# Patient Record
Sex: FEMALE | Race: BLACK OR AFRICAN AMERICAN | NOT HISPANIC OR LATINO | Employment: OTHER | URBAN - METROPOLITAN AREA
[De-identification: names, ages, dates, MRNs, and addresses within clinical notes are randomized per-mention and may not be internally consistent; named-entity substitution may affect disease eponyms.]

---

## 2017-01-03 ENCOUNTER — LAB VISIT (OUTPATIENT)
Dept: LAB | Facility: HOSPITAL | Age: 57
End: 2017-01-03
Attending: INTERNAL MEDICINE
Payer: COMMERCIAL

## 2017-01-03 DIAGNOSIS — Z94.4 LIVER REPLACED BY TRANSPLANT: ICD-10-CM

## 2017-01-03 LAB
ALBUMIN SERPL BCP-MCNC: 3.7 G/DL
ALP SERPL-CCNC: 71 U/L
ALT SERPL W/O P-5'-P-CCNC: 24 U/L
ANION GAP SERPL CALC-SCNC: 6 MMOL/L
AST SERPL-CCNC: 20 U/L
BASOPHILS # BLD AUTO: 0.01 K/UL
BASOPHILS NFR BLD: 0.2 %
BILIRUB SERPL-MCNC: 0.5 MG/DL
BUN SERPL-MCNC: 10 MG/DL
CALCIUM SERPL-MCNC: 9.1 MG/DL
CHLORIDE SERPL-SCNC: 108 MMOL/L
CO2 SERPL-SCNC: 28 MMOL/L
CREAT SERPL-MCNC: 0.8 MG/DL
DIFFERENTIAL METHOD: NORMAL
EOSINOPHIL # BLD AUTO: 0.1 K/UL
EOSINOPHIL NFR BLD: 1.8 %
ERYTHROCYTE [DISTWIDTH] IN BLOOD BY AUTOMATED COUNT: 13.3 %
EST. GFR  (AFRICAN AMERICAN): >60 ML/MIN/1.73 M^2
EST. GFR  (NON AFRICAN AMERICAN): >60 ML/MIN/1.73 M^2
GLUCOSE SERPL-MCNC: 80 MG/DL
HCT VFR BLD AUTO: 39.2 %
HGB BLD-MCNC: 13 G/DL
LYMPHOCYTES # BLD AUTO: 1.2 K/UL
LYMPHOCYTES NFR BLD: 26.8 %
MCH RBC QN AUTO: 29.4 PG
MCHC RBC AUTO-ENTMCNC: 33.2 %
MCV RBC AUTO: 89 FL
MONOCYTES # BLD AUTO: 0.3 K/UL
MONOCYTES NFR BLD: 7.3 %
NEUTROPHILS # BLD AUTO: 2.8 K/UL
NEUTROPHILS NFR BLD: 63.7 %
PLATELET # BLD AUTO: 185 K/UL
PMV BLD AUTO: 10 FL
POTASSIUM SERPL-SCNC: 3.9 MMOL/L
PROT SERPL-MCNC: 7.2 G/DL
RBC # BLD AUTO: 4.42 M/UL
SIROLIMUS BLD-MCNC: 6.5 NG/ML
SODIUM SERPL-SCNC: 142 MMOL/L
TACROLIMUS BLD-MCNC: 2.1 NG/ML
WBC # BLD AUTO: 4.36 K/UL

## 2017-01-03 PROCEDURE — 80053 COMPREHEN METABOLIC PANEL: CPT

## 2017-01-03 PROCEDURE — 85025 COMPLETE CBC W/AUTO DIFF WBC: CPT

## 2017-01-03 PROCEDURE — 36415 COLL VENOUS BLD VENIPUNCTURE: CPT

## 2017-01-03 PROCEDURE — 80197 ASSAY OF TACROLIMUS: CPT

## 2017-01-03 PROCEDURE — 80195 ASSAY OF SIROLIMUS: CPT

## 2017-01-05 ENCOUNTER — TELEPHONE (OUTPATIENT)
Dept: TRANSPLANT | Facility: CLINIC | Age: 57
End: 2017-01-05

## 2017-01-05 DIAGNOSIS — C22.1 CHOLANGIOCARCINOMA: Primary | ICD-10-CM

## 2017-01-05 NOTE — TELEPHONE ENCOUNTER
Dr. Benítez reviewed your labs.  Continue routine labs no changes needed.  Repeat labs on 01/23/17, call to let patient know

## 2017-01-18 DIAGNOSIS — R52 PAIN: Primary | ICD-10-CM

## 2017-01-19 ENCOUNTER — OFFICE VISIT (OUTPATIENT)
Dept: ORTHOPEDICS | Facility: CLINIC | Age: 57
End: 2017-01-19
Payer: COMMERCIAL

## 2017-01-19 ENCOUNTER — HOSPITAL ENCOUNTER (OUTPATIENT)
Dept: RADIOLOGY | Facility: OTHER | Age: 57
Discharge: HOME | End: 2017-01-19
Attending: ORTHOPAEDIC SURGERY
Payer: COMMERCIAL

## 2017-01-19 VITALS
HEART RATE: 66 BPM | DIASTOLIC BLOOD PRESSURE: 68 MMHG | HEIGHT: 62 IN | BODY MASS INDEX: 24.78 KG/M2 | WEIGHT: 134.69 LBS | SYSTOLIC BLOOD PRESSURE: 103 MMHG

## 2017-01-19 DIAGNOSIS — M65.311 TRIGGER FINGER OF RIGHT THUMB: Primary | ICD-10-CM

## 2017-01-19 DIAGNOSIS — M65.312 TRIGGER FINGER OF LEFT THUMB: ICD-10-CM

## 2017-01-19 DIAGNOSIS — M65.30 TRIGGER FINGER OF LEFT HAND, UNSPECIFIED FINGER: ICD-10-CM

## 2017-01-19 DIAGNOSIS — R52 PAIN: ICD-10-CM

## 2017-01-19 PROCEDURE — 20550 NJX 1 TENDON SHEATH/LIGAMENT: CPT | Mod: 51,F3,S$GLB, | Performed by: ORTHOPAEDIC SURGERY

## 2017-01-19 PROCEDURE — 73130 X-RAY EXAM OF HAND: CPT | Mod: 50,TC

## 2017-01-19 PROCEDURE — 1159F MED LIST DOCD IN RCRD: CPT | Mod: S$GLB,,, | Performed by: ORTHOPAEDIC SURGERY

## 2017-01-19 PROCEDURE — 99204 OFFICE O/P NEW MOD 45 MIN: CPT | Mod: 25,S$GLB,, | Performed by: ORTHOPAEDIC SURGERY

## 2017-01-19 PROCEDURE — 99999 PR PBB SHADOW E&M-EST. PATIENT-LVL II: CPT | Mod: PBBFAC,,, | Performed by: ORTHOPAEDIC SURGERY

## 2017-01-19 PROCEDURE — 73130 X-RAY EXAM OF HAND: CPT | Mod: 26,50,, | Performed by: RADIOLOGY

## 2017-01-19 RX ADMIN — DEXAMETHASONE SODIUM PHOSPHATE 4 MG: 4 INJECTION, SOLUTION INTRA-ARTICULAR; INTRALESIONAL; INTRAMUSCULAR; INTRAVENOUS; SOFT TISSUE at 09:01

## 2017-01-19 NOTE — PROCEDURES
Tendon Sheath  Date/Time: 1/19/2017 9:29 AM  Performed by: KATHRYN GRANDA  Authorized by: KATHRYN GRANDA     Consent Done?: Yes (Verbal)  Timeout: prior to procedure the correct patient, procedure, and site was verified   Indications:  Pain  Timeout: prior to procedure the correct patient, procedure, and site was verified    Location:  Thumb  Site:  R thumb MCP  Needle size:  22 G  Medications:  4 mg dexamethasone 4 mg/mL

## 2017-01-19 NOTE — PROCEDURES
Tendon Sheath  Date/Time: 1/19/2017 9:30 AM  Performed by: KATHRYN GRANDA  Authorized by: KATHRYN GRANDA     Consent Done?: Yes (Verbal)  Timeout: prior to procedure the correct patient, procedure, and site was verified    Timeout: prior to procedure the correct patient, procedure, and site was verified    Location:  Thumb  Site:  L thumb MCP  Needle size:  22 G  Medications:  4 mg dexamethasone 4 mg/mL

## 2017-01-19 NOTE — PROGRESS NOTES
Chief Complaint: BL hand pain, RIGHT thumb MCP pain and left thumb and index pain    History of Present Illness:  Nadeen Mcgovern is a very pleasant 56 y.o. female w/ hx of liver tx who presents with bilateral hand with no trauma.  She reports diffuse as well as focal right thumb MCP joint and triggering as well as left thumb and ring pain over A1 and triggering.  She has a hx of RIGHT septic arthritis of her shouder treated with arthroscopic lavage as well ass a right thumb I&D a that same time that reportedly did not extend into the joint .    She denies any f/c/n/v.    Review of Systems   Constitution: Negative. Negative for chills, fever and night sweats.   HENT: neg congestion.    Eyes: Negative for blurred vision.  Cardiovascular: Negative for chest pain and syncope.   Respiratory: Negative for cough and shortness of breath.    Hematologic/Lymphatic: Does not bruise/bleed easily.   Skin: Negative for dry skin, itching and rash.   Musculoskeletal: neg fall.  No weakness  Gastrointestinal: Negative for abdominal pain.  Normal bowel function.  Genitourinary: Normal bladder function   Neurological: No dizziness, loss of balance, seizures.   Psychiatric/Behavioral: Negative for depression.         Past Medical History   Diagnosis Date    Acute cholecystitis      Cholecystitis    Arthritis      septic arthritis of right shoulder    Bacteremia due to Streptococcus pneumoniae     Cancer     Cholangiocarcinoma     Jaundice      obstructive    Prediabetes        Past Surgical History:   Past Surgical History   Procedure Laterality Date     section      Arthoscopic Right      drained infection    Shoulder arthroscopy      Incision and drainage of wound       s/p I&D of R thumb    Liver transplant         Social History:  negative tobacco abuse    Allergies:  Review of patient's allergies indicates:  No Known Allergies    Medications:  Current Outpatient Prescriptions   Medication Sig Dispense Refill     amlodipine (NORVASC) 5 MG tablet Take 1 tablet (5 mg total) by mouth once daily. 90 tablet 3    aspirin 81 MG Chew Take 81 mg by mouth once daily.      famotidine (PEPCID) 20 MG tablet Take 1 tablet (20 mg total) by mouth every evening. 30 tablet 11    hydrOXYzine pamoate (VISTARIL) 50 MG Cap Take 1 capsule (50 mg total) by mouth every 8 (eight) hours as needed. 30 capsule 0    multivitamin (THERAGRAN) tablet Take 1 tablet by mouth once daily.      predniSONE (DELTASONE) 10 MG tablet Take 1 tablet daily x 2 weeks and then 1/2 tab daily x 2 weeks. Then take 1/2 tablet every other day for 2 weeks. 28 tablet 0    sirolimus (RAPAMUNE) 1 MG Tab Take 2 tablets (2 mg total) by mouth once daily. 90 tablet 11    tacrolimus (PROGRAF) 1 MG Cap Take 1 capsule (1 mg total) by mouth every 12 (twelve) hours. 60 capsule 11    tramadol (ULTRAM) 50 mg tablet Take 1 tablet (50 mg total) by mouth every 8 (eight) hours as needed for Pain. 90 tablet 2     No current facility-administered medications for this visit.        Physical Exam:   General:  Well developed and well nourished age appropriate female in no acute distress  Cardiovascular: regular rate and rhythm, 2+ radial pulse  Respiratory: non-labored breathing, no wheezing  Musculoskeletal: RIGHT hand diffuse MCP pain and swelling as well as increased ttp over thumg A1, palpable nodule  LEFT hand + ttp over A1 at left thumb and ring finger  BL NVI Median AIN Radial ,PIN and ulnar  FROM of wrist no pain  - CMC grind test  Neuro: as above    Imaging:  Imaging revealed: + MCP arthritis, no dislocation, BL hands    Diagnosis:  56 year old woman with RIGHT MCP arthritis with additional component of trigger finger.  She also has left ring and thumb trigger finger    Plan:   1. We discussed both surgical and non surgical options.  Patient would like to proceed with non-surgical intervention.   2. BL thumb and left ring finger trigger finger injection today

## 2017-01-19 NOTE — LETTER
January 20, 2017      Ankit العلي MD  [address]  [facility] [address]           [facility]  [address]  [address]  [facility] [address]  Phone: [phone]          Patient: Nadeen Mcgovern   MR Number: [id number]   YOB: 1960   Date of Visit: 1/19/2017       Dear Dr. Ankit العلي:    Thank you for referring Nadeen Mcgovern to me for evaluation. Attached you will find relevant portions of my assessment and plan of care.    If you have questions, please do not hesitate to call me. I look forward to following Nadeen Mcgovern along with you.    Sincerely,    Jovana Rossi MD    Enclosure  CC:  No Recipients    If you would like to receive this communication electronically, please contact [email or web address] or [phone] to request more information on [employer] Link access.    For providers and/or their staff who would like to refer a patient to [facility], please contact us through our one-stop-shop provider referral line, [facility], at [phone].    If you feel you have received this communication in error or would no longer like to receive these types of communications, please e-mail [email or web address]

## 2017-01-19 NOTE — PROCEDURES
Tendon Sheath  Date/Time: 1/19/2017 9:31 AM  Performed by: KATHRYN GRANDA  Authorized by: KATHRYN GRANDA     Consent Done?: Yes (Verbal)  Timeout: prior to procedure the correct patient, procedure, and site was verified    Indications:  Pain  Timeout: prior to procedure the correct patient, procedure, and site was verified    Location:  Ring finger  Site:  L ring MCP  Needle size:  22 G  Medications:  4 mg dexamethasone 4 mg/mL

## 2017-01-20 RX ORDER — DEXAMETHASONE SODIUM PHOSPHATE 4 MG/ML
4 INJECTION, SOLUTION INTRA-ARTICULAR; INTRALESIONAL; INTRAMUSCULAR; INTRAVENOUS; SOFT TISSUE
Status: DISCONTINUED | OUTPATIENT
Start: 2017-01-19 | End: 2017-01-20 | Stop reason: HOSPADM

## 2017-01-20 NOTE — PROGRESS NOTES
I have personally taken the history and examined this patient. I agree with the resident's note as stated above. We will do three trigger finger injections today. Pt will f/u with rheum for her scleroderma and pt was referred to GI by rheum. Pt does not seem to have a real grasp on her condition. Spent significant amount t of time on education.

## 2017-01-23 ENCOUNTER — LAB VISIT (OUTPATIENT)
Dept: LAB | Facility: HOSPITAL | Age: 57
End: 2017-01-23
Attending: INTERNAL MEDICINE
Payer: MEDICAID

## 2017-01-23 ENCOUNTER — TELEPHONE (OUTPATIENT)
Dept: TRANSPLANT | Facility: CLINIC | Age: 57
End: 2017-01-23

## 2017-01-23 DIAGNOSIS — C22.1 CHOLANGIOCELLULAR CARCINOMA: ICD-10-CM

## 2017-01-23 DIAGNOSIS — Z94.4 LIVER REPLACED BY TRANSPLANT: ICD-10-CM

## 2017-01-23 DIAGNOSIS — C22.1 CHOLANGIOCARCINOMA: ICD-10-CM

## 2017-01-23 LAB
AFP SERPL-MCNC: 1.2 NG/ML
ALBUMIN SERPL BCP-MCNC: 4 G/DL
ALP SERPL-CCNC: 81 U/L
ALT SERPL W/O P-5'-P-CCNC: 31 U/L
ANION GAP SERPL CALC-SCNC: 7 MMOL/L
AST SERPL-CCNC: 23 U/L
BASOPHILS # BLD AUTO: 0.02 K/UL
BASOPHILS NFR BLD: 0.4 %
BILIRUB SERPL-MCNC: 0.8 MG/DL
BUN SERPL-MCNC: 12 MG/DL
CALCIUM SERPL-MCNC: 9.4 MG/DL
CANCER AG19-9 SERPL-ACNC: 10 U/ML
CEA SERPL-MCNC: 1.5 NG/ML
CHLORIDE SERPL-SCNC: 107 MMOL/L
CO2 SERPL-SCNC: 26 MMOL/L
CREAT SERPL-MCNC: 0.7 MG/DL
DIFFERENTIAL METHOD: NORMAL
EOSINOPHIL # BLD AUTO: 0.1 K/UL
EOSINOPHIL NFR BLD: 2 %
ERYTHROCYTE [DISTWIDTH] IN BLOOD BY AUTOMATED COUNT: 13.2 %
EST. GFR  (AFRICAN AMERICAN): >60 ML/MIN/1.73 M^2
EST. GFR  (NON AFRICAN AMERICAN): >60 ML/MIN/1.73 M^2
GLUCOSE SERPL-MCNC: 96 MG/DL
HCT VFR BLD AUTO: 40.5 %
HGB BLD-MCNC: 13.8 G/DL
LYMPHOCYTES # BLD AUTO: 1 K/UL
LYMPHOCYTES NFR BLD: 20.5 %
MCH RBC QN AUTO: 29.6 PG
MCHC RBC AUTO-ENTMCNC: 34.1 %
MCV RBC AUTO: 87 FL
MONOCYTES # BLD AUTO: 0.3 K/UL
MONOCYTES NFR BLD: 6.8 %
NEUTROPHILS # BLD AUTO: 3.5 K/UL
NEUTROPHILS NFR BLD: 70.1 %
PLATELET # BLD AUTO: 166 K/UL
PMV BLD AUTO: 9.8 FL
POTASSIUM SERPL-SCNC: 3.7 MMOL/L
PROT SERPL-MCNC: 7.6 G/DL
RBC # BLD AUTO: 4.66 M/UL
SIROLIMUS BLD-MCNC: 5.9 NG/ML
SODIUM SERPL-SCNC: 140 MMOL/L
TACROLIMUS BLD-MCNC: 1.8 NG/ML
WBC # BLD AUTO: 4.97 K/UL

## 2017-01-23 PROCEDURE — 80195 ASSAY OF SIROLIMUS: CPT

## 2017-01-23 PROCEDURE — 86301 IMMUNOASSAY TUMOR CA 19-9: CPT

## 2017-01-23 PROCEDURE — 82378 CARCINOEMBRYONIC ANTIGEN: CPT

## 2017-01-23 PROCEDURE — 80197 ASSAY OF TACROLIMUS: CPT

## 2017-01-23 PROCEDURE — 85025 COMPLETE CBC W/AUTO DIFF WBC: CPT

## 2017-01-23 PROCEDURE — 80053 COMPREHEN METABOLIC PANEL: CPT

## 2017-01-23 PROCEDURE — 82105 ALPHA-FETOPROTEIN SERUM: CPT

## 2017-01-23 PROCEDURE — 36415 COLL VENOUS BLD VENIPUNCTURE: CPT

## 2017-01-26 ENCOUNTER — TELEPHONE (OUTPATIENT)
Dept: TRANSPLANT | Facility: CLINIC | Age: 57
End: 2017-01-26

## 2017-01-26 NOTE — TELEPHONE ENCOUNTER
----- Message from Maryuri Tong sent at 1/26/2017  1:23 PM CST -----  Contact: pt  Called regarding PA for Ansleyludy, please call [phone]

## 2017-01-26 NOTE — TELEPHONE ENCOUNTER
Pharmacist was able get Medicaid to pay for the Rapamune so patient will continue with current meds.

## 2017-01-28 ENCOUNTER — HOSPITAL ENCOUNTER (OUTPATIENT)
Dept: RADIOLOGY | Facility: HOSPITAL | Age: 57
Discharge: HOME | End: 2017-01-28
Attending: INTERNAL MEDICINE
Payer: MEDICAID

## 2017-01-28 DIAGNOSIS — C22.1 CHOLANGIOCARCINOMA: ICD-10-CM

## 2017-01-28 PROCEDURE — 25500020 PHARM REV CODE 255: Performed by: INTERNAL MEDICINE

## 2017-01-28 PROCEDURE — 74177 CT ABD & PELVIS W/CONTRAST: CPT | Mod: 26,,, | Performed by: RADIOLOGY

## 2017-01-28 PROCEDURE — 71260 CT THORAX DX C+: CPT | Mod: 26,,, | Performed by: RADIOLOGY

## 2017-01-28 PROCEDURE — 71260 CT THORAX DX C+: CPT | Mod: TC

## 2017-01-28 PROCEDURE — 74177 CT ABD & PELVIS W/CONTRAST: CPT | Mod: TC

## 2017-01-28 RX ADMIN — IOHEXOL 75 ML: 350 INJECTION, SOLUTION INTRAVENOUS at 10:01

## 2017-01-28 RX ADMIN — IOHEXOL 15 ML: 350 INJECTION, SOLUTION INTRAVENOUS at 09:01

## 2017-01-28 RX ADMIN — IOHEXOL 15 ML: 350 INJECTION, SOLUTION INTRAVENOUS at 08:01

## 2017-01-31 ENCOUNTER — TELEPHONE (OUTPATIENT)
Dept: TRANSPLANT | Facility: CLINIC | Age: 57
End: 2017-01-31

## 2017-01-31 ENCOUNTER — TELEPHONE (OUTPATIENT)
Dept: GASTROENTEROLOGY | Facility: CLINIC | Age: 57
End: 2017-01-31

## 2017-02-01 ENCOUNTER — OFFICE VISIT (OUTPATIENT)
Dept: GASTROENTEROLOGY | Facility: CLINIC | Age: 57
End: 2017-02-01
Payer: MEDICAID

## 2017-02-01 ENCOUNTER — TELEPHONE (OUTPATIENT)
Dept: ENDOSCOPY | Facility: HOSPITAL | Age: 57
End: 2017-02-01

## 2017-02-01 VITALS
WEIGHT: 132.25 LBS | HEART RATE: 72 BPM | HEIGHT: 62 IN | DIASTOLIC BLOOD PRESSURE: 68 MMHG | SYSTOLIC BLOOD PRESSURE: 119 MMHG | BODY MASS INDEX: 24.34 KG/M2

## 2017-02-01 DIAGNOSIS — R13.10 DYSPHAGIA, UNSPECIFIED TYPE: Primary | ICD-10-CM

## 2017-02-01 PROCEDURE — 99999 PR PBB SHADOW E&M-EST. PATIENT-LVL III: CPT | Mod: PBBFAC,,, | Performed by: INTERNAL MEDICINE

## 2017-02-01 PROCEDURE — 99213 OFFICE O/P EST LOW 20 MIN: CPT | Mod: PBBFAC | Performed by: INTERNAL MEDICINE

## 2017-02-01 PROCEDURE — 99214 OFFICE O/P EST MOD 30 MIN: CPT | Mod: S$PBB,,, | Performed by: INTERNAL MEDICINE

## 2017-02-01 NOTE — LETTER
February 1, 2017      Ankit العلي MD  [address]  [facility] [address]           [facility] - Gastroenterology  [address]  [facility] [address]  Phone: [phone]  Fax: [phone]          Patient: Nadeen Mcgovern   MR Number: [id number]   YOB: 1960   Date of Visit: 2/1/2017       Dear Dr. Ankit العلي:    Thank you for referring Nadeen Mcgovern to me for evaluation. Attached you will find relevant portions of my assessment and plan of care.    If you have questions, please do not hesitate to call me. I look forward to following Nadeen Mcgovern along with you.    Sincerely,    Carlos Haynes MD    Enclosure  CC:  No Recipients    If you would like to receive this communication electronically, please contact [email or web address] or [phone] to request more information on [employer] Link access.    For providers and/or their staff who would like to refer a patient to [facility], please contact us through our one-stop-shop provider referral line, [facility], at [phone].    If you feel you have received this communication in error or would no longer like to receive these types of communications, please e-mail [email or web address]

## 2017-02-01 NOTE — MR AVS SNAPSHOT
[facility] - Gastroenterology  [address]  [facility] [address]  Phone: [phone]  Fax: [phone]                  Nadeen Mcgovern   2017 9:00 AM   Office Visit    Description:  Female : 1960   Provider:  Carlos Haynes MD   Department:  [facility] - Gastroenterology           Reason for Visit     GI Problem           Diagnoses this Visit        Comments    Dysphagia, unspecified type    -  Primary            To Do List           Future Appointments        Provider Department Dept Phone    2017 8:00 AM NOM [id number] 350 LB LIMIT [facility] [phone]    3/6/2017 8:05 AM LAB, TRANSPLANT [facility] [phone]    2017 9:00 AM Haresh Butler MD [facility] Rheumatology [phone]      Goals (5 Years of Data)     None      [facility]     [facility] Care [facility] -  Assistance  Registered nurses in the [facility] provide clinical advisement, health education, appointment booking, and other advisory services.  Call for this free service at [phone].             Medications           Message regarding Medications     Verify the changes and/or additions to your medication regime listed below are the same as discussed with your clinician today.  If any of these changes or additions are incorrect, please notify your healthcare provider.             Verify that the below list of medications is an accurate representation of the medications you are currently taking.  If none reported, the list may be blank. If incorrect, please contact your healthcare provider. Carry this list with you in case of emergency.           Current Medications     amlodipine (NORVASC) 5 MG tablet Take 1 tablet (5 mg total) by mouth once daily.    aspirin 81 MG Chew Take 81 mg by mouth once daily.    famotidine (PEPCID) 20 MG tablet Take 1 tablet (20 mg total) by mouth every evening.    hydrOXYzine pamoate (VISTARIL) 50 MG Cap Take 1  "capsule (50 mg total) by mouth every 8 (eight) hours as needed.    multivitamin (THERAGRAN) tablet Take 1 tablet by mouth once daily.    predniSONE (DELTASONE) 10 MG tablet Take 1 tablet daily x 2 weeks and then 1/2 tab daily x 2 weeks. Then take 1/2 tablet every other day for 2 weeks.    sirolimus (RAPAMUNE) 1 MG Tab Take 2 tablets (2 mg total) by mouth once daily.    tacrolimus (PROGRAF) 1 MG Cap Take 1 capsule (1 mg total) by mouth every 12 (twelve) hours.    tramadol (ULTRAM) 50 mg tablet Take 1 tablet (50 mg total) by mouth every 8 (eight) hours as needed for Pain.           Clinical Reference Information           Vital Signs - Last Recorded  Most recent update: 2/1/2017  9:10 AM by Yaw Banks MA    BP Pulse Ht Wt LMP BMI    119/68 72 5' 2" (1.575 m) 60 kg (132 lb 4.4 oz) (LMP Unknown) 24.19 kg/m2      Blood Pressure          Most Recent Value    BP  119/68      Allergies as of 2/1/2017     No Known Allergies      Immunizations Administered on Date of Encounter - 2/1/2017     None      Orders Placed During Today's Visit      Normal Orders This Visit    Case request GI: ESOPHAGOGASTRODUODENOSCOPY (EGD)     Future Labs/Procedures Expected by Expires    FL Esophagram Complete  2/1/2017 2/1/2018      Maintenance Dialysis History     Patient has no recorded history of maintenance dialysis.      Transplant Information        Txp Date Organ Coordinator Care Team    10/8/2015 Liver Chetna Pickard RN Referring Physician:  Wendy Hernandez MD   Current PCP:  Wendy Hernandez MD   Current Hepatologist:  Мария Benítez MD   Corresponding Physician:  Wendy Hernandez MD   Surgeon:  Floyd Recinos MD   Assisting Surgeon:  Vicente Salguero MD   Resident:  Robson Garcia MD   Fellow:  Terrance Black MD         "

## 2017-02-01 NOTE — PROGRESS NOTES
REASON FOR VISIT:  Dysphagia.    HISTORY OF PRESENT ILLNESS:  Ms. Mcgovern is a 56-year-old who has been   complaining of intermittent dysphagia to solids and occasionally to liquids for   the past at least two years.  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  Her dysphagia   complaints include mostly feeling of food when she swallows the food, there is   occasional gurgling in her chest, it may also occasionally happen with liquids.    There is no regurgitation of liquids through her nose.  She denies any   heartburn, although she has been taking Pepcid regularly and upon further query   she did admit she used to have some heartburn symptoms.  Denies any   regurgitation of food when she lies down.  She denies any chest pain except when   the food gets stuck in her chest.  She has never had food impactions in the   past.  There is no shortness of breath.  She has good exercise tolerance, has no   abdominal pain, nausea or vomiting.  Her bowels move every day to every other   day.  There is no evidence of blood in the stool.  Her last colonoscopy was   probably within the last five years, although not done here at [facility].  She   does not have any polyps and she recollects she was asked to come back in 10   years.    PAST MEDICAL, SURGICAL, SOCIAL AND FAMILY HISTORY:  Reviewed.    MEDICATIONS AND ALLERGIES:  Reviewed.    REVIEW OF SYSTEMS:  CONSTITUTIONAL:  No fever, no chills, no malaise or fatigue.  EYES:  No visual changes.  ENT:  No odynophagia or hoarseness of voice.  CARDIOVASCULAR:  No angina or palpitations.  RESPIRATORY:  No shortness of breath or wheezing.  GENITOURINARY:  No dysuria or frequency.  MUSCULOSKELETAL:  No arthralgias or myalgias.  SKIN:  Has eczema on both her hands.  NEUROLOGIC:  No headache, no seizures.  PSYCHIATRIC:  No anxiety or  depression.  GASTROINTESTINAL:  See HPI.    PHYSICAL EXAMINATION:  VITAL SIGNS:  See EPIC.  GENERAL:  Awake, alert and oriented x3, no acute distress.  NECK:  Supple.  No carotid bruits.  No cervical adenopathy.  ABDOMEN:  Flat, soft, nontender, nondistended.  No masses palpable.  No   hepatosplenomegaly appreciated.  Bowel sounds are normal.  No abdominal bruits   heard.  EYES:  Conjunctivae anicteric.  ENT:  Oropharynx, mucosa moist.  Mallampati 3.  CARDIOVASCULAR:  S1, S2 normal.  RESPIRATORY:  Bilateral air entry equal.  SKIN:  Sclerodactyly noted on both hands.  NEUROLOGIC:  No asterixis.  PSYCHIATRY:  Affect appropriate.  LOWER EXTREMITY:  No pedal edema.    IMPRESSION:  Dysphagia, mostly to solids, but occasionally to liquids - most   likely secondary to dysmotility.  However, a mechanical problem cannot be   entirely ruled out.    RECOMMENDATION:  1. We will proceed with a barium esophagram and we will also use a barium   tablet.  2. We will also schedule an upper endoscopy for further evaluation with possible   biopsies with or without dilation.  Further recommendation based on the above.      ACT/HN  dd: 02/01/2017 09:32:19 (CST)  td: 02/02/2017 01:30:55 (CST)  Doc ID   #[id number]  Job ID #[id number]    CC:

## 2017-02-02 ENCOUNTER — HOSPITAL ENCOUNTER (OUTPATIENT)
Dept: RADIOLOGY | Facility: HOSPITAL | Age: 57
Discharge: HOME | End: 2017-02-02
Attending: INTERNAL MEDICINE
Payer: MEDICAID

## 2017-02-02 DIAGNOSIS — R13.10 DYSPHAGIA, UNSPECIFIED TYPE: ICD-10-CM

## 2017-02-02 PROCEDURE — 74220 X-RAY XM ESOPHAGUS 1CNTRST: CPT | Mod: 26,,, | Performed by: RADIOLOGY

## 2017-02-02 PROCEDURE — 74220 X-RAY XM ESOPHAGUS 1CNTRST: CPT | Mod: TC

## 2017-02-03 ENCOUNTER — ANESTHESIA EVENT (OUTPATIENT)
Dept: ENDOSCOPY | Facility: HOSPITAL | Age: 57
End: 2017-02-03
Payer: MEDICAID

## 2017-02-03 ENCOUNTER — HOSPITAL ENCOUNTER (OUTPATIENT)
Facility: HOSPITAL | Age: 57
Discharge: HOME | End: 2017-02-03
Attending: INTERNAL MEDICINE | Admitting: INTERNAL MEDICINE
Payer: MEDICAID

## 2017-02-03 ENCOUNTER — ANESTHESIA (OUTPATIENT)
Dept: ENDOSCOPY | Facility: HOSPITAL | Age: 57
End: 2017-02-03
Payer: MEDICAID

## 2017-02-03 ENCOUNTER — SURGERY (OUTPATIENT)
Age: 57
End: 2017-02-03

## 2017-02-03 VITALS
BODY MASS INDEX: 25.03 KG/M2 | TEMPERATURE: 98 F | DIASTOLIC BLOOD PRESSURE: 67 MMHG | OXYGEN SATURATION: 98 % | RESPIRATION RATE: 16 BRPM | HEART RATE: 66 BPM | SYSTOLIC BLOOD PRESSURE: 120 MMHG | HEIGHT: 62 IN | WEIGHT: 136 LBS

## 2017-02-03 VITALS — RESPIRATION RATE: 50 BRPM

## 2017-02-03 DIAGNOSIS — R13.10 DYSPHAGIA, UNSPECIFIED TYPE: Primary | ICD-10-CM

## 2017-02-03 DIAGNOSIS — R13.10 DYSPHAGIA: ICD-10-CM

## 2017-02-03 PROCEDURE — 43249 ESOPH EGD DILATION <30 MM: CPT | Performed by: INTERNAL MEDICINE

## 2017-02-03 PROCEDURE — 43245 EGD DILATE STRICTURE: CPT | Performed by: INTERNAL MEDICINE

## 2017-02-03 PROCEDURE — 88305 TISSUE EXAM BY PATHOLOGIST: CPT | Performed by: PATHOLOGY

## 2017-02-03 PROCEDURE — 25000003 PHARM REV CODE 250: Performed by: NURSE ANESTHETIST, CERTIFIED REGISTERED

## 2017-02-03 PROCEDURE — 43239 EGD BIOPSY SINGLE/MULTIPLE: CPT | Mod: ,,, | Performed by: INTERNAL MEDICINE

## 2017-02-03 PROCEDURE — 37000009 HC ANESTHESIA EA ADD 15 MINS: Performed by: INTERNAL MEDICINE

## 2017-02-03 PROCEDURE — D9220A PRA ANESTHESIA: Mod: CRNA,,, | Performed by: NURSE ANESTHETIST, CERTIFIED REGISTERED

## 2017-02-03 PROCEDURE — C1726 CATH, BAL DIL, NON-VASCULAR: HCPCS | Performed by: INTERNAL MEDICINE

## 2017-02-03 PROCEDURE — 25000003 PHARM REV CODE 250: Performed by: INTERNAL MEDICINE

## 2017-02-03 PROCEDURE — 27201012 HC FORCEPS, HOT/COLD, DISP: Performed by: INTERNAL MEDICINE

## 2017-02-03 PROCEDURE — D9220A PRA ANESTHESIA: Mod: ANES,,, | Performed by: ANESTHESIOLOGY

## 2017-02-03 PROCEDURE — 43239 EGD BIOPSY SINGLE/MULTIPLE: CPT | Performed by: INTERNAL MEDICINE

## 2017-02-03 PROCEDURE — 37000008 HC ANESTHESIA 1ST 15 MINUTES: Performed by: INTERNAL MEDICINE

## 2017-02-03 PROCEDURE — 43249 ESOPH EGD DILATION <30 MM: CPT | Mod: ,,, | Performed by: INTERNAL MEDICINE

## 2017-02-03 PROCEDURE — 63600175 PHARM REV CODE 636 W HCPCS: Performed by: NURSE ANESTHETIST, CERTIFIED REGISTERED

## 2017-02-03 PROCEDURE — 88305 TISSUE EXAM BY PATHOLOGIST: CPT | Mod: 26,,, | Performed by: PATHOLOGY

## 2017-02-03 RX ORDER — PROPOFOL 10 MG/ML
VIAL (ML) INTRAVENOUS
Status: DISCONTINUED | OUTPATIENT
Start: 2017-02-03 | End: 2017-02-03

## 2017-02-03 RX ORDER — PROPOFOL 10 MG/ML
VIAL (ML) INTRAVENOUS CONTINUOUS PRN
Status: DISCONTINUED | OUTPATIENT
Start: 2017-02-03 | End: 2017-02-03

## 2017-02-03 RX ORDER — SODIUM CHLORIDE 9 MG/ML
INJECTION, SOLUTION INTRAVENOUS CONTINUOUS
Status: DISCONTINUED | OUTPATIENT
Start: 2017-02-03 | End: 2017-02-03 | Stop reason: HOSPADM

## 2017-02-03 RX ORDER — LIDOCAINE HCL/PF 100 MG/5ML
SYRINGE (ML) INTRAVENOUS
Status: DISCONTINUED | OUTPATIENT
Start: 2017-02-03 | End: 2017-02-03

## 2017-02-03 RX ADMIN — PROPOFOL 150 MCG/KG/MIN: 10 INJECTION, EMULSION INTRAVENOUS at 10:02

## 2017-02-03 RX ADMIN — LIDOCAINE HYDROCHLORIDE 50 MG: 20 INJECTION, SOLUTION INTRAVENOUS at 10:02

## 2017-02-03 RX ADMIN — PROPOFOL 100 MG: 10 INJECTION, EMULSION INTRAVENOUS at 10:02

## 2017-02-03 RX ADMIN — SODIUM CHLORIDE: 0.9 INJECTION, SOLUTION INTRAVENOUS at 10:02

## 2017-02-03 NOTE — ANESTHESIA RELEASE NOTE
"Anesthesia Release from PACU Note    Patient: Nadeen Mcgovern    Procedure(s) Performed: Procedure(s) (LRB):  ESOPHAGOGASTRODUODENOSCOPY (EGD) (N/A)    Anesthesia type: general    Post pain: Adequate analgesia    Post assessment: no apparent anesthetic complications, tolerated procedure well and no evidence of recall    Last Vitals:   Visit Vitals    /67 (BP Location: Left arm, Patient Position: Lying, BP Method: Automatic)    Pulse 66    Temp 36.5 °C (97.7 °F)    Resp 16    Ht 5' 2" (1.575 m)    Wt 61.7 kg (136 lb)    LMP  (LMP Unknown)    SpO2 98%    Breastfeeding No    BMI 24.87 kg/m2       Post vital signs: stable    Level of consciousness: awake, alert  and oriented    Nausea/Vomiting: no nausea/no vomiting    Complications: none    Airway Patency: patent    Respiratory: unassisted, spontaneous ventilation, room air    Cardiovascular: stable and blood pressure at baseline    Hydration: euvolemic  "

## 2017-02-03 NOTE — TRANSFER OF CARE
"Anesthesia Transfer of Care Note    Patient: Nadeen Mcgovern    Procedure(s) Performed: Procedure(s) (LRB):  ESOPHAGOGASTRODUODENOSCOPY (EGD) (N/A)    Patient location: PACU    Anesthesia Type: general    Transport from OR: Transported from OR on room air with adequate spontaneous ventilation    Post pain: adequate analgesia    Post assessment: no apparent anesthetic complications    Post vital signs: stable    Level of consciousness: awake    Nausea/Vomiting: no nausea/vomiting    Complications: none          Last vitals:   Visit Vitals    BP (!) 114/58    Pulse 60    Temp 36.5 °C (97.7 °F)    Resp 18    Ht 5' 2" (1.575 m)    Wt 61.7 kg (136 lb)    LMP  (LMP Unknown)    SpO2 100%    Breastfeeding No    BMI 24.87 kg/m2     "

## 2017-02-03 NOTE — ANESTHESIA PREPROCEDURE EVALUATION
02/03/2017  Nadeen Mcgovern is a 56 y.o., female.    [facility] Anesthesia Evaluation    I have reviewed the Patient Summary Reports.    I have reviewed the Nursing Notes.   I have reviewed the Medications.     Review of Systems  Anesthesia Hx:  No problems with previous Anesthesia  History of prior surgery of interest to airway management or planning: liver transplant. Previous anesthesia: General Denies Family Hx of Anesthesia complications.   Denies Personal Hx of Anesthesia complications.   Social:  Non-Smoker    Hematology/Oncology:  Hematology Normal       -- Cancer in past history:  Oncology Comments: Cholangiocarcinoma     EENT/Dental:   Full upper/lower dentures   Cardiovascular:  Cardiovascular Normal Exercise tolerance: good     Pulmonary:  Pulmonary Normal    Renal/:  Renal/ Normal     Hepatic/GI:   GERD Liver Disease, S/p liver transplant   Musculoskeletal:  Musculoskeletal Normal    Neurological:  Neurology Normal    Endocrine:  Endocrine Normal    Dermatological:   Scleroderma   Psych:  Psychiatric Normal           Physical Exam  General:  Well nourished    Airway/Jaw/Neck:  Airway Findings: Mouth Opening: Small, but > 3cm Tongue: Normal  General Airway Assessment: Adult, Average  Mallampati: III  Improves to II with phonation.  TM Distance: 4 - 6 cm        Eyes/Ears/Nose:  EYES/EARS/NOSE FINDINGS: Normal   Dental:  Dental Findings: Upper Dentures, Lower Dentures   Chest/Lungs:  Chest/Lungs Findings: Clear to auscultation, Normal Respiratory Rate     Heart/Vascular:  Heart Findings: Rate: Normal  Rhythm: Regular Rhythm  Sounds: Normal  Heart murmur: negative Vascular Findings: Normal    Abdomen:  Abdomen Findings: Normal    Musculoskeletal:  Musculoskeletal Findings: Normal   Skin:  Skin Findings: Normal    Mental Status:  Mental Status Findings:  Cooperative, Alert and Oriented         Anesthesia  Plan  Type of Anesthesia, risks & benefits discussed:  Anesthesia Type:  general  Patient's Preference:   Intra-op Monitoring Plan:   Intra-op Monitoring Plan Comments:   Post Op Pain Control Plan:   Post Op Pain Control Plan Comments:   Induction:   IV  Beta Blocker:  Patient is not currently on a Beta-Blocker (No further documentation required).       Informed Consent: Patient understands risks and agrees with Anesthesia plan.  Questions answered. Anesthesia consent signed with patient.  ASA Score: 3     Day of Surgery Review of History & Physical:  There are no significant changes.          Ready For Surgery From Anesthesia Perspective.

## 2017-02-03 NOTE — INTERVAL H&P NOTE
The patient has been examined and the H&P has been reviewed:    There is no interval changes since last encounter.  EGD: Dysphagia  Sedation: GA  ASA: Per anesthesia  Mallampati: Per anesthesia    Endoscopy risks, benefits and alternative options discussed and understood by patient/family.          There are no hospital problems to display for this patient.

## 2017-02-03 NOTE — IP AVS SNAPSHOT
[facility]  [address]  [facility] [address]  Phone: [phone]           Patient Discharge Instructions     Our goal is to set you up for success. This packet includes information on your condition, medications, and your home care. It will help you to care for yourself so you don't get sicker and need to go back to the hospital.     Please ask your nurse if you have any questions.        There are many details to remember when preparing to leave the hospital. Here is what you will need to do:    1. Take your medicine. If you are prescribed medications, review your Medication List in the following pages. You may have new medications to  at the pharmacy and others that you'll need to stop taking. Review the instructions for how and when to take your medications. Talk with your doctor or nurses if you are unsure of what to do.     2. Go to your follow-up appointments. Specific follow-up information is listed in the following pages. Your may be contacted by a transition nurse or clinical provider about future appointments. Be sure we have all of the phone numbers to reach you, if needed. Please contact your provider's office if you are unable to make an appointment.     3. Watch for warning signs. Your doctor or nurse will give you detailed warning signs to watch for and when to call for assistance. These instructions may also include educational information about your condition. If you experience any of warning signs to your health, call your doctor.               [facility]  Unless otherwise directed by your provider, please contact [facility], our nurse care line that is available for 24/7 assistance.     [phone] (toll-[phone])    Registered nurses in the [facility] provide clinical advisement, health education, appointment booking, and other advisory services.                    ** Verify the list of medication(s) below is accurate and up  to date. Carry this with you in case of emergency. If your medications have changed, please notify your healthcare provider.             Medication List      CONTINUE taking these medications        Additional Info                      amlodipine 5 MG tablet   Commonly known as:  NORVASC   Quantity:  90 tablet   Refills:  3   Dose:  5 mg    Instructions:  Take 1 tablet (5 mg total) by mouth once daily.     Begin Date    AM    Noon    PM    Bedtime       aspirin 81 MG Chew   Refills:  0   Dose:  81 mg    Instructions:  Take 81 mg by mouth once daily.     Begin Date    AM    Noon    PM    Bedtime       famotidine 20 MG tablet   Commonly known as:  PEPCID   Quantity:  30 tablet   Refills:  11   Dose:  20 mg    Instructions:  Take 1 tablet (20 mg total) by mouth every evening.     Begin Date    AM    Noon    PM    Bedtime       hydrOXYzine pamoate 50 MG Cap   Commonly known as:  VISTARIL   Quantity:  30 capsule   Refills:  0   Dose:  50 mg    Instructions:  Take 1 capsule (50 mg total) by mouth every 8 (eight) hours as needed.     Begin Date    AM    Noon    PM    Bedtime       multivitamin tablet   Commonly known as:  THERAGRAN   Refills:  0   Dose:  1 tablet    Instructions:  Take 1 tablet by mouth once daily.     Begin Date    AM    Noon    PM    Bedtime       predniSONE 10 MG tablet   Commonly known as:  DELTASONE   Quantity:  28 tablet   Refills:  0    Instructions:  Take 1 tablet daily x 2 weeks and then 1/2 tab daily x 2 weeks. Then take 1/2 tablet every other day for 2 weeks.     Begin Date    AM    Noon    PM    Bedtime       sirolimus 1 MG Tab   Commonly known as:  RAPAMUNE   Quantity:  90 tablet   Refills:  11   Dose:  2 mg    Instructions:  Take 2 tablets (2 mg total) by mouth once daily.     Begin Date    AM    Noon    PM    Bedtime       tacrolimus 1 MG Cap   Commonly known as:  PROGRAF   Quantity:  60 capsule   Refills:  11   Dose:  1 mg   Indications:  Prevention of Liver Transplant Rejection     Instructions:  Take 1 capsule (1 mg total) by mouth every 12 (twelve) hours.     Begin Date    AM    Noon    PM    Bedtime       tramadol 50 mg tablet   Commonly known as:  ULTRAM   Quantity:  90 tablet   Refills:  2   Dose:  50 mg    Instructions:  Take 1 tablet (50 mg total) by mouth every 8 (eight) hours as needed for Pain.     Begin Date    AM    Noon    PM    Bedtime                  Please bring to all follow up appointments:    1. A copy of your discharge instructions.  2. All medicines you are currently taking in their original bottles.  3. Identification and insurance card.    Please arrive 15 minutes ahead of scheduled appointment time.    Please call 24 hours in advance if you must reschedule your appointment and/or time.        Your Scheduled Appointments     Mar 06, 2017  8:05 AM CST   Fasting Lab with LAB, TRANSPLANT   [facility] ([facility] )    [address]  [place] [address] [phone]            Apr 07, 2017  9:00 AM CDT   Established Patient Visit with Haresh Butler MD   [facility] - Rheumatology ([facility] )    [address]  [place] [address] [phone]              Follow-up Information     Follow up with Nadeen Figueroa MD.    Specialty:  Internal Medicine    Contact information:    [address]  [place] [id number] [phone]          Discharge Instructions     Future Orders    Diet general     Questions:    Total calories:      Fat restriction, if any:      Protein restriction, if any:      Na restriction, if any:      Fluid restriction:      Additional restrictions:          Discharge Instructions         Upper GI Endoscopy     During endoscopy, a long, flexible tube is used to view the inside of your upper GI tract.      Upper GI endoscopy allows your healthcare provider to look directly into the beginning of your gastrointestinal (GI) tract. The esophagus, stomach, and duodenum (the first part of the small intestine) make  up the upper GI tract.   Before the exam  Follow these and any other instructions you are given before your endoscopy. If you dont follow the healthcare providers instructions carefully, the test may need to be canceled or done over:  · Don't eat or drink anything after midnight the night before your exam. If your exam is in the afternoon, drink only clear liquids in the morning. Don't eat or drink anything for 8 hours before the exam. In some cases, you may be able to take medicines with sips of water until 2 hours before the procedure. Speak with your healthcare provider about this.   · Bring your X-rays and any other test results you have.  · Because you will be sedated, arrange for an adult to drive you home after the exam.  · Tell your healthcare provider before the exam if you are taking any medicines or have any medical problems.  The procedure  Here is what to expect:  · You will lie on the endoscopy table. Usually patients lie on the left side.  · You will be monitored and given oxygen.  · Your throat may be numbed with a spray or gargle. You are given medicine through an intravenous (IV) line that will help you relax and remain comfortable. You may be awake or asleep during the procedure.  · The healthcare provider will put the endoscope in your mouth and down your esophagus. It is thinner than most pieces of food that you swallow. It will not affect your breathing. The medicine helps keep you from gagging.  · Air is put into your GI tract to expand it. It can make you burp.  · During the procedure, the healthcare provider can take biopsies (tissue samples), remove abnormalities, such as polyps, or treat abnormalities through a variety of devices placed through the endoscope. You will not feel this.   · The endoscope carries images of your upper GI tract to a video screen. If you are awake, you may be able to look at the images.  · After the procedure is done, you will rest for a time. An adult must  "drive you home.  When to call your healthcare provider  Contact your healthcare provider if you have:  · Black or tarry stools, or blood in your stool  · Fever  · Pain in your belly that does not go away  · Nausea and vomiting, or vomiting blood   Date Last Reviewed: 7/1/2016  © 1753-3636 [employer]. [address] [place] [address], [place] [address]. All rights reserved. This information is not intended as a substitute for professional medical care. Always follow your healthcare professional's instructions.            Admission Information     Date & Time Provider Department CSN    2/3/2017  8:25 AM Carlos Haynes MD [facility] [id number]      Care Providers     Provider Role Specialty Primary office phone    Carlos Haynes MD Attending Provider Gastroenterology [phone]    Carlos Haynes MD Surgeon  Gastroenterology [phone]      Your Vitals Were     BP Pulse Temp Resp Height Weight    119/59 (BP Location: Left arm, Patient Position: Lying, BP Method: Automatic) 62 97.7 °F (36.5 °C) (Oral) 16 5' 2" (1.575 m) 61.7 kg (136 lb)    Last Period SpO2 BMI          (LMP Unknown) 99% 24.87 kg/m2        Recent Lab Values        10/7/2015                           7:55 PM           A1C 5.9                       Pending Labs     Order Current Status    Specimen to Pathology - Surgery Collected (02/03/17 1056)      Allergies as of 2/3/2017     No Known Allergies      Advance Directives     An advance directive is a document which, in the event you are no longer able to make decisions for yourself, tells your healthcare team what kind of treatment you do or do not want to receive, or who you would like to make those decisions for you.  If you do not currently have an advance directive, [facility] encourages you to create one.  For more information call:  [phone]), [phone] ([phone]),  or log on to [email or web address].        Smoking Cessation     If you " would like to quit smoking:   You may be eligible for free services if you are a [place] resident and started smoking cigarettes before September 1, 1988.  Call the Smoking Cessation Trust (SCT) toll free at (427) 044-1331 or (917) 285-3039.   Call 1-800-QUIT-NOW if you do not meet the above criteria.            Language Assistance Services     ATTENTION: Language assistance services are available, free of charge. Please call [phone].      ATENCIÓN: Si habla español, tiene a segundo disposición servicios gratuitos de asistencia lingüística. Llame al [phone].     [facility] Ý: N?u b?n nói Ti?ng Vi?t, có các d?ch v? h? tr? ngôn ng? mi?n phí dành cho b?n. G?i s? [phone].         [facility] complies with applicable Federal civil rights laws and does not discriminate on the basis of race, color, national origin, age, disability, or sex.

## 2017-02-03 NOTE — ANESTHESIA POSTPROCEDURE EVALUATION
"Anesthesia Post Evaluation    Patient: Nadeen Mcgovern    Procedure(s) Performed: Procedure(s) (LRB):  ESOPHAGOGASTRODUODENOSCOPY (EGD) (N/A)    Final Anesthesia Type: general  Patient location during evaluation: GI PACU  Patient participation: Yes- Able to Participate  Level of consciousness: awake and alert and oriented  Post-procedure vital signs: reviewed and stable  Pain management: adequate  Airway patency: patent  PONV status at discharge: No PONV  Anesthetic complications: no      Cardiovascular status: hemodynamically stable  Respiratory status: unassisted, spontaneous ventilation and room air  Hydration status: euvolemic  Follow-up not needed.        Visit Vitals    /67 (BP Location: Left arm, Patient Position: Lying, BP Method: Automatic)    Pulse 66    Temp 36.5 °C (97.7 °F)    Resp 16    Ht 5' 2" (1.575 m)    Wt 61.7 kg (136 lb)    LMP  (LMP Unknown)    SpO2 98%    Breastfeeding No    BMI 24.87 kg/m2       Pain/Pam Score: Pain Assessment Performed: Yes (2/3/2017 11:32 AM)  Presence of Pain: denies (2/3/2017 11:32 AM)  Pam Score: 10 (2/3/2017 11:32 AM)      "

## 2017-02-03 NOTE — DISCHARGE INSTRUCTIONS

## 2017-02-07 ENCOUNTER — TELEPHONE (OUTPATIENT)
Dept: GASTROENTEROLOGY | Facility: CLINIC | Age: 57
End: 2017-02-07

## 2017-02-07 NOTE — TELEPHONE ENCOUNTER
----- Message from Carlos Haynes MD sent at 2/7/2017  2:06 PM CST -----  Esophagus biopsies were normal.

## 2017-02-08 ENCOUNTER — NURSE TRIAGE (OUTPATIENT)
Dept: ADMINISTRATIVE | Facility: CLINIC | Age: 57
End: 2017-02-08

## 2017-02-08 NOTE — TELEPHONE ENCOUNTER
Received call from Primary Care. Pt had made appt next week with pcp for arm pain but wanted to discuss with a nurse.    Pt reported onset Monday of intermittent arm pain- located in middle of upper left arm, no radiation. Pain described as severe and when it occurs is to painful to move. May have a little swelling of upper arm, no discoloration, no trauma she is aware of. Arm sore to touch in this area.  Cannot bring it on with movement.     Reason for Disposition   SEVERE pain (e.g., excruciating, unable to do any normal activities)    Protocols used: ST ARM PAIN-A-OH

## 2017-02-10 ENCOUNTER — TELEPHONE (OUTPATIENT)
Dept: ENDOSCOPY | Facility: HOSPITAL | Age: 57
End: 2017-02-10

## 2017-02-14 ENCOUNTER — OFFICE VISIT (OUTPATIENT)
Dept: INTERNAL MEDICINE | Facility: CLINIC | Age: 57
End: 2017-02-14
Payer: MEDICAID

## 2017-02-14 VITALS
HEIGHT: 62 IN | WEIGHT: 131.63 LBS | SYSTOLIC BLOOD PRESSURE: 102 MMHG | RESPIRATION RATE: 17 BRPM | BODY MASS INDEX: 24.22 KG/M2 | TEMPERATURE: 98 F | HEART RATE: 70 BPM | DIASTOLIC BLOOD PRESSURE: 60 MMHG

## 2017-02-14 DIAGNOSIS — Z13.6 SCREENING FOR CARDIOVASCULAR CONDITION: ICD-10-CM

## 2017-02-14 DIAGNOSIS — D84.9 IMMUNOSUPPRESSED STATUS: ICD-10-CM

## 2017-02-14 DIAGNOSIS — Z94.4 S/P LIVER TRANSPLANT: ICD-10-CM

## 2017-02-14 DIAGNOSIS — L30.9 ECZEMA OF BOTH HANDS: Primary | ICD-10-CM

## 2017-02-14 DIAGNOSIS — M25.512 CHRONIC LEFT SHOULDER PAIN: ICD-10-CM

## 2017-02-14 DIAGNOSIS — G89.29 CHRONIC LEFT SHOULDER PAIN: ICD-10-CM

## 2017-02-14 DIAGNOSIS — Z79.52 CURRENT CHRONIC USE OF SYSTEMIC STEROIDS: ICD-10-CM

## 2017-02-14 DIAGNOSIS — Z12.4 ENCOUNTER FOR SCREENING FOR CERVICAL CANCER: ICD-10-CM

## 2017-02-14 DIAGNOSIS — R73.03 PRE-DIABETES: ICD-10-CM

## 2017-02-14 DIAGNOSIS — M34.9 LIMITED SCLERODERMA: ICD-10-CM

## 2017-02-14 PROCEDURE — 99999 PR PBB SHADOW E&M-EST. PATIENT-LVL IV: CPT | Mod: PBBFAC,,, | Performed by: INTERNAL MEDICINE

## 2017-02-14 PROCEDURE — 99214 OFFICE O/P EST MOD 30 MIN: CPT | Mod: S$PBB,,, | Performed by: INTERNAL MEDICINE

## 2017-02-14 PROCEDURE — 99214 OFFICE O/P EST MOD 30 MIN: CPT | Mod: PBBFAC | Performed by: INTERNAL MEDICINE

## 2017-02-14 NOTE — PROGRESS NOTES
"Subjective:       Patient ID: Nadeen Mcgovern is a 56 y.o. female.    Chief Complaint: urgent, L shoulder pain.    HPI   L shoulder pain that started 8 days ago. Intermittent. When she does get the pain, she can't move or lift the L arm x 10 min. Pain is sharp. Non-radiating. She was left w/ soreness in the L shoulder area when touching. Went to  to have it evaluated. Given tramadol and pain gone for 4 days. Feels back to baseline.     Rash and pain in the hands returned. Takes tramadol 50mg prn - at most bid. Tramadol still helping w/ the pain. Evaluated by Dr. Souza and will be getting a follow up appt. On amlodipine 5mg daily for Raynaud's. No purulent discharge.     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    Cholangiocarcinoma s/p liver transplant 2015. Currently on prograf 1mg bid and sirolimus 2mg daily and prednisone 10mg daily. No fevers/chills.     Review of Systems   Constitutional: Negative for chills and fever.   HENT: Negative for congestion, postnasal drip, rhinorrhea and sore throat.    Respiratory: Negative for cough, shortness of breath and wheezing.    Cardiovascular: Negative for chest pain, palpitations and leg swelling.   Gastrointestinal: Negative.    Musculoskeletal: Positive for arthralgias (hand pain).   Skin: Positive for rash.   Neurological: Positive for headaches (yesterday but resolved).         Objective:      Physical Exam    Visit Vitals    /60 (BP Location: Left arm, Patient Position: Sitting, BP Method: Manual)    Pulse 70    Temp 98.2 °F (36.8 °C) (Oral)    Resp 17    Ht 5' 2" (1.575 m)    Wt 59.7 kg (131 lb 9.8 oz)    LMP  (LMP Unknown)    BMI 24.07 kg/m2     Gen - A+OX4, NAD  HEENT - PERRL, OP clear. MMM. TM normal.   Neck - no LAD  CV -R RR  CHEST - CTAB, no wheezing/rhonchi/crackles  Abd - S/NT/ND/+BS  Ext -2 + B radial and DP pulses. No LE edema.   Skin - scaly rash w/ cracks along the tips of fingers and also palm of hand - painful on palpation.  MSK - " pain on L shoulder abduction. Unable to abduct L shoulder past 90 degrees. Decreased hand  B.     Labs reviewed w/ pt.       Assessment/Plan     Nadeen was seen today for follow-up.    Diagnoses and all orders for this visit:    Eczema of both hands - pt reports no need for refill on tramadol at this time as just given #45 from . Pt uses tramadol intermittently. F/u w/ Dr. Souza. Keep hands in gloves when weather is cold.    Limited scleroderma - f/u w/ Dr. Butler.     S/P liver transplant - cont current medicines. Immunosuppressed. No signs of active infection. F/u w/ transplant.     Encounter for screening for cervical cancer   -     Ambulatory Referral to Obstetrics / Gynecology    Pre-diabetes  -     Hemoglobin A1c; Future    Current chronic use of systemic steroids  -     Hemoglobin A1c; Future    Immunosuppressed status  -     Hemoglobin A1c; Future    Screening for cardiovascular condition  -     Lipid panel; Future    Chronic left shoulder pain - likely rotator cuff pathology given exam.   -     MRI Upper Extremity Joint WO Cont Left; Future    Return in about 3 months (around 5/14/2017).      Nadeen Figueroa MD  Department of Internal Medicine - [facility]  11:43 AM

## 2017-02-14 NOTE — MR AVS SNAPSHOT
[facility]  [address]  [facility] [address]  Phone: [phone]  Fax: [phone]                  Nadeen Mcgovern   2017 11:40 AM   Office Visit    Description:  Female : 1960   Provider:  Nadeen Figueroa MD   Department:  [facility]           Reason for Visit     Follow-up           Diagnoses this Visit        Comments    Eczema of both hands    -  Primary     Limited scleroderma         S/P liver transplant         Encounter for screening for cervical cancer          Pre-diabetes         Current chronic use of systemic steroids         Immunosuppressed status         Screening for cardiovascular condition         Chronic left shoulder pain                To Do List           Future Appointments        Provider Department Dept Phone    3/6/2017 8:05 AM LAB, TRANSPLANT [facility] [phone]    3/15/2017 11:30 AM Carlos Haynes MD [facility] Gastroenterology [phone]    2017 9:00 AM Haresh Butler MD [facility] Rheumatology [phone]    5/15/2017 1:40 PM Nadeen Figueroa MD [facility] - Internal Medicine [phone]      Goals (5 Years of Data)     None      [facility]     [facility] Nurse Care Line -  Assistance  Registered nurses in the [facility] provide clinical advisement, health education, appointment booking, and other advisory services.  Call for this free service at [phone].             Medications           Message regarding Medications     Verify the changes and/or additions to your medication regime listed below are the same as discussed with your clinician today.  If any of these changes or additions are incorrect, please notify your healthcare provider.             Verify that the below list of medications is an accurate representation of the medications you are currently taking.  If none reported, the list may be blank. If incorrect, please contact your healthcare provider. Carry  "this list with you in case of emergency.           Current Medications     amlodipine (NORVASC) 5 MG tablet Take 1 tablet (5 mg total) by mouth once daily.    aspirin 81 MG Chew Take 81 mg by mouth once daily.    famotidine (PEPCID) 20 MG tablet Take 1 tablet (20 mg total) by mouth every evening.    hydrOXYzine pamoate (VISTARIL) 50 MG Cap Take 1 capsule (50 mg total) by mouth every 8 (eight) hours as needed.    multivitamin (THERAGRAN) tablet Take 1 tablet by mouth once daily.    predniSONE (DELTASONE) 10 MG tablet Take 1 tablet daily x 2 weeks and then 1/2 tab daily x 2 weeks. Then take 1/2 tablet every other day for 2 weeks.    sirolimus (RAPAMUNE) 1 MG Tab Take 2 tablets (2 mg total) by mouth once daily.    tacrolimus (PROGRAF) 1 MG Cap Take 1 capsule (1 mg total) by mouth every 12 (twelve) hours.    tramadol (ULTRAM) 50 mg tablet Take 1 tablet (50 mg total) by mouth every 8 (eight) hours as needed for Pain.           Clinical Reference Information           Your Vitals Were     BP Pulse Temp Resp Height Weight    102/60 (BP Location: Left arm, Patient Position: Sitting, BP Method: Manual) 70 98.2 °F (36.8 °C) (Oral) 17 5' 2" (1.575 m) 59.7 kg (131 lb 9.8 oz)    Last Period BMI             (LMP Unknown) 24.07 kg/m2         Blood Pressure          Most Recent Value    BP  102/60      Allergies as of 2/14/2017     No Known Allergies      Immunizations Administered on Date of Encounter - 2/14/2017     None      Orders Placed During Today's Visit      Normal Orders This Visit    Ambulatory Referral to Obstetrics / Gynecology     Future Labs/Procedures Expected by Expires    Hemoglobin A1c  2/14/2017 (Approximate) 4/15/2018    Lipid panel  2/14/2017 4/15/2018    MRI Upper Extremity Joint WO Cont Left  2/14/2017 2/14/2018      Maintenance Dialysis History     Patient has no recorded history of maintenance dialysis.      Transplant Information        Txp Date Organ Coordinator Care Team    10/8/2015 Liver Chetna Pickard, " RN Referring Physician:  Wendy Hernandez MD   Current PCP:  Wendy Hernandez MD   Current Hepatologist:  Мария Benítez MD   Corresponding Physician:  Wendy Hernandez MD   Surgeon:  Floyd Recinos MD   Assisting Surgeon:  Vicente Salguero MD   Resident:  Robson Garcia MD   Fellow:  Terrance Black MD         Language Assistance Services     ATTENTION: Language assistance services are available, free of charge. Please call [phone].      ATENCIÓN: Si habla español, tiene a segundo disposición servicios gratuitos de asistencia lingüística. Llame al [phone].     CHÚ Ý: N?u b?n nói Ti?ng Vi?t, có các d?ch v? h? tr? ngôn ng? mi?n phí dành cho b?n. G?i s? [phone].         [facility] complies with applicable Federal civil rights laws and does not discriminate on the basis of race, color, national origin, age, disability, or sex.

## 2017-02-15 ENCOUNTER — LAB VISIT (OUTPATIENT)
Dept: LAB | Facility: HOSPITAL | Age: 57
End: 2017-02-15
Attending: INTERNAL MEDICINE
Payer: MEDICAID

## 2017-02-15 DIAGNOSIS — D84.9 IMMUNOSUPPRESSED STATUS: ICD-10-CM

## 2017-02-15 DIAGNOSIS — Z79.52 CURRENT CHRONIC USE OF SYSTEMIC STEROIDS: ICD-10-CM

## 2017-02-15 DIAGNOSIS — R73.03 PRE-DIABETES: ICD-10-CM

## 2017-02-15 DIAGNOSIS — Z13.6 SCREENING FOR CARDIOVASCULAR CONDITION: ICD-10-CM

## 2017-02-15 LAB
CHOLEST/HDLC SERPL: 3.7 {RATIO}
ESTIMATED AVG GLUCOSE: 108 MG/DL
HBA1C MFR BLD HPLC: 5.4 %
HDL/CHOLESTEROL RATIO: 27.4 %
HDLC SERPL-MCNC: 190 MG/DL
HDLC SERPL-MCNC: 52 MG/DL
LDLC SERPL CALC-MCNC: 124 MG/DL
NONHDLC SERPL-MCNC: 138 MG/DL
TRIGL SERPL-MCNC: 70 MG/DL

## 2017-02-15 PROCEDURE — 83036 HEMOGLOBIN GLYCOSYLATED A1C: CPT

## 2017-02-15 PROCEDURE — 36415 COLL VENOUS BLD VENIPUNCTURE: CPT

## 2017-02-15 PROCEDURE — 80061 LIPID PANEL: CPT

## 2017-02-25 ENCOUNTER — NURSE TRIAGE (OUTPATIENT)
Dept: ADMINISTRATIVE | Facility: CLINIC | Age: 57
End: 2017-02-25

## 2017-02-25 NOTE — TELEPHONE ENCOUNTER
"    Reason for Disposition   [1] Request for URGENT new prescription or refill of "essential" medication (i.e., likelihood of harm to patient if not taken) AND [2] triager unable to fill per unit policy    Additional Information   Commented on: Answer Assessment - Initial Assessment Questions     Patient is calling because she has been waiting for her local pharmacist to call for clearance of a prior authorization of Sirolimus 1 mg.  Patient has taken her last pill today.  Called on call doctor - Dr. Su Broussard.  Dr. Broussard suggested that the patient purchase out the pocket medication until Monday.  Called a pharmacy that carried the medication and called Dr. Broussard with the name and number to the pharmacist and ended call.  Called the patient to let her know what pharmacy to  her medication and provided her with the phone number and ended call.  Please f/u with patient on Monday.    Protocols used: ST MEDICATION QUESTION CALL-A-    Liver transplant 10/8/15  BPA 9  "

## 2017-03-01 ENCOUNTER — NURSE TRIAGE (OUTPATIENT)
Dept: ADMINISTRATIVE | Facility: CLINIC | Age: 57
End: 2017-03-01

## 2017-03-06 ENCOUNTER — LAB VISIT (OUTPATIENT)
Dept: LAB | Facility: HOSPITAL | Age: 57
End: 2017-03-06
Attending: INTERNAL MEDICINE
Payer: MEDICAID

## 2017-03-06 DIAGNOSIS — Z94.4 LIVER REPLACED BY TRANSPLANT: ICD-10-CM

## 2017-03-06 DIAGNOSIS — C22.1 CHOLANGIOCELLULAR CARCINOMA: ICD-10-CM

## 2017-03-06 LAB
ALBUMIN SERPL BCP-MCNC: 3.9 G/DL
ALP SERPL-CCNC: 71 U/L
ALT SERPL W/O P-5'-P-CCNC: 26 U/L
ANION GAP SERPL CALC-SCNC: 10 MMOL/L
AST SERPL-CCNC: 23 U/L
BASOPHILS # BLD AUTO: 0.01 K/UL
BASOPHILS NFR BLD: 0.3 %
BILIRUB SERPL-MCNC: 0.7 MG/DL
BUN SERPL-MCNC: 12 MG/DL
CALCIUM SERPL-MCNC: 9.1 MG/DL
CHLORIDE SERPL-SCNC: 112 MMOL/L
CO2 SERPL-SCNC: 21 MMOL/L
CREAT SERPL-MCNC: 0.7 MG/DL
DIFFERENTIAL METHOD: NORMAL
EOSINOPHIL # BLD AUTO: 0.1 K/UL
EOSINOPHIL NFR BLD: 1.5 %
ERYTHROCYTE [DISTWIDTH] IN BLOOD BY AUTOMATED COUNT: 13.2 %
EST. GFR  (AFRICAN AMERICAN): >60 ML/MIN/1.73 M^2
EST. GFR  (NON AFRICAN AMERICAN): >60 ML/MIN/1.73 M^2
GLUCOSE SERPL-MCNC: 87 MG/DL
HCT VFR BLD AUTO: 40.7 %
HGB BLD-MCNC: 13.9 G/DL
LDH SERPL L TO P-CCNC: 228 U/L
LYMPHOCYTES # BLD AUTO: 1.1 K/UL
LYMPHOCYTES NFR BLD: 26.8 %
MCH RBC QN AUTO: 29.5 PG
MCHC RBC AUTO-ENTMCNC: 34.2 %
MCV RBC AUTO: 86 FL
MONOCYTES # BLD AUTO: 0.3 K/UL
MONOCYTES NFR BLD: 6.3 %
NEUTROPHILS # BLD AUTO: 2.6 K/UL
NEUTROPHILS NFR BLD: 64.8 %
PLATELET # BLD AUTO: 165 K/UL
PMV BLD AUTO: 9.8 FL
POTASSIUM SERPL-SCNC: 4 MMOL/L
PROT SERPL-MCNC: 7.6 G/DL
RBC # BLD AUTO: 4.71 M/UL
SIROLIMUS BLD-MCNC: 5.3 NG/ML
SODIUM SERPL-SCNC: 143 MMOL/L
TACROLIMUS BLD-MCNC: 1.6 NG/ML
WBC # BLD AUTO: 4 K/UL

## 2017-03-06 PROCEDURE — 85025 COMPLETE CBC W/AUTO DIFF WBC: CPT

## 2017-03-06 PROCEDURE — 83615 LACTATE (LD) (LDH) ENZYME: CPT

## 2017-03-06 PROCEDURE — 80197 ASSAY OF TACROLIMUS: CPT

## 2017-03-06 PROCEDURE — 36415 COLL VENOUS BLD VENIPUNCTURE: CPT

## 2017-03-06 PROCEDURE — 80053 COMPREHEN METABOLIC PANEL: CPT

## 2017-03-06 PROCEDURE — 80195 ASSAY OF SIROLIMUS: CPT

## 2017-03-07 ENCOUNTER — DOCUMENTATION ONLY (OUTPATIENT)
Dept: REHABILITATION | Facility: HOSPITAL | Age: 57
End: 2017-03-07

## 2017-03-07 NOTE — PROGRESS NOTES
OCCUPATIONAL THERAPY Discharge         DATE : 03/07/2017    Name: Nadeen Mcgovern  Referring Physician: No ref. provider found   Allergies: Review of patient's allergies indicates:  No Known Allergies  Medical history:   Past Medical History:   Diagnosis Date    Acute cholecystitis     Cholecystitis    Arthritis 2015    septic arthritis of right shoulder    Bacteremia due to Streptococcus pneumoniae     Cancer     Cholangiocarcinoma     Jaundice     obstructive    Prediabetes      Medication:    Current Outpatient Prescriptions on File Prior to Visit   Medication Sig Dispense Refill    amlodipine (NORVASC) 5 MG tablet Take 1 tablet (5 mg total) by mouth once daily. 90 tablet 3    aspirin 81 MG Chew Take 81 mg by mouth once daily.      famotidine (PEPCID) 20 MG tablet Take 1 tablet (20 mg total) by mouth every evening. 30 tablet 11    hydrOXYzine pamoate (VISTARIL) 50 MG Cap Take 1 capsule (50 mg total) by mouth every 8 (eight) hours as needed. 30 capsule 0    multivitamin (THERAGRAN) tablet Take 1 tablet by mouth once daily.      predniSONE (DELTASONE) 10 MG tablet Take 1 tablet daily x 2 weeks and then 1/2 tab daily x 2 weeks. Then take 1/2 tablet every other day for 2 weeks. 28 tablet 0    sirolimus (RAPAMUNE) 1 MG Tab Take 2 tablets (2 mg total) by mouth once daily. 90 tablet 11    tacrolimus (PROGRAF) 1 MG Cap Take 1 capsule (1 mg total) by mouth every 12 (twelve) hours. 60 capsule 11    tramadol (ULTRAM) 50 mg tablet Take 1 tablet (50 mg total) by mouth every 8 (eight) hours as needed for Pain. 90 tablet 2     No current facility-administered medications on file prior to visit.      Diagnosis:  No diagnosis found.  Orders: Occupational Therapy evaluate and treat    Precautions stated on order: none      Evaluation Date: 8/8/16  Visit #:3  Authorization period: none  Plan of care Expiration: 9/30/16   last seen 9/19/16       SUBJECTIVE   Patient states:  Pt states that she can get her shoulder up  higher than before. Pt state that she has trouble making a fist , she plans to ask her doctor about removing the port in her arm.       Pts goals for therapy:  Increase  and shoulder motion greater than 90 to left Ue  Pain Scale: Nadeen rates pain on a scale of 0-10 to 2/10     Onset: insidious  Date of Surgery: none  Chief complaint:  Drops things when cooking, she cannot empty pots , feels that she looses controls .  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   Patients  structured exercise routine: none   Exercise routine prior to onset: none     Imaging:   MRI: none         Xray: none        Hand Dominance: right                                   OBJECTIVE        Pt seen for moist heat to left shoulder and fluidotherapy X 10 minutes , passive range of motion to shoulder followed by  Shoulder pulleys flexion, abduction , IR   And repetitive throwing task  , Keeping shoulder at 90 degrees.  Pt then seen for scapula strengthening to mid and lower traps  And fine and gross motor coordination.               Strength - second setting 40#,40#,45# : 20#,20#,30#                           Upper limb tension test , possible entrapement median and ulnar nerve , arms shaking when moving through motions       Treatment        Written Home Exercises Provided:   Nadeen bryson good understanding of the education provided. Patient demo good return demo of skill of exercises.    1. Pt performed and was  "provided with a written copy of exercises to perform as tolerated, including: tendon glides and use of paraffin used today . Importance of range of motion  And large handle tools to manage .      2. Pt was provided educational information, including range of motion, strengthening   3. Pt educated to use ice for 10- 20 minutes to decrease swelling  and/ or pain as needed.    Treatment :  Pt seen for  Moist heat to left shoulder and fluidotherapy X 10 minutes , passive range of motion   Left shoulder X  15 minutes  Flexion ,scaption ER with towel roll under thoracic  Spine .  Standing scpaula  Bent at waist  'i", " y " "t" .  10 reps  Encouraged to use towel roll under  spine at home as well .  Wrist flexion 2# , wrist ext 2# , sup red flex bar, and pronation red flex bar ,     AROM   Shoulder flexion 130 (+30)   shoulder abduction 100(+20)   ER 75 same    T 12 area (+2" higher )     FOTO - Outcomes  And G Codes     FOTO survey     FOTO:   Self Care DATE SCORE GCODE MODIFIER   INITIAL 8/8/16 53 /100  CK   5TH /100 10TH /100     Discharge      CJ       FOTO Goal : CJ modifier 8994  CJ     Interpretation of FOTO Modifiers    TEST SCORE  Modifier  Impairment Limitation Restriction    0%  CH  0 % impaired, limited or restricted    1-19%  CI  @ least 1% but less than 20% impaired, limited or restricted    20-39%  CJ  @ least 20%<40% impaired, limited or restricted    40-59%  CK  @ least 40%<60% impaired, limited or restricted    60-79%  CL  @ least 60% <80% impaired, limited or restricted    80-99%  CM  @ least 80%<100% impaired limited or restricted    100%  CN  100% impaired, limited or restricted       ASSESSMENT    Pt present to occupational therapy with an OT diagnosis of left hand weakness . Pt present with the impairments as stated below in the impairments/problems list. Pt prognosis is Good. Pt with increased   And active shoulder motions , Pt is very motivated and pushes herself " at home .   Pt will benefit from skilled outpatient occupational therapy to address the above stated deficits, provide pt/family education and to maximize pt's level of independence in the home and community environment. Pt stopped scheduling any appointments after  9/9 16     Discussed Plan of Care with patient: Yes    Medical necessity is demonstrated by the following IMPAIRMENTS/PROBLEMS:  1. Poor posture  2. pain in  joint / limb  Right / left   3. Decreased flexibility  4. Decreased ROM  5. Decreased muscle strength  6. Inability to work       Pt's spiritual, cultural and educational needs considered and pt agreeable to plan of care and goals as stated below:     Anticipated Barriers for Occupational  therapy: anticipated none    GOALS: 8 weeks. Pt agrees with goals set.  1. Independent with HEP.  2. Report decreased    to    2./10 pain  with adls , MET   3.  such as reaching overhead and into the cabinets.  4. Decreased pain to   2/10   With gripping toothbrush or water bottle  MET   5. Pt to report that she can hook her bra. Not assessed   6.   7. Increased arom  for increase pip range by 10 degrees on left hand to make a full fist     PLAN   Pt made improvements as evident of increased motion and function. Pt did not schedule any further appointments.  D/C from OT

## 2017-03-08 ENCOUNTER — TELEPHONE (OUTPATIENT)
Dept: TRANSPLANT | Facility: CLINIC | Age: 57
End: 2017-03-08

## 2017-03-08 DIAGNOSIS — C22.0 CARCINOMA OF LIVER: Primary | ICD-10-CM

## 2017-03-08 DIAGNOSIS — C22.1 CHOLANGIOCELLULAR CARCINOMA: ICD-10-CM

## 2017-03-08 NOTE — TELEPHONE ENCOUNTER
Dr. Benítez reviewed your labs.  Continue routine labs no changes needed.  Letter sent for next lab appointment 04/03/17

## 2017-03-15 ENCOUNTER — OFFICE VISIT (OUTPATIENT)
Dept: GASTROENTEROLOGY | Facility: CLINIC | Age: 57
End: 2017-03-15
Payer: MEDICAID

## 2017-03-15 VITALS
HEIGHT: 62 IN | DIASTOLIC BLOOD PRESSURE: 76 MMHG | HEART RATE: 68 BPM | SYSTOLIC BLOOD PRESSURE: 125 MMHG | WEIGHT: 136.44 LBS | BODY MASS INDEX: 25.11 KG/M2

## 2017-03-15 DIAGNOSIS — R13.19 OTHER DYSPHAGIA: Primary | ICD-10-CM

## 2017-03-15 PROCEDURE — 99213 OFFICE O/P EST LOW 20 MIN: CPT | Mod: S$PBB,,, | Performed by: INTERNAL MEDICINE

## 2017-03-15 PROCEDURE — 99213 OFFICE O/P EST LOW 20 MIN: CPT | Mod: PBBFAC | Performed by: INTERNAL MEDICINE

## 2017-03-15 PROCEDURE — 99999 PR PBB SHADOW E&M-EST. PATIENT-LVL III: CPT | Mod: PBBFAC,,, | Performed by: INTERNAL MEDICINE

## 2017-03-15 NOTE — PROGRESS NOTES
REASON FOR VISIT:  Dysphagia.    Ms. Mcgovern was last seen in February for complaints of dysphagia to both solids   and liquids, but mostly to solids where she feels like the food goes down   slowly with occasional gurgling in her chest.  She underwent a barium   esophagram, which did not reveal any gastroesophageal reflux.  There was no   obvious strictures found on followup endoscopy revealed a possible mild   esophageal stenosis and dilation was done up to 18 mm and mid esophagus biopsies   were normal.  She returns today without significant improvement after the   dilation and we discussed about possibility of her dysphagia being secondary to   dysmotility.  Her last CT imaging of the chest was in 2017, which did not reveal   any extrinsic compression on the esophagus.  We briefly talked about esophageal   manometry, but given that she is overall been able to swallow with some as long   as she takes time and chews the food well.  I would not recommend any further   testing at this time.    PAST MEDICAL, SURGICAL, SOCIAL AND FAMILY HISTORY:  Reviewed.    MEDICATIONS AND ALLERGIES:  Reviewed.    REVIEW OF SYSTEMS:  CONSTITUTIONAL:  No fevers, chills or unintentional weight loss.  Appetite is   normal.  EYES:  No visual changes.  ENT:  No odynophagia or hoarseness.  CARDIOVASCULAR:  No angina or palpitations.  RESPIRATORY:  No shortness of breath or wheezing.  GASTROINTESTINAL:  See HPI.  Bowels are moving regularly.  No blood in the   stool.    PHYSICAL EXAMINATION:  VITAL SIGNS:  See EPIC.  GENERAL:  Awake, alert, oriented x3, no acute distress.  No exam today.  About 15 minutes spent with the patient, more than 50% in   counseling regarding possibility of esophageal dysmotility.    IMPRESSION:  Dysphagia most likely secondary to esophageal dysmotility, no   further investigation recommended at this time, although if her swallowing   worsens.  We could potentially do esophageal manometry in the  future.      ACT/IN  dd: 03/15/2017 12:01:55 (CDT)  td: 03/16/2017 03:43:03 (CDT)  Doc ID   #[id number]  Job ID #[id number]    CC:

## 2017-03-15 NOTE — MR AVS SNAPSHOT
[facility]  [address]  [facility] [address]  Phone: [phone]  Fax: [phone]                  Nadeen Mcgovern   3/15/2017 11:30 AM   Office Visit    Description:  Female : 1960   Provider:  Carlos Haynes MD   Department:  [facility]           Reason for Visit     Follow-up                To Do List           Future Appointments        Provider Department Dept Phone    3/22/2017 10:20 AM Su Bland NP [facility] [address] [phone]    4/3/2017 8:20 AM LAB, TRANSPLANT [facility] [phone]    2017 9:00 AM Haresh Butler MD [facility] - Rheumatology [phone]    4/10/2017 9:30 AM NOM CT1 64- LIM[address] 450 LBS [facility] [phone]    5/15/2017 1:40 PM Nadeen Figueroa MD [facility] - Internal Medicine [phone]      Goals (5 Years of Data)     None      [facility]     [facility] -  Assistance  Registered nurses in the [facility] provide clinical advisement, health education, appointment booking, and other advisory services.  Call for this free service at [phone].             Medications           Message regarding Medications     Verify the changes and/or additions to your medication regime listed below are the same as discussed with your clinician today.  If any of these changes or additions are incorrect, please notify your healthcare provider.             Verify that the below list of medications is an accurate representation of the medications you are currently taking.  If none reported, the list may be blank. If incorrect, please contact your healthcare provider. Carry this list with you in case of emergency.           Current Medications     amlodipine (NORVASC) 5 MG tablet Take 1 tablet (5 mg total) by mouth once daily.    aspirin 81 MG Chew Take 81 mg by mouth once daily.    famotidine (PEPCID) 20 MG tablet Take 1 tablet (20 mg  "total) by mouth every evening.    hydrOXYzine pamoate (VISTARIL) 50 MG Cap Take 1 capsule (50 mg total) by mouth every 8 (eight) hours as needed.    multivitamin (THERAGRAN) tablet Take 1 tablet by mouth once daily.    predniSONE (DELTASONE) 10 MG tablet Take 1 tablet daily x 2 weeks and then 1/2 tab daily x 2 weeks. Then take 1/2 tablet every other day for 2 weeks.    sirolimus (RAPAMUNE) 1 MG Tab Take 2 tablets (2 mg total) by mouth once daily.    tacrolimus (PROGRAF) 1 MG Cap Take 1 capsule (1 mg total) by mouth every 12 (twelve) hours.    tramadol (ULTRAM) 50 mg tablet Take 1 tablet (50 mg total) by mouth every 8 (eight) hours as needed for Pain.           Clinical Reference Information           Your Vitals Were     BP Pulse Height Weight Last Period BMI    125/76 68 5' 2" (1.575 m) 61.9 kg (136 lb 7.4 oz) (LMP Unknown) 24.96 kg/m2      Blood Pressure          Most Recent Value    BP  125/76      Allergies as of 3/15/2017     No Known Allergies      Immunizations Administered on Date of Encounter - 3/15/2017     None      Maintenance Dialysis History     Patient has no recorded history of maintenance dialysis.      Transplant Information        Txp Date Organ Coordinator Care Team    10/8/2015 Liver Chetna Pickard RN Referring Physician:  Wendy Hernandez MD   Current PCP:  Wendy Hernandez MD   Current Hepatologist:  Мария Benítez MD   Corresponding Physician:  Wendy Hernandez MD   Surgeon:  Floyd Recinos MD   Assisting Surgeon:  Vicente Salguero MD   Resident:  Robson Garcia MD   Fellow:  Terrance Black MD         Language Assistance Services     ATTENTION: Language assistance services are available, free of charge. Please call [phone].      ATENCIÓN: Si wyattla karuna, tiene a segundo disposición servicios gratuitos de asistencia lingüística. Llame al [phone].     CHÚ Ý: N?u b?n nói Ti?ng Vi?t, có các d?ch v? h? tr? ngôn ng? mi?n phí dành cho b?n. G?i s? [phone].         " [facility] complies with applicable Federal civil rights laws and does not discriminate on the basis of race, color, national origin, age, disability, or sex.

## 2017-03-22 ENCOUNTER — HOSPITAL ENCOUNTER (OUTPATIENT)
Dept: RADIOLOGY | Facility: OTHER | Age: 57
Discharge: HOME | End: 2017-03-22
Attending: NURSE PRACTITIONER
Payer: MEDICAID

## 2017-03-22 ENCOUNTER — OFFICE VISIT (OUTPATIENT)
Dept: OBSTETRICS AND GYNECOLOGY | Facility: CLINIC | Age: 57
End: 2017-03-22
Payer: MEDICAID

## 2017-03-22 VITALS
WEIGHT: 132.06 LBS | SYSTOLIC BLOOD PRESSURE: 106 MMHG | BODY MASS INDEX: 24.3 KG/M2 | DIASTOLIC BLOOD PRESSURE: 64 MMHG | HEIGHT: 62 IN

## 2017-03-22 DIAGNOSIS — Z01.419 PAP SMEAR, AS PART OF ROUTINE GYNECOLOGICAL EXAMINATION: ICD-10-CM

## 2017-03-22 DIAGNOSIS — Z01.419 VISIT FOR GYNECOLOGIC EXAMINATION: Primary | ICD-10-CM

## 2017-03-22 DIAGNOSIS — Z12.31 VISIT FOR SCREENING MAMMOGRAM: ICD-10-CM

## 2017-03-22 DIAGNOSIS — Z12.39 SCREENING FOR MALIGNANT NEOPLASM OF BREAST: ICD-10-CM

## 2017-03-22 DIAGNOSIS — Z78.0 POSTMENOPAUSAL: ICD-10-CM

## 2017-03-22 PROCEDURE — 77067 SCR MAMMO BI INCL CAD: CPT | Mod: TC

## 2017-03-22 PROCEDURE — 99386 PREV VISIT NEW AGE 40-64: CPT | Mod: S$PBB,,, | Performed by: NURSE PRACTITIONER

## 2017-03-22 PROCEDURE — 77063 BREAST TOMOSYNTHESIS BI: CPT | Mod: 26,,, | Performed by: RADIOLOGY

## 2017-03-22 PROCEDURE — 77067 SCR MAMMO BI INCL CAD: CPT | Mod: 26,,, | Performed by: RADIOLOGY

## 2017-03-22 PROCEDURE — 99999 PR PBB SHADOW E&M-EST. PATIENT-LVL III: CPT | Mod: PBBFAC,,, | Performed by: NURSE PRACTITIONER

## 2017-03-22 PROCEDURE — 88175 CYTOPATH C/V AUTO FLUID REDO: CPT

## 2017-03-22 NOTE — PROGRESS NOTES
CC: Annual  HPI: Pt is a 56 y.o.  female who presents for routine annual exam. She is postmenopausal. No family h/o breast cancer and denies h/o abnormal pap smears. Due for her MMG-will schedule today. Had a liver transplant in 2015-doing well. No problems.     Last MMG-12/2015 WNL  Last DXA bone density scan-1/2015 WNL  Last pap-2014    ROS:  GENERAL: Feeling well overall. Denies fever or chills.   SKIN: Denies rash or lesions.   HEAD: Denies head injury or headache.   NODES: Denies enlarged lymph nodes.   CHEST: Denies chest pain or shortness of breath.   CARDIOVASCULAR: Denies palpitations or left sided chest pain.   ABDOMEN: No abdominal pain, constipation, diarrhea, nausea, vomiting or rectal bleeding.   URINARY: No dysuria, hematuria, or burning on urination.  REPRODUCTIVE: See HPI.   BREASTS: Denies pain, lumps, or nipple discharge.   HEMATOLOGIC: No easy bruisability or excessive bleeding.   MUSCULOSKELETAL: Denies joint pain or swelling.   NEUROLOGIC: Denies syncope or weakness.   PSYCHIATRIC: Denies depression, anxiety or mood swings.    PE:   APPEARANCE: Well nourished, well developed, Black or  female in no acute distress.  NODES: no cervical, supraclavicular, or inguinal lymphadenopathy  BREASTS: Symmetrical, no skin changes or visible lesions. No palpable masses, nipple discharge or adenopathy bilaterally.  ABDOMEN: Soft. No tenderness or masses. No distention. No hernias palpated. No CVA tenderness.  VULVA: No lesions. Normal external female genitalia.  URETHRAL MEATUS: Normal size and location, no lesions, no prolapse.  URETHRA: No masses, tenderness, or prolapse.  VAGINA: atophic. No lesions or lacerations noted. No abnormal discharge present. No odor present.   CERVIX: No lesions or discharge. No cervical motion tenderness.   UTERUS: Normal size, regular shape, mobile, non-tender.  ADNEXA: No tenderness. No fullness or masses palpated in the adnexal regions.   ANUS PERINEUM:  Normal.      Diagnosis:  1. Visit for gynecologic examination    2. Screening for malignant neoplasm of breast        Plan:     Orders Placed This Encounter    Mammo Digital Screening Bilat with CAD     MMG ordered  Pap updated  Patient was counseled today on the new ACS guidelines for cervical cytology screening as well as the current recommendations for breast cancer screening. She was counseled to follow up with her PCP for other routine health maintenance. Counseling session lasted approximately 10 minutes, and all her questions were answered.    Follow-up with me in 1 year for routine exam; pap in 3 years.

## 2017-03-22 NOTE — MR AVS SNAPSHOT
[facility] [address]  [address]  [facility] [address]  Phone: [phone]  Fax: [phone]                  Nadeen Mcgovern   3/22/2017 10:20 AM   Office Visit    Description:  Female : 1960   Provider:  Su Bland NP   Department:  [facility] [address]           Reason for Visit     Gynecologic Exam                To Do List           Future Appointments        Provider Department Dept Phone    4/3/2017 8:20 AM LAB, TRANSPLANT [facility] [phone]    2017 9:00 AM Haresh Butler MD [facility] - Rheumatology [phone]    4/10/2017 9:30 AM [facility] CT1 64- LIMIT 450 LBS [facility] [phone]    5/15/2017 1:40 PM Nadeen Figueroa MD [facility] - Internal Medicine [phone]      Goals (5 Years of Data)     None      [facility]     [facility] -  Assistance  Registered nurses in the [facility] provide clinical advisement, health education, appointment booking, and other advisory services.  Call for this free service at [phone].             Medications           Message regarding Medications     Verify the changes and/or additions to your medication regime listed below are the same as discussed with your clinician today.  If any of these changes or additions are incorrect, please notify your healthcare provider.             Verify that the below list of medications is an accurate representation of the medications you are currently taking.  If none reported, the list may be blank. If incorrect, please contact your healthcare provider. Carry this list with you in case of emergency.           Current Medications     amlodipine (NORVASC) 5 MG tablet Take 1 tablet (5 mg total) by mouth once daily.    aspirin 81 MG Chew Take 81 mg by mouth once daily.    famotidine (PEPCID) 20 MG tablet Take 1 tablet (20 mg total) by mouth every evening.    hydrOXYzine pamoate (VISTARIL) 50 MG Cap  "Take 1 capsule (50 mg total) by mouth every 8 (eight) hours as needed.    multivitamin (THERAGRAN) tablet Take 1 tablet by mouth once daily.    predniSONE (DELTASONE) 10 MG tablet Take 1 tablet daily x 2 weeks and then 1/2 tab daily x 2 weeks. Then take 1/2 tablet every other day for 2 weeks.    sirolimus (RAPAMUNE) 1 MG Tab Take 2 tablets (2 mg total) by mouth once daily.    tacrolimus (PROGRAF) 1 MG Cap Take 1 capsule (1 mg total) by mouth every 12 (twelve) hours.    tramadol (ULTRAM) 50 mg tablet Take 1 tablet (50 mg total) by mouth every 8 (eight) hours as needed for Pain.           Clinical Reference Information           Your Vitals Were     BP Height Weight Last Period BMI    106/64 5' 2" (1.575 m) 59.9 kg (132 lb 0.9 oz) (LMP Unknown) 24.15 kg/m2      Blood Pressure          Most Recent Value    BP  106/64      Allergies as of 3/22/2017     No Known Allergies      Immunizations Administered on Date of Encounter - 3/22/2017     None      Maintenance Dialysis History     Patient has no recorded history of maintenance dialysis.      Transplant Information        Txp Date Organ Coordinator Care Team    10/8/2015 Liver Chetna Pickard RN Referring Physician:  Wendy Hernandez MD   Current PCP:  Wendy Hernandez MD   Current Hepatologist:  Мария Benítez MD   Corresponding Physician:  Wendy Hernandez MD   Surgeon:  Floyd Recinos MD   Assisting Surgeon:  Vicente Salguero MD   Resident:  Robson Garcia MD   Fellow:  Terrance Black MD         Language Assistance Services     ATTENTION: Language assistance services are available, free of charge. Please call [phone].      ATENCIÓN: Si habla español, tiene a segundo disposición servicios gratuitos de asistencia lingüística. Llame al [phone].     CHÚ Ý: N?u b?n nói Ti?ng Vi?t, có các d?ch v? h? tr? ngôn ng? mi?n phí dành cho b?n. G?i s? [phone].         [facility] [address] complies with applicable Federal civil rights laws and " does not discriminate on the basis of race, color, national origin, age, disability, or sex.

## 2017-03-27 ENCOUNTER — TELEPHONE (OUTPATIENT)
Dept: TRANSPLANT | Facility: CLINIC | Age: 57
End: 2017-03-27

## 2017-03-27 NOTE — TELEPHONE ENCOUNTER
----- Message from Petra Boothe sent at 3/27/2017  4:05 PM CDT -----  Contact: patient   Patient would like a call about her medication. Please call

## 2017-03-27 NOTE — TELEPHONE ENCOUNTER
Returned call patient states needs Prior Authorization for Rapamune.  I called Bertha Moore she told me that patient needs to call Medicaid because they think she has another insurance.  Patient repeated and verbalized understanding.

## 2017-04-03 ENCOUNTER — LAB VISIT (OUTPATIENT)
Dept: LAB | Facility: HOSPITAL | Age: 57
End: 2017-04-03
Attending: INTERNAL MEDICINE
Payer: MEDICAID

## 2017-04-03 DIAGNOSIS — Z94.4 LIVER REPLACED BY TRANSPLANT: ICD-10-CM

## 2017-04-03 LAB
ALBUMIN SERPL BCP-MCNC: 3.5 G/DL
ALP SERPL-CCNC: 95 U/L
ALT SERPL W/O P-5'-P-CCNC: 33 U/L
ANION GAP SERPL CALC-SCNC: 8 MMOL/L
AST SERPL-CCNC: 25 U/L
BASOPHILS # BLD AUTO: 0.02 K/UL
BASOPHILS NFR BLD: 0.4 %
BILIRUB DIRECT SERPL-MCNC: 0.2 MG/DL
BILIRUB SERPL-MCNC: 0.3 MG/DL
BUN SERPL-MCNC: 9 MG/DL
CALCIUM SERPL-MCNC: 9.1 MG/DL
CHLORIDE SERPL-SCNC: 111 MMOL/L
CO2 SERPL-SCNC: 26 MMOL/L
CREAT SERPL-MCNC: 0.7 MG/DL
DIFFERENTIAL METHOD: ABNORMAL
EOSINOPHIL # BLD AUTO: 0.1 K/UL
EOSINOPHIL NFR BLD: 2 %
ERYTHROCYTE [DISTWIDTH] IN BLOOD BY AUTOMATED COUNT: 13.3 %
EST. GFR  (AFRICAN AMERICAN): >60 ML/MIN/1.73 M^2
EST. GFR  (NON AFRICAN AMERICAN): >60 ML/MIN/1.73 M^2
GLUCOSE SERPL-MCNC: 87 MG/DL
HCT VFR BLD AUTO: 35.7 %
HGB BLD-MCNC: 11.5 G/DL
LYMPHOCYTES # BLD AUTO: 1.3 K/UL
LYMPHOCYTES NFR BLD: 27.3 %
MCH RBC QN AUTO: 29 PG
MCHC RBC AUTO-ENTMCNC: 32.2 %
MCV RBC AUTO: 90 FL
MONOCYTES # BLD AUTO: 0.3 K/UL
MONOCYTES NFR BLD: 6.1 %
NEUTROPHILS # BLD AUTO: 2.9 K/UL
NEUTROPHILS NFR BLD: 63.8 %
PLATELET # BLD AUTO: 227 K/UL
PMV BLD AUTO: 9.2 FL
POTASSIUM SERPL-SCNC: 4.1 MMOL/L
PROT SERPL-MCNC: 7.3 G/DL
RBC # BLD AUTO: 3.97 M/UL
SIROLIMUS BLD-MCNC: 6.2 NG/ML
SODIUM SERPL-SCNC: 145 MMOL/L
TACROLIMUS BLD-MCNC: 2.2 NG/ML
WBC # BLD AUTO: 4.61 K/UL

## 2017-04-03 PROCEDURE — 80053 COMPREHEN METABOLIC PANEL: CPT

## 2017-04-03 PROCEDURE — 80197 ASSAY OF TACROLIMUS: CPT

## 2017-04-03 PROCEDURE — 80195 ASSAY OF SIROLIMUS: CPT

## 2017-04-03 PROCEDURE — 36415 COLL VENOUS BLD VENIPUNCTURE: CPT

## 2017-04-03 PROCEDURE — 85025 COMPLETE CBC W/AUTO DIFF WBC: CPT

## 2017-04-03 PROCEDURE — 82248 BILIRUBIN DIRECT: CPT

## 2017-04-04 ENCOUNTER — TELEPHONE (OUTPATIENT)
Dept: TRANSPLANT | Facility: CLINIC | Age: 57
End: 2017-04-04

## 2017-04-04 DIAGNOSIS — C22.1 CHOLANGIOCARCINOMA: Primary | ICD-10-CM

## 2017-04-04 NOTE — TELEPHONE ENCOUNTER
Dr. Benítez reviewed your labs.  Continue routine labs no changes needed.  Letter sent for next lab appointment 05/22/17

## 2017-04-07 ENCOUNTER — HOSPITAL ENCOUNTER (OUTPATIENT)
Dept: PULMONOLOGY | Facility: CLINIC | Age: 57
Discharge: HOME | End: 2017-04-07
Payer: MEDICAID

## 2017-04-07 ENCOUNTER — OFFICE VISIT (OUTPATIENT)
Dept: RHEUMATOLOGY | Facility: CLINIC | Age: 57
End: 2017-04-07
Payer: MEDICAID

## 2017-04-07 VITALS
BODY MASS INDEX: 23.13 KG/M2 | HEART RATE: 67 BPM | DIASTOLIC BLOOD PRESSURE: 66 MMHG | SYSTOLIC BLOOD PRESSURE: 115 MMHG | WEIGHT: 135.5 LBS | HEIGHT: 64 IN

## 2017-04-07 VITALS — BODY MASS INDEX: 24.75 KG/M2 | WEIGHT: 134.5 LBS | HEIGHT: 62 IN

## 2017-04-07 DIAGNOSIS — M75.100 TEAR OF ROTATOR CUFF, UNSPECIFIED LATERALITY, UNSPECIFIED TEAR EXTENT: ICD-10-CM

## 2017-04-07 DIAGNOSIS — D84.9 IMMUNOSUPPRESSION: ICD-10-CM

## 2017-04-07 DIAGNOSIS — M65.342 TRIGGER RING FINGER OF LEFT HAND: ICD-10-CM

## 2017-04-07 DIAGNOSIS — I73.00 RAYNAUD'S DISEASE WITHOUT GANGRENE: Primary | ICD-10-CM

## 2017-04-07 DIAGNOSIS — M75.02 ADHESIVE CAPSULITIS OF LEFT SHOULDER: ICD-10-CM

## 2017-04-07 DIAGNOSIS — M65.312 TRIGGER THUMB OF LEFT HAND: ICD-10-CM

## 2017-04-07 DIAGNOSIS — M34.9 LIMITED SCLERODERMA: ICD-10-CM

## 2017-04-07 DIAGNOSIS — R13.10 DYSPHAGIA, UNSPECIFIED TYPE: ICD-10-CM

## 2017-04-07 DIAGNOSIS — I73.00 RAYNAUD'S DISEASE WITHOUT GANGRENE: ICD-10-CM

## 2017-04-07 DIAGNOSIS — C22.1 CHOLANGIOCARCINOMA: ICD-10-CM

## 2017-04-07 DIAGNOSIS — L30.9 ECZEMA OF LEFT HAND: ICD-10-CM

## 2017-04-07 DIAGNOSIS — I73.00 RAYNAUD'S PHENOMENON WITHOUT GANGRENE: ICD-10-CM

## 2017-04-07 DIAGNOSIS — M65.9 FLEXOR TENOSYNOVITIS OF FINGER: ICD-10-CM

## 2017-04-07 DIAGNOSIS — M34.9 SCLERODERMA: ICD-10-CM

## 2017-04-07 LAB
PRE FEV1 FVC: 84
PRE FEV1: 2.97
PRE FVC: 3.52
PREDICTED FEV1 FVC: 82
PREDICTED FEV1: 2.34
PREDICTED FVC: 2.87

## 2017-04-07 PROCEDURE — 99214 OFFICE O/P EST MOD 30 MIN: CPT | Mod: S$PBB,,, | Performed by: INTERNAL MEDICINE

## 2017-04-07 PROCEDURE — 94727 GAS DIL/WSHOT DETER LNG VOL: CPT | Mod: 26,S$PBB,, | Performed by: INTERNAL MEDICINE

## 2017-04-07 PROCEDURE — 94729 DIFFUSING CAPACITY: CPT | Mod: 26,S$PBB,, | Performed by: INTERNAL MEDICINE

## 2017-04-07 PROCEDURE — 94010 BREATHING CAPACITY TEST: CPT | Mod: 26,59,S$PBB, | Performed by: INTERNAL MEDICINE

## 2017-04-07 PROCEDURE — 94620 PR PULMONARY STRESS TESTING,SIMPLE: CPT | Mod: 26,S$PBB,, | Performed by: INTERNAL MEDICINE

## 2017-04-07 PROCEDURE — 94620 PR PULMONARY STRESS TESTING,SIMPLE: CPT | Mod: PBBFAC | Performed by: INTERNAL MEDICINE

## 2017-04-07 PROCEDURE — 99999 PR PBB SHADOW E&M-EST. PATIENT-LVL III: CPT | Mod: PBBFAC,,, | Performed by: INTERNAL MEDICINE

## 2017-04-07 ASSESSMENT — ROUTINE ASSESSMENT OF PATIENT INDEX DATA (RAPID3)
PSYCHOLOGICAL DISTRESS SCORE: 1.1
AM STIFFNESS SCORE: 0, NO
TOTAL RAPID3 SCORE: 6.05
MDHAQ FUNCTION SCORE: 1.4
PAIN SCORE: 7
PATIENT GLOBAL ASSESSMENT SCORE: 6.5
FATIGUE SCORE: 5.5

## 2017-04-07 NOTE — PATIENT INSTRUCTIONS
F/u Dr. Kay in Ortho for left rotator cuff tear  F/u Dr. Rossi in Ortho to consider flexor tenosynovectomy  F/u Dr. Haynes in Gastro for swallowing

## 2017-04-07 NOTE — PROGRESS NOTES
Subjective:       Patient ID: Nadeen Mcgovern is a 56 y.o. female.    Chief Complaint: ? LcSSc, oxaplatin induced Raynaud's    HPI    Still dysphagia for liquids and solids despite dilatation 2/3/17. No heartburn. Had injections right thumb, left middle and left little with only temporary relief, now back to baseline. Stopped OT b/o eczema.    2/3/17: EGD:  Impression:           - Benign-appearing mild esophageal stenosis at the                         GE junction, dilated up to 18 mm. Mid esophagus                         biopsied.  Recommendation:       - Patient has a contact number available for                         emergencies. The signs and symptoms of potential                         delayed complications were discussed with the                         patient. Return to normal activities tomorrow.                         Written discharge instructions were provided to the                         patient.                        - Discharge patient to home.                        - Resume previous diet.                        - Continue present medications.                        - Await pathology results.  Attending Participation:       -  Carlos Haynes MD  2/3/2017 11:00:07 AM  ESOPHAGUS, MID (BIOPSY):  Squamous mucosa with no significant histopathologic changes  No increased eosinophils      Saw Dr. Rossi in [facility]o 1/19/17:    I have personally taken the history and examined this patient. I agree with the resident's note as stated above. We will do three trigger finger injections today. Pt will f/u with rheum for her scleroderma and pt was referred to GI by rheum. Pt does not seem to have a real grasp on her condition. Spent significant amount t of time on education.       Saw Dr. Souza in Derm 12/5/16:     Eczema of hand, bilateral  KOH performed at last OV to rule out tinea. KOH negative  - NB-UVB Light Therapy; Future (twice weekly)  Ok to continue TAC cream twice daily as  well     Prophylactic immunotherapy/S/P liver transplant/History of bile duct cancer  Last TBSE 10/20/16. Recommend annual skin checks    Review of Systems   Constitutional: Positive for fatigue. Negative for appetite change, fever and unexpected weight change.   HENT: Positive for trouble swallowing. Negative for mouth sores.         Dry mouth   Eyes: Negative for visual disturbance.   Respiratory: Negative for cough, shortness of breath and wheezing.    Cardiovascular: Negative for chest pain and palpitations.   Gastrointestinal: Negative for abdominal pain, anal bleeding, blood in stool, constipation, diarrhea, nausea and vomiting.   Genitourinary: Negative for dysuria, frequency and urgency.   Musculoskeletal: Negative for arthralgias, back pain, gait problem, joint swelling, myalgias, neck pain and neck stiffness.   Skin: Positive for color change and rash.   Neurological: Positive for numbness. Negative for weakness and headaches.   Hematological: Negative for adenopathy. Does not bruise/bleed easily.   Psychiatric/Behavioral: Negative for sleep disturbance. The patient is not nervous/anxious.        Results for SHILO HAMPTON (MRN [id number]) as of 4/7/2017 09:08   Ref. Range 7/1/2016 09:15 8/17/2016 15:05   EUGENE HEP-2 Titer Unknown Positive 1:320 Nu...    Anti-SSA Antibody Latest Ref Range: 0.00 - 19.99 EU 2.20    Anti-SSA Interpretation Latest Ref Range: Negative  Negative    Anti-SSB Antibody Latest Ref Range: 0.00 - 19.99 EU 1.17    Anti-SSB Interpretation Latest Ref Range: Negative  Negative    ds DNA Ab Latest Ref Range: Negative 1:10  Negative 1:10    Anti Sm Antibody Latest Ref Range: 0.00 - 19.99 EU 2.14    Anti-Sm Interpretation Latest Ref Range: Negative  Negative    Anti Sm/RNP Antibody Latest Ref Range: 0.00 - 19.99 EU 0.82    Anti-Sm/RNP Interpretation Latest Ref Range: Negative  Negative    Scleroderma SCL- Latest Ref Range: <20 UNITS  3   Complement (C-3) Latest Ref Range: 50 - 180 mg/dL  134    Complement (C-4) Latest Ref Range: 11 - 44 mg/dL  35   Anti-Lynn-1 Antibody Latest Ref Range: <20 Units  <20   Anti-PM/Scl Ab Latest Ref Range: <20 Units  <20   Anti-SS-A 52 kD Ab, IgG Latest Ref Range: <20 Units  92 (H)   Anti-U1-RNP  Ab Latest Ref Range: <20 Units  <20   CCP Antibodies Latest Ref Range: <5.0 U/mL <0.5    EJ Latest Ref Range: Negative   Negative   Fibrillarin (U3 RNP) Latest Ref Range: Negative   Negative   Ku Latest Ref Range: Negative   Negative   MDA-5 (P140) (CADM-140) Latest Ref Range: <20 Units  <20   MI-2 Latest Ref Range: Negative   Negative   NXP-2 (P140) Latest Ref Range: <20 Units  <20   OJ Latest Ref Range: Negative   Negative   PL-12 Latest Ref Range: Negative   Negative   PL-7 Latest Ref Range: Negative   Negative   Rheumatoid Factor Latest Ref Range: 0.0 - 15.0 IU/mL <10.0    SRP Latest Ref Range: Negative   Negative   TIF1 GAMMA (P155/140) Latest Ref Range: <20 Units  <20   U2 snRNP Latest Ref Range: Negative   Negative     Results for SHILO HAMPTON (MRN [id number]) as of 4/7/2017 09:08   Ref. Range 8/17/2016 15:05   RNA Polymerase III Antibodies, IgG, Serum Latest Ref Range: <20.0 (Negative) U <10.0         Objective:   LMP  (LMP Unknown)     Physical Exam   Constitutional: She is oriented to person, place, and time and well-developed, well-nourished, and in no distress.   HENT:   Head: Normocephalic and atraumatic.   Mouth/Throat: Oropharynx is clear and moist.   Eyes: Conjunctivae and EOM are normal.   Neck: Normal range of motion. Neck supple. No thyromegaly present.   Cardiovascular: Normal rate, regular rhythm, normal heart sounds and intact distal pulses.  Exam reveals no gallop and no friction rub.    No murmur heard.  Pulmonary/Chest: Breath sounds normal. She has no wheezes. She has no rales. She exhibits no tenderness.   Abdominal: Soft. She exhibits no distension and no mass. There is tenderness (epigastrium, ).   Large inverted v-shaped scar upper abdomen       Right Side  Rheumatological Exam     Examination finds the shoulder, elbow, wrist, knee, 1st PIP, 1st MCP, 2nd PIP, 2nd MCP, 3rd PIP, 3rd MCP, 4th PIP, 4th MCP, 5th PIP and 5th MCP normal.    Left Side Rheumatological Exam     Examination finds the shoulder, elbow, wrist, knee, 1st PIP, 1st MCP, 2nd PIP, 2nd MCP, 3rd PIP, 3rd MCP, 4th PIP, 4th MCP, 5th PIP and 5th MCP normal.      Lymphadenopathy:     She has no cervical adenopathy.   Neurological: She is alert and oriented to person, place, and time. She displays normal reflexes. Gait normal.   Nl motor strength UE and LE bilateral   Skin:     Hand eczema, improved  ?sclerodactyly  ? Thickening of forearm skin  MRSS=4(?)     Musculoskeletal: She exhibits no edema.       Results for SHILO HAMPTON (MRN [id number]) as of 4/7/2017 09:08   Ref. Range 4/3/2017 06:44   WBC Latest Ref Range: 3.90 - 12.70 K/uL 4.61   RBC Latest Ref Range: 4.00 - 5.40 M/uL 3.97 (L)   Hemoglobin Latest Ref Range: 12.0 - 16.0 g/dL 11.5 (L)   Hematocrit Latest Ref Range: 37.0 - 48.5 % 35.7 (L)   MCV Latest Ref Range: 82 - 98 fL 90   MCH Latest Ref Range: 27.0 - 31.0 pg 29.0   MCHC Latest Ref Range: 32.0 - 36.0 % 32.2   RDW Latest Ref Range: 11.5 - 14.5 % 13.3   Platelets Latest Ref Range: 150 - 350 K/uL 227   MPV Latest Ref Range: 9.2 - 12.9 fL 9.2   Gran% Latest Ref Range: 38.0 - 73.0 % 63.8   Gran # Latest Ref Range: 1.8 - 7.7 K/uL 2.9   Lymph% Latest Ref Range: 18.0 - 48.0 % 27.3   Lymph # Latest Ref Range: 1.0 - 4.8 K/uL 1.3   Mono% Latest Ref Range: 4.0 - 15.0 % 6.1   Mono # Latest Ref Range: 0.3 - 1.0 K/uL 0.3   Eosinophil% Latest Ref Range: 0.0 - 8.0 % 2.0   Eos # Latest Ref Range: 0.0 - 0.5 K/uL 0.1   Basophil% Latest Ref Range: 0.0 - 1.9 % 0.4   Baso # Latest Ref Range: 0.00 - 0.20 K/uL 0.02   Sodium Latest Ref Range: 136 - 145 mmol/L 145   Potassium Latest Ref Range: 3.5 - 5.1 mmol/L 4.1   Chloride Latest Ref Range: 95 - 110 mmol/L 111 (H)   CO2 Latest Ref Range: 23 - 29 mmol/L 26   Anion Gap Latest  Ref Range: 8 - 16 mmol/L 8   BUN, Bld Latest Ref Range: 6 - 20 mg/dL 9   Creatinine Latest Ref Range: 0.5 - 1.4 mg/dL 0.7   eGFR if non African American Latest Ref Range: >60 mL/min/1.73 m^2 >60.0   eGFR if African American Latest Ref Range: >60 mL/min/1.73 m^2 >60.0   Glucose Latest Ref Range: 70 - 110 mg/dL 87   Calcium Latest Ref Range: 8.7 - 10.5 mg/dL 9.1   Alkaline Phosphatase Latest Ref Range: 55 - 135 U/L 95   Total Protein Latest Ref Range: 6.0 - 8.4 g/dL 7.3   Albumin Latest Ref Range: 3.5 - 5.2 g/dL 3.5   Total Bilirubin Latest Ref Range: 0.1 - 1.0 mg/dL 0.3   Bilirubin, Direct Latest Ref Range: 0.1 - 0.3 mg/dL 0.2   AST Latest Ref Range: 10 - 40 U/L 25   ALT Latest Ref Range: 10 - 44 U/L 33   Tacrolimus Lvl Latest Ref Range: 5.0 - 15.0 ng/mL 2.2 (L)   Sirolimus Lvl Latest Ref Range: 4.0 - 20.0 ng/mL 6.2   Differential Method Unknown Automated           Ref Range & Units 7mo ago   1yr ago   2yr ago      EF 55 - 65 60 55 60    Diastolic Dysfunction  No No No    Aortic Valve Regurgitation  MILD TO MODERATE (A) MILD MILD TO MODERATE (A)    Tricuspid Valve Regurgitation  MILD TRIVIAL MILD   Resulting Agency  CVIS CVIS CVIS   Narrative   Date of Procedure: 08/17/2016        TEST DESCRIPTION   Technical Quality: This is a technically adequate study.     Aorta: The aortic root is normal in size, measuring 3.1 cm at sinotubular junction and 3.2 cm at Sinuses of Valsalva. The proximal ascending aorta is normal in size, measuring 2.9 cm across.     Left Atrium: The left atrial volume index is mildly enlarged, measuring 34.57 cc/m2.     Left Ventricle: The left ventricle is normal in size, with an end-diastolic diameter of 4.3 cm, and an end-systolic diameter of 3.1 cm. LV wall thickness is normal, with the septum and the posterior wall each measuring 0.7 cm across. Relative wall   thickness was normal at 0.33, and the LV mass index was 60.2 g/m2 consistent with normal left ventricular mass. Global left ventricular  systolic function appears normal. Visually estimated ejection fraction is 60-65%. The LV Doppler derived stroke volume   equals 60.0 ccs.   The E/e'(sep) is 11, consistent with normal diastolic function.     Right Atrium: The right atrium is normal in size, measuring 4.0 cm in length and 3.2 cm in width in the apical view.     Right Ventricle: The right ventricle is normal in size measuring 2.9 cm at the base in the apical right ventricle-focused view. Global right ventricular systolic function appears normal. Tricuspid annular plane systolic excursion (TAPSE) is 2.5 cm.   Tissue Doppler-derived tricuspid annular peak systolic velocity (S prime) is 12.1 cm/s.     Aortic Valve:  The aortic valve is normal in structure. The aortic valve is tri-leaflet in structure. Additionally, there is mild to moderate aortic regurgitation.     Mitral Valve:  The mitral valve is normal in structure.     Tricuspid Valve:  The tricuspid valve is normal in structure. There is mild tricuspid regurgitation.     Pulmonary Valve:  The pulmonic valve is normal in structure.     IVC: IVC is normal in size and collapses > 50% with a sniff, suggesting normal right atrial pressure of 3 mmHg.     Intracavitary: There is no evidence of pericardial effusion, intracavity mass, thrombi, or vegetation.         CONCLUSIONS     1 - Normal left ventricular systolic function (EF 60-65%).     2 - Mild left atrial enlargement.     3 - Normal left ventricular diastolic function.     4 - Normal right ventricular systolic function .     5 - Tricuspid AV; normal aortic root size; mild to moderate aortic regurgitation.     6 - Mild tricuspid regurgitation.                     Results for SHILO HAMPTON (MRN [id number]) as of 4/7/2017 09:08   Ref. Range 1/6/2015 18:29 1/21/2015 09:58 7/1/2016 09:15 8/17/2016 15:05   Sed Rate Latest Ref Range: 0 - 20 mm/Hr 132 (H) 107 (H) 30 (H) 20     Results for SHILO HAMPTON (MRN [id number]) as of 4/7/2017 09:08   Ref. Range 1/6/2015  18:29 1/10/2015 03:51 1/21/2015 09:58 7/1/2016 09:15 8/17/2016 15:05   CRP Latest Ref Range: 0.0 - 8.2 mg/L 219.3 (H) 165.1 (H) 17.4 (H) 2.7 1.1              PFTs 8/18/16: normal lung volumes, spirometry and DLco  Assessment:     ? lcSSc(limited scleroderma): ACR/EULAR criteria: sclerodactyly of fingers distal to mcps prox to pips(4) digital tip pitting scars(3) telangiectases(2) Raynaud's(3) =12 meets criteria ; oxaliplatin(cisplatin associated with Raynaud's) ; 5-FU not known to be associated with Raynaud's or scleroderma. MRSS=2(mild) May all be oxiplatin induced Raynaud's  EUGENE+ 1:320 nucleolar(scleroderma pattern) and speckled + SS-A (most typical of Sjogren's and SLE), Raynaud's, telangiectases, dysphagia. All scleroderma associated autoantibodies negative.   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   hand eczema     Plan:     esr, crp, Th/To today    PFTs today  amlodipine 5mg for Raynaud's will not tolerate higher dose nor other vasodilators given her current BP  Not a candidate for additional immunosuppression based on current disease activity and already on sirolimus and tacrolimus for liver transplant.  reordered OT for left hand and left shoulder as she is doing and ordered by Ortho and ff/u with them.   F/u with Dr. Haynes in Gastro for dysphagia despite EGD with dilatation  R/u Dr. Rossi for recurrent trigger fingers despite injection x2  RTC 3 months

## 2017-04-07 NOTE — MR AVS SNAPSHOT
[facility] - Rheumatology  [address]  [facility] [address]  Phone: [phone]  Fax: [phone]                  Nadeen Mcgovern   2017 9:00 AM   Office Visit    Description:  Female : 1960   Provider:  Haresh Butler MD   Department:  [facility] - Rheumatology           Diagnoses this Visit        Comments    Raynaud's disease without gangrene    -  Primary     Flexor tenosynovitis of finger         Scleroderma         Cholangiocarcinoma         Adhesive capsulitis of left shoulder         Immunosuppression         Limited scleroderma         Raynaud's phenomenon without gangrene         Trigger ring finger of left hand         Trigger thumb of left hand         Tear of rotator cuff, unspecified laterality, unspecified tear extent                To Do List           Future Appointments        Provider Department Dept Phone    2017 10:00 AM PULMONARY FUNCTION [facility] [phone]    2017 10:15 AM PULMONARY FUNCTION [facility] [phone]    2017 10:30 AM PULMONARY FUNCTION [facility] [phone]    2017 11:00 AM SIX, MINUTE WALK [facility] [phone]    4/10/2017 8:10 AM LAB, APPOINTMENT [place] [facility] [phone]      Goals (5 Years of Data)     None      Follow-Up and Disposition     Return in about 3 months (around 2017).      [facility]     [facility] Nurse Care Line -  Assistance  Unless otherwise directed by your provider, please contact [facility], our nurse care line that is available for  assistance.     Registered nurses in the [facility] provide: appointment scheduling, clinical advisement, health education, and other advisory services.  Call: [phone] (toll free)               Medications           Message regarding Medications     Verify the changes and/or additions to your medication regime listed below are the same as  "discussed with your clinician today.  If any of these changes or additions are incorrect, please notify your healthcare provider.        STOP taking these medications     predniSONE (DELTASONE) 10 MG tablet Take 1 tablet daily x 2 weeks and then 1/2 tab daily x 2 weeks. Then take 1/2 tablet every other day for 2 weeks.           Verify that the below list of medications is an accurate representation of the medications you are currently taking.  If none reported, the list may be blank. If incorrect, please contact your healthcare provider. Carry this list with you in case of emergency.           Current Medications     amlodipine (NORVASC) 5 MG tablet Take 1 tablet (5 mg total) by mouth once daily.    aspirin 81 MG Chew Take 81 mg by mouth once daily.    famotidine (PEPCID) 20 MG tablet Take 1 tablet (20 mg total) by mouth every evening.    hydrOXYzine pamoate (VISTARIL) 50 MG Cap Take 1 capsule (50 mg total) by mouth every 8 (eight) hours as needed.    multivitamin (THERAGRAN) tablet Take 1 tablet by mouth once daily.    sirolimus (RAPAMUNE) 1 MG Tab Take 2 tablets (2 mg total) by mouth once daily.    tacrolimus (PROGRAF) 1 MG Cap Take 1 capsule (1 mg total) by mouth every 12 (twelve) hours.    tramadol (ULTRAM) 50 mg tablet Take 1 tablet (50 mg total) by mouth every 8 (eight) hours as needed for Pain.           Clinical Reference Information           Your Vitals Were     BP Pulse Height Weight Last Period BMI    115/66 67 5' 3.6" (1.615 m) 61.5 kg (135 lb 8 oz) (LMP Unknown) 23.55 kg/m2      Blood Pressure          Most Recent Value    BP  115/66      Allergies as of 4/7/2017     No Known Allergies      Immunizations Administered on Date of Encounter - 4/7/2017     None      Orders Placed During Today's Visit      Normal Orders This Visit    Ambulatory Referral to Physical/Occupational Therapy     Future Labs/Procedures Expected by Expires    Sedimentation rate, manual  4/7/2017 4/7/2018    Th/To Antibody  " 4/7/2017 6/6/2018    DLCO  As directed 4/7/2018    Lung Volume  As directed 4/7/2018    Six Minute Walk  As directed 4/7/2018    Spriometry w/Tracings  As directed 4/7/2018      Maintenance Dialysis History     Patient has no recorded history of maintenance dialysis.      Transplant Information        Txp Date Organ Coordinator Care Team    10/8/2015 Liver Chetna Pickard, RENEE Referring Physician:  Wendy Hernandez MD   Current PCP:  Wendy Hernandez MD   Current Hepatologist:  Мария Benítez MD   Corresponding Physician:  Wendy Hernandez MD   Surgeon:  Floyd Recinos MD   Assisting Surgeon:  Vicente Salguero MD   Resident:  Robson Garcia MD   Fellow:  Terrance Black MD         Instructions    F/u Dr. Kay in Ortho for left rotator cuff tear  F/u Dr. Rossi in Ortho to consider flexor tenosynovectomy  F/u Dr. Haynes in Gastro for swallowing       Language Assistance Services     ATTENTION: Language assistance services are available, free of charge. Please call [phone].      ATENCIÓN: Si habla español, tiene a segundo disposición servicios gratuitos de asistencia lingüística. Llame al [phone].     CHÚ Ý: N?u b?n nói Ti?ng Vi?t, có các d?ch v? h? tr? ngôn ng? mi?n phí dành cho b?n. G?i s? [phone].         [facility] complies with applicable Federal civil rights laws and does not discriminate on the basis of race, color, national origin, age, disability, or sex.

## 2017-04-08 NOTE — PROCEDURES
Nadeen Mcgovern is a 56 y.o.  female patient, who presents for a 6 minute walk test ordered by Haresh Butler MD.  The diagnosis is Scleroderma/CREST.  The patient's BMI is 24.6 kg/m2.  Predicted distance (lower limit of normal) is 398.02 meters.      Test Results:    The test was completed without stopping.  The total time walked was 360 seconds.  During walking, the patient reported:  Dyspnea, Lightheadedness. The patient used no assistive devices  during testing.     04/07/2017---------Distance: 365.76 meters (1200 feet)     O2 Sat % Supplemental Oxygen Heart Rate Blood Pressure Adamaris Scale   Pre-exercise  (Resting) 100 % Room Air 62 bpm 122/59 mmHg 3   During Exercise 92 % Room Air 77 bpm 105/54 mmHg 4   Post-exercise  (Recovery) 100 % Room Air  65 bpm       Recovery Time: 88 seconds    Performing nurse/tech: SHANTI Raymond      PREVIOUS STUDY:   08/18/2016---------Distance: 365.76 meters (1200 feet)       O2 Sat % Supplemental Oxygen Heart Rate Blood Pressure Adamaris Scale   Pre-exercise  (Resting) 100 % Room Air 70 bpm 120/63 mmHg 3   During Exercise 100 % Room Air 76 bpm 121/60 mmHg 4   Post-exercise  (Recovery) 100 % Room Air  67 bpm           CLINICAL INTERPRETATION:  Six minute walk distance is 365.76 meters (1200 feet) with somewhat heavy dyspnea.  During exercise, there was significant desaturation while breathing room air.  Both blood pressure and heart rate remained stable with walking.  The patient reported non-pulmonary symptoms during exercise.  Since the previous study in August 2016, exercise capacity is unchanged.  Based upon age and body mass index, exercise capacity is less than predicted.

## 2017-04-10 ENCOUNTER — HOSPITAL ENCOUNTER (OUTPATIENT)
Dept: RADIOLOGY | Facility: HOSPITAL | Age: 57
Discharge: HOME | End: 2017-04-10
Attending: INTERNAL MEDICINE
Payer: MEDICAID

## 2017-04-10 DIAGNOSIS — C22.1 CHOLANGIOCELLULAR CARCINOMA: ICD-10-CM

## 2017-04-10 PROCEDURE — 71260 CT THORAX DX C+: CPT | Mod: 26,,, | Performed by: RADIOLOGY

## 2017-04-10 PROCEDURE — 25500020 PHARM REV CODE 255: Performed by: INTERNAL MEDICINE

## 2017-04-10 PROCEDURE — 74177 CT ABD & PELVIS W/CONTRAST: CPT | Mod: 26,,, | Performed by: RADIOLOGY

## 2017-04-10 PROCEDURE — 74177 CT ABD & PELVIS W/CONTRAST: CPT | Mod: TC

## 2017-04-10 PROCEDURE — 71260 CT THORAX DX C+: CPT | Mod: TC

## 2017-04-10 RX ADMIN — IOHEXOL 15 ML: 350 INJECTION, SOLUTION INTRAVENOUS at 08:04

## 2017-04-10 RX ADMIN — IOHEXOL 15 ML: 350 INJECTION, SOLUTION INTRAVENOUS at 09:04

## 2017-04-10 RX ADMIN — IOHEXOL 75 ML: 350 INJECTION, SOLUTION INTRAVENOUS at 10:04

## 2017-04-12 ENCOUNTER — TELEPHONE (OUTPATIENT)
Dept: TRANSPLANT | Facility: CLINIC | Age: 57
End: 2017-04-12

## 2017-04-16 DIAGNOSIS — G47.00 INSOMNIA: ICD-10-CM

## 2017-04-17 RX ORDER — ZOLPIDEM TARTRATE 5 MG/1
TABLET ORAL
Qty: 30 TABLET | Refills: 0 | Status: SHIPPED | OUTPATIENT
Start: 2017-04-17 | End: 2017-06-20 | Stop reason: SDUPTHER

## 2017-04-20 ENCOUNTER — CLINICAL SUPPORT (OUTPATIENT)
Dept: REHABILITATION | Facility: HOSPITAL | Age: 57
End: 2017-04-20
Attending: INTERNAL MEDICINE
Payer: MEDICAID

## 2017-04-20 DIAGNOSIS — M65.342 TRIGGER RING FINGER OF LEFT HAND: ICD-10-CM

## 2017-04-20 DIAGNOSIS — M75.102 TEAR OF LEFT ROTATOR CUFF, UNSPECIFIED TEAR EXTENT: ICD-10-CM

## 2017-04-20 DIAGNOSIS — R29.898 LEFT ARM WEAKNESS: Primary | ICD-10-CM

## 2017-04-20 DIAGNOSIS — M77.8 TENDINITIS OF LEFT HAND: ICD-10-CM

## 2017-04-20 DIAGNOSIS — M75.02 ADHESIVE CAPSULITIS OF LEFT SHOULDER: ICD-10-CM

## 2017-04-20 PROCEDURE — G8990 OTHER PT/OT CURRENT STATUS: HCPCS | Mod: CK,PO

## 2017-04-20 PROCEDURE — G8991 OTHER PT/OT GOAL STATUS: HCPCS | Mod: CJ,PO

## 2017-04-20 PROCEDURE — 97166 OT EVAL MOD COMPLEX 45 MIN: CPT | Mod: PO

## 2017-04-20 PROCEDURE — 97110 THERAPEUTIC EXERCISES: CPT | Mod: PO

## 2017-04-20 NOTE — PROGRESS NOTES
Occupational Therapy - Evaluation    Patient: Nadeen Mcgovern  Date of Evaluation: 4/20/2017  Referring Physician: Haresh Butler MD  Diagnosis:   1. Left arm weakness     2. Tendinitis of left hand     3. Tear of left rotator cuff, unspecified tear extent     4. Adhesive capsulitis of left shoulder     5. Trigger ring finger of left hand       MRN: [id number]  Age: 56 y.o.  Sex: female     Referral Orders:      Visits Approved: 12    Start Time: 10:05  End Time: 11:15  Total Time: 65     Hand dominance: Right    Subjective     Nadeen is a 56 y.o. female that presents to clinic secondary to L shoulder and hand pain. Pain started in shoulder one year ago and hand six months ago. MRI were taken and revealed no fx. Previous treatment included PT for L shoulder, OT for L thumb and RF, Cortizone injections for L RF and thumb. No cultural, environmental, or spiritual barriers identified to treatment or learning.    Pain:  Reported on the VAS. Functional Pain Scale Rating 0-10 as follows  Pain location: Thumb and RF MCP, L shoulder with AROM  Pain at rest: 3-4/10  Pain w/AROM 6-7/10  Pain at worst: 9/10  Pain is increased with movement, gripping  Patient uses medicine (Tramadol) and rest to control pain  Description of pain: Aching and Throbbing    Prior Level of Function:  Patient was independent prior to onset of symptoms  DME owned:  none  Home/Living environment : lives alone    Current Functional Limitations:      Basic ADLs:    Feeding: Assistance - Difficult due to pain   Bathing: Assistance - Difficulty reaching to R side   Dressing: Assistance - Difficulty donning shirts and buttoning   Grooming: Assistance - Extreme difficulty styling hair   Toileting: Assistance - Difficulty pulling up pants     IADLs:   Managing finances: Assistance - Unable to use L hand   Driving/handling transportation: Assistance - Difficulty grasping steering wheel with L hand   Shopping: Assistance - Unable to reach certain items and  difficulty holding bags   Using phone:Independent   Computer:Independent   Managing medications: Assistance - Min A opening bottles   Housework & home maintenance: Assistance - Mod A from daughter     Leisure interests: walking, inside biking    Vocational:   Working presently: disability  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    Past Medical History/Physical Systems Review:   Past Medical History:   Diagnosis Date    Acute cholecystitis     Cholecystitis    Arthritis 2015    septic arthritis of right shoulder    Bacteremia due to Streptococcus pneumoniae     Cancer     Cholangiocarcinoma     Jaundice     obstructive    Prediabetes        Allergies:  Review of patient's allergies indicates:  No Known Allergies    Barriers:  Environmental Concerns/ Fall Risk:  None  Barriers to Learning: None   Cultural/Spiritual : None   Developmental/Education: None   Abuse/ Neglect: None   Nutritional Deficit: None   Language: None   Hearing/Vision Deficit: None     Objective     Observation:   Diffuse brown spots (mild) on B hands    Sensation:   Pt reports paresthesia in L RF  3.61 diminished light touch L hand    Range of Motion:   Digits:  left Active  (Ext/Flex) Thumb Index Middle Ring Small   MP 0/63 0/35 0/50 0/37 0/15   PIP 0/55 0/78 0/88 0/83 0/70   DIP --- 0/50 0/65 0/65 0/60   GARCIA 118 163 203 185 145     Thumb Opposition: -8mm  Palmar Abduction: 38  Radial Abduction: 38    Wrist: left  Flexion 60   Extension 60   Radial Deviation 17   Ulnar Deviation 20   Supination WNL   Pronation WNL     Manual Muscle Testing:     Wrist Flexion 4+/5  Wrist Extension 4+/5  Radial Deviation 4+/5  Ulnar Deviation 4+/5  Pronation 5/5  Supination 4+/5  Finger Extension 4+/5     Strength: (DUARTE Dynamometer in lbs.) Average 3 trials, Position II  Right: 37  Left: 22    Pinch Strength: (Pinch Gauge in psi's), Average 3 trials  Ventura Pinch  R) 6 psi's    L) 3.5 psi's  3pt Pinch    R) 2 psi's   L) .5 psi's  2pt Pinch     R) 2 psi's    L) 1.2 psi's    9 hole peg test (placing 9 pegs)  36 seconds L hand  19 seconds R hand    OUTCOME MEASURE: FOTO    Category: Self Care      Current Score  =  48% impaired  Goal at Discharge Score =  33% impaired    Score interpretation is as follows:     TEST SCORE  Modifier  Impairment Limitation Restriction    0%  CH  0 % impaired, limited or restricted    1-19%  CI  @ least 1% but less than 20% impaired, limited or restricted    20-39%  CJ  @ least 20%<40% impaired, limited or restricted    40-59%  CK  @ least 40%<60% impaired, limited or restricted    60-79%  CL  @ least 60% <80% impaired, limited or restricted    80-99%  CM  @ least 80%<100% impaired limited or restricted    100%  CN  100% impaired, limited or restricted     Involved area: left shoulder and hand  Mechanism of Injury: Insidious    Past Medical History:   Diagnosis Date    Acute cholecystitis     Cholecystitis    Arthritis 2015    septic arthritis of right shoulder    Bacteremia due to Streptococcus pneumoniae     Cancer     Cholangiocarcinoma     Jaundice     obstructive    Prediabetes      Shoulder    Objective:  Observation/Inspection    Observation/Inspection:  scapular winging and rounded shoulders (mild)    Range of Motion:   Right: limited as follows: (see measurements table below)  Left: limited as follows: (see measurements table below)     L (L) UE (R) UE     PROM AROM AROM Norm   Shoulder Flexion 100 85 115 180   Shoulder Abduction 125 70 105 0-180   Shoulder Extension 65 36 63 0-50   Shoulder Internal Rotation (shoulder at 90 degrees) NT 22 30 0-90   Shoulder External Rotation (elbow by side)  55 40 35 0-90   Hor.Shoulder Adduction 30 20 28 0-40   7/10 pain R shoulder ABD  9/10 IR, other 7/10    Painful Arc:   Patient demonstrates painful arc in shoulder abduction.        Strength  Shoulder Flexion RUE: 4-/5   Shoulder Extension RUE: 4+/5   Shoulder Abduction RUE:  4-/5   Shoulder Adduction RUE:  4+/5    Internal Rotation RUE: 4+/5   External Rotation RUE: 4/5   Horizontal Adduction RUE: 4+/5   Shoulder Flexion LUE: 4-/5   Shoulder Extension LUE: 4+/5   Shoulder Abduction LUE: 3+/5   Shoulder Abbduction LUE: 4+/5   Internal Rotation LUE:  4/5   External Rotation LUE:  4/5   Horizontal Adduction LUE:  4+/5       Special Tests:  Negative: Drop Arm Test  Positive: Empty can test  Positive: Maravilla Ministerio      Palpation: (for pain)  Positive: Coracoid process, Posterior Subacromial Space and Bicipital Groove, AC joint, supra and infraspinatous tendons    Pt presents with limitations as described in problem list. Patient can benefit from Occupational Therapy services for Iontophoresis, Ultrasound, moist heat, PROM, AROM, Theraputic exercises, joint mobs, home exercise program provided with written instructions, ice, strengthening, Theraband Ex, UBE and pulley ex in order to maximize painfree functional use of  left UE. . The following goals were discussed with the patient and she is in agreement with them as to be addressed in the treatment plan.     Problem List:   Decreased function of Left UE, Decreased ROM, Increased pain, Decreased strength, Scapular winging, Hypomobility, Muscular atrophy, Inability to perform work/tasks, Difficulty sleeping, Inability to perform leisure activities and Inability to perform self care tasks    Goals to be met in 4 weeks:  1) Pt will be independent and report performing HEP to maximize (R) shoulder functional capacity.  2) Pt will increase shoulder PROM in all measured planes of motion by 10 degrees to A with daily task.  3) Pt will report use of home modalities for pain management.  4) Pt will report one degree less of pain with nonuse and with use.  5) Pt will report interrupted sleep no more than twice a night.    Plan  Nadeen to be treated by Occupational Therapy 2 times per week for 4 weeks to achieve the established goals.   Treatment to include Iontophoresis w/ 1.2 cc's of  dexamethasone, Ultrasoud @ 3mHz, AAROM Mobilization of GH joint, PROM Home program, Ice, Moist heat, Strengthening Theraband Ex, UBE  and E- stim as well as any other treatments deemed necessary based on the patient's needs or progress.     I certify the need for these services furnished under this plan of treatment and while under my care    ____________________________________  Physician/Referring Practitioner    _______________  Date of Signature    Treatment today included: Evaluation, patient education on HEP and cryotherapy after tx    Assessment     This is a 56 y.o. female referred to outpatient occupational/hand therapy and presents with a medical diagnosis of L shoulder and hand pain and demonstrates limitations as described in the problem list. Following Moderate medical record review it is determined that pt will benefit from occupational therapy services in order to maximize pain free and/or functional use of left hand and shoulder.     History Examination Decision Making Complexity Score   Occupational Profile: Pt is on disability    Medical and Therapy History Review: moderate                 Performance Deficits: Difficulty with all basic ADL's due to pain and limited AROM.  Unable to open medicine bottles or perform homemaking tasks    Physical:  Decreased ROM   Decreased  and pinch strength   Decreased muscle strength   Decreased functional hand use   Increased pain   Edema  Scar Adhesions     Cognitive:  WNL    Psychosocial:    Pt lives alone     Modification of tasks during evaluation: None  moderate  Based on PMHX, co morbidities , data from assessments and functional level of assistance required with task and clinical presentation directly impacting     The following goals were discussed with the patient and he/she is in agreement with them as to be addressed in the treatment plan. Pt was given a HEP consisting of ER/IR, scapular retraction AROM, SROM/tabletop stretch. Pt verbalized  understanding of the instructions as they were given and demonstrated proper form and technique during therapy. Pt was advised to perform these exercises 2x/day.   The patient's rehab potential is good due to motivation and overall condition.     Short Term Goals: ( 2 weeks)   1)   Patient to be IND with HEP and modalities for pain management  2)   Patient to report decreased pain to 5/10 w/AROM  3)   Patient to achieve full opposition with L hand to increase ADL independence    Long Term Goals (6 weeks)  1)   Increase ROM in L shoulder and hand to WFL for increased functional hand use   2)   Increase  strength 10 lbs. to grasp items during basic and IADL's  3)   Increase pinch 1-3 psis for  Opening containers and jars    Plan     Pt to be treated by Occupational Therapy 2 times per week for 6 weeks during the certification period from 4/20/17 to 5/31/17 to achieve the established goals.     Treatment to include: Paraffin, Fluidotherapy, Manual therapy/joint mobilizations, Modalities for pain management, US 3 mhz, Therapeutic exercises/activities., Iontophoresis with 2.0 cc Dexamethasone, Strengthening, Orthotic Fabrication/Fit/Training, Edema Control, Electrical Modalities, Joint Protection and Energy Conservation, as well as any other treatments deemed necessary based on the patient's needs or progress.       I certify the need for these services furnished under this plan of treatment and while under my care    ____________________________________                         __________________  Physician/Referring Practitioner                                               Date of Signature

## 2017-04-24 ENCOUNTER — DOCUMENTATION ONLY (OUTPATIENT)
Dept: REHABILITATION | Facility: HOSPITAL | Age: 57
End: 2017-04-24

## 2017-04-24 NOTE — PLAN OF CARE
Create Note         My Note 04/20/2017   Edit              Occupational Therapy - Evaluation     Patient: Nadeen Mcgovern  Date of Evaluation: 4/20/2017  Referring Physician: Haresh Butler MD  Diagnosis:   1. Left arm weakness      2. Tendinitis of left hand      3. Tear of left rotator cuff, unspecified tear extent      4. Adhesive capsulitis of left shoulder      5. Trigger ring finger of left hand         MRN: [id number]  Age: 56 y.o.  Sex: female      Referral Orders:    Visits Approved: 12     Start Time: 10:05  End Time: 11:15  Total Time: 65      Hand dominance: Right     Subjective      Nadeen is a 56 y.o. female that presents to clinic secondary to L shoulder and hand pain. Pain started in shoulder one year ago and hand six months ago. MRI were taken and revealed no fx. Previous treatment included PT for L shoulder, OT for L thumb and RF, Cortizone injections for L RF and thumb. No cultural, environmental, or spiritual barriers identified to treatment or learning.     Pain:  Reported on the VAS. Functional Pain Scale Rating 0-10 as follows  Pain location: Thumb and RF MCP, L shoulder with AROM  Pain at rest: 3-4/10  Pain w/AROM 6-7/10  Pain at worst: 9/10  Pain is increased with movement, gripping  Patient uses medicine (Tramadol) and rest to control pain  Description of pain: Aching and Throbbing     Prior Level of Function:  Patient was independent prior to onset of symptoms  DME owned: none  Home/Living environment : lives alone     Current Functional Limitations:      Basic ADLs:   Feeding: Assistance - Difficult due to pain  Bathing: Assistance - Difficulty reaching to R side  Dressing: Assistance - Difficulty donning shirts and buttoning  Grooming: Assistance - Extreme difficulty styling hair  Toileting: Assistance - Difficulty pulling up pants      IADLs:  Managing finances: Assistance - Unable to use L hand  Driving/handling transportation: Assistance - Difficulty grasping steering wheel  with L hand  Shopping: Assistance - Unable to reach certain items and difficulty holding bags  Using phone:Independent  Computer:Independent  Managing medications: Assistance - Min A opening bottles  Housework & home maintenance: Assistance - Mod A from daughter     Leisure interests: walking, inside biking     Vocational:   Working presently: disability  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     Past Medical History/Physical Systems Review:        Past Medical History:   Diagnosis Date    Acute cholecystitis       Cholecystitis    Arthritis 2015     septic arthritis of right shoulder    Bacteremia due to Streptococcus pneumoniae      Cancer      Cholangiocarcinoma      Jaundice       obstructive    Prediabetes           Allergies:  Review of patient's allergies indicates:  No Known Allergies     Barriers:  Environmental Concerns/ Fall Risk: None  Barriers to Learning: None   Cultural/Spiritual : None   Developmental/Education: None   Abuse/ Neglect: None   Nutritional Deficit: None   Language: None   Hearing/Vision Deficit: None      Objective      Observation:   Diffuse brown spots (mild) on B hands     Sensation:   Pt reports paresthesia in L RF  3.61 diminished light touch L hand     Range of Motion:   Digits:  left Active  (Ext/Flex) Thumb Index Middle Ring Small   MP 0/63 0/35 0/50 0/37 0/15   PIP 0/55 0/78 0/88 0/83 0/70   DIP --- 0/50 0/65 0/65 0/60   GARCIA 118 163 203 185 145      Thumb Opposition: -8mm  Palmar Abduction: 38  Radial Abduction: 38     Wrist: left  Flexion 60   Extension 60   Radial Deviation 17   Ulnar Deviation 20   Supination WNL   Pronation WNL      Manual Muscle Testing:      Wrist Flexion 4+/5  Wrist Extension 4+/5  Radial Deviation 4+/5  Ulnar Deviation 4+/5  Pronation 5/5  Supination 4+/5  Finger Extension 4+/5      Strength: (DUARTE Dynamometer in lbs.) Average 3 trials, Position II  Right: 37  Left: 22     Pinch Strength: (Pinch Gauge in psi's), Average 3  trials  Ventura Pinch  R) 6 psi's  L) 3.5 psi's  3pt Pinch  R) 2 psi's  L) .5 psi's  2pt Pinch  R) 2 psi's  L) 1.2 psi's     9 hole peg test (placing 9 pegs)  36 seconds L hand  19 seconds R hand     OUTCOME MEASURE: FOTO     Category: Self Care      Current Score = 48% impaired  Goal at Discharge Score = 33% impaired    Score interpretation is as follows:   TEST SCORE Modifier Impairment Limitation Restriction   0% CH 0 % impaired, limited or restricted   1-19% CI @ least 1% but less than 20% impaired, limited or restricted   20-39% CJ @ least 20%<40% impaired, limited or restricted   40-59% CK @ least 40%<60% impaired, limited or restricted   60-79% CL @ least 60% <80% impaired, limited or restricted   80-99% CM @ least 80%<100% impaired limited or restricted   100% % impaired, limited or restricted      Involved area: left shoulder and hand  Mechanism of Injury: Insidious          Past Medical History:   Diagnosis Date    Acute cholecystitis       Cholecystitis    Arthritis 2015     septic arthritis of right shoulder    Bacteremia due to Streptococcus pneumoniae      Cancer      Cholangiocarcinoma      Jaundice       obstructive    Prediabetes        Shoulder     Objective:  Observation/Inspection     Observation/Inspection:  scapular winging and rounded shoulders (mild)     Range of Motion:   Right: limited as follows: (see measurements table below)  Left: limited as follows: (see measurements table below)       L (L) UE (R) UE       PROM AROM AROM Norm   Shoulder Flexion 100 85 115 180   Shoulder Abduction 125 70 105 0-180   Shoulder Extension 65 36 63 0-50   Shoulder Internal Rotation (shoulder at 90 degrees) NT 22 30 0-90   Shoulder External Rotation (elbow by side)  55 40 35 0-90   Hor.Shoulder Adduction 30 20 28 0-40   7/10 pain R shoulder ABD  9/10 IR, other 7/10     Painful Arc:   Patient demonstrates painful arc in shoulder abduction.         Strength  Shoulder Flexion RUE: 4-/5   Shoulder  Extension RUE: 4+/5   Shoulder Abduction RUE:  4-/5   Shoulder Adduction RUE:  4+/5   Internal Rotation RUE: 4+/5   External Rotation RUE: 4/5   Horizontal Adduction RUE: 4+/5   Shoulder Flexion LUE: 4-/5   Shoulder Extension LUE: 4+/5   Shoulder Abduction LUE: 3+/5   Shoulder Abbduction LUE: 4+/5   Internal Rotation LUE:  4/5   External Rotation LUE:  4/5   Horizontal Adduction LUE:  4+/5         Special Tests:  Negative: Drop Arm Test  Positive: Empty can test  Positive: Maravilla Ministerio        Palpation: (for pain)  Positive: Coracoid process, Posterior Subacromial Space and Bicipital Groove, AC joint, supra and infraspinatous tendons     Pt presents with limitations as described in problem list. Patient can benefit from Occupational Therapy services for Iontophoresis, Ultrasound, moist heat, PROM, AROM, Theraputic exercises, joint mobs, home exercise program provided with written instructions, ice, strengthening, Theraband Ex, UBE and pulley ex in order to maximize painfree functional use of left UE. . The following goals were discussed with the patient and she is in agreement with them as to be addressed in the treatment plan.      Problem List:   Decreased function of Left UE, Decreased ROM, Increased pain, Decreased strength, Scapular winging, Hypomobility, Muscular atrophy, Inability to perform work/tasks, Difficulty sleeping, Inability to perform leisure activities and Inability to perform self care tasks     Goals to be met in 4 weeks:  1) Pt will be independent and report performing HEP to maximize (R) shoulder functional capacity.  2) Pt will increase shoulder PROM in all measured planes of motion by 10 degrees to A with daily task.  3) Pt will report use of home modalities for pain management.  4) Pt will report one degree less of pain with nonuse and with use.  5) Pt will report interrupted sleep no more than twice a night.     Plan  Nadeen to be treated by Occupational Therapy 2 times per week for 4  weeks to achieve the established goals.   Treatment to include Iontophoresis w/ 1.2 cc's of dexamethasone, Ultrasoud @ 3mHz, AAROM Mobilization of GH joint, PROM Home program, Ice, Moist heat, Strengthening Theraband Ex, UBE and E- stim as well as any other treatments deemed necessary based on the patient's needs or progress.      I certify the need for these services furnished under this plan of treatment and while under my care     ____________________________________  Physician/Referring Practitioner     _______________  Date of Signature     Treatment today included: Evaluation, patient education on HEP and cryotherapy after tx     Assessment      This is a 56 y.o. female referred to outpatient occupational/hand therapy and presents with a medical diagnosis of L shoulder and hand pain and demonstrates limitations as described in the problem list. Following Moderate medical record review it is determined that pt will benefit from occupational therapy services in order to maximize pain free and/or functional use of left hand and shoulder.      History Examination Decision Making Complexity Score   Occupational Profile: Pt is on disability     Medical and Therapy History Review: moderate                      Performance Deficits: Difficulty with all basic ADL's due to pain and limited AROM. Unable to open medicine bottles or perform homemaking tasks     Physical:  Decreased ROM   Decreased  and pinch strength   Decreased muscle strength   Decreased functional hand use   Increased pain   Edema  Scar Adhesions      Cognitive:  WNL     Psychosocial:   Pt lives alone    Modification of tasks during evaluation: None moderate  Based on PMHX, co morbidities , data from assessments and functional level of assistance required with task and clinical presentation directly impacting      The following goals were discussed with the patient and he/she is in agreement with them as to be addressed in the treatment plan. Pt was  given a HEP consisting of ER/IR, scapular retraction AROM, SROM/tabletop stretch. Pt verbalized understanding of the instructions as they were given and demonstrated proper form and technique during therapy. Pt was advised to perform these exercises 2x/day. The patient's rehab potential is good due to motivation and overall condition.      Short Term Goals: ( 2 weeks)   1)  Patient to be IND with HEP and modalities for pain management  2) Patient to report decreased pain to 5/10 w/AROM  3) Patient to achieve full opposition with L hand to increase ADL independence     Long Term Goals (6 weeks)  1)  Increase ROM in L shoulder and hand to WFL for increased functional hand use   2) Increase  strength 10 lbs. to grasp items during basic and IADL's  3) Increase pinch 1-3 psis for Opening containers and jars     Plan      Pt to be treated by Occupational Therapy 2 times per week for 6 weeks during the certification period from 4/20/17 to 5/31/17 to achieve the established goals.      Treatment to include: Paraffin, Fluidotherapy, Manual therapy/joint mobilizations, Modalities for pain management, US 3 mhz, Therapeutic exercises/activities., Iontophoresis with 2.0 cc Dexamethasone, Strengthening, Orthotic Fabrication/Fit/Training, Edema Control, Electrical Modalities, Joint Protection and Energy Conservation, as well as any other treatments deemed necessary based on the patient's needs or progress.         I certify the need for these services furnished under this plan of treatment and while under my care     ____________________________________ __________________  Physician/Referring Practitioner Date of Signature

## 2017-04-26 ENCOUNTER — CLINICAL SUPPORT (OUTPATIENT)
Dept: REHABILITATION | Facility: HOSPITAL | Age: 57
End: 2017-04-26
Attending: INTERNAL MEDICINE
Payer: MEDICAID

## 2017-04-26 DIAGNOSIS — M65.312 TRIGGER THUMB OF LEFT HAND: Primary | ICD-10-CM

## 2017-04-26 DIAGNOSIS — M77.8 TENDINITIS OF LEFT HAND: ICD-10-CM

## 2017-04-26 DIAGNOSIS — M75.102 TEAR OF LEFT ROTATOR CUFF, UNSPECIFIED TEAR EXTENT: ICD-10-CM

## 2017-04-26 PROCEDURE — 97014 ELECTRIC STIMULATION THERAPY: CPT | Mod: PO

## 2017-04-26 PROCEDURE — 97110 THERAPEUTIC EXERCISES: CPT | Mod: PO

## 2017-04-26 PROCEDURE — 97140 MANUAL THERAPY 1/> REGIONS: CPT | Mod: PO

## 2017-04-26 PROCEDURE — 97035 APP MDLTY 1+ULTRASOUND EA 15: CPT | Mod: PO

## 2017-04-26 NOTE — PROGRESS NOTES
"     OT DAILY NOTE     Patient: Nadeen Mcgovern  Date of Surgery:   Referring Physician: Haresh Butler MD  Diagnosis:   1. Trigger thumb of left hand     2. Tendinitis of left hand     3. Tear of left rotator cuff, unspecified tear extent       MRN: [id number]    Start Time: 8:30 am  End Time: 9:30 am  Total Time: 60    Subjective: Pt reported 4-5/10 pain in fingertips and "tightness" in L shoulder and neck on arrival.    Treatment Min.   TE: FDP blocking exercises, FDP tendon gliding (no full fist) 3x15 each 10   US: Patient receives ultrasound  for pain control and decreased inflammation @ 50% duty cycle, 3.3 Mhz, applied to R hand MCP, intensity = .8 w/cm2 for 8 minutes. 8   Estim: IFC to R shoulder for pain 15   MT: Manual lymphatic drainage to hand and retrograde massage around FDP tendon at MCP pulley of RF 8     Charges this day: 1 TE, 1 us, 1 estim, 1 MT    Assessment: Patient participated in therapy well today and reported resolution of pain.  Pt. has good potential for further improvement w/ continued OT services. Recommend to continue OT per poc & progress, as tolerated     Plan:  Recommend to continue OT 2x/week for 6 weeks & progress as tolerated towards established goals during the certification period of 4/20/17 to 5/31/17                "

## 2017-05-01 ENCOUNTER — CLINICAL SUPPORT (OUTPATIENT)
Dept: REHABILITATION | Facility: HOSPITAL | Age: 57
End: 2017-05-01
Attending: INTERNAL MEDICINE
Payer: MEDICAID

## 2017-05-01 DIAGNOSIS — M65.342 TRIGGER RING FINGER OF LEFT HAND: ICD-10-CM

## 2017-05-01 DIAGNOSIS — M75.02 ADHESIVE CAPSULITIS OF LEFT SHOULDER: ICD-10-CM

## 2017-05-01 DIAGNOSIS — M77.8 TENDINITIS OF LEFT HAND: ICD-10-CM

## 2017-05-01 DIAGNOSIS — R29.898 LEFT ARM WEAKNESS: Primary | ICD-10-CM

## 2017-05-01 DIAGNOSIS — M65.312 TRIGGER THUMB OF LEFT HAND: ICD-10-CM

## 2017-05-01 PROCEDURE — 97150 GROUP THERAPEUTIC PROCEDURES: CPT | Mod: PO

## 2017-05-01 NOTE — PROGRESS NOTES
OT DAILY NOTE     Patient: Nadeen Mcgovern  Date of Surgery:   Referring Physician: Haresh Butler MD  Diagnosis:   1. Left arm weakness     2. Adhesive capsulitis of left shoulder     3. Trigger ring finger of left hand     4. Tendinitis of left hand     5. Trigger thumb of left hand       MRN: [id number]    Start Time: 8:00 am  End Time: 9:00 am  Total Time: 60    Subjective: Pt reported 0/10 pain  In hand at rest, 2/10 L upper trap at rest and 3/10 L hand pain w/AROM    Treatment Min.   TE: FDP blocking exercises, FDP tendon gliding (no full fist) 3x15 each 15   US: Patient receives ultrasound  for pain control and decreased inflammation @ 50% duty cycle, 3.3 Mhz, applied to Lhand MCP, intensity = .8 w/cm2 for 8 minutes. 8   Paraffin w/ MHP to L hand for 15 min, pre-tx to decrease pain & increase tissue extensibility   15   MT: Manual lymphatic drainage to hand and retrograde massage around FDP tendon at MCP pulley of RF. STM to upper trap to decrease pain and spasm.  Kinesiotape to inhibit B upper trap 15     Charges this day: 4 TA    Assessment: Patient reported decreased pain after tx to 1/10. Pt. has good potential for further improvement w/ continued OT services. Recommend to continue OT per poc & progress, as tolerated     Plan:  Recommend to continue OT 2x/week for 6 weeks & progress as tolerated towards established goals during the certification period of 4/20/17 to 5/31/17

## 2017-05-03 ENCOUNTER — CLINICAL SUPPORT (OUTPATIENT)
Dept: REHABILITATION | Facility: HOSPITAL | Age: 57
End: 2017-05-03
Attending: INTERNAL MEDICINE
Payer: MEDICAID

## 2017-05-03 DIAGNOSIS — M75.102 TEAR OF LEFT ROTATOR CUFF, UNSPECIFIED TEAR EXTENT: ICD-10-CM

## 2017-05-03 DIAGNOSIS — M77.8 TENDINITIS OF LEFT HAND: ICD-10-CM

## 2017-05-03 DIAGNOSIS — M75.02 ADHESIVE CAPSULITIS OF LEFT SHOULDER: ICD-10-CM

## 2017-05-03 DIAGNOSIS — M65.312 TRIGGER THUMB OF LEFT HAND: Primary | ICD-10-CM

## 2017-05-03 PROCEDURE — 97530 THERAPEUTIC ACTIVITIES: CPT | Mod: PO

## 2017-05-03 NOTE — PROGRESS NOTES
"     OT DAILY NOTE     Patient: Nadeen Mcgovern  Date of Surgery:   Referring Physician: Haresh Butler MD  Diagnosis:   1. Trigger thumb of left hand     2. Tendinitis of left hand     3. Tear of left rotator cuff, unspecified tear extent     4. Adhesive capsulitis of left shoulder       MRN: [id number]    Start Time: 8:00 am  End Time: 9:00 am  Total Time: 60    Subjective: Pt reported 0/10 pain  In hand at rest (just stiffness), 3/10 L upper trap at rest.  Pt stated "my neck/shoulder is feeling a lot better since my last treatment"    Visit #4 of 12    Treatment Min.   TE: FDP blocking exercises, FDP tendon gliding (no full fist) 3x15 each 15   US: Patient receives ultrasound  for pain control and decreased inflammation @ 50% duty cycle, 3.3 Mhz, applied to Lhand MCP, intensity = .8 w/cm2 for 8 minutes. 8   Paraffin w/ MHP to L hand for 15 min, pre-tx to decrease pain & increase tissue extensibility.  MH concurrently to L shoulder (NC) 15   MT: Manual lymphatic drainage to hand and RFand retrograde massage around FDP tendon at MCP pulley of RF. Kinesiotape to inhibit B upper trap and functional correction to limit RF MP flexion 15     Charges this day: 4 TA    Assessment: Patient reported decreased pain after tx to 1/10 and decreased stiffness in L hand. Pt. has good potential for further improvement w/ continued OT services. Recommend to continue OT per poc & progress, as tolerated     Plan:  Recommend to continue OT 2x/week for 6 weeks & progress as tolerated towards established goals during the certification period of 4/20/17 to 5/31/17                "

## 2017-05-08 ENCOUNTER — CLINICAL SUPPORT (OUTPATIENT)
Dept: REHABILITATION | Facility: HOSPITAL | Age: 57
End: 2017-05-08
Attending: INTERNAL MEDICINE
Payer: MEDICAID

## 2017-05-08 DIAGNOSIS — M77.8 TENDINITIS OF LEFT HAND: ICD-10-CM

## 2017-05-08 DIAGNOSIS — M65.312 TRIGGER THUMB OF LEFT HAND: Primary | ICD-10-CM

## 2017-05-08 DIAGNOSIS — M75.102 TEAR OF LEFT ROTATOR CUFF, UNSPECIFIED TEAR EXTENT: ICD-10-CM

## 2017-05-08 PROCEDURE — 97530 THERAPEUTIC ACTIVITIES: CPT | Mod: PO

## 2017-05-10 ENCOUNTER — CLINICAL SUPPORT (OUTPATIENT)
Dept: REHABILITATION | Facility: HOSPITAL | Age: 57
End: 2017-05-10
Attending: INTERNAL MEDICINE
Payer: MEDICAID

## 2017-05-10 DIAGNOSIS — M77.8 TENDINITIS OF LEFT HAND: ICD-10-CM

## 2017-05-10 DIAGNOSIS — M75.102 TEAR OF LEFT ROTATOR CUFF, UNSPECIFIED TEAR EXTENT: ICD-10-CM

## 2017-05-10 DIAGNOSIS — M65.312 TRIGGER THUMB OF LEFT HAND: Primary | ICD-10-CM

## 2017-05-10 PROCEDURE — 97530 THERAPEUTIC ACTIVITIES: CPT | Mod: PO

## 2017-05-10 NOTE — PROGRESS NOTES
"     OT DAILY NOTE     Patient: Nadeen Mcgovern  Date of Surgery:   Referring Physician: Haresh Butler MD  Diagnosis:   1. Trigger thumb of left hand     2. Tendinitis of left hand     3. Tear of left rotator cuff, unspecified tear extent       MRN: [id number]    Start Time: 8:15 am  End Time: 9:00 am  Total Time: 60    Subjective: Pt reported 0/10 pain  In L RF hand on arrival "just stiffness".    Visit #6 of 12    FOTO readministered w/pt scoring 45% limitation (decreased from 58%- see media section of chart for details)    Treatment Min.   TE: FDP blocking exercises, FDP tendon gliding (no full fist) 3x15 each. 8 min reciprocal pulleys to increase shoulder AROM 18   US: Patient receives ultrasound  for pain control and decreased inflammation @ 50% duty cycle, 3.3 Mhz, applied to Lhand MCP, intensity = .8 w/cm2 for 8 minutes. 8   Paraffin w/ MHP to L hand for 15 min, pre-tx to decrease pain & increase tissue extensibility.  MH concurrently to L shoulder (NC) 15   MT: Manual lymphatic drainage to hand and RFand retrograde massage around FDP tendon at MCP pulley of RF. Kinesiotape for space correction at RF MCP pulley and functional correction to limit RF MP flexion 15     Charges this day: 4 TA    Assessment: Patient is progressing well with decreased pain and increased ROM.  Pt  over IF MCP but improving.  Pt. has good potential for further improvement w/ continued OT services. Recommend to continue OT per poc & progress, as tolerated     Plan:  Recommend to continue OT 2x/week for 6 weeks & progress as tolerated towards established goals during the certification period of 4/20/17 to 5/31/17                "

## 2017-05-15 ENCOUNTER — OFFICE VISIT (OUTPATIENT)
Dept: INTERNAL MEDICINE | Facility: CLINIC | Age: 57
End: 2017-05-15
Payer: MEDICAID

## 2017-05-15 ENCOUNTER — CLINICAL SUPPORT (OUTPATIENT)
Dept: REHABILITATION | Facility: HOSPITAL | Age: 57
End: 2017-05-15
Attending: INTERNAL MEDICINE
Payer: MEDICAID

## 2017-05-15 VITALS
TEMPERATURE: 99 F | SYSTOLIC BLOOD PRESSURE: 91 MMHG | DIASTOLIC BLOOD PRESSURE: 68 MMHG | WEIGHT: 138.63 LBS | HEIGHT: 62 IN | BODY MASS INDEX: 25.51 KG/M2 | RESPIRATION RATE: 17 BRPM | HEART RATE: 74 BPM

## 2017-05-15 DIAGNOSIS — L30.9 ECZEMA OF BOTH HANDS: Primary | ICD-10-CM

## 2017-05-15 DIAGNOSIS — R29.898 LEFT ARM WEAKNESS: ICD-10-CM

## 2017-05-15 DIAGNOSIS — Z94.4 LIVER TRANSPLANT STATUS: ICD-10-CM

## 2017-05-15 DIAGNOSIS — M79.642 HAND PAIN, LEFT: ICD-10-CM

## 2017-05-15 DIAGNOSIS — M34.9 SCLERODERMA: ICD-10-CM

## 2017-05-15 DIAGNOSIS — D84.9 IMMUNOSUPPRESSED STATUS: ICD-10-CM

## 2017-05-15 DIAGNOSIS — K62.5 BRBPR (BRIGHT RED BLOOD PER RECTUM): ICD-10-CM

## 2017-05-15 DIAGNOSIS — M65.342 TRIGGER RING FINGER OF LEFT HAND: Primary | ICD-10-CM

## 2017-05-15 PROCEDURE — 99214 OFFICE O/P EST MOD 30 MIN: CPT | Mod: S$PBB,,, | Performed by: INTERNAL MEDICINE

## 2017-05-15 PROCEDURE — 99213 OFFICE O/P EST LOW 20 MIN: CPT | Mod: PBBFAC | Performed by: INTERNAL MEDICINE

## 2017-05-15 PROCEDURE — 97530 THERAPEUTIC ACTIVITIES: CPT | Mod: PO

## 2017-05-15 PROCEDURE — 99999 PR PBB SHADOW E&M-EST. PATIENT-LVL III: CPT | Mod: PBBFAC,,, | Performed by: INTERNAL MEDICINE

## 2017-05-15 NOTE — MR AVS SNAPSHOT
[facility]  [address]  [facility] [address]  Phone: [phone]  Fax: [phone]                  Nadeen Mcgovern   5/15/2017 1:40 PM   Office Visit    Description:  Female : 1960   Provider:  Nadeen Figueroa MD   Department:  [facility]           Reason for Visit     Follow-up           Diagnoses this Visit        Comments    Eczema of both hands    -  Primary     Scleroderma         Liver transplant status         Immunosuppressed status         BRBPR (bright red blood per rectum)                To Do List           Future Appointments        Provider Department Dept Phone    2017 8:00 AM Sandra Prater OT [facility] [phone]    2017 8:25 AM LAB, TRANSPLANT [facility] [phone]    2017 10:30 AM Carlos Haynes MD [facility] Gastroenterology [phone]    2017 9:00 AM Haresh Butler MD [facility] - Rheumatology [phone]    9/15/2017 10:20 AM Nadeen Figueroa MD [facility] - Internal Medicine [phone]      Goals (5 Years of Data)     None      Follow-Up and Disposition     Return in about 4 months (around 9/15/2017).      [facility]     [facility] -  Assistance  Unless otherwise directed by your provider, please contact [facility], our nurse care line that is available for  assistance.     Registered nurses in the [facility] provide: appointment scheduling, clinical advisement, health education, and other advisory services.  Call: [phone] (toll free)               Medications           Message regarding Medications     Verify the changes and/or additions to your medication regime listed below are the same as discussed with your clinician today.  If any of these changes or additions are incorrect, please notify your healthcare provider.             Verify that the below list of medications is an accurate representation of the medications  "you are currently taking.  If none reported, the list may be blank. If incorrect, please contact your healthcare provider. Carry this list with you in case of emergency.           Current Medications     amlodipine (NORVASC) 5 MG tablet Take 1 tablet (5 mg total) by mouth once daily.    aspirin 81 MG Chew Take 81 mg by mouth once daily.    famotidine (PEPCID) 20 MG tablet Take 1 tablet (20 mg total) by mouth every evening.    hydrOXYzine pamoate (VISTARIL) 50 MG Cap Take 1 capsule (50 mg total) by mouth every 8 (eight) hours as needed.    multivitamin (THERAGRAN) tablet Take 1 tablet by mouth once daily.    sirolimus (RAPAMUNE) 1 MG Tab Take 2 tablets (2 mg total) by mouth once daily.    tacrolimus (PROGRAF) 1 MG Cap Take 1 capsule (1 mg total) by mouth every 12 (twelve) hours.    tramadol (ULTRAM) 50 mg tablet Take 1 tablet (50 mg total) by mouth every 8 (eight) hours as needed for Pain.    zolpidem (AMBIEN) 5 MG Tab TAKE 1 TABLET BY MOUTH NIGHTLY AS NEEDED           Clinical Reference Information           Your Vitals Were     BP Pulse Temp Resp Height Weight    91/68 74 98.5 °F (36.9 °C) (Oral) 17 5' 2" (1.575 m) 62.9 kg (138 lb 9.6 oz)    Last Period BMI             (LMP Unknown) 25.35 kg/m2         Blood Pressure          Most Recent Value    BP  91/68      Allergies as of 5/15/2017     No Known Allergies      Immunizations Administered on Date of Encounter - 5/15/2017     None      Orders Placed During Today's Visit     Future Labs/Procedures Expected by Expires    Ferritin  5/15/2017 7/14/2018    Iron and TIBC  5/15/2017 7/14/2018      Maintenance Dialysis History     Patient has no recorded history of maintenance dialysis.      Transplant Information        Txp Date Organ Coordinator Care Team    10/8/2015 Liver Chetna Pickard RN Referring Physician:  Wendy Hernandez MD   Current PCP:  Wendy Hernandez MD   Current Hepatologist:  Мария Benítez MD   Corresponding Physician:  Wendy Hernandez MD "   Surgeon:  Floyd Recinos MD   Assisting Surgeon:  Vicente Salguero MD   Resident:  Robson Garcia MD   Fellow:  Terrance Black MD         Language Assistance Services     ATTENTION: Language assistance services are available, free of charge. Please call [phone].      ATENCIÓN: Si habla español, tiene a segundo disposición servicios gratuitos de asistencia lingüística. Llame al [phone].     CHÚ Ý: N?u b?n nói Ti?ng Vi?t, có các d?ch v? h? tr? ngôn ng? mi?n phí dành cho b?n. G?i s? [phone].         [facility] complies with applicable Federal civil rights laws and does not discriminate on the basis of race, color, national origin, age, disability, or sex.

## 2017-05-15 NOTE — PROGRESS NOTES
"Subjective:       Patient ID: Nadeen Mcgovern is a 56 y.o. female.    Chief Complaint: follow up     HPI   Last seen me 2/14/17.  Eczema of the hands much improved since last visit. Was seeing Dr. Rossi but had to change to [facility] 2/2 insurance. Still w/ pain in the hands. Working OT. Takes tramadol prn. Still have leftover from rx 12/2016. Helps w/ the pain. Still w/ some hand weakness.     Has scleroderma. Occasionally w/ dysphagia. Seen in GI. Had EGD w/ stenosis s/p dilation. Small bites and pt reports brings water w/ her wherever she goes. Raynaud's - on amlodipine 5mg daily.     H/o liver transplant. On sirolimus and tacrolimus. No fevers/chills. Rhinorrhea. No cough. No SOB/wheezing.     Started biking. Walks every Saturday w/ walking group - 3 miles. Does take breaks in between. Spirometry normal. 6MWT - w/ TAM and desat during exercise.     No abdominal pain/nausea/vomiting. No diarrhea. Occasional constipation. Saw some blood in stool - streaks - today. First time noticing it. Had hard stools. Last Cscope was about 4 yrs ago from [facility] on [address].     Review of Systems  as above in HPI.     Objective:      Physical Exam    BP 91/68  Pulse 74  Temp 98.5 °F (36.9 °C) (Oral)   Resp 17  Ht 5' 2" (1.575 m)  Wt 62.9 kg (138 lb 9.6 oz)  LMP  (LMP Unknown)  BMI 25.35 kg/m2    Gen - A+OX4, NAD  HEENT - PERRL, OP clear. MMM.   NECK - No LAD or thyromegaly  CV - rrr, no m/r  Chest - CTAB, no wheezing/rhonchi  Abd - S/NT/ND/+BS x 4.   Ext - 2+ BDP and radial pulses. No LE edema.   Rectal - external hemorrhoids. Trace FOBT positive. No masses felt internally. Soft brown stool in vault.     CT C/A/P 4/10/17  Impression       1. Postoperative change consistent with orthotopic liver transplantation and choledochojejunostomy.    2. Slight reduction in size of a hepatic segment VII hypodensity now measuring 3.0 cm x 0.7 cm and most consistent with postoperative seroma versus resolving hematoma.    3. Stable 1.4 " cm splenic cyst.    4. Stable sub-solid opacity within the RIGHT lung apex as well as a subcentimeter pleural-based pulmonary nodule within the posterior segment RIGHT upper lobe.         Assessment/Plan     Nadeen was seen today for follow-up.    Diagnoses and all orders for this visit:    Eczema of both hands - improving. Cont to work w/ OT for hand pain. F/u w/ Dr. Souza. Last seen 12/2016. Needs at least once a year.     Scleroderma - f/u w/ Dr. Butler    Liver transplant status  -     Cancel: CBC auto differential; Future    Immunosuppressed status - has CBC scheduled. Will check iron levels as Hgb worsened slightly. No signs of acute infection.  -     Cancel: CBC auto differential; Future    BRBPR (bright red blood per rectum) - will get Cscope result from [facility]. Possibly due to hemorrhoids. Avoid constipation. Increase fiber intake - metamucil daily and continue to stay hydrated. Stool softener prn.  -     Ferritin; Future   -     Iron and TIBC; Future    Return in about 4 months (around 9/15/2017).      Nadeen Figueroa MD  Department of Internal Medicine - [facility]  1:42 PM

## 2017-05-15 NOTE — PROGRESS NOTES
OT DAILY NOTE     Patient: Nadeen Mcgovern  Date of Surgery:   Referring Physician: Haresh Butler MD  Diagnosis:   1. Trigger ring finger of left hand     2. Left arm weakness     3. Hand pain, left       MRN: [id number]    Start Time: 8:00 am  End Time: 9:00 am  Total Time: 60    Subjective: Pt reported 0/10 pain at rest, 2-3/10 w/palpation over RF MCP.   Visit #8 of 12    Treatment Min.   TE: FDP blocking exercises, FDP tendon gliding (no full fist) 3x15 each. 8 min reciprocal pulleys to increase shoulder AROM 10   US: Patient receives ultrasound  for pain control and decreased inflammation @ 50% duty cycle, 3.3 Mhz, applied to Lhand MCP, intensity = .8 w/cm2 for 8 minutes. (NC performed by tech) 8   Paraffin w/ MHP to L hand for 15 min, pre-tx to decrease pain & increase tissue extensibility.  MH concurrently to L shoulder (NC) 15   Ortho Fit:  Fabricated hand based RF blocking splint to decrease pain over MCP pulleyy and educated on wear/care/precautions 27     Charges this day: 4 TA    Assessment: Patient with good understanding of splint wear/care/precautions.  Pain decreased to 1/10.   Recommend to continue OT per poc & progress, as tolerated     Plan:  Recommend to continue OT 2x/week for 6 weeks & progress as tolerated towards established goals during the certification period of 4/20/17 to 5/31/17

## 2017-05-17 ENCOUNTER — DOCUMENTATION ONLY (OUTPATIENT)
Dept: REHABILITATION | Facility: HOSPITAL | Age: 57
End: 2017-05-17

## 2017-05-22 ENCOUNTER — LAB VISIT (OUTPATIENT)
Dept: LAB | Facility: HOSPITAL | Age: 57
End: 2017-05-22
Attending: INTERNAL MEDICINE
Payer: MEDICAID

## 2017-05-22 DIAGNOSIS — Z94.4 LIVER REPLACED BY TRANSPLANT: ICD-10-CM

## 2017-05-22 DIAGNOSIS — K62.5 BRBPR (BRIGHT RED BLOOD PER RECTUM): ICD-10-CM

## 2017-05-22 LAB
ALBUMIN SERPL BCP-MCNC: 3.7 G/DL
ALP SERPL-CCNC: 73 U/L
ALT SERPL W/O P-5'-P-CCNC: 21 U/L
ANION GAP SERPL CALC-SCNC: 8 MMOL/L
AST SERPL-CCNC: 20 U/L
BASOPHILS # BLD AUTO: 0.02 K/UL
BASOPHILS NFR BLD: 0.5 %
BILIRUB SERPL-MCNC: 0.8 MG/DL
BUN SERPL-MCNC: 11 MG/DL
CALCIUM SERPL-MCNC: 9.3 MG/DL
CHLORIDE SERPL-SCNC: 109 MMOL/L
CO2 SERPL-SCNC: 26 MMOL/L
CREAT SERPL-MCNC: 0.8 MG/DL
DIFFERENTIAL METHOD: ABNORMAL
EOSINOPHIL # BLD AUTO: 0.1 K/UL
EOSINOPHIL NFR BLD: 1.6 %
ERYTHROCYTE [DISTWIDTH] IN BLOOD BY AUTOMATED COUNT: 13.5 %
EST. GFR  (AFRICAN AMERICAN): >60 ML/MIN/1.73 M^2
EST. GFR  (NON AFRICAN AMERICAN): >60 ML/MIN/1.73 M^2
FERRITIN SERPL-MCNC: 49 NG/ML
GLUCOSE SERPL-MCNC: 88 MG/DL
HCT VFR BLD AUTO: 39.9 %
HGB BLD-MCNC: 13.1 G/DL
IRON SERPL-MCNC: 63 UG/DL
LYMPHOCYTES # BLD AUTO: 1.1 K/UL
LYMPHOCYTES NFR BLD: 28.8 %
MCH RBC QN AUTO: 29.2 PG
MCHC RBC AUTO-ENTMCNC: 32.8 %
MCV RBC AUTO: 89 FL
MONOCYTES # BLD AUTO: 0.3 K/UL
MONOCYTES NFR BLD: 8.5 %
NEUTROPHILS # BLD AUTO: 2.2 K/UL
NEUTROPHILS NFR BLD: 60.6 %
PLATELET # BLD AUTO: 166 K/UL
PMV BLD AUTO: 10.1 FL
POTASSIUM SERPL-SCNC: 4.2 MMOL/L
PROT SERPL-MCNC: 7.3 G/DL
RBC # BLD AUTO: 4.49 M/UL
SATURATED IRON: 20 %
SIROLIMUS BLD-MCNC: 5.4 NG/ML
SODIUM SERPL-SCNC: 143 MMOL/L
TACROLIMUS BLD-MCNC: <1.5 NG/ML
TOTAL IRON BINDING CAPACITY: 315 UG/DL
TRANSFERRIN SERPL-MCNC: 213 MG/DL
WBC # BLD AUTO: 3.64 K/UL

## 2017-05-22 PROCEDURE — 36415 COLL VENOUS BLD VENIPUNCTURE: CPT

## 2017-05-22 PROCEDURE — 85025 COMPLETE CBC W/AUTO DIFF WBC: CPT

## 2017-05-22 PROCEDURE — 83540 ASSAY OF IRON: CPT

## 2017-05-22 PROCEDURE — 82728 ASSAY OF FERRITIN: CPT

## 2017-05-22 PROCEDURE — 80197 ASSAY OF TACROLIMUS: CPT

## 2017-05-22 PROCEDURE — 80195 ASSAY OF SIROLIMUS: CPT

## 2017-05-22 PROCEDURE — 80053 COMPREHEN METABOLIC PANEL: CPT

## 2017-05-24 ENCOUNTER — CLINICAL SUPPORT (OUTPATIENT)
Dept: REHABILITATION | Facility: HOSPITAL | Age: 57
End: 2017-05-24
Attending: INTERNAL MEDICINE
Payer: MEDICAID

## 2017-05-24 DIAGNOSIS — M79.642 HAND PAIN, LEFT: Primary | ICD-10-CM

## 2017-05-24 DIAGNOSIS — R29.898 LEFT ARM WEAKNESS: ICD-10-CM

## 2017-05-24 PROCEDURE — 97530 THERAPEUTIC ACTIVITIES: CPT | Mod: PO

## 2017-05-24 NOTE — PROGRESS NOTES
"     OT DAILY NOTE     Patient: Nadeen Mcgovern  Date of Surgery:   Referring Physician: Haresh Butler MD  Diagnosis:   1. Hand pain, left     2. Left arm weakness       MRN: [id number]    Start Time: 8:00 am  End Time: 9:00 am  Total Time: 60    Subjective: Pt reported 5/10 pain at rest, stating "it's this weather".  Pt reported shoulder has been hurting intermittently as well  .   Visit #9 of 12    Treatment Min.   TE: FDP blocking exercises, FDP tendon gliding (no full fist) while in fluidotherapy x15 min. 5 min reciprocal pulleys to increase shoulder AROM 20   US: Patient receives ultrasound  for pain control and decreased inflammation @ 50% duty cycle, 3.3 Mhz, applied to Lhand MCP, intensity = .8-.6 w/cm2 for 8 minutes. (NC performed by tech) 8   MT: MT: provided deep STM, including MFR, CFM, lymphatic drainage technique to L  MCP Joint mobilizations to include: L RF , grade I, all planes.  Massage over RF MCP w/mini massager to decrease edema.  Kinesiotape for RF edema and space correction over MCP   25         Charges this day: 4 TA    Assessment: Patient with increased pain on arrival today, decreased after tx to 2/10.   Recommend to continue OT per poc & progress, as tolerated     Plan:  Recommend to continue OT 2x/week for 6 weeks & progress as tolerated towards established goals during the certification period of 4/20/17 to 5/31/17                "

## 2017-05-25 ENCOUNTER — TELEPHONE (OUTPATIENT)
Dept: TRANSPLANT | Facility: CLINIC | Age: 57
End: 2017-05-25

## 2017-05-25 NOTE — TELEPHONE ENCOUNTER
----- Message from Мария Benítez MD sent at 5/24/2017 12:39 PM CDT -----  Liver tests ok but tacro level low for unclear reasons, repeat labs next week

## 2017-05-25 NOTE — TELEPHONE ENCOUNTER
Dr. Benítez reviewed your labs.  Continue routine labs no changes needed.  Called patient to let her know to due labs on Tuesday.

## 2017-05-30 ENCOUNTER — LAB VISIT (OUTPATIENT)
Dept: LAB | Facility: HOSPITAL | Age: 57
End: 2017-05-30
Attending: INTERNAL MEDICINE
Payer: MEDICAID

## 2017-05-30 DIAGNOSIS — Z94.4 LIVER REPLACED BY TRANSPLANT: ICD-10-CM

## 2017-05-30 LAB
ALBUMIN SERPL BCP-MCNC: 3.7 G/DL
ALP SERPL-CCNC: 79 U/L
ALT SERPL W/O P-5'-P-CCNC: 21 U/L
ANION GAP SERPL CALC-SCNC: 9 MMOL/L
AST SERPL-CCNC: 19 U/L
BASOPHILS # BLD AUTO: 0.02 K/UL
BASOPHILS NFR BLD: 0.5 %
BILIRUB SERPL-MCNC: 0.6 MG/DL
BUN SERPL-MCNC: 10 MG/DL
CALCIUM SERPL-MCNC: 8.9 MG/DL
CHLORIDE SERPL-SCNC: 109 MMOL/L
CO2 SERPL-SCNC: 25 MMOL/L
CREAT SERPL-MCNC: 0.8 MG/DL
DIFFERENTIAL METHOD: NORMAL
EOSINOPHIL # BLD AUTO: 0.1 K/UL
EOSINOPHIL NFR BLD: 1.7 %
ERYTHROCYTE [DISTWIDTH] IN BLOOD BY AUTOMATED COUNT: 13.4 %
EST. GFR  (AFRICAN AMERICAN): >60 ML/MIN/1.73 M^2
EST. GFR  (NON AFRICAN AMERICAN): >60 ML/MIN/1.73 M^2
GLUCOSE SERPL-MCNC: 86 MG/DL
HCT VFR BLD AUTO: 38.5 %
HGB BLD-MCNC: 12.8 G/DL
LYMPHOCYTES # BLD AUTO: 1.2 K/UL
LYMPHOCYTES NFR BLD: 29 %
MCH RBC QN AUTO: 29.3 PG
MCHC RBC AUTO-ENTMCNC: 33.2 %
MCV RBC AUTO: 88 FL
MONOCYTES # BLD AUTO: 0.3 K/UL
MONOCYTES NFR BLD: 7.7 %
NEUTROPHILS # BLD AUTO: 2.5 K/UL
NEUTROPHILS NFR BLD: 60.9 %
PLATELET # BLD AUTO: 165 K/UL
PMV BLD AUTO: 9.7 FL
POTASSIUM SERPL-SCNC: 4 MMOL/L
PROT SERPL-MCNC: 7.3 G/DL
RBC # BLD AUTO: 4.37 M/UL
SIROLIMUS BLD-MCNC: 5.2 NG/ML
SODIUM SERPL-SCNC: 143 MMOL/L
TACROLIMUS BLD-MCNC: 1.7 NG/ML
WBC # BLD AUTO: 4.14 K/UL

## 2017-05-30 PROCEDURE — 85025 COMPLETE CBC W/AUTO DIFF WBC: CPT

## 2017-05-30 PROCEDURE — 80195 ASSAY OF SIROLIMUS: CPT

## 2017-05-30 PROCEDURE — 36415 COLL VENOUS BLD VENIPUNCTURE: CPT

## 2017-05-30 PROCEDURE — 80053 COMPREHEN METABOLIC PANEL: CPT

## 2017-05-30 PROCEDURE — 80197 ASSAY OF TACROLIMUS: CPT

## 2017-05-31 ENCOUNTER — CLINICAL SUPPORT (OUTPATIENT)
Dept: REHABILITATION | Facility: HOSPITAL | Age: 57
End: 2017-05-31
Attending: INTERNAL MEDICINE
Payer: MEDICAID

## 2017-05-31 DIAGNOSIS — M79.642 HAND PAIN, LEFT: Primary | ICD-10-CM

## 2017-05-31 DIAGNOSIS — R29.898 WEAKNESS OF LEFT HAND: ICD-10-CM

## 2017-05-31 PROCEDURE — 97530 THERAPEUTIC ACTIVITIES: CPT | Mod: PO

## 2017-05-31 NOTE — PLAN OF CARE
OCCUPATIONAL THERAPY UPDATED PLAN OF TREATMENT    Patient name: Nadeen Mcgovern  Onset Date:  Shoulder 1 year ago, hand 6 months ago  SOC Date:  4/20/17  Primary Diagnosis:  L hand and shoulder pain  Treatment Diagnosis:  Same  Certification Period:  5/31/17 to 7/31/17  Precautions:  universal  Visits from SOC:  10    Pain:  Reported on the VAS. Functional Pain Scale Rating 0-10 as follows  Pain location: Thumb and RF MCP, L shoulder with AROM  Pain at rest: 1-2/10  (-2)  Pain w/AROM 4-5 (-2)  Pain at worst: 6/10  (-3)    Range of Motion:   Digits:  left Active  (Ext/Flex) Ring   MP 68 (+31)   PIP 95 (+14)   DIP 65 (=)   GARCIA 228 (+43)      Thumb Opposition: wnlmm  Palmar Abduction: 48 (+10)  Radial Abduction: 48 (+10)      Strength: (DUARTE Dynamometer in lbs.) Average 3 trials, Position II  Right: 50 (+13)  Left: 38 (+16)     Pinch Strength: (Pinch Gauge in psi's), Average 3 trials  [place] Pinch  R) 8.5 (+2.5)  psi's  L) 9 (+5.5) psi's  3pt Pinch  R) 3 (+1) psi's  L) .5(=)  psi's  2pt Pinch  R) 2 (=) psi's  L) 1 (= ) psi's    Previous Short Term Goals Status:     Short Term Goals: ( 2 weeks)   1)  Patient to be IND with HEP and modalities for pain management - Met  2) Patient to report decreased pain to 5/10 w/AROM - Met  3) Patient to achieve full opposition with L hand to increase ADL independence - Met     Long Term Goal Status:     Long Term Goals (by d/c)  1)  Increase ROM in L shoulder and hand to WFL for increased functional hand use 124 flex, 95 ABD - Not met  2) Increase  strength 10 lbs. to grasp items during basic and IADL's - Met  3) Increase pinch 1-3 psis for Opening containers and jars - Partially met  4) Increase  strength to 45# in L hand to grasp items during B/IADL's - New goal  5) L hand pain to decrease to 0-2 at worst - New goal    Continue with LTG's # 1 and 3    Reasons for Recertification of Therapy:  Patient has increased strength and ROM in her L hand with decreased pain, however is  still limited.  Treatment has focused more on the hand during the previous certification period    Recommended Treatment Plan: Therapeutic Exercise, Functional Activities, Patient Education, Home Exercise Program, ADL Training, Ultrasound/Phonophoresis, Edema Control/Fluidotherapy, Electrical Stimulation/TENS/Interferential, Moist Heat/Ice/Paraffin, Functional Standing and Manual Therapy        Therapist's Name: FELICIA Mckeon       Date: 05/31/2017     I CERTIFY THE NEED FOR THESE SERVICES FURNISHED UNDER THIS PLAN OF TREATMENT AND WHILE UNDER MY CARE    Physician's comments: ________________________________________________________________________________________________________________________________________________      Physician's Name (print): ___________________________________    Physician's Signature: _______________________________________________    Date: ________________________

## 2017-05-31 NOTE — PROGRESS NOTES
OT DAILY NOTE     Patient: Nadeen Mcgovern  Date of Surgery:   Referring Physician: Haresh Butler MD  Diagnosis:   1. Hand pain, left     2. Weakness of left hand       MRN: [id number]    Start Time: 8:00 am  End Time: 9:00 am  Total Time: 60    Subjective: Pt reported 3-4/10 pain at rest.   Pt remeasured for progress report and revised POC.  See tx section for details    Visit #10 of 12    Treatment Min.   TE: FDP blocking exercises, FDP tendon gliding (no full fist) 10   US: Patient receives ultrasound  for pain control and decreased inflammation @ 50% duty cycle, 3.3 Mhz, applied to L hand MCP, intensity = .8-.6 w/cm2 for 8 minutes.  8   MT: MT: provided deep STM, including MFR, CFM, lymphatic drainage technique to L  MCP. Massage over RF MCP w/mini massager to decrease edema.  10   Paraffin w/ MHP to L hand for 10 min, pre-tx to decrease pain & increase tissue extensibility     10     Charges this day: 4 TA    See updated POC in tx session for Assessment and PLan

## 2017-06-02 ENCOUNTER — TELEPHONE (OUTPATIENT)
Dept: TRANSPLANT | Facility: CLINIC | Age: 57
End: 2017-06-02

## 2017-06-02 NOTE — TELEPHONE ENCOUNTER
----- Message from Мария Benítez MD sent at 5/31/2017 12:43 PM CDT -----  Reviewed, nothing to do

## 2017-06-02 NOTE — LETTER
June 2, 2017    Nadeen Mcgovern  [address] [facility] [address]          Dear Nadeen Mcgovern:  MRN: [id number]    Your lab results were stable.  There are no medicine changes.  Please have your labs drawn again on 7/17/17.      If you cannot have your labs drawn on the scheduled date, it is your responsibility to call the transplant department to reschedule.  To reschedule or make an appointment, please as to speak to or leave a message for my assistant, Sarah Golden, at [phone].  When leaving a message for Chantel Smith, or myself, we ask that you leave a brief message regarding your request.    Sincerely,      Your Liver Transplant Coordinator    [facility]  [facility] [place] [address] [phone]

## 2017-06-07 ENCOUNTER — TELEPHONE (OUTPATIENT)
Dept: TRANSPLANT | Facility: CLINIC | Age: 57
End: 2017-06-07

## 2017-06-07 ENCOUNTER — CLINICAL SUPPORT (OUTPATIENT)
Dept: REHABILITATION | Facility: HOSPITAL | Age: 57
End: 2017-06-07
Attending: INTERNAL MEDICINE
Payer: MEDICARE

## 2017-06-07 DIAGNOSIS — M79.642 HAND PAIN, LEFT: Primary | ICD-10-CM

## 2017-06-07 DIAGNOSIS — M75.02 ADHESIVE CAPSULITIS OF LEFT SHOULDER: ICD-10-CM

## 2017-06-07 DIAGNOSIS — R29.898 WEAKNESS OF LEFT HAND: ICD-10-CM

## 2017-06-07 DIAGNOSIS — M77.8 TENDINITIS OF LEFT HAND: ICD-10-CM

## 2017-06-07 PROCEDURE — 97530 THERAPEUTIC ACTIVITIES: CPT | Mod: PO

## 2017-06-07 NOTE — PROGRESS NOTES
TIME RECORD    Date:  06/09/2017    Start Time:  8:00 am  Stop Time:  9:00 am    PROCEDURES:    TIMED  Procedure Min.   MT 10   TE 35                 UNTIMED  Procedure Min.   Paraffin 10         Total Timed Minutes:  55  Total Timed Units:  3  Total Untimed Units:  1  Charges Billed/# of units:  4      Progress/Current Status    Subjective:     Patient ID: Nadeen Mcgovern is a 56 y.o. female.  Diagnosis:   1. Hand pain, left     2. Tendinitis of left hand     3. Adhesive capsulitis of left shoulder     4. Weakness of left hand       Pain: 3 /10  L palm    Visit #10 of 13 approved    Objective:     Paraffin w/ MHP to L hand for 10 min, pre-tx to decrease pain & increase tissue extensibility    MT: Pt received STM to L RF MCP to decrease edema and pain.  Pt received passive scapular mobilization in side lying position, f/b ant/post GH joint mobs, pec release and PROM/prolonged passive stretch to L shoulder.     TE: Pt performed 2x10 scapular retraction with green theraband, and 2x20  ER sidelying w/2# db for RTC stretngthening  2x15 FDP blocking exercise for L hand  5 min reciprocol pulleys to increase L shoulder AROM        Assessment:     Pt tolerated shoulder treatment without pain.  Pt reported hand pain decreased to 1/10 after tx      Plans and Goals:     Recommend continue with updated POC to progress towards established goals

## 2017-06-07 NOTE — TELEPHONE ENCOUNTER
Spoke with pt, she is switching from Medicaid to Medicare. Pt states she will have a 10 to 14 day lapse in coverage.  Will discuss with Andre Mackay and call pt back.

## 2017-06-07 NOTE — TELEPHONE ENCOUNTER
----- Message from Petra Boothe sent at 6/7/2017  1:07 PM CDT -----  Contact: patient   Calling to speak with someone about her medication. Please call

## 2017-06-07 NOTE — TELEPHONE ENCOUNTER
----- Message from Qi Hernández sent at 6/7/2017  2:28 PM CDT -----  Contact: patient  Returning your call please call

## 2017-06-08 ENCOUNTER — TELEPHONE (OUTPATIENT)
Dept: TRANSPLANT | Facility: CLINIC | Age: 57
End: 2017-06-08

## 2017-06-08 NOTE — TELEPHONE ENCOUNTER
14 day prograf voucher received from Andre Mackay.  Script called in with voucher info to [facility].    Spoke with pt, states she will  medication today. Pt to contact insurance to find out exact date insurance coverage changes.

## 2017-06-14 ENCOUNTER — CLINICAL SUPPORT (OUTPATIENT)
Dept: REHABILITATION | Facility: HOSPITAL | Age: 57
End: 2017-06-14
Attending: INTERNAL MEDICINE
Payer: MEDICAID

## 2017-06-14 DIAGNOSIS — R29.898 WEAKNESS OF LEFT HAND: ICD-10-CM

## 2017-06-14 DIAGNOSIS — M77.8 TENDINITIS OF LEFT HAND: ICD-10-CM

## 2017-06-14 DIAGNOSIS — R29.898 LEFT ARM WEAKNESS: ICD-10-CM

## 2017-06-14 DIAGNOSIS — M79.642 HAND PAIN, LEFT: ICD-10-CM

## 2017-06-14 PROCEDURE — 97110 THERAPEUTIC EXERCISES: CPT | Mod: PO

## 2017-06-14 PROCEDURE — 97140 MANUAL THERAPY 1/> REGIONS: CPT | Mod: PO

## 2017-06-14 PROCEDURE — 97035 APP MDLTY 1+ULTRASOUND EA 15: CPT | Mod: PO

## 2017-06-14 NOTE — PROGRESS NOTES
"Occupational Therapy Progress Note    Patient: Nadeen Mcgovern  OT Diagnosis: left hand pain, left hand weakness, left shoulder adhesive capsulitis   Date of treatment: 06/14/2017  Visit #11 of 12 approved    Total Treatment time: 60  Group Time: 0    Time in: 8  Time out: 8:45    Subjective: Pt reported, " I have been able to use my hand."     Pain upon arrival:  2/10  Pain post session: 2/10    Objective: Pt will be re-assessed for discharge at next visit as it will be her 12th approved visit.  Pt participated in OT on this date for:  Patient received moist heat x 10 minutes to left shoulder to increase tissue elasticity in preparation for treatment. Performed manual therapy techniques to include left scapula retract, depress and GH joint ant/post joint mobilizations, grade 1 for 15 minutes to increase joint mobility, ROM and for pain management. Patient received ultrasound  for pain control and decreased inflammation @ 100 duty cycle, 3.3 Mhz, applied to left RF, intensity = 0.5 w/cm2 for 8 minutes. Performed manual therapy techniques to left RF MP/PIP/DIP area including joint mobilizations, grade 1 for 5 minutes to increase joint mobility, ROM and for pain management. Pt performed following therex: PIP and DIP isolated flex for FDS and FDP to left RF x 10 reps each, isolated left RF ext x 10 reps.    Treatment: MH x 10 min, MT x 23 min, US x 8 min, therex x 8 min    Assessment:  Fabricated finger gutter orthosis. Pt demonstrated tightness in shoulder capsule at 90 degrees sh flex and sh abd with increased pain. Pt demonstrated hard connective tissue mass on volar MP. Pt left shoulder PROM limited by joint tightness and pain. Pt would continue to benefit from skilled therapy treatment at this time. Pt requires verbal and visual cueing to complete HEP.    Goals: Continue with POC.     Plan:  Continue 1-2x week x 6-8  weeks during the certification period from 3-29-17  to 5-29-17  in pursuit of OT goals.           "

## 2017-06-20 DIAGNOSIS — Z94.4 LIVER REPLACED BY TRANSPLANT: ICD-10-CM

## 2017-06-20 DIAGNOSIS — G47.00 INSOMNIA: ICD-10-CM

## 2017-06-20 DIAGNOSIS — Z29.89 PROPHYLACTIC IMMUNOTHERAPY: ICD-10-CM

## 2017-06-20 RX ORDER — ZOLPIDEM TARTRATE 5 MG/1
TABLET ORAL
Qty: 30 TABLET | Refills: 1 | Status: SHIPPED | OUTPATIENT
Start: 2017-06-20 | End: 2017-09-15 | Stop reason: SDUPTHER

## 2017-06-21 ENCOUNTER — CLINICAL SUPPORT (OUTPATIENT)
Dept: REHABILITATION | Facility: HOSPITAL | Age: 57
End: 2017-06-21
Attending: INTERNAL MEDICINE
Payer: MEDICAID

## 2017-06-21 DIAGNOSIS — R29.898 LEFT ARM WEAKNESS: ICD-10-CM

## 2017-06-21 DIAGNOSIS — M79.642 HAND PAIN, LEFT: ICD-10-CM

## 2017-06-21 DIAGNOSIS — R29.898 WEAKNESS OF LEFT HAND: ICD-10-CM

## 2017-06-21 DIAGNOSIS — M77.8 TENDINITIS OF LEFT HAND: ICD-10-CM

## 2017-06-21 PROCEDURE — G8989 SELF CARE D/C STATUS: HCPCS | Mod: CK,PO

## 2017-06-21 PROCEDURE — G8988 SELF CARE GOAL STATUS: HCPCS | Mod: CJ,PO

## 2017-06-21 PROCEDURE — 97530 THERAPEUTIC ACTIVITIES: CPT | Mod: PO

## 2017-06-21 RX ORDER — SIROLIMUS 1 MG/1
2 TABLET, FILM COATED ORAL DAILY
Qty: 60 TABLET | Refills: 0 | Status: SHIPPED | OUTPATIENT
Start: 2017-06-21 | End: 2017-06-29 | Stop reason: SDUPTHER

## 2017-06-21 RX ORDER — TACROLIMUS 1 MG/1
1 CAPSULE, GELATIN COATED ORAL EVERY 12 HOURS
Qty: 60 CAPSULE | Refills: 0 | Status: SHIPPED | OUTPATIENT
Start: 2017-06-21 | End: 2017-06-29 | Stop reason: SDUPTHER

## 2017-06-21 NOTE — PROGRESS NOTES
"Occupational Therapy Progress Note    Patient: Nadeen Mcgovern  OT Diagnosis: left hand pain, left hand weakness, left shoulder adhesive capsulitis   Date of treatment: 06/21/2017  Visit #11 of 12 approved    Total Treatment time: 60  Group Time: 0    Time in: 8  Time out: 8:45    Subjective: Pt reported, " I    Pain upon arrival:  2/10  Pain post session: 2/10    Objective:   Pt participated in OT on this date for:  Patient received moist heat x 10 minutes to left shoulder to increase tissue elasticity in preparation for treatment. Supplied new HEP: shoulder flex, ABDhoriz ADD, IE with extension, ER at side (GE) with broomstick x 5 reps each, scap retraction throughout the day along with some pendulums. Manual massage to (B) shoulder with biofreeze x 7 minutes and Supplied biofreeze packet for home use.        (L) UE (R) UE      AROM AROM Norm   Shoulder Flexion  80 135 180   Shoulder Abduction  70 95 0-180   Shoulder Extension  36 wnl 0-50   Shoulder Internal Rotation (shoulder at 90 degrees)  22 L2 0-90   Shoulder External Rotation (elbow by side)   45GE; 70AG 50 0-90   Hor.Shoulder Adduction  30 25 0-40   7/10 pain R shoulder ABD  9/10 IR, other 7/10    Painful Arc:   Patient demonstrates painful arc in shoulder abduction.        Strength: 3-/5 in all shoulder plans of motion        Treatment: MH x 10 min, MT x 23 min, US x 8 min, therex x 8 min    Assessment:  Left shoulder more limited than right shoulder . 1/5 goals met and this patient was transferred from another OT. Pt would continue to benefit from skilled therapy treatment at this time. She is very limited at this time. She was supplied new HEP for shoulder stretching x 10 reps twice daily, see above for HEP,    G CODE TOOL: FOTO    Category: self care       goal Score  = 33% impaired   Discharge Score = 46 impaired    Score interpretation is as follows:     TEST SCORE  Modifier  Impairment Limitation Restriction    0%  CH  0 % impaired, limited or " restricted    1-19%  CI  @ least 1% but less than 20% impaired, limited or restricted    20-39%  CJ  @ least 20%<40% impaired, limited or restricted    40-59%  CK  @ least 40%<60% impaired, limited or restricted    60-79%  CL  @ least 60% <80% impaired, limited or restricted    80-99%  CM  @ least 80%<100% impaired limited or restricted    100%  CN  100% impaired, limited or restricted       Goals to be met in 4 weeks:  1) Pt will be independent and report performing HEP to maximize (R) shoulder functional capacity.(met)  2) Pt will increase shoulder PROM in all measured planes of motion by 10 degrees to A with daily task.(not met)  3) Pt will report use of home modalities for pain management.(not met)  4) Pt will report one degree less of pain with nonuse and with use.(not met)  5) Pt will report interrupted sleep no more than twice a night.(not met)       Plan:  Discharge at this time as  insurance is limited, but she could benefit for more therapy for neck and shoulder if her new  insurance allows.

## 2017-06-22 ENCOUNTER — TELEPHONE (OUTPATIENT)
Dept: ENDOSCOPY | Facility: HOSPITAL | Age: 57
End: 2017-06-22

## 2017-06-22 ENCOUNTER — OFFICE VISIT (OUTPATIENT)
Dept: GASTROENTEROLOGY | Facility: CLINIC | Age: 57
End: 2017-06-22
Payer: MEDICARE

## 2017-06-22 VITALS
SYSTOLIC BLOOD PRESSURE: 125 MMHG | WEIGHT: 140.63 LBS | BODY MASS INDEX: 25.88 KG/M2 | DIASTOLIC BLOOD PRESSURE: 76 MMHG | HEIGHT: 62 IN | HEART RATE: 61 BPM

## 2017-06-22 DIAGNOSIS — R13.10 DYSPHAGIA, UNSPECIFIED TYPE: Primary | ICD-10-CM

## 2017-06-22 PROCEDURE — 99213 OFFICE O/P EST LOW 20 MIN: CPT | Mod: PBBFAC | Performed by: INTERNAL MEDICINE

## 2017-06-22 PROCEDURE — 99213 OFFICE O/P EST LOW 20 MIN: CPT | Mod: S$PBB,,, | Performed by: INTERNAL MEDICINE

## 2017-06-22 PROCEDURE — 99999 PR PBB SHADOW E&M-EST. PATIENT-LVL III: CPT | Mod: PBBFAC,,, | Performed by: INTERNAL MEDICINE

## 2017-06-22 NOTE — PROGRESS NOTES
REASON FOR VISIT:  Continued dysphagia, although she has learned to adjust to it   by chewing her food well and drinking sips of water.  Her dysphagia is not   progressing, it is stable.  Her last endoscopy with dilation was done in   February 2017 with balloon dilation of the GE junction up to 18 mm, but I   suspect given her continued diagnosis of scleroderma, most likely she has   esophageal dysmotility contributing to her current condition.  Today, we   discussed about proceeding with an esophageal manometry for further evaluation.    No new complaints.  Bowels are moving okay with no blood in the stool.  Last   colonoscopy about 5 years or so ago, repeat will be in 2022.    PAST MEDICAL, SURGICAL, SOCIAL AND FAMILY HISTORY:  Reviewed.    MEDICATIONS AND ALLERGIES:  Reviewed.    REVIEW OF SYSTEMS:  CONSTITUTIONAL:  No fever, no chills, no malaise or fatigue.  EYES:  No visual changes.  No red eyes.  ENT:  No odynophagia.  Dysphagia, as previously mentioned.  GASTROINTESTINAL:  See HPI.    PHYSICAL EXAMINATION:  VITAL SIGNS:  See EPIC.  GENERAL:  Awake, alert and oriented x3, in no acute distress.  No physical exam done today.  About 15 minutes spent with the patient, more than   50% in counseling regarding the manometry testing.    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    RECOMMENDATION:  1. Proceed with esophageal manometry:  2. The patient will continue to chew her food well and drink sips of water in   between to help with dysphagia.      ACT/HN  dd: 06/22/2017 11:35:26 (CDT)  td: 06/23/2017 04:35:56 (CDT)  Doc ID   #[id number]  Job ID #[id number]    CC:

## 2017-06-29 DIAGNOSIS — Z94.4 LIVER REPLACED BY TRANSPLANT: ICD-10-CM

## 2017-06-29 RX ORDER — SIROLIMUS 1 MG/1
2 TABLET, FILM COATED ORAL DAILY
Qty: 60 TABLET | Refills: 5 | Status: SHIPPED | OUTPATIENT
Start: 2017-06-29 | End: 2018-07-11 | Stop reason: SINTOL

## 2017-06-29 RX ORDER — TACROLIMUS 1 MG/1
1 CAPSULE, GELATIN COATED ORAL EVERY 12 HOURS
Qty: 60 CAPSULE | Refills: 5 | Status: SHIPPED | OUTPATIENT
Start: 2017-06-29 | End: 2017-12-26 | Stop reason: SDUPTHER

## 2017-06-29 NOTE — TELEPHONE ENCOUNTER
----- Message from Qi Hernández sent at 6/29/2017  8:56 AM CDT -----  Contact: patient   States that her insurance changed and she's now using a different pharmacy [place] [address] [facility] # [phone] pt's # [phone]

## 2017-06-30 ENCOUNTER — TELEPHONE (OUTPATIENT)
Dept: TRANSPLANT | Facility: CLINIC | Age: 57
End: 2017-06-30

## 2017-06-30 NOTE — TELEPHONE ENCOUNTER
----- Message from Petra Boothe sent at 6/30/2017  1:32 PM CDT -----  Contact: patient   Returning call to speak with Chetna mi states the pharmacy will fill for the week. Please call

## 2017-06-30 NOTE — TELEPHONE ENCOUNTER
Returned call she will  a weeks worth today, since I cannot do PA because her new insurance goes into affect tomorrow.

## 2017-07-05 ENCOUNTER — SURGERY (OUTPATIENT)
Age: 57
End: 2017-07-05

## 2017-07-05 ENCOUNTER — HOSPITAL ENCOUNTER (OUTPATIENT)
Facility: HOSPITAL | Age: 57
Discharge: HOME | End: 2017-07-05
Attending: INTERNAL MEDICINE | Admitting: INTERNAL MEDICINE
Payer: MEDICARE

## 2017-07-05 VITALS
BODY MASS INDEX: 24.84 KG/M2 | OXYGEN SATURATION: 99 % | HEART RATE: 70 BPM | TEMPERATURE: 98 F | HEIGHT: 62 IN | RESPIRATION RATE: 20 BRPM | DIASTOLIC BLOOD PRESSURE: 79 MMHG | WEIGHT: 135 LBS | SYSTOLIC BLOOD PRESSURE: 140 MMHG

## 2017-07-05 DIAGNOSIS — R13.10 DYSPHAGIA: ICD-10-CM

## 2017-07-05 PROCEDURE — 91037 ESOPH IMPED FUNCTION TEST: CPT | Performed by: INTERNAL MEDICINE

## 2017-07-05 PROCEDURE — 91010 ESOPHAGUS MOTILITY STUDY: CPT | Performed by: INTERNAL MEDICINE

## 2017-07-05 PROCEDURE — 25000003 PHARM REV CODE 250: Performed by: INTERNAL MEDICINE

## 2017-07-05 PROCEDURE — 91037 ESOPH IMPED FUNCTION TEST: CPT | Mod: 26,,, | Performed by: INTERNAL MEDICINE

## 2017-07-05 PROCEDURE — 91010 ESOPHAGUS MOTILITY STUDY: CPT | Mod: 26,,, | Performed by: INTERNAL MEDICINE

## 2017-07-05 RX ORDER — LIDOCAINE HYDROCHLORIDE 20 MG/ML
JELLY TOPICAL ONCE
Status: COMPLETED | OUTPATIENT
Start: 2017-07-05 | End: 2017-07-05

## 2017-07-05 RX ADMIN — LIDOCAINE HYDROCHLORIDE 10 ML: 20 JELLY TOPICAL at 07:07

## 2017-07-05 NOTE — DISCHARGE INSTRUCTIONS
Esophageal Manometry     A catheter measures pressure along the esophagus.     Esophageal manometry is a test to measure the strength and function of the esophagus (the food pipe). Results can help identify causes of heartburn, swallowing problems, or chest pain. The test can also help plan surgery and determine the success of previous surgery.  Preparing for the test  Be sure to talk to your healthcare provider about any medicines you take. Some medicines can affect the test results. Also ask any questions you have about the risks of the test. These include irritation to the nose and throat. Be sure not to smoke, eat, or drink for up to 12 hours before the test.  During the test  Manometry takes about an hour. Usually you lie down during the test. Your nose and throat are numbed. Then a soft, thin tube is placed through the nose and down the esophagus. At first you may notice a gagging feeling. You will be asked to swallow several times. Holes along the tube measure the pressure while you swallow. Measurements are printed out as tracings, much like a heart test tracing. After the test, another catheter may be left in the esophagus for up to 24 hours to measure acid (pH) levels.  After esophageal manometry  Youll probably discuss the results of the test with your healthcare provider at another appointment. This is because time is needed to review the tracings. You may have a mild sore throat for a short time. As soon as the numbness in your throat is gone, you can return to eating and your normal activities.  Date Last Reviewed: 6/1/2016  © 0544-3947 [employer]. [address] [place] [address], [place] [address]. All rights reserved. This information is not intended as a substitute for professional medical care. Always follow your healthcare professional's instructions.

## 2017-07-05 NOTE — OR NURSING
Catheter to right naris without difficulty. Slight to moderate discomfort noted per facial expression during placement. Tolerated well.

## 2017-07-06 DIAGNOSIS — R13.19 ESOPHAGEAL DYSPHAGIA: Primary | ICD-10-CM

## 2017-07-07 ENCOUNTER — TELEPHONE (OUTPATIENT)
Dept: GASTROENTEROLOGY | Facility: CLINIC | Age: 57
End: 2017-07-07

## 2017-07-07 NOTE — PROGRESS NOTES
Esophageal manometry reveals possible narrowing in the esophagus. Will schedule endoscopy with dilation.

## 2017-07-07 NOTE — TELEPHONE ENCOUNTER
----- Message from Carlos Haynes MD sent at 7/6/2017 10:40 PM CDT -----  Esophageal manometry reveals possible narrowing in the esophagus. Will schedule endoscopy with dilation.

## 2017-07-12 ENCOUNTER — LAB VISIT (OUTPATIENT)
Dept: LAB | Facility: HOSPITAL | Age: 57
End: 2017-07-12
Payer: MEDICARE

## 2017-07-12 ENCOUNTER — OFFICE VISIT (OUTPATIENT)
Dept: RHEUMATOLOGY | Facility: CLINIC | Age: 57
End: 2017-07-12
Payer: MEDICARE

## 2017-07-12 ENCOUNTER — TELEPHONE (OUTPATIENT)
Dept: ENDOSCOPY | Facility: HOSPITAL | Age: 57
End: 2017-07-12

## 2017-07-12 VITALS
HEIGHT: 64 IN | SYSTOLIC BLOOD PRESSURE: 116 MMHG | HEART RATE: 74 BPM | BODY MASS INDEX: 24.05 KG/M2 | DIASTOLIC BLOOD PRESSURE: 73 MMHG | WEIGHT: 140.88 LBS

## 2017-07-12 DIAGNOSIS — R13.10 DYSPHAGIA, UNSPECIFIED TYPE: ICD-10-CM

## 2017-07-12 DIAGNOSIS — M18.11 OSTEOARTHRITIS OF RIGHT THUMB: ICD-10-CM

## 2017-07-12 DIAGNOSIS — G89.29 CHRONIC RIGHT SHOULDER PAIN: ICD-10-CM

## 2017-07-12 DIAGNOSIS — M25.40 SWOLLEN JOINT: ICD-10-CM

## 2017-07-12 DIAGNOSIS — M12.812 ROTATOR CUFF TEAR ARTHROPATHY, LEFT: ICD-10-CM

## 2017-07-12 DIAGNOSIS — M25.40 SWOLLEN JOINT: Primary | ICD-10-CM

## 2017-07-12 DIAGNOSIS — I73.00 RAYNAUD'S PHENOMENON WITHOUT GANGRENE: ICD-10-CM

## 2017-07-12 DIAGNOSIS — Z94.4 S/P LIVER TRANSPLANT: ICD-10-CM

## 2017-07-12 DIAGNOSIS — D84.9 IMMUNOSUPPRESSION: ICD-10-CM

## 2017-07-12 DIAGNOSIS — M75.102 TEAR OF LEFT ROTATOR CUFF, UNSPECIFIED TEAR EXTENT: ICD-10-CM

## 2017-07-12 DIAGNOSIS — I73.00 RAYNAUD'S DISEASE WITHOUT GANGRENE: ICD-10-CM

## 2017-07-12 DIAGNOSIS — C22.1 CHOLANGIOCARCINOMA: ICD-10-CM

## 2017-07-12 DIAGNOSIS — M25.511 CHRONIC RIGHT SHOULDER PAIN: ICD-10-CM

## 2017-07-12 DIAGNOSIS — M75.102 ROTATOR CUFF TEAR ARTHROPATHY, LEFT: ICD-10-CM

## 2017-07-12 LAB
CRP SERPL-MCNC: 1 MG/L
ERYTHROCYTE [SEDIMENTATION RATE] IN BLOOD BY WESTERGREN METHOD: 28 MM/HR
URATE SERPL-MCNC: 3.7 MG/DL

## 2017-07-12 PROCEDURE — 84550 ASSAY OF BLOOD/URIC ACID: CPT

## 2017-07-12 PROCEDURE — 86140 C-REACTIVE PROTEIN: CPT

## 2017-07-12 PROCEDURE — 36415 COLL VENOUS BLD VENIPUNCTURE: CPT

## 2017-07-12 PROCEDURE — 85651 RBC SED RATE NONAUTOMATED: CPT

## 2017-07-12 PROCEDURE — 99214 OFFICE O/P EST MOD 30 MIN: CPT | Mod: S$PBB,,, | Performed by: INTERNAL MEDICINE

## 2017-07-12 PROCEDURE — 99999 PR PBB SHADOW E&M-EST. PATIENT-LVL IV: CPT | Mod: PBBFAC,,, | Performed by: INTERNAL MEDICINE

## 2017-07-12 RX ORDER — DICLOFENAC SODIUM 10 MG/G
2 GEL TOPICAL 4 TIMES DAILY
Qty: 1 TUBE | Refills: 3 | Status: SHIPPED | OUTPATIENT
Start: 2017-07-12 | End: 2017-11-21 | Stop reason: SDUPTHER

## 2017-07-12 ASSESSMENT — ROUTINE ASSESSMENT OF PATIENT INDEX DATA (RAPID3)
PATIENT GLOBAL ASSESSMENT SCORE: 5
WHEN YOU AWAKENED IN THE MORNING OVER THE LAST WEEK, PLEASE INDICATE THE AMOUNT OF TIME IT TAKES UNTIL YOU ARE AS LIMBER AS YOU WILL BE FOR THE DAY: 20 MINS
PAIN SCORE: 5.5
AM STIFFNESS SCORE: 1, YES
MDHAQ FUNCTION SCORE: .6
FATIGUE SCORE: 4.5
TOTAL RAPID3 SCORE: 4.17
PSYCHOLOGICAL DISTRESS SCORE: 2.2

## 2017-07-12 NOTE — PROGRESS NOTES
I have personally taken the history and examined the patient and agree with the resident,s note as stated above c/o right thumb mcp pain. No digital ulcers, rash    Exam: no definite sclerodactyly or telangiectases, no digital ulcers. Fingers warm bilateral. Tender bony enlargement right thumb mcp. Other joints normal. Tenderness and decreased rom left shoulder      Results for SHILO HAMPTON (MRN [id number]) as of 7/12/2017 09:15   Ref. Range 1/6/2015 18:29 1/10/2015 03:51 1/21/2015 09:58 7/1/2016 09:15 8/17/2016 15:05   CRP Latest Ref Range: 0.0 - 8.2 mg/L 219.3 (H) 165.1 (H) 17.4 (H) 2.7 1.1       Results for SHILO HAMPTON (MRN [id number]) as of 7/12/2017 09:15   Ref. Range 1/6/2015 18:29 1/21/2015 09:58 7/1/2016 09:15 8/17/2016 15:05 4/7/2017 10:45   Sed Rate Latest Ref Range: 0 - 20 mm/Hr 132 (H) 107 (H) 30 (H) 20 30 (H)     Results for SHILO HAMPTON (MRN [id number]) as of 7/12/2017 09:15   Ref. Range 7/1/2016 09:15 8/17/2016 15:05 4/7/2017 10:45   EUGENE HEP-2 Titer Unknown Positive 1:320 Nu...     Anti-SSA Antibody Latest Ref Range: 0.00 - 19.99 EU 2.20     Anti-SSA Interpretation Latest Ref Range: Negative  Negative     Anti-SSB Antibody Latest Ref Range: 0.00 - 19.99 EU 1.17     Anti-SSB Interpretation Latest Ref Range: Negative  Negative     ds DNA Ab Latest Ref Range: Negative 1:10  Negative 1:10     Anti Sm Antibody Latest Ref Range: 0.00 - 19.99 EU 2.14     Anti-Sm Interpretation Latest Ref Range: Negative  Negative     Anti Sm/RNP Antibody Latest Ref Range: 0.00 - 19.99 EU 0.82     Anti-Sm/RNP Interpretation Latest Ref Range: Negative  Negative     Scleroderma SCL- Latest Ref Range: <20 UNITS  3    Complement (C-3) Latest Ref Range: 50 - 180 mg/dL  134    Complement (C-4) Latest Ref Range: 11 - 44 mg/dL  35    Anti-Lynn-1 Antibody Latest Ref Range: <20 Units  <20    Anti-PM/Scl Ab Latest Ref Range: <20 Units  <20    Anti-SS-A 52 kD Ab, IgG Latest Ref Range: <20 Units  92 (H)    Anti-U1-RNP  Ab Latest Ref Range: <20  Units  <20    CCP Antibodies Latest Ref Range: <5.0 U/mL <0.5     EJ Latest Ref Range: Negative   Negative    Fibrillarin (U3 RNP) Latest Ref Range: Negative   Negative    Ku Latest Ref Range: Negative   Negative    MDA-5 (P140) (CADM-140) Latest Ref Range: <20 Units  <20    MI-2 Latest Ref Range: Negative   Negative    NXP-2 (P140) Latest Ref Range: <20 Units  <20    OJ Latest Ref Range: Negative   Negative    PL-12 Latest Ref Range: Negative   Negative    PL-7 Latest Ref Range: Negative   Negative    Rheumatoid Factor Latest Ref Range: 0.0 - 15.0 IU/mL <10.0     SRP Latest Ref Range: Negative   Negative    Th/To Latest Ref Range: Not Detected    Negative   TIF1 GAMMA (P155/140) Latest Ref Range: <20 Units  <20    U2 snRNP Latest Ref Range: Negative   Negative      CT CHEST/ABDOMEN/PELVIS - TRIPLE PHASE.      Technique:  Oral contrast was used. Non-contrast images of the abdomen were done. Using 75 cc of Omnipaque 350 intravenous contrast and helical CT technique, images of the liver were obtained during the arterial phase. Standard portal venous phase CT scan of the chest, abdomen, and pelvis was then performed. Delayed images of the liver also performed    Comparison: CT chest abdomen pelvis with contrast 1/28/2017.    Findings:    Examination of the vascular and soft tissue structures at the base of the neck is unremarkable.    There is a left-sided three-vessel aortic arch. The thoracic aorta maintains normal caliber, contour, and course with minimal calcific atherosclerosis along its course.      The heart is normal in appearance without cardiomegaly or pericardial effusion.    The trachea is midline, the proximal airways are patent, and the lungs are symmetrically expanded. There is redemonstration of a subtle subsolid opacity within the RIGHT lung apex as well as a subcentimeter pleural-based nodule within the posterior segment of the RIGHT upper lobe unchanged in size and appearance from prior examination  dated 1/28/2017.    There is no axillary, mediastinal, or hilar lymphadenopathy.    The esophagus maintains a normal course and caliber.     Again noted are postoperative changes consistent with orthotopic liver transplantation and choledochojejunostomy with surgical clips present within the nick hepatis. There is redemonstration of a hypodense hepatic segment VII lesion, slightly reduced in size on prior examination measuring 3.0 cm x 0.7 cm from 3.5 cm x 0.7 cm previously. The gallbladder is surgically absent. There is a small amount of persistent air within the biliary system secondary to patient's history of choledochojejunostomy, however there is no intra-or extrahepatic biliary ductal dilatation.    There is a 1.4 cm splenic hypodensity unchanged in size and appearance from prior examination and most consistent with splenic cyst. The stomach, pancreas, and adrenal glands are unremarkable.    The kidneys are normal in size and location demonstrating appropriate concentration and excretion of contrast on delayed imaging. Note is made of a retroaortic LEFT renal vein. There is no hydroureteronephrosis. The urinary bladder and uterus demonstrate no significant abnormalities.     The visualized loops of small and large bowel show no evidence of obstruction or inflammation.      There is no ascites, free fluid, or intraperitoneal free air.     There is no abdominopelvic lymphadenopathy.    The abdominal aorta is normal in course and caliber without significant atherosclerotic calcifications.    The osseus structures demonstrate age-appropriate degenerative change without evidence of acute fracture or osseus destructive process.      The extraperitoneal soft tissues are unremarkable.   Impression         1. Postoperative change consistent with orthotopic liver transplantation and choledochojejunostomy.    2. Slight reduction in size of a hepatic segment VII hypodensity now measuring 3.0 cm x 0.7 cm and most  consistent with postoperative seroma versus resolving hematoma.    3. Stable 1.4 cm splenic cyst.    4. Stable sub-solid opacity within the RIGHT lung apex as well as a subcentimeter pleural-based pulmonary nodule within the posterior segment RIGHT upper lobe.    ______________________________________     Electronically signed by resident: FABIAN LAYNE MD  Date: 04/10/17  Time: 11:58            As the supervising and teaching physician, I personally reviewed the images and resident's interpretation and I agree with the findings.            Electronically signed by: KIMMY MORGAN MD       Comparison: 4/28/16.    Findings: PA, lateral and oblique radiographs of the hands demonstrate severe loss of joint space at the right first metacarpophalangeal joint with sclerosis and subchondral cyst formation.  The mineralization is preserved bilaterally.  The remaining joint spaces are preserved.  There is no fracture, dislocation or abnormal soft tissue findings.   Impression      Findings compatible with severe osteoarthritis at the right first metacarpophalangeal joint.  Otherwise this exam is within normal limits.      Electronically signed by: TIMOTHY TAYLOR MD  Date: 01/19/17  Time: 08:49            Impression:           - Esophagogastric junction (EGJ) outflow                         obstruction. There is impairment of bolus passage                         (incomplete passage).                        - Findings are not consistent with Scleroderma.  Recommendation:       - Return to referring physician.  Attending Participation:       I have reviewed and interpreted the results of the above documented        diagnostic study.  Leonardo Chris MD  7/6/2017 11:35:08 AM      PFTs           FVC            SVC          RV           DLco    4/7/17           123             124            49            91  8/18/17          113             107           110          88           ?? lcSSc(limited scleroderma): ACR/EULAR  criteria: (no sclerodactyly today) digital tip pitting scars(3) telangiectases(2) Raynaud's(3) =8 does not meet  criteria ; oxaliplatin(cisplatin associated with Raynaud's) ; 5-FU not known to be associated with Raynaud's or scleroderma. MRSS=2(mild) May all be oxiplatin induced Raynaud's. No evidence of scleroderma esophagus by esophageal manometry  EUGENE+ 1:320 nucleolar(scleroderma pattern) and speckled + SS-A (most typical of Sjogren's and SLE), Raynaud's, telangiectases, dysphagia. All scleroderma associated autoantibodies negative.   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   hand eczema         Continue amlodipine 5mg for Raynaud's hands much improved, all digital ulcers healed and fingers warm.  Not a candidate for additional immunosuppression based on current disease activity and already on sirolimus and tacrolimus for liver transplant.  reordered OT for left hand and left shoulder as she is doing and ordered by Ortho and ff/u with them.   F/u with Dr. Haynes in [place] for dysphagia caused by distal esophageal outflow obstruction with EGD with dilatation  R/u Dr. Rossi for recurrent trigger fingers despite injection x2 and right thumb mcp  Small [place] thumb brace  Diclofenac gel 1% qid prn  Reordered OT/PT for hands and left shoulder  RTC 3 months

## 2017-07-13 ENCOUNTER — PATIENT MESSAGE (OUTPATIENT)
Dept: INTERNAL MEDICINE | Facility: CLINIC | Age: 57
End: 2017-07-13

## 2017-07-13 DIAGNOSIS — B35.2 TINEA MANUS: Primary | ICD-10-CM

## 2017-07-13 RX ORDER — BETAMETHASONE DIPROPIONATE 0.5 MG/G
CREAM TOPICAL 2 TIMES DAILY
Qty: 30 G | Refills: 3 | Status: SHIPPED | OUTPATIENT
Start: 2017-07-13 | End: 2018-01-16 | Stop reason: SDUPTHER

## 2017-07-13 RX ORDER — CLOTRIMAZOLE 1 %
CREAM (GRAM) TOPICAL 2 TIMES DAILY
Qty: 30 G | Refills: 3 | Status: SHIPPED | OUTPATIENT
Start: 2017-07-13 | End: 2017-11-21 | Stop reason: SDUPTHER

## 2017-07-13 NOTE — TELEPHONE ENCOUNTER
Pt request  Refill on clotrimazole cream and betamethasone cream seperately for insurance to cover

## 2017-07-14 ENCOUNTER — CLINICAL SUPPORT (OUTPATIENT)
Dept: REHABILITATION | Facility: HOSPITAL | Age: 57
End: 2017-07-14
Attending: STUDENT IN AN ORGANIZED HEALTH CARE EDUCATION/TRAINING PROGRAM
Payer: MEDICARE

## 2017-07-14 DIAGNOSIS — G89.29 CHRONIC PAIN OF BOTH SHOULDERS: ICD-10-CM

## 2017-07-14 DIAGNOSIS — M25.511 CHRONIC PAIN OF BOTH SHOULDERS: ICD-10-CM

## 2017-07-14 DIAGNOSIS — M25.512 CHRONIC PAIN OF BOTH SHOULDERS: ICD-10-CM

## 2017-07-14 PROCEDURE — G8979 MOBILITY GOAL STATUS: HCPCS | Mod: CJ,PO

## 2017-07-14 PROCEDURE — 97112 NEUROMUSCULAR REEDUCATION: CPT | Mod: PO

## 2017-07-14 PROCEDURE — 97161 PT EVAL LOW COMPLEX 20 MIN: CPT | Mod: PO

## 2017-07-14 PROCEDURE — G8978 MOBILITY CURRENT STATUS: HCPCS | Mod: CL,PO

## 2017-07-14 NOTE — PROGRESS NOTES
Name: Nadeen Mcgovern  Clinic Number: [id number]  07/14/2017.     Diagnosis: L shoulder   Physician: Kerrie Rosas MD  Treatment Orders: PT Eval and Treat    Past Medical History:   Diagnosis Date    Acute cholecystitis     Cholecystitis    Arthritis 2015    septic arthritis of right shoulder    Bacteremia due to Streptococcus pneumoniae     Cancer     Cholangiocarcinoma     Jaundice     obstructive    Prediabetes      Current Outpatient Prescriptions   Medication Sig    amlodipine (NORVASC) 5 MG tablet Take 1 tablet (5 mg total) by mouth once daily.    aspirin 81 MG Chew Take 81 mg by mouth once daily.    betamethasone dipropionate (DIPROLENE) 0.05 % cream Apply topically 2 (two) times daily.    clotrimazole (LOTRIMIN) 1 % cream Apply topically 2 (two) times daily.    diclofenac sodium (VOLTAREN) 1 % Gel Apply 2 g topically 4 (four) times daily. PRN    famotidine (PEPCID) 20 MG tablet Take 1 tablet (20 mg total) by mouth every evening.    hydrOXYzine pamoate (VISTARIL) 50 MG Cap Take 1 capsule (50 mg total) by mouth every 8 (eight) hours as needed.    multivitamin (THERAGRAN) tablet Take 1 tablet by mouth once daily.    PROGRAF 1 mg Cap Take 1 capsule (1 mg total) by mouth every 12 (twelve) hours.    sirolimus (RAPAMUNE) 1 MG Tab Take 2 tablets (2 mg total) by mouth once daily.    zolpidem (AMBIEN) 5 MG Tab TAKE ONE TABLET BY MOUTH NIGHTLY AS NEEDED     No current facility-administered medications for this visit.      Review of patient's allergies indicates:  No Known Allergies  Precautions: cardiac      Subjective:    Previous PT: OT for L hand; also PT for her shoulders;   Work history: retired; disable; was a cook; been disabled since 2014; liver cancer; has had a liver transplant; told she can't lift > 10#   Recreational activities/exercise program: walks, rides her bike; does OT shoulder exercises.      Nadeen is a 56 y.o. female referred to PT for shoulder pain. She states she has trouble  toting pots for long distances when trying to cook. She has trouble with her , and she also has trouble lifting her arms up past her shoulder. Will have to lean over or onto the bed to put on her shirt, and has trouble fixing her hair. Has trouble washing her back. sxs are located on anterior shoulder and sometimes on lateral neck. Sxs on both sides, L > R. Describes sxs as sharp. No sxs in the arm/past elbow. Writing is fine. Sometimes feels like she loses her balance, which is new.      Pain is rated on a 0-10 scale with 0 being no pain and 10 being pain requiring emergency room visit.    Diagnostic Tests: n/a    Patient Goals for PT: improve carrying strength, improved UE ROM      Objective:    FOTO Shoulder Survey 60% limitations  Current -YK Goal -XZ (40%)   Category: Mobility     G-Code Modifiers  CH  0% Impaired, limited, or restricted    CI  19% - 1%  Impaired, limited, or restricted    CJ  20% - 39% Impaired, limited, or restricted    CK  40% - 59% Impaired, limited, or restricted    CL  60% - 79% Impaired, limited, or restricted    CM  80% - 99% Impaired, limited, or restricted    CN  100% Impaired, limited, or restricted        Visit # 1  Time In: 0800  Time Out: 0900  Treatment time: 60  Date of eval/re-eval: 7/14/2017    DTR R/L: Biceps C5/6 NL / NL Patella L4 NL / NL Achilles S2 NL / NL   NL 1+, 2+, 3+; ABS 0; HYP 4+; PANCHITO 5+    Light touch R/L: Ant shoulder C5 NL / NL Thumb C6 NL / NL Middle finger C7 NL / NL Pinky C8 NL / NL Med bicep T1 NL / NL    NL intact; DIM diminished     Alignment/appearance:   Thoracic spine:  --> nl --> inc   Lumbar spine:  --> nl --> inc  Pelvic tilt:  --> nl --> ant    Pelvic rotation: L / R post / sup nl  Scapular position: ant tilt     Strength R/L wfl throughout UE except   Flexion 4- / 4-   Scaption 4- / 4-   Abduction C5 4- / 4- unable to get full shoulder flexion         Findings R/L    Selective Functional Movement Assessment (SFMA)  Coding:  FN =  "functional non-painful FP = functional painful     DP = dysfunctional painful  DN = dysfunctional non-painful    Cervical flexion FP  Cervical extension DN  Cervical rotation FN / DP  UE Pattern 1 MR DP / DP   UE Pattern 2 ER DP / DP   Multi-segmental flexion DN uanble to touch toes     Shoulder flexion ~ 90E / ~ 90E     Movement tests R/L     PROM ER wfl all planes    PROM IR wfl all planes    T-spine rotation wfl / nettles   AROM shoulder extension nt / nt   AROM shoulder flexion nt / decreases sxs with nt      PT Evaluation Complete? YES  Discussed Plan of Care with patient: YES    [n/c] Manual therapy for improved ROM and/or pain relief x 5'   MWM c/s rotation R/L 2 x 10 R/L      [2] Neuromuscular re-ed for improving motor control, learning new techniques, and/or pain relief x 30'   Supnie chin tucks 10 x 5" cues to "make back of neck long"   Chin tucks with supine horiz abd 2 x 8    Prone ext/row/horiz abd/scap 2 x 8 R/L   Wall slides 2 x 8 cues to 'float arms up' to reduce effort of neck     Written HEP Provided: yes  Patient education: HEP  Nadeen demo good understanding of the education provided. Patient demo good return demo of skill of exercises.    Problem List: muscle length impairments, decreased motor control and mobility, impaired ADLs, activity and participation restrictions.     Personal factors: n/a    CPT Code reference        Hx  Exam  Presentation   Code     Low     0   1-2    Stable    11339     Mod     1-2   3    Evolving    41171     High     3+   4+     Unstable    06956       Assessment:    Physical therapy diagnosis: insufficient scap UR  Rehab potential: good    Nadeen presents with the above listed impairments.  GHJ more flexible into elevation than scapulo-thoracic joint. Increased activation of traps into elevation of scapula instead of serratus as upward rotator.    Nadeen will benefit from skilled PT services to address these impairments and progress towards the below listed functional goals.  " Nadeen is in agreement with the goals that will be addressed in the treatment plan.Nadeen demonstrates no additional cultural, spiritual or educational needs and currently has no barriers to learning.      Medical necessity: see problem list -  indicates low / mod / high complexity based on clinical presentation that is stable / evolving / unstable.       Functional Goals  Time frame     1.   The pt will demosntrate > 125 AROM elevation for improved functional strength and improving grooming.   6 weeks     2.   The pt will demonstrate > 4/5 UE strength for improved functional strength with cooking.   6-8 weeks     3.         4.   The pt will be independent in performance and progression of HEP.     2-3 visits            Plan:      Proceed/Continue with POC.     Pt will be treated by physical therapy 2x/week during the certification period. Treatment will consist of therapeutic exercise, manual therapy, neuromuscular re-education, heat and cold modalities, electrical stimulation for pain relief and/or muscle activation, and any other types of treatment hat may be deemed medically necessary. Nadeen may at times be seen by a PTA as part of the Rehab Team.       Physical Therapist: DASH Shepherd, DPT, OCS, CSCS    I certify the need for these services furnished under this plan of treatment and while under my care.       ___________________________________  Physician/Referring Practitioner        _________________  Date of Signature

## 2017-07-17 ENCOUNTER — LAB VISIT (OUTPATIENT)
Dept: LAB | Facility: HOSPITAL | Age: 57
End: 2017-07-17
Attending: INTERNAL MEDICINE
Payer: MEDICARE

## 2017-07-17 DIAGNOSIS — Z94.4 LIVER REPLACED BY TRANSPLANT: ICD-10-CM

## 2017-07-17 LAB
ALBUMIN SERPL BCP-MCNC: 4 G/DL
ALP SERPL-CCNC: 87 U/L
ALT SERPL W/O P-5'-P-CCNC: 28 U/L
ANION GAP SERPL CALC-SCNC: 9 MMOL/L
AST SERPL-CCNC: 22 U/L
BASOPHILS # BLD AUTO: 0.02 K/UL
BASOPHILS NFR BLD: 0.5 %
BILIRUB SERPL-MCNC: 0.8 MG/DL
BUN SERPL-MCNC: 11 MG/DL
CALCIUM SERPL-MCNC: 9.5 MG/DL
CHLORIDE SERPL-SCNC: 110 MMOL/L
CO2 SERPL-SCNC: 23 MMOL/L
CREAT SERPL-MCNC: 0.7 MG/DL
DIFFERENTIAL METHOD: NORMAL
EOSINOPHIL # BLD AUTO: 0.1 K/UL
EOSINOPHIL NFR BLD: 1.4 %
ERYTHROCYTE [DISTWIDTH] IN BLOOD BY AUTOMATED COUNT: 13.2 %
EST. GFR  (AFRICAN AMERICAN): >60 ML/MIN/1.73 M^2
EST. GFR  (NON AFRICAN AMERICAN): >60 ML/MIN/1.73 M^2
GLUCOSE SERPL-MCNC: 98 MG/DL
HCT VFR BLD AUTO: 40.7 %
HGB BLD-MCNC: 13.8 G/DL
LYMPHOCYTES # BLD AUTO: 1.4 K/UL
LYMPHOCYTES NFR BLD: 32.3 %
MCH RBC QN AUTO: 29.2 PG
MCHC RBC AUTO-ENTMCNC: 33.9 %
MCV RBC AUTO: 86 FL
MONOCYTES # BLD AUTO: 0.4 K/UL
MONOCYTES NFR BLD: 8.6 %
NEUTROPHILS # BLD AUTO: 2.4 K/UL
NEUTROPHILS NFR BLD: 57 %
PLATELET # BLD AUTO: 182 K/UL
PMV BLD AUTO: 9.8 FL
POTASSIUM SERPL-SCNC: 3.7 MMOL/L
PROT SERPL-MCNC: 7.7 G/DL
RBC # BLD AUTO: 4.72 M/UL
SIROLIMUS BLD-MCNC: 7.7 NG/ML
SODIUM SERPL-SCNC: 142 MMOL/L
TACROLIMUS BLD-MCNC: 2.4 NG/ML
WBC # BLD AUTO: 4.21 K/UL

## 2017-07-17 PROCEDURE — 80053 COMPREHEN METABOLIC PANEL: CPT

## 2017-07-17 PROCEDURE — 80195 ASSAY OF SIROLIMUS: CPT

## 2017-07-17 PROCEDURE — 36415 COLL VENOUS BLD VENIPUNCTURE: CPT

## 2017-07-17 PROCEDURE — 85025 COMPLETE CBC W/AUTO DIFF WBC: CPT

## 2017-07-17 PROCEDURE — 80197 ASSAY OF TACROLIMUS: CPT

## 2017-07-18 ENCOUNTER — CLINICAL SUPPORT (OUTPATIENT)
Dept: REHABILITATION | Facility: HOSPITAL | Age: 57
End: 2017-07-18
Attending: STUDENT IN AN ORGANIZED HEALTH CARE EDUCATION/TRAINING PROGRAM
Payer: MEDICARE

## 2017-07-18 DIAGNOSIS — M25.512 CHRONIC PAIN OF BOTH SHOULDERS: Primary | ICD-10-CM

## 2017-07-18 DIAGNOSIS — G89.29 CHRONIC PAIN OF BOTH SHOULDERS: Primary | ICD-10-CM

## 2017-07-18 DIAGNOSIS — G89.29 CHRONIC LEFT SHOULDER PAIN: ICD-10-CM

## 2017-07-18 DIAGNOSIS — M25.511 CHRONIC PAIN OF BOTH SHOULDERS: Primary | ICD-10-CM

## 2017-07-18 DIAGNOSIS — M25.512 CHRONIC LEFT SHOULDER PAIN: ICD-10-CM

## 2017-07-18 PROCEDURE — 97110 THERAPEUTIC EXERCISES: CPT | Mod: PO

## 2017-07-18 NOTE — PROGRESS NOTES
"                                                    Physical Therapy Daily Note     Name: Nadeen Mcgovern  Clinic Number: [id number]  Diagnosis:   Encounter Diagnoses   Name Primary?    Chronic left shoulder pain     Chronic pain of both shoulders Yes     Physician: Kerrie Rosas MD  Visit #: 2 of 20  Time In: 8:02  Time Out: 9:00  Treatment time: 50  1 on1 time: 30    Subjective     Pt reports: chronic pain/stiffness to (B) shoulders.  Pain Scale: Nadeen rates pain on a scale of 0-10 to be 5 currently.    Objective     Patient receives moist heat applied to (B) shoulders x 5 minutes for muscle relaxation/pain control prior to Tx.     Nadeen received individual therapeutic exercises to develop strength, endurance, ROM, flexibility and posture for 40 minutes including:  Supnie chin tucks 10 x 5" cues to "make back of neck long"   Chin tucks with supine horiz abd/add  2 x 10    Prone ext/row/horiz abd/scap 2 x 10 R/L    Serratus Wall slides with foam roll 2 x 10 cues to 'float arms up' to reduce effort of neck  Supine AAROM shoulder flex with dowel 2 x 10 with 5 sec hold end range    Nadeen received the following manual therapy techniques: Joint mobilizations were applied to the: (B) GH joint for 10 minutes including:  GH oscillations, Inferior/posterior GH glides grade ll-lll.    Patient education: Patient educated to continue with ex's provided on eval along with the addition of supine shoulder flex with dowel , supine H- abd/add and serratus wall slides. Patient with good understanding.   No spiritual or educational barriers to learning .    Assessment     Patient tolerated nelly Tx well.  Some muscular fatigue with progressive ex's but was able to perform without increased pain. She notes some pain relief with manual GH mobilization techniques and shoulder flexibility post Tx.   This is a 56 y.o. female referred to outpatient physical therapy and presents with a medical diagnosis of   Encounter Diagnoses   Name Primary? "    Chronic left shoulder pain     Chronic pain of both shoulders Yes    and demonstrates limitations as described in the problem list. Pt will continue to benefit from skilled outpatient physical therapy to address the deficits listed in the problem list, provide pt/family education and to maximize pt's level of independence in the home and community environment.           Functional Goals  Time frame     1.   The pt will demosntrate > 125 AROM elevation for improved functional strength and improving grooming.   6 weeks     2.   The pt will demonstrate > 4/5 UE strength for improved functional strength with cooking.   6-8 weeks     3.          4.   The pt will be independent in performance and progression of HEP.     2-3 visits                    Plan     Continue with established Plan of Care towards PT goals.    Therapist: Sachin Love, PTA  7/18/2017

## 2017-07-19 ENCOUNTER — TELEPHONE (OUTPATIENT)
Dept: TRANSPLANT | Facility: CLINIC | Age: 57
End: 2017-07-19

## 2017-07-19 DIAGNOSIS — C22.1 INTRAHEPATIC CHOLANGIOCARCINOMA: Primary | ICD-10-CM

## 2017-07-19 NOTE — TELEPHONE ENCOUNTER
Dr. Benítez reviewed your labs.  Continue routine labs no changes needed.  Letter sent for next lab appointment 09/11/17

## 2017-07-19 NOTE — TELEPHONE ENCOUNTER
----- Message from Мария Benítez MD sent at 7/19/2017 11:07 AM CDT -----  Reviewed, nothing to do

## 2017-07-21 ENCOUNTER — CLINICAL SUPPORT (OUTPATIENT)
Dept: REHABILITATION | Facility: HOSPITAL | Age: 57
End: 2017-07-21
Attending: STUDENT IN AN ORGANIZED HEALTH CARE EDUCATION/TRAINING PROGRAM
Payer: MEDICARE

## 2017-07-21 DIAGNOSIS — M25.512 CHRONIC LEFT SHOULDER PAIN: ICD-10-CM

## 2017-07-21 DIAGNOSIS — G89.29 CHRONIC PAIN OF BOTH SHOULDERS: Primary | ICD-10-CM

## 2017-07-21 DIAGNOSIS — M25.511 CHRONIC PAIN OF BOTH SHOULDERS: Primary | ICD-10-CM

## 2017-07-21 DIAGNOSIS — M25.512 CHRONIC PAIN OF BOTH SHOULDERS: Primary | ICD-10-CM

## 2017-07-21 DIAGNOSIS — G89.29 CHRONIC LEFT SHOULDER PAIN: ICD-10-CM

## 2017-07-21 PROCEDURE — 97110 THERAPEUTIC EXERCISES: CPT | Mod: PO

## 2017-07-21 PROCEDURE — 97140 MANUAL THERAPY 1/> REGIONS: CPT | Mod: PO

## 2017-07-21 NOTE — PROGRESS NOTES
"                                                    Physical Therapy Daily Note     Name: Nadeen Mcgovern  Clinic Number: [id number]  Diagnosis:   Encounter Diagnoses   Name Primary?    Chronic left shoulder pain     Chronic pain of both shoulders Yes     Physician: Kerrie Rosas MD  Visit #: 3 of 20  Time In: 8:00  Time Out: 9:00  Treatment time: 55       Subjective     Pt reports: chronic pain/stiffness to (B) shoulders. She reports no problems after last Tx. She states that she took a warm shower prior to Tx which helps to loosen her shoulders up.  Pain Scale: Nadeen rates pain on a scale of 0-10 to be 5 currently.    Objective     Patient receives moist heat applied to (B) shoulders x 5 minutes for muscle relaxation/pain control prior to Tx.     Nadeen received individual therapeutic exercises to develop strength, endurance, ROM, flexibility and posture for 45 minutes including:  Supine chin tucks 10 x 5" cues to "make back of neck long"   Supine pec stretch lying over towel rolls x 2 minutes.   Chin tucks with supine horiz abd/add while lying over towel roll 2 x 10   Sidelying Shoulder H- abd 2 x10  sidelying shoulder flex 2 x 10  Supine AAROM shoulder flex with dowel 2 x 10 with 5 sec hold end range  Prone ext/row/horiz abd/scap with 1# 2 x 8 R/L    Serratus Wall slides with foam roll 2 x 10 cues to 'float arms up' to reduce effort of neck  Standing scaption to 90 degrees 2 x10    Nadeen received the following manual therapy techniques: Joint mobilizations were applied to the: (B) GH joint for 10 minutes including:  GH oscillations, Inferior/posterior GH glides grade ll-lll.    Patient education: Patient educated to continue with previously issued HEP to tolerance.  Patient with good understanding.   No spiritual or educational barriers to learning .    Assessment     Patient tolerated nelly Tx fairly well.  Some muscular fatigue with progressive ex's but was able to perform without increased pain. She notes some " pain relief with manual GH mobilization techniques and improved shoulder flexibility post Tx. Discomfort level = tolerable post Tx.  This is a 56 y.o. female referred to outpatient physical therapy and presents with a medical diagnosis of   Encounter Diagnoses   Name Primary?    Chronic left shoulder pain     Chronic pain of both shoulders Yes    and demonstrates limitations as described in the problem list. Pt will continue to benefit from skilled outpatient physical therapy to address the deficits listed in the problem list, provide pt/family education and to maximize pt's level of independence in the home and community environment.           Functional Goals  Time frame     1.   The pt will demosntrate > 125 AROM elevation for improved functional strength and improving grooming.   6 weeks     2.   The pt will demonstrate > 4/5 UE strength for improved functional strength with cooking.   6-8 weeks     3.          4.   The pt will be independent in performance and progression of HEP.     2-3 visits                    Plan     Continue with established Plan of Care towards PT goals.    Therapist: Sachin Love, PTA  7/21/2017

## 2017-07-25 ENCOUNTER — CLINICAL SUPPORT (OUTPATIENT)
Dept: REHABILITATION | Facility: HOSPITAL | Age: 57
End: 2017-07-25
Attending: STUDENT IN AN ORGANIZED HEALTH CARE EDUCATION/TRAINING PROGRAM
Payer: MEDICARE

## 2017-07-25 DIAGNOSIS — M25.512 CHRONIC LEFT SHOULDER PAIN: ICD-10-CM

## 2017-07-25 DIAGNOSIS — G89.29 CHRONIC LEFT SHOULDER PAIN: ICD-10-CM

## 2017-07-25 PROCEDURE — 97110 THERAPEUTIC EXERCISES: CPT | Mod: PO

## 2017-07-25 PROCEDURE — 97140 MANUAL THERAPY 1/> REGIONS: CPT | Mod: PO

## 2017-07-25 NOTE — PROGRESS NOTES
Physical Therapy Daily Note     Name: Nadeen Mcgovern  Clinic Number: [id number]  Diagnosis:   Encounter Diagnosis   Name Primary?    Chronic left shoulder pain      Physician: Kerrie Rosas MD  Visit #: 4 of 20  Time In: 8:05  Time Out: 9:00  Treatment time: 55     Subjective     Pt reports: chronic pain/stiffness to (B) shoulders. She reports symptoms are improving and feel like she has more flexibility.   Pain Scale: Nadeen rates pain on a scale of 0-10 to be 3-4 currently.    Objective     Patient receives moist heat applied to (B) shoulders x 5 minutes for muscle relaxation/pain control prior to Tx.--NP     Nadeen received individual therapeutic exercises to develop strength, endurance, ROM, flexibility and posture for 45 minutes including:  Supine chin tucks 10 x 5  Supine pec stretch lying over towel rolls x 2 minutes.   Chin tucks with supine horiz abd/add while lying over towel roll 2 x 10   Sidelying Shoulder H- abd 2 x 10  sidelying shoulder flex   2 x 10  Sidelying shoulder ER 2 x 10  Supine scap protract with 2# 2 x 10  Supine AAROM shoulder flex with dowel 2 x 10 with 5 sec hold end range  Rhythmic stabilization 2  x  30 sec  Prone ext/row/horiz abd/scap with 1# 2 x 10 R/L --NP  Scap retract/Row with GTB 2 x 10  Shoulder ext with OTB 2 x 10   Serratus Wall slides with foam roll 2 x 10 cues to 'float arms up' to reduce effort of neck  Standing scaption to 90 degrees with 1# 2 x10    Nadeen received the following manual therapy techniques: Joint mobilizations were applied to the: (B) GH joint for 10 minutes including:  GH oscillations, Inferior/posterior GH glides grade ll-lll.    Patient education: Patient educated to continue with previously issued HEP to tolerance. She ws provided with a copy of new sidelying shoulder ER ex. Patient with good understanding.   No spiritual or educational barriers to learning .    Assessment     Patient tolerated nelly Tx  fairly well.  Some muscular fatigue with progressive ex's but was able to perform without increased pain. Attempted addition of 1# weight for sidelying shoulder flex however, stopped due to patient reporting that it felt too heavy. She notes some pain relief with manual GH mobilization techniques and improved shoulder flexibility post Tx. Discomfort level = slight/tolerable post Tx.  This is a 56 y.o. female referred to outpatient physical therapy and presents with a medical diagnosis of   Encounter Diagnoses   Name Primary?    Chronic left shoulder pain     Chronic pain of both shoulders Yes    and demonstrates limitations as described in the problem list. Pt will continue to benefit from skilled outpatient physical therapy to address the deficits listed in the problem list, provide pt/family education and to maximize pt's level of independence in the home and community environment.           Functional Goals  Time frame     1.   The pt will demosntrate > 125 AROM elevation for improved functional strength and improving grooming.   6 weeks     2.   The pt will demonstrate > 4/5 UE strength for improved functional strength with cooking.   6-8 weeks     3.          4.   The pt will be independent in performance and progression of HEP.     2-3 visits                    Plan     Continue with established Plan of Care towards PT goals.    Therapist: Sachin Love, PTA  7/25/2017

## 2017-07-27 ENCOUNTER — HOSPITAL ENCOUNTER (OUTPATIENT)
Dept: RADIOLOGY | Facility: HOSPITAL | Age: 57
Discharge: HOME | End: 2017-07-27
Attending: INTERNAL MEDICINE
Payer: MEDICARE

## 2017-07-27 DIAGNOSIS — C22.1 INTRAHEPATIC CHOLANGIOCARCINOMA: ICD-10-CM

## 2017-07-27 PROCEDURE — 71260 CT THORAX DX C+: CPT | Mod: 26,,, | Performed by: RADIOLOGY

## 2017-07-27 PROCEDURE — 71260 CT THORAX DX C+: CPT | Mod: TC

## 2017-07-27 PROCEDURE — 25500020 PHARM REV CODE 255: Performed by: INTERNAL MEDICINE

## 2017-07-27 PROCEDURE — 74177 CT ABD & PELVIS W/CONTRAST: CPT | Mod: TC

## 2017-07-27 PROCEDURE — 74177 CT ABD & PELVIS W/CONTRAST: CPT | Mod: 26,GC,, | Performed by: RADIOLOGY

## 2017-07-27 RX ADMIN — IOHEXOL 15 ML: 350 INJECTION, SOLUTION INTRAVENOUS at 04:07

## 2017-07-27 RX ADMIN — IOHEXOL 68 ML: 350 INJECTION, SOLUTION INTRAVENOUS at 06:07

## 2017-07-27 RX ADMIN — IOHEXOL 15 ML: 350 INJECTION, SOLUTION INTRAVENOUS at 03:07

## 2017-08-01 ENCOUNTER — CLINICAL SUPPORT (OUTPATIENT)
Dept: REHABILITATION | Facility: HOSPITAL | Age: 57
End: 2017-08-01
Attending: INTERNAL MEDICINE
Payer: MEDICARE

## 2017-08-01 DIAGNOSIS — M25.512 CHRONIC PAIN OF BOTH SHOULDERS: Primary | ICD-10-CM

## 2017-08-01 DIAGNOSIS — G89.29 CHRONIC PAIN OF BOTH SHOULDERS: Primary | ICD-10-CM

## 2017-08-01 DIAGNOSIS — M25.512 CHRONIC LEFT SHOULDER PAIN: ICD-10-CM

## 2017-08-01 DIAGNOSIS — G89.29 CHRONIC LEFT SHOULDER PAIN: ICD-10-CM

## 2017-08-01 DIAGNOSIS — M25.511 CHRONIC PAIN OF BOTH SHOULDERS: Primary | ICD-10-CM

## 2017-08-01 PROCEDURE — 97110 THERAPEUTIC EXERCISES: CPT | Mod: PO

## 2017-08-01 NOTE — PROGRESS NOTES
Physical Therapy Daily Note     Name: Nadeen cMgovern  Clinic Number: [id number]  Diagnosis:   Encounter Diagnoses   Name Primary?    Chronic left shoulder pain     Chronic pain of both shoulders Yes     Physician: Kerrie Rosas MD  Visit #: 5 of 20  Time In: 8:00  Time Out: 9:00  Treatment time: 55   1 on1  Time: 30    Subjective     Pt reports: chronic pain/stiffness to (B) shoulders. She reports symptoms are improving and feels like she has more flexibility.   Pain Scale: Nadeen rates pain on a scale of 0-10 to be 3-4 currently.    Objective     Patient receives moist heat applied to (B) shoulders x 5 minutes for muscle relaxation/pain control prior to Tx.--NP     Nadeen received individual therapeutic exercises to develop strength, endurance, ROM, flexibility and posture for 45 minutes including:  Supine chin tucks 10 x 5  Supine pec stretch lying over towel rolls x 2 minutes.   Chin tucks with supine horiz abd/add while lying over towel roll 2 x 10   Supine AAROM shoulder flex with dowel 2 x 10 with 5 sec hold end range  Sidelying Shoulder H- abd with 1# 2 x 10--NP  sidelying shoulder flex   2 x 10  Sidelying shoulder ER 2 x 10  Closed chain scap protract on wall  2# 2 x 10  Rhythmic stabilization 2  x  30 sec---NP  Prone ext/row/horiz abd/scap with 1# 2 x 10 R/L ( Prone over T-ball)  Scap retract/Row on Cable cross with 7#  2 x 8  Shoulder ext on Cable cross with 3#  2 x 8   Serratus Wall slides with foam roll 2 x 10 cues to 'float arms up' to reduce effort of neck  Standing scaption to 90 degrees with 1# 2 x15    Nadeen received the following manual therapy techniques: Joint mobilizations were applied to the: (B) GH joint for 10 minutes including:  GH oscillations, Inferior/posterior GH glides grade ll-lll.    Patient education: Patient educated to continue with previously issued HEP to tolerance.   Patient with good understanding.   No spiritual or educational  barriers to learning .    Assessment     Patient tolerated nelly Tx fairly well.  Some muscular fatigue with progressive ex's but was able to perform without increased pain.   Some discomfort/stiffness still lingering although it is improved along with functional use per subjective report. Discomfort level = slight/tolerable post Tx.  This is a 56 y.o. female referred to outpatient physical therapy and presents with a medical diagnosis of   Encounter Diagnoses   Name Primary?    Chronic left shoulder pain     Chronic pain of both shoulders Yes    and demonstrates limitations as described in the problem list. Pt will continue to benefit from skilled outpatient physical therapy to address the deficits listed in the problem list, provide pt/family education and to maximize pt's level of independence in the home and community environment.           Functional Goals  Time frame     1.   The pt will demosntrate > 125 AROM elevation for improved functional strength and improving grooming.   6 weeks     2.   The pt will demonstrate > 4/5 UE strength for improved functional strength with cooking.   6-8 weeks     3.          4.   The pt will be independent in performance and progression of HEP.     2-3 visits                    Plan     Continue with established Plan of Care towards PT goals.    Therapist: Sachin Love, PTA  8/1/2017

## 2017-08-03 ENCOUNTER — TELEPHONE (OUTPATIENT)
Dept: TRANSPLANT | Facility: CLINIC | Age: 57
End: 2017-08-03

## 2017-08-03 NOTE — TELEPHONE ENCOUNTER
----- Message from Мария Benítez MD sent at 8/3/2017  8:44 AM CDT -----  Reviewed, nothing to do

## 2017-08-08 ENCOUNTER — CLINICAL SUPPORT (OUTPATIENT)
Dept: REHABILITATION | Facility: HOSPITAL | Age: 57
End: 2017-08-08
Attending: INTERNAL MEDICINE
Payer: MEDICARE

## 2017-08-08 DIAGNOSIS — M25.512 CHRONIC LEFT SHOULDER PAIN: ICD-10-CM

## 2017-08-08 DIAGNOSIS — G89.29 CHRONIC LEFT SHOULDER PAIN: ICD-10-CM

## 2017-08-08 PROCEDURE — 97110 THERAPEUTIC EXERCISES: CPT | Mod: PO

## 2017-08-08 PROCEDURE — 97140 MANUAL THERAPY 1/> REGIONS: CPT | Mod: PO

## 2017-08-08 NOTE — PROGRESS NOTES
Physical Therapy Daily Note     Name: Nadeen Mcgovern  Clinic Number: [id number]  Diagnosis:   Encounter Diagnosis   Name Primary?    Chronic left shoulder pain      Physician: Kerrie Rosas MD  Visit #: 6 of 20  Time In: 8:00  Time Out: 9:00  Treatment time: 60   1 on1  Time: 30    Subjective     Pt reports: chronic pain/stiffness to (B) shoulders. She reports symptoms are slightly improved but reports having some good days and some bad days. (L) shoulder more bothersome than (R) in general.  Pain Scale: Nadeen rates pain on a scale of 0-10 to be  5 currently.    Objective     Patient receives moist heat applied to (B) shoulders x 5 minutes for muscle relaxation/pain control prior to Tx.--NP     Nadeen received individual therapeutic exercises to develop strength, endurance, ROM, flexibility and posture for 50 minutes including:    Supine pec stretch lying over 1/2 foam roll x 2 minutes.   Chin tucks with supine horiz abd/add while lying over towel roll 2 x 10   Supine AAROM shoulder flex with dowel 2 x 10 with 5 sec hold end range--NP  Sidelying Shoulder H- abd with 1# 2 x 10--NP  sidelying shoulder flex   2 x 10  Sidelying shoulder ER 2 x 10  Supine AA D2 flex Diagonal 2 x 8  Closed chain scap protract on wall  2 x 10  Rhythmic stabilization 2  x  30 sec---NP  Prone ext with ER (1#)/row ( 2#)/horiz abd/scap  2 x 10 R/L ( Performed Prone over T-ball)  Scap retract/Row on Cable cross with 7#  2 x 10  Shoulder ext on Cable cross with 3#  2 x 10  Lower trap lift off on wall 2 x 8  Middle trap lift off on wall 2 x 8   Serratus Wall slides with foam roll 2 x 10    Standing scaption to 90 degrees with 1# 2 x10       Nadeen received the following manual therapy techniques: Joint mobilizations were applied to the: (B) GH joint for 10 minutes including:  GH oscillations, Inferior/posterior GH glides grade ll-lll.    Ex's/activities not performed today  Supine chin tucks 10 x  5--NP      Patient education: Patient educated to continue with previously issued HEP to tolerance along with the addition of lower trap and middle trap lift off on wall, D2 flex in supine. Patient with good understanding.   No spiritual or educational barriers to learning .    Assessment     Patient tolerated nelly Tx fairly well.  . Some muscular fatigue with progressive ex's but was able to perform without increased pain.  Still some discomfort/stiffness still lingering (L>R) although it is improved along with functional use per subjective report. Patient notes some relief with manual techniques. Discomfort level = slight/tolerable post Tx.  This is a 56 y.o. female referred to outpatient physical therapy and presents with a medical diagnosis of   Encounter Diagnoses   Name Primary?    Chronic left shoulder pain     Chronic pain of both shoulders Yes    and demonstrates limitations as described in the problem list. Pt will continue to benefit from skilled outpatient physical therapy to address the deficits listed in the problem list, provide pt/family education and to maximize pt's level of independence in the home and community environment.           Functional Goals  Time frame     1.   The pt will demosntrate > 125 AROM elevation for improved functional strength and improving grooming.   6 weeks     2.   The pt will demonstrate > 4/5 UE strength for improved functional strength with cooking.   6-8 weeks     3.          4.   The pt will be independent in performance and progression of HEP.     2-3 visits                    Plan     Continue with established Plan of Care towards PT goals.    Therapist: Sachin Love, RADHA  8/8/2017

## 2017-08-11 ENCOUNTER — CLINICAL SUPPORT (OUTPATIENT)
Dept: REHABILITATION | Facility: HOSPITAL | Age: 57
End: 2017-08-11
Attending: INTERNAL MEDICINE
Payer: MEDICARE

## 2017-08-11 DIAGNOSIS — G89.29 CHRONIC LEFT SHOULDER PAIN: ICD-10-CM

## 2017-08-11 DIAGNOSIS — M25.512 CHRONIC LEFT SHOULDER PAIN: ICD-10-CM

## 2017-08-11 PROCEDURE — G8978 MOBILITY CURRENT STATUS: HCPCS | Mod: CK,PO

## 2017-08-11 PROCEDURE — 97110 THERAPEUTIC EXERCISES: CPT | Mod: PO

## 2017-08-11 PROCEDURE — G8979 MOBILITY GOAL STATUS: HCPCS | Mod: CK,PO

## 2017-08-11 PROCEDURE — 97112 NEUROMUSCULAR REEDUCATION: CPT | Mod: PO

## 2017-08-11 NOTE — PROGRESS NOTES
Therapist: Sachin Shepherd, PT  8/11/2017     Name: Nadeen Mcgovern  Clinic Number: [id number]  08/11/2017.     Diagnosis: L shoulder   Physician: Kerrie Rosas MD  Treatment Orders: PT Eval and Treat    Past Medical History:   Diagnosis Date    Acute cholecystitis     Cholecystitis    Arthritis 2015    septic arthritis of right shoulder    Bacteremia due to Streptococcus pneumoniae     Cancer     Cholangiocarcinoma     Jaundice     obstructive    Prediabetes      Current Outpatient Prescriptions   Medication Sig    amlodipine (NORVASC) 5 MG tablet Take 1 tablet (5 mg total) by mouth once daily.    aspirin 81 MG Chew Take 81 mg by mouth once daily.    betamethasone dipropionate (DIPROLENE) 0.05 % cream Apply topically 2 (two) times daily.    clotrimazole (LOTRIMIN) 1 % cream Apply topically 2 (two) times daily.    diclofenac sodium (VOLTAREN) 1 % Gel Apply 2 g topically 4 (four) times daily. PRN    famotidine (PEPCID) 20 MG tablet Take 1 tablet (20 mg total) by mouth every evening.    hydrOXYzine pamoate (VISTARIL) 50 MG Cap Take 1 capsule (50 mg total) by mouth every 8 (eight) hours as needed.    multivitamin (THERAGRAN) tablet Take 1 tablet by mouth once daily.    PROGRAF 1 mg Cap Take 1 capsule (1 mg total) by mouth every 12 (twelve) hours.    sirolimus (RAPAMUNE) 1 MG Tab Take 2 tablets (2 mg total) by mouth once daily.    zolpidem (AMBIEN) 5 MG Tab TAKE ONE TABLET BY MOUTH NIGHTLY AS NEEDED     No current facility-administered medications for this visit.      Review of patient's allergies indicates:  No Known Allergies  Precautions: cardiac      Subjective:    Previous PT: OT for L hand; also PT for her shoulders;   Work history: retired; disable; was a cook; been disabled since 2014; liver cancer; has had a liver transplant; told she can't lift > 10#   Recreational activities/exercise program: walks, rides her bike; does OT shoulder exercises.      Nadeen doing well overall. States it is  easier to reach behind her head for grooming. Feels she also has more elevation. Still has difficulty lifting 10# pots for cooking, has to ask for help. Had to cancel 2 appointments to go be with her brother who was dx with liver cancer. Will be watching her grandchildren while her kids are on vacation.       Pain is rated on a 0-10 scale with 0 being no pain and 10 being pain requiring emergency room visit.    Diagnostic Tests: n/a    Patient Goals for PT: improve carrying strength, improved UE ROM      Objective:    FOTO Shoulder Survey 46% limitations  Current -JT Goal -BY (40%)   Category: Mobility     G-Code Modifiers  CH  0% Impaired, limited, or restricted    CI  19% - 1%  Impaired, limited, or restricted    CJ  20% - 39% Impaired, limited, or restricted    CK  40% - 59% Impaired, limited, or restricted    CL  60% - 79% Impaired, limited, or restricted    CM  80% - 99% Impaired, limited, or restricted    CN  100% Impaired, limited, or restricted        Visit # 7  Time In: 0700  Time Out: 0800  Treatment time: 45  Date of eval/re-eval: 7/14/2017     findings R/L       UE AROM   Elevation 115e / 100e  PROM WFL all planes     Strength R/L wfl throughout UE except   Flexion 4- / 4-   Scaption 4- / 4-   Abduction C5 4- / 4- unable to get full shoulder flexion      Special tests   ER lag -  /-   Hornblower's teres + / + both difficulty, L > R     10# DB carry min-mod difficulty     [2] Neurmuscular re-ed x 30'   Prone ER at 90/90 3 x 12 R/L   Repeated extension with lunge 10x LUE    [1] Therapeutic exercise x 15'   DB deadlift from foot stools 5x 5#, 10#, 15#  Floor press 3 x 5 4# db        Next visits   3 sets of 5 floor press @ 6#; increase weight 2-4# each workout  deadlift  1 set of 5 15# dbs' increase weight each workout          Written HEP Provided: yes  Patient education: HEP  Nadeen bryson good understanding of the education provided. Patient demo good return demo of skill of exercises.    Problem  List: muscle length impairments, decreased motor control and mobility, impaired ADLs, activity and participation restrictions.     Personal factors: n/a    CPT Code reference        Hx  Exam  Presentation   Code     Low     0   1-2    Stable    48134     Mod     1-2   3    Evolving    53499     High     3+   4+     Unstable    71533       Assessment:    Physical therapy diagnosis: insufficient scap UR  Rehab potential: good    Nadeen tolerated tx without increase in sxs. ROM is improving. Strength and coordination improving but limitations still present. Will move to linear progression strengthening program in order to help with lifting pots for cooking.    Nadeen is in agreement with the goals that will be addressed in the treatment plan.Nadeen demonstrates no additional cultural, spiritual or educational needs and currently has no barriers to learning.      Medical necessity: see problem list -  indicates low / mod / high complexity based on clinical presentation that is stable / evolving / unstable.       Functional Goals  Time frame     1.   The pt will demosntrate > 125 AROM elevation for improved functional strength and improving grooming. onging  6 weeks     2.   The pt will demonstrate > 4/5 UE strength for improved functional strength with cooking. ongoing  6-8 weeks     3.   The pt will be able to carry 15# db at waist height for 100# for improved functional strength with cooking.   6-8 weeks     4.   The pt will be independent in performance and progression of HEP.     2-3 visits            Plan:      Proceed/Continue with POC.     Pt will be treated by physical therapy 2x/week during the certification period. Treatment will consist of therapeutic exercise, manual therapy, neuromuscular re-education, heat and cold modalities, electrical stimulation for pain relief and/or muscle activation, and any other types of treatment hat may be deemed medically necessary. Nadeen may at times be seen by a PTA as part of the  Rehab Team.       Physical Therapist: DASH Shepherd, DPT, OCS, CSCS    I certify the need for these services furnished under this plan of treatment and while under my care.       ___________________________________  Physician/Referring Practitioner        _________________  Date of Signature

## 2017-08-15 ENCOUNTER — OFFICE VISIT (OUTPATIENT)
Dept: ORTHOPEDICS | Facility: CLINIC | Age: 57
End: 2017-08-15
Payer: MEDICARE

## 2017-08-15 VITALS
DIASTOLIC BLOOD PRESSURE: 76 MMHG | BODY MASS INDEX: 24.05 KG/M2 | SYSTOLIC BLOOD PRESSURE: 120 MMHG | WEIGHT: 140.88 LBS | HEIGHT: 64 IN | HEART RATE: 74 BPM

## 2017-08-15 DIAGNOSIS — M34.9 SCLERODERMA: Primary | ICD-10-CM

## 2017-08-15 PROCEDURE — 99214 OFFICE O/P EST MOD 30 MIN: CPT | Mod: S$PBB,,, | Performed by: ORTHOPAEDIC SURGERY

## 2017-08-15 PROCEDURE — 99999 PR PBB SHADOW E&M-EST. PATIENT-LVL III: CPT | Mod: PBBFAC,,, | Performed by: ORTHOPAEDIC SURGERY

## 2017-08-15 PROCEDURE — 99213 OFFICE O/P EST LOW 20 MIN: CPT | Mod: PBBFAC | Performed by: ORTHOPAEDIC SURGERY

## 2017-08-15 NOTE — PROGRESS NOTES
Chief Complaint: BL hand pain, RIGHT thumb MCP pain and left thumb and index pain    History of Present Illness:  Nadeen Mcgovern is a very pleasant 56 y.o. female w/ hx of liver tx who presents with bilateral hand with no trauma.  She reports diffuse as well as focal right thumb MCP joint and triggering as well as left thumb and ring pain over A1 and triggering.  She has a hx of RIGHT septic arthritis of her shouder treated with arthroscopic lavage as well ass a right thumb I&D a that same time that reportedly did not extend into the joint .    She denies any f/c/n/v.    Review of Systems   Constitution: Negative. Negative for chills, fever and night sweats.   HENT: neg congestion.    Eyes: Negative for blurred vision.  Cardiovascular: Negative for chest pain and syncope.   Respiratory: Negative for cough and shortness of breath.    Hematologic/Lymphatic: Does not bruise/bleed easily.   Skin: Negative for dry skin, itching and rash.   Musculoskeletal: neg fall.  No weakness  Gastrointestinal: Negative for abdominal pain.  Normal bowel function.  Genitourinary: Normal bladder function   Neurological: No dizziness, loss of balance, seizures.   Psychiatric/Behavioral: Negative for depression.         Past Medical History:   Diagnosis Date    Acute cholecystitis     Cholecystitis    Arthritis 2015    septic arthritis of right shoulder    Bacteremia due to Streptococcus pneumoniae     Cancer     Cholangiocarcinoma     Jaundice     obstructive    Prediabetes        Past Surgical History:   Past Surgical History:   Procedure Laterality Date    arthoscopic Right     drained infection     SECTION      INCISION AND DRAINAGE OF WOUND      s/p I&D of R thumb    LIVER TRANSPLANT      SHOULDER ARTHROSCOPY         Social History:  negative tobacco abuse    Allergies:  Review of patient's allergies indicates:  No Known Allergies    Medications:  Current Outpatient Prescriptions   Medication Sig Dispense Refill     amlodipine (NORVASC) 5 MG tablet Take 1 tablet (5 mg total) by mouth once daily. 90 tablet 3    aspirin 81 MG Chew Take 81 mg by mouth once daily.      betamethasone dipropionate (DIPROLENE) 0.05 % cream Apply topically 2 (two) times daily. 30 g 3    clotrimazole (LOTRIMIN) 1 % cream Apply topically 2 (two) times daily. 30 g 3    diclofenac sodium (VOLTAREN) 1 % Gel Apply 2 g topically 4 (four) times daily. PRN 1 Tube 3    famotidine (PEPCID) 20 MG tablet Take 1 tablet (20 mg total) by mouth every evening. 30 tablet 11    hydrOXYzine pamoate (VISTARIL) 50 MG Cap Take 1 capsule (50 mg total) by mouth every 8 (eight) hours as needed. 30 capsule 0    multivitamin (THERAGRAN) tablet Take 1 tablet by mouth once daily.      PROGRAF 1 mg Cap Take 1 capsule (1 mg total) by mouth every 12 (twelve) hours. 60 capsule 5    sirolimus (RAPAMUNE) 1 MG Tab Take 2 tablets (2 mg total) by mouth once daily. 60 tablet 5    zolpidem (AMBIEN) 5 MG Tab TAKE ONE TABLET BY MOUTH NIGHTLY AS NEEDED 30 tablet 1     No current facility-administered medications for this visit.        Physical Exam:   General:  Well developed and well nourished age appropriate female in no acute distress  Cardiovascular: regular rate and rhythm, 2+ radial pulse  Respiratory: non-labored breathing, no wheezing  Musculoskeletal: RIGHT hand diffuse MCP pain and swelling as well as increased ttp over thumg A1, palpable nodule  LEFT hand + ttp over A1 at left thumb and ring finger  BL NVI Median AIN Radial ,PIN and ulnar  FROM of wrist no pain  - [facility] grind test  Neuro: as above    Imaging:  Imaging revealed: + MCP arthritis, no dislocation, BL hands    Diagnosis:  56 year old woman with RIGHT MCP arthritis with additional component of trigger finger.  She also has left ring and thumb trigger finger    Plan:   1. We discussed both surgical and non surgical options.  Patient would like to proceed with non-surgical intervention. She does not want injections  today and will fu with rheum for her scleroderma. We will start OT and hyperbarics.

## 2017-08-15 NOTE — PROGRESS NOTES
I have personally taken the history and examined this patient. I agree with the resident's note as stated above. Plan for hyperbarics consult for finger ulceration and OT for hand stiffness. Pt would also like to see her rheumatologist earlier than OCtober

## 2017-08-18 ENCOUNTER — CLINICAL SUPPORT (OUTPATIENT)
Dept: REHABILITATION | Facility: HOSPITAL | Age: 57
End: 2017-08-18
Attending: INTERNAL MEDICINE
Payer: MEDICARE

## 2017-08-18 DIAGNOSIS — G89.29 CHRONIC LEFT SHOULDER PAIN: ICD-10-CM

## 2017-08-18 DIAGNOSIS — M25.512 CHRONIC LEFT SHOULDER PAIN: ICD-10-CM

## 2017-08-18 PROCEDURE — 97110 THERAPEUTIC EXERCISES: CPT | Mod: PO

## 2017-08-18 PROCEDURE — 97112 NEUROMUSCULAR REEDUCATION: CPT | Mod: PO

## 2017-08-18 NOTE — PROGRESS NOTES
Therapist: Sachin Shepherd, PT  8/18/2017     Name: Nadeen Mcgovern  Clinic Number: [id number]  08/18/2017.     Diagnosis: L shoulder   Physician: Kerrie Rosas MD  Treatment Orders: PT Eval and Treat    Past Medical History:   Diagnosis Date    Acute cholecystitis     Cholecystitis    Arthritis 2015    septic arthritis of right shoulder    Bacteremia due to Streptococcus pneumoniae     Cancer     Cholangiocarcinoma     Jaundice     obstructive    Prediabetes      Current Outpatient Prescriptions   Medication Sig    amlodipine (NORVASC) 5 MG tablet Take 1 tablet (5 mg total) by mouth once daily.    aspirin 81 MG Chew Take 81 mg by mouth once daily.    betamethasone dipropionate (DIPROLENE) 0.05 % cream Apply topically 2 (two) times daily.    clotrimazole (LOTRIMIN) 1 % cream Apply topically 2 (two) times daily.    diclofenac sodium (VOLTAREN) 1 % Gel Apply 2 g topically 4 (four) times daily. PRN    famotidine (PEPCID) 20 MG tablet Take 1 tablet (20 mg total) by mouth every evening.    hydrOXYzine pamoate (VISTARIL) 50 MG Cap Take 1 capsule (50 mg total) by mouth every 8 (eight) hours as needed.    multivitamin (THERAGRAN) tablet Take 1 tablet by mouth once daily.    PROGRAF 1 mg Cap Take 1 capsule (1 mg total) by mouth every 12 (twelve) hours.    sirolimus (RAPAMUNE) 1 MG Tab Take 2 tablets (2 mg total) by mouth once daily.    zolpidem (AMBIEN) 5 MG Tab TAKE ONE TABLET BY MOUTH NIGHTLY AS NEEDED     No current facility-administered medications for this visit.      Review of patient's allergies indicates:  No Known Allergies  Precautions: cardiac      Subjective:    Previous PT: OT for L hand; also PT for her shoulders;   Work history: retired; disable; was a cook; been disabled since 2014; liver cancer; has had a liver transplant; told she can't lift > 10#   Recreational activities/exercise program: walks, rides her bike; does OT shoulder exercises.      Nadeen reports had flare up of scleroderma  last week. Was offered injections into the hands but she declined. States that it happens often, doesn't know of the triggers.     Pain is rated on a 0-10 scale with 0 being no pain and 10 being pain requiring emergency room visit.    Diagnostic Tests: n/a    Patient Goals for PT: improve carrying strength, improved UE ROM      Objective:    FOTO Shoulder Survey 46% limitations  Current -CV Goal -FS (40%)   Category: Mobility     G-Code Modifiers  CH  0% Impaired, limited, or restricted    CI  19% - 1%  Impaired, limited, or restricted    CJ  20% - 39% Impaired, limited, or restricted    CK  40% - 59% Impaired, limited, or restricted    CL  60% - 79% Impaired, limited, or restricted    CM  80% - 99% Impaired, limited, or restricted    CN  100% Impaired, limited, or restricted        Visit # 8  Time In: 0730  Time Out: 0800  Treatment time: 45  Date of eval/re-eval: 7/14/2017     findings R/L       UE AROM   Elevation 115e / 100e  PROM WFL all planes     Strength R/L wfl throughout UE except   Flexion 4- / 4-   Scaption 4- / 4-   Abduction C5 4- / 4- unable to get full shoulder flexion      Special tests   ER lag -  /-   Hornblower's teres + / + both difficulty, L > R     10# DB carry min-mod difficulty     [2] Neurmuscular re-ed x 30'   Supine ER/IR 10/10 R/L   Prone ER at 90/90 2 x 15 R/L   Wall slides 15x   Repeated extension with lunge 10x RUE    [2] Therapeutic exercise x 15'   See flow sheet      Exercise    8/18/2017        Incline press    3 x 5 @ 25# (+ 5# next VISIT)   3 x 5 @ 35#      Trap bar DL    1 x 5 @ 100# (+15# next WEEK)   Next week     Box squats   NP   3 x 5 @ ##                Program:  Trap bar DL 1 x 5 reps, 1x week, + 10-15# per week after warm up   Incline press 3 x 5 reps, 2x/week, + 2.5-5# per week   Box squats 3 x 5 reps, 2x/week, +10-15# per week, DB at side or held in front         Written HEP Provided: yes  Patient education: HEP  Nadeen bryson good understanding of the education  provided. Patient demo good return demo of skill of exercises.    Problem List: muscle length impairments, decreased motor control and mobility, impaired ADLs, activity and participation restrictions.     Personal factors: n/a    CPT Code reference        Hx  Exam  Presentation   Code     Low     0   1-2    Stable    11657     Mod     1-2   3    Evolving    85917     High     3+   4+     Unstable    60162       Assessment:    Physical therapy diagnosis: insufficient scap UR  Rehab potential: good    Nadeen tolerated tx without increase in sxs.  Linear progression for strength training. Will monitor status of scleroderma.     Nadeen is in agreement with the goals that will be addressed in the treatment plan.Nadeen demonstrates no additional cultural, spiritual or educational needs and currently has no barriers to learning.      Medical necessity: see problem list -  indicates low / mod / high complexity based on clinical presentation that is stable / evolving / unstable.       Functional Goals  Time frame     1.   The pt will demosntrate > 125 AROM elevation for improved functional strength and improving grooming. onging  6 weeks     2.   The pt will demonstrate > 4/5 UE strength for improved functional strength with cooking. ongoing  6-8 weeks     3.   The pt will be able to carry 15# db at waist height for 100# for improved functional strength with cooking.   6-8 weeks     4.   The pt will be independent in performance and progression of HEP.     2-3 visits            Plan:      Proceed/Continue with POC.     Pt will be treated by physical therapy 2x/week during the certification period. Treatment will consist of therapeutic exercise, manual therapy, neuromuscular re-education, heat and cold modalities, electrical stimulation for pain relief and/or muscle activation, and any other types of treatment hat may be deemed medically necessary. Nadeen may at times be seen by a PTA as part of the Rehab Team.       Physical  Therapist: DASH Shepherd, DPT, OCS, CSCS    I certify the need for these services furnished under this plan of treatment and while under my care.       ___________________________________  Physician/Referring Practitioner        _________________  Date of Signature

## 2017-08-25 ENCOUNTER — CLINICAL SUPPORT (OUTPATIENT)
Dept: REHABILITATION | Facility: HOSPITAL | Age: 57
End: 2017-08-25
Attending: INTERNAL MEDICINE
Payer: MEDICARE

## 2017-08-25 DIAGNOSIS — G89.29 CHRONIC LEFT SHOULDER PAIN: ICD-10-CM

## 2017-08-25 DIAGNOSIS — M25.512 CHRONIC LEFT SHOULDER PAIN: ICD-10-CM

## 2017-08-25 PROCEDURE — 97110 THERAPEUTIC EXERCISES: CPT | Mod: PO

## 2017-08-25 NOTE — PROGRESS NOTES
Therapist: Sachin Shepherd, PT  8/25/2017     Name: Nadeen Mcgovern  Clinic Number: [id number]  08/25/2017.     Diagnosis: L shoulder   Physician: Kerrie Rosas MD  Treatment Orders: PT Eval and Treat    Past Medical History:   Diagnosis Date    Acute cholecystitis     Cholecystitis    Arthritis 2015    septic arthritis of right shoulder    Bacteremia due to Streptococcus pneumoniae     Cancer     Cholangiocarcinoma     Jaundice     obstructive    Prediabetes      Current Outpatient Prescriptions   Medication Sig    amlodipine (NORVASC) 5 MG tablet Take 1 tablet (5 mg total) by mouth once daily.    aspirin 81 MG Chew Take 81 mg by mouth once daily.    betamethasone dipropionate (DIPROLENE) 0.05 % cream Apply topically 2 (two) times daily.    clotrimazole (LOTRIMIN) 1 % cream Apply topically 2 (two) times daily.    diclofenac sodium (VOLTAREN) 1 % Gel Apply 2 g topically 4 (four) times daily. PRN    famotidine (PEPCID) 20 MG tablet Take 1 tablet (20 mg total) by mouth every evening.    hydrOXYzine pamoate (VISTARIL) 50 MG Cap Take 1 capsule (50 mg total) by mouth every 8 (eight) hours as needed.    multivitamin (THERAGRAN) tablet Take 1 tablet by mouth once daily.    PROGRAF 1 mg Cap Take 1 capsule (1 mg total) by mouth every 12 (twelve) hours.    sirolimus (RAPAMUNE) 1 MG Tab Take 2 tablets (2 mg total) by mouth once daily.    zolpidem (AMBIEN) 5 MG Tab TAKE ONE TABLET BY MOUTH NIGHTLY AS NEEDED     No current facility-administered medications for this visit.      Review of patient's allergies indicates:  No Known Allergies  Precautions: cardiac      Subjective:    Previous PT: OT for L hand; also PT for her shoulders;   Work history: retired; disable; was a cook; been disabled since 2014; liver cancer; has had a liver transplant; told she can't lift > 10#   Recreational activities/exercise program: walks, rides her bike; does OT shoulder exercises.      Nadeen reports hands still bother her.  Reports lifting weights last tx didn't increase sxs in hand.     Pain is rated on a 0-10 scale with 0 being no pain and 10 being pain requiring emergency room visit.    Diagnostic Tests: n/a    Patient Goals for PT: improve carrying strength, improved UE ROM      Objective:    FOTO Shoulder Survey 46% limitations  Current -OU Goal -MI (40%)   Category: Mobility     G-Code Modifiers  CH  0% Impaired, limited, or restricted    CI  19% - 1%  Impaired, limited, or restricted    CJ  20% - 39% Impaired, limited, or restricted    CK  40% - 59% Impaired, limited, or restricted    CL  60% - 79% Impaired, limited, or restricted    CM  80% - 99% Impaired, limited, or restricted    CN  100% Impaired, limited, or restricted        Visit # 9  Time In: 0730  Time Out: 0800  Treatment time: 45  Date of eval/re-eval: 7/14/2017     findings R/L       UE AROM R/L   Elevation 155e / 145e        Strength R/L wfl throughout UE except   Flexion 4- / 4-   Scaption 4- / 4-   Abduction C5 4- / 4- unable to get full shoulder flexion      Special tests   ER lag -  /-   Hornblower's teres + / + both difficulty, L > R     10# DB carry min-mod difficulty     [2] Neurmuscular re-ed x 30'   Supine ER/IR 10/10 R/L   Prone ER at 90/90 2 x 15 R/L   Wall slides 15x   Repeated extension with lunge 10x RUE    [2] Therapeutic exercise x 15'     See flow sheet      Exercise    8/25/2017 8/18/2017     Incline press    3 x 5 @ 30#   3 x 5 @ 25# (+ 5# next VISIT)                Trap bar squats   2 x 5 50#, 70#   NP     Trap bar DL    1 x 5 @ 100# (+15# next WEEK)           Program:  Trap bar DL 1 x 5 reps, 1x week, + 10-15# per week after warm up (at least 5 days between)  Incline press 3 x 5 reps, 2x/week, + 2.5-5# per week   Box squats 3 x 5 reps, 2x/week, +10-15# per week, DB at side or held in front         Written HEP Provided: yes  Patient education: HEP  Nadeen bryson good understanding of the education provided. Patient demo good return demo  of skill of exercises.    Problem List: muscle length impairments, decreased motor control and mobility, impaired ADLs, activity and participation restrictions.     Personal factors: n/a    CPT Code reference        Hx  Exam  Presentation   Code     Low     0   1-2    Stable    73500     Mod     1-2   3    Evolving    18427     High     3+   4+     Unstable    65422       Assessment:    Physical therapy diagnosis: insufficient scap UR  Rehab potential: good    Nadeen tolerated tx without increase in sxs. Large improvements in AROM elevation.   Linear progression for strength training. Will monitor status of scleroderma.     Nadeen is in agreement with the goals that will be addressed in the treatment plan.Nadeen demonstrates no additional cultural, spiritual or educational needs and currently has no barriers to learning.      Medical necessity: see problem list -  indicates low / mod / high complexity based on clinical presentation that is stable / evolving / unstable.       Functional Goals  Time frame     1.   The pt will demosntrate > 125 AROM elevation for improved functional strength and improving grooming. onging  6 weeks     2.   The pt will demonstrate > 4/5 UE strength for improved functional strength with cooking. ongoing  6-8 weeks     3.   The pt will be able to carry 15# db at waist height for 100# for improved functional strength with cooking.   6-8 weeks     4.   The pt will be independent in performance and progression of HEP.     2-3 visits            Plan:      Proceed/Continue with POC.     Pt will be treated by physical therapy 2x/week during the certification period. Treatment will consist of therapeutic exercise, manual therapy, neuromuscular re-education, heat and cold modalities, electrical stimulation for pain relief and/or muscle activation, and any other types of treatment hat may be deemed medically necessary. Nadeen may at times be seen by a PTA as part of the Rehab Team.       Physical  Therapist: DASH Shepherd, DPT, OCS, CSCS    I certify the need for these services furnished under this plan of treatment and while under my care.       ___________________________________  Physician/Referring Practitioner        _________________  Date of Signature

## 2017-08-28 ENCOUNTER — TELEPHONE (OUTPATIENT)
Dept: PODIATRY | Facility: CLINIC | Age: 57
End: 2017-08-28

## 2017-08-28 NOTE — TELEPHONE ENCOUNTER
----- Message from Pau Sevilla LPN sent at 8/23/2017  5:51 PM CDT -----  Dr Smith said yes  ----- Message -----  From: Candace Dickerson RN  Sent: 8/21/2017   2:42 PM  To: Jose Miguel Arroyo MD, [facility] Staff    Dr Arroyo, [facility] is no longer covered for treatment of Reynaud's wounds.  Do you still want her evaluated???  Let me know     Heaven WARDN, RN, CWS  Wound Care Nurse Navigator  [phone]

## 2017-09-01 ENCOUNTER — CLINICAL SUPPORT (OUTPATIENT)
Dept: REHABILITATION | Facility: HOSPITAL | Age: 57
End: 2017-09-01
Attending: INTERNAL MEDICINE
Payer: MEDICARE

## 2017-09-01 DIAGNOSIS — G89.29 CHRONIC LEFT SHOULDER PAIN: ICD-10-CM

## 2017-09-01 DIAGNOSIS — M25.512 CHRONIC LEFT SHOULDER PAIN: ICD-10-CM

## 2017-09-01 PROCEDURE — 97112 NEUROMUSCULAR REEDUCATION: CPT | Mod: PO

## 2017-09-01 PROCEDURE — 97110 THERAPEUTIC EXERCISES: CPT | Mod: PO

## 2017-09-01 NOTE — PROGRESS NOTES
Therapist: Sachin Shepherd, PT  9/1/2017     Name: Nadeen Mcgovren  Clinic Number: [id number]  09/01/2017.     Diagnosis: L shoulder   Physician: Kerrie Eaton MD  Treatment Orders: PT Eval and Treat    Past Medical History:   Diagnosis Date    Acute cholecystitis     Cholecystitis    Arthritis 2015    septic arthritis of right shoulder    Bacteremia due to Streptococcus pneumoniae     Cancer     Cholangiocarcinoma     Jaundice     obstructive    Prediabetes      Current Outpatient Prescriptions   Medication Sig    amlodipine (NORVASC) 5 MG tablet Take 1 tablet (5 mg total) by mouth once daily.    aspirin 81 MG Chew Take 81 mg by mouth once daily.    betamethasone dipropionate (DIPROLENE) 0.05 % cream Apply topically 2 (two) times daily.    clotrimazole (LOTRIMIN) 1 % cream Apply topically 2 (two) times daily.    diclofenac sodium (VOLTAREN) 1 % Gel Apply 2 g topically 4 (four) times daily. PRN    famotidine (PEPCID) 20 MG tablet Take 1 tablet (20 mg total) by mouth every evening.    hydrOXYzine pamoate (VISTARIL) 50 MG Cap Take 1 capsule (50 mg total) by mouth every 8 (eight) hours as needed.    multivitamin (THERAGRAN) tablet Take 1 tablet by mouth once daily.    PROGRAF 1 mg Cap Take 1 capsule (1 mg total) by mouth every 12 (twelve) hours.    sirolimus (RAPAMUNE) 1 MG Tab Take 2 tablets (2 mg total) by mouth once daily.    zolpidem (AMBIEN) 5 MG Tab TAKE ONE TABLET BY MOUTH NIGHTLY AS NEEDED     No current facility-administered medications for this visit.      Review of patient's allergies indicates:  No Known Allergies  Precautions: cardiac      Subjective:    Previous PT: OT for L hand; also PT for her shoulders;   Work history: retired; disable; was a cook; been disabled since 2014; liver cancer; has had a liver transplant; told she can't lift > 10#   Recreational activities/exercise program: walks, rides her bike; does OT shoulder exercises.      Nadeen reports better able to  heavy pots.  "    Pain is rated on a 0-10 scale with 0 being no pain and 10 being pain requiring emergency room visit.    Diagnostic Tests: n/a    Patient Goals for PT: improve carrying strength, improved UE ROM      Objective:    FOTO Shoulder Survey 46% limitations  Current -EQ Goal -SO (40%)   Category: Mobility     G-Code Modifiers  CH  0% Impaired, limited, or restricted    CI  19% - 1%  Impaired, limited, or restricted    CJ  20% - 39% Impaired, limited, or restricted    CK  40% - 59% Impaired, limited, or restricted    CL  60% - 79% Impaired, limited, or restricted    CM  80% - 99% Impaired, limited, or restricted    CN  100% Impaired, limited, or restricted        Visit # 10  Time In: 0730  Time Out: 0815  Treatment time: 45  Date of eval/re-eval: 7/14/2017     findings R/L       UE AROM R/L   Elevation 155e / 145e        Strength R/L wfl throughout UE except   Flexion 4- / 4-   Scaption 4- / 4-   Abduction C5 4- / 4- unable to get full shoulder flexion      Special tests   ER lag -  /-   Hornblower's teres + / + both difficulty, L > R     10# DB carry min-mod difficulty     [1] Neurmuscular re-ed x 15'  TB 1-arm row with rotation 2 x 8/8 OTB  TB T's 10x YTB  Long axis distraction with isometric hold 5 x 10"/ 10" at various angles   Supine dead bug Pallof 2 x 10 BTB    [2] Therapeutic exercise x 30'  Supine ER/IR 10/10 R/L NP  Prone ER at 90/90 2 x 15 R/L     See flow sheet for strength training       Exercise    9/1/2017 8/25/2017 8/18/2017     Incline press    3 x 5 29# next visit 30+   3 x 5 @ 30#   3 x 5 @ 25# (+ 5# next VISIT)                 Trap bar squats   1 x 5 120#   2 x 5 50#, 70#   NP     Trap bar DL     1 x 5 @ 100# (+15# next WEEK)            Program:  Trap bar DL 1 x 5 reps, 1x week, + 10-15# per week after warm up (at least 5 days between)  Incline press 3 x 5 reps, 2x/week, + 2.5-5# per week   Box squats 3 x 5 reps, 2x/week, +10-15# per week, DB at side or held in front         Written HEP " Provided: yes  Patient education: HEP  Nadeen demo good understanding of the education provided. Patient demo good return demo of skill of exercises.    Problem List: muscle length impairments, decreased motor control and mobility, impaired ADLs, activity and participation restrictions.     Personal factors: n/a    CPT Code reference        Hx  Exam  Presentation   Code     Low     0   1-2    Stable    83790     Mod     1-2   3    Evolving    45428     High     3+   4+     Unstable    56177       Assessment:    Physical therapy diagnosis: insufficient scap UR  Rehab potential: good    Nadeen tolerated tx without increase in sxs. Large improvements in AROM elevation.   Linear progression for strength training. Will monitor status of scleroderma.     Nadeen is in agreement with the goals that will be addressed in the treatment plan.Nadeen demonstrates no additional cultural, spiritual or educational needs and currently has no barriers to learning.      Medical necessity: see problem list -  indicates low / mod / high complexity based on clinical presentation that is stable / evolving / unstable.       Functional Goals  Time frame     1.   The pt will demosntrate > 125 AROM elevation for improved functional strength and improving grooming. onging  6 weeks     2.   The pt will demonstrate > 4/5 UE strength for improved functional strength with cooking. ongoing  6-8 weeks     3.   The pt will be able to carry 15# db at waist height for 100# for improved functional strength with cooking.   6-8 weeks     4.   The pt will be independent in performance and progression of HEP.     2-3 visits            Plan:      Proceed/Continue with POC.     Pt will be treated by physical therapy 2x/week during the certification period. Treatment will consist of therapeutic exercise, manual therapy, neuromuscular re-education, heat and cold modalities, electrical stimulation for pain relief and/or muscle activation, and any other types of treatment  hat may be deemed medically necessary. Nadeen may at times be seen by a PTA as part of the Rehab Team.       Physical Therapist: DASH Shepherd, DPT, OCS, CSCS    I certify the need for these services furnished under this plan of treatment and while under my care.       ___________________________________  Physician/Referring Practitioner        _________________  Date of Signature

## 2017-09-11 ENCOUNTER — LAB VISIT (OUTPATIENT)
Dept: LAB | Facility: HOSPITAL | Age: 57
End: 2017-09-11
Attending: INTERNAL MEDICINE
Payer: MEDICARE

## 2017-09-11 DIAGNOSIS — C22.0 CARCINOMA OF LIVER: ICD-10-CM

## 2017-09-11 DIAGNOSIS — C22.1 CHOLANGIOCELLULAR CARCINOMA: ICD-10-CM

## 2017-09-11 DIAGNOSIS — Z94.4 LIVER REPLACED BY TRANSPLANT: ICD-10-CM

## 2017-09-11 LAB
AFP SERPL-MCNC: 1.5 NG/ML
ALBUMIN SERPL BCP-MCNC: 3.7 G/DL
ALP SERPL-CCNC: 82 U/L
ALT SERPL W/O P-5'-P-CCNC: 25 U/L
ANION GAP SERPL CALC-SCNC: 7 MMOL/L
AST SERPL-CCNC: 22 U/L
BASOPHILS # BLD AUTO: 0.02 K/UL
BASOPHILS NFR BLD: 0.5 %
BILIRUB SERPL-MCNC: 0.9 MG/DL
BUN SERPL-MCNC: 11 MG/DL
CALCIUM SERPL-MCNC: 9.2 MG/DL
CANCER AG19-9 SERPL-ACNC: 8 U/ML
CHLORIDE SERPL-SCNC: 109 MMOL/L
CO2 SERPL-SCNC: 26 MMOL/L
CREAT SERPL-MCNC: 0.8 MG/DL
DIFFERENTIAL METHOD: NORMAL
EOSINOPHIL # BLD AUTO: 0.1 K/UL
EOSINOPHIL NFR BLD: 1.9 %
ERYTHROCYTE [DISTWIDTH] IN BLOOD BY AUTOMATED COUNT: 13.8 %
EST. GFR  (AFRICAN AMERICAN): >60 ML/MIN/1.73 M^2
EST. GFR  (NON AFRICAN AMERICAN): >60 ML/MIN/1.73 M^2
GLUCOSE SERPL-MCNC: 91 MG/DL
HCT VFR BLD AUTO: 38.1 %
HGB BLD-MCNC: 12.9 G/DL
LYMPHOCYTES # BLD AUTO: 1.2 K/UL
LYMPHOCYTES NFR BLD: 27.2 %
MCH RBC QN AUTO: 29.5 PG
MCHC RBC AUTO-ENTMCNC: 33.9 G/DL
MCV RBC AUTO: 87 FL
MONOCYTES # BLD AUTO: 0.3 K/UL
MONOCYTES NFR BLD: 7.5 %
NEUTROPHILS # BLD AUTO: 2.7 K/UL
NEUTROPHILS NFR BLD: 62.9 %
PLATELET # BLD AUTO: 189 K/UL
PMV BLD AUTO: 9.8 FL
POTASSIUM SERPL-SCNC: 3.6 MMOL/L
PROT SERPL-MCNC: 7.4 G/DL
RBC # BLD AUTO: 4.37 M/UL
SIROLIMUS BLD-MCNC: 6.6 NG/ML
SODIUM SERPL-SCNC: 142 MMOL/L
TACROLIMUS BLD-MCNC: 1.5 NG/ML
WBC # BLD AUTO: 4.26 K/UL

## 2017-09-11 PROCEDURE — 36415 COLL VENOUS BLD VENIPUNCTURE: CPT

## 2017-09-11 PROCEDURE — 80195 ASSAY OF SIROLIMUS: CPT

## 2017-09-11 PROCEDURE — 86301 IMMUNOASSAY TUMOR CA 19-9: CPT

## 2017-09-11 PROCEDURE — 80197 ASSAY OF TACROLIMUS: CPT

## 2017-09-11 PROCEDURE — 85025 COMPLETE CBC W/AUTO DIFF WBC: CPT

## 2017-09-11 PROCEDURE — 80053 COMPREHEN METABOLIC PANEL: CPT

## 2017-09-11 PROCEDURE — 82105 ALPHA-FETOPROTEIN SERUM: CPT

## 2017-09-14 ENCOUNTER — TELEPHONE (OUTPATIENT)
Dept: TRANSPLANT | Facility: CLINIC | Age: 57
End: 2017-09-14

## 2017-09-14 DIAGNOSIS — Z94.4 LIVER REPLACED BY TRANSPLANT: Primary | ICD-10-CM

## 2017-09-14 NOTE — TELEPHONE ENCOUNTER
Dr. Benítez reviewed your labs.  Continue routine labs no changes needed.  Letter sent for next lab appointment 11/06/17

## 2017-09-14 NOTE — TELEPHONE ENCOUNTER
----- Message from Мария Benítez MD sent at 9/14/2017  3:35 PM CDT -----  Reviewed, nothing to do

## 2017-09-15 ENCOUNTER — IMMUNIZATION (OUTPATIENT)
Dept: INTERNAL MEDICINE | Facility: CLINIC | Age: 57
End: 2017-09-15
Payer: MEDICARE

## 2017-09-15 ENCOUNTER — OFFICE VISIT (OUTPATIENT)
Dept: INTERNAL MEDICINE | Facility: CLINIC | Age: 57
End: 2017-09-15
Payer: MEDICARE

## 2017-09-15 ENCOUNTER — HOSPITAL ENCOUNTER (OUTPATIENT)
Dept: RADIOLOGY | Facility: HOSPITAL | Age: 57
Discharge: HOME | End: 2017-09-15
Attending: INTERNAL MEDICINE
Payer: MEDICARE

## 2017-09-15 VITALS
DIASTOLIC BLOOD PRESSURE: 61 MMHG | OXYGEN SATURATION: 99 % | HEIGHT: 64 IN | BODY MASS INDEX: 23.63 KG/M2 | HEART RATE: 73 BPM | WEIGHT: 138.38 LBS | TEMPERATURE: 99 F | SYSTOLIC BLOOD PRESSURE: 105 MMHG

## 2017-09-15 DIAGNOSIS — L98.491: ICD-10-CM

## 2017-09-15 DIAGNOSIS — Z94.4 S/P LIVER TRANSPLANT: ICD-10-CM

## 2017-09-15 DIAGNOSIS — M34.9 LIMITED SCLERODERMA: ICD-10-CM

## 2017-09-15 DIAGNOSIS — G47.00 INSOMNIA, UNSPECIFIED TYPE: Primary | ICD-10-CM

## 2017-09-15 PROCEDURE — G0008 ADMIN INFLUENZA VIRUS VAC: HCPCS | Mod: PBBFAC

## 2017-09-15 PROCEDURE — 99214 OFFICE O/P EST MOD 30 MIN: CPT | Mod: S$PBB,,, | Performed by: INTERNAL MEDICINE

## 2017-09-15 PROCEDURE — 93930 UPPER EXTREMITY STUDY: CPT

## 2017-09-15 PROCEDURE — 93931 UPPER EXTREMITY STUDY: CPT | Mod: 26,GC,, | Performed by: RADIOLOGY

## 2017-09-15 PROCEDURE — 93931 UPPER EXTREMITY STUDY: CPT | Mod: 26,59,GC, | Performed by: RADIOLOGY

## 2017-09-15 PROCEDURE — 99999 PR PBB SHADOW E&M-EST. PATIENT-LVL IV: CPT | Mod: PBBFAC,,, | Performed by: INTERNAL MEDICINE

## 2017-09-15 PROCEDURE — 99214 OFFICE O/P EST MOD 30 MIN: CPT | Mod: PBBFAC | Performed by: INTERNAL MEDICINE

## 2017-09-15 PROCEDURE — 93931 UPPER EXTREMITY STUDY: CPT | Mod: TC

## 2017-09-15 RX ORDER — ZOLPIDEM TARTRATE 5 MG/1
5 TABLET ORAL NIGHTLY PRN
Qty: 30 TABLET | Refills: 2 | Status: SHIPPED | OUTPATIENT
Start: 2017-09-15 | End: 2018-04-10 | Stop reason: SDUPTHER

## 2017-09-15 NOTE — PROGRESS NOTES
"Subjective:       Patient ID: Nadeen Mcgovern is a 56 y.o. female.    Chief Complaint: Follow-up    HPI   Scleroderma   Saw Dr. Rosas and Dr. Butler in July. On voltaren gel qid prn. R thumb w/ pain and swelling. Saw Dr. Arroyo/Dr. Smith-Wise 8/2017. Declined injections at this time. Started on OT and plan for hyperbarics (pt reports hasn't been contacted yet). Increased ROM of shoulders. Still w/ some eczema/wound at the fingertips. No purulent drainage. Pain when the fingertips crack. Can't bend the fingers.     S/p liver transplant 2 yrs ago - on sirolimus 2mg daily.  No weight gain but eating more. No abdominal pain. No nausea/vomting/diarrhea. Sometimes w/ constipation. Takes metamucil, which works for her.     No fevers/chills/rhinorrhea/sore throat. No painful urination. Drinks a lot of water.    Very active. Rides bike regularly.     Insomnia - takes ambien 5mg intermittently.     Dysphagia - EGD s/p dilation of esophageal stenosis.   Still present. Will wait till food goes down before continuing to eat. Sometimes feels like she has phlegm in her throat. No aspiration.   Follows w/ Dr. Haynes.     Review of Systems  Comprehensive review of systems otherwise negative. See history/subjective section for more details.    Objective:      Physical Exam    /61 (BP Location: Left arm, Patient Position: Sitting)   Pulse 73   Temp 98.7 °F (37.1 °C) (Oral)   Ht 5' 3.6" (1.615 m)   Wt 62.8 kg (138 lb 6.4 oz)   LMP  (LMP Unknown)   SpO2 99%   BMI 24.06 kg/m²     Gen - A+OX4, NAD  HEENT - PERRL, OP clear. MMM. TM normal  Neck - no LAD  CV - RRR, II/VI diastolic murmur at apex.  Chest - CTAB, no wheezing/ rhonchi  Abd - s/nt/nd/+bs. abd scar well healed.   Ext - 2+ BDP and 1+ B radial pulses. No LE edema.   Skin - multiple wound at the tips of fingers, some healing and some still open. No drainage. Taut skin.    Labs reviewed.    Assessment/Plan     Nadeen was seen today for follow-up.    Diagnoses and all " orders for this visit:    Insomnia, unspecified type  -     zolpidem (AMBIEN) 5 MG Tab; Take 1 tablet (5 mg total) by mouth nightly as needed.    Limited scleroderma - f/u w/ Dr. Butler.  Cont to f/u w/ Dr. Haynes. EGD 6/2017 s/p dilation of esophagus. Continued dysphagia. Discussed chopping foods finely or thickened diet. Wait till foods go down before next bite. Follow each bite w/ a small sip of water.     S/P liver transplant - ana laura sirolimus and f/u w/ transplant. No signs of acute infections.     Finger ulcer, limited to breakdown of skin - will send message to Dr. Smith-Derrick to see how to set up bariatrics. Cont OT as it's helping w/ her hand motility.  -     US Upper Extremity Arteries Bilateral; Future    Flu vaccine today.  Return in about 6 months (around 3/15/2018).      Nadeen Figueroa MD  Department of Internal Medicine - [facility]  10:32 AM

## 2017-09-18 ENCOUNTER — OFFICE VISIT (OUTPATIENT)
Dept: RHEUMATOLOGY | Facility: CLINIC | Age: 57
End: 2017-09-18
Payer: MEDICARE

## 2017-09-18 VITALS
SYSTOLIC BLOOD PRESSURE: 118 MMHG | HEIGHT: 62 IN | HEART RATE: 74 BPM | BODY MASS INDEX: 25.34 KG/M2 | WEIGHT: 137.69 LBS | DIASTOLIC BLOOD PRESSURE: 71 MMHG

## 2017-09-18 DIAGNOSIS — I73.00 RAYNAUD'S PHENOMENON WITHOUT GANGRENE: ICD-10-CM

## 2017-09-18 DIAGNOSIS — I35.1 AORTIC VALVE REGURGITATION, UNSPECIFIED ETIOLOGY: ICD-10-CM

## 2017-09-18 DIAGNOSIS — J98.4 RESTRICTIVE LUNG DISEASE: ICD-10-CM

## 2017-09-18 DIAGNOSIS — M34.9 SCLERODERMA: Primary | ICD-10-CM

## 2017-09-18 DIAGNOSIS — Z94.4 S/P LIVER TRANSPLANT: ICD-10-CM

## 2017-09-18 DIAGNOSIS — L30.9 HAND ECZEMA: ICD-10-CM

## 2017-09-18 DIAGNOSIS — C22.1 CHOLANGIOCARCINOMA: ICD-10-CM

## 2017-09-18 PROCEDURE — 99999 PR PBB SHADOW E&M-EST. PATIENT-LVL IV: CPT | Mod: PBBFAC,,, | Performed by: INTERNAL MEDICINE

## 2017-09-18 PROCEDURE — 99214 OFFICE O/P EST MOD 30 MIN: CPT | Mod: S$PBB,,, | Performed by: INTERNAL MEDICINE

## 2017-09-18 PROCEDURE — 99214 OFFICE O/P EST MOD 30 MIN: CPT | Mod: PBBFAC | Performed by: INTERNAL MEDICINE

## 2017-09-18 RX ORDER — SILDENAFIL CITRATE 20 MG/1
20 TABLET ORAL 3 TIMES DAILY
Qty: 90 TABLET | Refills: 2 | Status: SHIPPED | OUTPATIENT
Start: 2017-09-18 | End: 2018-01-12 | Stop reason: SDUPTHER

## 2017-09-18 NOTE — PROGRESS NOTES
Subjective:       Patient ID: Nadeen Mcgovern is a 56 y.o. female.    Chief Complaint: lcSSc, EUGENE + 1:320 nucleolar, +SS-A, OA hands    HPI   Not doing as well. Has developed multiple digital finger pad scars and crusted ulcer right thumb.Did attend OT/PT, completed. Using diclofenac gel twice daily prn only. Misplaced thumb brace which was helping when out of town at wedding and (mother of the bride passed just prior to planned wedding).   Increased hand eczema as well. D  Still Still dysphagia for solids and liquids, manometry normal, except for distal GEJ obstruction as per prior endoscopy. The left shoulder is improved. Did attend OT/PT, completed.  Breathing is unchanged.        Saw Dr. Haynes in [facility]astro 17 :   IMPRESSION:  Dysphagia to both solids and liquids - most likely secondary to   scleroderma esophagus.   1. Proceed with esophageal manometry:  2. The patient will continue to chew her food well and drink sips of water in   between to help with dysphagia.      2/3/17 EGD:       One mild benign-appearing, intrinsic stenosis was found at the        gastroesophageal junction. And was traversed. A TTS dilator was        passed through the scope. Dilation with a 15-16.5-18 mm pyloric        balloon dilator was performed. The dilation site was examined and        showed mild improvement in luminal narrowing. Estimated blood loss        was minimal. Biopsies were taken with a cold forceps for histology.        Estimated blood loss was minimal.       The entire examined stomach was normal.       The examined duodenum was normal.  Impression:           - Benign-appearing mild esophageal stenosis at the                         GE junction, dilated up to 18 mm. Mid esophagus                         biopsied.    Impression:           - Esophagogastric junction (EGJ) outflow                         obstruction. There is impairment of bolus passage                         (incomplete passage).                         - Findings are not consistent with Scleroderma.  Recommendation:       - Return to referring physician.  Attending Participation:       I have reviewed and interpreted the results of the above documented        diagnostic study.  Leonardo Chris MD  7/6/2017 11:35:08 AM    Saw Dr Smith-Derrick         Review of Systems   Constitutional: Positive for fatigue. Negative for appetite change, fever and unexpected weight change.   HENT: Positive for trouble swallowing. Negative for mouth sores.         Dry mouth   Eyes: Negative for visual disturbance.   Respiratory: Negative for cough, shortness of breath and wheezing.    Cardiovascular: Negative for chest pain and palpitations.   Gastrointestinal: Negative for abdominal pain, anal bleeding, blood in stool, constipation, diarrhea, nausea and vomiting.   Genitourinary: Negative for dysuria, frequency and urgency.   Musculoskeletal: Negative for arthralgias, back pain, gait problem, joint swelling, myalgias, neck pain and neck stiffness.   Skin: Positive for color change. Negative for rash.   Neurological: Negative for weakness, numbness and headaches.   Hematological: Negative for adenopathy. Bruises/bleeds easily.   Psychiatric/Behavioral: Negative for sleep disturbance. The patient is not nervous/anxious.          Objective:   LMP  (LMP Unknown)      Physical Exam   Constitutional: She is oriented to person, place, and time and well-developed, well-nourished, and in no distress.   HENT:   Head: Normocephalic and atraumatic.   Mouth/Throat: Oropharynx is clear and moist.   Eyes: Conjunctivae and EOM are normal.   Neck: Normal range of motion. Neck supple. No thyromegaly present.   Cardiovascular: Normal rate, regular rhythm, normal heart sounds and intact distal pulses.  Exam reveals no gallop and no friction rub.    No murmur heard.  Pulmonary/Chest: Breath sounds normal. She has no wheezes. She has no rales. She exhibits no tenderness.   Abdominal: Soft. She  exhibits no distension and no mass. There is no tenderness.       Right Side Rheumatological Exam     Examination finds the shoulder, elbow, wrist, knee, 1st PIP, 2nd PIP, 2nd MCP, 3rd PIP, 3rd MCP, 4th PIP, 4th MCP, 5th PIP and 5th MCP normal.    The patient is tender to palpation of the 1st MCP    She has swelling of the 1st MCP    The patient has an enlarged 1st MCP    Left Side Rheumatological Exam     Examination finds the shoulder, elbow, wrist, knee, 1st PIP, 2nd PIP, 2nd MCP, 3rd PIP, 3rd MCP, 4th PIP, 4th MCP, 5th PIP and 5th MCP normal.    The patient is tender to palpation of the 1st MCP.    The patient has an enlarged 1st MCP.      Lymphadenopathy:     She has no cervical adenopathy.   Neurological: She is alert and oriented to person, place, and time. She displays normal reflexes. Gait normal.   Nl motor strength UE and LE bilateral   Skin:     Hand eczema  Digital finger pad pitting scars  Eliza right thumb also others, all left digits  MRSS=17(moderate)   Musculoskeletal: She exhibits no edema.           Assessment:             lcSSc(limited scleroderma): ACR/EULAR criteria: (no sclerodactyly today) digital tip pitting scars(3) telangiectases(2) Raynaud's(3) =8 does not meet  criteria ; oxaliplatin(cisplatin associated with Raynaud's) ; 5-FU not known to be associated with Raynaud's or scleroderma. MRSS=17 increased from 2 previous!. No evidence of scleroderma esophagus by esophageal manometry or upper endoscopy  EUGENE+ 1:320 nucleolar(scleroderma pattern) and speckled + SS-A (most typical of Sjogren's and SLE), Raynaud's, telangiectases, dysphagia. All scleroderma associated autoantibodies negative.   S/p liver transplant 10/8/15 for unresectable cholangiocarcinoma and biliary tract carcinoma) on sirolimus and tacrolimus, s/p chemotherapy prior  Left rotator cuff tear, adhesive capsulitis, followed by Ortho  Left thumb and ring finger flexor tenosynovitis, failed left thumb inj x 1 by Ortho 4/2016,  injected x 3 1/19/17  Right thumb mcp severe OA   Mild-moderate aortic regurgitation  CPX with severe functional impairment due to deconditioning or circulatory insufficiency.   hand eczema                Plan:       Continue amlodipine 5mg for Raynaud's hands  Cautiously add sildenafil(Revatio) 20mg daily x 3 days, twice daily x 3 days, then three times daily, as tolerated and with daily BP checks to avoid hypotension given low baseline BP at present.  If no better, consider hospitalization for 5 day course of epoprostenol   6 month PFTs due  Annual 2D echocardiogram due  Consider add mycophenolate given progression of rapid progression of skin score, would need to check with Liver Transplant team as already on tacrolimus and sirolimus  Not a candidate for additional immunosuppression based on current disease activity and already on sirolimus and tacrolimus for liver transplant.  F/u with Dr. Haynes in [place] for dysphagia caused by distal esophageal outflow obstruction with EGD with dilatation  R/u Dr. Rossi for recurrent trigger fingers despite injection x2 and right thumb mcp  Small [place] thumb braces bilateral, reorder  Diclofenac gel 1% qid prn  RTC 6 wks

## 2017-09-25 ENCOUNTER — HOSPITAL ENCOUNTER (OUTPATIENT)
Dept: PULMONOLOGY | Facility: CLINIC | Age: 57
Discharge: HOME | End: 2017-09-25
Payer: MEDICARE

## 2017-09-25 ENCOUNTER — HOSPITAL ENCOUNTER (OUTPATIENT)
Dept: CARDIOLOGY | Facility: CLINIC | Age: 57
Discharge: HOME | End: 2017-09-25
Payer: MEDICARE

## 2017-09-25 VITALS — HEIGHT: 62 IN | BODY MASS INDEX: 25.76 KG/M2 | WEIGHT: 140 LBS

## 2017-09-25 DIAGNOSIS — M34.9 SCLERODERMA: ICD-10-CM

## 2017-09-25 DIAGNOSIS — I35.1 AORTIC VALVE REGURGITATION, UNSPECIFIED ETIOLOGY: ICD-10-CM

## 2017-09-25 DIAGNOSIS — J98.4 RESTRICTIVE LUNG DISEASE: ICD-10-CM

## 2017-09-25 LAB
AORTIC VALVE REGURGITATION: NORMAL
DIASTOLIC DYSFUNCTION: NO
ESTIMATED PA SYSTOLIC PRESSURE: 25.47
MITRAL VALVE MOBILITY: NORMAL
PRE FEV1 FVC: 83
PRE FEV1: 2.9
PRE FVC: 3.48
PREDICTED FEV1 FVC: 82
PREDICTED FEV1: 2.32
PREDICTED FVC: 2.86
RETIRED EF AND QEF - SEE NOTES: 60 (ref 55–65)

## 2017-09-25 PROCEDURE — 94727 GAS DIL/WSHOT DETER LNG VOL: CPT | Mod: 26,S$PBB,, | Performed by: INTERNAL MEDICINE

## 2017-09-25 PROCEDURE — 94620 PR PULMONARY STRESS TESTING,SIMPLE: CPT | Mod: PBBFAC | Performed by: INTERNAL MEDICINE

## 2017-09-25 PROCEDURE — 94729 DIFFUSING CAPACITY: CPT | Mod: 26,S$PBB,, | Performed by: INTERNAL MEDICINE

## 2017-09-25 PROCEDURE — 93306 TTE W/DOPPLER COMPLETE: CPT | Mod: PBBFAC | Performed by: INTERNAL MEDICINE

## 2017-09-25 PROCEDURE — 94727 GAS DIL/WSHOT DETER LNG VOL: CPT | Mod: PBBFAC | Performed by: INTERNAL MEDICINE

## 2017-09-25 PROCEDURE — 94010 BREATHING CAPACITY TEST: CPT | Mod: PBBFAC | Performed by: INTERNAL MEDICINE

## 2017-09-25 PROCEDURE — 94620 PR PULMONARY STRESS TESTING,SIMPLE: CPT | Mod: 26,S$PBB,, | Performed by: INTERNAL MEDICINE

## 2017-09-25 PROCEDURE — 94729 DIFFUSING CAPACITY: CPT | Mod: PBBFAC | Performed by: INTERNAL MEDICINE

## 2017-09-25 PROCEDURE — 94010 BREATHING CAPACITY TEST: CPT | Mod: 26,S$PBB,, | Performed by: INTERNAL MEDICINE

## 2017-09-25 NOTE — PROCEDURES
Nadeen Mcgovern is a 57 y.o.  female patient, who presents for a 6 minute walk test ordered by MD. Carrie.  The diagnosis is Scleroderma/CREST.  The patient's BMI is 25.7 kg/m2.  Predicted distance (lower limit of normal) is 385.32 meters.      Test Results:    The test was completed without stopping.The total time walked was 360 seconds.  During walking, the patient reported:  Dyspnea, Other (Comment) (Knee pain.). The patient used no assistive devices  during testing.     09/25/2017---------Distance: 304.8 meters (1000 feet)     O2 Sat % Supplemental Oxygen Heart Rate Blood Pressure Adamaris Scale   Pre-exercise  (Resting) 98 % Room Air 68 bpm 133/61 mmHg 2   During Exercise 98 % Room Air 83 bpm 137/76 mmHg 4   Post-exercise  (Recovery) 98 % Room Air  69 bpm   mmHg       Recovery Time: 60 seconds    Performing nurse/tech: SHANTI Mackay      PREVIOUS STUDY:   The patient had a previous study.  04/07/2017---------Distance: 365.76 meters (1200 feet)       O2 Sat % Supplemental Oxygen Heart Rate Blood Pressure Adamaris Scale   Pre-exercise  (Resting) 100 % Room Air 62 bpm 122/59 mmHg 3   During Exercise 92 % Room Air 77 bpm 105/54 mmHg 4   Post-exercise  (Recovery) 100 % Room Air  65 bpm             CLINICAL INTERPRETATION:  Six minute walk distance is 304.8 meters (1000 feet) with somewhat heavy dyspnea.  During exercise, there was no significant desaturation while breathing room air.  Both blood pressure and heart rate remained stable with walking.  The patient did not report non-pulmonary symptoms during exercise.  Since the previous study in April 2017, exercise capacity is unchanged.  Based upon age and body mass index, exercise capacity is less than predicted.

## 2017-09-26 ENCOUNTER — TELEPHONE (OUTPATIENT)
Dept: RHEUMATOLOGY | Facility: CLINIC | Age: 57
End: 2017-09-26

## 2017-09-26 NOTE — TELEPHONE ENCOUNTER
PFTs           FVC            SVC          RV           DLco      9/25/17         122             122            30           89  4/7/17           123             124            49           91  8/18/17          113            107           110          88           CT CHEST, ABDOMEN, and, PELVIS  with IV contrast    Protocol:  Axial images of the chest, abdomen, and pelvis were acquired  after the use of 68 cc [id number] IV contrast.  Coronal and sagittal reconstructions were also obtained    HISTORY:  56 year old F with Cholangio     COMPARISON: CT chest abdomen pelvis 04/10/2017.     FINDINGS:  Thoracic soft tissues: No significant abnormality.     Aorta: Normal in course and caliber, without significant atherosclerotic plaque. There are three branching vessels at the arch.       Heart: Normal in size. No pericardial effusion.     Aminta/Mediastinum: No significant lymphadenopathy     Lungs: Well aerated, without consolidation or pleural fluid. Previously identified pleura nodule in the right upper lobe is stable from prior.    Liver: Post operative changes consistent with orthotopic liver transplant and choledochojejunostomy with surgical clips in place. There is interval decrease in size of the previously identified hypodense lesion in segment VII. There is pneumobilia, unchanged from prior.      Gallbladder: Is surgically removed.     Bile Ducts: No evidence of dilated ducts.     Pancreas: No mass or peripancreatic fat stranding.      Spleen: There is 1.4 cm splenic cyst, unchanged from prior.     Adrenals: Unremarkable.     Kidneys/ Ureters: Normal in size and location. Normal concentration and excretion of contrast. No hydronephrosis or nephrolithiasis. No ureteral dilatation. Incidental finding of a retroaortic left renal vein.    Bladder: No evidence of wall thickening.     Reproductive organs: Unremarkable.     GI Tract/Mesentery: No evidence of bowel obstruction or inflammation.     Peritoneal Space: No  ascites. No free air.     Retroperitoneum:  No significant adenopathy.      Abdominal wall:  Unremarkable.      Vasculature: No significant atherosclerosis or aneurysm.     Bones: No acute fracture. Age-appropriate degenerative changes.   Impression        Postoperative changes consistent with orthotopic liver transplantation and choledochojejunostomy.    Interval decrease in size of hepatic segment VII hypodense lesion.    Stable splenic cyst.    Stable right pleural nodule.        ______________________________________     Electronically signed by resident: DEMETRI MAX  Date: 07/28/17  Time: 08:34            As the supervising and teaching physician, I personally reviewed the images and resident's interpretation and I agree with the findings.            Electronically signed by: Dr. Tl Lynn MD  Date: 07/28/17  Time: 08:53     Encounter     View Encounter            Mild restrictive lung disease, normal CT chest except stable RUL pleural based nodule.

## 2017-10-05 ENCOUNTER — OFFICE VISIT (OUTPATIENT)
Dept: TRANSPLANT | Facility: CLINIC | Age: 57
End: 2017-10-05
Payer: MEDICARE

## 2017-10-05 VITALS
WEIGHT: 140 LBS | SYSTOLIC BLOOD PRESSURE: 121 MMHG | BODY MASS INDEX: 25.76 KG/M2 | OXYGEN SATURATION: 99 % | RESPIRATION RATE: 16 BRPM | DIASTOLIC BLOOD PRESSURE: 71 MMHG | HEART RATE: 68 BPM | TEMPERATURE: 98 F | HEIGHT: 62 IN

## 2017-10-05 DIAGNOSIS — Z94.4 LIVER TRANSPLANTED: ICD-10-CM

## 2017-10-05 DIAGNOSIS — D84.9 IMMUNOSUPPRESSION: Primary | ICD-10-CM

## 2017-10-05 DIAGNOSIS — Z85.09 HISTORY OF CHOLANGIOCARCINOMA: ICD-10-CM

## 2017-10-05 PROCEDURE — 99214 OFFICE O/P EST MOD 30 MIN: CPT | Mod: PBBFAC | Performed by: INTERNAL MEDICINE

## 2017-10-05 PROCEDURE — 99999 PR PBB SHADOW E&M-EST. PATIENT-LVL IV: CPT | Mod: PBBFAC,,, | Performed by: INTERNAL MEDICINE

## 2017-10-05 PROCEDURE — 99213 OFFICE O/P EST LOW 20 MIN: CPT | Mod: S$PBB,,, | Performed by: INTERNAL MEDICINE

## 2017-10-05 NOTE — LETTER
October 17, 2017        Wendy Hernandez  [address] [facility] [address]  Phone: [phone]  Fax: [phone]             [facility] - Liver Transplant  [address]  [place] [address]  Phone: [phone]   Patient: Nadeen Mcgovern   MR Number: [id number]   YOB: 1960   Date of Visit: 10/5/2017       Dear Dr. Wendy Hernandez    Thank you for referring Nadeen Mcgovern to me for evaluation. Attached you will find relevant portions of my assessment and plan of care.    If you have questions, please do not hesitate to call me. I look forward to following Nadeen Mcgovern along with you.    Sincerely,    Мария Benítez MD    Enclosure    If you would like to receive this communication electronically, please contact [email or web address] or [phone] to request [employer] access.    [employer] is a tool which provides read-only access to select patient information with whom you have a relationship. Its easy to use and provides real time access to review your patients record including encounter summaries, notes, results, and demographic information.    If you feel you have received this communication in error or would no longer like to receive these types of communications, please e-mail [email or web address]

## 2017-10-05 NOTE — PROGRESS NOTES
Transplant Hepatology  Liver Transplant Recipient Follow-up    Transplant Date: 10/8/2015  UN Native Liver Dx: Bile Duct Cancer (Cholangioma, Biliary Tract Carcinoma)    Nadeen is here for follow up of her liver transplant.    ORGAN: LIVER  Whole or Partial: whole liver  Donor Type:  - brain death  CDC High Risk: yes  Donor CMV Status: Positive  Donor HCV Status: Negative  Donor HBcAb: Negative  Biliary Anastomosis: tobias-en-y  Arterial Anatomy: standard  IVC reconstruction: end to end ivc  Portal vein status: patent  .    Subjective:     Interval History:  Currently, she is doing adequately. Current complaints include hand peeling. This has been a chronic issue for which is currently getting evaluation and treatment.    She denies any other issues. She reports compliance with medications.    Review of Systems    Objective:     Physical Exam   Constitutional: She is oriented to person, place, and time. She appears well-developed and well-nourished. No distress.   HENT:   Head: Normocephalic and atraumatic.   Mouth/Throat: Oropharynx is clear and moist. No oropharyngeal exudate.   Eyes: Conjunctivae are normal. Pupils are equal, round, and reactive to light. Right eye exhibits no discharge. Left eye exhibits no discharge. No scleral icterus.   Pulmonary/Chest: Effort normal and breath sounds normal. No respiratory distress. She has no wheezes.   Abdominal: Soft. She exhibits no distension. There is no tenderness.   Musculoskeletal: She exhibits no edema.   Neurological: She is alert and oriented to person, place, and time.   Psychiatric: She has a normal mood and affect. Her behavior is normal.   Vitals reviewed.      CT 2017  Postoperative changes consistent with orthotopic liver transplantation and choledochojejunostomy.    Interval decrease in size of hepatic segment VII hypodense lesion.    Stable splenic cyst.    Stable right pleural nodule.    WBC   Date Value Ref Range Status   2017 4.26 3.90 -  12.70 K/uL Final     Hemoglobin   Date Value Ref Range Status   09/11/2017 12.9 12.0 - 16.0 g/dL Final     POC Hematocrit   Date Value Ref Range Status   10/08/2015 34 (L) 36 - 54 %PCV Final     Hematocrit   Date Value Ref Range Status   09/11/2017 38.1 37.0 - 48.5 % Final     Platelets   Date Value Ref Range Status   09/11/2017 189 150 - 350 K/uL Final     BUN, Bld   Date Value Ref Range Status   09/11/2017 11 6 - 20 mg/dL Final     Creatinine   Date Value Ref Range Status   09/11/2017 0.8 0.5 - 1.4 mg/dL Final     Glucose   Date Value Ref Range Status   09/11/2017 91 70 - 110 mg/dL Final     Calcium   Date Value Ref Range Status   09/11/2017 9.2 8.7 - 10.5 mg/dL Final     Sodium   Date Value Ref Range Status   09/11/2017 142 136 - 145 mmol/L Final     Potassium   Date Value Ref Range Status   09/11/2017 3.6 3.5 - 5.1 mmol/L Final     Chloride   Date Value Ref Range Status   09/11/2017 109 95 - 110 mmol/L Final     Magnesium   Date Value Ref Range Status   10/13/2015 2.0 1.6 - 2.6 mg/dL Final     AST   Date Value Ref Range Status   09/11/2017 22 10 - 40 U/L Final     ALT   Date Value Ref Range Status   09/11/2017 25 10 - 44 U/L Final     Alkaline Phosphatase   Date Value Ref Range Status   09/11/2017 82 55 - 135 U/L Final     Total Bilirubin   Date Value Ref Range Status   09/11/2017 0.9 0.1 - 1.0 mg/dL Final     Comment:     For infants and newborns, interpretation of results should be based  on gestational age, weight and in agreement with clinical  observations.  Premature Infant recommended reference ranges:  Up to 24 hours.............<8.0 mg/dL  Up to 48 hours............<12.0 mg/dL  3-5 days..................<15.0 mg/dL  6-29 days.................<15.0 mg/dL       Albumin   Date Value Ref Range Status   09/11/2017 3.7 3.5 - 5.2 g/dL Final     INR   Date Value Ref Range Status   10/27/2016 1.0 0.8 - 1.2 Final     Comment:     Coumadin Therapy:  2.0 - 3.0 for INR for all indicators except mechanical heart  valves  and antiphospholipid syndromes which should use 2.5 - 3.5.       Lab Results   Component Value Date    TACROLIMUS 1.5 (L) 09/11/2017    SIROLIMUSLEV 6.6 09/11/2017           Assessment/Plan:     1. Immunosuppression    2. Liver transplanted    3. History of cholangiocarcinoma      Immunosuppression  -Continue current IS    Liver transplanted-good allograft function    History of cholangiocarcinoma  -Continue with routine f/u    RTC in 1 year    Мария Benítez MD         [facility] Patient Status  Functional Status: 70% - Cares for self: unable to carry on normal activity or active work  Physical Capacity: No Limitations

## 2017-10-17 DIAGNOSIS — C22.1 CHOLANGIOCARCINOMA: Primary | ICD-10-CM

## 2017-10-25 ENCOUNTER — OFFICE VISIT (OUTPATIENT)
Dept: CARDIOLOGY | Facility: CLINIC | Age: 57
End: 2017-10-25
Payer: MEDICARE

## 2017-10-25 VITALS
OXYGEN SATURATION: 100 % | SYSTOLIC BLOOD PRESSURE: 134 MMHG | BODY MASS INDEX: 25.8 KG/M2 | HEART RATE: 68 BPM | HEIGHT: 62 IN | WEIGHT: 140.19 LBS | DIASTOLIC BLOOD PRESSURE: 61 MMHG

## 2017-10-25 DIAGNOSIS — R06.09 DYSPNEA ON EXERTION: Primary | ICD-10-CM

## 2017-10-25 DIAGNOSIS — R73.03 PREDIABETES: ICD-10-CM

## 2017-10-25 DIAGNOSIS — Z94.4 S/P LIVER TRANSPLANT: ICD-10-CM

## 2017-10-25 DIAGNOSIS — I35.1 NONRHEUMATIC AORTIC VALVE INSUFFICIENCY: ICD-10-CM

## 2017-10-25 PROCEDURE — 99999 PR PBB SHADOW E&M-EST. PATIENT-LVL III: CPT | Mod: PBBFAC,,, | Performed by: INTERNAL MEDICINE

## 2017-10-25 PROCEDURE — 99214 OFFICE O/P EST MOD 30 MIN: CPT | Mod: S$PBB,,, | Performed by: INTERNAL MEDICINE

## 2017-10-25 PROCEDURE — 99213 OFFICE O/P EST LOW 20 MIN: CPT | Mod: PBBFAC | Performed by: INTERNAL MEDICINE

## 2017-10-25 NOTE — PROGRESS NOTES
Subjective:   Patient ID:  Nadeen Mcgovern is a 57 y.o. female who presents for follow up of Shortness of Breath (1 yr f/u )      HPI: Very pleasant woman with history as below here for f/u.  Last year she had a CPX that was challenging to interpret but that revealed significant impairment (54% of VO2 max).  It looked most likely on that test that deconditioning was the cause of her functional impairment.  She is walking a bit more now and overall feeling a bit better though she still gets out of breath fairly easily.  She says she can climb a flight of stairs without stopping.    No angina or other chest discomfort, no palpitations, no PND/orthopnea.  No syncope.    2016 HPI: Very pleasant woman with a history of liver transplantation for cholangiocarcinoma presents for increasing dyspnea on exertion.  She has noticed an increasing dyspnea on exertion over the last several months.  She doesn't think she could walk up a full flight of stairs at this point without having to stop to catch her breath.  She still tries to maintain some exercise even if it's just walking around the house or chasing her grandson.     PFTs were done last week and they were entirely normal.     The only chest discomfort she has is with the feeling of food getting stuck in her esophagus.  This has been present for at least a month.    Patient Active Problem List   Diagnosis    Normocytic anemia    S/P liver transplant    Immunosuppression    Prediabetes    Cholangiocarcinoma    Left arm weakness    Limited scleroderma    Trigger thumb of left hand    Tendinitis of left hand    Dysphagia    Rotator cuff tear, left    Adhesive capsulitis of left shoulder    Raynaud's phenomenon    Trigger ring finger of left hand    Hand eczema    Raynaud's disease without gangrene    Hand pain, left    Weakness of left hand    Right shoulder pain    Osteoarthritis of right thumb    Chronic left shoulder pain    Aortic valve regurgitation     Restrictive lung disease       Current Outpatient Prescriptions   Medication Sig    amlodipine (NORVASC) 5 MG tablet Take 1 tablet (5 mg total) by mouth once daily.    aspirin 81 MG Chew Take 81 mg by mouth once daily.    clotrimazole (LOTRIMIN) 1 % cream Apply topically 2 (two) times daily.    diclofenac sodium (VOLTAREN) 1 % Gel Apply 2 g topically 4 (four) times daily. PRN    hydrOXYzine pamoate (VISTARIL) 50 MG Cap Take 1 capsule (50 mg total) by mouth every 8 (eight) hours as needed.    multivitamin (THERAGRAN) tablet Take 1 tablet by mouth once daily.    PROGRAF 1 mg Cap Take 1 capsule (1 mg total) by mouth every 12 (twelve) hours.    sildenafil (REVATIO) 20 mg Tab Take 1 tablet (20 mg total) by mouth 3 (three) times daily. Take only 1 daily for 3 days, monitor BP daily. If tolerated, increase to 1 twice daily for 3 days, and if tolerated increase to full dose of three times daily.    sirolimus (RAPAMUNE) 1 MG Tab Take 2 tablets (2 mg total) by mouth once daily.    zolpidem (AMBIEN) 5 MG Tab Take 1 tablet (5 mg total) by mouth nightly as needed.    betamethasone dipropionate (DIPROLENE) 0.05 % cream Apply topically 2 (two) times daily.     No current facility-administered medications for this visit.        Review of Systems   Constitution: Negative.   HENT: Negative.    Eyes: Negative.    Cardiovascular: Negative.  Negative for chest pain, dyspnea on exertion, near-syncope, orthopnea and palpitations.   Respiratory: Negative.  Negative for cough, hemoptysis and shortness of breath.    Endocrine: Negative.    Hematologic/Lymphatic: Negative.    Skin: Negative.         Skin cracking, and skin on hand feels stiff   Musculoskeletal: Negative.    Gastrointestinal: Positive for dysphagia.   Genitourinary: Negative.    Neurological: Negative.    Psychiatric/Behavioral: Negative.      Objective:   Physical Exam   Constitutional: She is oriented to person, place, and time. She appears well-developed and  well-nourished.   HENT:   Head: Normocephalic and atraumatic.   Mouth/Throat: Oropharynx is clear and moist.   Eyes: Conjunctivae and EOM are normal. No scleral icterus.   Neck: Normal range of motion. Neck supple. No JVD present.   Cardiovascular: Normal rate, regular rhythm, normal heart sounds and intact distal pulses.  Exam reveals no gallop and no friction rub.    No murmur heard.  Pulmonary/Chest: Effort normal and breath sounds normal. She has no wheezes. She has no rales.   Abdominal: Soft. Bowel sounds are normal. She exhibits no distension. There is no tenderness.   Musculoskeletal: Normal range of motion. She exhibits no edema.   Neurological: She is alert and oriented to person, place, and time.   Skin: Skin is warm and dry. No rash noted. No erythema.   Psychiatric: She has a normal mood and affect. Her behavior is normal. Judgment and thought content normal.   Vitals reviewed.      Lab Results   Component Value Date    WBC 4.26 09/11/2017    HGB 12.9 09/11/2017    HCT 38.1 09/11/2017    MCV 87 09/11/2017     09/11/2017         Chemistry        Component Value Date/Time     09/11/2017 0855    K 3.6 09/11/2017 0855     09/11/2017 0855    CO2 26 09/11/2017 0855    BUN 11 09/11/2017 0855    CREATININE 0.8 09/11/2017 0855    GLU 91 09/11/2017 0855        Component Value Date/Time    CALCIUM 9.2 09/11/2017 0855    ALKPHOS 82 09/11/2017 0855    AST 22 09/11/2017 0855    ALT 25 09/11/2017 0855    BILITOT 0.9 09/11/2017 0855    ESTGFRAFRICA >60.0 09/11/2017 0855    EGFRNONAA >60.0 09/11/2017 0855            Lab Results   Component Value Date    CHOL 190 02/15/2017    CHOL 140 12/14/2015    CHOL 200 (H) 01/21/2015     Lab Results   Component Value Date    HDL 52 02/15/2017    HDL 49 12/14/2015    HDL 47 01/21/2015     Lab Results   Component Value Date    LDLCALC 124.0 02/15/2017    LDLCALC 74.4 12/14/2015    LDLCALC 129.6 01/21/2015     Lab Results   Component Value Date    TRIG 70 02/15/2017     TRIG 83 12/14/2015    TRIG 117 01/21/2015     Lab Results   Component Value Date    CHOLHDL 27.4 02/15/2017    CHOLHDL 35.0 12/14/2015    CHOLHDL 23.5 01/21/2015       Lab Results   Component Value Date    TSH 1.224 08/17/2016       Lab Results   Component Value Date    HGBA1C 5.4 02/15/2017       Assessment:     1. Dyspnea on exertion    2. S/P liver transplant    3. Nonrheumatic aortic valve insufficiency    4. Prediabetes        Plan:     Reassurance.    Exercise regimen reinforced.    F/U PRN

## 2017-10-27 ENCOUNTER — TELEPHONE (OUTPATIENT)
Dept: TRANSPLANT | Facility: CLINIC | Age: 57
End: 2017-10-27

## 2017-10-27 RX ORDER — FAMOTIDINE 20 MG/1
20 TABLET, FILM COATED ORAL NIGHTLY
Qty: 30 TABLET | Refills: 11 | Status: SHIPPED | OUTPATIENT
Start: 2017-10-27 | End: 2019-06-25 | Stop reason: SDUPTHER

## 2017-10-27 NOTE — TELEPHONE ENCOUNTER
Called patient per request from Dr Benítez, patient was taking it every night.  I told her only take it if she has heartburn, because she is not on Prednisone anymore.  She repeated and verbalized understanding.

## 2017-10-27 NOTE — TELEPHONE ENCOUNTER
----- Message from Мария Benítez MD sent at 10/27/2017  4:48 PM CDT -----  I refilled famotidine on licea but can you make sure she knows its only if she has heartburn. I want to make sure she's not just taking it because it was on the blue card from her transplant.

## 2017-11-02 ENCOUNTER — HOSPITAL ENCOUNTER (OUTPATIENT)
Dept: RADIOLOGY | Facility: HOSPITAL | Age: 57
Discharge: HOME | End: 2017-11-02
Attending: INTERNAL MEDICINE
Payer: MEDICARE

## 2017-11-02 DIAGNOSIS — C22.1 CHOLANGIOCARCINOMA: ICD-10-CM

## 2017-11-02 PROCEDURE — 71260 CT THORAX DX C+: CPT | Mod: TC

## 2017-11-02 PROCEDURE — 71260 CT THORAX DX C+: CPT | Mod: 26,,, | Performed by: RADIOLOGY

## 2017-11-02 PROCEDURE — 74177 CT ABD & PELVIS W/CONTRAST: CPT | Mod: 26,GC,, | Performed by: RADIOLOGY

## 2017-11-02 PROCEDURE — 74177 CT ABD & PELVIS W/CONTRAST: CPT | Mod: TC

## 2017-11-02 PROCEDURE — 25500020 PHARM REV CODE 255: Performed by: INTERNAL MEDICINE

## 2017-11-02 RX ADMIN — IOHEXOL 75 ML: 350 INJECTION, SOLUTION INTRAVENOUS at 10:11

## 2017-11-02 RX ADMIN — IOHEXOL 15 ML: 350 INJECTION, SOLUTION INTRAVENOUS at 08:11

## 2017-11-02 RX ADMIN — IOHEXOL 15 ML: 350 INJECTION, SOLUTION INTRAVENOUS at 07:11

## 2017-11-06 ENCOUNTER — LAB VISIT (OUTPATIENT)
Dept: LAB | Facility: HOSPITAL | Age: 57
End: 2017-11-06
Attending: INTERNAL MEDICINE
Payer: MEDICARE

## 2017-11-06 DIAGNOSIS — Z94.4 LIVER REPLACED BY TRANSPLANT: ICD-10-CM

## 2017-11-06 LAB
ALBUMIN SERPL BCP-MCNC: 3.9 G/DL
ALP SERPL-CCNC: 91 U/L
ALT SERPL W/O P-5'-P-CCNC: 28 U/L
ANION GAP SERPL CALC-SCNC: 8 MMOL/L
AST SERPL-CCNC: 24 U/L
BASOPHILS # BLD AUTO: 0.03 K/UL
BASOPHILS NFR BLD: 0.7 %
BILIRUB DIRECT SERPL-MCNC: 0.2 MG/DL
BILIRUB SERPL-MCNC: 0.6 MG/DL
BUN SERPL-MCNC: 10 MG/DL
CALCIUM SERPL-MCNC: 9.4 MG/DL
CHLORIDE SERPL-SCNC: 108 MMOL/L
CO2 SERPL-SCNC: 26 MMOL/L
CREAT SERPL-MCNC: 0.8 MG/DL
DIFFERENTIAL METHOD: NORMAL
EOSINOPHIL # BLD AUTO: 0.1 K/UL
EOSINOPHIL NFR BLD: 2.7 %
ERYTHROCYTE [DISTWIDTH] IN BLOOD BY AUTOMATED COUNT: 12.7 %
EST. GFR  (AFRICAN AMERICAN): >60 ML/MIN/1.73 M^2
EST. GFR  (NON AFRICAN AMERICAN): >60 ML/MIN/1.73 M^2
GLUCOSE SERPL-MCNC: 87 MG/DL
HCT VFR BLD AUTO: 40.6 %
HGB BLD-MCNC: 13.5 G/DL
IMM GRANULOCYTES # BLD AUTO: 0.01 K/UL
IMM GRANULOCYTES NFR BLD AUTO: 0.2 %
LYMPHOCYTES # BLD AUTO: 1.3 K/UL
LYMPHOCYTES NFR BLD: 32.4 %
MCH RBC QN AUTO: 29.4 PG
MCHC RBC AUTO-ENTMCNC: 33.3 G/DL
MCV RBC AUTO: 89 FL
MONOCYTES # BLD AUTO: 0.4 K/UL
MONOCYTES NFR BLD: 8.7 %
NEUTROPHILS # BLD AUTO: 2.2 K/UL
NEUTROPHILS NFR BLD: 55.3 %
NRBC BLD-RTO: 0 /100 WBC
PLATELET # BLD AUTO: 181 K/UL
PMV BLD AUTO: 10.1 FL
POTASSIUM SERPL-SCNC: 4 MMOL/L
PROT SERPL-MCNC: 8 G/DL
RBC # BLD AUTO: 4.59 M/UL
SIROLIMUS BLD-MCNC: 5.3 NG/ML
SODIUM SERPL-SCNC: 142 MMOL/L
WBC # BLD AUTO: 4.01 K/UL

## 2017-11-06 PROCEDURE — 36415 COLL VENOUS BLD VENIPUNCTURE: CPT

## 2017-11-06 PROCEDURE — 82248 BILIRUBIN DIRECT: CPT

## 2017-11-06 PROCEDURE — 85025 COMPLETE CBC W/AUTO DIFF WBC: CPT

## 2017-11-06 PROCEDURE — 80053 COMPREHEN METABOLIC PANEL: CPT

## 2017-11-06 PROCEDURE — 80195 ASSAY OF SIROLIMUS: CPT

## 2017-11-06 PROCEDURE — 80197 ASSAY OF TACROLIMUS: CPT

## 2017-11-07 LAB — TACROLIMUS BLD-MCNC: 1.9 NG/ML

## 2017-11-13 ENCOUNTER — TELEPHONE (OUTPATIENT)
Dept: TRANSPLANT | Facility: CLINIC | Age: 57
End: 2017-11-13

## 2017-11-13 NOTE — TELEPHONE ENCOUNTER
----- Message from Мария Benítez MD sent at 11/10/2017  3:06 PM CST -----  Reviewed, nothing to do

## 2017-11-21 ENCOUNTER — PATIENT MESSAGE (OUTPATIENT)
Dept: INTERNAL MEDICINE | Facility: CLINIC | Age: 57
End: 2017-11-21

## 2017-11-21 DIAGNOSIS — B35.2 TINEA MANUS: ICD-10-CM

## 2017-11-21 RX ORDER — DICLOFENAC SODIUM 10 MG/G
2 GEL TOPICAL 4 TIMES DAILY
Qty: 1 TUBE | Refills: 3 | Status: SHIPPED | OUTPATIENT
Start: 2017-11-21 | End: 2018-06-15

## 2017-11-21 RX ORDER — CLOTRIMAZOLE 1 %
CREAM (GRAM) TOPICAL 2 TIMES DAILY
Qty: 30 G | Refills: 3 | Status: SHIPPED | OUTPATIENT
Start: 2017-11-21 | End: 2020-07-20

## 2017-11-29 ENCOUNTER — TELEPHONE (OUTPATIENT)
Dept: RHEUMATOLOGY | Facility: CLINIC | Age: 57
End: 2017-11-29

## 2017-12-26 DIAGNOSIS — Z94.4 LIVER REPLACED BY TRANSPLANT: ICD-10-CM

## 2017-12-27 ENCOUNTER — HOSPITAL ENCOUNTER (EMERGENCY)
Facility: HOSPITAL | Age: 57
Discharge: HOME | End: 2017-12-27
Attending: EMERGENCY MEDICINE
Payer: MEDICARE

## 2017-12-27 ENCOUNTER — PATIENT MESSAGE (OUTPATIENT)
Dept: TRANSPLANT | Facility: CLINIC | Age: 57
End: 2017-12-27

## 2017-12-27 VITALS
DIASTOLIC BLOOD PRESSURE: 57 MMHG | SYSTOLIC BLOOD PRESSURE: 115 MMHG | RESPIRATION RATE: 16 BRPM | BODY MASS INDEX: 23.92 KG/M2 | TEMPERATURE: 99 F | OXYGEN SATURATION: 99 % | WEIGHT: 130 LBS | HEART RATE: 81 BPM | HEIGHT: 62 IN

## 2017-12-27 DIAGNOSIS — M25.551 HIP PAIN, ACUTE, RIGHT: ICD-10-CM

## 2017-12-27 DIAGNOSIS — Z94.4 S/P LIVER TRANSPLANT: ICD-10-CM

## 2017-12-27 DIAGNOSIS — S76.211A GROIN STRAIN, RIGHT, INITIAL ENCOUNTER: Primary | ICD-10-CM

## 2017-12-27 PROCEDURE — 25000003 PHARM REV CODE 250: Performed by: PHYSICIAN ASSISTANT

## 2017-12-27 PROCEDURE — 99284 EMERGENCY DEPT VISIT MOD MDM: CPT | Mod: 25

## 2017-12-27 PROCEDURE — 99284 EMERGENCY DEPT VISIT MOD MDM: CPT | Mod: ,,, | Performed by: PHYSICIAN ASSISTANT

## 2017-12-27 RX ORDER — TACROLIMUS 1 MG/1
1 CAPSULE, GELATIN COATED ORAL EVERY 12 HOURS
Qty: 60 CAPSULE | Refills: 11 | Status: SHIPPED | OUTPATIENT
Start: 2017-12-27 | End: 2018-07-11 | Stop reason: SDUPTHER

## 2017-12-27 RX ORDER — TRAMADOL HYDROCHLORIDE 50 MG/1
50 TABLET ORAL
Status: COMPLETED | OUTPATIENT
Start: 2017-12-27 | End: 2017-12-27

## 2017-12-27 RX ADMIN — TRAMADOL HYDROCHLORIDE 50 MG: 50 TABLET, FILM COATED ORAL at 10:12

## 2017-12-27 NOTE — DISCHARGE INSTRUCTIONS
Use a heat pack over the region. Use over the counter pain medication if needed. Reduce strenuous activity.

## 2017-12-27 NOTE — ED PROVIDER NOTES
Encounter Date: 2017       History     Chief Complaint   Patient presents with    Hip Pain     since  pain in right hip with difficulty ambulating, sensation unchanged, SMC intact distal     Patient is a 57-year-old female status post liver transplant secondary to carcinoma, pancreatitis, scleroderma is presenting to the ER for evaluation right hip pain.  Patient is the 25th and noticed pain to the right hip.  She states that since then she has had difficulty ambulating due to pain.  She denies radiation of pain to her right lower extremity.  Denies any injury or trauma.  She says almost pain is located anterior aspect of the hip.  No use of blood thinners.  Denies any abdominal pain, nausea or vomiting.  No diarrhea.  She took some antacids for her symptom.  Denies any swelling or redness to the site.  No paresthesia or focal weakness to her extremity.  No prior history of injury or surgery to the site.        The history is provided by the patient.     Review of patient's allergies indicates:  No Known Allergies  Past Medical History:   Diagnosis Date    Acute cholecystitis     Cholecystitis    Arthritis     septic arthritis of right shoulder    Bacteremia due to Streptococcus pneumoniae     Cancer     Cholangiocarcinoma     Jaundice     obstructive    Prediabetes      Past Surgical History:   Procedure Laterality Date    arthoscopic Right     drained infection     SECTION      INCISION AND DRAINAGE OF WOUND      s/p I&D of R thumb    LIVER TRANSPLANT      SHOULDER ARTHROSCOPY       Family History   Problem Relation Age of Onset    Cancer Father      Lung    Arthritis Father     Stroke Mother     Diabetes Mother     Hypertension Mother     Heart attack Mother     Cancer Paternal Grandmother      pancreatic cancer    Cancer Brother      Lung    Alcohol abuse Brother     Arthritis Sister     Colon cancer Neg Hx     Esophageal cancer Neg Hx      Social History    Substance Use Topics    Smoking status: Former Smoker    Smokeless tobacco: Never Used    Alcohol use No      Comment: socially     Review of Systems   Constitutional: Negative for chills, diaphoresis, fatigue and fever.   Respiratory: Negative for cough and shortness of breath.    Cardiovascular: Negative for chest pain and leg swelling.   Gastrointestinal: Negative for abdominal pain, diarrhea, nausea and vomiting.   Genitourinary: Negative for dysuria, hematuria, pelvic pain and urgency.   Musculoskeletal: Positive for arthralgias and gait problem. Negative for back pain and myalgias.   Skin: Negative for color change, rash and wound.   Neurological: Negative for weakness and numbness.   Psychiatric/Behavioral: Negative for confusion.       Physical Exam     Initial Vitals [12/27/17 0822]   BP Pulse Resp Temp SpO2   (!) 141/66 77 16 98 °F (36.7 °C) 100 %      MAP       91         Physical Exam    Constitutional: She appears well-developed and well-nourished.   HENT:   Head: Normocephalic and atraumatic.   Eyes: Conjunctivae and EOM are normal.   Cardiovascular: Normal rate.   Pulmonary/Chest: Breath sounds normal.   Abdominal: Soft. She exhibits no distension and no mass. There is no tenderness. There is no guarding.   Musculoskeletal:   Tenderness on palpation over the anterior aspect of the right hip/ proximal femur.  No erythema or swelling to the site.  Range of motion intact but limited upon abduction  .  Mild tenderness on external rotation.  Pulses intact.  Range of motion of the knee otherwise intact.  No calf swelling noted.  Sensation fully intact.     Neurological: She is alert and oriented to person, place, and time.   Skin: Skin is warm and dry.         ED Course   Procedures  Labs Reviewed - No data to display                APC / Resident Notes:   Patient was seen in the ER promptly upon arrival.  She is afebrile acute distress.  Physical examination does reveal tenderness on palpation over  the right anterior hip, proximal femoral head.  Injuries and pain upon abduction or external rotation.  Sensation intact. No calf swelling.   She was given Tramadol for pain.  X-ray shows DJD, no acute fractures.  The patient continues to complain of pain.  I did evaluate patient but she states that she has increasing pain upon bearing weight and movement.  Due to this I did obtain a CT scan of the pelvis without contrast.  This is done to rule out evidence of fractures versus metastatic lesions given her history of cholangiocarcinoma.  CT does not show any acute findings.  No hip fracture, dislocation avascular necrosis or metastases noted.  There is some degeneration.  Upon reassessment patient is resting comfortably.  She did have alleviation after the tramadol given.  I do have suspicion that her pain likely secondary to groin strain.  She is advised to use a heat pad over the region.  Over-the-counter pain medication if needed.  She was advised.  Tomorrow.  She is otherwise stable for outpatient therapy at this time.    The care of this patient was overseen by attending physician who agrees with treatment, plan, and disposition.                ED Course as of Dec 27 1528   Wed Dec 27, 2017   1252 CT Pelvis Without Contrast [AJ]   1252 CT Pelvis Without Contrast [AJ]      ED Course User Index  [AJ] Brie HERNANDEZ Santoro     CT Pelvis Without Contrast   Final Result         No acute findings, specifically no hip fracture, dislocation, avascular necrosis, or metastases.      Degenerative changes of the hips, symphysis pubis, and lumbar spine.   ______________________________________       Electronically signed by resident: TIFFANIE UREÑA   Date:     12/27/17   Time:    11:54                  As the supervising and teaching physician, I personally reviewed the images and resident's interpretation and I agree with the findings.                  Electronically signed by: Sachin Falcon MD   Date:     12/27/17    Time:    11:58       X-Ray Hip 2 View Right   Final Result        Clinical Impression:   The primary encounter diagnosis was Groin strain, right, initial encounter. A diagnosis of Hip pain, acute, right was also pertinent to this visit.    Disposition:   Disposition: Discharged  Condition: Stable                        Brie Santoro PA-C  12/27/17 1523

## 2017-12-29 NOTE — TELEPHONE ENCOUNTER
----- Message from Petra Boothe sent at 1/23/2017  9:06 AM CST -----  Calling to speak with Chetna about a medication. Please call     Patient/Parent

## 2018-01-02 ENCOUNTER — LAB VISIT (OUTPATIENT)
Dept: LAB | Facility: HOSPITAL | Age: 58
End: 2018-01-02
Attending: INTERNAL MEDICINE
Payer: MEDICARE

## 2018-01-02 ENCOUNTER — OFFICE VISIT (OUTPATIENT)
Dept: INTERNAL MEDICINE | Facility: CLINIC | Age: 58
End: 2018-01-02
Payer: MEDICARE

## 2018-01-02 ENCOUNTER — TELEPHONE (OUTPATIENT)
Dept: ORTHOPEDICS | Facility: CLINIC | Age: 58
End: 2018-01-02

## 2018-01-02 VITALS
DIASTOLIC BLOOD PRESSURE: 64 MMHG | SYSTOLIC BLOOD PRESSURE: 110 MMHG | HEIGHT: 62 IN | WEIGHT: 141.56 LBS | HEART RATE: 75 BPM | BODY MASS INDEX: 26.05 KG/M2

## 2018-01-02 DIAGNOSIS — Z94.4 S/P LIVER TRANSPLANT: ICD-10-CM

## 2018-01-02 DIAGNOSIS — D84.9 IMMUNOSUPPRESSION: ICD-10-CM

## 2018-01-02 DIAGNOSIS — I73.00 RAYNAUD'S DISEASE WITHOUT GANGRENE: Primary | ICD-10-CM

## 2018-01-02 DIAGNOSIS — Z94.4 LIVER REPLACED BY TRANSPLANT: ICD-10-CM

## 2018-01-02 DIAGNOSIS — C22.0 CARCINOMA OF LIVER: ICD-10-CM

## 2018-01-02 DIAGNOSIS — I73.00 RAYNAUD'S PHENOMENON WITHOUT GANGRENE: ICD-10-CM

## 2018-01-02 DIAGNOSIS — M34.9 LIMITED SCLERODERMA: ICD-10-CM

## 2018-01-02 DIAGNOSIS — C22.1 CHOLANGIOCELLULAR CARCINOMA: ICD-10-CM

## 2018-01-02 LAB
AFP SERPL-MCNC: 1.4 NG/ML
ALBUMIN SERPL BCP-MCNC: 3.7 G/DL
ALP SERPL-CCNC: 98 U/L
ALT SERPL W/O P-5'-P-CCNC: 31 U/L
ANION GAP SERPL CALC-SCNC: 9 MMOL/L
AST SERPL-CCNC: 22 U/L
BASOPHILS # BLD AUTO: 0.02 K/UL
BASOPHILS NFR BLD: 0.4 %
BILIRUB DIRECT SERPL-MCNC: 0.3 MG/DL
BILIRUB SERPL-MCNC: 0.7 MG/DL
BUN SERPL-MCNC: 8 MG/DL
CALCIUM SERPL-MCNC: 9.4 MG/DL
CANCER AG19-9 SERPL-ACNC: 9 U/ML
CHLORIDE SERPL-SCNC: 108 MMOL/L
CO2 SERPL-SCNC: 23 MMOL/L
CREAT SERPL-MCNC: 0.7 MG/DL
DIFFERENTIAL METHOD: ABNORMAL
EOSINOPHIL # BLD AUTO: 0.1 K/UL
EOSINOPHIL NFR BLD: 1.2 %
ERYTHROCYTE [DISTWIDTH] IN BLOOD BY AUTOMATED COUNT: 12.8 %
EST. GFR  (AFRICAN AMERICAN): >60 ML/MIN/1.73 M^2
EST. GFR  (NON AFRICAN AMERICAN): >60 ML/MIN/1.73 M^2
GLUCOSE SERPL-MCNC: 96 MG/DL
HCT VFR BLD AUTO: 39.6 %
HGB BLD-MCNC: 13.2 G/DL
IMM GRANULOCYTES # BLD AUTO: 0.02 K/UL
IMM GRANULOCYTES NFR BLD AUTO: 0.4 %
LYMPHOCYTES # BLD AUTO: 0.9 K/UL
LYMPHOCYTES NFR BLD: 17.6 %
MCH RBC QN AUTO: 29.2 PG
MCHC RBC AUTO-ENTMCNC: 33.3 G/DL
MCV RBC AUTO: 88 FL
MONOCYTES # BLD AUTO: 0.4 K/UL
MONOCYTES NFR BLD: 8.5 %
NEUTROPHILS # BLD AUTO: 3.6 K/UL
NEUTROPHILS NFR BLD: 71.9 %
NRBC BLD-RTO: 0 /100 WBC
PLATELET # BLD AUTO: 208 K/UL
PMV BLD AUTO: 9.8 FL
POTASSIUM SERPL-SCNC: 4 MMOL/L
PROT SERPL-MCNC: 7.8 G/DL
RBC # BLD AUTO: 4.52 M/UL
SIROLIMUS BLD-MCNC: 5.9 NG/ML
SODIUM SERPL-SCNC: 140 MMOL/L
TACROLIMUS BLD-MCNC: 1.9 NG/ML
WBC # BLD AUTO: 5.06 K/UL

## 2018-01-02 PROCEDURE — 99213 OFFICE O/P EST LOW 20 MIN: CPT | Mod: PBBFAC | Performed by: INTERNAL MEDICINE

## 2018-01-02 PROCEDURE — 86301 IMMUNOASSAY TUMOR CA 19-9: CPT

## 2018-01-02 PROCEDURE — 82248 BILIRUBIN DIRECT: CPT

## 2018-01-02 PROCEDURE — 99999 PR PBB SHADOW E&M-EST. PATIENT-LVL III: CPT | Mod: PBBFAC,,, | Performed by: INTERNAL MEDICINE

## 2018-01-02 PROCEDURE — 99499 UNLISTED E&M SERVICE: CPT | Mod: S$PBB,,, | Performed by: INTERNAL MEDICINE

## 2018-01-02 PROCEDURE — 80053 COMPREHEN METABOLIC PANEL: CPT

## 2018-01-02 PROCEDURE — 80195 ASSAY OF SIROLIMUS: CPT

## 2018-01-02 PROCEDURE — 80197 ASSAY OF TACROLIMUS: CPT

## 2018-01-02 PROCEDURE — 36415 COLL VENOUS BLD VENIPUNCTURE: CPT

## 2018-01-02 PROCEDURE — 99214 OFFICE O/P EST MOD 30 MIN: CPT | Mod: S$PBB,,, | Performed by: INTERNAL MEDICINE

## 2018-01-02 PROCEDURE — 82105 ALPHA-FETOPROTEIN SERUM: CPT

## 2018-01-02 PROCEDURE — 85025 COMPLETE CBC W/AUTO DIFF WBC: CPT

## 2018-01-02 NOTE — PROGRESS NOTES
HISTORY OF PRESENT ILLNESS:  She is a patient of Dr. Figueroa, who comes in today   after Emergency Room visit on 09/27/2017, at which time on [place] day, she   felt like she hurt her leg to the point where she could not stand her legs   anymore due to pain.  She came in the Emergency Room after having trouble moving   the leg.  She denies any trauma, but now that she thinks about it, she did lift   the turkey.  She had a CAT scan of her abdomen done, which shows some OA of the   hip and OA of the pelvis, but otherwise unremarkable.  This is a lady who had   status post liver transplant back in 2015 secondary to carcinoma.  No signs of   malignancy were found.  She was discharged on some tramadol.  She says that the   pain is resolved.  Only complaint she has is scleroderma and has been having   some difficulty with her hands being very dry in the weather.  Right now is   blood freezing outside and she has been having a little trouble with that.    Otherwise, no new complaints.    REVIEW OF SYSTEMS:  No chest pain, shortness of breath, palpitations, nausea, or   vomiting.  No fevers or chills, otherwise, she has been feeling pretty well.    PHYSICAL EXAMINATION:  GENERAL:  She is a well-appearing 57-year-old -American female in no   acute distress.  NECK:  Supple.  She has no JVD.  Thyroid is not enlarged.  CARDIOVASCULAR:  S1 and S2, regular rate and rhythm without murmur, gallop, or   rub.  ABDOMEN:  Soft, nontender, no hepatosplenomegaly, no guarding or rebound   tenderness.  LOWER EXTREMITIES:  No edema.    ASSESSMENT:  Raynaud's phenomenon with scleroderma.  Hands are very dry and   cool.  We discussed moisturizing of the hand and keep in warm.  She is   immunosuppressed secondary to her liver transplant.  No signs of any malignancy   going on, especially with history of malignancy.  For the hip pain, it has   completely resolved.  It seems like musculoskeletal in nature.  She does have   some spinal  stenosis that was seen on her CT scan, but she is not having any   symptoms and even at the time she came in to the Emergency Room, she was not   having any sciatica symptoms, so we will just follow her.  She will follow with   Dr. Figueroa, her regular physician.      NABEEL  dd: 01/02/2018 10:19:49 (CST)  td: 01/02/2018 23:01:44 (CST)  Doc ID   #[id number]  Job ID #[id number]    CC:

## 2018-01-02 NOTE — TELEPHONE ENCOUNTER
Message     Appointment For: SHILO HAMPTON ([id number])   Visit Type: MYCHART FOLLOWUP/OFFICE VISIT (2382)      2/20/2018    4:45 PM  15 mins.  Jovana Rossi MD  [facility]      Patient Comments:   Existing Patient   Hands peeling,cracked and sore spots     Phone call to patient regarding reason for appt. States that this is more skin relate rather than joint. Suggested that she call rheumatology to follow up with them on the scleroderma and can cancel the appt with us if she no longer needs to see us.   Verbalized understanding and appreciated the call to let her know that she may need to see another specialist.

## 2018-01-03 ENCOUNTER — TELEPHONE (OUTPATIENT)
Dept: TRANSPLANT | Facility: CLINIC | Age: 58
End: 2018-01-03

## 2018-01-03 DIAGNOSIS — C22.1 CHOLANGIOCARCINOMA: ICD-10-CM

## 2018-01-03 DIAGNOSIS — C22.0 ERYTHROCYTOSIS DUE TO HEPATOMA: Primary | ICD-10-CM

## 2018-01-03 DIAGNOSIS — D75.1 ERYTHROCYTOSIS DUE TO HEPATOMA: Primary | ICD-10-CM

## 2018-01-03 NOTE — TELEPHONE ENCOUNTER
----- Message from Мария Benítez MD sent at 1/2/2018  4:00 PM CST -----  Reviewed, nothing to do

## 2018-01-03 NOTE — TELEPHONE ENCOUNTER
Dr. Benítez reviewed your labs.  Continue routine labs no changes needed.  Letter sent for next lab appointment 04/03/18

## 2018-01-08 DIAGNOSIS — C22.1 CHOLANGIOCARCINOMA: Primary | ICD-10-CM

## 2018-01-08 DIAGNOSIS — R97.8 OTHER ABNORMAL TUMOR MARKERS: ICD-10-CM

## 2018-01-11 ENCOUNTER — TELEPHONE (OUTPATIENT)
Dept: RHEUMATOLOGY | Facility: CLINIC | Age: 58
End: 2018-01-11

## 2018-01-12 ENCOUNTER — OFFICE VISIT (OUTPATIENT)
Dept: RHEUMATOLOGY | Facility: CLINIC | Age: 58
End: 2018-01-12
Payer: MEDICARE

## 2018-01-12 ENCOUNTER — TELEPHONE (OUTPATIENT)
Dept: DERMATOLOGY | Facility: CLINIC | Age: 58
End: 2018-01-12

## 2018-01-12 VITALS
SYSTOLIC BLOOD PRESSURE: 125 MMHG | HEART RATE: 75 BPM | HEIGHT: 62 IN | BODY MASS INDEX: 25.8 KG/M2 | DIASTOLIC BLOOD PRESSURE: 71 MMHG | WEIGHT: 140.19 LBS

## 2018-01-12 DIAGNOSIS — M34.9 SCLERODERMA: ICD-10-CM

## 2018-01-12 DIAGNOSIS — Z94.4 S/P LIVER TRANSPLANT: ICD-10-CM

## 2018-01-12 DIAGNOSIS — M18.11 OSTEOARTHRITIS OF RIGHT THUMB: ICD-10-CM

## 2018-01-12 DIAGNOSIS — J98.4 RESTRICTIVE LUNG DISEASE: ICD-10-CM

## 2018-01-12 DIAGNOSIS — L30.9 HAND ECZEMA: ICD-10-CM

## 2018-01-12 DIAGNOSIS — M65.312 TRIGGER THUMB OF LEFT HAND: ICD-10-CM

## 2018-01-12 DIAGNOSIS — M34.9 LIMITED SCLERODERMA: ICD-10-CM

## 2018-01-12 DIAGNOSIS — M75.102 TEAR OF LEFT ROTATOR CUFF, UNSPECIFIED TEAR EXTENT: ICD-10-CM

## 2018-01-12 PROCEDURE — 99213 OFFICE O/P EST LOW 20 MIN: CPT | Mod: S$PBB,,, | Performed by: INTERNAL MEDICINE

## 2018-01-12 PROCEDURE — 99213 OFFICE O/P EST LOW 20 MIN: CPT | Mod: PBBFAC | Performed by: INTERNAL MEDICINE

## 2018-01-12 PROCEDURE — 99999 PR PBB SHADOW E&M-EST. PATIENT-LVL III: CPT | Mod: PBBFAC,,, | Performed by: INTERNAL MEDICINE

## 2018-01-12 RX ORDER — SILDENAFIL CITRATE 20 MG/1
20 TABLET ORAL 3 TIMES DAILY
Qty: 270 TABLET | Refills: 3 | Status: SHIPPED | OUTPATIENT
Start: 2018-01-12 | End: 2018-04-09 | Stop reason: SDUPTHER

## 2018-01-12 RX ORDER — DIPHENHYDRAMINE HCL 25 MG
25 CAPSULE ORAL EVERY 6 HOURS PRN
COMMUNITY

## 2018-01-12 RX ORDER — CLOBETASOL PROPIONATE 0.5 MG/G
OINTMENT TOPICAL 2 TIMES DAILY
Qty: 60 G | Refills: 0 | Status: SHIPPED | OUTPATIENT
Start: 2018-01-12 | End: 2020-01-17

## 2018-01-12 ASSESSMENT — ROUTINE ASSESSMENT OF PATIENT INDEX DATA (RAPID3)
AM STIFFNESS SCORE: 1, YES
MDHAQ FUNCTION SCORE: 1.8
WHEN YOU AWAKENED IN THE MORNING OVER THE LAST WEEK, PLEASE INDICATE THE AMOUNT OF TIME IT TAKES UNTIL YOU ARE AS LIMBER AS YOU WILL BE FOR THE DAY: 8 HRS
PAIN SCORE: 10
PSYCHOLOGICAL DISTRESS SCORE: 4.4
FATIGUE SCORE: 6.5
PATIENT GLOBAL ASSESSMENT SCORE: 10
TOTAL RAPID3 SCORE: 8.66

## 2018-01-12 NOTE — ASSESSMENT & PLAN NOTE
Keep regularly scheduled appt late March with PFTs  Increase Revatio to 20mg tid  Cont amlodipine 5mg daily

## 2018-01-12 NOTE — PROGRESS NOTES
"Subjective:       Patient ID: Nadeen Mcgovern is a 57 y.o. female.    Chief Complaint: lcSSc, EUGENE + 1:320 nucleolar, +SS-A, OA hands  HPI  For past 2 weeks severe erythematous, scaly intensely  Pruritic  eczematoid rash palmar largely slightly antecubital. Hasn't seen Dr. Souza in Derm since 12/16 for bilateral hand eczema. Had planned NB-UVB but never done. She is using TAC cream as previously prescribed. Raynaud's stable despite recent cold weather. Remains on amlodipine and taking Revatio 20mg only twice daily Dysphagia improved after E & D  Review of Systems   Constitutional: Positive for fatigue. Negative for appetite change, fever and unexpected weight change.   HENT: Negative for mouth sores.         Dry mouth   Eyes: Negative for visual disturbance.   Respiratory: Positive for cough. Negative for shortness of breath and wheezing.    Cardiovascular: Negative for chest pain and palpitations.   Gastrointestinal: Negative for abdominal pain, anal bleeding, blood in stool, constipation, diarrhea, nausea and vomiting.   Genitourinary: Negative for dysuria, frequency and urgency.   Musculoskeletal: Negative for arthralgias, back pain, gait problem, joint swelling, myalgias, neck pain and neck stiffness.   Skin: Positive for color change and rash.   Neurological: Positive for numbness and headaches. Negative for weakness.   Hematological: Negative for adenopathy. Does not bruise/bleed easily.   Psychiatric/Behavioral: Negative for sleep disturbance. The patient is not nervous/anxious.          Objective:   /71   Pulse 75   Ht 5' 2.4" (1.585 m)   Wt 63.6 kg (140 lb 3.2 oz)   LMP  (LMP Unknown)   BMI 25.32 kg/m²      Physical Exam   Pulmonary/Chest: No respiratory distress. She has no wheezes. She has no rales.                 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 MRSS=17 increased from 2 previous!. No evidence of scleroderma esophagus by esophageal manometry or upper endoscopy  EUGENE+ 1:320 nucleolar(scleroderma pattern) and speckled + SS-A (most typical of Sjogren's and SLE), Raynaud's, telangiectases, dysphagia. All scleroderma associated autoantibodies negative         Trigger thumb of left hand    Overview     Left thumb and ring finger flexor tenosynovitis, failed left thumb inj x 1 by Ortho 4/2016, injected x 3 1/19/17         Rotator cuff tear, left    Current Assessment & Plan     Left rotator cuff tear, adhesive capsulitis, followed by Ortho         Hand eczema    Current Assessment & Plan     F/u Dr. Souza for severe progressive hand eczema, likely needs UVB Rx         Osteoarthritis of right thumb    Overview     Right thumb mcp severe OA

## 2018-01-12 NOTE — PATIENT INSTRUCTIONS
Increase Revatio to 20mg three times daily  See Dr. Kari Souza for NB- UVB for eczema    Clobetasol 0.05% twice to three times daily, place saran wrap over and then white cloth gloves  Aquaphor hourly

## 2018-01-12 NOTE — TELEPHONE ENCOUNTER
Call pt, informed her that she will need to see provider again before UV light therapy. Pt scheduled for 1/16    ----- Message from Kenya Mendez sent at 1/12/2018 12:10 PM CST -----  Contact: pt at [phone]  Harley pt-pts hands are turning another color.  Hands itching,scaly ,red and splitting.  Saw Harley a years ago and never did the uv light

## 2018-01-16 ENCOUNTER — OFFICE VISIT (OUTPATIENT)
Dept: DERMATOLOGY | Facility: CLINIC | Age: 58
End: 2018-01-16
Payer: MEDICARE

## 2018-01-16 ENCOUNTER — TELEPHONE (OUTPATIENT)
Dept: DERMATOLOGY | Facility: CLINIC | Age: 58
End: 2018-01-16

## 2018-01-16 DIAGNOSIS — L30.9 ECZEMA, UNSPECIFIED TYPE: ICD-10-CM

## 2018-01-16 DIAGNOSIS — L25.9 CONTACT DERMATITIS, UNSPECIFIED CONTACT DERMATITIS TYPE, UNSPECIFIED TRIGGER: Primary | ICD-10-CM

## 2018-01-16 PROCEDURE — 99999 PR PBB SHADOW E&M-EST. PATIENT-LVL II: CPT | Mod: PBBFAC,,, | Performed by: DERMATOLOGY

## 2018-01-16 PROCEDURE — 99214 OFFICE O/P EST MOD 30 MIN: CPT | Mod: S$PBB,,, | Performed by: DERMATOLOGY

## 2018-01-16 PROCEDURE — 99212 OFFICE O/P EST SF 10 MIN: CPT | Mod: PBBFAC | Performed by: DERMATOLOGY

## 2018-01-16 RX ORDER — BETAMETHASONE DIPROPIONATE 0.5 MG/G
CREAM TOPICAL 2 TIMES DAILY
Qty: 45 G | Refills: 3 | Status: SHIPPED | OUTPATIENT
Start: 2018-01-16 | End: 2020-04-28

## 2018-01-16 RX ORDER — PREDNISONE 20 MG/1
TABLET ORAL
Qty: 11 TABLET | Refills: 0 | Status: SHIPPED | OUTPATIENT
Start: 2018-01-16 | End: 2018-03-15

## 2018-01-16 NOTE — TELEPHONE ENCOUNTER
Spoke with pt, states her insurance company approved the patch testing. Informed her that she is all set up for the patch testing appointments.     ----- Message from Kenya Mendez sent at 1/16/2018 10:34 AM CST -----  Contact: pt at [phone]  Harley pt-Pt saw Harley today and is ready to set up her patch testing.  Pt can be reached at above number.

## 2018-01-16 NOTE — PROGRESS NOTES
Subjective:       Patient ID:  Nadeen Mcgovern is a 57 y.o. female who presents for   Chief Complaint   Patient presents with    Eczema     both hands same      Eczema  - Initial  Affected locations: left arm, right arm, right hand and left hand  Duration: 2 weeks  Signs / symptoms: dryness, itching, peeling, scaling, tender, pain, burning, cracking and crusting  Severity: moderate to severe  Timing: constant  Aggravated by: cold weather and sweating  Treatments tried: been using cold/packs(vegetables packs) to cool down to calm down the itching   Improvement on treatment: mild    Derm Hx: Denies personal or family h/o skin cancer but pt is high risk due to immunosuppression s/p liver transplant 10/2015 (due to cholangiocarcinoma); also with h/o limited scleroderma, Raynaud's (followed by Rheumatology    Past Medical History:   Diagnosis Date    Acute cholecystitis     Cholecystitis    Arthritis 2015    septic arthritis of right shoulder    Bacteremia due to Streptococcus pneumoniae     Cancer     Cholangiocarcinoma     Jaundice     obstructive    Prediabetes      Review of Systems   Constitutional: Positive for chills. Negative for fever, fatigue and malaise.   Skin: Positive for itching, rash, dry skin and activity-related sunscreen use. Negative for daily sunscreen use.   Hematologic/Lymphatic: Bruises/bleeds easily.        Objective:    Physical Exam   Constitutional: She appears well-developed and well-nourished.   Neurological: She is alert and oriented to person, place, and time.   Psychiatric: She has a normal mood and affect.   Skin:   Areas Examined (abnormalities noted in diagram):   Head / Face Inspection Performed  Back Inspection Performed  RUE Inspected  LUE Inspection Performed  Nails and Digits Inspection Performed              Diagram Legend     Erythematous scaling macule/papule c/w actinic keratosis       Vascular papule c/w angioma      Pigmented verrucoid papule/plaque c/w seborrheic  keratosis      Yellow umbilicated papule c/w sebaceous hyperplasia      Irregularly shaped tan macule c/w lentigo     1-2 mm smooth white papules consistent with Milia      Movable subcutaneous cyst with punctum c/w epidermal inclusion cyst      Subcutaneous movable cyst c/w pilar cyst      Firm pink to brown papule c/w dermatofibroma      Pedunculated fleshy papule(s) c/w skin tag(s)      Evenly pigmented macule c/w junctional nevus     Mildly variegated pigmented, slightly irregular-bordered macule c/w mildly atypical nevus      Flesh colored to evenly pigmented papule c/w intradermal nevus       Pink pearly papule/plaque c/w basal cell carcinoma      Erythematous hyperkeratotic cursted plaque c/w SCC      Surgical scar with no sign of skin cancer recurrence      Open and closed comedones      Inflammatory papules and pustules      Verrucoid papule consistent consistent with wart     Erythematous eczematous patches and plaques     Dystrophic onycholytic nail with subungual debris c/w onychomycosis     Umbilicated papule    Erythematous-base heme-crusted tan verrucoid plaque consistent with inflamed seborrheic keratosis     Erythematous Silvery Scaling Plaque c/w Psoriasis     See annotation      Assessment / Plan:        Contact dermatitis, unspecified contact dermatitis type, unspecified trigger  -     Patch Testing; Future      Eczema, unspecified type  -     NB-UVB Light Therapy; Standing  -     predniSONE (DELTASONE) 20 MG tablet; Take 2 pills qd x 3d, then 1 pills qd x3d, then 1/2 pill qd x 3d  Dispense: 11 tablet; Refill: 0  -     betamethasone dipropionate (DIPROLENE) 0.05 % cream; Apply topically 2 (two) times daily.  Dispense: 45 g; Refill: 3  - Start prednisone taper for symptom relief  - Suspect component of allergic contact dermatitis. Recommend patch testing  - Start nbUVB to hands 2-3 times weekly  - Continue betamethasone cream BID, vaseline, and gentle cleansers         Follow-up for for Patch  testing.

## 2018-01-17 ENCOUNTER — TELEPHONE (OUTPATIENT)
Dept: PHARMACY | Facility: CLINIC | Age: 58
End: 2018-01-17

## 2018-01-18 DIAGNOSIS — I73.00 RAYNAUD'S DISEASE WITHOUT GANGRENE: ICD-10-CM

## 2018-01-18 RX ORDER — AMLODIPINE BESYLATE 5 MG/1
TABLET ORAL
Qty: 90 TABLET | Refills: 3 | Status: SHIPPED | OUTPATIENT
Start: 2018-01-18 | End: 2018-06-15 | Stop reason: SDUPTHER

## 2018-01-29 ENCOUNTER — CLINICAL SUPPORT (OUTPATIENT)
Dept: DERMATOLOGY | Facility: CLINIC | Age: 58
End: 2018-01-29
Payer: MEDICARE

## 2018-01-29 DIAGNOSIS — L25.9 CONTACT DERMATITIS, UNSPECIFIED CONTACT DERMATITIS TYPE, UNSPECIFIED TRIGGER: ICD-10-CM

## 2018-01-29 PROCEDURE — 99212 OFFICE O/P EST SF 10 MIN: CPT | Mod: PBBFAC,PO,25

## 2018-01-29 PROCEDURE — 95044 PATCH/APPLICATION TESTS: CPT | Mod: PBBFAC,PO

## 2018-01-29 PROCEDURE — 99999 PR PBB SHADOW E&M-EST. PATIENT-LVL II: CPT | Mod: PBBFAC,,,

## 2018-01-29 NOTE — PROGRESS NOTES
[place] screening series applied to patients back. Follow up on Wed for 1st reading.   (70 allergens applied)

## 2018-01-31 ENCOUNTER — OFFICE VISIT (OUTPATIENT)
Dept: DERMATOLOGY | Facility: CLINIC | Age: 58
End: 2018-01-31
Payer: MEDICARE

## 2018-01-31 DIAGNOSIS — L25.9 CONTACT DERMATITIS, UNSPECIFIED CONTACT DERMATITIS TYPE, UNSPECIFIED TRIGGER: Primary | ICD-10-CM

## 2018-01-31 PROCEDURE — 99212 OFFICE O/P EST SF 10 MIN: CPT | Mod: PBBFAC | Performed by: DERMATOLOGY

## 2018-01-31 PROCEDURE — 99999 PR PBB SHADOW E&M-EST. PATIENT-LVL II: CPT | Mod: PBBFAC,,, | Performed by: DERMATOLOGY

## 2018-01-31 PROCEDURE — 99212 OFFICE O/P EST SF 10 MIN: CPT | Mod: S$PBB,,, | Performed by: DERMATOLOGY

## 2018-01-31 NOTE — PROGRESS NOTES
Subjective:       Patient ID:  Nadeen Mcgovern is a 57 y.o. female who presents for   Chief Complaint   Patient presents with    Patch Testing     HPI   Pt presents for 1st patch test reading today (eczema vs contact derm)    Derm Hx: Denies personal or family h/o skin cancer but pt is high risk due to immunosuppression s/p liver transplant 10/2015 (due to cholangiocarcinoma); also with h/o limited scleroderma, Raynaud's (followed by Rheumatology)    Past Medical History:   Diagnosis Date    Acute cholecystitis     Cholecystitis    Arthritis 2015    septic arthritis of right shoulder    Bacteremia due to Streptococcus pneumoniae     Cancer     Cholangiocarcinoma     Jaundice     obstructive    Prediabetes      Review of Systems   Constitutional: Negative for fever, fatigue and malaise.   Skin: Positive for itching, rash, dry skin and activity-related sunscreen use. Negative for daily sunscreen use.   Hematologic/Lymphatic: Bruises/bleeds easily.        Objective:    Physical Exam   Constitutional: She appears well-developed and well-nourished.   Neurological: She is alert and oriented to person, place, and time.   Psychiatric: She has a normal mood and affect.   Skin:   Areas Examined (abnormalities noted in diagram):   Back Inspection Performed  RUE Inspected  LUE Inspection Performed  Nails and Digits Inspection Performed              Diagram Legend     Erythematous scaling macule/papule c/w actinic keratosis       Vascular papule c/w angioma      Pigmented verrucoid papule/plaque c/w seborrheic keratosis      Yellow umbilicated papule c/w sebaceous hyperplasia      Irregularly shaped tan macule c/w lentigo     1-2 mm smooth white papules consistent with Milia      Movable subcutaneous cyst with punctum c/w epidermal inclusion cyst      Subcutaneous movable cyst c/w pilar cyst      Firm pink to brown papule c/w dermatofibroma      Pedunculated fleshy papule(s) c/w skin tag(s)      Evenly pigmented macule  c/w junctional nevus     Mildly variegated pigmented, slightly irregular-bordered macule c/w mildly atypical nevus      Flesh colored to evenly pigmented papule c/w intradermal nevus       Pink pearly papule/plaque c/w basal cell carcinoma      Erythematous hyperkeratotic cursted plaque c/w SCC      Surgical scar with no sign of skin cancer recurrence      Open and closed comedones      Inflammatory papules and pustules      Verrucoid papule consistent consistent with wart     Erythematous eczematous patches and plaques     Dystrophic onycholytic nail with subungual debris c/w onychomycosis     Umbilicated papule    Erythematous-base heme-crusted tan verrucoid plaque consistent with inflamed seborrheic keratosis     Erythematous Silvery Scaling Plaque c/w Psoriasis     See annotation      Assessment / Plan:        Contact dermatitis, unspecified contact dermatitis type, unspecified trigger vs Eczema  Patch testing was performed using standard technique. Tests were read after 48 hours.     Interpretation:  No reaction today         Follow-up for Fri for final patch test reading.

## 2018-02-02 ENCOUNTER — OFFICE VISIT (OUTPATIENT)
Dept: DERMATOLOGY | Facility: CLINIC | Age: 58
End: 2018-02-02
Payer: MEDICARE

## 2018-02-02 DIAGNOSIS — L25.9 CONTACT DERMATITIS, UNSPECIFIED CONTACT DERMATITIS TYPE, UNSPECIFIED TRIGGER: Primary | ICD-10-CM

## 2018-02-02 PROCEDURE — 3008F BODY MASS INDEX DOCD: CPT | Mod: S$GLB,,, | Performed by: NURSE PRACTITIONER

## 2018-02-02 PROCEDURE — 99212 OFFICE O/P EST SF 10 MIN: CPT | Mod: S$GLB,,, | Performed by: NURSE PRACTITIONER

## 2018-02-02 PROCEDURE — 99999 PR PBB SHADOW E&M-EST. PATIENT-LVL III: CPT | Mod: PBBFAC,,, | Performed by: NURSE PRACTITIONER

## 2018-02-02 PROCEDURE — 99213 OFFICE O/P EST LOW 20 MIN: CPT | Mod: PBBFAC | Performed by: NURSE PRACTITIONER

## 2018-02-02 NOTE — PROGRESS NOTES
Patient here for 2nd patch test reading.    HPI: Hx of rash to bilateral hands and upper extremities for approximately 1 yr. Come and goes. +itching, redness, cracking, and peeling. Last appointment with [facility] 1/16/18- treated with Betamethasone 0.05% BID (under occlusion qhs) and prednisone taper. Last dose prednisone approx 2 weeks. + flare since stopping prednisone.     PE: erythematous scaling plaques to entire palmar surface of bilateral hands. Forearms clear.     IMP/PLAN:  R/O Allergic Contact Dermatitis    +1: Colophony  +2: nickel sulfate hexahydrate; methylchloroisothiazolinone/methylisothiazolinone; oleamidopropyl dimethylamine  +4: propylene glycol; hydrocortisone-17 butyrate      Information provided to patient about these allergens and what to avoid. Printout from [employer] database will be sent to patient, which lists all products safe for the patient to use that do not contain these particular allergens.

## 2018-02-05 ENCOUNTER — TELEPHONE (OUTPATIENT)
Dept: DERMATOLOGY | Facility: CLINIC | Age: 58
End: 2018-02-05

## 2018-03-14 ENCOUNTER — TELEPHONE (OUTPATIENT)
Dept: INTERNAL MEDICINE | Facility: CLINIC | Age: 58
End: 2018-03-14

## 2018-03-14 DIAGNOSIS — Z12.31 ENCOUNTER FOR SCREENING MAMMOGRAM FOR BREAST CANCER: Primary | ICD-10-CM

## 2018-03-14 NOTE — TELEPHONE ENCOUNTER
----- Message from Shayla Samson sent at 3/14/2018 10:12 AM CDT -----  Contact: Self  X   1st Request  _  2nd Request  _  3rd Request        Who: SHILO HAMPTON [[id number]]    Why: Requesting a call back in regards to an order she needs for a mammogram placed in the system.    Please return the call at earliest convenience. Thanks!    What Number to Call Back: [phone]      When to Expect a call back: (Within 24 hours)

## 2018-03-15 ENCOUNTER — OFFICE VISIT (OUTPATIENT)
Dept: RHEUMATOLOGY | Facility: CLINIC | Age: 58
End: 2018-03-15
Payer: MEDICARE

## 2018-03-15 VITALS
SYSTOLIC BLOOD PRESSURE: 123 MMHG | HEART RATE: 68 BPM | WEIGHT: 144.38 LBS | DIASTOLIC BLOOD PRESSURE: 74 MMHG | BODY MASS INDEX: 26.57 KG/M2 | HEIGHT: 62 IN

## 2018-03-15 DIAGNOSIS — D84.9 IMMUNOSUPPRESSION: ICD-10-CM

## 2018-03-15 DIAGNOSIS — M18.11 OSTEOARTHRITIS OF RIGHT THUMB: ICD-10-CM

## 2018-03-15 DIAGNOSIS — M75.102 TEAR OF LEFT ROTATOR CUFF, UNSPECIFIED TEAR EXTENT: ICD-10-CM

## 2018-03-15 DIAGNOSIS — Z85.09 HISTORY OF BILE DUCT CANCER: ICD-10-CM

## 2018-03-15 DIAGNOSIS — M34.9 LIMITED SCLERODERMA: ICD-10-CM

## 2018-03-15 DIAGNOSIS — I73.00 RAYNAUD'S DISEASE WITHOUT GANGRENE: ICD-10-CM

## 2018-03-15 DIAGNOSIS — I73.00 RAYNAUD'S PHENOMENON WITHOUT GANGRENE: ICD-10-CM

## 2018-03-15 DIAGNOSIS — Z94.4 S/P LIVER TRANSPLANT: Primary | ICD-10-CM

## 2018-03-15 PROCEDURE — 99214 OFFICE O/P EST MOD 30 MIN: CPT | Mod: S$GLB,,, | Performed by: INTERNAL MEDICINE

## 2018-03-15 PROCEDURE — 99999 PR PBB SHADOW E&M-EST. PATIENT-LVL IV: CPT | Mod: PBBFAC,,, | Performed by: INTERNAL MEDICINE

## 2018-03-15 PROCEDURE — 99499 UNLISTED E&M SERVICE: CPT | Mod: S$GLB,,, | Performed by: INTERNAL MEDICINE

## 2018-03-15 ASSESSMENT — ROUTINE ASSESSMENT OF PATIENT INDEX DATA (RAPID3)
PATIENT GLOBAL ASSESSMENT SCORE: 7
FATIGUE SCORE: 1.5
PSYCHOLOGICAL DISTRESS SCORE: 2.2
AM STIFFNESS SCORE: 0, NO
TOTAL RAPID3 SCORE: 6.94
MDHAQ FUNCTION SCORE: 1.3
PAIN SCORE: 9.5

## 2018-03-15 NOTE — PATIENT INSTRUCTIONS
Be sure and take sildenafil(Revatio) 20mg three times daily  F/u Dr. Souza for persistent hand and now foot eczema

## 2018-03-15 NOTE — ASSESSMENT & PLAN NOTE
MRSS=6 mild, improved      Increase sildenafil 20mg three times daily as prescribed  If no improvement Raynaud's, increase amlodipine 10mg daily if tolerated  PFTs ordered  ESR, CRP added to Hepatology labs early April  RTC 3 months

## 2018-03-15 NOTE — PROGRESS NOTES
"Subjective:       Patient ID: Nadeen Mcgovern is a 57 y.o. female.    Chief Complaint: lcSSc, EUGENE+ 1:320 nucleolar, SS-A+, OA hands; hand eczema  HPI The hand eczema v contact dermatitis hands slightly improved with twice daily betamethasone 0.05%. Now has on soles of both feet and using betamethasone 0.05% twice daily on soles of feet. Raynaud's unchanged, but still only taking sildenafil 20mg twice instead of three times daily. Taking amlodipine 5mg daily. No other changes in skin texture. No digital ulcers. No heartburn, rare dysphagia. No cough/SOB.  Review of Systems   Constitutional: Positive for fatigue. Negative for appetite change, fever and unexpected weight change.   HENT: Negative for mouth sores.         Dry mouth   Eyes: Negative for visual disturbance.   Respiratory: Negative for cough, shortness of breath and wheezing.    Cardiovascular: Negative for chest pain and palpitations.   Gastrointestinal: Negative for abdominal pain, anal bleeding, blood in stool, constipation, diarrhea, nausea and vomiting.   Genitourinary: Negative for dysuria, frequency and urgency.   Musculoskeletal: Negative for arthralgias, back pain, gait problem, joint swelling, myalgias, neck pain and neck stiffness.   Skin: Positive for color change and rash.   Neurological: Positive for numbness and headaches. Negative for weakness.   Hematological: Negative for adenopathy. Bruises/bleeds easily.   Psychiatric/Behavioral: Negative for sleep disturbance. The patient is not nervous/anxious.          Objective:   /74   Pulse 68   Ht 5' 2.4" (1.585 m)   Wt 65.5 kg (144 lb 6.4 oz)   LMP  (LMP Unknown)   BMI 26.07 kg/m²      Physical Exam   Constitutional: She is oriented to person, place, and time and well-developed, well-nourished, and in no distress.   HENT:   Head: Normocephalic and atraumatic.   Mouth/Throat: Oropharynx is clear and moist.   Eyes: Conjunctivae and EOM are normal.   Neck: Normal range of motion. Neck " supple. No thyromegaly present.   Cardiovascular: Normal rate, regular rhythm, normal heart sounds and intact distal pulses.  Exam reveals no gallop and no friction rub.    No murmur heard.  Pulmonary/Chest: Breath sounds normal. She has no wheezes. She has no rales. She exhibits no tenderness.   Abdominal: Soft. She exhibits no distension and no mass. There is no tenderness.       Right Side Rheumatological Exam     Examination finds the shoulder, elbow, wrist, knee, 1st PIP, 1st MCP, 2nd PIP, 2nd MCP, 3rd PIP, 3rd MCP, 4th PIP, 4th MCP, 5th PIP and 5th MCP normal.    Left Side Rheumatological Exam     Examination finds the shoulder, elbow, wrist, knee, 1st PIP, 1st MCP, 2nd PIP, 2nd MCP, 3rd PIP, 3rd MCP, 4th PIP, 4th MCP, 5th PIP and 5th MCP normal.      Lymphadenopathy:     She has no cervical adenopathy.   Neurological: She is alert and oriented to person, place, and time. She displays normal reflexes. Gait normal.   Nl motor strength UE and LE bilateral   Skin: Rash noted. No erythema. No pallor.     See photos hands and feet    mRSS =6   Psychiatric: Mood, memory, affect and judgment normal.   Musculoskeletal: She exhibits no edema.                              Results for SHILO HAMPTON (MRN [id number]) as of 3/15/2018 09:20   Ref. Range 1/2/2018 08:20   WBC Latest Ref Range: 3.90 - 12.70 K/uL 5.06   RBC Latest Ref Range: 4.00 - 5.40 M/uL 4.52   Hemoglobin Latest Ref Range: 12.0 - 16.0 g/dL 13.2   Hematocrit Latest Ref Range: 37.0 - 48.5 % 39.6   MCV Latest Ref Range: 82 - 98 fL 88   MCH Latest Ref Range: 27.0 - 31.0 pg 29.2   MCHC Latest Ref Range: 32.0 - 36.0 g/dL 33.3   RDW Latest Ref Range: 11.5 - 14.5 % 12.8   Platelets Latest Ref Range: 150 - 350 K/uL 208   MPV Latest Ref Range: 9.2 - 12.9 fL 9.8   Gran% Latest Ref Range: 38.0 - 73.0 % 71.9   Gran # (ANC) Latest Ref Range: 1.8 - 7.7 K/uL 3.6   Immature Granulocytes Latest Ref Range: 0.0 - 0.5 % 0.4   Immature Grans (Abs) Latest Ref Range: 0.00 - 0.04 K/uL  0.02   Lymph% Latest Ref Range: 18.0 - 48.0 % 17.6 (L)   Lymph # Latest Ref Range: 1.0 - 4.8 K/uL 0.9 (L)   Mono% Latest Ref Range: 4.0 - 15.0 % 8.5   Mono # Latest Ref Range: 0.3 - 1.0 K/uL 0.4   Eosinophil% Latest Ref Range: 0.0 - 8.0 % 1.2   Eos # Latest Ref Range: 0.0 - 0.5 K/uL 0.1   Basophil% Latest Ref Range: 0.0 - 1.9 % 0.4   Baso # Latest Ref Range: 0.00 - 0.20 K/uL 0.02   nRBC Latest Ref Range: 0 /100 WBC 0   Sodium Latest Ref Range: 136 - 145 mmol/L 140   Potassium Latest Ref Range: 3.5 - 5.1 mmol/L 4.0   Chloride Latest Ref Range: 95 - 110 mmol/L 108   CO2 Latest Ref Range: 23 - 29 mmol/L 23   Anion Gap Latest Ref Range: 8 - 16 mmol/L 9   BUN, Bld Latest Ref Range: 6 - 20 mg/dL 8   Creatinine Latest Ref Range: 0.5 - 1.4 mg/dL 0.7   eGFR if non African American Latest Ref Range: >60 mL/min/1.73 m^2 >60.0   eGFR if African American Latest Ref Range: >60 mL/min/1.73 m^2 >60.0   Glucose Latest Ref Range: 70 - 110 mg/dL 96   Calcium Latest Ref Range: 8.7 - 10.5 mg/dL 9.4   Alkaline Phosphatase Latest Ref Range: 55 - 135 U/L 98   Total Protein Latest Ref Range: 6.0 - 8.4 g/dL 7.8   Albumin Latest Ref Range: 3.5 - 5.2 g/dL 3.7   Total Bilirubin Latest Ref Range: 0.1 - 1.0 mg/dL 0.7   Bilirubin, Direct Latest Ref Range: 0.1 - 0.3 mg/dL 0.3   AST Latest Ref Range: 10 - 40 U/L 22   ALT Latest Ref Range: 10 - 44 U/L 31   AFP Latest Ref Range: 0.0 - 8.4 ng/mL 1.4   CA 19-9 Latest Ref Range: 2.0 - 40.0 U/mL 9.0   Tacrolimus Lvl Latest Ref Range: 5.0 - 15.0 ng/mL 1.9 (L)   Sirolimus Lvl Latest Ref Range: 4.0 - 20.0 ng/mL 5.9   Differential Method Unknown Automated        Assessment/Plan         Problem List Items Addressed This Visit     S/P liver transplant - Primary    Overview     S/p liver transplant 10/8/15 for unresectable cholangiocarcinoma and biliary tract carcinoma) on sirolimus and tacrolimus, s/p chemotherapy prior         Immunosuppression    RESOLVED: History of bile duct cancer    Limited scleroderma     Overview     lcSSc(limited scleroderma): ACR/EULAR criteria: (no sclerodactyly today) digital tip pitting scars(3) telangiectases(2) Raynaud's(3) =8 does not meet  criteria ; oxaliplatin(cisplatin associated with Raynaud's) ; 5-FU not known to be associated with Raynaud's or scleroderma. MRSS=17 increased from 2 previous!. No evidence of scleroderma esophagus by esophageal manometry or upper endoscopy  EUGENE+ 1:320 nucleolar(scleroderma pattern) and speckled + SS-A (most typical of Sjogren's and SLE), Raynaud's, telangiectases, dysphagia. All scleroderma associated autoantibodies negative    2D Echo w/ Color Flow Doppler   Order: [id number]   Status:  Final result   Visible to patient:  Yes (Patient Portal) Next appt:  02/20/2018 at 04:45 PM in Orthopedics (Jovana Rossi MD) Dx:  Scleroderma; Aortic valve regurgitati...     Ref Range & Units 3mo ago  (9/25/17) 1yr ago  (9/2/16) 1yr ago  (8/17/16) 2yr ago  (5/7/15) 2yr ago  (1/23/15)    EF 55 - 65 60   60  55  60     Diastolic Dysfunction  No  No  No  No  No     Aortic Valve Regurgitation  MILD   MILD TO MODERATE   MILD  MILD TO MODERATE      Est. PA Systolic Pressure  25.47    28  24     Mitral Valve Mobility  NORMAL     NORMAL    Resulting Agency  CVIS CVIS CVIS CVIS CVIS   Narrative                  PFTs          FVC     SVC    RV   DLco    9/25/17     122     122    30    89  4/7/17       123     124    49    91  8/18/16     107     107    110   88           Current Assessment & Plan     MRSS=6 mild, improved      Increase sildenafil 20mg three times daily as prescribed  If no improvement Raynaud's, increase amlodipine 10mg daily if tolerated  PFTs ordered  ESR, CRP added to Hepatology labs early April  RTC 3 months         Relevant Orders    Spriometry w/Tracings    DLCO    Lung Volume    Six Minute Walk    Sedimentation rate, manual    C-reactive protein    Rotator cuff tear, left    Raynaud's phenomenon    Current Assessment & Plan     Cont amlodipine 5mg  daily  Cont sildenafil 20mg three times daily         Raynaud's disease without gangrene    Osteoarthritis of right thumb    Overview     Right thumb mcp severe OA

## 2018-03-20 ENCOUNTER — HOSPITAL ENCOUNTER (OUTPATIENT)
Dept: PULMONOLOGY | Facility: CLINIC | Age: 58
Discharge: HOME | End: 2018-03-20
Payer: MEDICARE

## 2018-03-20 VITALS — WEIGHT: 141 LBS | HEIGHT: 62 IN | BODY MASS INDEX: 25.95 KG/M2

## 2018-03-20 DIAGNOSIS — M34.9 LIMITED SCLERODERMA: ICD-10-CM

## 2018-03-20 LAB
PRE FEV1 FVC: 86
PRE FEV1: 2.97
PRE FVC: 3.44
PREDICTED FEV1 FVC: 82
PREDICTED FEV1: 2.32
PREDICTED FVC: 2.86

## 2018-03-20 PROCEDURE — 94618 PULMONARY STRESS TESTING: CPT | Mod: S$GLB,,, | Performed by: INTERNAL MEDICINE

## 2018-03-20 PROCEDURE — 94010 BREATHING CAPACITY TEST: CPT | Mod: S$GLB,,, | Performed by: INTERNAL MEDICINE

## 2018-03-20 PROCEDURE — 94729 DIFFUSING CAPACITY: CPT | Mod: S$GLB,,, | Performed by: INTERNAL MEDICINE

## 2018-03-20 PROCEDURE — 94727 GAS DIL/WSHOT DETER LNG VOL: CPT | Mod: S$GLB,,, | Performed by: INTERNAL MEDICINE

## 2018-03-20 NOTE — PROCEDURES
Nadeen Mcgovern is a 57 y.o.  female patient, who presents for a 6 minute walk test ordered by MD. Carrie.  The diagnosis is Scleroderma/CREST.  The patient's BMI is 25.8 kg/m2.  Predicted distance (lower limit of normal) is 384.7 meters.      Test Results:    The test was completed with stops.  The patient stopped 1 times for a total of 85 seconds.  The total time walked was 275 seconds.  During walking, the patient reported:  Leg pain. The patient used no assistive devices  during testing.     03/20/2018---------Distance: 335.28 meters (1100 feet)     O2 Sat % Supplemental Oxygen Heart Rate Blood Pressure Adamaris Scale   Pre-exercise  (Resting) 99 % Room Air 64 bpm 124/60 mmHg 3   During Exercise 100 % Room Air 82 bpm 130/60 mmHg 3   Post-exercise  (Recovery) 99 % Room Air  67 bpm   mmHg       Recovery Time: 60 seconds    Performing nurse/tech: KIZZY Edward.      PREVIOUS STUDY:   The patient had a previous study.  09/25/2017---------Distance: 304.8 meters (1000 feet)       O2 Sat % Supplemental Oxygen Heart Rate Blood Pressure Adamaris Scale   Pre-exercise  (Resting) 98 % Room Air 68 bpm 133/61 mmHg 2   During Exercise 98 % Room Air 83 bpm 137/76 mmHg 4   Post-exercise  (Recovery) 98 % Room Air  69 bpm   mmHg          CLINICAL INTERPRETATION:  Six minute walk distance is 335.28 meters (1100 feet) with moderate dyspnea.  During exercise, there was no significant desaturation while breathing room air.  Both blood pressure and heart rate remained stable with walking.  The patient reported non-pulmonary symptoms during exercise.  The patient did complete the study, walking 275 seconds of the 360 second test.  Since the previous study in September 2017, exercise capacity is unchanged.  Based upon age and body mass index, exercise capacity is less than predicted.

## 2018-03-23 ENCOUNTER — HOSPITAL ENCOUNTER (OUTPATIENT)
Dept: RADIOLOGY | Facility: OTHER | Age: 58
Discharge: HOME | End: 2018-03-23
Attending: INTERNAL MEDICINE
Payer: MEDICARE

## 2018-03-23 DIAGNOSIS — Z12.31 ENCOUNTER FOR SCREENING MAMMOGRAM FOR BREAST CANCER: ICD-10-CM

## 2018-03-23 PROCEDURE — 77067 SCR MAMMO BI INCL CAD: CPT | Mod: 26,,, | Performed by: RADIOLOGY

## 2018-03-23 PROCEDURE — 77067 SCR MAMMO BI INCL CAD: CPT | Mod: TC

## 2018-03-23 PROCEDURE — 77063 BREAST TOMOSYNTHESIS BI: CPT | Mod: 26,,, | Performed by: RADIOLOGY

## 2018-04-03 ENCOUNTER — LAB VISIT (OUTPATIENT)
Dept: LAB | Facility: HOSPITAL | Age: 58
End: 2018-04-03
Attending: INTERNAL MEDICINE
Payer: MEDICARE

## 2018-04-03 DIAGNOSIS — C22.1 CHOLANGIOCARCINOMA: ICD-10-CM

## 2018-04-03 DIAGNOSIS — M34.9 LIMITED SCLERODERMA: ICD-10-CM

## 2018-04-03 DIAGNOSIS — R97.8 OTHER ABNORMAL TUMOR MARKERS: ICD-10-CM

## 2018-04-03 DIAGNOSIS — Z94.4 LIVER REPLACED BY TRANSPLANT: ICD-10-CM

## 2018-04-03 LAB
AFP SERPL-MCNC: 1.5 NG/ML
ALBUMIN SERPL BCP-MCNC: 3.9 G/DL
ALP SERPL-CCNC: 84 U/L
ALT SERPL W/O P-5'-P-CCNC: 21 U/L
ANION GAP SERPL CALC-SCNC: 10 MMOL/L
AST SERPL-CCNC: 19 U/L
BASOPHILS # BLD AUTO: 0.02 K/UL
BASOPHILS NFR BLD: 0.5 %
BILIRUB DIRECT SERPL-MCNC: 0.2 MG/DL
BILIRUB SERPL-MCNC: 0.5 MG/DL
BUN SERPL-MCNC: 12 MG/DL
CALCIUM SERPL-MCNC: 9.2 MG/DL
CANCER AG19-9 SERPL-ACNC: 7 U/ML
CEA SERPL-MCNC: 2.2 NG/ML
CHLORIDE SERPL-SCNC: 110 MMOL/L
CO2 SERPL-SCNC: 22 MMOL/L
CREAT SERPL-MCNC: 0.8 MG/DL
CRP SERPL-MCNC: 1.6 MG/L
DIFFERENTIAL METHOD: NORMAL
EOSINOPHIL # BLD AUTO: 0.1 K/UL
EOSINOPHIL NFR BLD: 2.3 %
ERYTHROCYTE [DISTWIDTH] IN BLOOD BY AUTOMATED COUNT: 13 %
ERYTHROCYTE [SEDIMENTATION RATE] IN BLOOD BY WESTERGREN METHOD: 20 MM/HR
EST. GFR  (AFRICAN AMERICAN): >60 ML/MIN/1.73 M^2
EST. GFR  (NON AFRICAN AMERICAN): >60 ML/MIN/1.73 M^2
GLUCOSE SERPL-MCNC: 100 MG/DL
HCT VFR BLD AUTO: 39.2 %
HGB BLD-MCNC: 12.9 G/DL
IMM GRANULOCYTES # BLD AUTO: 0.01 K/UL
IMM GRANULOCYTES NFR BLD AUTO: 0.2 %
LYMPHOCYTES # BLD AUTO: 1.3 K/UL
LYMPHOCYTES NFR BLD: 29.2 %
MCH RBC QN AUTO: 29.4 PG
MCHC RBC AUTO-ENTMCNC: 32.9 G/DL
MCV RBC AUTO: 89 FL
MONOCYTES # BLD AUTO: 0.4 K/UL
MONOCYTES NFR BLD: 8.4 %
NEUTROPHILS # BLD AUTO: 2.6 K/UL
NEUTROPHILS NFR BLD: 59.4 %
NRBC BLD-RTO: 0 /100 WBC
PLATELET # BLD AUTO: 192 K/UL
PMV BLD AUTO: 9.5 FL
POTASSIUM SERPL-SCNC: 3.9 MMOL/L
PROT SERPL-MCNC: 7.4 G/DL
RBC # BLD AUTO: 4.39 M/UL
SIROLIMUS BLD-MCNC: 5.2 NG/ML
SODIUM SERPL-SCNC: 142 MMOL/L
TACROLIMUS BLD-MCNC: <1.5 NG/ML
WBC # BLD AUTO: 4.39 K/UL

## 2018-04-03 PROCEDURE — 80053 COMPREHEN METABOLIC PANEL: CPT

## 2018-04-03 PROCEDURE — 82378 CARCINOEMBRYONIC ANTIGEN: CPT

## 2018-04-03 PROCEDURE — 82105 ALPHA-FETOPROTEIN SERUM: CPT

## 2018-04-03 PROCEDURE — 86140 C-REACTIVE PROTEIN: CPT

## 2018-04-03 PROCEDURE — 86301 IMMUNOASSAY TUMOR CA 19-9: CPT

## 2018-04-03 PROCEDURE — 80195 ASSAY OF SIROLIMUS: CPT

## 2018-04-03 PROCEDURE — 80197 ASSAY OF TACROLIMUS: CPT

## 2018-04-03 PROCEDURE — 85651 RBC SED RATE NONAUTOMATED: CPT

## 2018-04-03 PROCEDURE — 82248 BILIRUBIN DIRECT: CPT

## 2018-04-03 PROCEDURE — 36415 COLL VENOUS BLD VENIPUNCTURE: CPT

## 2018-04-03 PROCEDURE — 85025 COMPLETE CBC W/AUTO DIFF WBC: CPT

## 2018-04-05 ENCOUNTER — PATIENT MESSAGE (OUTPATIENT)
Dept: RHEUMATOLOGY | Facility: CLINIC | Age: 58
End: 2018-04-05

## 2018-04-05 DIAGNOSIS — M34.9 SCLERODERMA: ICD-10-CM

## 2018-04-05 RX ORDER — SILDENAFIL CITRATE 20 MG/1
20 TABLET ORAL 3 TIMES DAILY
Qty: 270 TABLET | Refills: 3 | Status: CANCELLED | OUTPATIENT
Start: 2018-04-05 | End: 2019-04-05

## 2018-04-06 ENCOUNTER — TELEPHONE (OUTPATIENT)
Dept: TRANSPLANT | Facility: CLINIC | Age: 58
End: 2018-04-06

## 2018-04-06 NOTE — TELEPHONE ENCOUNTER
----- Message from Мария Benítez MD sent at 4/4/2018  9:35 AM CDT -----  Reviewed, nothing to do

## 2018-04-06 NOTE — TELEPHONE ENCOUNTER
Dr. Benítez reviewed your labs.  Continue routine labs no changes needed..  Letter sent for next lab appointment 08/03/18

## 2018-04-09 ENCOUNTER — PATIENT MESSAGE (OUTPATIENT)
Dept: RHEUMATOLOGY | Facility: CLINIC | Age: 58
End: 2018-04-09

## 2018-04-09 ENCOUNTER — TELEPHONE (OUTPATIENT)
Dept: PHARMACY | Facility: CLINIC | Age: 58
End: 2018-04-09

## 2018-04-09 DIAGNOSIS — M34.9 SCLERODERMA: ICD-10-CM

## 2018-04-09 RX ORDER — SILDENAFIL CITRATE 20 MG/1
20 TABLET ORAL 3 TIMES DAILY
Qty: 270 TABLET | Refills: 3 | Status: SHIPPED | OUTPATIENT
Start: 2018-04-09 | End: 2018-04-12 | Stop reason: SDUPTHER

## 2018-04-10 ENCOUNTER — OFFICE VISIT (OUTPATIENT)
Dept: INTERNAL MEDICINE | Facility: CLINIC | Age: 58
End: 2018-04-10
Payer: MEDICARE

## 2018-04-10 VITALS
DIASTOLIC BLOOD PRESSURE: 60 MMHG | HEIGHT: 62 IN | WEIGHT: 143.63 LBS | BODY MASS INDEX: 26.43 KG/M2 | HEART RATE: 68 BPM | SYSTOLIC BLOOD PRESSURE: 102 MMHG

## 2018-04-10 DIAGNOSIS — M54.41 CHRONIC RIGHT-SIDED LOW BACK PAIN WITH RIGHT-SIDED SCIATICA: Primary | ICD-10-CM

## 2018-04-10 DIAGNOSIS — Z94.4 S/P LIVER TRANSPLANT: ICD-10-CM

## 2018-04-10 DIAGNOSIS — D84.9 IMMUNOSUPPRESSION: ICD-10-CM

## 2018-04-10 DIAGNOSIS — G89.29 CHRONIC RIGHT-SIDED LOW BACK PAIN WITH RIGHT-SIDED SCIATICA: ICD-10-CM

## 2018-04-10 DIAGNOSIS — G47.00 INSOMNIA, UNSPECIFIED TYPE: ICD-10-CM

## 2018-04-10 DIAGNOSIS — M54.41 CHRONIC RIGHT-SIDED LOW BACK PAIN WITH RIGHT-SIDED SCIATICA: ICD-10-CM

## 2018-04-10 DIAGNOSIS — I70.0 AORTIC ATHEROSCLEROSIS: ICD-10-CM

## 2018-04-10 DIAGNOSIS — G89.29 CHRONIC RIGHT-SIDED LOW BACK PAIN WITH RIGHT-SIDED SCIATICA: Primary | ICD-10-CM

## 2018-04-10 PROCEDURE — 99214 OFFICE O/P EST MOD 30 MIN: CPT | Mod: S$GLB,,, | Performed by: INTERNAL MEDICINE

## 2018-04-10 PROCEDURE — 99999 PR PBB SHADOW E&M-EST. PATIENT-LVL III: CPT | Mod: PBBFAC,,, | Performed by: INTERNAL MEDICINE

## 2018-04-10 PROCEDURE — 99499 UNLISTED E&M SERVICE: CPT | Mod: S$GLB,,, | Performed by: INTERNAL MEDICINE

## 2018-04-10 RX ORDER — ZOLPIDEM TARTRATE 5 MG/1
5 TABLET ORAL NIGHTLY PRN
Qty: 30 TABLET | Refills: 2 | Status: SHIPPED | OUTPATIENT
Start: 2018-04-10 | End: 2018-10-15 | Stop reason: SDUPTHER

## 2018-04-10 RX ORDER — TIZANIDINE 4 MG/1
4 TABLET ORAL NIGHTLY PRN
Qty: 30 TABLET | Refills: 1 | Status: SHIPPED | OUTPATIENT
Start: 2018-04-10 | End: 2018-04-10 | Stop reason: SDUPTHER

## 2018-04-10 RX ORDER — TIZANIDINE 4 MG/1
TABLET ORAL
Qty: 90 TABLET | Refills: 1 | Status: SHIPPED | OUTPATIENT
Start: 2018-04-10 | End: 2018-10-11 | Stop reason: SDUPTHER

## 2018-04-10 RX ORDER — GABAPENTIN 100 MG/1
100 CAPSULE ORAL 3 TIMES DAILY PRN
Qty: 90 CAPSULE | Refills: 0 | Status: SHIPPED | OUTPATIENT
Start: 2018-04-10 | End: 2018-07-06 | Stop reason: SDUPTHER

## 2018-04-10 NOTE — PROGRESS NOTES
"Subjective:       Patient ID: Nadeen Mcgovern is a 57 y.o. female.    Chief Complaint: 6 mo f/u for insomnia    HPI   Reports pulled a muscle in the lower back after picking up turkey 12/2017. Went to ED.   Hip XR 12/27/17 - mild DJD. No fx.  CT pelvis 12/27/17 - degenerative changes of the hip. No mets. No fx. Disc bulge at L4-L5 w/ mild spinal canal stenosis and neural foraminal narrowing.   Pain has been intermittent since then. Most recently she was sweeping and had LBP w/ radiation down the back of the R leg. Sharp pain - 7-8 of 10. It stopped her in her track. She walked back to her bed slowly and laid down. She took 2 of tylenol 500mg. Eventually it went away.   Pain comes on about 2-3x/mo. Each time last about 2 hours.    Legs sometimes feel like they're going to give out. No falls.     Insomnia - takes ambien 5mg intermittently. Still controlling insomnia well when she had to take it.   Dysphagia - EGD s/p dilation of esophageal stenosis. Reports this is doing much better.   s/p liver transplant for cholangiocarcinoma s/p chemo - on sirolimus 2mg daily. Saw Dr. Lock 10/17/17 - f/u in 1 yr.  Limited scleroderma - Raynaud's - sildenafil 20 and amlodipine 5  TAM - 6MWT unchanged from 2017 - no desat. Seen Dr. Trivedi 10/25/17 - deemed due to deconditioning.  CT C/A/P 2017 - The aorta maintains normal caliber and course and demonstrates mild scattered calcific atherosclerosis. No signs of metastatic disease.    Review of Systems  as above in HPI.     Objective:      Physical Exam    /60 (BP Location: Left arm, Patient Position: Sitting, BP Method: Medium (Manual))   Pulse 68   Ht 5' 2" (1.575 m)   Wt 65.1 kg (143 lb 9.6 oz)   LMP  (LMP Unknown)   BMI 26.26 kg/m²     Gen - A+O4, NAD  HEENT - PERRL, OP clear. MMM.   Neck - no LAD  CV - RRR  CHEST - CTAB, no wheezing/rhonchi/crackles  Abd - S/NT/ND/+BS  Ext -2 + B radial and PT pulses. No LE edema.   Skin - taut skin of hands and feet and also some cracked " skin at the fingertips. no erythema.   MSK - pain on palpation of the lower lumbosacral spine and R buttocks palpation. Positive straight leg raise test on the R. 5/5 BUE and BLE strength. Antalgic gait.    Labs reviewed.     Assessment/Plan     Nadeen was seen today for raynaud's, back pain, hip pain and foot pain.    Diagnoses and all orders for this visit:    Chronic right-sided low back pain with right-sided sciatica - f/u in 2 mo. If pain worsens or does not improve, refer to back and spine.   -     Ambulatory consult to Physical Therapy  -     gabapentin (NEURONTIN) 100 MG capsule; Take 1 capsule (100 mg total) by mouth 3 (three) times daily as needed.  -     tiZANidine (ZANAFLEX) 4 MG tablet; Take 1 tablet (4 mg total) by mouth nightly as needed.    Insomnia, unspecified type  -     zolpidem (AMBIEN) 5 MG Tab; Take 1 tablet (5 mg total) by mouth nightly as needed.    S/P liver transplant on chronic Immunosuppression - no signs of infection currently. F/u w/ transplant.     Aortic atherosclerosis - cont asa 81mg daily.       Follow-up in about 2 months (around 6/10/2018).      Nadeen Figueroa MD  Department of Internal Medicine - [facility]  7:21 AM

## 2018-04-12 ENCOUNTER — PATIENT MESSAGE (OUTPATIENT)
Dept: RHEUMATOLOGY | Facility: CLINIC | Age: 58
End: 2018-04-12

## 2018-04-12 DIAGNOSIS — M34.9 SCLERODERMA: ICD-10-CM

## 2018-04-12 RX ORDER — SILDENAFIL CITRATE 20 MG/1
20 TABLET ORAL 3 TIMES DAILY
Qty: 270 TABLET | Refills: 3 | Status: SHIPPED | OUTPATIENT
Start: 2018-04-12 | End: 2018-06-15 | Stop reason: SDUPTHER

## 2018-04-12 NOTE — TELEPHONE ENCOUNTER
Documentation Only:    Faxed Prior Authorization form and supporting documentation for Sildenafil to insurance on 04/12/2018.

## 2018-04-16 ENCOUNTER — HOSPITAL ENCOUNTER (OUTPATIENT)
Dept: RADIOLOGY | Facility: HOSPITAL | Age: 58
Discharge: HOME | End: 2018-04-16
Attending: INTERNAL MEDICINE
Payer: MEDICARE

## 2018-04-16 DIAGNOSIS — C22.1 CHOLANGIOCARCINOMA: ICD-10-CM

## 2018-04-16 PROCEDURE — 74177 CT ABD & PELVIS W/CONTRAST: CPT | Mod: TC

## 2018-04-16 PROCEDURE — 25500020 PHARM REV CODE 255: Performed by: INTERNAL MEDICINE

## 2018-04-16 PROCEDURE — 71260 CT THORAX DX C+: CPT | Mod: TC

## 2018-04-16 PROCEDURE — 74177 CT ABD & PELVIS W/CONTRAST: CPT | Mod: 26,,, | Performed by: RADIOLOGY

## 2018-04-16 PROCEDURE — 71260 CT THORAX DX C+: CPT | Mod: 26,,, | Performed by: RADIOLOGY

## 2018-04-16 RX ADMIN — IOHEXOL 75 ML: 350 INJECTION, SOLUTION INTRAVENOUS at 11:04

## 2018-04-16 NOTE — TELEPHONE ENCOUNTER
Documentation Only:    Prior Authorization for Sildenafil has been approved for 8 months.    Approval dates:  04/13/2018 through 12/31/2018    Patient co-pay: $3.35

## 2018-04-18 ENCOUNTER — TELEPHONE (OUTPATIENT)
Dept: TRANSPLANT | Facility: CLINIC | Age: 58
End: 2018-04-18

## 2018-04-18 NOTE — TELEPHONE ENCOUNTER
----- Message from Мария Benítez MD sent at 4/18/2018 11:56 AM CDT -----  Reviewed, nothing to do

## 2018-04-20 ENCOUNTER — TELEPHONE (OUTPATIENT)
Dept: TRANSPLANT | Facility: CLINIC | Age: 58
End: 2018-04-20

## 2018-04-24 ENCOUNTER — CLINICAL SUPPORT (OUTPATIENT)
Dept: REHABILITATION | Facility: HOSPITAL | Age: 58
End: 2018-04-24
Attending: INTERNAL MEDICINE
Payer: MEDICARE

## 2018-04-24 DIAGNOSIS — M54.40 CHRONIC BILATERAL LOW BACK PAIN WITH SCIATICA, SCIATICA LATERALITY UNSPECIFIED: ICD-10-CM

## 2018-04-24 DIAGNOSIS — G89.29 CHRONIC BILATERAL LOW BACK PAIN WITH SCIATICA, SCIATICA LATERALITY UNSPECIFIED: ICD-10-CM

## 2018-04-24 PROBLEM — M54.50 CHRONIC BILATERAL LOW BACK PAIN: Status: ACTIVE | Noted: 2018-04-24

## 2018-04-24 PROCEDURE — G8978 MOBILITY CURRENT STATUS: HCPCS | Mod: CK,PO

## 2018-04-24 PROCEDURE — 97161 PT EVAL LOW COMPLEX 20 MIN: CPT | Mod: PO

## 2018-04-24 PROCEDURE — 97110 THERAPEUTIC EXERCISES: CPT | Mod: PO

## 2018-04-24 PROCEDURE — G8979 MOBILITY GOAL STATUS: HCPCS | Mod: CK,PO
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
## 2018-04-27 ENCOUNTER — OFFICE VISIT (OUTPATIENT)
Dept: DERMATOLOGY | Facility: CLINIC | Age: 58
End: 2018-04-27
Payer: MEDICARE

## 2018-04-27 VITALS — WEIGHT: 143 LBS | BODY MASS INDEX: 26.16 KG/M2

## 2018-04-27 DIAGNOSIS — L25.9 CONTACT DERMATITIS, UNSPECIFIED CONTACT DERMATITIS TYPE, UNSPECIFIED TRIGGER: ICD-10-CM

## 2018-04-27 DIAGNOSIS — B35.3 TINEA PEDIS OF BOTH FEET: ICD-10-CM

## 2018-04-27 PROCEDURE — 99213 OFFICE O/P EST LOW 20 MIN: CPT | Mod: 25,S$GLB,, | Performed by: DERMATOLOGY

## 2018-04-27 PROCEDURE — 87220 TISSUE EXAM FOR FUNGI: CPT | Mod: S$GLB,,, | Performed by: DERMATOLOGY

## 2018-04-27 PROCEDURE — 99999 PR PBB SHADOW E&M-EST. PATIENT-LVL III: CPT | Mod: PBBFAC,,, | Performed by: DERMATOLOGY

## 2018-04-27 RX ORDER — DESOXIMETASONE 2.5 MG/G
OINTMENT TOPICAL 2 TIMES DAILY
Qty: 60 G | Refills: 5 | Status: ON HOLD | OUTPATIENT
Start: 2018-04-27 | End: 2018-05-08 | Stop reason: CLARIF

## 2018-04-27 NOTE — PROGRESS NOTES
Subjective:       Patient ID:  Nadeen Mcgovern is a 57 y.o. female who presents for   Chief Complaint   Patient presents with    Dry Skin     bilateral hands and feet     See previous notes Dr Souza, pt with contact dermatitis on hands has flared recently attempts to avoid the antigens she is allergic to but is using clobetasol now which is positive for 2 of her allergens.   Liver transplant pt        Review of Systems   Constitutional: Negative for fever.   Skin: Positive for itching and rash.   Hematologic/Lymphatic: Does not bruise/bleed easily.        Objective:    Physical Exam   Skin:   Areas Examined (abnormalities noted in diagram):   Head / Face Inspection Performed  Neck Inspection Performed  RUE Inspected  LUE Inspection Performed  RLE Inspected  LLE Inspection Performed  Nails and Digits Inspection Performed                 Diagram Legend     Erythematous scaling macule/papule c/w actinic keratosis       Vascular papule c/w angioma      Pigmented verrucoid papule/plaque c/w seborrheic keratosis      Yellow umbilicated papule c/w sebaceous hyperplasia      Irregularly shaped tan macule c/w lentigo     1-2 mm smooth white papules consistent with Milia      Movable subcutaneous cyst with punctum c/w epidermal inclusion cyst      Subcutaneous movable cyst c/w pilar cyst      Firm pink to brown papule c/w dermatofibroma      Pedunculated fleshy papule(s) c/w skin tag(s)      Evenly pigmented macule c/w junctional nevus     Mildly variegated pigmented, slightly irregular-bordered macule c/w mildly atypical nevus      Flesh colored to evenly pigmented papule c/w intradermal nevus       Pink pearly papule/plaque c/w basal cell carcinoma      Erythematous hyperkeratotic cursted plaque c/w SCC      Surgical scar with no sign of skin cancer recurrence      Open and closed comedones      Inflammatory papules and pustules      Verrucoid papule consistent consistent with wart     Erythematous eczematous patches and  plaques     Dystrophic onycholytic nail with subungual debris c/w onychomycosis     Umbilicated papule    Erythematous-base heme-crusted tan verrucoid plaque consistent with inflamed seborrheic keratosis     Erythematous Silvery Scaling Plaque c/w Psoriasis     See annotation      Assessment / Plan:        Contact dermatitis, unspecified contact dermatitis type, unspecified trigger  -     desoximetasone 0.25 % ointment; Apply topically 2 (two) times daily.  Dispense: 60 g; Refill: 5  cerave lotion after hand washing, do not use liquid soaps in restrooms which may contain propylene glycol  'Previously patch tested see notes Dr Souza  Avoid:  +1: Colophony  +2: nickel sulfate hexahydrate; methylchloroisothiazolinone/methylisothiazolinone; oleamidopropyl dimethylamine  +4: propylene glycol; hydrocortisone-17 butyrate    She is using diprolene cream and some clobetasol cream, both contain propylene glycol was told to dc  In addition clobetasol cross reacts the the hydrocortisone butyrate, told pt to only use topicort pr diprolene ointment (not cream) and to avoid prednisone, decadron ok   Explained door knobs, shopping cart handles etc have nickel in them and should consider gloves  Feel her main culprits are the propylene glycol, nickel and cortisone.     Tinea pedis of both feet, also contact dermatitis feet  Zeasorb af powder             Follow-up in about 3 months (around 7/27/2018).

## 2018-05-08 ENCOUNTER — HOSPITAL ENCOUNTER (INPATIENT)
Facility: HOSPITAL | Age: 58
LOS: 1 days | Discharge: HOME | DRG: 603 | End: 2018-05-09
Attending: EMERGENCY MEDICINE | Admitting: EMERGENCY MEDICINE
Payer: MEDICARE

## 2018-05-08 DIAGNOSIS — L03.116 CELLULITIS OF LEFT FOOT: Primary | ICD-10-CM

## 2018-05-08 DIAGNOSIS — Z94.4 LIVER TRANSPLANT RECIPIENT: ICD-10-CM

## 2018-05-08 DIAGNOSIS — L25.9 CONTACT DERMATITIS, UNSPECIFIED CONTACT DERMATITIS TYPE, UNSPECIFIED TRIGGER: ICD-10-CM

## 2018-05-08 DIAGNOSIS — M79.672 LEFT FOOT PAIN: ICD-10-CM

## 2018-05-08 LAB
ALBUMIN SERPL BCP-MCNC: 3.8 G/DL
ALP SERPL-CCNC: 84 U/L
ALT SERPL W/O P-5'-P-CCNC: 21 U/L
ANION GAP SERPL CALC-SCNC: 10 MMOL/L
AST SERPL-CCNC: 26 U/L
BASOPHILS # BLD AUTO: 0.04 K/UL
BASOPHILS NFR BLD: 0.9 %
BILIRUB SERPL-MCNC: 0.5 MG/DL
BUN SERPL-MCNC: 8 MG/DL
CALCIUM SERPL-MCNC: 9.2 MG/DL
CHLORIDE SERPL-SCNC: 111 MMOL/L
CO2 SERPL-SCNC: 21 MMOL/L
CREAT SERPL-MCNC: 0.7 MG/DL
DIFFERENTIAL METHOD: ABNORMAL
EOSINOPHIL # BLD AUTO: 0.1 K/UL
EOSINOPHIL NFR BLD: 1.9 %
ERYTHROCYTE [DISTWIDTH] IN BLOOD BY AUTOMATED COUNT: 13.1 %
EST. GFR  (AFRICAN AMERICAN): >60 ML/MIN/1.73 M^2
EST. GFR  (NON AFRICAN AMERICAN): >60 ML/MIN/1.73 M^2
GLUCOSE SERPL-MCNC: 90 MG/DL
HCT VFR BLD AUTO: 44.7 %
HGB BLD-MCNC: 13.7 G/DL
IMM GRANULOCYTES # BLD AUTO: 0.01 K/UL
IMM GRANULOCYTES NFR BLD AUTO: 0.2 %
LACTATE SERPL-SCNC: 0.9 MMOL/L
LYMPHOCYTES # BLD AUTO: 1.1 K/UL
LYMPHOCYTES NFR BLD: 24.5 %
MCH RBC QN AUTO: 29.5 PG
MCHC RBC AUTO-ENTMCNC: 30.6 G/DL
MCV RBC AUTO: 96 FL
MONOCYTES # BLD AUTO: 0.4 K/UL
MONOCYTES NFR BLD: 8 %
NEUTROPHILS # BLD AUTO: 3 K/UL
NEUTROPHILS NFR BLD: 64.5 %
NRBC BLD-RTO: 0 /100 WBC
PLATELET # BLD AUTO: 167 K/UL
PMV BLD AUTO: 10.7 FL
POTASSIUM SERPL-SCNC: 4.5 MMOL/L
PROT SERPL-MCNC: 7.8 G/DL
RBC # BLD AUTO: 4.64 M/UL
SODIUM SERPL-SCNC: 142 MMOL/L
WBC # BLD AUTO: 4.62 K/UL

## 2018-05-08 PROCEDURE — 11000001 HC ACUTE MED/SURG PRIVATE ROOM

## 2018-05-08 PROCEDURE — 25000003 PHARM REV CODE 250: Performed by: INTERNAL MEDICINE

## 2018-05-08 PROCEDURE — 85025 COMPLETE CBC W/AUTO DIFF WBC: CPT

## 2018-05-08 PROCEDURE — 99284 EMERGENCY DEPT VISIT MOD MDM: CPT | Mod: ,,, | Performed by: EMERGENCY MEDICINE

## 2018-05-08 PROCEDURE — 25000003 PHARM REV CODE 250: Performed by: PHYSICIAN ASSISTANT

## 2018-05-08 PROCEDURE — 94761 N-INVAS EAR/PLS OXIMETRY MLT: CPT

## 2018-05-08 PROCEDURE — 83605 ASSAY OF LACTIC ACID: CPT

## 2018-05-08 PROCEDURE — 96365 THER/PROPH/DIAG IV INF INIT: CPT

## 2018-05-08 PROCEDURE — 63600175 PHARM REV CODE 636 W HCPCS: Performed by: PHYSICIAN ASSISTANT

## 2018-05-08 PROCEDURE — 87040 BLOOD CULTURE FOR BACTERIA: CPT | Mod: 59

## 2018-05-08 PROCEDURE — 99223 1ST HOSP IP/OBS HIGH 75: CPT | Mod: ,,, | Performed by: INTERNAL MEDICINE

## 2018-05-08 PROCEDURE — 80053 COMPREHEN METABOLIC PANEL: CPT

## 2018-05-08 PROCEDURE — 63600175 PHARM REV CODE 636 W HCPCS: Performed by: INTERNAL MEDICINE

## 2018-05-08 PROCEDURE — 99285 EMERGENCY DEPT VISIT HI MDM: CPT | Mod: 25

## 2018-05-08 PROCEDURE — 96375 TX/PRO/DX INJ NEW DRUG ADDON: CPT

## 2018-05-08 RX ORDER — VANCOMYCIN/0.9 % SOD CHLORIDE 1 G/100 ML
1000 PLASTIC BAG, INJECTION (ML) INTRAVENOUS
Status: DISCONTINUED | OUTPATIENT
Start: 2018-05-08 | End: 2018-05-09 | Stop reason: HOSPADM

## 2018-05-08 RX ORDER — VANCOMYCIN 2 GRAM/500 ML IN 0.9 % SODIUM CHLORIDE INTRAVENOUS
2000
Status: COMPLETED | OUTPATIENT
Start: 2018-05-08 | End: 2018-05-08

## 2018-05-08 RX ORDER — SILDENAFIL CITRATE 20 MG/1
20 TABLET ORAL 3 TIMES DAILY
Status: DISCONTINUED | OUTPATIENT
Start: 2018-05-08 | End: 2018-05-09 | Stop reason: HOSPADM

## 2018-05-08 RX ORDER — CEFTRIAXONE 1 G/1
1 INJECTION, POWDER, FOR SOLUTION INTRAMUSCULAR; INTRAVENOUS
Status: DISCONTINUED | OUTPATIENT
Start: 2018-05-09 | End: 2018-05-09 | Stop reason: HOSPADM

## 2018-05-08 RX ORDER — TACROLIMUS 0.5 MG/1
1 CAPSULE ORAL 2 TIMES DAILY
Status: DISCONTINUED | OUTPATIENT
Start: 2018-05-08 | End: 2018-05-09 | Stop reason: HOSPADM

## 2018-05-08 RX ORDER — SIROLIMUS 1 MG/1
2 TABLET, FILM COATED ORAL DAILY
Status: DISCONTINUED | OUTPATIENT
Start: 2018-05-08 | End: 2018-05-09 | Stop reason: HOSPADM

## 2018-05-08 RX ORDER — FAMOTIDINE 20 MG/1
20 TABLET, FILM COATED ORAL NIGHTLY
Status: DISCONTINUED | OUTPATIENT
Start: 2018-05-08 | End: 2018-05-09 | Stop reason: HOSPADM

## 2018-05-08 RX ORDER — TRAMADOL HYDROCHLORIDE 50 MG/1
50 TABLET ORAL ONCE
Status: COMPLETED | OUTPATIENT
Start: 2018-05-08 | End: 2018-05-08

## 2018-05-08 RX ORDER — GABAPENTIN 100 MG/1
100 CAPSULE ORAL 3 TIMES DAILY
Status: DISCONTINUED | OUTPATIENT
Start: 2018-05-08 | End: 2018-05-09 | Stop reason: HOSPADM

## 2018-05-08 RX ORDER — DIPHENHYDRAMINE HCL 25 MG
25 CAPSULE ORAL EVERY 6 HOURS PRN
Status: DISCONTINUED | OUTPATIENT
Start: 2018-05-08 | End: 2018-05-09 | Stop reason: HOSPADM

## 2018-05-08 RX ORDER — TIZANIDINE 2 MG/1
4 TABLET ORAL NIGHTLY PRN
Status: DISCONTINUED | OUTPATIENT
Start: 2018-05-08 | End: 2018-05-09 | Stop reason: HOSPADM

## 2018-05-08 RX ORDER — ZOLPIDEM TARTRATE 5 MG/1
5 TABLET ORAL NIGHTLY PRN
Status: DISCONTINUED | OUTPATIENT
Start: 2018-05-08 | End: 2018-05-09 | Stop reason: HOSPADM

## 2018-05-08 RX ORDER — DICLOFENAC SODIUM 10 MG/G
2 GEL TOPICAL 4 TIMES DAILY
Status: DISCONTINUED | OUTPATIENT
Start: 2018-05-08 | End: 2018-05-09 | Stop reason: HOSPADM

## 2018-05-08 RX ORDER — AMLODIPINE BESYLATE 5 MG/1
5 TABLET ORAL DAILY
Status: DISCONTINUED | OUTPATIENT
Start: 2018-05-08 | End: 2018-05-09 | Stop reason: HOSPADM

## 2018-05-08 RX ORDER — NAPROXEN SODIUM 220 MG/1
81 TABLET, FILM COATED ORAL DAILY
Status: DISCONTINUED | OUTPATIENT
Start: 2018-05-08 | End: 2018-05-09 | Stop reason: HOSPADM

## 2018-05-08 RX ADMIN — DIPHENHYDRAMINE HYDROCHLORIDE 25 MG: 25 CAPSULE ORAL at 09:05

## 2018-05-08 RX ADMIN — GABAPENTIN 100 MG: 100 CAPSULE ORAL at 03:05

## 2018-05-08 RX ADMIN — FAMOTIDINE 20 MG: 20 TABLET, FILM COATED ORAL at 09:05

## 2018-05-08 RX ADMIN — DICLOFENAC 2 G: 10 GEL TOPICAL at 09:05

## 2018-05-08 RX ADMIN — AMLODIPINE BESYLATE 5 MG: 5 TABLET ORAL at 01:05

## 2018-05-08 RX ADMIN — CEFTRIAXONE SODIUM 2 G: 2 INJECTION, POWDER, FOR SOLUTION INTRAMUSCULAR; INTRAVENOUS at 10:05

## 2018-05-08 RX ADMIN — Medication 1 G: at 10:05

## 2018-05-08 RX ADMIN — VANCOMYCIN HYDROCHLORIDE 2000 MG: 10 INJECTION, POWDER, LYOPHILIZED, FOR SOLUTION INTRAVENOUS at 10:05

## 2018-05-08 RX ADMIN — SIROLIMUS 2 MG: 1 TABLET, SUGAR COATED ORAL at 01:05

## 2018-05-08 RX ADMIN — TACROLIMUS 1 MG: 0.5 CAPSULE ORAL at 05:05

## 2018-05-08 RX ADMIN — THERA TABS 1 TABLET: TAB at 01:05

## 2018-05-08 RX ADMIN — TRAMADOL HYDROCHLORIDE 50 MG: 50 TABLET, FILM COATED ORAL at 10:05

## 2018-05-08 RX ADMIN — GABAPENTIN 100 MG: 100 CAPSULE ORAL at 09:05

## 2018-05-08 RX ADMIN — ASPIRIN 81 MG CHEWABLE TABLET 81 MG: 81 TABLET CHEWABLE at 01:05

## 2018-05-08 RX ADMIN — DICLOFENAC 2 G: 10 GEL TOPICAL at 05:05

## 2018-05-08 RX ADMIN — TIZANIDINE 4 MG: 2 TABLET ORAL at 01:05

## 2018-05-08 RX ADMIN — SILDENAFIL 20 MG: 20 TABLET ORAL at 09:05

## 2018-05-08 RX ADMIN — SILDENAFIL 20 MG: 20 TABLET ORAL at 03:05

## 2018-05-08 NOTE — PROGRESS NOTES
"Pt complaining of redness and bumpy skin to right forearm and "burning" to head.  Vancomycin stopped and reported to Dr. Arnold.  Dr. Arnold ordered to keep Vancomycin stopped.  "

## 2018-05-08 NOTE — MEDICAL/APP STUDENT
Hospital Medicine  History and Physical Exam    Team: [facility] MED [address] [facility]  Admit Date: 2018  EREN   Principal Problem:  <principal problem not specified>   Patient information was obtained from patient and ER records.   Primary care Physician: Nadeen Figueroa MD  Code status: Full Code    HPI:   Patient is a 57 year old female with PMHx of cholangiocarcinoma s/p liver transplant in Oct 2015, chronic immunosuppression on prograf 1 mg and Rapamune 1 mg, HTN, pre-diabetes, and arthritis. She presents to the ED for rash of bilateral feet. Patient recently seen by dermatology on 18 for similar complaint. Patient treated for contact dermatitis of bilateral feet and hands. Patient denies relief of symptoms with diprolene cream, clobetasol cream, and athelete's foot powder. She reports increased pain of left foot with warmth to the touch and swelling for approximately one week. Reports associated itching. She denies fever,chills, nausea, vomiting, sob, chest pain, abd pain, dysuria, diarrhea, or constipation. She is a former smoker and denies alcohol use.       Past Medical History: Patient has a past medical history of Acute cholecystitis; Arthritis (); Bacteremia due to Streptococcus pneumoniae; Cancer; Cholangiocarcinoma; Jaundice; and Prediabetes.    Past Surgical History: Patient has a past surgical history that includes  section; arthoscopic (Right); Shoulder arthroscopy; Incision and drainage of wound; and Liver transplant.    Social History: Patient reports that she has quit smoking. She has never used smokeless tobacco. She reports that she does not drink alcohol or use drugs.    Family History: family history includes Alcohol abuse in her brother; Arthritis in her father and sister; Cancer in her brother, father, and paternal grandmother; Diabetes in her mother; Heart attack in her mother; Hypertension in her mother; Stroke in her mother.    Medications:   Prior to Admission medications     Medication Sig Start Date End Date Taking? Authorizing Provider   amLODIPine (NORVASC) 5 MG tablet TAKE 1 TABLET BY MOUTH EVERY DAY. 1/18/18  Yes Haresh Butler MD   aspirin 81 MG Chew Take 81 mg by mouth once daily.   Yes Historical Provider, MD   diclofenac sodium (VOLTAREN) 1 % Gel Apply 2 g topically 4 (four) times daily. PRN 11/21/17  Yes Nadeen Figueroa MD   famotidine (PEPCID) 20 MG tablet Take 1 tablet (20 mg total) by mouth nightly. 10/27/17 10/27/18 Yes Мария Benítez MD   gabapentin (NEURONTIN) 100 MG capsule Take 1 capsule (100 mg total) by mouth 3 (three) times daily as needed. 4/10/18 4/10/19 Yes Nadeen Figueroa MD   hydrOXYzine pamoate (VISTARIL) 50 MG Cap Take 1 capsule (50 mg total) by mouth every 8 (eight) hours as needed. 10/24/16  Yes Alicia Nelson NP   multivitamin (THERAGRAN) tablet Take 1 tablet by mouth once daily. 10/12/15  Yes Marysol Zavaleta MD   PROGRAF 1 mg Cap Take 1 capsule (1 mg total) by mouth every 12 (twelve) hours. 12/27/17  Yes Мария Benítez MD   sildenafil, antihypertensive, (REVATIO) 20 mg Tab Take 1 tablet (20 mg total) by mouth 3 (three) times daily. 4/12/18 4/12/19 Yes Haresh Butler MD   sirolimus (RAPAMUNE) 1 MG Tab Take 2 tablets (2 mg total) by mouth once daily. 6/29/17 6/29/18 Yes Мария Benítez MD   tiZANidine (ZANAFLEX) 4 MG tablet TAKE 1 TABLET(4 MG) BY MOUTH EVERY NIGHT AS NEEDED 4/10/18  Yes Nadeen Figueroa MD   zolpidem (AMBIEN) 5 MG Tab Take 1 tablet (5 mg total) by mouth nightly as needed. 4/10/18  Yes Nadeen Figueroa MD   betamethasone dipropionate (DIPROLENE) 0.05 % cream Apply topically 2 (two) times daily. 1/16/18   Kari Souza MD   clobetasol 0.05% (TEMOVATE) 0.05 % Oint Apply topically 2 (two) times daily. 1/12/18 1/22/18  Haresh Butler MD   clotrimazole (LOTRIMIN) 1 % cream Apply topically 2 (two) times daily. 11/21/17   Nadeen Figueroa MD   diphenhydrAMINE (BENADRYL) 25 mg capsule Take 25 mg by mouth every 6 (six) hours as needed for Itching.    Historical Provider, MD    desoximetasone 0.25 % ointment Apply topically 2 (two) times daily. 4/27/18 5/8/18  Verona Flowers MD       Allergies: Patient has No Known Allergies.    ROS    Pain Scale: 0/10   Constitutional: no fever or chills  Respiratory: no cough or shortness of breath  Cardiovascular: no chest pain or palpitations  Gastrointestinal: no nausea or vomiting, no abdominal pain or change in bowel habits  Genitourinary: no hematuria or dysuria  Integument/Breast: no rash or pruritis  Hematologic/Lymphatic: no easy bruising or lymphadenopathy  Musculoskeletal: no arthralgias or myalgias. B/L foot swelling  Neurological: no seizures or tremors  Behavioral/Psych: no depression or anxiety    PEx  Temp:  [97.5 °F (36.4 °C)-98.2 °F (36.8 °C)]   Pulse:  [63-73]   Resp:  [11-18]   BP: (115-127)/(60-75)   SpO2:  [96 %-100 %]   Body mass index is 24.69 kg/m².         General appearance: Resting comfortably in NAD on RA  Mental status: Alert and oriented x 3  HEENT:  conjunctivae/corneas clear, PERRL  Neck: supple, thyroid not enlarged  Pulm:   normal respiratory effort, CTA B, no c/w/r  Card: RRR, S1, S2 normal, no murmur, click, rub or gallop  Abd: soft, NT, ND, BS present; no masses, no organomegaly  Ext: no c/c/e  Pulses: 2+, symmetric  Skin: B/L non erythematous scaling feet with 1+ edema and weeping  Neuro: CN II-XII grossly intact, no focal numbness or weakness, normal strength and tone         Intake/Output Summary (Last 24 hours) at 05/08/18 1537  Last data filed at 05/08/18 1050   Gross per 24 hour   Intake               50 ml   Output                0 ml   Net               50 ml       Recent Labs  Lab 05/08/18  0946   WBC 4.62   HGB 13.7   HCT 44.7          Recent Labs  Lab 05/08/18  1106      K 4.5   *   CO2 21*   BUN 8   CREATININE 0.7   GLU 90   CALCIUM 9.2       Recent Labs  Lab 05/08/18  1106   ALKPHOS 84   ALT 21   AST 26   ALBUMIN 3.8   PROT 7.8   BILITOT 0.5      No results for input(s): POCTGLUCOSE  in the last 168 hours.  No results for input(s): CPK, CPKMB, MB, TROPONINI in the last 72 hours.    Recent Labs      05/08/18   1043   LACTATE  0.9        Active Hospital Problems    Diagnosis  POA    Cellulitis of left foot [L03.116]  Yes      Resolved Hospital Problems    Diagnosis Date Resolved POA   No resolved problems to display.       Overview  Patient is a 57 year old female with PMHx of cholangiocarcinoma s/p liver transplant in Oct 2015, chronic immunosuppression on prograf 1 mg and Rapamune 1 mg, HTN, pre-diabetes, and arthritis. She presents to the ED for rash of bilateral feet.     Assessment and Plan for Problems addressed today:    1) B/L non erythematous feet swelling  · Scaling with 1+ edema and weeping   · Consulted dermatology    2) Cellulitis  · Vancomycin 1g/250mL /hr q12  · Rocephin 1g IV daily  · Cultures pending     3) S/P liver transplant (10/8/2015)  · Continue with tacrolimus  · Get a tacrolimus level      DVT PPx:     Provider    I personally scribed for Arnaldo Arnold MD on 05/08/2018 at 3:51 PM. Electronically signed by bear Manzo III on 05/08/2018 at 3:51 PM

## 2018-05-08 NOTE — ED TRIAGE NOTES
Presents to ER with complaint of fungus type rash with burning, itching, and peeling to her both feet for 2 weeks.  Rash is worse on the left and left foot is swollen.  Pt identifiers checked and correct    LOC:   · The pt is awake, alert, and aware of environment with an appropriate affect,  as well as speaking appropriately; AAOx3 to person, place, and situation  APPEARANCE:   · Pt resting comfortably and in no acute distress; clean and well groomed  SKIN:   · Skin is warm and dry; color consistent with ethnicity; pt has normal skin turgor and moist mucus membranes  MUSCULOSKELETAL:   · Pt moving all extremities well; absent of obvious deformities; Ambulates independently  RESPIRATORY:   · Airway is open and patent; respirations are spontaneous; normal effort and rate noted; absent of accessory-muscle use  · Breath sounds auscultated in all lung fields are noted to be clear and absent of adventitious sounds  CARDIAC:   · Pt denies chest pain; normal heart sounds auscultated; Pt has no peripheral edema noted; capillary refill < 3 seconds  · 2+ pulses palpated in all extremities   ABDOMEN:   · Soft and non-tender to palpation; no abnormal distention noted/reported; bowel sounds present x 4  NEUROLOGIC:   · PERRL, 3mm bilaterally, eyes open spontaneously; pt follows commands; facial expression symmetrical; equal hand  bilaterally; purposeful motor response noted  · Normal sensation to touch reported in distal region of all extremities

## 2018-05-08 NOTE — ED PROVIDER NOTES
Encounter Date: 2018    SCRIBE #1 NOTE: I, Sierra Marcelo, am scribing for, and in the presence of,  Dr. Bill. I have scribed the following portions of the note - the APC attestation.       History     Chief Complaint   Patient presents with    Foot Problem     they broke out on the bottom with itching     Patient is a 57 year old female with PMHx of liver transplant in Oct 2015 s/p cholangiocarcinoma, chronic immunosuppression on prograf 1 mg and Rapamune 1 mg, HTN, pre-diabetes, and arthritis. She presents to the ED for rash of bilateral feet. Patient recently seen by dermatology on 18 for similar complaint. Patient treated for contact dermatitis of bilateral feet and hands. Patient denies relief of symptoms with diprolene cream, clobetasol cream, and athelete's foot powder. She reports increased pain of left foot with warmth to the touch and swelling for approximately one week. Reports associated itching of bilateral feet. She denies fever,chills, nausea, vomiting, sob, chest pain, abd pain, dysuria, diarrhea, or constipation. She is a former smoker and denies alcohol use.           Review of patient's allergies indicates:  No Known Allergies  Past Medical History:   Diagnosis Date    Acute cholecystitis     Cholecystitis    Arthritis     septic arthritis of right shoulder    Bacteremia due to Streptococcus pneumoniae     Cancer     Cholangiocarcinoma     Jaundice     obstructive    Prediabetes      Past Surgical History:   Procedure Laterality Date    arthoscopic Right     drained infection     SECTION      INCISION AND DRAINAGE OF WOUND      s/p I&D of R thumb    LIVER TRANSPLANT      SHOULDER ARTHROSCOPY       Family History   Problem Relation Age of Onset    Cancer Father         Lung    Arthritis Father     Stroke Mother     Diabetes Mother     Hypertension Mother     Heart attack Mother     Cancer Paternal Grandmother         pancreatic cancer    Cancer Brother          Lung    Alcohol abuse Brother     Arthritis Sister     Colon cancer Neg Hx     Esophageal cancer Neg Hx      Social History   Substance Use Topics    Smoking status: Former Smoker    Smokeless tobacco: Never Used    Alcohol use No      Comment: socially     Review of Systems   Constitutional: Negative for fever.   HENT: Negative for sore throat.    Respiratory: Negative for shortness of breath.    Cardiovascular: Negative for chest pain.   Gastrointestinal: Negative for abdominal pain, nausea and vomiting.   Genitourinary: Negative for dysuria.   Musculoskeletal: Negative for back pain.        Left foot swelling.    Skin: Positive for rash and wound.   Neurological: Negative for weakness.   Hematological: Does not bruise/bleed easily.       Physical Exam     Initial Vitals [05/08/18 0812]   BP Pulse Resp Temp SpO2   126/69 73 16 98.2 °F (36.8 °C) 97 %      MAP       88         Physical Exam    Vitals reviewed.  Constitutional: She appears well-developed and well-nourished. She is not diaphoretic. No distress.   Patient resting comfortably in NAD on RA.   HENT:   Head: Normocephalic and atraumatic.   Nose: Nose normal.   Eyes: Conjunctivae and EOM are normal. Pupils are equal, round, and reactive to light.   Neck: Normal range of motion.   Cardiovascular: Normal rate and regular rhythm. Exam reveals no friction rub.    No murmur heard.  Pulmonary/Chest: Breath sounds normal. No respiratory distress. She has no wheezes. She has no rales.   Abdominal: Soft. Bowel sounds are normal. She exhibits no distension. There is no tenderness. There is no rebound.   Musculoskeletal: Normal range of motion.   Neurological: She is alert and oriented to person, place, and time. She has normal strength. No sensory deficit.   Skin: Skin is warm and dry. There is erythema.   Dry, scaling erythematous rash of bilateral feet on plantar surface. Excoriations noted to dorsum of left foot with swelling and warmth to the touch  extending to ankle.    Psychiatric: She has a normal mood and affect. Thought content normal.         ED Course   Procedures  Labs Reviewed   CBC W/ AUTO DIFFERENTIAL - Abnormal; Notable for the following:        Result Value    MCHC 30.6 (*)     All other components within normal limits   COMPREHENSIVE METABOLIC PANEL - Abnormal; Notable for the following:     Chloride 111 (*)     CO2 21 (*)     All other components within normal limits   CULTURE, BLOOD   CULTURE, BLOOD   LACTIC ACID, PLASMA        Imaging Results          X-Ray Foot Complete Left (Final result)  Result time 05/08/18 10:25:33    Final result by Samuel Montoya III, MD (05/08/18 10:25:33)                 Impression:      See above      Electronically signed by: Samuel Montoya MD  Date:    05/08/2018  Time:    10:25             Narrative:    EXAMINATION:  XR FOOT COMPLETE 3 VIEW LEFT    CLINICAL HISTORY:  Pain in left foot    FINDINGS:  No fracture dislocation bone destruction seen.  There is DJD and spurs on the calcaneus.                                         APC / Resident Notes:   Patient is a 57 year old female with PMHx of liver transplant in Oct 2015 s/p cholangiocarcinoma, chronic immunosuppression on prograf 1 mg and Rapamune 1 mg, HTN, pre-diabetes, and arthritis. She presents to the ED for rash of bilateral feet.     Will order labs and antibiotics. Will treat with vanc and rocephin while in ED. Will continue to monitor.     Differential diagnoses include, but are not limited to: cellulitis, contact dermatitis, eczema, or electrolyte imbalance.     Labs unremarkable for infectious process. Lactate WNL. Blood cultures pending. Xray left foot found to have No fracture or dislocation or bone destruction seen.     Will admit to medicine.     I have discussed and reviewed with my supervising physician.           Scribe Attestation:   Scribe #1: I performed the above scribed service and the documentation accurately describes the services I  performed. I attest to the accuracy of the note.    Attending Attestation:     Physician Attestation Statement for NP/PA:   I discussed this assessment and plan of this patient with the NP/PA, but I did not personally examine the patient. The face to face encounter was performed by the NP/PA.    Other NP/PA Attestation Additions:    History of Present Illness: 57 Y.O. female with co morbidities of liver transplant and chronic  immunosuppression, as well as eczema to hands and feet, presents with left foot pain and swelling.              Clinical Impression:   The primary encounter diagnosis was Cellulitis of left foot. Diagnoses of Contact dermatitis, unspecified contact dermatitis type, unspecified trigger, Liver transplant recipient, and Left foot pain were also pertinent to this visit.    Disposition:   Disposition: Admitted  Condition: Stable                        Sasha Law PA-C  05/08/18 4075

## 2018-05-08 NOTE — H&P
Hospital Medicine  History and Physical Exam    Team: [facility] MED [facility]  Admit Date: 2018  EREN   Principal Problem:  <principal problem not specified>   Patient information was obtained from patient and ER records.   Primary care Physician: Nadeen Figueroa MD  Code status: Full Code    HPI:   Patient is a 57 year old female with PMHx of cholangiocarcinoma s/p liver transplant in Oct 2015, chronic immunosuppression on prograf 1 mg and Rapamune 1 mg, HTN, pre-diabetes, and arthritis. She presents to the ED for rash of bilateral feet. Patient recently seen by dermatology on 18 for similar complaint. Patient treated for contact dermatitis of bilateral feet and hands. Patient denies relief of symptoms with diprolene cream, clobetasol cream, and athelete's foot powder. She reports increased pain of left foot with warmth to the touch and swelling for approximately one week. Reports associated itching. She denies fever,chills, nausea, vomiting, sob, chest pain, abd pain, dysuria, diarrhea, or constipation. She is a former smoker and denies alcohol use.       Past Medical History: Patient has a past medical history of Acute cholecystitis; Arthritis (); Bacteremia due to Streptococcus pneumoniae; Cancer; Cholangiocarcinoma; Jaundice; and Prediabetes.    Past Surgical History: Patient has a past surgical history that includes  section; arthoscopic (Right); Shoulder arthroscopy; Incision and drainage of wound; and Liver transplant.    Social History: Patient reports that she has quit smoking. She has never used smokeless tobacco. She reports that she does not drink alcohol or use drugs.    Family History: family history includes Alcohol abuse in her brother; Arthritis in her father and sister; Cancer in her brother, father, and paternal grandmother; Diabetes in her mother; Heart attack in her mother; Hypertension in her mother; Stroke in her mother.    Medications:   Prior to Admission medications     Medication Sig Start Date End Date Taking? Authorizing Provider   amLODIPine (NORVASC) 5 MG tablet TAKE 1 TABLET BY MOUTH EVERY DAY. 1/18/18  Yes Haresh Butler MD   aspirin 81 MG Chew Take 81 mg by mouth once daily.   Yes Historical Provider, MD   diclofenac sodium (VOLTAREN) 1 % Gel Apply 2 g topically 4 (four) times daily. PRN 11/21/17  Yes Nadeen Figueroa MD   famotidine (PEPCID) 20 MG tablet Take 1 tablet (20 mg total) by mouth nightly. 10/27/17 10/27/18 Yes Мария Benítez MD   gabapentin (NEURONTIN) 100 MG capsule Take 1 capsule (100 mg total) by mouth 3 (three) times daily as needed. 4/10/18 4/10/19 Yes Nadeen Figueroa MD   hydrOXYzine pamoate (VISTARIL) 50 MG Cap Take 1 capsule (50 mg total) by mouth every 8 (eight) hours as needed. 10/24/16  Yes Alicia Nelson NP   multivitamin (THERAGRAN) tablet Take 1 tablet by mouth once daily. 10/12/15  Yes Marysol Zavaleta MD   PROGRAF 1 mg Cap Take 1 capsule (1 mg total) by mouth every 12 (twelve) hours. 12/27/17  Yes Мария Benítez MD   sildenafil, antihypertensive, (REVATIO) 20 mg Tab Take 1 tablet (20 mg total) by mouth 3 (three) times daily. 4/12/18 4/12/19 Yes Haresh Butler MD   sirolimus (RAPAMUNE) 1 MG Tab Take 2 tablets (2 mg total) by mouth once daily. 6/29/17 6/29/18 Yes Мария Benítez MD   tiZANidine (ZANAFLEX) 4 MG tablet TAKE 1 TABLET(4 MG) BY MOUTH EVERY NIGHT AS NEEDED 4/10/18  Yes Nadeen Figueroa MD   zolpidem (AMBIEN) 5 MG Tab Take 1 tablet (5 mg total) by mouth nightly as needed. 4/10/18  Yes Nadeen Figueroa MD   betamethasone dipropionate (DIPROLENE) 0.05 % cream Apply topically 2 (two) times daily. 1/16/18   Kari Souza MD   clobetasol 0.05% (TEMOVATE) 0.05 % Oint Apply topically 2 (two) times daily. 1/12/18 1/22/18  Haresh Butler MD   clotrimazole (LOTRIMIN) 1 % cream Apply topically 2 (two) times daily. 11/21/17   Nadeen Figueroa MD   diphenhydrAMINE (BENADRYL) 25 mg capsule Take 25 mg by mouth every 6 (six) hours as needed for Itching.    Historical Provider, MD    desoximetasone 0.25 % ointment Apply topically 2 (two) times daily. 4/27/18 5/8/18  Verona Flowers MD       Allergies: Patient has No Known Allergies.    ROS    Pain Scale: 0/10   Constitutional: no fever or chills  Respiratory: no cough or shortness of breath  Cardiovascular: no chest pain or palpitations  Gastrointestinal: no nausea or vomiting, no abdominal pain or change in bowel habits  Genitourinary: no hematuria or dysuria  Integument/Breast: no rash or pruritis  Hematologic/Lymphatic: no easy bruising or lymphadenopathy  Musculoskeletal: no arthralgias or myalgias. B/L foot swelling  Neurological: no seizures or tremors  Behavioral/Psych: no depression or anxiety    PEx  Temp:  [97.5 °F (36.4 °C)-98.2 °F (36.8 °C)]   Pulse:  [62-73]   Resp:  [11-19]   BP: (115-128)/(60-76)   SpO2:  [96 %-100 %]   Body mass index is 24.69 kg/m².         General appearance: Resting comfortably in NAD on RA  Mental status: Alert and oriented x 3  HEENT:  conjunctivae/corneas clear, PERRL  Neck: supple, thyroid not enlarged  Pulm:   normal respiratory effort, CTA B, no c/w/r  Card: RRR, S1, S2 normal, no murmur, click, rub or gallop  Abd: soft, NT, ND, BS present; no masses, no organomegaly  Ext: no c/c/e  Pulses: 2+, symmetric  Skin: B/L non erythematous scaling feet with 1+ edema and weeping  Neuro: CN II-XII grossly intact, no focal numbness or weakness, normal strength and tone         Intake/Output Summary (Last 24 hours) at 05/08/18 1646  Last data filed at 05/08/18 1050   Gross per 24 hour   Intake               50 ml   Output                0 ml   Net               50 ml       Recent Labs  Lab 05/08/18  0946   WBC 4.62   HGB 13.7   HCT 44.7          Recent Labs  Lab 05/08/18  1106      K 4.5   *   CO2 21*   BUN 8   CREATININE 0.7   GLU 90   CALCIUM 9.2       Recent Labs  Lab 05/08/18  1106   ALKPHOS 84   ALT 21   AST 26   ALBUMIN 3.8   PROT 7.8   BILITOT 0.5      No results for input(s): POCTGLUCOSE  in the last 168 hours.  No results for input(s): CPK, CPKMB, MB, TROPONINI in the last 72 hours.    Recent Labs      05/08/18   1043   LACTATE  0.9        Active Hospital Problems    Diagnosis  POA    Cellulitis of left foot [L03.116]  Yes      Resolved Hospital Problems    Diagnosis Date Resolved POA   No resolved problems to display.       Overview  Patient is a 57 year old female with PMHx of cholangiocarcinoma s/p liver transplant in Oct 2015, chronic immunosuppression on prograf 1 mg and Rapamune 1 mg, HTN, pre-diabetes, and arthritis. She presents to the ED for rash of bilateral feet.     Assessment and Plan for Problems addressed today:    1) B/L non erythematous feet swelling  · Scaling with 1+ edema and weeping   · Consulted dermatology    2) Cellulitis  · Vancomycin 1g/250mL /hr q12  · Rocephin 1g IV daily  · Cultures pending     3) S/P liver transplant (10/8/2015)  · Continue with tacrolimus  · Get a tacrolimus level      DVT PPx:     Provider    I personally scribed for Arnaldo Arnold MD on 05/08/2018 at 3:51 PM. Electronically signed by bear Manzo III on 05/08/2018 at 3:51 PM

## 2018-05-09 VITALS
TEMPERATURE: 98 F | SYSTOLIC BLOOD PRESSURE: 127 MMHG | WEIGHT: 143.19 LBS | RESPIRATION RATE: 15 BRPM | HEART RATE: 63 BPM | BODY MASS INDEX: 26.35 KG/M2 | DIASTOLIC BLOOD PRESSURE: 67 MMHG | OXYGEN SATURATION: 96 % | HEIGHT: 62 IN

## 2018-05-09 PROBLEM — L25.9 CHRONIC CONTACT DERMATITIS: Status: ACTIVE | Noted: 2018-05-09

## 2018-05-09 PROCEDURE — 63600175 PHARM REV CODE 636 W HCPCS: Performed by: INTERNAL MEDICINE

## 2018-05-09 PROCEDURE — 25000003 PHARM REV CODE 250: Performed by: INTERNAL MEDICINE

## 2018-05-09 PROCEDURE — 99238 HOSP IP/OBS DSCHRG MGMT 30/<: CPT | Mod: ,,, | Performed by: INTERNAL MEDICINE

## 2018-05-09 RX ORDER — CLINDAMYCIN HYDROCHLORIDE 300 MG/1
300 CAPSULE ORAL EVERY 6 HOURS
Qty: 20 CAPSULE | Refills: 0 | Status: SHIPPED | OUTPATIENT
Start: 2018-05-09 | End: 2018-05-14

## 2018-05-09 RX ADMIN — ASPIRIN 81 MG CHEWABLE TABLET 81 MG: 81 TABLET CHEWABLE at 08:05

## 2018-05-09 RX ADMIN — DICLOFENAC 2 G: 10 GEL TOPICAL at 08:05

## 2018-05-09 RX ADMIN — SIROLIMUS 2 MG: 1 TABLET, SUGAR COATED ORAL at 09:05

## 2018-05-09 RX ADMIN — SILDENAFIL 20 MG: 20 TABLET ORAL at 08:05

## 2018-05-09 RX ADMIN — TACROLIMUS 1 MG: 0.5 CAPSULE ORAL at 08:05

## 2018-05-09 RX ADMIN — AMLODIPINE BESYLATE 5 MG: 5 TABLET ORAL at 08:05

## 2018-05-09 RX ADMIN — THERA TABS 1 TABLET: TAB at 08:05

## 2018-05-09 RX ADMIN — GABAPENTIN 100 MG: 100 CAPSULE ORAL at 08:05

## 2018-05-09 RX ADMIN — DIPHENHYDRAMINE HYDROCHLORIDE 25 MG: 25 CAPSULE ORAL at 08:05

## 2018-05-09 RX ADMIN — CEFTRIAXONE SODIUM 1 G: 1 INJECTION, POWDER, FOR SOLUTION INTRAMUSCULAR; INTRAVENOUS at 11:05

## 2018-05-09 NOTE — MEDICAL/APP STUDENT
Discharge Summary  Hospital Medicine    Attending Provider on Discharge: Dr Arnold  [facility] Medicine Team: [facility] HOSP MED B  Date of Admission:  5/8/2018     Date of Discharge:    Code status: Full Code    Active Hospital Problems    Diagnosis  POA    *Chronic contact dermatitis [L25.9]  Yes    Cellulitis of left foot [L03.116]  Yes      Resolved Hospital Problems    Diagnosis Date Resolved POA   No resolved problems to display.        HPI  Patient is a 57 year old female with PMHx of cholangiocarcinoma s/p liver transplant in Oct 2015, chronic immunosuppression on prograf 1 mg and Rapamune 1 mg, HTN, pre-diabetes, and arthritis. She presents to the ED for rash of bilateral feet. Patient recently seen by dermatology on 04/27/18 for similar complaint. Patient treated for contact dermatitis of bilateral feet and hands. Patient denies relief of symptoms with diprolene cream, clobetasol cream, and athelete's foot powder. She reports increased pain of left foot with warmth to the touch and swelling for approximately one week. Reports associated itching. She denies fever,chills, nausea, vomiting, sob, chest pain, abd pain, dysuria, diarrhea, or constipation. She is a former smoker and denies alcohol use.        Hospital Course   Educated on what allergens to avoid in creams used for rashes. Discussed avoidance of scratching feet with bare hands, use ice pack, cold compresses, fans etc as alternative pruritus relief.      Overview  Patient is a 57 year old female with PMHx of cholangiocarcinoma s/p liver transplant in Oct 2015, chronic immunosuppression on prograf 1 mg and Rapamune 1 mg, HTN, pre-diabetes, and arthritis. She presents to the ED for rash of bilateral feet.      Assessment and Plan for Problems addressed today:     B/L non erythematous feet swelling  · Scaling with 1+ edema and weeping   · Consulted dermatology     Cellulitis  · Vancomycin 1g/250mL /hr q12  · Rocephin 1g IV daily  · Cultures pending       S/P liver transplant (10/8/2015)  · Continue with tacrolimus  · Get a tacrolimus level        Recent Labs  Lab 05/08/18  0946   WBC 4.62   HGB 13.7   HCT 44.7          Recent Labs  Lab 05/08/18  1106      K 4.5   *   CO2 21*   BUN 8   CREATININE 0.7   GLU 90   CALCIUM 9.2       Recent Labs  Lab 05/08/18  1106   ALKPHOS 84   ALT 21   AST 26   ALBUMIN 3.8   PROT 7.8   BILITOT 0.5      No results for input(s): POCTGLUCOSE in the last 168 hours.  No results for input(s): CPK, CPKMB, MB, TROPONINI in the last 72 hours.    Recent Labs      05/08/18   1043   LACTATE  0.9        Procedures: none    Consultants: Dermatology: Rash and swelling, Rheumatology: Immunosuppression    Current Discharge Medication List      CONTINUE these medications which have NOT CHANGED    Details   amLODIPine (NORVASC) 5 MG tablet TAKE 1 TABLET BY MOUTH EVERY DAY.  Qty: 90 tablet, Refills: 3    Associated Diagnoses: Raynaud's disease without gangrene      aspirin 81 MG Chew Take 81 mg by mouth once daily.      diclofenac sodium (VOLTAREN) 1 % Gel Apply 2 g topically 4 (four) times daily. PRN  Qty: 1 Tube, Refills: 3      famotidine (PEPCID) 20 MG tablet Take 1 tablet (20 mg total) by mouth nightly.  Qty: 30 tablet, Refills: 11      gabapentin (NEURONTIN) 100 MG capsule Take 1 capsule (100 mg total) by mouth 3 (three) times daily as needed.  Qty: 90 capsule, Refills: 0    Associated Diagnoses: Chronic right-sided low back pain with right-sided sciatica      hydrOXYzine pamoate (VISTARIL) 50 MG Cap Take 1 capsule (50 mg total) by mouth every 8 (eight) hours as needed.  Qty: 30 capsule, Refills: 0    Associated Diagnoses: Eczema, unspecified type      multivitamin (THERAGRAN) tablet Take 1 tablet by mouth once daily.      PROGRAF 1 mg Cap Take 1 capsule (1 mg total) by mouth every 12 (twelve) hours.  Qty: 60 capsule, Refills: 11    Associated Diagnoses: Liver replaced by transplant      sildenafil, antihypertensive,  (REVATIO) 20 mg Tab Take 1 tablet (20 mg total) by mouth 3 (three) times daily.  Qty: 270 tablet, Refills: 3    Associated Diagnoses: Scleroderma      sirolimus (RAPAMUNE) 1 MG Tab Take 2 tablets (2 mg total) by mouth once daily.  Qty: 60 tablet, Refills: 5    Associated Diagnoses: Liver replaced by transplant      tiZANidine (ZANAFLEX) 4 MG tablet TAKE 1 TABLET(4 MG) BY MOUTH EVERY NIGHT AS NEEDED  Qty: 90 tablet, Refills: 1    Comments: **Patient requests 90 days supply**  Associated Diagnoses: Chronic right-sided low back pain with right-sided sciatica      zolpidem (AMBIEN) 5 MG Tab Take 1 tablet (5 mg total) by mouth nightly as needed.  Qty: 30 tablet, Refills: 2    Associated Diagnoses: Insomnia, unspecified type      betamethasone dipropionate (DIPROLENE) 0.05 % cream Apply topically 2 (two) times daily.  Qty: 45 g, Refills: 3    Associated Diagnoses: Contact dermatitis, unspecified contact dermatitis type, unspecified trigger      clobetasol 0.05% (TEMOVATE) 0.05 % Oint Apply topically 2 (two) times daily.  Qty: 60 g, Refills: 0    Associated Diagnoses: Hand eczema      clotrimazole (LOTRIMIN) 1 % cream Apply topically 2 (two) times daily.  Qty: 30 g, Refills: 3    Associated Diagnoses: Tinea manus      diphenhydrAMINE (BENADRYL) 25 mg capsule Take 25 mg by mouth every 6 (six) hours as needed for Itching.             Discharge Diet:regular diet with Normal Fluid intake of 1500 - 2000 mL per day    Activity: activity as tolerated    Discharge Condition: Good    Disposition: Home or Self Care    Tests pending at the time of discharge: none      Time spent  on the discharge of the patient including review of hospital course with the patient. reviewing discharge medications and arranging follow-up care     Discharge examination Patient was seen and examined on the date of discharge and determined to be suitable for discharge.    Discharge plan and follow up:    Patient is to be discharged home with follow-up  with Rehab PT, Dermatology, Rheumatology and PCP as shown below.     Future Appointments  Date Time Provider Department Center   5/10/2018 7:00 AM Ashley Holstein, PT [facility]   5/15/2018 7:00 AM Ashley Holstein, PT [facility]   5/17/2018 7:00 AM Ashley Holstein, PT [facility]   5/22/2018 7:00 AM Ashley Holstein, PT [facility]   5/22/2018 2:00 PM Kari Souza MD [facility] DERM [address]   5/24/2018 7:00 AM Ashley Holstein, PT [facility]   6/12/2018 8:30 AM LAB[facility]   6/15/2018 9:00 AM Haresh Butler MD [facility] RHEUM [address]   6/19/2018 11:40 AM Nadeen Figueroa MD [facility] IM [address] PCW   6/22/2018 8:10 AM Verona Flowers MD [facility] DERM [place]   7/10/2018 8:00 AM [facility] [address]       Provider    I personally scribed for Arnaldo Arnold MD on 05/09/2018 at 9:47 AM. Electronically signed by bear Manzo III on 05/09/2018 at 9:47 AM

## 2018-05-09 NOTE — ASSESSMENT & PLAN NOTE
Cellulitis likely 2/2 to traumatic scratching. Mildly erythematous, edematous and warm to touch - no overt purulent fluctuance. Patient has multiple risk factors for infection including poor skin barrier function 2/2 chronic contact dermatitis and history of liver transplantation w/ subsequent immunosuppression. CBC, CMP, lactic acid and ESR wnl.   - Continue ceftriaxone; transition to po antibiotics per primary team.   - Discussed avoidance of scratching feet with bare hands. Recommended ice packs, cold compresses, fans, etc. as alternative pruritus relief.

## 2018-05-09 NOTE — ASSESSMENT & PLAN NOTE
Present on bilateral hands and feet. Feet worse than hands. Previous patch testing results as stated in the HPI.   - Reiterated allergens that patient should avoid.   - Diclofenac gel patient is using contains propylene glycol, one of her listed allergies. Currently tolerating. Discussed stopping the medication if patient's hands and feet worsen while using.   - Continue Topicort and diprolene ointment.   - Continue antihistamines as needed.   - Discussed importance of controlling pruritus so that patient doesn't scratch uncontrollably and introduce an infection.

## 2018-05-09 NOTE — HPI
Ms. Mcgovern is a 58 y/o woman w/ history of liver transplantation, cholangiocarcinoma and contact dermatitis of the hands who was admitted for treatment of cellulitis. History of immunosuppression on prograf and rapamune. Patient was last seen by derm on 4/27. Has contact dermatitis of bilateral hands and feet at baseline.    No evidence of cellulitis at last appointment. Patient reports significant pruritus of the foot and endorses scratching. In the past 1 week patient noticed swelling, redness and pain of the left foot unlike her previous symptoms of contact dermatitis. Reports using topicort for her feet and hands since last appointment. Also using diclofenac cream.     History of contact dermatitis:   +1: Colophony  +2: nickel sulfate hexahydrate; methylchloroisothiazolinone/methylisothiazolinone; oleamidopropyl dimethylamine  +4: propylene glycol; hydrocortisone-17 butyrate

## 2018-05-09 NOTE — PLAN OF CARE
05/09/18 1054   Discharge Assessment   Assessment Type Discharge Planning Assessment   Confirmed/corrected address and phone number on facesheet? Yes   Assessment information obtained from? Patient   Expected Length of Stay (days) 1   Communicated expected length of stay with patient/caregiver yes   Prior to hospitilization cognitive status: Alert/Oriented   Prior to hospitalization functional status: Independent   Current cognitive status: Alert/Oriented   Current Functional Status: Independent   Lives With spouse   Able to Return to Prior Arrangements yes   Is patient able to care for self after discharge? Yes   Patient's perception of discharge disposition home or selfcare   Readmission Within The Last 30 Days no previous admission in last 30 days   Patient currently being followed by outpatient case management? No   Equipment Currently Used at Home none   Do you have any problems affording any of your prescribed medications? Yes   Is the patient taking medications as prescribed? yes   Does the patient have transportation home? Yes   Transportation Available family or friend will provide   Discharge Plan A Home with family   Discharge Plan B Home Health   Patient/Family In Agreement With Plan yes

## 2018-05-09 NOTE — DISCHARGE SUMMARY
Discharge Summary  Hospital Medicine    Attending Provider on Discharge: Dr Arnold  [facility] Medicine Team: [facility] HOSP MED B  Date of Admission:  5/8/2018     Date of Discharge:    Code status: Full Code    Active Hospital Problems    Diagnosis  POA    *Chronic contact dermatitis [L25.9]  Yes    Cellulitis of left foot [L03.116]  Yes      Resolved Hospital Problems    Diagnosis Date Resolved POA   No resolved problems to display.        HPI  Patient is a 57 year old female with PMHx of cholangiocarcinoma s/p liver transplant in Oct 2015, chronic immunosuppression on prograf 1 mg and Rapamune 1 mg, HTN, pre-diabetes, and arthritis. She presents to the ED for rash of bilateral feet. Patient recently seen by dermatology on 04/27/18 for similar complaint. Patient treated for contact dermatitis of bilateral feet and hands. Patient denies relief of symptoms with diprolene cream, clobetasol cream, and athelete's foot powder. She reports increased pain of left foot with warmth to the touch and swelling for approximately one week. Reports associated itching. She denies fever,chills, nausea, vomiting, sob, chest pain, abd pain, dysuria, diarrhea, or constipation. She is a former smoker and denies alcohol use.        Hospital Course   Educated on what allergens to avoid in creams used for rashes. Discussed avoidance of scratching feet with bare hands, use ice pack, cold compresses, fans etc as alternative pruritus relief.      Overview  Patient is a 57 year old female with PMHx of cholangiocarcinoma s/p liver transplant in Oct 2015, chronic immunosuppression on prograf 1 mg and Rapamune 1 mg, HTN, pre-diabetes, and arthritis. She presents to the ED for rash of bilateral feet.      Assessment and Plan for Problems addressed today:     B/L non erythematous feet swelling  · Scaling with 1+ edema and weeping   · Consulted dermatology     Cellulitis  · Vancomycin 1g/250mL /hr q12  · Rocephin 1g IV daily  · Cultures pending       S/P liver transplant (10/8/2015)  · Continue with tacrolimus  · Get a tacrolimus level        Recent Labs  Lab 05/08/18  0946   WBC 4.62   HGB 13.7   HCT 44.7          Recent Labs  Lab 05/08/18  1106      K 4.5   *   CO2 21*   BUN 8   CREATININE 0.7   GLU 90   CALCIUM 9.2       Recent Labs  Lab 05/08/18  1106   ALKPHOS 84   ALT 21   AST 26   ALBUMIN 3.8   PROT 7.8   BILITOT 0.5      No results for input(s): POCTGLUCOSE in the last 168 hours.  No results for input(s): CPK, CPKMB, MB, TROPONINI in the last 72 hours.    Recent Labs      05/08/18   1043   LACTATE  0.9        Procedures: none    Consultants: Dermatology: Rash and swelling, Rheumatology: Immunosuppression    Current Discharge Medication List      START taking these medications    Details   clindamycin (CLEOCIN) 300 MG capsule Take 1 capsule (300 mg total) by mouth every 6 (six) hours.  Qty: 20 capsule, Refills: 0         CONTINUE these medications which have NOT CHANGED    Details   amLODIPine (NORVASC) 5 MG tablet TAKE 1 TABLET BY MOUTH EVERY DAY.  Qty: 90 tablet, Refills: 3    Associated Diagnoses: Raynaud's disease without gangrene      aspirin 81 MG Chew Take 81 mg by mouth once daily.      diclofenac sodium (VOLTAREN) 1 % Gel Apply 2 g topically 4 (four) times daily. PRN  Qty: 1 Tube, Refills: 3      famotidine (PEPCID) 20 MG tablet Take 1 tablet (20 mg total) by mouth nightly.  Qty: 30 tablet, Refills: 11      gabapentin (NEURONTIN) 100 MG capsule Take 1 capsule (100 mg total) by mouth 3 (three) times daily as needed.  Qty: 90 capsule, Refills: 0    Associated Diagnoses: Chronic right-sided low back pain with right-sided sciatica      hydrOXYzine pamoate (VISTARIL) 50 MG Cap Take 1 capsule (50 mg total) by mouth every 8 (eight) hours as needed.  Qty: 30 capsule, Refills: 0    Associated Diagnoses: Eczema, unspecified type      multivitamin (THERAGRAN) tablet Take 1 tablet by mouth once daily.      PROGRAF 1 mg Cap Take 1  capsule (1 mg total) by mouth every 12 (twelve) hours.  Qty: 60 capsule, Refills: 11    Associated Diagnoses: Liver replaced by transplant      sildenafil, antihypertensive, (REVATIO) 20 mg Tab Take 1 tablet (20 mg total) by mouth 3 (three) times daily.  Qty: 270 tablet, Refills: 3    Associated Diagnoses: Scleroderma      sirolimus (RAPAMUNE) 1 MG Tab Take 2 tablets (2 mg total) by mouth once daily.  Qty: 60 tablet, Refills: 5    Associated Diagnoses: Liver replaced by transplant      tiZANidine (ZANAFLEX) 4 MG tablet TAKE 1 TABLET(4 MG) BY MOUTH EVERY NIGHT AS NEEDED  Qty: 90 tablet, Refills: 1    Comments: **Patient requests 90 days supply**  Associated Diagnoses: Chronic right-sided low back pain with right-sided sciatica      zolpidem (AMBIEN) 5 MG Tab Take 1 tablet (5 mg total) by mouth nightly as needed.  Qty: 30 tablet, Refills: 2    Associated Diagnoses: Insomnia, unspecified type      betamethasone dipropionate (DIPROLENE) 0.05 % cream Apply topically 2 (two) times daily.  Qty: 45 g, Refills: 3    Associated Diagnoses: Contact dermatitis, unspecified contact dermatitis type, unspecified trigger      clobetasol 0.05% (TEMOVATE) 0.05 % Oint Apply topically 2 (two) times daily.  Qty: 60 g, Refills: 0    Associated Diagnoses: Hand eczema      clotrimazole (LOTRIMIN) 1 % cream Apply topically 2 (two) times daily.  Qty: 30 g, Refills: 3    Associated Diagnoses: Tinea manus      diphenhydrAMINE (BENADRYL) 25 mg capsule Take 25 mg by mouth every 6 (six) hours as needed for Itching.             Discharge Diet:regular diet with Normal Fluid intake of 1500 - 2000 mL per day    Activity: activity as tolerated    Discharge Condition: Good    Disposition: Home or Self Care    Tests pending at the time of discharge: none      Time spent  on the discharge of the patient including review of hospital course with the patient. reviewing discharge medications and arranging follow-up care     Discharge examination Patient was  seen and examined on the date of discharge and determined to be suitable for discharge.    Discharge plan and follow up:    Patient is to be discharged home with follow-up with Rehab PT, Dermatology, Rheumatology and PCP as shown below.     Future Appointments  Date Time Provider Department Center   5/10/2018 7:00 AM Ashley Holstein, PT [facility]   5/15/2018 7:00 AM Ashley Holstein, PT [facility]   5/17/2018 7:00 AM Ashley Holstein, [facility]   5/22/2018 7:00 AM Ashley Holstein, PT [facility]   5/22/2018 2:00 PM Kari Souza MD [facility] DERM [address]   5/24/2018 7:00 AM Ashley Holstein, PT [facility]   6/12/2018 8:30 AM LAB, [facility]   6/15/2018 9:00 AM Haresh Butler MD [facility] RHEUM [address]   6/19/2018 11:40 AM Nadeen Figueroa MD [facility] IM [address] PCW   6/22/2018 8:10 AM Verona Flowers MD [facility] DERM [place]   7/10/2018 8:00 AM LAB[facility] [address]       Provider    I personally scribed for Arnaldo Arnold MD on 05/09/2018 at 9:47 AM. Electronically signed by bear Manzo III on 05/09/2018 at 9:47 AM

## 2018-05-09 NOTE — CONSULTS
[facility]  Dermatology  Consult Note    Patient Name: Nadeen Mcgovern  MRN: [id number]  Admission Date: 2018  Hospital Length of Stay: 1 days  Attending Physician: Arnaldo Arnold MD  Primary Care Provider: Nadeen Figueroa MD     Consults  Subjective:     Principal Problem:Chronic contact dermatitis    HPI:  Ms. Mcgovern is a 58 y/o woman w/ history of liver transplantation, cholangiocarcinoma and contact dermatitis of the hands who was admitted for treatment of cellulitis. History of immunosuppression on prograf and rapamune. Patient was last seen by derm on . Has contact dermatitis of bilateral hands and feet at baseline.    No evidence of cellulitis at last appointment. Patient reports significant pruritus of the foot and endorses scratching. In the past 1 week patient noticed swelling, redness and pain of the left foot unlike her previous symptoms of contact dermatitis. Reports using topicort for her feet and hands since last appointment. Also using diclofenac cream.     History of contact dermatitis:   +1: Colophony  +2: nickel sulfate hexahydrate; methylchloroisothiazolinone/methylisothiazolinone; oleamidopropyl dimethylamine  +4: propylene glycol; hydrocortisone-17 butyrate             Past Medical History:   Diagnosis Date    Acute cholecystitis     Cholecystitis    Arthritis 2015    septic arthritis of right shoulder    Bacteremia due to Streptococcus pneumoniae     Cancer     Cholangiocarcinoma     Jaundice     obstructive    Prediabetes        Past Surgical History:   Procedure Laterality Date    arthoscopic Right     drained infection     SECTION      INCISION AND DRAINAGE OF WOUND      s/p I&D of R thumb    LIVER TRANSPLANT      SHOULDER ARTHROSCOPY       Family History     Problem Relation (Age of Onset)    Alcohol abuse Brother    Arthritis Father, Sister    Cancer Father, Paternal Grandmother, Brother    Diabetes Mother    Heart attack Mother    Hypertension Mother     Stroke Mother        Social History Main Topics    Smoking status: Former Smoker    Smokeless tobacco: Never Used    Alcohol use No      Comment: socially    Drug use: No      Comment: last use about a month ago    Sexual activity: No       Review of patient's allergies indicates:  No Known Allergies    Medications:  Continuous Infusions:  Scheduled Meds:   amLODIPine  5 mg Oral Daily    aspirin  81 mg Oral Daily    cefTRIAXone (ROCEPHIN) IVPB  1 g Intravenous Q24H    diclofenac sodium  2 g Topical (Top) QID    famotidine  20 mg Oral Nightly    gabapentin  100 mg Oral TID    multivitamin  1 tablet Oral Daily    sildenafil (antihypertensive)  20 mg Oral TID    sirolimus  2 mg Oral Daily    tacrolimus  1 mg Oral BID    vancomycin (VANCOCIN) IVPB  1,000 mg Intravenous Q12H     PRN Meds:diphenhydrAMINE, tiZANidine, zolpidem    Review of Systems   Constitutional: Negative for fever, chills and fatigue.   Gastrointestinal: Negative for nausea and vomiting.   Skin: Positive for itching and rash.   Allergic/Immunologic: Positive for environmental allergies.     Objective:     Vital Signs (Most Recent):  Temp: 97.6 °F (36.4 °C) (05/09/18 0849)  Pulse: 63 (05/09/18 0849)  Resp: 15 (05/09/18 0849)  BP: 127/67 (05/09/18 0849)  SpO2: 96 % (05/09/18 0849) Vital Signs (24h Range):  Temp:  [97.5 °F (36.4 °C)-98.1 °F (36.7 °C)] 97.6 °F (36.4 °C)  Pulse:  [60-70] 63  Resp:  [11-20] 15  SpO2:  [95 %-100 %] 96 %  BP: (109-137)/(56-81) 127/67     Weight: 65 kg (143 lb 3.2 oz)  Body mass index is 26.19 kg/m².    Physical Exam   Skin:              Significant Labs: All pertinent labs within the past 24 hours have been reviewed.  None    Significant Imaging: I have reviewed all pertinent imaging results/findings within the past 24 hours.    Assessment/Plan:     * Chronic contact dermatitis    Present on bilateral hands and feet. Feet worse than hands. Previous patch testing results as stated in the HPI.   - Reiterated  allergens that patient should avoid.   - Diclofenac gel patient is using contains propylene glycol, one of her listed allergies. Currently tolerating. Discussed stopping the medication if patient's hands and feet worsen while using.   - Continue Topicort and diprolene ointment.   - Continue antihistamines as needed.   - Discussed importance of controlling pruritus so that patient doesn't scratch uncontrollably and introduce an infection.        Cellulitis of left foot    Cellulitis likely 2/2 to traumatic scratching. Mildly erythematous, edematous and warm to touch - no overt purulent fluctuance. Patient has multiple risk factors for infection including poor skin barrier function 2/2 chronic contact dermatitis and history of liver transplantation w/ subsequent immunosuppression. CBC, CMP, lactic acid and ESR wnl.   - Continue ceftriaxone; transition to po antibiotics per primary team.   - Discussed avoidance of scratching feet with bare hands. Recommended ice packs, cold compresses, fans, etc. as alternative pruritus relief.           Thank you for your consult. I will follow-up with patient. Please contact us if you have any additional questions.    Marysol Molina MD  Dermatology  [facility]

## 2018-05-09 NOTE — PLAN OF CARE
Problem: Patient Care Overview  Goal: Plan of Care Review  Outcome: Outcome(s) achieved Date Met: 05/09/18  Patient being discharged in stable condition. Both patient and  verbalize understanding of discharge instructions, medications and follow up appointments.  All questions answered.

## 2018-05-09 NOTE — SUBJECTIVE & OBJECTIVE
Past Medical History:   Diagnosis Date    Acute cholecystitis     Cholecystitis    Arthritis 2015    septic arthritis of right shoulder    Bacteremia due to Streptococcus pneumoniae     Cancer     Cholangiocarcinoma     Jaundice     obstructive    Prediabetes        Past Surgical History:   Procedure Laterality Date    arthoscopic Right     drained infection     SECTION      INCISION AND DRAINAGE OF WOUND      s/p I&D of R thumb    LIVER TRANSPLANT      SHOULDER ARTHROSCOPY       Family History     Problem Relation (Age of Onset)    Alcohol abuse Brother    Arthritis Father, Sister    Cancer Father, Paternal Grandmother, Brother    Diabetes Mother    Heart attack Mother    Hypertension Mother    Stroke Mother        Social History Main Topics    Smoking status: Former Smoker    Smokeless tobacco: Never Used    Alcohol use No      Comment: socially    Drug use: No      Comment: last use about a month ago    Sexual activity: No       Review of patient's allergies indicates:  No Known Allergies    Medications:  Continuous Infusions:  Scheduled Meds:   amLODIPine  5 mg Oral Daily    aspirin  81 mg Oral Daily    cefTRIAXone (ROCEPHIN) IVPB  1 g Intravenous Q24H    diclofenac sodium  2 g Topical (Top) QID    famotidine  20 mg Oral Nightly    gabapentin  100 mg Oral TID    multivitamin  1 tablet Oral Daily    sildenafil (antihypertensive)  20 mg Oral TID    sirolimus  2 mg Oral Daily    tacrolimus  1 mg Oral BID    vancomycin (VANCOCIN) IVPB  1,000 mg Intravenous Q12H     PRN Meds:diphenhydrAMINE, tiZANidine, zolpidem    Review of Systems   Constitutional: Negative for fever, chills and fatigue.   Gastrointestinal: Negative for nausea and vomiting.   Skin: Positive for itching and rash.   Allergic/Immunologic: Positive for environmental allergies.     Objective:     Vital Signs (Most Recent):  Temp: 97.6 °F (36.4 °C) (18 0849)  Pulse: 63 (18 0849)  Resp: 15 (18  0849)  BP: 127/67 (05/09/18 0849)  SpO2: 96 % (05/09/18 0849) Vital Signs (24h Range):  Temp:  [97.5 °F (36.4 °C)-98.1 °F (36.7 °C)] 97.6 °F (36.4 °C)  Pulse:  [60-70] 63  Resp:  [11-20] 15  SpO2:  [95 %-100 %] 96 %  BP: (109-137)/(56-81) 127/67     Weight: 65 kg (143 lb 3.2 oz)  Body mass index is 26.19 kg/m².    Physical Exam   Skin:              Significant Labs: All pertinent labs within the past 24 hours have been reviewed.  None    Significant Imaging: I have reviewed all pertinent imaging results/findings within the past 24 hours.

## 2018-05-13 LAB
BACTERIA BLD CULT: NORMAL
BACTERIA BLD CULT: NORMAL

## 2018-05-22 ENCOUNTER — OFFICE VISIT (OUTPATIENT)
Dept: DERMATOLOGY | Facility: CLINIC | Age: 58
End: 2018-05-22
Payer: MEDICARE

## 2018-05-22 ENCOUNTER — TELEPHONE (OUTPATIENT)
Dept: TRANSPLANT | Facility: CLINIC | Age: 58
End: 2018-05-22

## 2018-05-22 DIAGNOSIS — L25.9 CHRONIC CONTACT DERMATITIS: Primary | ICD-10-CM

## 2018-05-22 DIAGNOSIS — L03.116 CELLULITIS OF LEFT FOOT: ICD-10-CM

## 2018-05-22 PROCEDURE — 87070 CULTURE OTHR SPECIMN AEROBIC: CPT

## 2018-05-22 PROCEDURE — 99213 OFFICE O/P EST LOW 20 MIN: CPT | Mod: S$GLB,,, | Performed by: DERMATOLOGY

## 2018-05-22 PROCEDURE — 87077 CULTURE AEROBIC IDENTIFY: CPT

## 2018-05-22 PROCEDURE — 87186 SC STD MICRODIL/AGAR DIL: CPT

## 2018-05-22 PROCEDURE — 99999 PR PBB SHADOW E&M-EST. PATIENT-LVL III: CPT | Mod: PBBFAC,,, | Performed by: DERMATOLOGY

## 2018-05-22 RX ORDER — CLINDAMYCIN HYDROCHLORIDE 300 MG/1
300 CAPSULE ORAL EVERY 6 HOURS
Qty: 28 CAPSULE | Refills: 0 | Status: SHIPPED | OUTPATIENT
Start: 2018-05-22 | End: 2018-05-29

## 2018-05-22 NOTE — ASSESSMENT & PLAN NOTE
Today's Plan:    - Reiterated allergens that patient should avoid.  - Printed a hand out on Foot dermatitis for the patient including the commonly encountered allergens in footwear. Handout contained recommendations for allergen free footwear the the patient could purchase.  - Continue betamethasone diproprionate 0.05% ointment

## 2018-05-22 NOTE — TELEPHONE ENCOUNTER
----- Message from Radha Jones sent at 5/22/2018 11:21 AM CDT -----  Contact: pt  Needs Medical Advice    Who Called: pt  Symptoms (please be specific):    How long has patient had these symptoms:    Pharmacy name and phone # if needed:    Communication Preference (MyChart response to Pt. (or) Call Back # and timeframe): [phone]  Additional Information: feet are still swollen and has rash/difficult for pt to walk on them/pt is finished antibiotics but still no better

## 2018-05-22 NOTE — TELEPHONE ENCOUNTER
Returned call patient was reporting the problems she had been having with her feet.  She will see a new Dermatologist today.

## 2018-05-22 NOTE — PROGRESS NOTES
Subjective:       Patient ID:  Nadeen Mcgovern is a 57 y.o. female who presents for   Chief Complaint   Patient presents with    Dermatitis     hands & botom of feet will crack, hurt, itch & get dry, Eucerin cr prn, antibiotic med that the ER gave me 3 wks ago     Nadeen Mcgovern is a 57 y.o. Female who presents for follow-up, last seen in the hospital on 5/9. Patient was admitted for cellulitis of the left foot. Sent home on 5 day course of clindamycin. Patient has long standing contact dermatitis of bilateral hands and feet, patch testing results as stated below. She has chronic cracks and fissures in her feet, deep fissures on the left foot and scratching were believed to be the entry point for the infection of her left foot. Today she reports that she is not doing well. She completed her antibiotic course w/o much improvement in the appearance of her foot. Her left foot continues to be swollen, red, and tender. She is finding it difficult to ambulate 2/2 pain. She denies fevers. She is using eucerin and desoximetasone on her feet twice daily under the occlusion of socks. She is also washing her feet twice daily with cold water and gentle soap.     History of contact dermatitis:   +1: Colophony  +2: nickel sulfate hexahydrate; methylchloroisothiazolinone/methylisothiazolinone; oleamidopropyl dimethylamine  +4: propylene glycol; hydrocortisone-17 butyrate      Past Medical History:   Diagnosis Date    Acute cholecystitis     Cholecystitis    Arthritis 2015    septic arthritis of right shoulder    Bacteremia due to Streptococcus pneumoniae     Cancer     Cholangiocarcinoma     Jaundice     obstructive    Prediabetes      Review of Systems   Constitutional: Negative for fever, chills and fatigue.   Gastrointestinal: Negative for nausea and vomiting.   Skin: Positive for itching, rash and dry skin.        +Erythema, swelling and tenderness of the left foot        Objective:    Physical Exam   Constitutional: She  appears well-developed and well-nourished.   Neurological: She is alert and oriented to person, place, and time.   Psychiatric: She has a normal mood and affect.   Skin:   Areas Examined (abnormalities noted in diagram):   Head / Face Inspection Performed  Neck Inspection Performed  RUE Inspected  LUE Inspection Performed  RLE Inspected  LLE Inspection Performed  Nails and Digits Inspection Performed                 Diagram Legend     Erythematous scaling macule/papule c/w actinic keratosis       Vascular papule c/w angioma      Pigmented verrucoid papule/plaque c/w seborrheic keratosis      Yellow umbilicated papule c/w sebaceous hyperplasia      Irregularly shaped tan macule c/w lentigo     1-2 mm smooth white papules consistent with Milia      Movable subcutaneous cyst with punctum c/w epidermal inclusion cyst      Subcutaneous movable cyst c/w pilar cyst      Firm pink to brown papule c/w dermatofibroma      Pedunculated fleshy papule(s) c/w skin tag(s)      Evenly pigmented macule c/w junctional nevus     Mildly variegated pigmented, slightly irregular-bordered macule c/w mildly atypical nevus      Flesh colored to evenly pigmented papule c/w intradermal nevus       Pink pearly papule/plaque c/w basal cell carcinoma      Erythematous hyperkeratotic cursted plaque c/w SCC      Surgical scar with no sign of skin cancer recurrence      Open and closed comedones      Inflammatory papules and pustules      Verrucoid papule consistent consistent with wart     Erythematous eczematous patches and plaques     Dystrophic onycholytic nail with subungual debris c/w onychomycosis     Umbilicated papule    Erythematous-base heme-crusted tan verrucoid plaque consistent with inflamed seborrheic keratosis     Erythematous Silvery Scaling Plaque c/w Psoriasis     See annotation      Assessment / Plan:        Cellulitis of left foot  -     clindamycin (CLEOCIN) 300 MG capsule; Take 1 capsule (300 mg total) by mouth every 6 (six)  hours.  Dispense: 28 capsule; Refill: 0  -     CULTURE, AEROBIC  (SPECIFY SOURCE)      Chronic contact dermatitis  Today's Plan:    - Reiterated allergens that patient should avoid.  - Printed a hand out on Foot dermatitis for the patient including the commonly encountered allergens in footwear. Handout contained recommendations for allergen free footwear the the patient could purchase.  - Continue betamethasone diproprionate 0.05% ointment        Follow-up in about 3 weeks (around 6/12/2018).

## 2018-05-25 ENCOUNTER — DOCUMENTATION ONLY (OUTPATIENT)
Dept: TRANSPLANT | Facility: CLINIC | Age: 58
End: 2018-05-25

## 2018-05-25 LAB — BACTERIA SPEC AEROBE CULT: NORMAL

## 2018-05-25 RX ORDER — DOXYCYCLINE 100 MG/1
100 TABLET ORAL 2 TIMES DAILY
Qty: 20 TABLET | Refills: 0 | Status: SHIPPED | OUTPATIENT
Start: 2018-05-25 | End: 2018-06-04

## 2018-06-12 ENCOUNTER — LAB VISIT (OUTPATIENT)
Dept: LAB | Facility: HOSPITAL | Age: 58
End: 2018-06-12
Attending: INTERNAL MEDICINE
Payer: MEDICARE

## 2018-06-12 DIAGNOSIS — M34.9 LIMITED SCLERODERMA: ICD-10-CM

## 2018-06-12 LAB
CRP SERPL-MCNC: 1.6 MG/L
ERYTHROCYTE [SEDIMENTATION RATE] IN BLOOD BY WESTERGREN METHOD: 18 MM/HR

## 2018-06-12 PROCEDURE — 36415 COLL VENOUS BLD VENIPUNCTURE: CPT

## 2018-06-12 PROCEDURE — 86140 C-REACTIVE PROTEIN: CPT

## 2018-06-12 PROCEDURE — 85651 RBC SED RATE NONAUTOMATED: CPT

## 2018-06-13 ENCOUNTER — OFFICE VISIT (OUTPATIENT)
Dept: DERMATOLOGY | Facility: CLINIC | Age: 58
End: 2018-06-13
Payer: MEDICARE

## 2018-06-13 ENCOUNTER — OFFICE VISIT (OUTPATIENT)
Dept: INTERNAL MEDICINE | Facility: CLINIC | Age: 58
End: 2018-06-13
Payer: MEDICARE

## 2018-06-13 VITALS
WEIGHT: 139.13 LBS | BODY MASS INDEX: 25.6 KG/M2 | TEMPERATURE: 98 F | DIASTOLIC BLOOD PRESSURE: 62 MMHG | HEART RATE: 76 BPM | HEIGHT: 62 IN | OXYGEN SATURATION: 99 % | SYSTOLIC BLOOD PRESSURE: 106 MMHG

## 2018-06-13 DIAGNOSIS — L30.9 HAND ECZEMA: ICD-10-CM

## 2018-06-13 DIAGNOSIS — L25.9 CHRONIC CONTACT DERMATITIS: Primary | ICD-10-CM

## 2018-06-13 DIAGNOSIS — M34.9 LIMITED SCLERODERMA: Primary | ICD-10-CM

## 2018-06-13 DIAGNOSIS — Z94.4 S/P LIVER TRANSPLANT: ICD-10-CM

## 2018-06-13 DIAGNOSIS — I35.1 NONRHEUMATIC AORTIC VALVE INSUFFICIENCY: ICD-10-CM

## 2018-06-13 DIAGNOSIS — I73.00 RAYNAUD'S DISEASE WITHOUT GANGRENE: ICD-10-CM

## 2018-06-13 DIAGNOSIS — L25.9 CHRONIC CONTACT DERMATITIS: ICD-10-CM

## 2018-06-13 PROCEDURE — 99213 OFFICE O/P EST LOW 20 MIN: CPT | Mod: S$GLB,,, | Performed by: DERMATOLOGY

## 2018-06-13 PROCEDURE — 99999 PR PBB SHADOW E&M-EST. PATIENT-LVL V: CPT | Mod: PBBFAC,,, | Performed by: INTERNAL MEDICINE

## 2018-06-13 PROCEDURE — 99999 PR PBB SHADOW E&M-EST. PATIENT-LVL II: CPT | Mod: PBBFAC,,, | Performed by: DERMATOLOGY

## 2018-06-13 PROCEDURE — 99213 OFFICE O/P EST LOW 20 MIN: CPT | Mod: S$GLB,,, | Performed by: INTERNAL MEDICINE

## 2018-06-13 PROCEDURE — 3008F BODY MASS INDEX DOCD: CPT | Mod: CPTII,S$GLB,, | Performed by: INTERNAL MEDICINE

## 2018-06-13 RX ORDER — BETAMETHASONE DIPROPIONATE 0.5 MG/G
OINTMENT TOPICAL 2 TIMES DAILY
Qty: 45 G | Refills: 1 | Status: SHIPPED | OUTPATIENT
Start: 2018-06-13 | End: 2019-06-25 | Stop reason: SDUPTHER

## 2018-06-13 NOTE — PROGRESS NOTES
Subjective:       Patient ID:  Nadeen Mcgovern is a 57 y.o. female who presents for   Chief Complaint   Patient presents with    Dermatitis     worsen     Dermatitis  - Follow-up  Symptom course: improving  Affected locations: left arm, right arm and neck  Signs / symptoms: spreading, itching, swelling, peeling, cracking and rough (hands, feet)  Severity: moderate to severe    Patient has long standing contact dermatitis of bilateral hands and feet, patch testing results as stated below. Culture from last OV grew Staph sensitive to doxycycline.  She completed her course of doxycycline and noticed improvement.  She is applying CeraVe cream only, no medicated ointments    History of contact dermatitis:   +1: ColophonyHe  +2: nickel sulfate hexahydrate; methylchloroisothiazolinone/methylisothiazolinone; oleamidopropyl dimethylamine  +4: propylene glycol; hydrocortisone-17 butyrate    Past Medical History:   Diagnosis Date    Acute cholecystitis     Cholecystitis    Arthritis 2015    septic arthritis of right shoulder    Bacteremia due to Streptococcus pneumoniae     Cancer     Cholangiocarcinoma     Jaundice     obstructive    Prediabetes      Review of Systems   Constitutional: Negative.  Negative for fever, chills and fatigue.   Skin: Positive for itching and rash. Negative for daily sunscreen use and activity-related sunscreen use.   Hematologic/Lymphatic: Bruises/bleeds easily.        Objective:    Physical Exam   Constitutional: She appears well-developed and well-nourished.   Neurological: She is alert and oriented to person, place, and time.   Psychiatric: She has a normal mood and affect.   Skin:   Areas Examined (abnormalities noted in diagram):   Head / Face Inspection Performed  Neck Inspection Performed  RUE Inspected  LUE Inspection Performed  RLE Inspected  LLE Inspection Performed  Nails and Digits Inspection Performed                      Diagram Legend     Erythematous scaling macule/papule c/w  actinic keratosis       Vascular papule c/w angioma      Pigmented verrucoid papule/plaque c/w seborrheic keratosis      Yellow umbilicated papule c/w sebaceous hyperplasia      Irregularly shaped tan macule c/w lentigo     1-2 mm smooth white papules consistent with Milia      Movable subcutaneous cyst with punctum c/w epidermal inclusion cyst      Subcutaneous movable cyst c/w pilar cyst      Firm pink to brown papule c/w dermatofibroma      Pedunculated fleshy papule(s) c/w skin tag(s)      Evenly pigmented macule c/w junctional nevus     Mildly variegated pigmented, slightly irregular-bordered macule c/w mildly atypical nevus      Flesh colored to evenly pigmented papule c/w intradermal nevus       Pink pearly papule/plaque c/w basal cell carcinoma      Erythematous hyperkeratotic cursted plaque c/w SCC      Surgical scar with no sign of skin cancer recurrence      Open and closed comedones      Inflammatory papules and pustules      Verrucoid papule consistent consistent with wart     Erythematous eczematous patches and plaques     Dystrophic onycholytic nail with subungual debris c/w onychomycosis     Umbilicated papule    Erythematous-base heme-crusted tan verrucoid plaque consistent with inflamed seborrheic keratosis     Erythematous Silvery Scaling Plaque c/w Psoriasis     See annotation      Assessment / Plan:        Chronic contact dermatitis-improving  Suspect the thin papules and plaques on arms and neck is id reaction  -     betamethasone dipropionate (DIPROLENE) 0.05 % ointment; Apply topically 2 (two) times daily.  Dispense: 45 g; Refill: 1    Reiterated again that allergens should be avoided  - At last OV, we printed a hand out on Foot dermatitis including the commonly encountered allergens in footwear. Handout contained recommendations for allergen free footwear the the patient could purchase.  She ordered a pair  - Continue betamethasone diproprionate 0.05% ointment and CeraVe cream          Follow-up in about 2 months (around 8/13/2018).

## 2018-06-13 NOTE — Clinical Note
Dr Benítez, I saw this pt of yours for urgent care. She is not my usual pt. I placed pictures of hands in my note. She is seeing Derm today but was worried maybe the rash related to one of her liver meds or indicated some other problem. I told her I would forward my note to you for your review.

## 2018-06-13 NOTE — PROGRESS NOTES
Subjective:       Patient ID: Nadeen Mcgovern is a 57 y.o. female.    Chief Complaint: Rash    HPI:  57-year-old female here for urgent care visit for rash.  As turns out she has a dermatology appointment later this morning and said she did not realize it was today.  She has a history hand eczema and chronic contact dermatitis with possible nickel allergy.  She says in part these skin changes have been present for months.  She is a liver transplant patient.  She has not seen her hepatology transplant specialist in a number of months but has seen Dermatology a few weeks ago and her rheumatologist for her scleroderma.  She has used creams were recommended with maybe a little improvement in her feet.  The hands are at times very itchy and at times painful.  No temperature.  She was afebrile in the office today.  She has a new rash on both forearms and the nape of her for a few days.  She did try Cetaphil cream that some family members use.  She is not sure if that caused the new rash she plans on stopping it.  I reviewed the recent notes including the prior Dermatology note.  Did not feel particularly comfortable stopping or changing any meds but told her I would send a picture of her skin changes to her transplant specialist for their review      Review of Systems   Constitutional: Negative for appetite change, chills and fever.   HENT: Negative for nosebleeds, sinus pain and sore throat.    Eyes: Negative for visual disturbance.   Respiratory: Positive for shortness of breath (chronic). Negative for cough and wheezing.    Cardiovascular: Negative for chest pain and leg swelling.   Gastrointestinal: Negative for abdominal pain, constipation and diarrhea.   Genitourinary: Negative for difficulty urinating and hematuria.   Musculoskeletal: Negative for neck pain and neck stiffness.   Skin: Positive for color change and rash. Negative for pallor.   Neurological: Negative for headaches.   Psychiatric/Behavioral: Negative for  dysphoric mood and suicidal ideas. The patient is not nervous/anxious.        Objective:      Physical Exam   Constitutional: She appears well-developed and well-nourished.   HENT:   Head: Normocephalic and atraumatic.   Right Ear: External ear normal.   Left Ear: External ear normal.   Abdominal: She exhibits no distension. There is no tenderness.   Skin: Rash (hands, arms and feet) noted.                 Assessment:       1. Limited scleroderma    2. S/P liver transplant    3. Nonrheumatic aortic valve insufficiency    4. Raynaud's disease without gangrene    5. Chronic contact dermatitis    6. Hand eczema        Plan:       Nadeen was seen today for rash.    Diagnoses and all orders for this visit:    Limited scleroderma    S/P liver transplant    Nonrheumatic aortic valve insufficiency    Raynaud's disease without gangrene    Chronic contact dermatitis    Hand eczema        Keep Derm appt and I will send a note to her Hepatologist.

## 2018-06-15 ENCOUNTER — OFFICE VISIT (OUTPATIENT)
Dept: RHEUMATOLOGY | Facility: CLINIC | Age: 58
End: 2018-06-15
Payer: MEDICARE

## 2018-06-15 VITALS
DIASTOLIC BLOOD PRESSURE: 62 MMHG | BODY MASS INDEX: 24.65 KG/M2 | HEIGHT: 64 IN | SYSTOLIC BLOOD PRESSURE: 98 MMHG | HEART RATE: 76 BPM | WEIGHT: 144.38 LBS

## 2018-06-15 DIAGNOSIS — M35.00 SICCA SYNDROME: ICD-10-CM

## 2018-06-15 DIAGNOSIS — L30.9 HAND ECZEMA: ICD-10-CM

## 2018-06-15 DIAGNOSIS — M35.01 SICCA SYNDROME WITH KERATOCONJUNCTIVITIS: ICD-10-CM

## 2018-06-15 DIAGNOSIS — I73.00 RAYNAUD'S DISEASE WITHOUT GANGRENE: ICD-10-CM

## 2018-06-15 DIAGNOSIS — M35.00 SJOGREN'S SYNDROME, WITH UNSPECIFIED ORGAN INVOLVEMENT: ICD-10-CM

## 2018-06-15 DIAGNOSIS — Z11.4 ENCOUNTER FOR SCREENING FOR HIV: ICD-10-CM

## 2018-06-15 DIAGNOSIS — I73.00 RAYNAUD'S PHENOMENON WITHOUT GANGRENE: ICD-10-CM

## 2018-06-15 DIAGNOSIS — J98.4 RESTRICTIVE LUNG DISEASE: ICD-10-CM

## 2018-06-15 DIAGNOSIS — M34.9 SCLERODERMA: Primary | ICD-10-CM

## 2018-06-15 PROBLEM — H16.229 SICCA SYNDROME WITH KERATOCONJUNCTIVITIS: Status: ACTIVE | Noted: 2018-06-15

## 2018-06-15 PROCEDURE — 3008F BODY MASS INDEX DOCD: CPT | Mod: CPTII,S$GLB,, | Performed by: INTERNAL MEDICINE

## 2018-06-15 PROCEDURE — 99499 UNLISTED E&M SERVICE: CPT | Mod: S$GLB,,, | Performed by: INTERNAL MEDICINE

## 2018-06-15 PROCEDURE — 99214 OFFICE O/P EST MOD 30 MIN: CPT | Mod: S$GLB,,, | Performed by: INTERNAL MEDICINE

## 2018-06-15 PROCEDURE — 99999 PR PBB SHADOW E&M-EST. PATIENT-LVL IV: CPT | Mod: PBBFAC,,, | Performed by: INTERNAL MEDICINE

## 2018-06-15 RX ORDER — SILDENAFIL CITRATE 20 MG/1
20 TABLET ORAL 2 TIMES DAILY
Qty: 180 TABLET | Refills: 3 | Status: SHIPPED | OUTPATIENT
Start: 2018-06-15 | End: 2019-06-18 | Stop reason: SDUPTHER

## 2018-06-15 RX ORDER — AMLODIPINE BESYLATE 5 MG/1
2.5 TABLET ORAL DAILY
Qty: 90 TABLET | Refills: 3 | Status: SHIPPED | OUTPATIENT
Start: 2018-06-15 | End: 2019-06-25

## 2018-06-15 ASSESSMENT — ROUTINE ASSESSMENT OF PATIENT INDEX DATA (RAPID3)
PAIN SCORE: 8.5
PSYCHOLOGICAL DISTRESS SCORE: 2.2
WHEN YOU AWAKENED IN THE MORNING OVER THE LAST WEEK, PLEASE INDICATE THE AMOUNT OF TIME IT TAKES UNTIL YOU ARE AS LIMBER AS YOU WILL BE FOR THE DAY: 1 HR
AM STIFFNESS SCORE: 1, YES
TOTAL RAPID3 SCORE: 6.83
MDHAQ FUNCTION SCORE: 1.8
PATIENT GLOBAL ASSESSMENT SCORE: 6
FATIGUE SCORE: 5

## 2018-06-15 NOTE — ASSESSMENT & PLAN NOTE
MRSS= 19(moderate) increased from 6 last visit    Pre-DMARD labs  Add mycophenolate if OK with Dr. Benítez, Liver Transplant  *Shingrix  ACR handout on mycophenolate provided  RTC 3 months with standing  Labs(additional if mycophenolate added) and PFTs

## 2018-06-15 NOTE — PROGRESS NOTES
"Subjective:       Patient ID: Nadeen Mcgovern is a 57 y.o. female.    Chief Complaint: lcSSc(+nucleolar EUGENE), secondary Sjogren's; OA hands  HPI hand dermatitis improving, and foot even more so. Metal allergy suspected as contributory. The Raynaud's Is controlled but only able to tolerate sildenafil 20mg twice daily. Still on amlodipine 5mg daily. Still dysphagia. Remains on sirolimus and tacrolimus for liver transplant post cholangiocarcinoma. No noted change in skin texture. No digital ulcers. Occ SOB.  Review of Systems   Constitutional: Positive for fatigue and unexpected weight change. Negative for fever.   HENT: Positive for trouble swallowing.         Dry mouth   Eyes: Negative for redness.   Respiratory: Positive for shortness of breath. Negative for cough.    Cardiovascular: Negative for chest pain.   Gastrointestinal: Negative for constipation and diarrhea.   Genitourinary: Negative for dysuria and genital sores.   Skin: Positive for rash.   Neurological: Positive for numbness. Negative for headaches.   Hematological: Bruises/bleeds easily.         Objective:   BP 98/62   Pulse 76   Ht 5' 3.6" (1.615 m)   Wt 65.5 kg (144 lb 6.4 oz)   LMP  (LMP Unknown)   BMI 25.10 kg/m²      Physical Exam   Constitutional: She is oriented to person, place, and time and well-developed, well-nourished, and in no distress.   HENT:   Head: Normocephalic and atraumatic.   Mouth/Throat: Oropharynx is clear and moist.   Eyes: Conjunctivae and EOM are normal.   dry   Neck: Normal range of motion. Neck supple. No thyromegaly present.   Cardiovascular: Normal rate, regular rhythm, normal heart sounds and intact distal pulses.  Exam reveals no gallop and no friction rub.    No murmur heard.  Pulmonary/Chest: Breath sounds normal. She has no wheezes. She has no rales. She exhibits no tenderness.   Abdominal: Soft. She exhibits no distension and no mass. There is no tenderness.       Right Side Rheumatological Exam     Examination " finds the shoulder, elbow, wrist, knee, 1st PIP, 1st MCP, 2nd PIP, 2nd MCP, 3rd PIP, 3rd MCP, 4th PIP, 4th MCP, 5th PIP and 5th MCP normal.    Left Side Rheumatological Exam     Examination finds the shoulder, elbow, wrist, knee, 1st PIP, 1st MCP, 2nd PIP, 2nd MCP, 3rd PIP, 3rd MCP, 4th PIP, 4th MCP, 5th PIP and 5th MCP normal.      Lymphadenopathy:     She has no cervical adenopathy.   Neurological: She is alert and oriented to person, place, and time. She displays normal reflexes. Gait normal.   Nl motor strength UE and LE bilateral   Skin:     Eczematous dermatitis palms and soles  Scleroderma mRSS=19(moderate)   Musculoskeletal: She exhibits no edema.           AssessmResults for SHILO HAMPTON (MRN [id number]) as of 6/15/2018 09:34   Ref. Range 5/8/2018 09:46 5/8/2018 10:43 5/8/2018 11:06 5/22/2018 16:41 6/12/2018 08:39   WBC Latest Ref Range: 3.90 - 12.70 K/uL 4.62       RBC Latest Ref Range: 4.00 - 5.40 M/uL 4.64       Hemoglobin Latest Ref Range: 12.0 - 16.0 g/dL 13.7       Hematocrit Latest Ref Range: 37.0 - 48.5 % 44.7       MCV Latest Ref Range: 82 - 98 fL 96       MCH Latest Ref Range: 27.0 - 31.0 pg 29.5       MCHC Latest Ref Range: 32.0 - 36.0 g/dL 30.6 (L)       RDW Latest Ref Range: 11.5 - 14.5 % 13.1       Platelets Latest Ref Range: 150 - 350 K/uL 167       MPV Latest Ref Range: 9.2 - 12.9 fL 10.7       Gran% Latest Ref Range: 38.0 - 73.0 % 64.5       Gran # (ANC) Latest Ref Range: 1.8 - 7.7 K/uL 3.0       Immature Granulocytes Latest Ref Range: 0.0 - 0.5 % 0.2       Immature Grans (Abs) Latest Ref Range: 0.00 - 0.04 K/uL 0.01       Lymph% Latest Ref Range: 18.0 - 48.0 % 24.5       Lymph # Latest Ref Range: 1.0 - 4.8 K/uL 1.1       Mono% Latest Ref Range: 4.0 - 15.0 % 8.0       Mono # Latest Ref Range: 0.3 - 1.0 K/uL 0.4       Eosinophil% Latest Ref Range: 0.0 - 8.0 % 1.9       Eos # Latest Ref Range: 0.0 - 0.5 K/uL 0.1       Basophil% Latest Ref Range: 0.0 - 1.9 % 0.9       Baso # Latest Ref Range:  0.00 - 0.20 K/uL 0.04       nRBC Latest Ref Range: 0 /100 WBC 0       Sed Rate Latest Ref Range: 0 - 20 mm/Hr     18   Sodium Latest Ref Range: 136 - 145 mmol/L   142     Potassium Latest Ref Range: 3.5 - 5.1 mmol/L   4.5     Chloride Latest Ref Range: 95 - 110 mmol/L   111 (H)     CO2 Latest Ref Range: 23 - 29 mmol/L   21 (L)     Anion Gap Latest Ref Range: 8 - 16 mmol/L   10     BUN, Bld Latest Ref Range: 6 - 20 mg/dL   8     Creatinine Latest Ref Range: 0.5 - 1.4 mg/dL   0.7     eGFR if non African American Latest Ref Range: >60 mL/min/1.73 m^2   >60.0     eGFR if African American Latest Ref Range: >60 mL/min/1.73 m^2   >60.0     Glucose Latest Ref Range: 70 - 110 mg/dL   90     Calcium Latest Ref Range: 8.7 - 10.5 mg/dL   9.2     Alkaline Phosphatase Latest Ref Range: 55 - 135 U/L   84     Total Protein Latest Ref Range: 6.0 - 8.4 g/dL   7.8     Albumin Latest Ref Range: 3.5 - 5.2 g/dL   3.8     Total Bilirubin Latest Ref Range: 0.1 - 1.0 mg/dL   0.5     AST Latest Ref Range: 10 - 40 U/L   26     ALT Latest Ref Range: 10 - 44 U/L   21     CRP Latest Ref Range: 0.0 - 8.2 mg/L     1.6   Lactate, Harjeet Latest Ref Range: 0.5 - 2.2 mmol/L  0.9      Blood Culture, Routine Unknown No growth after 5...       CULTURE, AEROBIC  (SPECIFY SOURCE) Unknown    Rpt    CULTURE, BLOOD Unknown Rpt       ent/Plan         Problem List Items Addressed This Visit     Scleroderma - Primary    Overview     lcSSc(limited scleroderma): ACR/EULAR criteria: (no sclerodactyly today) digital tip pitting scars(3) telangiectases(2) Raynaud's(3) =8 does not meet  criteria ; oxaliplatin(cisplatin associated with Raynaud's) ; 5-FU not known to be associated with Raynaud's or scleroderma. MRSS=17 increased from 2 previous!. No evidence of scleroderma esophagus by esophageal manometry or upper endoscopy  EUGENE+ 1:320 nucleolar(scleroderma pattern) and speckled + SS-A (most typical of Sjogren's and SLE), Raynaud's, telangiectases, dysphagia. All  scleroderma associated autoantibodies negative    2D Echo w/ Color Flow Doppler   Order: [id number]   Status:  Final result   Visible to patient:  Yes (Patient Portal) Next appt:  02/20/2018 at 04:45 PM in Orthopedics (Jovana Rossi MD) Dx:  Scleroderma; Aortic valve regurgitati...     Ref Range & Units 3mo ago  (9/25/17) 1yr ago  (9/2/16) 1yr ago  (8/17/16) 2yr ago  (5/7/15) 2yr ago  (1/23/15)    EF 55 - 65 60   60  55  60     Diastolic Dysfunction  No  No  No  No  No     Aortic Valve Regurgitation  MILD   MILD TO MODERATE   MILD  MILD TO MODERATE      Est. PA Systolic Pressure  25.47    28  24     Mitral Valve Mobility  NORMAL     NORMAL    Resulting Agency  CVIS CVIS CVIS CVIS CVIS   Narrative                  PFTs          FVC     SVC    RV   DLco  3/20/18     120    121     62    90  9/25/17     122     122    30    89  4/7/17       123     124    49    91  8/18/16     107     107    110   88           Current Assessment & Plan     MRSS= 19(moderate) increased from 6 last visit    Pre-DMARD labs  Add mycophenolate if OK with Dr. Benítez, Liver Transplant  *Shingrix  ACR handout on mycophenolate provided  RTC 3 months with standing  Labs(additional if mycophenolate added) and PFTs         Relevant Medications    sildenafil (REVATIO) 20 mg Tab    Other Relevant Orders    HIV-1 and HIV-2 antibodies    Hepatitis B surface antigen    HBcAB    Hepatitis C antibody    Quantiferon Gold TB    RPR    Ambulatory Referral to Ophthalmology    Raynaud's phenomenon    Current Assessment & Plan     Decrease amlodipine to 2.5mg daily  Keep sildenafil 20mg twice daily, max tolerated dose         Hand eczema    Current Assessment & Plan     F/u Kari Souza MD         Restrictive lung disease    Current Assessment & Plan     Repeat PFTs in 3 months         Sicca syndrome with keratoconjunctivitis    Sicca syndrome    Current Assessment & Plan     Ref to Dr. Connolly in Ophthalmology           Other Visit Diagnoses      Encounter for screening for HIV         Relevant Orders    HIV-1 and HIV-2 antibodies    Sjogren's syndrome, with unspecified organ involvement        Relevant Orders    Ambulatory Referral to Ophthalmology    Raynaud's disease without gangrene        Relevant Medications    amLODIPine (NORVASC) 5 MG tablet

## 2018-06-22 ENCOUNTER — TELEPHONE (OUTPATIENT)
Dept: PHARMACY | Facility: CLINIC | Age: 58
End: 2018-06-22

## 2018-06-22 ENCOUNTER — TELEPHONE (OUTPATIENT)
Dept: RHEUMATOLOGY | Facility: CLINIC | Age: 58
End: 2018-06-22

## 2018-06-22 DIAGNOSIS — G89.29 CHRONIC RIGHT-SIDED LOW BACK PAIN WITH RIGHT-SIDED SCIATICA: ICD-10-CM

## 2018-06-22 DIAGNOSIS — M34.9 SCLERODERMA: Primary | ICD-10-CM

## 2018-06-22 DIAGNOSIS — M54.41 CHRONIC RIGHT-SIDED LOW BACK PAIN WITH RIGHT-SIDED SCIATICA: ICD-10-CM

## 2018-06-22 DIAGNOSIS — Z79.899 ON MYCOPHENOLATE MOFETIL THERAPY: ICD-10-CM

## 2018-06-22 RX ORDER — MYCOPHENOLATE MOFETIL 500 MG/1
500 TABLET ORAL 2 TIMES DAILY
Qty: 120 TABLET | Refills: 2 | Status: SHIPPED | OUTPATIENT
Start: 2018-06-22 | End: 2018-08-15 | Stop reason: SDUPTHER

## 2018-06-22 RX ORDER — GABAPENTIN 100 MG/1
100 CAPSULE ORAL 3 TIMES DAILY PRN
Qty: 90 CAPSULE | Refills: 0 | Status: CANCELLED | OUTPATIENT
Start: 2018-06-22 | End: 2019-06-22

## 2018-06-22 NOTE — TELEPHONE ENCOUNTER
Angelique, please tell pt I spoke with Dr. Benítez who feels that we can safely substitute mycophenolate for the sirolimus(Rapamune). Will send Rx for mycophenolate 500mg twice daily x 1wk, then 1000mg twice daily and continue to OSP. Once she gets the mycophenolate, she should stop the sirolimus(Rapamune). She will need weekly CBC x 4, then q 2wks x 4. Also cmp q 4wks. Thanks SAILAJA

## 2018-06-22 NOTE — TELEPHONE ENCOUNTER
Spoke with Dr. German of Liver Transplant about mycophenolate. She says this is OK but patient should stop the sirolimus.SAILAJA

## 2018-06-25 ENCOUNTER — TELEPHONE (OUTPATIENT)
Dept: RHEUMATOLOGY | Facility: CLINIC | Age: 58
End: 2018-06-25

## 2018-06-25 ENCOUNTER — TELEPHONE (OUTPATIENT)
Dept: TRANSPLANT | Facility: CLINIC | Age: 58
End: 2018-06-25

## 2018-06-25 NOTE — TELEPHONE ENCOUNTER
Spoke to Vijaya Pharmacist at [facility] she was calling to get new RX for Sirolimus.  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  Called patient she will  Cellcept in the morning and discontinue the Sirolimus, she will call me to let me know when she starts so we can set lab schedule.  Called Vijaya back to let her know.

## 2018-07-02 ENCOUNTER — TELEPHONE (OUTPATIENT)
Dept: TRANSPLANT | Facility: CLINIC | Age: 58
End: 2018-07-02

## 2018-07-02 NOTE — TELEPHONE ENCOUNTER
----- Message from Tara Shepherd sent at 2018  3:19 PM CDT -----  Contact: Angi  Needs Advice    Reason for call:  Refill for sirolimus (RAPAMUNE) 1 MG Tab ()  Communication Preference: [phone]  Additional Information: n/a

## 2018-07-02 NOTE — TELEPHONE ENCOUNTER
Returned call spoke to Vijaya Pharmacist last week to let them know patient will no longer be taking medication.

## 2018-07-06 DIAGNOSIS — G89.29 CHRONIC RIGHT-SIDED LOW BACK PAIN WITH RIGHT-SIDED SCIATICA: ICD-10-CM

## 2018-07-06 DIAGNOSIS — M54.41 CHRONIC RIGHT-SIDED LOW BACK PAIN WITH RIGHT-SIDED SCIATICA: ICD-10-CM

## 2018-07-06 RX ORDER — GABAPENTIN 100 MG/1
CAPSULE ORAL
Qty: 90 CAPSULE | Refills: 1 | Status: SHIPPED | OUTPATIENT
Start: 2018-07-06 | End: 2018-09-16 | Stop reason: SDUPTHER

## 2018-07-10 ENCOUNTER — LAB VISIT (OUTPATIENT)
Dept: LAB | Facility: HOSPITAL | Age: 58
End: 2018-07-10
Attending: INTERNAL MEDICINE
Payer: MEDICARE

## 2018-07-10 DIAGNOSIS — Z94.4 LIVER REPLACED BY TRANSPLANT: ICD-10-CM

## 2018-07-10 LAB
ALBUMIN SERPL BCP-MCNC: 3.9 G/DL
ALP SERPL-CCNC: 76 U/L
ALT SERPL W/O P-5'-P-CCNC: 23 U/L
ANION GAP SERPL CALC-SCNC: 12 MMOL/L
AST SERPL-CCNC: 24 U/L
BASOPHILS # BLD AUTO: 0.03 K/UL
BASOPHILS NFR BLD: 0.6 %
BILIRUB DIRECT SERPL-MCNC: 0.3 MG/DL
BILIRUB SERPL-MCNC: 0.9 MG/DL
BUN SERPL-MCNC: 7 MG/DL
CALCIUM SERPL-MCNC: 9.6 MG/DL
CHLORIDE SERPL-SCNC: 107 MMOL/L
CO2 SERPL-SCNC: 22 MMOL/L
CREAT SERPL-MCNC: 0.7 MG/DL
DIFFERENTIAL METHOD: NORMAL
EOSINOPHIL # BLD AUTO: 0.1 K/UL
EOSINOPHIL NFR BLD: 2.3 %
ERYTHROCYTE [DISTWIDTH] IN BLOOD BY AUTOMATED COUNT: 13.3 %
EST. GFR  (AFRICAN AMERICAN): >60 ML/MIN/1.73 M^2
EST. GFR  (NON AFRICAN AMERICAN): >60 ML/MIN/1.73 M^2
GLUCOSE SERPL-MCNC: 82 MG/DL
HCT VFR BLD AUTO: 39 %
HGB BLD-MCNC: 12.9 G/DL
IMM GRANULOCYTES # BLD AUTO: 0.01 K/UL
IMM GRANULOCYTES NFR BLD AUTO: 0.2 %
LYMPHOCYTES # BLD AUTO: 1.1 K/UL
LYMPHOCYTES NFR BLD: 20.2 %
MCH RBC QN AUTO: 29.3 PG
MCHC RBC AUTO-ENTMCNC: 33.1 G/DL
MCV RBC AUTO: 89 FL
MONOCYTES # BLD AUTO: 0.5 K/UL
MONOCYTES NFR BLD: 8.8 %
NEUTROPHILS # BLD AUTO: 3.5 K/UL
NEUTROPHILS NFR BLD: 67.9 %
NRBC BLD-RTO: 0 /100 WBC
PLATELET # BLD AUTO: 210 K/UL
PMV BLD AUTO: 10.3 FL
POTASSIUM SERPL-SCNC: 3.9 MMOL/L
PROT SERPL-MCNC: 7.4 G/DL
RBC # BLD AUTO: 4.4 M/UL
SIROLIMUS BLD-MCNC: <2 NG/ML
SODIUM SERPL-SCNC: 141 MMOL/L
TACROLIMUS BLD-MCNC: 1.6 NG/ML
WBC # BLD AUTO: 5.2 K/UL

## 2018-07-10 PROCEDURE — 80197 ASSAY OF TACROLIMUS: CPT

## 2018-07-10 PROCEDURE — 80053 COMPREHEN METABOLIC PANEL: CPT

## 2018-07-10 PROCEDURE — 82248 BILIRUBIN DIRECT: CPT

## 2018-07-10 PROCEDURE — 36415 COLL VENOUS BLD VENIPUNCTURE: CPT

## 2018-07-10 PROCEDURE — 85025 COMPLETE CBC W/AUTO DIFF WBC: CPT

## 2018-07-10 PROCEDURE — 80195 ASSAY OF SIROLIMUS: CPT

## 2018-07-11 ENCOUNTER — HOSPITAL ENCOUNTER (EMERGENCY)
Facility: HOSPITAL | Age: 58
Discharge: HOME | End: 2018-07-11
Attending: EMERGENCY MEDICINE
Payer: MEDICARE

## 2018-07-11 VITALS
HEIGHT: 62 IN | RESPIRATION RATE: 17 BRPM | DIASTOLIC BLOOD PRESSURE: 58 MMHG | BODY MASS INDEX: 25.4 KG/M2 | OXYGEN SATURATION: 99 % | TEMPERATURE: 98 F | HEART RATE: 67 BPM | SYSTOLIC BLOOD PRESSURE: 113 MMHG | WEIGHT: 138 LBS

## 2018-07-11 DIAGNOSIS — L03.114 CELLULITIS OF LEFT UPPER EXTREMITY: Primary | ICD-10-CM

## 2018-07-11 DIAGNOSIS — C22.1 CHOLANGIOCARCINOMA: ICD-10-CM

## 2018-07-11 DIAGNOSIS — Z94.4 LIVER REPLACED BY TRANSPLANT: ICD-10-CM

## 2018-07-11 DIAGNOSIS — R60.9 SWOLLEN: ICD-10-CM

## 2018-07-11 DIAGNOSIS — C22.0: Primary | ICD-10-CM

## 2018-07-11 LAB
ALBUMIN SERPL BCP-MCNC: 3.9 G/DL
ALP SERPL-CCNC: 88 U/L
ALT SERPL W/O P-5'-P-CCNC: 22 U/L
ANION GAP SERPL CALC-SCNC: 9 MMOL/L
AST SERPL-CCNC: 21 U/L
BASOPHILS # BLD AUTO: 0.03 K/UL
BASOPHILS NFR BLD: 0.4 %
BILIRUB SERPL-MCNC: 0.9 MG/DL
BUN SERPL-MCNC: 10 MG/DL
CALCIUM SERPL-MCNC: 9.6 MG/DL
CHLORIDE SERPL-SCNC: 108 MMOL/L
CO2 SERPL-SCNC: 25 MMOL/L
CREAT SERPL-MCNC: 0.8 MG/DL
CRP SERPL-MCNC: 3.2 MG/L
DIFFERENTIAL METHOD: ABNORMAL
EOSINOPHIL # BLD AUTO: 0.1 K/UL
EOSINOPHIL NFR BLD: 1.4 %
ERYTHROCYTE [DISTWIDTH] IN BLOOD BY AUTOMATED COUNT: 13.6 %
ERYTHROCYTE [SEDIMENTATION RATE] IN BLOOD BY WESTERGREN METHOD: 21 MM/HR
EST. GFR  (AFRICAN AMERICAN): >60 ML/MIN/1.73 M^2
EST. GFR  (NON AFRICAN AMERICAN): >60 ML/MIN/1.73 M^2
GLUCOSE SERPL-MCNC: 103 MG/DL
HCT VFR BLD AUTO: 37.9 %
HGB BLD-MCNC: 12.9 G/DL
IMM GRANULOCYTES # BLD AUTO: 0.02 K/UL
IMM GRANULOCYTES NFR BLD AUTO: 0.3 %
LYMPHOCYTES # BLD AUTO: 1 K/UL
LYMPHOCYTES NFR BLD: 14.6 %
MCH RBC QN AUTO: 30.1 PG
MCHC RBC AUTO-ENTMCNC: 34 G/DL
MCV RBC AUTO: 89 FL
MONOCYTES # BLD AUTO: 0.5 K/UL
MONOCYTES NFR BLD: 7.4 %
NEUTROPHILS # BLD AUTO: 5.3 K/UL
NEUTROPHILS NFR BLD: 75.9 %
NRBC BLD-RTO: 0 /100 WBC
PLATELET # BLD AUTO: 206 K/UL
PMV BLD AUTO: 9.9 FL
POTASSIUM SERPL-SCNC: 3.7 MMOL/L
PROT SERPL-MCNC: 7.2 G/DL
RBC # BLD AUTO: 4.28 M/UL
SODIUM SERPL-SCNC: 142 MMOL/L
WBC # BLD AUTO: 7 K/UL

## 2018-07-11 PROCEDURE — 99284 EMERGENCY DEPT VISIT MOD MDM: CPT | Mod: ,,, | Performed by: PHYSICIAN ASSISTANT

## 2018-07-11 PROCEDURE — 86140 C-REACTIVE PROTEIN: CPT

## 2018-07-11 PROCEDURE — 99284 EMERGENCY DEPT VISIT MOD MDM: CPT | Mod: 25

## 2018-07-11 PROCEDURE — 96365 THER/PROPH/DIAG IV INF INIT: CPT

## 2018-07-11 PROCEDURE — 85025 COMPLETE CBC W/AUTO DIFF WBC: CPT

## 2018-07-11 PROCEDURE — 80053 COMPREHEN METABOLIC PANEL: CPT

## 2018-07-11 PROCEDURE — S0077 INJECTION, CLINDAMYCIN PHOSP: HCPCS | Performed by: PHYSICIAN ASSISTANT

## 2018-07-11 PROCEDURE — 25000003 PHARM REV CODE 250: Performed by: PHYSICIAN ASSISTANT

## 2018-07-11 PROCEDURE — 85651 RBC SED RATE NONAUTOMATED: CPT

## 2018-07-11 RX ORDER — TACROLIMUS 1 MG/1
2 CAPSULE, GELATIN COATED ORAL EVERY 12 HOURS
Qty: 120 CAPSULE | Refills: 11 | Status: SHIPPED | OUTPATIENT
Start: 2018-07-11 | End: 2019-07-21 | Stop reason: SDUPTHER

## 2018-07-11 RX ORDER — CLINDAMYCIN PHOSPHATE 600 MG/50ML
600 INJECTION, SOLUTION INTRAVENOUS
Status: COMPLETED | OUTPATIENT
Start: 2018-07-11 | End: 2018-07-11

## 2018-07-11 RX ORDER — CLINDAMYCIN HYDROCHLORIDE 300 MG/1
600 CAPSULE ORAL EVERY 8 HOURS
Qty: 60 CAPSULE | Refills: 0 | Status: SHIPPED | OUTPATIENT
Start: 2018-07-11 | End: 2018-07-21

## 2018-07-11 RX ORDER — HYDROCODONE BITARTRATE AND ACETAMINOPHEN 5; 325 MG/1; MG/1
1 TABLET ORAL
Status: COMPLETED | OUTPATIENT
Start: 2018-07-11 | End: 2018-07-11

## 2018-07-11 RX ADMIN — CLINDAMYCIN IN 5 PERCENT DEXTROSE 600 MG: 12 INJECTION, SOLUTION INTRAVENOUS at 10:07

## 2018-07-11 RX ADMIN — HYDROCODONE BITARTRATE AND ACETAMINOPHEN 1 TABLET: 5; 325 TABLET ORAL at 11:07

## 2018-07-11 NOTE — TELEPHONE ENCOUNTER
Dr. Benítez reviewed your labs  Called patient to let her know that labs were ok but her Prograf level was too low since we took her off the Sirolimus.  Please increase Prograf dose to 2 mg twice a day and repeat labs on 07/17/18.

## 2018-07-11 NOTE — ED PROVIDER NOTES
Encounter Date: 2018       History     Chief Complaint   Patient presents with    Insect Bite     Pt presented to the ED via pov. Pt c/o insect bite, pt states she thinks it happened yesterday. Pt's left arm is swollen.      Patient is a 57-year-old female with a history of hypertension, status post liver transplant in  secondary to cholangiocarcinoma is presenting to the ER for evaluation of left elbow and arm swelling. Patient states that last night she noticed pain to the left elbow.  She has noticed redness to the site.  She thought that she was bit by an insect.  Denies any itching to the site.  She states that she does have pain upon movement of the left elbow.  Denies fall injury or trauma. Denies fever chills at home.  No recent antibiotic use.  Denies recent hospitalizations.  Patient is currently on CellCept and Rapamune.       The history is provided by the patient.     Review of patient's allergies indicates:  No Known Allergies  Past Medical History:   Diagnosis Date    Acute cholecystitis     Cholecystitis    Arthritis     septic arthritis of right shoulder    Bacteremia due to Streptococcus pneumoniae     Cancer     Cholangiocarcinoma     Hypertension     Jaundice     obstructive    Prediabetes      Past Surgical History:   Procedure Laterality Date    arthoscopic Right     drained infection     SECTION      INCISION AND DRAINAGE OF WOUND      s/p I&D of R thumb    LIVER TRANSPLANT      SHOULDER ARTHROSCOPY       Family History   Problem Relation Age of Onset    Cancer Father         Lung    Arthritis Father     Stroke Mother     Diabetes Mother     Hypertension Mother     Heart attack Mother     Cancer Paternal Grandmother         pancreatic cancer    Cancer Brother         Lung    Alcohol abuse Brother     Arthritis Sister     Colon cancer Neg Hx     Esophageal cancer Neg Hx      Social History   Substance Use Topics    Smoking status: Former Smoker     Smokeless tobacco: Never Used    Alcohol use No      Comment: socially     Review of Systems   Constitutional: Negative for chills and fever.   Respiratory: Negative for shortness of breath.    Cardiovascular: Negative for chest pain.   Musculoskeletal: Positive for arthralgias, joint swelling and myalgias.   Skin: Positive for rash. Negative for wound.   Allergic/Immunologic: Positive for immunocompromised state.   Neurological: Negative for weakness and numbness.   Hematological: Does not bruise/bleed easily.   Psychiatric/Behavioral: Negative for confusion.       Physical Exam     Initial Vitals [07/11/18 0958]   BP Pulse Resp Temp SpO2   126/60 68 17 97.8 °F (36.6 °C) 99 %      MAP       --         Physical Exam    Constitutional: She appears well-developed and well-nourished. She is not diaphoretic. No distress.   HENT:   Head: Normocephalic and atraumatic.   Mouth/Throat: Oropharynx is clear and moist.   Eyes: EOM are normal. Pupils are equal, round, and reactive to light.   Cardiovascular: Normal rate, regular rhythm, normal heart sounds and intact distal pulses.   Pulmonary/Chest: Breath sounds normal.   Musculoskeletal:        Left elbow: She exhibits swelling. She exhibits normal range of motion, no effusion and no deformity. Tenderness (diffuse tenderness with mild erythema to the elbow and distal to the elbow.) found.   Neurological: She is alert and oriented to person, place, and time. She has normal strength.   Skin: Skin is warm and dry. There is erythema.              ED Course   Procedures  Labs Reviewed   CBC W/ AUTO DIFFERENTIAL - Abnormal; Notable for the following:        Result Value    Gran% 75.9 (*)     Lymph% 14.6 (*)     All other components within normal limits   SEDIMENTATION RATE - Abnormal; Notable for the following:     Sed Rate 21 (*)     All other components within normal limits   COMPREHENSIVE METABOLIC PANEL   C-REACTIVE PROTEIN          Imaging Results          X-Ray Elbow  Complete Left (Final result)  Result time 07/11/18 12:19:15    Final result by Samuel Montoya III, MD (07/11/18 12:19:15)                 Impression:      See above      Electronically signed by: Samuel Montoya MD  Date:    07/11/2018  Time:    12:19             Narrative:    EXAMINATION:  XR ELBOW COMPLETE 3 VIEW LEFT    CLINICAL HISTORY:  Edema, unspecified    FINDINGS:  No fracture dislocation bone destruction or joint effusion seen.  There is mild DJD.                                       APC / Resident Notes:   Patient was seen in the ER promptly upon arrival.  She is afebrile, no acute distress. Physical examination does reveal erythema to the left elbow as well as distally.  Tender on palpation. Appears to be cellulitic in nature. IV access was established labs ordered.  Patient was initiated on IV clindamycin.  Patient given [place] for pain.    Laboratory studies were found to be unremarkable. Normal white count of 7.0.  Hemoglobin stable.  Chemistries were unremarkable.  Normal CRP of a 3.2.  ESR 21 the.  X-ray of elbow is unremarkable. Mild DJD.    Discussed with transplant LOS who will message transplant coordinator for follow up appointment.  Feel that she is stable for outpatient therapy and follow-up.  Advised patient to follow up with family doctor as well as the transplant team.  She will be prescribed home on clindamycin to use as directed.  Given strict return precautions.    The care of this patient was overseen by attending physician who agrees with treatment, plan, and disposition.                   Clinical Impression:   The primary encounter diagnosis was Cellulitis of left upper extremity. A diagnosis of Swollen was also pertinent to this visit.      Disposition:   Disposition: Discharged  Condition: Stable                        Brie Santoro PA-C  07/11/18 2915

## 2018-07-11 NOTE — DISCHARGE INSTRUCTIONS
Finish full course of antibiotics.  Follow up with family doctor as well as transplant team in 2 days.  Return to the ER immediately for symptoms persist or worsen.

## 2018-07-11 NOTE — TELEPHONE ENCOUNTER
----- Message from Мария Benítez MD sent at 7/11/2018  9:21 AM CDT -----  Tacrolimus level is low.  Increased to 2 mg twice a day repeat labs next week.

## 2018-07-11 NOTE — ED TRIAGE NOTES
Patient states she thinks she has an insect bite to her left forearm because she has swelling, tightness, and tenderness.  Patient denies itching.

## 2018-07-17 ENCOUNTER — LAB VISIT (OUTPATIENT)
Dept: LAB | Facility: HOSPITAL | Age: 58
End: 2018-07-17
Attending: INTERNAL MEDICINE
Payer: MEDICARE

## 2018-07-17 DIAGNOSIS — Z94.4 LIVER REPLACED BY TRANSPLANT: ICD-10-CM

## 2018-07-17 LAB
ALBUMIN SERPL BCP-MCNC: 3.7 G/DL
ALP SERPL-CCNC: 81 U/L
ALT SERPL W/O P-5'-P-CCNC: 19 U/L
ANION GAP SERPL CALC-SCNC: 8 MMOL/L
AST SERPL-CCNC: 16 U/L
BASOPHILS # BLD AUTO: 0.03 K/UL
BASOPHILS NFR BLD: 0.6 %
BILIRUB SERPL-MCNC: 0.9 MG/DL
BUN SERPL-MCNC: 9 MG/DL
CALCIUM SERPL-MCNC: 9.4 MG/DL
CHLORIDE SERPL-SCNC: 108 MMOL/L
CO2 SERPL-SCNC: 26 MMOL/L
CREAT SERPL-MCNC: 0.8 MG/DL
DIFFERENTIAL METHOD: ABNORMAL
EOSINOPHIL # BLD AUTO: 0.1 K/UL
EOSINOPHIL NFR BLD: 2.6 %
ERYTHROCYTE [DISTWIDTH] IN BLOOD BY AUTOMATED COUNT: 13.9 %
EST. GFR  (AFRICAN AMERICAN): >60 ML/MIN/1.73 M^2
EST. GFR  (NON AFRICAN AMERICAN): >60 ML/MIN/1.73 M^2
GLUCOSE SERPL-MCNC: 79 MG/DL
HCT VFR BLD AUTO: 36.9 %
HGB BLD-MCNC: 12.1 G/DL
IMM GRANULOCYTES # BLD AUTO: 0.01 K/UL
IMM GRANULOCYTES NFR BLD AUTO: 0.2 %
LYMPHOCYTES # BLD AUTO: 1.2 K/UL
LYMPHOCYTES NFR BLD: 26.3 %
MCH RBC QN AUTO: 29.5 PG
MCHC RBC AUTO-ENTMCNC: 32.8 G/DL
MCV RBC AUTO: 90 FL
MONOCYTES # BLD AUTO: 0.5 K/UL
MONOCYTES NFR BLD: 9.6 %
NEUTROPHILS # BLD AUTO: 2.8 K/UL
NEUTROPHILS NFR BLD: 60.7 %
NRBC BLD-RTO: 0 /100 WBC
PLATELET # BLD AUTO: 193 K/UL
PMV BLD AUTO: 10.9 FL
POTASSIUM SERPL-SCNC: 4.1 MMOL/L
PROT SERPL-MCNC: 7 G/DL
RBC # BLD AUTO: 4.1 M/UL
SODIUM SERPL-SCNC: 142 MMOL/L
TACROLIMUS BLD-MCNC: 3.3 NG/ML
WBC # BLD AUTO: 4.67 K/UL

## 2018-07-17 PROCEDURE — 80197 ASSAY OF TACROLIMUS: CPT

## 2018-07-17 PROCEDURE — 36415 COLL VENOUS BLD VENIPUNCTURE: CPT

## 2018-07-17 PROCEDURE — 80053 COMPREHEN METABOLIC PANEL: CPT

## 2018-07-17 PROCEDURE — 85025 COMPLETE CBC W/AUTO DIFF WBC: CPT

## 2018-07-18 ENCOUNTER — TELEPHONE (OUTPATIENT)
Dept: TRANSPLANT | Facility: CLINIC | Age: 58
End: 2018-07-18

## 2018-07-18 DIAGNOSIS — C22.1 CHOLANGIOCARCINOMA: Primary | ICD-10-CM

## 2018-07-18 NOTE — TELEPHONE ENCOUNTER
----- Message from Мария Benítez MD sent at 7/18/2018  9:19 AM CDT -----  Labs ok repeat in 2 weeks

## 2018-07-18 NOTE — TELEPHONE ENCOUNTER
Dr. Benítez reviewed your labs.  Continue routine labs no changes needed.  Letter sent for next lab appointment 07/30/18

## 2018-07-20 ENCOUNTER — TELEPHONE (OUTPATIENT)
Dept: TRANSPLANT | Facility: CLINIC | Age: 58
End: 2018-07-20

## 2018-07-20 NOTE — TELEPHONE ENCOUNTER
Called patient to see if she how she was doing since visiting the ER with an insect bite, that was recommended to follow up with her PCP.  Left voicemail.

## 2018-07-23 ENCOUNTER — TELEPHONE (OUTPATIENT)
Dept: INTERNAL MEDICINE | Facility: CLINIC | Age: 58
End: 2018-07-23

## 2018-07-23 ENCOUNTER — OFFICE VISIT (OUTPATIENT)
Dept: INTERNAL MEDICINE | Facility: CLINIC | Age: 58
End: 2018-07-23
Payer: MEDICARE

## 2018-07-23 VITALS
OXYGEN SATURATION: 98 % | SYSTOLIC BLOOD PRESSURE: 112 MMHG | WEIGHT: 139.75 LBS | BODY MASS INDEX: 25.72 KG/M2 | HEIGHT: 62 IN | HEART RATE: 73 BPM | DIASTOLIC BLOOD PRESSURE: 52 MMHG

## 2018-07-23 DIAGNOSIS — L03.114 CELLULITIS OF LEFT ELBOW: Primary | ICD-10-CM

## 2018-07-23 PROCEDURE — 99999 PR PBB SHADOW E&M-EST. PATIENT-LVL III: CPT | Mod: PBBFAC,,, | Performed by: NURSE PRACTITIONER

## 2018-07-23 PROCEDURE — 99213 OFFICE O/P EST LOW 20 MIN: CPT | Mod: S$GLB,,, | Performed by: NURSE PRACTITIONER

## 2018-07-23 PROCEDURE — 3008F BODY MASS INDEX DOCD: CPT | Mod: CPTII,S$GLB,, | Performed by: NURSE PRACTITIONER

## 2018-07-23 NOTE — PROGRESS NOTES
Subjective:       Patient ID: Nadeen Mcgovern is a 57 y.o. female.    Chief Complaint: No chief complaint on file.    Pt of Dr. Figueroa, here for ED f/u visit.    She went to ED on 7-11-18 for what she thought was an insect bite to the left elbow. Xray done was negative for fractures. Dx with Cellulitis and sent home on Clindamycin. She has 3 more days on the antibiotics. States that it is still sore, however it has improved. She denies fever or chills.    I have reviewed the ED note from the 7-11-18 visit.      Review of Systems   Constitutional: Negative for activity change and unexpected weight change.   HENT: Negative.    Eyes: Negative for discharge and visual disturbance.   Respiratory: Negative for chest tightness and wheezing.    Cardiovascular: Negative for chest pain and palpitations.   Gastrointestinal: Positive for constipation. Negative for blood in stool, diarrhea and vomiting.   Endocrine: Negative for polydipsia and polyuria.   Genitourinary: Negative for difficulty urinating and dysuria.   Musculoskeletal: Negative for arthralgias, joint swelling and neck pain.   Skin:        As documented in HPI left elbow   Allergic/Immunologic: Negative.  Negative for environmental allergies, food allergies and immunocompromised state.   Neurological: Negative.    Psychiatric/Behavioral: Negative.  Negative for confusion and dysphoric mood.       Review of patient's allergies indicates:  No Known Allergies    Current Outpatient Prescriptions:     amLODIPine (NORVASC) 5 MG tablet, Take 0.5 tablets (2.5 mg total) by mouth once daily., Disp: 90 tablet, Rfl: 3    aspirin 81 MG Chew, Take 81 mg by mouth once daily., Disp: , Rfl:     betamethasone dipropionate (DIPROLENE) 0.05 % cream, Apply topically 2 (two) times daily., Disp: 45 g, Rfl: 3    betamethasone dipropionate (DIPROLENE) 0.05 % ointment, Apply topically 2 (two) times daily., Disp: 45 g, Rfl: 1    clobetasol 0.05% (TEMOVATE) 0.05 % Oint, Apply topically 2 (two)  times daily., Disp: 60 g, Rfl: 0    clotrimazole (LOTRIMIN) 1 % cream, Apply topically 2 (two) times daily., Disp: 30 g, Rfl: 3    diphenhydrAMINE (BENADRYL) 25 mg capsule, Take 25 mg by mouth every 6 (six) hours as needed for Itching., Disp: , Rfl:     famotidine (PEPCID) 20 MG tablet, Take 1 tablet (20 mg total) by mouth nightly., Disp: 30 tablet, Rfl: 11    gabapentin (NEURONTIN) 100 MG capsule, TAKE 1 CAPSULE(100 MG) BY MOUTH THREE TIMES DAILY AS NEEDED, Disp: 90 capsule, Rfl: 1    hydrOXYzine pamoate (VISTARIL) 50 MG Cap, Take 1 capsule (50 mg total) by mouth every 8 (eight) hours as needed., Disp: 30 capsule, Rfl: 0    multivitamin (THERAGRAN) tablet, Take 1 tablet by mouth once daily., Disp: , Rfl:     mycophenolate (CELLCEPT) 500 mg Tab, Take 1 tablet (500 mg total) by mouth 2 (two) times daily. After 1 wk, increase to 2 tabs(1000mg) twice daily and continue, Disp: 120 tablet, Rfl: 2    PROGRAF 1 mg Cap, Take 2 capsules (2 mg total) by mouth every 12 (twelve) hours., Disp: 120 capsule, Rfl: 11    sildenafil (REVATIO) 20 mg Tab, Take 1 tablet (20 mg total) by mouth 2 (two) times daily., Disp: 180 tablet, Rfl: 3    tiZANidine (ZANAFLEX) 4 MG tablet, TAKE 1 TABLET(4 MG) BY MOUTH EVERY NIGHT AS NEEDED, Disp: 90 tablet, Rfl: 1    zolpidem (AMBIEN) 5 MG Tab, Take 1 tablet (5 mg total) by mouth nightly as needed., Disp: 30 tablet, Rfl: 2    Patient Active Problem List   Diagnosis    S/P liver transplant    Immunosuppression    Prediabetes    Left arm weakness    Scleroderma    Trigger thumb of left hand    Tendinitis of left hand    Dysphagia    Rotator cuff tear, left    Adhesive capsulitis of left shoulder    Raynaud's phenomenon    Hand eczema    Hand pain, left    Weakness of left hand    Right shoulder pain    Osteoarthritis of right thumb    Chronic left shoulder pain    Aortic valve regurgitation    Restrictive lung disease    Chronic bilateral low back pain    Cellulitis of  "left foot    Chronic contact dermatitis    Sicca syndrome with keratoconjunctivitis    Sicca syndrome       Past Medical History:   Diagnosis Date    Acute cholecystitis     Cholecystitis    Arthritis 2015    septic arthritis of right shoulder    Bacteremia due to Streptococcus pneumoniae     Cancer     Cholangiocarcinoma     Hypertension     Jaundice     obstructive    Prediabetes        Past Surgical History:   Procedure Laterality Date    arthoscopic Right     drained infection     SECTION      INCISION AND DRAINAGE OF WOUND      s/p I&D of R thumb    LIVER TRANSPLANT      SHOULDER ARTHROSCOPY         Social History     Social History    Marital status: Single     Spouse name: N/A    Number of children: N/A    Years of education: N/A     Occupational History    [employer]     Social History Main Topics    Smoking status: Former Smoker    Smokeless tobacco: Never Used    Alcohol use No      Comment: socially    Drug use: No      Comment: last use about a month ago    Sexual activity: No     Other Topics Concern    Not on file     Social History Narrative    No narrative on file       Family History   Problem Relation Age of Onset    Cancer Father         Lung    Arthritis Father     Stroke Mother     Diabetes Mother     Hypertension Mother     Heart attack Mother     Cancer Paternal Grandmother         pancreatic cancer    Cancer Brother         Lung    Alcohol abuse Brother     Arthritis Sister     Colon cancer Neg Hx     Esophageal cancer Neg Hx        Objective:       Vitals:    18 1352   BP: (!) 112/52   Pulse: 73   SpO2: 98%   Weight: 63.4 kg (139 lb 12.4 oz)   Height: 5' 2" (1.575 m)   PainSc: 0-No pain     Body mass index is 25.56 kg/m².    Physical Exam   Constitutional: She is oriented to person, place, and time. Vital signs are normal. She appears well-developed and well-nourished.   Eyes: Lids are normal. Lids " are everted and swept, no foreign bodies found.   Neck: Trachea normal and full passive range of motion without pain. No JVD present. Carotid bruit is not present.   Cardiovascular: Normal rate, regular rhythm, S1 normal, S2 normal, normal heart sounds, intact distal pulses and normal pulses.    Pulmonary/Chest: Effort normal and breath sounds normal.   Abdominal: Soft. Normal appearance and bowel sounds are normal. There is no hepatosplenomegaly.   Musculoskeletal: Normal range of motion. She exhibits no edema, tenderness or deformity.   Neurological: She is alert and oriented to person, place, and time. She has normal strength and normal reflexes.   Skin: Skin is warm, dry and intact. Capillary refill takes less than 2 seconds.   Left elbow, no swelling or redness   Psychiatric: She has a normal mood and affect. Her speech is normal and behavior is normal. Judgment and thought content normal. Cognition and memory are normal.   Vitals reviewed.      Assessment:       1. Cellulitis of left elbow     2. BMI 25.0-25.9,adult            Plan:     Nadeen was seen today for hospital follow up.    Diagnoses and all orders for this visit:    Cellulitis of left elbow  Resolving    Continue and finish out clindamycin    Keep skin clean with antibacterial soap and water    Warm compresses prn    BMI 25.0-25.9,adult  BMI reviewed    RTC prn if no improvement or s/s worsen

## 2018-07-23 NOTE — TELEPHONE ENCOUNTER
----- Message from Alicia ALCANTARA Harley sent at 7/23/2018  9:43 AM CDT -----  Contact: PT Portal Request  Appointment Request From: Nadeen Mcgovern    With Provider: Nadeen Figueroa MD [-Primary Care Physician-]    Would Accept With:Only the person I've selected    Preferred Date Range: Any date 7/23/2018 or later    Preferred Times: Any    Reason for visit: Request an Appt    Comments:  Follow up with Dr Figueroa from emergency rm

## 2018-07-23 NOTE — PATIENT INSTRUCTIONS
Keep skin clean with antibacterial soap and water    Warm compresses prn    Finish out clindamycin pills

## 2018-07-31 ENCOUNTER — LAB VISIT (OUTPATIENT)
Dept: LAB | Facility: HOSPITAL | Age: 58
End: 2018-07-31
Attending: INTERNAL MEDICINE
Payer: MEDICARE

## 2018-07-31 DIAGNOSIS — Z94.4 LIVER REPLACED BY TRANSPLANT: ICD-10-CM

## 2018-07-31 LAB
ALBUMIN SERPL BCP-MCNC: 3.8 G/DL
ALP SERPL-CCNC: 77 U/L
ALT SERPL W/O P-5'-P-CCNC: 21 U/L
ANION GAP SERPL CALC-SCNC: 7 MMOL/L
AST SERPL-CCNC: 16 U/L
BASOPHILS # BLD AUTO: 0.02 K/UL
BASOPHILS NFR BLD: 0.4 %
BILIRUB SERPL-MCNC: 0.7 MG/DL
BUN SERPL-MCNC: 14 MG/DL
CALCIUM SERPL-MCNC: 9.4 MG/DL
CHLORIDE SERPL-SCNC: 110 MMOL/L
CO2 SERPL-SCNC: 24 MMOL/L
CREAT SERPL-MCNC: 0.7 MG/DL
DIFFERENTIAL METHOD: ABNORMAL
EOSINOPHIL # BLD AUTO: 0.1 K/UL
EOSINOPHIL NFR BLD: 2.8 %
ERYTHROCYTE [DISTWIDTH] IN BLOOD BY AUTOMATED COUNT: 14.6 %
EST. GFR  (AFRICAN AMERICAN): >60 ML/MIN/1.73 M^2
EST. GFR  (NON AFRICAN AMERICAN): >60 ML/MIN/1.73 M^2
GLUCOSE SERPL-MCNC: 95 MG/DL
HCT VFR BLD AUTO: 38.3 %
HGB BLD-MCNC: 12.5 G/DL
IMM GRANULOCYTES # BLD AUTO: 0.01 K/UL
IMM GRANULOCYTES NFR BLD AUTO: 0.2 %
LYMPHOCYTES # BLD AUTO: 1.1 K/UL
LYMPHOCYTES NFR BLD: 23.7 %
MCH RBC QN AUTO: 30.2 PG
MCHC RBC AUTO-ENTMCNC: 32.6 G/DL
MCV RBC AUTO: 93 FL
MONOCYTES # BLD AUTO: 0.3 K/UL
MONOCYTES NFR BLD: 7.3 %
NEUTROPHILS # BLD AUTO: 3.1 K/UL
NEUTROPHILS NFR BLD: 65.6 %
NRBC BLD-RTO: 0 /100 WBC
PLATELET # BLD AUTO: 189 K/UL
PMV BLD AUTO: 10.3 FL
POTASSIUM SERPL-SCNC: 4.3 MMOL/L
PROT SERPL-MCNC: 7.4 G/DL
RBC # BLD AUTO: 4.14 M/UL
SODIUM SERPL-SCNC: 141 MMOL/L
TACROLIMUS BLD-MCNC: 3.3 NG/ML
WBC # BLD AUTO: 4.68 K/UL

## 2018-07-31 PROCEDURE — 80053 COMPREHEN METABOLIC PANEL: CPT

## 2018-07-31 PROCEDURE — 85025 COMPLETE CBC W/AUTO DIFF WBC: CPT

## 2018-07-31 PROCEDURE — 80197 ASSAY OF TACROLIMUS: CPT

## 2018-07-31 PROCEDURE — 36415 COLL VENOUS BLD VENIPUNCTURE: CPT

## 2018-08-01 ENCOUNTER — TELEPHONE (OUTPATIENT)
Dept: TRANSPLANT | Facility: CLINIC | Age: 58
End: 2018-08-01

## 2018-08-01 NOTE — TELEPHONE ENCOUNTER
----- Message from Мария Benítez MD sent at 8/1/2018 10:01 AM CDT -----  Labs stable repeat in 2 weeks

## 2018-08-01 NOTE — TELEPHONE ENCOUNTER
Dr. Benítez reviewed your labs.  Continue routine labs no changes needed.  Letter sent for next lab appointment 08/14/18

## 2018-08-14 ENCOUNTER — LAB VISIT (OUTPATIENT)
Dept: LAB | Facility: HOSPITAL | Age: 58
End: 2018-08-14
Attending: INTERNAL MEDICINE
Payer: MEDICARE

## 2018-08-14 DIAGNOSIS — Z94.4 LIVER REPLACED BY TRANSPLANT: ICD-10-CM

## 2018-08-14 LAB
ALBUMIN SERPL BCP-MCNC: 4 G/DL
ALP SERPL-CCNC: 75 U/L
ALT SERPL W/O P-5'-P-CCNC: 21 U/L
ANION GAP SERPL CALC-SCNC: 9 MMOL/L
AST SERPL-CCNC: 20 U/L
BASOPHILS # BLD AUTO: 0.02 K/UL
BASOPHILS NFR BLD: 0.4 %
BILIRUB SERPL-MCNC: 1.3 MG/DL
BUN SERPL-MCNC: 10 MG/DL
CALCIUM SERPL-MCNC: 10 MG/DL
CHLORIDE SERPL-SCNC: 108 MMOL/L
CO2 SERPL-SCNC: 26 MMOL/L
CREAT SERPL-MCNC: 0.8 MG/DL
DIFFERENTIAL METHOD: ABNORMAL
EOSINOPHIL # BLD AUTO: 0.2 K/UL
EOSINOPHIL NFR BLD: 3.4 %
ERYTHROCYTE [DISTWIDTH] IN BLOOD BY AUTOMATED COUNT: 14.8 %
EST. GFR  (AFRICAN AMERICAN): >60 ML/MIN/1.73 M^2
EST. GFR  (NON AFRICAN AMERICAN): >60 ML/MIN/1.73 M^2
GLUCOSE SERPL-MCNC: 100 MG/DL
HCT VFR BLD AUTO: 41.1 %
HGB BLD-MCNC: 13.5 G/DL
IMM GRANULOCYTES # BLD AUTO: 0.01 K/UL
IMM GRANULOCYTES NFR BLD AUTO: 0.2 %
LYMPHOCYTES # BLD AUTO: 1.2 K/UL
LYMPHOCYTES NFR BLD: 26.1 %
MCH RBC QN AUTO: 30.5 PG
MCHC RBC AUTO-ENTMCNC: 32.8 G/DL
MCV RBC AUTO: 93 FL
MONOCYTES # BLD AUTO: 0.4 K/UL
MONOCYTES NFR BLD: 8.7 %
NEUTROPHILS # BLD AUTO: 2.9 K/UL
NEUTROPHILS NFR BLD: 61.2 %
NRBC BLD-RTO: 0 /100 WBC
PLATELET # BLD AUTO: 190 K/UL
PMV BLD AUTO: 10.2 FL
POTASSIUM SERPL-SCNC: 4.7 MMOL/L
PROT SERPL-MCNC: 7.5 G/DL
RBC # BLD AUTO: 4.42 M/UL
SODIUM SERPL-SCNC: 143 MMOL/L
TACROLIMUS BLD-MCNC: 4 NG/ML
WBC # BLD AUTO: 4.72 K/UL

## 2018-08-14 PROCEDURE — 85025 COMPLETE CBC W/AUTO DIFF WBC: CPT

## 2018-08-14 PROCEDURE — 80197 ASSAY OF TACROLIMUS: CPT

## 2018-08-14 PROCEDURE — 80053 COMPREHEN METABOLIC PANEL: CPT

## 2018-08-14 PROCEDURE — 36415 COLL VENOUS BLD VENIPUNCTURE: CPT

## 2018-08-15 ENCOUNTER — TELEPHONE (OUTPATIENT)
Dept: TRANSPLANT | Facility: CLINIC | Age: 58
End: 2018-08-15

## 2018-08-15 ENCOUNTER — TELEPHONE (OUTPATIENT)
Dept: RHEUMATOLOGY | Facility: CLINIC | Age: 58
End: 2018-08-15

## 2018-08-15 DIAGNOSIS — Z79.899 ON MYCOPHENOLATE MOFETIL THERAPY: ICD-10-CM

## 2018-08-15 DIAGNOSIS — Z94.4 LIVER REPLACED BY TRANSPLANT: Primary | ICD-10-CM

## 2018-08-15 DIAGNOSIS — M34.9 SCLERODERMA: ICD-10-CM

## 2018-08-15 NOTE — TELEPHONE ENCOUNTER
----- Message from Мария Benítez MD sent at 8/15/2018  4:07 PM CDT -----  Slight increase in tbili, repeat labs in 4 weeks with direct bili included

## 2018-08-15 NOTE — TELEPHONE ENCOUNTER
Angelique, please find out how much mycophenolate she is taking so I can refill with current dose. Thanks SAILAJA

## 2018-08-15 NOTE — TELEPHONE ENCOUNTER
Dr. Benítez reviewed your labs.  Continue routine labs no changes needed.  Letter sent for next lab appointment 09/11/18

## 2018-08-16 RX ORDER — MYCOPHENOLATE MOFETIL 500 MG/1
1000 TABLET ORAL 2 TIMES DAILY
Qty: 120 TABLET | Refills: 1 | Status: SHIPPED | OUTPATIENT
Start: 2018-08-16 | End: 2018-08-22 | Stop reason: SDUPTHER

## 2018-08-22 ENCOUNTER — TELEPHONE (OUTPATIENT)
Dept: TRANSPLANT | Facility: CLINIC | Age: 58
End: 2018-08-22

## 2018-08-22 DIAGNOSIS — Z79.899 ON MYCOPHENOLATE MOFETIL THERAPY: ICD-10-CM

## 2018-08-22 DIAGNOSIS — M34.9 SCLERODERMA: ICD-10-CM

## 2018-08-22 RX ORDER — MYCOPHENOLATE MOFETIL 500 MG/1
1000 TABLET ORAL 2 TIMES DAILY
Qty: 120 TABLET | Refills: 0 | Status: SHIPPED | OUTPATIENT
Start: 2018-08-22 | End: 2018-09-17 | Stop reason: SDUPTHER

## 2018-08-22 RX ORDER — MYCOPHENOLATE MOFETIL 500 MG/1
1000 TABLET ORAL 2 TIMES DAILY
Qty: 120 TABLET | Refills: 1 | Status: SHIPPED | OUTPATIENT
Start: 2018-08-22 | End: 2018-09-17 | Stop reason: SDUPTHER

## 2018-08-23 ENCOUNTER — TELEPHONE (OUTPATIENT)
Dept: TRANSPLANT | Facility: CLINIC | Age: 58
End: 2018-08-23

## 2018-08-23 NOTE — TELEPHONE ENCOUNTER
----- Message from Tara Shepherd sent at 8/23/2018  8:49 AM CDT -----  Contact: Patient  Needs Advice    Reason for call:    Patient asks that prograf medication be routed to [facility] on [address] [address] [facility] in [place]. [phone]  Communication Preference: [phone]  Additional Information: n/a

## 2018-09-11 ENCOUNTER — LAB VISIT (OUTPATIENT)
Dept: LAB | Facility: HOSPITAL | Age: 58
End: 2018-09-11
Attending: INTERNAL MEDICINE
Payer: MEDICARE

## 2018-09-11 DIAGNOSIS — Z94.4 LIVER REPLACED BY TRANSPLANT: ICD-10-CM

## 2018-09-11 LAB
ALBUMIN SERPL BCP-MCNC: 3.9 G/DL
ALP SERPL-CCNC: 64 U/L
ALT SERPL W/O P-5'-P-CCNC: 17 U/L
ANION GAP SERPL CALC-SCNC: 7 MMOL/L
AST SERPL-CCNC: 18 U/L
BASOPHILS # BLD AUTO: 0.02 K/UL
BASOPHILS NFR BLD: 0.6 %
BILIRUB DIRECT SERPL-MCNC: 0.4 MG/DL
BILIRUB SERPL-MCNC: 1.2 MG/DL
BUN SERPL-MCNC: 9 MG/DL
CALCIUM SERPL-MCNC: 9.2 MG/DL
CHLORIDE SERPL-SCNC: 108 MMOL/L
CO2 SERPL-SCNC: 24 MMOL/L
CREAT SERPL-MCNC: 0.7 MG/DL
DIFFERENTIAL METHOD: ABNORMAL
EOSINOPHIL # BLD AUTO: 0.1 K/UL
EOSINOPHIL NFR BLD: 3.7 %
ERYTHROCYTE [DISTWIDTH] IN BLOOD BY AUTOMATED COUNT: 14 %
EST. GFR  (AFRICAN AMERICAN): >60 ML/MIN/1.73 M^2
EST. GFR  (NON AFRICAN AMERICAN): >60 ML/MIN/1.73 M^2
GLUCOSE SERPL-MCNC: 94 MG/DL
HCT VFR BLD AUTO: 38.9 %
HGB BLD-MCNC: 12.8 G/DL
IMM GRANULOCYTES # BLD AUTO: 0.01 K/UL
IMM GRANULOCYTES NFR BLD AUTO: 0.3 %
LYMPHOCYTES # BLD AUTO: 0.9 K/UL
LYMPHOCYTES NFR BLD: 25.9 %
MCH RBC QN AUTO: 30.5 PG
MCHC RBC AUTO-ENTMCNC: 32.9 G/DL
MCV RBC AUTO: 93 FL
MONOCYTES # BLD AUTO: 0.3 K/UL
MONOCYTES NFR BLD: 9.8 %
NEUTROPHILS # BLD AUTO: 2 K/UL
NEUTROPHILS NFR BLD: 59.7 %
NRBC BLD-RTO: 0 /100 WBC
PLATELET # BLD AUTO: 178 K/UL
PMV BLD AUTO: 10.6 FL
POTASSIUM SERPL-SCNC: 3.9 MMOL/L
PROT SERPL-MCNC: 7.1 G/DL
RBC # BLD AUTO: 4.19 M/UL
SODIUM SERPL-SCNC: 139 MMOL/L
TACROLIMUS BLD-MCNC: 5.1 NG/ML
WBC # BLD AUTO: 3.28 K/UL

## 2018-09-11 PROCEDURE — 82248 BILIRUBIN DIRECT: CPT

## 2018-09-11 PROCEDURE — 36415 COLL VENOUS BLD VENIPUNCTURE: CPT

## 2018-09-11 PROCEDURE — 85025 COMPLETE CBC W/AUTO DIFF WBC: CPT

## 2018-09-11 PROCEDURE — 80197 ASSAY OF TACROLIMUS: CPT

## 2018-09-11 PROCEDURE — 80053 COMPREHEN METABOLIC PANEL: CPT

## 2018-09-13 ENCOUNTER — TELEPHONE (OUTPATIENT)
Dept: TRANSPLANT | Facility: CLINIC | Age: 58
End: 2018-09-13

## 2018-09-13 NOTE — TELEPHONE ENCOUNTER
Dr. Benítez reviewed your labs.  Continue routine labs no changes needed.  Letter sent for next lab appointment 12/10/18

## 2018-09-13 NOTE — TELEPHONE ENCOUNTER
----- Message from Мария Benítez MD sent at 9/13/2018  1:38 PM CDT -----  Reviewed, nothing to do; repeat per routine

## 2018-09-14 ENCOUNTER — LAB VISIT (OUTPATIENT)
Dept: LAB | Facility: HOSPITAL | Age: 58
End: 2018-09-14
Attending: INTERNAL MEDICINE
Payer: MEDICARE

## 2018-09-14 DIAGNOSIS — M34.9 LIMITED SCLERODERMA: ICD-10-CM

## 2018-09-14 LAB
CRP SERPL-MCNC: 0.8 MG/L
ERYTHROCYTE [SEDIMENTATION RATE] IN BLOOD BY WESTERGREN METHOD: 10 MM/HR

## 2018-09-14 PROCEDURE — 85652 RBC SED RATE AUTOMATED: CPT

## 2018-09-14 PROCEDURE — 86140 C-REACTIVE PROTEIN: CPT

## 2018-09-14 PROCEDURE — 36415 COLL VENOUS BLD VENIPUNCTURE: CPT

## 2018-09-16 DIAGNOSIS — G89.29 CHRONIC RIGHT-SIDED LOW BACK PAIN WITH RIGHT-SIDED SCIATICA: ICD-10-CM

## 2018-09-16 DIAGNOSIS — M54.41 CHRONIC RIGHT-SIDED LOW BACK PAIN WITH RIGHT-SIDED SCIATICA: ICD-10-CM

## 2018-09-17 ENCOUNTER — TELEPHONE (OUTPATIENT)
Dept: RHEUMATOLOGY | Facility: CLINIC | Age: 58
End: 2018-09-17

## 2018-09-17 ENCOUNTER — OFFICE VISIT (OUTPATIENT)
Dept: RHEUMATOLOGY | Facility: CLINIC | Age: 58
End: 2018-09-17
Payer: MEDICARE

## 2018-09-17 VITALS
DIASTOLIC BLOOD PRESSURE: 73 MMHG | BODY MASS INDEX: 23.59 KG/M2 | WEIGHT: 138.19 LBS | SYSTOLIC BLOOD PRESSURE: 121 MMHG | HEIGHT: 64 IN | HEART RATE: 59 BPM

## 2018-09-17 DIAGNOSIS — J98.4 RESTRICTIVE LUNG DISEASE: Primary | ICD-10-CM

## 2018-09-17 DIAGNOSIS — L30.9 HAND ECZEMA: ICD-10-CM

## 2018-09-17 DIAGNOSIS — I73.00 RAYNAUD'S PHENOMENON WITHOUT GANGRENE: ICD-10-CM

## 2018-09-17 DIAGNOSIS — M35.01 SICCA SYNDROME WITH KERATOCONJUNCTIVITIS: ICD-10-CM

## 2018-09-17 DIAGNOSIS — Z79.899 ON MYCOPHENOLATE MOFETIL THERAPY: ICD-10-CM

## 2018-09-17 DIAGNOSIS — M34.9 SCLERODERMA: ICD-10-CM

## 2018-09-17 PROBLEM — Z79.624 ON MYCOPHENOLATE MOFETIL THERAPY: Status: ACTIVE | Noted: 2018-09-17

## 2018-09-17 PROCEDURE — 99999 PR PBB SHADOW E&M-EST. PATIENT-LVL III: CPT | Mod: PBBFAC,,, | Performed by: INTERNAL MEDICINE

## 2018-09-17 PROCEDURE — 99499 UNLISTED E&M SERVICE: CPT | Mod: S$GLB,,, | Performed by: INTERNAL MEDICINE

## 2018-09-17 PROCEDURE — 99213 OFFICE O/P EST LOW 20 MIN: CPT | Mod: PBBFAC | Performed by: INTERNAL MEDICINE

## 2018-09-17 PROCEDURE — 99214 OFFICE O/P EST MOD 30 MIN: CPT | Mod: S$PBB,,, | Performed by: INTERNAL MEDICINE

## 2018-09-17 PROCEDURE — 3008F BODY MASS INDEX DOCD: CPT | Mod: CPTII,,, | Performed by: INTERNAL MEDICINE

## 2018-09-17 RX ORDER — GABAPENTIN 100 MG/1
CAPSULE ORAL
Qty: 90 CAPSULE | Refills: 0 | Status: SHIPPED | OUTPATIENT
Start: 2018-09-17 | End: 2019-05-02

## 2018-09-17 RX ORDER — MYCOPHENOLATE MOFETIL 500 MG/1
1000 TABLET ORAL 2 TIMES DAILY
Qty: 120 TABLET | Refills: 2 | Status: SHIPPED | OUTPATIENT
Start: 2018-09-17 | End: 2018-12-06 | Stop reason: SDUPTHER

## 2018-09-17 ASSESSMENT — ROUTINE ASSESSMENT OF PATIENT INDEX DATA (RAPID3)
PSYCHOLOGICAL DISTRESS SCORE: 3.3
PAIN SCORE: 7
AM STIFFNESS SCORE: 0, NO
MDHAQ FUNCTION SCORE: .9
TOTAL RAPID3 SCORE: 5.5
FATIGUE SCORE: 6
PATIENT GLOBAL ASSESSMENT SCORE: 6.5

## 2018-09-17 NOTE — PROGRESS NOTES
"Subjective:       Patient ID: Nadeen Mcgovern is a 57 y.o. female.    Chief Complaint: No chief complaint on file.    Ms Mcgovern is a 57 year old female with PMH of lcSsc, secondary Sjögren's, OA hands who presents for follow up visit. She reports that she has noticed significant improvement in her symptoms since starting cellcept. She notes that her hand dermatitis is improving, but at times can still be painful enough to affect her . She has followed up with dermatology and attempts to avoid contact allergies. She is somewhat bother by dry eyes and mouth and uses artifical tears as needed. She has not yet followed up with Ophthalmology since her last visit with us. She reports no worsening shortness of breath and rides her stationary bike often at home without difficulty. She does note difficulty swallowing 2-3 times per month, especially with meats and notes that she knows she needs "to chew that stuff a bit more."      Review of Systems   Constitutional: Negative for chills and fever.   HENT: Positive for trouble swallowing.    Eyes: Negative for redness.   Respiratory: Positive for shortness of breath. Negative for cough.    Cardiovascular: Negative for chest pain.   Gastrointestinal: Negative for constipation and diarrhea.   Genitourinary: Negative for dysuria and genital sores.   Skin: Positive for rash. Negative for color change.   Neurological: Negative for headaches.   Hematological: Bruises/bleeds easily.         Objective:   /73   Pulse (!) 59   Ht 5' 3.6" (1.615 m)   Wt 62.7 kg (138 lb 3.2 oz)   LMP  (LMP Unknown)   BMI 24.02 kg/m²      Physical Exam   Constitutional: She is oriented to person, place, and time and well-developed, well-nourished, and in no distress. No distress.   HENT:   Head: Normocephalic and atraumatic.   Right Ear: External ear normal.   Left Ear: External ear normal.   Mouth/Throat: Oropharynx is clear and moist.   Eyes: Conjunctivae and EOM are normal. Pupils are " equal, round, and reactive to light.   Neck: Normal range of motion. Neck supple. No thyromegaly present.   Cardiovascular: Normal rate, regular rhythm and normal heart sounds.    Pulmonary/Chest: Effort normal and breath sounds normal. No respiratory distress. She has no wheezes. She has no rales. She exhibits no tenderness.   Abdominal: Soft. Bowel sounds are normal. She exhibits no distension and no mass. There is no tenderness. There is no rebound and no guarding.       Right Side Rheumatological Exam     Examination finds the shoulder, elbow, wrist, knee, 1st PIP, 1st MCP, 2nd PIP, 2nd MCP, 3rd PIP, 3rd MCP, 4th PIP, 4th MCP, 5th PIP and 5th MCP normal.    Left Side Rheumatological Exam     Examination finds the shoulder, elbow, wrist, knee, 1st PIP, 1st MCP, 2nd PIP, 2nd MCP, 3rd PIP, 3rd MCP, 4th PIP, 4th MCP, 5th PIP and 5th MCP normal.      Neurological: She is alert and oriented to person, place, and time. Gait normal.   Skin: She is not diaphoretic.     Eczematous dermatitis of palms and soles  Digital skin pitting  Scleroderma mRSS=13 (Mild)         Assessment:       1. Scleroderma    2. On mycophenolate mofetil therapy    3. Raynaud's phenomenon without gangrene    4. Hand eczema    5. Sicca syndrome with keratoconjunctivitis            Plan:           Problem List Items Addressed This Visit     Scleroderma     LcSSC: mRSS 13 improved from last visit.   Continue mycophenolate 1000mg po BID  WBC decreased; repeat CBC in 2 weeks         Relevant Medications    mycophenolate (CELLCEPT) 500 mg Tab    Raynaud's phenomenon     No recent episodes  Continue amlodipine 2.5mg daily  continue sildenafil 20mg po BID         Hand eczema     F/u with Kari Souza MD         Sicca syndrome with keratoconjunctivitis     Ref to Dr. Connolly in opthalmology         On mycophenolate mofetil therapy    Relevant Medications    mycophenolate (CELLCEPT) 500 mg Tab        RTC in 3 months with standing labs    Robert FLOR  DO Darrin  9:35 AM 09/17/2018

## 2018-09-17 NOTE — ASSESSMENT & PLAN NOTE
LcSSC: mRSS 13 improved from last visit.   Continue mycophenolate 1000mg po BID  WBC decreased; repeat CBC in 2 weeks

## 2018-09-17 NOTE — PROGRESS NOTES
I have personally taken the history and examined the patient and agree with the resident,s note as stated above Off sirolimus(Rapaimmune) and on mycophenolate since  6/25/18. Doing much better overall, including hand eczema. Tolerating mycophenolate without GI issues. Raynaud's improved, no new digital ulcers. Feels skin overall may be softer    Exam: 121/73 Chest is clear to ausc. Cardiac exam normal. Abdomen benign. Joints with OA hands. Hand eczema much improved, still some scaly erythema hypothenar eminences. + digital pitting scars left hand.  MRSS= 13(decreased from 19 prior visit)    DcSSc: EUGENE+ 1:320 nucleolar/speckled, SS-A + Scl-70 - RNAP III - improved with mRSS currently 13(mild)  Sjogren's  Hand eczema much improved  Leukopenia, lymphopenia due to mycophenolate  Liver transplant for cholangiocarcinoma    Repeat cbc in 2 wks  *Shingrix x 2  when available  Due for annual 2 D echo with CFD to screen for PAH  Due for 6 month PFTs  Cont mycophenolate 1000mg twice daily  Cont amlodipine 2.5mg daily  Cont sildenafil 20 mg twice daily   RTC  3 months with standing labs

## 2018-10-01 ENCOUNTER — LAB VISIT (OUTPATIENT)
Dept: LAB | Facility: HOSPITAL | Age: 58
End: 2018-10-01
Attending: INTERNAL MEDICINE
Payer: MEDICARE

## 2018-10-01 DIAGNOSIS — Z79.899 ON MYCOPHENOLATE MOFETIL THERAPY: ICD-10-CM

## 2018-10-01 DIAGNOSIS — M34.9 SCLERODERMA: ICD-10-CM

## 2018-10-01 LAB
BASOPHILS # BLD AUTO: 0.02 K/UL
BASOPHILS NFR BLD: 0.4 %
DIFFERENTIAL METHOD: NORMAL
EOSINOPHIL # BLD AUTO: 0.1 K/UL
EOSINOPHIL NFR BLD: 2.2 %
ERYTHROCYTE [DISTWIDTH] IN BLOOD BY AUTOMATED COUNT: 13.7 %
HCT VFR BLD AUTO: 39.7 %
HGB BLD-MCNC: 13.2 G/DL
LYMPHOCYTES # BLD AUTO: 1.4 K/UL
LYMPHOCYTES NFR BLD: 25.1 %
MCH RBC QN AUTO: 30.7 PG
MCHC RBC AUTO-ENTMCNC: 33.2 G/DL
MCV RBC AUTO: 92 FL
MONOCYTES # BLD AUTO: 0.4 K/UL
MONOCYTES NFR BLD: 8 %
NEUTROPHILS # BLD AUTO: 3.5 K/UL
NEUTROPHILS NFR BLD: 64.3 %
PLATELET # BLD AUTO: 182 K/UL
PMV BLD AUTO: 10.2 FL
RBC # BLD AUTO: 4.3 M/UL
WBC # BLD AUTO: 5.49 K/UL

## 2018-10-01 PROCEDURE — 36415 COLL VENOUS BLD VENIPUNCTURE: CPT

## 2018-10-01 PROCEDURE — 85025 COMPLETE CBC W/AUTO DIFF WBC: CPT

## 2018-10-11 DIAGNOSIS — G89.29 CHRONIC RIGHT-SIDED LOW BACK PAIN WITH RIGHT-SIDED SCIATICA: ICD-10-CM

## 2018-10-11 DIAGNOSIS — M54.41 CHRONIC RIGHT-SIDED LOW BACK PAIN WITH RIGHT-SIDED SCIATICA: ICD-10-CM

## 2018-10-12 RX ORDER — TIZANIDINE 4 MG/1
TABLET ORAL
Qty: 90 TABLET | Refills: 0 | Status: SHIPPED | OUTPATIENT
Start: 2018-10-12 | End: 2019-06-25

## 2018-10-15 DIAGNOSIS — G47.00 INSOMNIA, UNSPECIFIED TYPE: ICD-10-CM

## 2018-10-15 RX ORDER — ZOLPIDEM TARTRATE 5 MG/1
TABLET ORAL
Qty: 30 TABLET | Refills: 3 | Status: SHIPPED | OUTPATIENT
Start: 2018-10-15 | End: 2019-01-10 | Stop reason: SDUPTHER

## 2018-10-16 ENCOUNTER — HOSPITAL ENCOUNTER (OUTPATIENT)
Dept: RADIOLOGY | Facility: HOSPITAL | Age: 58
Discharge: HOME | End: 2018-10-16
Attending: INTERNAL MEDICINE
Payer: MEDICARE

## 2018-10-16 DIAGNOSIS — C22.1 CHOLANGIOCARCINOMA: ICD-10-CM

## 2018-10-16 LAB
CREAT SERPL-MCNC: 0.6 MG/DL (ref 0.5–1.4)
SAMPLE: NORMAL

## 2018-10-16 PROCEDURE — 74177 CT ABD & PELVIS W/CONTRAST: CPT | Mod: TC

## 2018-10-16 PROCEDURE — 25500020 PHARM REV CODE 255: Performed by: INTERNAL MEDICINE

## 2018-10-16 PROCEDURE — 71260 CT THORAX DX C+: CPT | Mod: 26,,, | Performed by: RADIOLOGY

## 2018-10-16 PROCEDURE — 74177 CT ABD & PELVIS W/CONTRAST: CPT | Mod: 26,,, | Performed by: RADIOLOGY

## 2018-10-16 PROCEDURE — 71260 CT THORAX DX C+: CPT | Mod: TC

## 2018-10-16 RX ADMIN — IOHEXOL 75 ML: 350 INJECTION, SOLUTION INTRAVENOUS at 11:10

## 2018-10-17 ENCOUNTER — TELEPHONE (OUTPATIENT)
Dept: TRANSPLANT | Facility: CLINIC | Age: 58
End: 2018-10-17

## 2018-10-17 NOTE — TELEPHONE ENCOUNTER
----- Message from Мария Benítez MD sent at 10/17/2018  4:03 PM CDT -----  No concern for cancer. There is a hernia in the abdominal wall, but nothing to do if no pain or asymptomatic

## 2018-10-17 NOTE — TELEPHONE ENCOUNTER
Sent letter to let patient know CT scan reviewed no evidence of recurrent disease, will continue with routine scans.

## 2018-10-18 ENCOUNTER — OFFICE VISIT (OUTPATIENT)
Dept: TRANSPLANT | Facility: CLINIC | Age: 58
End: 2018-10-18
Payer: MEDICARE

## 2018-10-18 VITALS
WEIGHT: 139.75 LBS | SYSTOLIC BLOOD PRESSURE: 109 MMHG | RESPIRATION RATE: 16 BRPM | HEART RATE: 63 BPM | BODY MASS INDEX: 24.76 KG/M2 | OXYGEN SATURATION: 96 % | HEIGHT: 63 IN | TEMPERATURE: 98 F | DIASTOLIC BLOOD PRESSURE: 57 MMHG

## 2018-10-18 DIAGNOSIS — Z85.09 HISTORY OF CHOLANGIOCARCINOMA: ICD-10-CM

## 2018-10-18 DIAGNOSIS — D84.9 IMMUNOSUPPRESSION: Primary | ICD-10-CM

## 2018-10-18 DIAGNOSIS — Z94.4 LIVER TRANSPLANTED: ICD-10-CM

## 2018-10-18 PROCEDURE — 99213 OFFICE O/P EST LOW 20 MIN: CPT | Mod: S$PBB,,, | Performed by: INTERNAL MEDICINE

## 2018-10-18 PROCEDURE — 3008F BODY MASS INDEX DOCD: CPT | Mod: CPTII,,, | Performed by: INTERNAL MEDICINE

## 2018-10-18 PROCEDURE — 99999 PR PBB SHADOW E&M-EST. PATIENT-LVL III: CPT | Mod: PBBFAC,,, | Performed by: INTERNAL MEDICINE

## 2018-10-18 PROCEDURE — 99213 OFFICE O/P EST LOW 20 MIN: CPT | Mod: PBBFAC | Performed by: INTERNAL MEDICINE

## 2018-10-18 NOTE — PROGRESS NOTES
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     ORGAN: LIVER  Whole or Partial: whole liver  Donor Type:  - brain death  CDC High Risk: yes  Donor CMV Status: Positive  Donor HCV Status: Negative  Donor HBcAb: Negative  Biliary Anastomosis: tobias-en-y  Arterial Anatomy: standard  IVC reconstruction: end to end ivc  Portal vein status: patent  .    Subjective:     Interval History:  Currently, she is doing adequately. She is on Tacrolimus and Cellcept. She is following with Rheumatology for her scleroderma. Pain in her hands and feet are improved following initiation of Cellcept. She is able to ambulate more now. She complains of intermittent dysphagia with both solids and liquids. Episodes can occur when she is lying down or standing up. She denies odynophagia. She denies fevers or chills. She complains of epigastric bloating, likely related to recent meal. Last bowel movement was yesterday, denies melena, hematochezia or diarrhea.    She denies any other issues. She reports compliance with medications.    Review of Systems   Constitutional: Negative for activity change, appetite change, fatigue and fever.   HENT: Negative for congestion, nosebleeds and postnasal drip.    Respiratory: Negative for chest tightness, shortness of breath and wheezing.    Cardiovascular: Negative for chest pain, palpitations and leg swelling.   Gastrointestinal: Positive for nausea. Negative for abdominal distention, constipation, diarrhea and vomiting.   Musculoskeletal: Negative for arthralgias, joint swelling and myalgias.   Skin: Negative for pallor.   Neurological: Negative for dizziness and numbness.       Objective:     Physical Exam   Constitutional: She is oriented to person, place, and time. She appears well-developed and well-nourished. No  distress.   HENT:   Head: Normocephalic and atraumatic.   Mouth/Throat: Oropharynx is clear and moist. No oropharyngeal exudate.   Eyes: Conjunctivae are normal. Pupils are equal, round, and reactive to light. Right eye exhibits no discharge. Left eye exhibits no discharge. No scleral icterus.   Pulmonary/Chest: Effort normal and breath sounds normal. No respiratory distress. She has no wheezes.   Abdominal: Soft. She exhibits no distension. There is no tenderness.   Musculoskeletal: She exhibits no edema.   Neurological: She is alert and oriented to person, place, and time.   Psychiatric: She has a normal mood and affect. Her behavior is normal.   Vitals reviewed.  Abdomen: Transverse abdominal scar, soft, no evidence of hernia    CT 7/2018  No evidence of metastatic or recurrent malignancy. Two stable right apical subcentimeter lung nodules. Nonspecific but stable splenic hypodensity.  Fat containing left-sided anterior abdominal wall hernia.    WBC   Date Value Ref Range Status   10/01/2018 5.49 3.90 - 12.70 K/uL Final     Hemoglobin   Date Value Ref Range Status   10/01/2018 13.2 12.0 - 16.0 g/dL Final     POC Hematocrit   Date Value Ref Range Status   10/08/2015 34 (L) 36 - 54 %PCV Final     Hematocrit   Date Value Ref Range Status   10/01/2018 39.7 37.0 - 48.5 % Final     Platelets   Date Value Ref Range Status   10/01/2018 182 150 - 350 K/uL Final     BUN, Bld   Date Value Ref Range Status   09/11/2018 9 6 - 20 mg/dL Final     Creatinine   Date Value Ref Range Status   09/11/2018 0.7 0.5 - 1.4 mg/dL Final     Glucose   Date Value Ref Range Status   09/11/2018 94 70 - 110 mg/dL Final     Calcium   Date Value Ref Range Status   09/11/2018 9.2 8.7 - 10.5 mg/dL Final     Sodium   Date Value Ref Range Status   09/11/2018 139 136 - 145 mmol/L Final     Potassium   Date Value Ref Range Status   09/11/2018 3.9 3.5 - 5.1 mmol/L Final     Chloride   Date Value Ref Range Status   09/11/2018 108 95 - 110 mmol/L Final      Magnesium   Date Value Ref Range Status   10/13/2015 2.0 1.6 - 2.6 mg/dL Final     AST   Date Value Ref Range Status   09/11/2018 18 10 - 40 U/L Final     ALT   Date Value Ref Range Status   09/11/2018 17 10 - 44 U/L Final     Alkaline Phosphatase   Date Value Ref Range Status   09/11/2018 64 55 - 135 U/L Final     Total Bilirubin   Date Value Ref Range Status   09/11/2018 1.2 (H) 0.1 - 1.0 mg/dL Final     Comment:     For infants and newborns, interpretation of results should be based  on gestational age, weight and in agreement with clinical  observations.  Premature Infant recommended reference ranges:  Up to 24 hours.............<8.0 mg/dL  Up to 48 hours............<12.0 mg/dL  3-5 days..................<15.0 mg/dL  6-29 days.................<15.0 mg/dL       Albumin   Date Value Ref Range Status   09/11/2018 3.9 3.5 - 5.2 g/dL Final     INR   Date Value Ref Range Status   10/27/2016 1.0 0.8 - 1.2 Final     Comment:     Coumadin Therapy:  2.0 - 3.0 for INR for all indicators except mechanical heart valves  and antiphospholipid syndromes which should use 2.5 - 3.5.       Lab Results   Component Value Date    TACROLIMUS 5.1 09/11/2018    SIROLIMUSLEV <2.0 (L) 07/10/2018           Assessment/Plan:     No diagnosis found.  Immunosuppression  - Continue Cellcept and Tacrolimus     Liver transplanted  - Reviewed labs. Good allograft function    History of cholangiocarcinoma  - CT abd/pelvis with no re-occurrence/malignancy. Continue with routine f/u    Intermittent Dysphagia  - Likely secondary to scleroderma. Discussed importance of chewing foods carefully.   - Recommend EGD for possible dilation if needed with GI evaluation with Dr. Mckenna    RTC in 1 year    Irma Bustamante MD         [facility] Patient Status  Functional Status: 70% - Cares for self: unable to carry on normal activity or active work  Physical Capacity: No Limitations

## 2018-10-18 NOTE — LETTER
October 25, 2018        Wendy Hernandez  [address] [facility] [address]  Phone: [phone]  Fax: [phone]             [facility] - Liver Trans[facility]  [address]  [place] [address]  Phone: [phone]   Patient: Nadeen Mcgovern   MR Number: [id number]   YOB: 1960   Date of Visit: 10/18/2018       Dear Dr. Wendy Hernandez    Thank you for referring Nadeen Mcgovern to me for evaluation. Attached you will find relevant portions of my assessment and plan of care.    If you have questions, please do not hesitate to call me. I look forward to following Nadeen Mcgovern along with you.    Sincerely,    Мария Benítez MD    Enclosure    If you would like to receive this communication electronically, please contact [email or web address] or [phone] to request [employer] access.    [employer] is a tool which provides read-only access to select patient information with whom you have a relationship. Its easy to use and provides real time access to review your patients record including encounter summaries, notes, results, and demographic information.    If you feel you have received this communication in error or would no longer like to receive these types of communications, please e-mail [email or web address]

## 2018-10-25 NOTE — PROGRESS NOTES
Patient seen and discussed with fellow. I agree with her assessment and plan. Patient doing well post transplant as it related to her liver. Main issues have been secondary to scleroderma. She has dysphagia now therefore recommend she get EGD and be schedule with Dr. Mckenna for management of likely motility issues related to scleroderma.

## 2018-10-30 ENCOUNTER — OFFICE VISIT (OUTPATIENT)
Dept: CARDIOLOGY | Facility: CLINIC | Age: 58
End: 2018-10-30
Payer: MEDICARE

## 2018-10-30 VITALS
WEIGHT: 139.31 LBS | DIASTOLIC BLOOD PRESSURE: 67 MMHG | OXYGEN SATURATION: 100 % | HEIGHT: 62 IN | HEART RATE: 59 BPM | BODY MASS INDEX: 25.64 KG/M2 | SYSTOLIC BLOOD PRESSURE: 132 MMHG

## 2018-10-30 DIAGNOSIS — Z94.4 S/P LIVER TRANSPLANT: ICD-10-CM

## 2018-10-30 DIAGNOSIS — R73.03 PREDIABETES: ICD-10-CM

## 2018-10-30 DIAGNOSIS — I35.1 NONRHEUMATIC AORTIC VALVE INSUFFICIENCY: Primary | ICD-10-CM

## 2018-10-30 DIAGNOSIS — D84.9 IMMUNOSUPPRESSION: ICD-10-CM

## 2018-10-30 PROCEDURE — 99213 OFFICE O/P EST LOW 20 MIN: CPT | Mod: PBBFAC | Performed by: INTERNAL MEDICINE

## 2018-10-30 PROCEDURE — 3008F BODY MASS INDEX DOCD: CPT | Mod: CPTII,,, | Performed by: INTERNAL MEDICINE

## 2018-10-30 PROCEDURE — 99213 OFFICE O/P EST LOW 20 MIN: CPT | Mod: S$PBB,,, | Performed by: INTERNAL MEDICINE

## 2018-10-30 PROCEDURE — 99999 PR PBB SHADOW E&M-EST. PATIENT-LVL III: CPT | Mod: PBBFAC,,, | Performed by: INTERNAL MEDICINE

## 2018-10-30 NOTE — PROGRESS NOTES
Subjective:   Patient ID:  Nadeen Mcgovern is a 58 y.o. female who presents for follow up of Shortness of Breath (1 yr f/u )      HPI: Yearly f/u in this pt s/p liver transplant who was having TAM a year ago but was reassured that it was just deconditioning.  She's feeling like overall her health is improving and she reports no limitation in her activity because of undue dyspnea.  She is doing well with no new symptoms or cardiovascular complaints and no change in exercise capacity.  She denies chest discomfort, TAM, palpitations, PND/orthopnea, lightheadedness and syncope.      Patient Active Problem List   Diagnosis    S/P liver transplant    Immunosuppression    Prediabetes    Left arm weakness    Scleroderma    Trigger thumb of left hand    Tendinitis of left hand    Dysphagia    Rotator cuff tear, left    Adhesive capsulitis of left shoulder    Raynaud's phenomenon    Hand eczema    Hand pain, left    Weakness of left hand    Right shoulder pain    Osteoarthritis of right thumb    Chronic left shoulder pain    Aortic valve regurgitation    Restrictive lung disease    Chronic bilateral low back pain    Cellulitis of left foot    Chronic contact dermatitis    Sicca syndrome with keratoconjunctivitis    Sicca syndrome    On mycophenolate mofetil therapy       Current Outpatient Medications   Medication Sig    amLODIPine (NORVASC) 5 MG tablet Take 0.5 tablets (2.5 mg total) by mouth once daily.    aspirin 81 MG Chew Take 81 mg by mouth once daily.    betamethasone dipropionate (DIPROLENE) 0.05 % cream Apply topically 2 (two) times daily.    betamethasone dipropionate (DIPROLENE) 0.05 % ointment Apply topically 2 (two) times daily.    clotrimazole (LOTRIMIN) 1 % cream Apply topically 2 (two) times daily.    diphenhydrAMINE (BENADRYL) 25 mg capsule Take 25 mg by mouth every 6 (six) hours as needed for Itching.    gabapentin (NEURONTIN) 100 MG capsule TAKE 1 CAPSULE(100 MG) BY MOUTH THREE  TIMES DAILY AS NEEDED    hydrOXYzine pamoate (VISTARIL) 50 MG Cap Take 1 capsule (50 mg total) by mouth every 8 (eight) hours as needed.    multivitamin (THERAGRAN) tablet Take 1 tablet by mouth once daily.    mycophenolate (CELLCEPT) 500 mg Tab Take 2 tablets (1,000 mg total) by mouth 2 (two) times daily.    PROGRAF 1 mg Cap Take 2 capsules (2 mg total) by mouth every 12 (twelve) hours.    sildenafil (REVATIO) 20 mg Tab Take 1 tablet (20 mg total) by mouth 2 (two) times daily.    tiZANidine (ZANAFLEX) 4 MG tablet TAKE 1 TABLET(4 MG) BY MOUTH EVERY NIGHT AS NEEDED    zolpidem (AMBIEN) 5 MG Tab TAKE 1 TABLET BY MOUTH EVERY DAY AT BEDTIME AS NEEDED.    clobetasol 0.05% (TEMOVATE) 0.05 % Oint Apply topically 2 (two) times daily.    famotidine (PEPCID) 20 MG tablet Take 1 tablet (20 mg total) by mouth nightly.     No current facility-administered medications for this visit.        Review of Systems   Constitution: Negative.   HENT: Negative.    Eyes: Negative.    Cardiovascular: Negative.  Negative for chest pain, dyspnea on exertion, near-syncope, orthopnea and palpitations.   Respiratory: Negative.  Negative for cough, hemoptysis and shortness of breath.    Endocrine: Negative.    Hematologic/Lymphatic: Negative.    Skin: Negative.    Musculoskeletal: Negative.    Gastrointestinal: Negative.    Genitourinary: Negative.    Neurological: Negative.    Psychiatric/Behavioral: Negative.      Objective:   Physical Exam   Constitutional: She is oriented to person, place, and time. She appears well-developed and well-nourished.   HENT:   Head: Normocephalic and atraumatic.   Mouth/Throat: Oropharynx is clear and moist.   Eyes: Conjunctivae and EOM are normal. No scleral icterus.   Neck: Normal range of motion. Neck supple. No JVD present.   Cardiovascular: Normal rate, regular rhythm, normal heart sounds and intact distal pulses. Exam reveals no gallop and no friction rub.   No murmur heard.  Pulmonary/Chest: Effort  normal and breath sounds normal. She has no wheezes. She has no rales.   Abdominal: Soft. Bowel sounds are normal. She exhibits no distension. There is no tenderness.   Musculoskeletal: Normal range of motion. She exhibits no edema.   Neurological: She is alert and oriented to person, place, and time.   Skin: Skin is warm and dry. No rash noted. No erythema.   Psychiatric: She has a normal mood and affect. Her behavior is normal. Judgment and thought content normal.   Vitals reviewed.      Lab Results   Component Value Date    WBC 5.49 10/01/2018    HGB 13.2 10/01/2018    HCT 39.7 10/01/2018    MCV 92 10/01/2018     10/01/2018         Chemistry        Component Value Date/Time     09/11/2018 0655    K 3.9 09/11/2018 0655     09/11/2018 0655    CO2 24 09/11/2018 0655    BUN 9 09/11/2018 0655    CREATININE 0.7 09/11/2018 0655    GLU 94 09/11/2018 0655        Component Value Date/Time    CALCIUM 9.2 09/11/2018 0655    ALKPHOS 64 09/11/2018 0655    AST 18 09/11/2018 0655    ALT 17 09/11/2018 0655    BILITOT 1.2 (H) 09/11/2018 0655    ESTGFRAFRICA >60.0 09/11/2018 0655    EGFRNONAA >60.0 09/11/2018 0655            Lab Results   Component Value Date    CHOL 190 02/15/2017    CHOL 140 12/14/2015    CHOL 200 (H) 01/21/2015     Lab Results   Component Value Date    HDL 52 02/15/2017    HDL 49 12/14/2015    HDL 47 01/21/2015     Lab Results   Component Value Date    LDLCALC 124.0 02/15/2017    LDLCALC 74.4 12/14/2015    LDLCALC 129.6 01/21/2015     Lab Results   Component Value Date    TRIG 70 02/15/2017    TRIG 83 12/14/2015    TRIG 117 01/21/2015     Lab Results   Component Value Date    CHOLHDL 27.4 02/15/2017    CHOLHDL 35.0 12/14/2015    CHOLHDL 23.5 01/21/2015       Lab Results   Component Value Date    TSH 1.224 08/17/2016       Lab Results   Component Value Date    HGBA1C 5.4 02/15/2017       Assessment:     1. Nonrheumatic aortic valve insufficiency - mild   2. Prediabetes    3. S/P liver  transplant    4. Immunosuppression        Plan:     Continue current medicines.    Diet/exercise goals reinforced.    F/U PRN

## 2018-11-12 ENCOUNTER — OFFICE VISIT (OUTPATIENT)
Dept: INTERNAL MEDICINE | Facility: CLINIC | Age: 58
End: 2018-11-12
Payer: MEDICARE

## 2018-11-12 VITALS
HEART RATE: 66 BPM | OXYGEN SATURATION: 97 % | DIASTOLIC BLOOD PRESSURE: 70 MMHG | TEMPERATURE: 98 F | WEIGHT: 135.81 LBS | BODY MASS INDEX: 24.99 KG/M2 | HEIGHT: 62 IN | SYSTOLIC BLOOD PRESSURE: 120 MMHG

## 2018-11-12 DIAGNOSIS — R05.8 COUGH PRODUCTIVE OF CLEAR SPUTUM: Primary | ICD-10-CM

## 2018-11-12 PROCEDURE — 3008F BODY MASS INDEX DOCD: CPT | Mod: CPTII,GC,S$GLB, | Performed by: STUDENT IN AN ORGANIZED HEALTH CARE EDUCATION/TRAINING PROGRAM

## 2018-11-12 PROCEDURE — 99999 PR PBB SHADOW E&M-EST. PATIENT-LVL V: CPT | Mod: PBBFAC,GC,, | Performed by: STUDENT IN AN ORGANIZED HEALTH CARE EDUCATION/TRAINING PROGRAM

## 2018-11-12 PROCEDURE — 99213 OFFICE O/P EST LOW 20 MIN: CPT | Mod: GC,S$GLB,, | Performed by: STUDENT IN AN ORGANIZED HEALTH CARE EDUCATION/TRAINING PROGRAM

## 2018-11-12 RX ORDER — PROMETHAZINE HYDROCHLORIDE 6.25 MG/5ML
25 SYRUP ORAL EVERY 6 HOURS PRN
Qty: 118 ML | Refills: 0 | Status: SHIPPED | OUTPATIENT
Start: 2018-11-12 | End: 2020-01-17

## 2018-11-12 RX ORDER — BENZONATATE 100 MG/1
100 CAPSULE ORAL 3 TIMES DAILY PRN
Qty: 30 CAPSULE | Refills: 0 | Status: SHIPPED | OUTPATIENT
Start: 2018-11-12 | End: 2018-11-22

## 2018-11-12 NOTE — PATIENT INSTRUCTIONS
- If you do not get better in a week call us back   - Try some honey with your tea   - Try some flonase for over the counter   - If you have any fever or chills please call us back  - wash your hands and try to stay away from sick individuals   - Call us if you have any questions can give you some augmentin if you get worse.   - I sent your medication to [facility]

## 2018-11-12 NOTE — PROGRESS NOTES
Subjective     Chief Complaint: Cough and congestion     History of Present Illness:  Ms. Nadeen Mcgovern is a 58 y.o. female with a PMH of HTN, arthritis, limited scleroderma, s/p liver transplant ( 2015) , nonrheumatic aortic valve insufficiency( sees cardiology )  , raynauds here for an urgent care visit for cough and congestion. It started two weeks ago with congestion , productive cough that started with dark green and now clearing now yellowish, used to have headaches ( not anymore),  And complains of a constant drip in the back of her throat. She states that sometimes she has worsening of shortness of breath at night but describes it as a cough that causes her to  Breath harder. She states that this started after she noticed her grandson was cough. He is 4 years old and goes to  with known teacher who had a cold. She also has some nausea . She is taking mucinex which has helped a lot and is drinking hot tea which soothes her throat.     Patient denies any rhinorrhea , chills,  fever, diarrhea, vomting .     She has taken her flu shot , pneumonia.        Review of Systems   Constitutional: Positive for chills and malaise/fatigue. Negative for diaphoresis, fever and weight loss.   HENT: Positive for congestion and sore throat. Negative for hearing loss.    Eyes: Negative for blurred vision, double vision and photophobia.   Respiratory: Positive for cough. Negative for wheezing.    Cardiovascular: Negative for chest pain, palpitations, orthopnea, leg swelling and PND.   Gastrointestinal: Positive for nausea. Negative for abdominal pain, constipation, diarrhea and vomiting.   Genitourinary: Negative for frequency, hematuria and urgency.   Musculoskeletal: Negative for back pain, joint pain, myalgias and neck pain.   Skin: Negative for itching and rash.   Neurological: Positive for headaches. Negative for dizziness, sensory change, seizures, loss of consciousness and weakness.   Psychiatric/Behavioral:  Negative for depression. The patient is not nervous/anxious.        PAST HISTORY:     Past Medical History:   Diagnosis Date    Acute cholecystitis     Cholecystitis    Arthritis     septic arthritis of right shoulder    Bacteremia due to Streptococcus pneumoniae     Cancer     Cholangiocarcinoma     Hypertension     Jaundice     obstructive    Prediabetes        Past Surgical History:   Procedure Laterality Date    arthoscopic Right     drained infection    ARTHROSCOPY-SHOULDER - septic right shoulder Right 2015    Performed by Drew Frank MD at [facility] OR 2ND FLR    LYBBXA-KEZSRO-BY N/A 2016    Performed by [facility] Diagnostic Provider at [facility] OR 2ND FLR     SECTION      ERCP N/A 2016    Performed by Efrain Simms MD at [facility] ENDO (2ND FLR)    ERCP N/A 2015    Performed by Sathish Cohen MD at [facility] ENDO (2ND FLR)    ERCP N/A 2015    Performed by Efrain Simms MD at [facility] ENDO (2ND FLR)    ERCP N/A 3/12/2015    Performed by Sathish Cohen MD at [facility] ENDO (2ND FLR)    ERCP N/A 2015    Performed by Efrain Simms MD at [facility] ENDO (2ND FLR)    ERCP N/A 2014    Performed by Efrain Simms MD at [facility] ENDO (2ND FLR)    ERCP W/ Confocal EM N/A 2015    Per[facility] by [facility] at [facility] ENDO    ESOPHAGOGASTRODUODENOSCOPY (EGD) N/A 2/3/2017    Performed by Carlos Haynes MD at [facility] ENDO (4TH FLR)    ESOPHAGOGASTRODUODENOSCOPY (EGD) N/A 2015    Performed by Sathish Cohen MD at [facility] ENDO (2ND FLR)    INCISION AND DRAINAGE (I&D), HAND Right 2015    Performed by Drew Frank MD at [facility] OR 2ND [place]    INCISION AND DRAINAGE OF WOUND      s/p I&D of R thumb    DXAOBCVRK-VOED-O-CATH N/A 2015    Performed by Josue Cartwright Jr., MD at [facility] OR 2ND FLR    LIVER TRANSPLANT      MANOMETRY-ESOPHAGEAL-WITH IMPEDANCE N/A 2017    Performed by Leonardo Chris MD at [facility] ENDO (4TH FLR)    REMOVAL-PORT-A-CATH N/A  10/27/2016    Performed by [facility] Diagnostic Provider at [facility] OR 2ND FLR    SHOULDER ARTHROSCOPY      TRANSPLANT-LIVER N/A 10/7/2015    Performed by Floyd Recinos MD at [facility] OR 2ND FLR    ULTRASOUND-ENDOSCOPIC-UPPER N/A 3/12/2015    Performed by Sathish Cohen MD at [facility] ENDO (2ND FLR)    ULTRASOUND-ENDOSCOPIC-UPPER N/A 12/16/2014    Performed by Efrain Simms MD at [facility] ENDO (2ND FLR)       Family History   Problem Relation Age of Onset    Cancer Father         Lung    Arthritis Father     Stroke Mother     Diabetes Mother     Hypertension Mother     Heart attack Mother     Cancer Paternal Grandmother         pancreatic cancer    Cancer Brother         Lung    Alcohol abuse Brother     Arthritis Sister     Colon cancer Neg Hx     Esophageal cancer Neg Hx        Social History     Socioeconomic History    Marital status: Single     Spouse name: Not on file    Number of children: Not on file    Years of education: Not on file    Highest education level: Not on file   Social Needs    Financial resource strain: Not on file    Food insecurity - worry: Not on file    Food insecurity - inability: Not on file    Transportation needs - medical: Not on file    Transportation needs - non-medical: Not on file   Occupational History    Occupation: Food Tech     Comment: [employer]   Tobacco Use    Smoking status: Former Smoker    Smokeless tobacco: Never Used   Substance and Sexual Activity    Alcohol use: Yes     Comment: socially    Drug use: No     Comment: last use about a month ago    Sexual activity: No   Other Topics Concern    Not on file   Social History Narrative    Not on file       MEDICATIONS & ALLERGIES:     Current Outpatient Medications on File Prior to Visit   Medication Sig    amLODIPine (NORVASC) 5 MG tablet Take 0.5 tablets (2.5 mg total) by mouth once daily.    aspirin 81 MG Chew Take 81 mg by mouth once daily.    betamethasone  "dipropionate (DIPROLENE) 0.05 % cream Apply topically 2 (two) times daily.    betamethasone dipropionate (DIPROLENE) 0.05 % ointment Apply topically 2 (two) times daily.    clobetasol 0.05% (TEMOVATE) 0.05 % Oint Apply topically 2 (two) times daily.    clotrimazole (LOTRIMIN) 1 % cream Apply topically 2 (two) times daily.    diphenhydrAMINE (BENADRYL) 25 mg capsule Take 25 mg by mouth every 6 (six) hours as needed for Itching.    famotidine (PEPCID) 20 MG tablet Take 1 tablet (20 mg total) by mouth nightly.    gabapentin (NEURONTIN) 100 MG capsule TAKE 1 CAPSULE(100 MG) BY MOUTH THREE TIMES DAILY AS NEEDED    hydrOXYzine pamoate (VISTARIL) 50 MG Cap Take 1 capsule (50 mg total) by mouth every 8 (eight) hours as needed.    multivitamin (THERAGRAN) tablet Take 1 tablet by mouth once daily.    mycophenolate (CELLCEPT) 500 mg Tab Take 2 tablets (1,000 mg total) by mouth 2 (two) times daily.    PROGRAF 1 mg Cap Take 2 capsules (2 mg total) by mouth every 12 (twelve) hours.    sildenafil (REVATIO) 20 mg Tab Take 1 tablet (20 mg total) by mouth 2 (two) times daily.    tiZANidine (ZANAFLEX) 4 MG tablet TAKE 1 TABLET(4 MG) BY MOUTH EVERY NIGHT AS NEEDED    zolpidem (AMBIEN) 5 MG Tab TAKE 1 TABLET BY MOUTH EVERY DAY AT BEDTIME AS NEEDED.     No current facility-administered medications on file prior to visit.        Review of patient's allergies indicates:  No Known Allergies    OBJECTIVE:     Vital Signs:  Vitals:    11/12/18 1308   BP: 120/70   BP Location: Left arm   Patient Position: Sitting   BP Method: Medium (Manual)   Pulse: 66   Temp: 98 °F (36.7 °C)   TempSrc: Oral   SpO2: 97%   Weight: 61.6 kg (135 lb 12.9 oz)   Height: 5' 2" (1.575 m)       Body mass index is 24.84 kg/m².     Physical Exam:  General:  Well developed, well nourished, no acute distress  Head: Normocephalic, atraumatic  Eyes: PERRL, EOMI, clear sclera  Throat: No posterior pharyngeal erythema or exudate, no lymphadenopathy  no tonsillar " exudate  Neck: supple, normal ROM, no thyromegaly   CVS:  RRR, S1 and S2 normal, no murmurs, rubs, gallops, 2+ peripheral pulses  Resp:  Lungs clear to auscultation, no wheezes, rales, rhonchi, cough  GI:  Abdomen soft, non-tender, non-distended, normoactive bowel sounds  MSK:  No muscle atrophy, cyanosis, peripheral edema   Skin:  No rashes, ulcers, erythema  Neuro:  CNII-XII grossly intact, no focal deficits noted  Psych:  Appropriate mood and affect, normal judgement    Laboratory  Lab Results   Component Value Date    WBC 5.49 10/01/2018    HGB 13.2 10/01/2018    HCT 39.7 10/01/2018    MCV 92 10/01/2018     10/01/2018     @[email or web address](GLU,NA,K,Cl,CO2,BUN,Creatinine,Calcium,MG)@  Lab Results   Component Value Date    INR 1.0 10/27/2016    INR 0.9 06/20/2016    INR 1.0 05/16/2016     Lab Results   Component Value Date    HGBA1C 5.4 02/15/2017     No results for input(s): POCTGLUCOSE in the last 72 hours.    Diagnostic Results:  Labs: Reviewed  No new imaging     ASSESSMENT & PLAN:   Ms. Nadeen Mcgovern is a 58 y.o. female    Cough productive of clear sputum  Upper respiratory infection - likely viral     - Patient with a history of two weeks who is presenting with a cough.  - She remains to be feeling better since a week ago  - Centor criteria is -1   - Talked to her about her immunosuppression and that she should always sees a physician when she feels ill as she has a low threshold for antibiotics.   -Given that she is afebrile and improving will continue with supportive therapy, tessalon pearls, phenergan syrup and Flonase  - She is to call if she is feeling worse or has any fevers        RTC as needed     Discussed with Dr. Manjarrez - staff attestation to follow        Padma Curran MD, MPH  Internal Medicine PGY2  [facility]  [address] [place] [id number] [phone]

## 2018-11-20 ENCOUNTER — TELEPHONE (OUTPATIENT)
Dept: INTERNAL MEDICINE | Facility: CLINIC | Age: 58
End: 2018-11-20

## 2018-11-20 DIAGNOSIS — R05.9 COUGH: Primary | ICD-10-CM

## 2018-11-20 RX ORDER — AMOXICILLIN AND CLAVULANATE POTASSIUM 500; 125 MG/1; MG/1
1 TABLET, FILM COATED ORAL 2 TIMES DAILY
Qty: 14 TABLET | Refills: 0 | Status: SHIPPED | OUTPATIENT
Start: 2018-11-20 | End: 2018-11-27

## 2018-11-20 NOTE — TELEPHONE ENCOUNTER
----- Message from Jong Archuleta sent at 11/20/2018  3:06 PM CST -----  Contact: Patient [phone]  Patient is returning a phone call.  Who left a message for the patient:  office  Does patient know what this is regarding:  Previous message  Comments:     Attn: Padma Chatman    Please call an advise  Thank you

## 2018-11-20 NOTE — TELEPHONE ENCOUNTER
----- Message from Nancy Rush sent at 11/20/2018 12:31 PM CST -----  Contact: self/[phone]  Pt called in regards to letting Padma Curran MD know that she is not feeling any better and would like to get the Rx for augmentin.     [facility]   Please advise

## 2018-11-20 NOTE — TELEPHONE ENCOUNTER
----- Message from Nancy Rush sent at 11/20/2018 12:31 PM CST -----  Contact: self/[phone]  Pt called in regards to letting Padma Curran MD know that she is not feeling any better and would like to get the Rx for augmentin.     [facility]   Please advise

## 2018-11-21 NOTE — TELEPHONE ENCOUNTER
Called patient to check on her symptoms. She is feeling much better and does not complain of any new symptoms of fever or chills. She will call her PCP if her cough gets worse for now she filled her Augmentin and is comfortable.         Padma Curran MD, MPH  Internal Medicine PGY2  [address] [place] [address]

## 2018-11-27 ENCOUNTER — TELEPHONE (OUTPATIENT)
Dept: RHEUMATOLOGY | Facility: CLINIC | Age: 58
End: 2018-11-27

## 2018-12-06 DIAGNOSIS — Z79.899 ON MYCOPHENOLATE MOFETIL THERAPY: ICD-10-CM

## 2018-12-06 DIAGNOSIS — M34.9 SCLERODERMA: ICD-10-CM

## 2018-12-06 RX ORDER — MYCOPHENOLATE MOFETIL 500 MG/1
TABLET ORAL
Qty: 120 TABLET | Refills: 1 | Status: SHIPPED | OUTPATIENT
Start: 2018-12-06 | End: 2019-03-19 | Stop reason: SDUPTHER

## 2018-12-10 ENCOUNTER — LAB VISIT (OUTPATIENT)
Dept: LAB | Facility: HOSPITAL | Age: 58
End: 2018-12-10
Attending: INTERNAL MEDICINE
Payer: MEDICARE

## 2018-12-10 DIAGNOSIS — Z94.4 LIVER REPLACED BY TRANSPLANT: ICD-10-CM

## 2018-12-10 DIAGNOSIS — C22.1 CHOLANGIOCARCINOMA: ICD-10-CM

## 2018-12-10 LAB
ALBUMIN SERPL BCP-MCNC: 3.8 G/DL
ALP SERPL-CCNC: 74 U/L
ALT SERPL W/O P-5'-P-CCNC: 17 U/L
ANION GAP SERPL CALC-SCNC: 8 MMOL/L
AST SERPL-CCNC: 17 U/L
BASOPHILS # BLD AUTO: 0.03 K/UL
BASOPHILS NFR BLD: 0.5 %
BILIRUB DIRECT SERPL-MCNC: 0.3 MG/DL
BILIRUB SERPL-MCNC: 0.8 MG/DL
BUN SERPL-MCNC: 11 MG/DL
CALCIUM SERPL-MCNC: 9.6 MG/DL
CANCER AG19-9 SERPL-ACNC: 3 U/ML
CHLORIDE SERPL-SCNC: 104 MMOL/L
CO2 SERPL-SCNC: 27 MMOL/L
CREAT SERPL-MCNC: 0.8 MG/DL
DIFFERENTIAL METHOD: ABNORMAL
EOSINOPHIL # BLD AUTO: 0.2 K/UL
EOSINOPHIL NFR BLD: 3.4 %
ERYTHROCYTE [DISTWIDTH] IN BLOOD BY AUTOMATED COUNT: 13.2 %
EST. GFR  (AFRICAN AMERICAN): >60 ML/MIN/1.73 M^2
EST. GFR  (NON AFRICAN AMERICAN): >60 ML/MIN/1.73 M^2
GLUCOSE SERPL-MCNC: 85 MG/DL
HCT VFR BLD AUTO: 42.1 %
HGB BLD-MCNC: 13.4 G/DL
IMM GRANULOCYTES # BLD AUTO: 0.02 K/UL
IMM GRANULOCYTES NFR BLD AUTO: 0.4 %
LYMPHOCYTES # BLD AUTO: 1.6 K/UL
LYMPHOCYTES NFR BLD: 27.6 %
MCH RBC QN AUTO: 30.2 PG
MCHC RBC AUTO-ENTMCNC: 31.8 G/DL
MCV RBC AUTO: 95 FL
MONOCYTES # BLD AUTO: 0.5 K/UL
MONOCYTES NFR BLD: 8.7 %
NEUTROPHILS # BLD AUTO: 3.3 K/UL
NEUTROPHILS NFR BLD: 59.4 %
NRBC BLD-RTO: 0 /100 WBC
PLATELET # BLD AUTO: 195 K/UL
PMV BLD AUTO: 10.7 FL
POTASSIUM SERPL-SCNC: 4 MMOL/L
PROT SERPL-MCNC: 7.5 G/DL
RBC # BLD AUTO: 4.43 M/UL
SODIUM SERPL-SCNC: 139 MMOL/L
TACROLIMUS BLD-MCNC: 3.3 NG/ML
WBC # BLD AUTO: 5.62 K/UL

## 2018-12-10 PROCEDURE — 86301 IMMUNOASSAY TUMOR CA 19-9: CPT

## 2018-12-10 PROCEDURE — 82248 BILIRUBIN DIRECT: CPT

## 2018-12-10 PROCEDURE — 85025 COMPLETE CBC W/AUTO DIFF WBC: CPT

## 2018-12-10 PROCEDURE — 80053 COMPREHEN METABOLIC PANEL: CPT

## 2018-12-10 PROCEDURE — 36415 COLL VENOUS BLD VENIPUNCTURE: CPT

## 2018-12-10 PROCEDURE — 80197 ASSAY OF TACROLIMUS: CPT

## 2018-12-14 ENCOUNTER — TELEPHONE (OUTPATIENT)
Dept: TRANSPLANT | Facility: CLINIC | Age: 58
End: 2018-12-14

## 2018-12-14 NOTE — TELEPHONE ENCOUNTER
Dr. Benítez reviewed your labs.  Continue routine labs no changes needed.  Letter sent for next lab appointment 03/11/19

## 2018-12-14 NOTE — TELEPHONE ENCOUNTER
----- Message from Мария Benítez MD sent at 12/13/2018  5:06 PM CST -----  Reviewed, nothing to do; repeat per routine

## 2018-12-20 ENCOUNTER — PES CALL (OUTPATIENT)
Dept: ADMINISTRATIVE | Facility: CLINIC | Age: 58
End: 2018-12-20

## 2018-12-28 ENCOUNTER — LAB VISIT (OUTPATIENT)
Dept: LAB | Facility: HOSPITAL | Age: 58
End: 2018-12-28
Attending: INTERNAL MEDICINE
Payer: MEDICARE

## 2018-12-28 DIAGNOSIS — M34.9 LIMITED SCLERODERMA: ICD-10-CM

## 2018-12-28 DIAGNOSIS — Z79.899 ON MYCOPHENOLATE MOFETIL THERAPY: ICD-10-CM

## 2018-12-28 DIAGNOSIS — M34.9 SCLERODERMA: ICD-10-CM

## 2018-12-28 LAB
ALBUMIN SERPL BCP-MCNC: 3.9 G/DL
ALP SERPL-CCNC: 69 U/L
ALT SERPL W/O P-5'-P-CCNC: 23 U/L
ANION GAP SERPL CALC-SCNC: 8 MMOL/L
AST SERPL-CCNC: 20 U/L
BASOPHILS # BLD AUTO: 0.01 K/UL
BASOPHILS NFR BLD: 0.2 %
BILIRUB SERPL-MCNC: 0.7 MG/DL
BUN SERPL-MCNC: 11 MG/DL
CALCIUM SERPL-MCNC: 9 MG/DL
CHLORIDE SERPL-SCNC: 109 MMOL/L
CO2 SERPL-SCNC: 23 MMOL/L
CREAT SERPL-MCNC: 0.7 MG/DL
CRP SERPL-MCNC: 4.8 MG/L
DIFFERENTIAL METHOD: NORMAL
EOSINOPHIL # BLD AUTO: 0.2 K/UL
EOSINOPHIL NFR BLD: 2.7 %
ERYTHROCYTE [DISTWIDTH] IN BLOOD BY AUTOMATED COUNT: 13.3 %
ERYTHROCYTE [SEDIMENTATION RATE] IN BLOOD BY WESTERGREN METHOD: 31 MM/HR
EST. GFR  (AFRICAN AMERICAN): >60 ML/MIN/1.73 M^2
EST. GFR  (NON AFRICAN AMERICAN): >60 ML/MIN/1.73 M^2
GLUCOSE SERPL-MCNC: 96 MG/DL
HCT VFR BLD AUTO: 40 %
HGB BLD-MCNC: 12.9 G/DL
LYMPHOCYTES # BLD AUTO: 1.3 K/UL
LYMPHOCYTES NFR BLD: 24 %
MCH RBC QN AUTO: 30.5 PG
MCHC RBC AUTO-ENTMCNC: 32.3 G/DL
MCV RBC AUTO: 95 FL
MONOCYTES # BLD AUTO: 0.4 K/UL
MONOCYTES NFR BLD: 6.6 %
NEUTROPHILS # BLD AUTO: 3.6 K/UL
NEUTROPHILS NFR BLD: 66.3 %
PLATELET # BLD AUTO: 187 K/UL
PMV BLD AUTO: 11 FL
POTASSIUM SERPL-SCNC: 4 MMOL/L
PROT SERPL-MCNC: 7.1 G/DL
RBC # BLD AUTO: 4.23 M/UL
SODIUM SERPL-SCNC: 140 MMOL/L
WBC # BLD AUTO: 5.49 K/UL

## 2018-12-28 PROCEDURE — 36415 COLL VENOUS BLD VENIPUNCTURE: CPT

## 2018-12-28 PROCEDURE — 85652 RBC SED RATE AUTOMATED: CPT

## 2018-12-28 PROCEDURE — 85025 COMPLETE CBC W/AUTO DIFF WBC: CPT

## 2018-12-28 PROCEDURE — 80053 COMPREHEN METABOLIC PANEL: CPT

## 2018-12-28 PROCEDURE — 86140 C-REACTIVE PROTEIN: CPT

## 2019-01-07 ENCOUNTER — TELEPHONE (OUTPATIENT)
Dept: INTERNAL MEDICINE | Facility: CLINIC | Age: 59
End: 2019-01-07

## 2019-01-07 ENCOUNTER — OFFICE VISIT (OUTPATIENT)
Dept: INTERNAL MEDICINE | Facility: CLINIC | Age: 59
End: 2019-01-07
Payer: MEDICARE

## 2019-01-07 VITALS
DIASTOLIC BLOOD PRESSURE: 60 MMHG | RESPIRATION RATE: 15 BRPM | OXYGEN SATURATION: 98 % | HEIGHT: 62 IN | BODY MASS INDEX: 24.84 KG/M2 | WEIGHT: 135 LBS | SYSTOLIC BLOOD PRESSURE: 108 MMHG | HEART RATE: 67 BPM

## 2019-01-07 DIAGNOSIS — Z13.6 ENCOUNTER FOR LIPID SCREENING FOR CARDIOVASCULAR DISEASE: ICD-10-CM

## 2019-01-07 DIAGNOSIS — Z79.899 ON MYCOPHENOLATE MOFETIL THERAPY: ICD-10-CM

## 2019-01-07 DIAGNOSIS — I10 BENIGN ESSENTIAL HYPERTENSION: ICD-10-CM

## 2019-01-07 DIAGNOSIS — Z13.5 SCREENING FOR EYE CONDITION: ICD-10-CM

## 2019-01-07 DIAGNOSIS — M75.02 ADHESIVE CAPSULITIS OF LEFT SHOULDER: ICD-10-CM

## 2019-01-07 DIAGNOSIS — I73.00 RAYNAUD'S PHENOMENON WITHOUT GANGRENE: ICD-10-CM

## 2019-01-07 DIAGNOSIS — R73.03 PREDIABETES: ICD-10-CM

## 2019-01-07 DIAGNOSIS — I70.0 ATHEROSCLEROSIS OF AORTA: ICD-10-CM

## 2019-01-07 DIAGNOSIS — M35.01 SICCA SYNDROME WITH KERATOCONJUNCTIVITIS: ICD-10-CM

## 2019-01-07 DIAGNOSIS — M18.11 OSTEOARTHRITIS OF RIGHT THUMB: ICD-10-CM

## 2019-01-07 DIAGNOSIS — M34.9 SCLERODERMA: ICD-10-CM

## 2019-01-07 DIAGNOSIS — J98.4 RESTRICTIVE LUNG DISEASE: ICD-10-CM

## 2019-01-07 DIAGNOSIS — R91.1 LUNG NODULE SEEN ON IMAGING STUDY: ICD-10-CM

## 2019-01-07 DIAGNOSIS — Z94.4 S/P LIVER TRANSPLANT: ICD-10-CM

## 2019-01-07 DIAGNOSIS — Z00.00 ENCOUNTER FOR PREVENTIVE HEALTH EXAMINATION: Primary | ICD-10-CM

## 2019-01-07 DIAGNOSIS — I35.1 AORTIC VALVE INSUFFICIENCY, ETIOLOGY OF CARDIAC VALVE DISEASE UNSPECIFIED: ICD-10-CM

## 2019-01-07 DIAGNOSIS — K22.4 ESOPHAGEAL DYSMOTILITY: ICD-10-CM

## 2019-01-07 DIAGNOSIS — M54.5 CHRONIC BILATERAL LOW BACK PAIN, WITH SCIATICA PRESENCE UNSPECIFIED: ICD-10-CM

## 2019-01-07 DIAGNOSIS — M79.642 HAND PAIN, LEFT: ICD-10-CM

## 2019-01-07 DIAGNOSIS — M75.102 TEAR OF LEFT ROTATOR CUFF, UNSPECIFIED TEAR EXTENT: ICD-10-CM

## 2019-01-07 DIAGNOSIS — L25.9 CHRONIC CONTACT DERMATITIS: ICD-10-CM

## 2019-01-07 DIAGNOSIS — G89.29 CHRONIC BILATERAL LOW BACK PAIN, WITH SCIATICA PRESENCE UNSPECIFIED: ICD-10-CM

## 2019-01-07 DIAGNOSIS — M77.8 TENDINITIS OF LEFT HAND: ICD-10-CM

## 2019-01-07 DIAGNOSIS — Z13.220 ENCOUNTER FOR LIPID SCREENING FOR CARDIOVASCULAR DISEASE: ICD-10-CM

## 2019-01-07 DIAGNOSIS — M35.00 SICCA SYNDROME: ICD-10-CM

## 2019-01-07 DIAGNOSIS — D84.9 IMMUNOSUPPRESSION: ICD-10-CM

## 2019-01-07 DIAGNOSIS — L30.9 HAND ECZEMA: ICD-10-CM

## 2019-01-07 PROBLEM — L03.116 CELLULITIS OF LEFT FOOT: Status: RESOLVED | Noted: 2018-05-08 | Resolved: 2019-01-07

## 2019-01-07 PROBLEM — R29.898 WEAKNESS OF LEFT HAND: Status: RESOLVED | Noted: 2017-05-31 | Resolved: 2019-01-07

## 2019-01-07 PROBLEM — M25.511 RIGHT SHOULDER PAIN: Status: RESOLVED | Noted: 2017-07-12 | Resolved: 2019-01-07

## 2019-01-07 PROBLEM — M25.512 CHRONIC LEFT SHOULDER PAIN: Status: RESOLVED | Noted: 2017-07-14 | Resolved: 2019-01-07

## 2019-01-07 PROCEDURE — 99999 PR PBB SHADOW E&M-EST. PATIENT-LVL V: CPT | Mod: PBBFAC,,, | Performed by: NURSE PRACTITIONER

## 2019-01-07 PROCEDURE — G0439 PPPS, SUBSEQ VISIT: HCPCS | Mod: S$GLB,,, | Performed by: NURSE PRACTITIONER

## 2019-01-07 PROCEDURE — 99499 RISK ADDL DX/OHS AUDIT: ICD-10-PCS | Mod: S$GLB,,, | Performed by: NURSE PRACTITIONER

## 2019-01-07 PROCEDURE — G0439 PR MEDICARE ANNUAL WELLNESS SUBSEQUENT VISIT: ICD-10-PCS | Mod: S$GLB,,, | Performed by: NURSE PRACTITIONER

## 2019-01-07 PROCEDURE — 99499 UNLISTED E&M SERVICE: CPT | Mod: S$GLB,,, | Performed by: NURSE PRACTITIONER

## 2019-01-07 PROCEDURE — 99999 PR PBB SHADOW E&M-EST. PATIENT-LVL V: ICD-10-PCS | Mod: PBBFAC,,, | Performed by: NURSE PRACTITIONER

## 2019-01-07 NOTE — TELEPHONE ENCOUNTER
----- Message from Vanessa Russell sent at 1/7/2019  9:55 AM CST -----  Doctor appointment and lab have been scheduled.  Please link lab orders to the lab appointment.  Date of doctor appointment:  5/2  Physical or EP:  Physical  Date of lab appointment:  4/25  Comments:

## 2019-01-07 NOTE — Clinical Note
Primary Care Providers:Nadeen Figueroa MD, MD (General)Your patient was seen today for a HRA visit. Gap(s) in care (HEDIS gaps) have been identified during this visit that require additional testing and possible follow up.Orders Placed This Encounter    Ambulatory Referral to Ophthalmology        Referral Priority:Routine        Referral Type:Consultation        Referral Reason:Specialty Services Required        Requested Specialty:Ophthalmology        Number of Visits Requested:1 have included a copy of my visit note; please review the note and feel free to contact me with any questions. Thank you for allowing me to participate in the care of your patients.Ania Blackwood NP

## 2019-01-07 NOTE — PROGRESS NOTES
I offered to discuss end of life issues, including information on how to make advance directives that the patient could use to name someone who would make medical decisions on their behalf if they became too ill to make themselves.    ___Patient declined  _X_Patient  Has completed already.

## 2019-01-07 NOTE — PATIENT INSTRUCTIONS
Counseling and Referral of Other Preventative  (Italic type indicates deductible and co-insurance are waived)    Patient Name: Nadeen Mcgovern  Today's Date: 1/7/2019    Health Maintenance       Date Due Completion Date    Pap Smear with HPV Cotest 03/22/2019   3/22/2017    Eye exam-- due      Mammogram 03/23/2019   3/23/2018    Pneumococcal Vaccine (Highest Risk) (3 of 3 - PPSV23) Already done 4/1/2015    Lipid Panel 02/15/2018   2/15/2017    Colonoscopy 06/06/2022 approx date- due in 2022 per Dr Gonzalez/s progress notes 6/6/2014    Bone density                    normal             Last 1/15/16  Due in 2-4 years      TETANUS VACCINE 02/18/2025 2/18/2015        Orders Placed This Encounter   Procedures    Ambulatory Referral to Ophthalmology     The following information is provided to all patients.  This information is to help you find resources for any of the problems found today that may be affecting your health:                Living healthy guide: www.[facility]      Understanding Diabetes: www.diabetes.org      Eating healthy: www.cdc.gov/healthyweight      CDC home safety checklist: www.cdc.gov/steadi/patient.html      Agency on Aging: www.goea.[place].[facility]      Alcoholics anonymous (AA): www.aa.org      Physical Activity: www.cody.nih.gov/qp1zkwc      Tobacco use: www.quitwithusla.org

## 2019-01-07 NOTE — PROGRESS NOTES
"Nadeen Mcgovern presented for a  Medicare AWV and comprehensive Health Risk Assessment today. The following components were reviewed and updated:    · Medical history  · Family History  · Social history  · Allergies and Current Medications  · Health Risk Assessment  · Health Maintenance  · Care Team-external eye     ** See Completed Assessments for Annual Wellness Visit within the encounter summary.**       The following assessments were completed:  · Living Situation  · CAGE  · Depression Screening  · Timed Get Up and Go  · Whisper Test  · Cognitive Function Screening  · Nutrition Screening  · ADL Screening  · PAQ Screening    Vitals:    01/07/19 0927   BP: 108/60   Pulse: 67   Resp: 15   SpO2: 98%   Weight: 61.2 kg (135 lb)   Height: 5' 2" (1.575 m)     Body mass index is 24.69 kg/m².  Physical Exam   Constitutional: She is oriented to person, place, and time. She appears well-developed and well-nourished.   HENT:   Head: Normocephalic and atraumatic.   Cardiovascular: Normal rate, regular rhythm and normal heart sounds.   No murmur heard.  Pulmonary/Chest: Effort normal and breath sounds normal. No respiratory distress. She has no wheezes. She has no rales.   Abdominal: Soft. Bowel sounds are normal. She exhibits no distension. There is no tenderness.   Musculoskeletal: Normal range of motion. She exhibits no edema, tenderness or deformity.   Neurological: She is alert and oriented to person, place, and time. No cranial nerve deficit.   Skin: Skin is warm and dry.   Psychiatric: She has a normal mood and affect. Her behavior is normal. Judgment and thought content normal.   Nursing note and vitals reviewed.        Diagnoses and health risks identified today and associated recommendations/orders:    1. S/P liver transplant  stable- followed by liver transplant    2. Immunosuppression  stable- followed by liver transplant    3. Prediabetes  stable- followed by PCP    4. Scleroderma  stable- followed by " rheumatology    5. Osteoarthritis of right thumb  stable- followed by orthopedics    6. Aortic valve insufficiency, etiology of cardiac valve disease unspecified  stable- followed by PCP    7. Restrictive lung disease  stable- followed by rheumatology      8. On mycophenolate mofetil therapy  stable- followed by liver transplant    9. Sicca syndrome  stable- followed by rheumatology      10. Sicca syndrome with keratoconjunctivitis  stable- followed by rheumatology    11. Chronic contact dermatitis  stable- followed by derm    12. Chronic bilateral low back pain, with sciatica presence unspecified  stable- followed by PCP    13. Raynaud's phenomenon without gangrene  stable- followed by rheumatology    14. Tear of left rotator cuff, unspecified tear extent  stable- followed by orthopedics    15. Esophageal dysmotility  stable- followed by gastroenterology    16. Lung nodule seen on imaging study  stable- followed by rheumatology, PCP- last CT chest 10/16/18    17. Tendinitis of left hand  stable- followed by orthopedics    18. Adhesive capsulitis of left shoulder  stable- followed by orthopedics    19. Hand eczema  stable- followed by derm    20. Hand pain, left  stable- followed by orthopedics    21. Atherosclerosis of aorta  stable- followed by PCP      22. Screening for eye condition  stable- followed by opth  - Ambulatory Referral to Ophthalmology    23. Encounter for preventive health examination  Assessments completed  Preventative health recommendations reviewed         Provided Nadeen with a 5-10 year written screening schedule and personal prevention plan. Recommendations were developed using the USPSTF age appropriate recommendations. Education, counseling, and referrals were provided as needed. After Visit Summary printed and given to patient which includes a list of additional screenings\tests needed. SHe is due for annual & opth exams- she will schedule.    Follow-up in about 1 year (around 1/7/2020) for  YANDELA.    Ania Blackwood, NP

## 2019-01-10 DIAGNOSIS — G47.00 INSOMNIA, UNSPECIFIED TYPE: ICD-10-CM

## 2019-01-10 RX ORDER — ZOLPIDEM TARTRATE 5 MG/1
5 TABLET ORAL NIGHTLY PRN
Qty: 30 TABLET | Refills: 3 | Status: SHIPPED | OUTPATIENT
Start: 2019-01-10 | End: 2019-09-23 | Stop reason: SDUPTHER

## 2019-03-11 ENCOUNTER — LAB VISIT (OUTPATIENT)
Dept: LAB | Facility: HOSPITAL | Age: 59
End: 2019-03-11
Attending: INTERNAL MEDICINE
Payer: MEDICARE

## 2019-03-11 DIAGNOSIS — Z94.4 LIVER REPLACED BY TRANSPLANT: ICD-10-CM

## 2019-03-11 LAB
ALBUMIN SERPL BCP-MCNC: 3.8 G/DL
ALP SERPL-CCNC: 79 U/L
ALT SERPL W/O P-5'-P-CCNC: 23 U/L
ANION GAP SERPL CALC-SCNC: 7 MMOL/L
AST SERPL-CCNC: 20 U/L
BASOPHILS # BLD AUTO: 0.03 K/UL
BASOPHILS NFR BLD: 0.6 %
BILIRUB DIRECT SERPL-MCNC: 0.3 MG/DL
BILIRUB SERPL-MCNC: 0.7 MG/DL
BUN SERPL-MCNC: 10 MG/DL
CALCIUM SERPL-MCNC: 9.6 MG/DL
CHLORIDE SERPL-SCNC: 108 MMOL/L
CO2 SERPL-SCNC: 26 MMOL/L
CREAT SERPL-MCNC: 0.7 MG/DL
DIFFERENTIAL METHOD: NORMAL
EOSINOPHIL # BLD AUTO: 0.2 K/UL
EOSINOPHIL NFR BLD: 3.5 %
ERYTHROCYTE [DISTWIDTH] IN BLOOD BY AUTOMATED COUNT: 13 %
EST. GFR  (AFRICAN AMERICAN): >60 ML/MIN/1.73 M^2
EST. GFR  (NON AFRICAN AMERICAN): >60 ML/MIN/1.73 M^2
GLUCOSE SERPL-MCNC: 91 MG/DL
HCT VFR BLD AUTO: 41 %
HGB BLD-MCNC: 13.3 G/DL
IMM GRANULOCYTES # BLD AUTO: 0.01 K/UL
IMM GRANULOCYTES NFR BLD AUTO: 0.2 %
LYMPHOCYTES # BLD AUTO: 1.5 K/UL
LYMPHOCYTES NFR BLD: 28.2 %
MCH RBC QN AUTO: 30.4 PG
MCHC RBC AUTO-ENTMCNC: 32.4 G/DL
MCV RBC AUTO: 94 FL
MONOCYTES # BLD AUTO: 0.5 K/UL
MONOCYTES NFR BLD: 9 %
NEUTROPHILS # BLD AUTO: 3.1 K/UL
NEUTROPHILS NFR BLD: 58.5 %
NRBC BLD-RTO: 0 /100 WBC
PLATELET # BLD AUTO: 190 K/UL
PMV BLD AUTO: 11.3 FL
POTASSIUM SERPL-SCNC: 4.3 MMOL/L
PROT SERPL-MCNC: 7.2 G/DL
RBC # BLD AUTO: 4.37 M/UL
SODIUM SERPL-SCNC: 141 MMOL/L
TACROLIMUS BLD-MCNC: 5.2 NG/ML
WBC # BLD AUTO: 5.21 K/UL

## 2019-03-11 PROCEDURE — 82248 BILIRUBIN DIRECT: CPT

## 2019-03-11 PROCEDURE — 80197 ASSAY OF TACROLIMUS: CPT

## 2019-03-11 PROCEDURE — 36415 COLL VENOUS BLD VENIPUNCTURE: CPT

## 2019-03-11 PROCEDURE — 85025 COMPLETE CBC W/AUTO DIFF WBC: CPT

## 2019-03-11 PROCEDURE — 80053 COMPREHEN METABOLIC PANEL: CPT

## 2019-03-15 ENCOUNTER — TELEPHONE (OUTPATIENT)
Dept: TRANSPLANT | Facility: CLINIC | Age: 59
End: 2019-03-15

## 2019-03-15 DIAGNOSIS — C22.1 CHOLANGIOCARCINOMA: ICD-10-CM

## 2019-03-15 DIAGNOSIS — C22.0 COMBINED HEPATOCELLULAR AND CHOLANGIOCARCINOMA: Primary | ICD-10-CM

## 2019-03-15 NOTE — TELEPHONE ENCOUNTER
----- Message from Мария Benítez MD sent at 3/13/2019  6:20 PM CDT -----  Reviewed, nothing to do; repeat per routine

## 2019-03-15 NOTE — TELEPHONE ENCOUNTER
Dr. Benítez reviewed your labs.  Continue routine labs no changes needed.  Letter sent for next lab appointment 06/11/19

## 2019-03-19 DIAGNOSIS — Z79.899 ON MYCOPHENOLATE MOFETIL THERAPY: ICD-10-CM

## 2019-03-19 DIAGNOSIS — M34.9 SCLERODERMA: ICD-10-CM

## 2019-03-19 RX ORDER — MYCOPHENOLATE MOFETIL 500 MG/1
TABLET ORAL
Qty: 360 TABLET | Refills: 0 | Status: SHIPPED | OUTPATIENT
Start: 2019-03-19 | End: 2019-06-25 | Stop reason: SDUPTHER

## 2019-03-21 ENCOUNTER — TELEPHONE (OUTPATIENT)
Dept: INTERNAL MEDICINE | Facility: CLINIC | Age: 59
End: 2019-03-21

## 2019-03-21 DIAGNOSIS — Z12.31 ENCOUNTER FOR SCREENING MAMMOGRAM FOR BREAST CANCER: ICD-10-CM

## 2019-03-21 DIAGNOSIS — Z12.39 ENCOUNTER FOR BREAST CANCER SCREENING OTHER THAN MAMMOGRAM: Primary | ICD-10-CM

## 2019-03-21 NOTE — TELEPHONE ENCOUNTER
----- Message from Coby Sadler sent at 3/21/2019  1:51 PM CDT -----  Contact: [employer] request  Message     ----- Message from [facility], System Message sent at 3/19/2019  5:03 PM CDT -----    Appointment Request From: Nadeen Mcgovern    With Provider: Nadeen Figueroa MD [[facility] - Internal Medicine]    Preferred Date Range: 3/21/2019 - 3/29/2019    Preferred Times: Any time    Reason for visit: Existing Patient    Comments:  Need a referral for annual mammogram

## 2019-03-26 ENCOUNTER — HOSPITAL ENCOUNTER (OUTPATIENT)
Dept: CARDIOLOGY | Facility: CLINIC | Age: 59
Discharge: HOME | End: 2019-03-26
Attending: INTERNAL MEDICINE
Payer: MEDICARE

## 2019-03-26 ENCOUNTER — HOSPITAL ENCOUNTER (OUTPATIENT)
Dept: PULMONOLOGY | Facility: CLINIC | Age: 59
Discharge: HOME | End: 2019-03-26
Payer: MEDICARE

## 2019-03-26 ENCOUNTER — OFFICE VISIT (OUTPATIENT)
Dept: RHEUMATOLOGY | Facility: CLINIC | Age: 59
End: 2019-03-26
Payer: MEDICARE

## 2019-03-26 ENCOUNTER — TELEPHONE (OUTPATIENT)
Dept: RHEUMATOLOGY | Facility: CLINIC | Age: 59
End: 2019-03-26

## 2019-03-26 VITALS
HEIGHT: 64 IN | DIASTOLIC BLOOD PRESSURE: 77 MMHG | HEART RATE: 63 BPM | BODY MASS INDEX: 24.75 KG/M2 | WEIGHT: 145 LBS | SYSTOLIC BLOOD PRESSURE: 124 MMHG

## 2019-03-26 VITALS — BODY MASS INDEX: 27.18 KG/M2 | WEIGHT: 147.69 LBS | HEIGHT: 62 IN

## 2019-03-26 VITALS
HEART RATE: 60 BPM | DIASTOLIC BLOOD PRESSURE: 77 MMHG | BODY MASS INDEX: 27.23 KG/M2 | SYSTOLIC BLOOD PRESSURE: 124 MMHG | HEIGHT: 62 IN | WEIGHT: 148 LBS

## 2019-03-26 DIAGNOSIS — G89.29 CHRONIC PAIN OF BOTH SHOULDERS: ICD-10-CM

## 2019-03-26 DIAGNOSIS — M34.9 SCLERODERMA: ICD-10-CM

## 2019-03-26 DIAGNOSIS — M34.9 LIMITED SCLERODERMA: ICD-10-CM

## 2019-03-26 DIAGNOSIS — Z78.0 MENOPAUSE: Primary | ICD-10-CM

## 2019-03-26 DIAGNOSIS — Z94.4 S/P LIVER TRANSPLANT: ICD-10-CM

## 2019-03-26 DIAGNOSIS — I73.00 RAYNAUD'S PHENOMENON WITHOUT GANGRENE: ICD-10-CM

## 2019-03-26 DIAGNOSIS — M35.01 SICCA SYNDROME WITH KERATOCONJUNCTIVITIS: ICD-10-CM

## 2019-03-26 DIAGNOSIS — M25.512 CHRONIC PAIN OF BOTH SHOULDERS: ICD-10-CM

## 2019-03-26 DIAGNOSIS — M25.512 LEFT SHOULDER PAIN, UNSPECIFIED CHRONICITY: ICD-10-CM

## 2019-03-26 DIAGNOSIS — M25.511 RIGHT ANTERIOR SHOULDER PAIN: ICD-10-CM

## 2019-03-26 DIAGNOSIS — J98.4 RESTRICTIVE LUNG DISEASE: ICD-10-CM

## 2019-03-26 DIAGNOSIS — M25.511 CHRONIC PAIN OF BOTH SHOULDERS: ICD-10-CM

## 2019-03-26 DIAGNOSIS — L30.9 HAND ECZEMA: ICD-10-CM

## 2019-03-26 DIAGNOSIS — I70.0 ATHEROSCLEROSIS OF AORTA: ICD-10-CM

## 2019-03-26 LAB
ASCENDING AORTA: 3.01 CM
AV INDEX (PROSTH): 0.75
AV MEAN GRADIENT: 3.41 MMHG
AV PEAK GRADIENT: 6.97 MMHG
AV VALVE AREA: 3.1 CM2
AV VELOCITY RATIO: 0.66
BSA FOR ECHO PROCEDURE: 1.71 M2
CV ECHO LV RWT: 0.32 CM
DOP CALC AO PEAK VEL: 1.32 M/S
DOP CALC AO VTI: 26.99 CM
DOP CALC LVOT AREA: 4.15 CM2
DOP CALC LVOT DIAMETER: 2.3 CM
DOP CALC LVOT PEAK VEL: 0.88 M/S
DOP CALC LVOT STROKE VOLUME: 83.76 CM3
DOP CALCLVOT PEAK VEL VTI: 20.17 CM
E WAVE DECELERATION TIME: 200.09 MSEC
E/A RATIO: 1
E/E' RATIO: 7.76
ECHO LV POSTERIOR WALL: 0.72 CM (ref 0.6–1.1)
FRACTIONAL SHORTENING: 27 % (ref 28–44)
INTERVENTRICULAR SEPTUM: 0.72 CM (ref 0.6–1.1)
IVRT: 0.13 MSEC
LA MAJOR: 4.94 CM
LA MINOR: 4.74 CM
LA WIDTH: 3.31 CM
LEFT ATRIUM SIZE: 3.47 CM
LEFT ATRIUM VOLUME INDEX: 28.1 ML/M2
LEFT ATRIUM VOLUME: 47.23 CM3
LEFT INTERNAL DIMENSION IN SYSTOLE: 3.34 CM (ref 2.1–4)
LEFT VENTRICLE DIASTOLIC VOLUME INDEX: 56.43 ML/M2
LEFT VENTRICLE DIASTOLIC VOLUME: 94.91 ML
LEFT VENTRICLE MASS INDEX: 60.1 G/M2
LEFT VENTRICLE SYSTOLIC VOLUME INDEX: 27 ML/M2
LEFT VENTRICLE SYSTOLIC VOLUME: 45.34 ML
LEFT VENTRICULAR INTERNAL DIMENSION IN DIASTOLE: 4.55 CM (ref 3.5–6)
LEFT VENTRICULAR MASS: 101.04 G
LV LATERAL E/E' RATIO: 6.6
LV SEPTAL E/E' RATIO: 9.43
MV PEAK A VEL: 0.66 M/S
MV PEAK E VEL: 0.66 M/S
PISA TR MAX VEL: 2.28 M/S
PRE FEV1 FVC: 83
PRE FEV1: 2.71
PRE FVC: 3.27
PREDICTED FEV1 FVC: 82
PREDICTED FEV1: 2.3
PREDICTED FVC: 2.84
PULM VEIN S/D RATIO: 1.22
PV PEAK D VEL: 0.46 M/S
PV PEAK S VEL: 0.56 M/S
RA MAJOR: 4.51 CM
RA PRESSURE: 3 MMHG
RA WIDTH: 3.68 CM
RIGHT VENTRICULAR END-DIASTOLIC DIMENSION: 3.57 CM
RV TISSUE DOPPLER FREE WALL SYSTOLIC VELOCITY 1 (APICAL 4 CHAMBER VIEW): 12.32 M/S
SINUS: 3.05 CM
STJ: 2.63 CM
TDI LATERAL: 0.1
TDI SEPTAL: 0.07
TDI: 0.09
TR MAX PG: 20.79 MMHG
TRICUSPID ANNULAR PLANE SYSTOLIC EXCURSION: 2.33 CM
TV REST PULMONARY ARTERY PRESSURE: 24 MMHG

## 2019-03-26 PROCEDURE — 94010 BREATHING CAPACITY TEST: CPT | Mod: S$GLB,,, | Performed by: INTERNAL MEDICINE

## 2019-03-26 PROCEDURE — 93306 TRANSTHORACIC ECHO (TTE) COMPLETE: ICD-10-PCS | Mod: S$GLB,,, | Performed by: INTERNAL MEDICINE

## 2019-03-26 PROCEDURE — 99999 PR PBB SHADOW E&M-EST. PATIENT-LVL V: ICD-10-PCS | Mod: PBBFAC,,, | Performed by: INTERNAL MEDICINE

## 2019-03-26 PROCEDURE — 94618 PULMONARY STRESS TESTING: ICD-10-PCS | Mod: S$GLB,,, | Performed by: INTERNAL MEDICINE

## 2019-03-26 PROCEDURE — 3008F BODY MASS INDEX DOCD: CPT | Mod: CPTII,S$GLB,, | Performed by: INTERNAL MEDICINE

## 2019-03-26 PROCEDURE — 3074F SYST BP LT 130 MM HG: CPT | Mod: CPTII,S$GLB,, | Performed by: INTERNAL MEDICINE

## 2019-03-26 PROCEDURE — 99214 OFFICE O/P EST MOD 30 MIN: CPT | Mod: S$GLB,,, | Performed by: INTERNAL MEDICINE

## 2019-03-26 PROCEDURE — 94727 GAS DIL/WSHOT DETER LNG VOL: CPT | Mod: S$GLB,,, | Performed by: INTERNAL MEDICINE

## 2019-03-26 PROCEDURE — 3078F DIAST BP <80 MM HG: CPT | Mod: CPTII,S$GLB,, | Performed by: INTERNAL MEDICINE

## 2019-03-26 PROCEDURE — 3008F PR BODY MASS INDEX (BMI) DOCUMENTED: ICD-10-PCS | Mod: CPTII,S$GLB,, | Performed by: INTERNAL MEDICINE

## 2019-03-26 PROCEDURE — 99214 PR OFFICE/OUTPT VISIT, EST, LEVL IV, 30-39 MIN: ICD-10-PCS | Mod: S$GLB,,, | Performed by: INTERNAL MEDICINE

## 2019-03-26 PROCEDURE — 93306 TTE W/DOPPLER COMPLETE: CPT | Mod: S$GLB,,, | Performed by: INTERNAL MEDICINE

## 2019-03-26 PROCEDURE — 3078F PR MOST RECENT DIASTOLIC BLOOD PRESSURE < 80 MM HG: ICD-10-PCS | Mod: CPTII,S$GLB,, | Performed by: INTERNAL MEDICINE

## 2019-03-26 PROCEDURE — 99999 PR PBB SHADOW E&M-EST. PATIENT-LVL V: CPT | Mod: PBBFAC,,, | Performed by: INTERNAL MEDICINE

## 2019-03-26 PROCEDURE — 94618 PULMONARY STRESS TESTING: CPT | Mod: S$GLB,,, | Performed by: INTERNAL MEDICINE

## 2019-03-26 PROCEDURE — 94727 PR PULM FUNCTION TEST BY GAS: ICD-10-PCS | Mod: S$GLB,,, | Performed by: INTERNAL MEDICINE

## 2019-03-26 PROCEDURE — 94729 PR C02/MEMBANE DIFFUSE CAPACITY: ICD-10-PCS | Mod: S$GLB,,, | Performed by: INTERNAL MEDICINE

## 2019-03-26 PROCEDURE — 3074F PR MOST RECENT SYSTOLIC BLOOD PRESSURE < 130 MM HG: ICD-10-PCS | Mod: CPTII,S$GLB,, | Performed by: INTERNAL MEDICINE

## 2019-03-26 PROCEDURE — 94729 DIFFUSING CAPACITY: CPT | Mod: S$GLB,,, | Performed by: INTERNAL MEDICINE

## 2019-03-26 PROCEDURE — 94010 BREATHING CAPACITY TEST: ICD-10-PCS | Mod: S$GLB,,, | Performed by: INTERNAL MEDICINE

## 2019-03-26 ASSESSMENT — ROUTINE ASSESSMENT OF PATIENT INDEX DATA (RAPID3)
PAIN SCORE: 5
AM STIFFNESS SCORE: 0, NO
PATIENT GLOBAL ASSESSMENT SCORE: 8
MDHAQ FUNCTION SCORE: .6
FATIGUE SCORE: 5
TOTAL RAPID3 SCORE: 5
PSYCHOLOGICAL DISTRESS SCORE: 2.2

## 2019-03-26 NOTE — ASSESSMENT & PLAN NOTE
Active and passive abd limited 90 degrees bilateral  No erythema, warmth or swelling  + empty can, Kasi and Uziel bilateral          X-ray shoulders  Ref to PT  Ref to Ortho, Dr. Rossi

## 2019-03-26 NOTE — PROCEDURES
Nadeen Mcgovern is a 58 y.o.  female patient, who presents for a 6 minute walk test ordered by MD Carrie.  The diagnosis is Scleroderma/CREST.  The patient's BMI is 27.1 kg/m2.  Predicted distance (lower limit of normal) is 370.76 meters.      Test Results:    The test was completed without stopping.   The total time walked was 360 seconds.  During walking, the patient reported:  Dyspnea, Lightheadedness. The patient used no assistive devices  during testing.     03/26/2019---------Distance: 284.07 meters (932 feet)     O2 Sat % Supplemental Oxygen Heart Rate Blood Pressure Adamaris Scale   Pre-exercise  (Resting) 99 % Room Air 65 bpm 133/62 mmHg 3   During Exercise 99 % Room Air 71 bpm 136/70 mmHg 4   Post-exercise  (Recovery) 100 % Room Air  64 bpm   mmHg       Recovery Time: 66 seconds    Performing nurse/tech: Enrique MOSER      PREVIOUS STUDY:   The patient had a previous study.    03/20/2018---------Distance: 335.28 meters (1100 feet)       O2 Sat % Supplemental Oxygen Heart Rate Blood Pressure Adamaris Scale   Pre-exercise  (Resting) 99 % Room Air 64 bpm 124/60 mmHg 3   During Exercise 100 % Room Air 82 bpm 130/60 mmHg 3   Post-exercise  (Recovery) 99 % Room Air  67 bpm   mmHg        CLINICAL INTERPRETATION:  Six minute walk distance is 284.07 meters (932 feet) with somewhat heavy dyspnea.  During exercise, there was no significant desaturation while breathing room air.  Both blood pressure and heart rate remained stable with walking.  The patient reported non-pulmonary symptoms during exercise.  Significant exercise impairment is likely due to subjective symptoms.  Since the previous study in March 2018, exercise capacity is significantly worse.  Based upon age and body mass index, exercise capacity is less than predicted.

## 2019-03-26 NOTE — ASSESSMENT & PLAN NOTE
MRSS=10(mild) continues to improve, down from 13 last visit.     *Shingrix x 2  when available  Cont mycophenolate 1000mg twice daily  Cont amlodipine 2.5mg daily for Raynaud's  Cont sildendafil 20mg three times daily for Raynaud's  Schedule PFTS ordered in Sept 2018  Schedule TTE ordered in Sept. 2018

## 2019-03-26 NOTE — PROGRESS NOTES
"Subjective:       Patient ID: Nadeen Mcgovern is a 58 y.o. female.    Chief Complaint: lcSSc, secondary Sjogren's, OA hands  HPI Recurrent hand eczema started last few days, just resumed Diprolene.Some pain left> right shoulder hiral at  Night. No recent specific trauma. H/o septic arthritis right shoulder 2015  And h/o rotator cuff tear and adhesive capsulitis left shoulder.  Review of Systems   Constitutional: Positive for fatigue (5/10) and unexpected weight change. Negative for appetite change and fever.   HENT: Positive for trouble swallowing. Negative for mouth sores.         Dry mouth   Eyes: Negative for visual disturbance.   Respiratory: Positive for shortness of breath. Negative for cough and wheezing.    Cardiovascular: Negative for chest pain and palpitations.   Gastrointestinal: Negative for abdominal pain, anal bleeding, blood in stool, constipation, diarrhea, nausea and vomiting.   Genitourinary: Negative for dysuria, frequency and urgency.   Musculoskeletal: Negative for arthralgias, back pain, gait problem, joint swelling, myalgias, neck pain and neck stiffness.   Skin: Positive for rash.   Neurological: Positive for numbness. Negative for weakness and headaches.   Hematological: Negative for adenopathy. Bruises/bleeds easily.   Psychiatric/Behavioral: Negative for sleep disturbance. The patient is not nervous/anxious.          Objective:   /77   Pulse 63   Ht 5' 3.6" (1.615 m)   Wt 65.8 kg (145 lb)   LMP  (LMP Unknown)   BMI 25.20 kg/m²      Physical Exam   Constitutional: She is oriented to person, place, and time and well-developed, well-nourished, and in no distress.   HENT:   Head: Normocephalic and atraumatic.   Mouth/Throat: Oropharynx is clear and moist.   Eyes: Conjunctivae and EOM are normal.   Neck: Normal range of motion. Neck supple. No thyromegaly present.   Cardiovascular: Normal rate, regular rhythm, normal heart sounds and intact distal pulses.  Exam reveals no gallop and no " friction rub.    No murmur heard.  Pulmonary/Chest: Breath sounds normal. She has no wheezes. She has no rales. She exhibits no tenderness.   Abdominal: Soft. She exhibits no distension and no mass. There is no tenderness.       Right Side Rheumatological Exam     Examination finds the elbow, wrist, knee, 1st PIP, 1st MCP, 2nd PIP, 2nd MCP, 3rd PIP, 3rd MCP, 4th PIP, 4th MCP, 5th PIP and 5th MCP normal.    The patient is tender to palpation of the shoulder    Left Side Rheumatological Exam     Examination finds the elbow, wrist, knee, 1st PIP, 1st MCP, 2nd PIP, 2nd MCP, 3rd PIP, 3rd MCP, 4th PIP, 4th MCP, 5th PIP and 5th MCP normal.    The patient is tender to palpation of the shoulder.      Lymphadenopathy:     She has no cervical adenopathy.   Neurological: She is alert and oriented to person, place, and time. She displays normal reflexes. Gait normal.   Nl motor strength UE and LE bilateral   Skin: Rash (hand eczema bilateral) noted.     MRSS=10(mild)   Psychiatric: Mood, memory, affect and judgment normal.   Musculoskeletal: She exhibits no edema.         Active and passive abd limited 90 degrees bilateral  No erythema, warmth or swelling  + empty can, Maravilla-Ministerio and Neer bilateral       Results for SHILO HAMPTON (MRN [id number]) as of 3/26/2019 07:30   Ref. Range 3/11/2019 07:25   WBC Latest Ref Range: 3.90 - 12.70 K/uL 5.21   RBC Latest Ref Range: 4.00 - 5.40 M/uL 4.37   Hemoglobin Latest Ref Range: 12.0 - 16.0 g/dL 13.3   Hematocrit Latest Ref Range: 37.0 - 48.5 % 41.0   MCV Latest Ref Range: 82 - 98 fL 94   MCH Latest Ref Range: 27.0 - 31.0 pg 30.4   MCHC Latest Ref Range: 32.0 - 36.0 g/dL 32.4   RDW Latest Ref Range: 11.5 - 14.5 % 13.0   Platelets Latest Ref Range: 150 - 350 K/uL 190   MPV Latest Ref Range: 9.2 - 12.9 fL 11.3   Gran% Latest Ref Range: 38.0 - 73.0 % 58.5   Gran # (ANC) Latest Ref Range: 1.8 - 7.7 K/uL 3.1   Immature Grans (Abs) Latest Ref Range: 0.00 - 0.04 K/uL 0.01   Lymph% Latest Ref  Range: 18.0 - 48.0 % 28.2   Lymph # Latest Ref Range: 1.0 - 4.8 K/uL 1.5   Mono% Latest Ref Range: 4.0 - 15.0 % 9.0   Mono # Latest Ref Range: 0.3 - 1.0 K/uL 0.5   Eosinophil% Latest Ref Range: 0.0 - 8.0 % 3.5   Eos # Latest Ref Range: 0.0 - 0.5 K/uL 0.2   Basophil% Latest Ref Range: 0.0 - 1.9 % 0.6   Baso # Latest Ref Range: 0.00 - 0.20 K/uL 0.03   nRBC Latest Ref Range: 0 /100 WBC 0   Differential Method Unknown Automated   Immature Granulocytes Latest Ref Range: 0.0 - 0.5 % 0.2   Sodium Latest Ref Range: 136 - 145 mmol/L 141   Potassium Latest Ref Range: 3.5 - 5.1 mmol/L 4.3   Chloride Latest Ref Range: 95 - 110 mmol/L 108   CO2 Latest Ref Range: 23 - 29 mmol/L 26   Anion Gap Latest Ref Range: 8 - 16 mmol/L 7 (L)   BUN, Bld Latest Ref Range: 6 - 20 mg/dL 10   Creatinine Latest Ref Range: 0.5 - 1.4 mg/dL 0.7   eGFR if non African American Latest Ref Range: >60 mL/min/1.73 m^2 >60.0   eGFR if African American Latest Ref Range: >60 mL/min/1.73 m^2 >60.0   Glucose Latest Ref Range: 70 - 110 mg/dL 91   Calcium Latest Ref Range: 8.7 - 10.5 mg/dL 9.6   Alkaline Phosphatase Latest Ref Range: 55 - 135 U/L 79   Total Protein Latest Ref Range: 6.0 - 8.4 g/dL 7.2   Albumin Latest Ref Range: 3.5 - 5.2 g/dL 3.8   Total Bilirubin Latest Ref Range: 0.1 - 1.0 mg/dL 0.7   Bilirubin, Direct Latest Ref Range: 0.1 - 0.3 mg/dL 0.3   AST Latest Ref Range: 10 - 40 U/L 20   ALT Latest Ref Range: 10 - 44 U/L 23   Tacrolimus Lvl Latest Ref Range: 5.0 - 15.0 ng/mL 5.2     Assessment/Plan         Problem List Items Addressed This Visit     Limited scleroderma    Overview     lcSSc(limited scleroderma): ACR/EULAR criteria: (no sclerodactyly today) digital tip pitting scars(3) telangiectases(2) Raynaud's(3) =8 does not meet  criteria ; oxaliplatin(cisplatin associated with Raynaud's) ; 5-FU not known to be associated with Raynaud's or scleroderma. MRSS=17 increased from 2 previous!. No evidence of scleroderma esophagus by esophageal manometry  or upper endoscopy  EUGENE+ 1:320 nucleolar(scleroderma pattern) and speckled + SS-A (most typical of Sjogren's and SLE), Raynaud's, telangiectases, dysphagia. All scleroderma associated autoantibodies negative    2D Echo w/ Color Flow Doppler   Order: [id number]   Status:  Final result   Visible to patient:  Yes (Patient Portal) Next appt:  02/20/2018 at 04:45 PM in Orthopedics (Jovana Rossi MD) Dx:  Scleroderma; Aortic valve regurgitati...     Ref Range & Units 3mo ago  (9/25/17) 1yr ago  (9/2/16) 1yr ago  (8/17/16) 2yr ago  (5/7/15) 2yr ago  (1/23/15)    EF 55 - 65 60   60  55  60     Diastolic Dysfunction  No  No  No  No  No     Aortic Valve Regurgitation  MILD   MILD TO MODERATE   MILD  MILD TO MODERATE      Est. PA Systolic Pressure  25.47    28  24     Mitral Valve Mobility  NORMAL     NORMAL    Resulting Agency  CVIS CVIS CVIS CVIS CVIS   Narrative                  PFTs          FVC     SVC    RV   DLco  3/20/18     120    121     62    90  9/25/17     122     122    30    89  4/7/17       123     124    49    91  8/18/16     107     107    110   88           Current Assessment & Plan     MRSS=10(mild) continues to improve, down from 13 last visit.     *Shingrix x 2  when available  Cont mycophenolate 1000mg twice daily  Cont amlodipine 2.5mg daily for Raynaud's  Cont sildendafil 20mg three times daily for Raynaud's  Schedule PFTS ordered in Sept 2018  Schedule TTE ordered in Sept. 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 Rossi         Restrictive lung disease    Current Assessment & Plan     Schedule previously ordered PFTs  Cont mycophenolate         Atherosclerosis of aorta    Current Assessment & Plan     Lipid panel           Other Visit Diagnoses     Menopause    -  Primary    Relevant Orders    DXA Bone Density Spine And Hip    Left shoulder pain, unspecified chronicity        Relevant Orders    Ambulatory Referral to Physical/Occupational Therapy    X-Ray Shoulder Complete Bilateral    Ambulatory Referral to Orthopedics    Right anterior shoulder pain        Relevant Orders    X-Ray Shoulder Complete Bilateral    Ambulatory Referral to Orthopedics

## 2019-03-26 NOTE — PATIENT INSTRUCTIONS
F/u Dr. Souza for hand eczema  X-ray shoulders  Labs today  F/u Dr. Rossi for shoulders   PT for shoulders  Schedule breathing tests and echocardiogram

## 2019-03-27 ENCOUNTER — HOSPITAL ENCOUNTER (OUTPATIENT)
Dept: RADIOLOGY | Facility: OTHER | Age: 59
Discharge: HOME | End: 2019-03-27
Attending: INTERNAL MEDICINE
Payer: MEDICARE

## 2019-03-27 DIAGNOSIS — Z12.31 ENCOUNTER FOR SCREENING MAMMOGRAM FOR BREAST CANCER: ICD-10-CM

## 2019-03-27 PROCEDURE — 77067 SCR MAMMO BI INCL CAD: CPT | Mod: TC

## 2019-03-27 PROCEDURE — 77067 MAMMO DIGITAL SCREENING BILAT WITH TOMOSYNTHESIS_CAD: ICD-10-PCS | Mod: 26,,, | Performed by: INTERNAL MEDICINE

## 2019-03-27 PROCEDURE — 77067 SCR MAMMO BI INCL CAD: CPT | Mod: 26,,, | Performed by: INTERNAL MEDICINE

## 2019-03-27 PROCEDURE — 77063 MAMMO DIGITAL SCREENING BILAT WITH TOMOSYNTHESIS_CAD: ICD-10-PCS | Mod: 26,,, | Performed by: INTERNAL MEDICINE

## 2019-03-27 PROCEDURE — 77063 BREAST TOMOSYNTHESIS BI: CPT | Mod: 26,,, | Performed by: INTERNAL MEDICINE

## 2019-03-29 ENCOUNTER — TELEPHONE (OUTPATIENT)
Dept: RHEUMATOLOGY | Facility: CLINIC | Age: 59
End: 2019-03-29

## 2019-04-09 DIAGNOSIS — R52 PAIN: Primary | ICD-10-CM

## 2019-04-11 ENCOUNTER — OFFICE VISIT (OUTPATIENT)
Dept: ORTHOPEDICS | Facility: CLINIC | Age: 59
End: 2019-04-11
Payer: MEDICARE

## 2019-04-11 ENCOUNTER — HOSPITAL ENCOUNTER (OUTPATIENT)
Dept: RADIOLOGY | Facility: OTHER | Age: 59
Discharge: HOME | End: 2019-04-11
Attending: ORTHOPAEDIC SURGERY
Payer: MEDICARE

## 2019-04-11 VITALS
DIASTOLIC BLOOD PRESSURE: 80 MMHG | HEIGHT: 62 IN | HEART RATE: 67 BPM | SYSTOLIC BLOOD PRESSURE: 122 MMHG | BODY MASS INDEX: 27.22 KG/M2 | WEIGHT: 147.94 LBS

## 2019-04-11 DIAGNOSIS — M18.11 DEGENERATIVE ARTHRITIS OF THUMB, RIGHT: Primary | ICD-10-CM

## 2019-04-11 DIAGNOSIS — M25.511 RIGHT SHOULDER PAIN, UNSPECIFIED CHRONICITY: ICD-10-CM

## 2019-04-11 DIAGNOSIS — R52 PAIN: ICD-10-CM

## 2019-04-11 PROCEDURE — 3074F SYST BP LT 130 MM HG: CPT | Mod: CPTII,S$GLB,, | Performed by: ORTHOPAEDIC SURGERY

## 2019-04-11 PROCEDURE — 20610 DRAIN/INJ JOINT/BURSA W/O US: CPT | Mod: RT,S$GLB,, | Performed by: ORTHOPAEDIC SURGERY

## 2019-04-11 PROCEDURE — 20610 LARGE JOINT ASPIRATION/INJECTION: R SUBACROMIAL BURSA: ICD-10-PCS | Mod: RT,S$GLB,, | Performed by: ORTHOPAEDIC SURGERY

## 2019-04-11 PROCEDURE — 73030 XR SHOULDER COMPLETE 2 OR MORE VIEWS RIGHT: ICD-10-PCS | Mod: 26,RT,, | Performed by: RADIOLOGY

## 2019-04-11 PROCEDURE — 3079F PR MOST RECENT DIASTOLIC BLOOD PRESSURE 80-89 MM HG: ICD-10-PCS | Mod: CPTII,S$GLB,, | Performed by: ORTHOPAEDIC SURGERY

## 2019-04-11 PROCEDURE — 3008F PR BODY MASS INDEX (BMI) DOCUMENTED: ICD-10-PCS | Mod: CPTII,51,S$GLB, | Performed by: ORTHOPAEDIC SURGERY

## 2019-04-11 PROCEDURE — 99214 OFFICE O/P EST MOD 30 MIN: CPT | Mod: 25,S$GLB,, | Performed by: ORTHOPAEDIC SURGERY

## 2019-04-11 PROCEDURE — 3079F DIAST BP 80-89 MM HG: CPT | Mod: CPTII,S$GLB,, | Performed by: ORTHOPAEDIC SURGERY

## 2019-04-11 PROCEDURE — 73030 X-RAY EXAM OF SHOULDER: CPT | Mod: TC,FY,RT

## 2019-04-11 PROCEDURE — 3008F BODY MASS INDEX DOCD: CPT | Mod: CPTII,51,S$GLB, | Performed by: ORTHOPAEDIC SURGERY

## 2019-04-11 PROCEDURE — 73030 X-RAY EXAM OF SHOULDER: CPT | Mod: 26,RT,, | Performed by: RADIOLOGY

## 2019-04-11 PROCEDURE — 3074F PR MOST RECENT SYSTOLIC BLOOD PRESSURE < 130 MM HG: ICD-10-PCS | Mod: CPTII,S$GLB,, | Performed by: ORTHOPAEDIC SURGERY

## 2019-04-11 PROCEDURE — 99999 PR PBB SHADOW E&M-EST. PATIENT-LVL IV: ICD-10-PCS | Mod: PBBFAC,,, | Performed by: ORTHOPAEDIC SURGERY

## 2019-04-11 PROCEDURE — 99214 PR OFFICE/OUTPT VISIT, EST, LEVL IV, 30-39 MIN: ICD-10-PCS | Mod: 25,S$GLB,, | Performed by: ORTHOPAEDIC SURGERY

## 2019-04-11 PROCEDURE — 99999 PR PBB SHADOW E&M-EST. PATIENT-LVL IV: CPT | Mod: PBBFAC,,, | Performed by: ORTHOPAEDIC SURGERY

## 2019-04-11 RX ADMIN — TRIAMCINOLONE ACETONIDE 80 MG: 40 INJECTION, SUSPENSION INTRA-ARTICULAR; INTRAMUSCULAR at 07:04

## 2019-04-11 NOTE — LETTER
April 12, 2019      Haresh Butler MD  [address]  [facility] [address]           [facility] [address] [facility] [address]  [facility], [address]  [facility] [address]  Phone: [phone]          Patient: Nadeen Mcgovern   MR Number: [id number]   YOB: 1960   Date of Visit: 4/11/2019       Dear Dr. Haresh Butler:    Thank you for referring Nadeen Mcgovern to me for evaluation. Attached you will find relevant portions of my assessment and plan of care.    If you have questions, please do not hesitate to call me. I look forward to following Nadeen Mcgovern along with you.    Sincerely,    Jovana Rossi MD    Enclosure  CC:  No Recipients    If you would like to receive this communication electronically, please contact [email or web address] or [phone] to request more information on [employer] access.    For providers and/or their staff who would like to refer a patient to [facility], please contact us through our one-stop-shop provider referral line, [facility] [place], at [phone].    If you feel you have received this communication in error or would no longer like to receive these types of communications, please e-mail [email or web address]

## 2019-04-11 NOTE — PROGRESS NOTES
Chief Complaint: BL hand pain, RIGHT thumb MCP pain and right shoulder pain    History of Present Illness 8/15/17:  Nadeen Mcgovern is a very pleasant 58 y.o. female w/ hx of liver tx and scleroderma who presents with bilateral hand with no trauma.  She reports diffuse as well as focal right thumb MCP joint and triggering as well as left thumb and ring pain over A1 and triggering.  She has a hx of RIGHT septic arthritis of her shouder treated with arthroscopic lavage as well ass a right thumb I&D a that same time that reportedly did not extend into the joint .    She denies any f/c/n/v.    Interval History 4/11/19:   Here for follow-up of right thumb MCP and right shoulder pain. Has been having some shoulder pain since arthroscopic I&D in 2017. Has pain in thumb MCP with ADLs. Of note has had some wounds on her hands that have been healing. Follows with Dr. Butler for scleroderma. States right thumb also triggers sometimes at night, unable to make trigger in clinic.    Review of Systems   Constitution: Negative. Negative for chills, fever and night sweats.   HENT: neg congestion.    Eyes: Negative for blurred vision.  Cardiovascular: Negative for chest pain and syncope.   Respiratory: Negative for cough and shortness of breath.    Hematologic/Lymphatic: Does not bruise/bleed easily.   Skin: Negative for dry skin, itching and rash.   Musculoskeletal: neg fall.  No weakness  Gastrointestinal: Negative for abdominal pain.  Normal bowel function.  Genitourinary: Normal bladder function   Neurological: No dizziness, loss of balance, seizures.   Psychiatric/Behavioral: Negative for depression.         Past Medical History:   Diagnosis Date    Acute cholecystitis     Cholecystitis    Arthritis 2015    septic arthritis of right shoulder    Bacteremia due to Streptococcus pneumoniae     Cancer     Cholangiocarcinoma     Hypertension     Jaundice     obstructive    Prediabetes        Past Surgical History:   Past  Surgical History:   Procedure Laterality Date    arthoscopic Right     drained infection    ARTHROSCOPY-SHOULDER - septic right shoulder Right 2015    Performed by Drew Frank MD at [facility] OR 2ND FLR    [id number] N/A 2016    Performed by [facility] at [facility] OR 2ND FLR     SECTION      ERCP N/A 2016    Performed by Efrain Simms MD at [facility] ENDO (2ND FLR)    ERCP N/A 2015    Performed by Sathish Cohen MD at [facility] ENDO (2ND FLR)    ERCP N/A 2015    Performed by Efrain Simms MD at [facility] ENDO (2ND FLR)    ERCP N/A 3/12/2015    Performed by Sathish Cohen MD at [facility] ENDO (2ND FLR)    ERCP N/A 2015    Performed by Efrain Simms MD at [facility] ENDO (2ND FLR)    ERCP N/A 2014    [facility] at [facility] (2ND FLR)    ERCP W/ Confocal EM N/A 2015    Performed by [facility]obar [facility] at [facility] ENDO    ESOPHAGOGASTRODUODENOSCOPY (EGD) N/A 2/3/2017    Performed by Carlos Haynes MD at [facility] ENDO (4TH FLR)    ESOPHAGOGASTRODUODENOSCOPY (EGD) N/A 2015    Performed by Sathish Cohen MD at [facility] ENDO (2ND FLR)    INCISION AND DRAINAGE (I&D), HAND Right 2015    Performed by Drew Frank MD at [facility] OR 2ND FLR    INCISION AND DRAINAGE OF WOUND      s/p I&D of R thumb    [id number]-CATH N/A 2015    Performed by Josue Cartwright Jr., MD at [facility] OR 2ND FLR    LIVER TRANSPLANT      MANOMETRY-ESOPHAGEAL-WITH IMPEDANCE N/A 2017    Performed by Leonardo Chris MD at [facility] ENDO (4TH FLR)    REMOVAL-PORT-A-CATH N/A 10/27/2016    Performed by [facility] at [facility] OR 2ND FLR    SHOULDER ARTHROSCOPY      TRANSPLANT-LIVER N/A 10/7/2015    Performed by Floyd Recinos MD at [facility] OR 2ND FLR    ULTRASOUND-ENDOSCOPIC-UPPER N/A 3/12/2015    Performed by Sathish Cohen MD at [facility] ENDO (2ND FLR)    ULTRASOUND-ENDOSCOPIC-UPPER N/A 2014    Performed by Efrain Simms MD at [facility]  ENDO (2ND FLR)       Social History:  negative tobacco abuse    Allergies:  Review of patient's allergies indicates:  No Known Allergies    Medications:  Current Outpatient Medications   Medication Sig Dispense Refill    amLODIPine (NORVASC) 5 MG tablet Take 0.5 tablets (2.5 mg total) by mouth once daily. 90 tablet 3    aspirin 81 MG Chew Take 81 mg by mouth once daily.      betamethasone dipropionate (DIPROLENE) 0.05 % cream Apply topically 2 (two) times daily. 45 g 3    betamethasone dipropionate (DIPROLENE) 0.05 % ointment Apply topically 2 (two) times daily. 45 g 1    clotrimazole (LOTRIMIN) 1 % cream Apply topically 2 (two) times daily. 30 g 3    diphenhydrAMINE (BENADRYL) 25 mg capsule Take 25 mg by mouth every 6 (six) hours as needed for Itching.      gabapentin (NEURONTIN) 100 MG capsule TAKE 1 CAPSULE(100 MG) BY MOUTH THREE TIMES DAILY AS NEEDED 90 capsule 0    hydrOXYzine pamoate (VISTARIL) 50 MG Cap Take 1 capsule (50 mg total) by mouth every 8 (eight) hours as needed. 30 capsule 0    multivitamin (THERAGRAN) tablet Take 1 tablet by mouth once daily.      mycophenolate (CELLCEPT) 500 mg Tab TAKE 2 TABLETS BY MOUTH TWICE DAILY 360 tablet 0    PROGRAF 1 mg Cap Take 2 capsules (2 mg total) by mouth every 12 (twelve) hours. 120 capsule 11    promethazine (PHENERGAN) 6.25 mg/5 mL syrup Take 20 mLs (25 mg total) by mouth every 6 (six) hours as needed for Nausea (and cough). 118 mL 0    sildenafil (REVATIO) 20 mg Tab Take 1 tablet (20 mg total) by mouth 2 (two) times daily. 180 tablet 3    tiZANidine (ZANAFLEX) 4 MG tablet TAKE 1 TABLET(4 MG) BY MOUTH EVERY NIGHT AS NEEDED 90 tablet 0    zolpidem (AMBIEN) 5 MG Tab Take 1 tablet (5 mg total) by mouth nightly as needed. 30 tablet 3    clobetasol 0.05% (TEMOVATE) 0.05 % Oint Apply topically 2 (two) times daily. 60 g 0    famotidine (PEPCID) 20 MG tablet Take 1 tablet (20 mg total) by mouth nightly. 30 tablet 11     No current facility-administered  medications for this visit.        Physical Exam:   General:  Well developed and well nourished age appropriate female in no acute distress  Cardiovascular: regular rate and rhythm, 2+ radial pulse  Respiratory: non-labored breathing, no wheezing  Musculoskeletal: RIGHT hand diffuse MCP pain and swelling as well as increased ttp over thumg A1, palpable nodule  BL NVI Median AIN Radial ,PIN and ulnar  FROM of wrist no pain  - CMC grind test    R shoulder:  FF 80 (passive 110)  Abd 70  IR sacrum  ER 40  + Speed  Tenderness over biceps tendon    L shoulder    Abd 90  IR T10  ER 60    Neuro: as above    Imaging:  B hand x-rays 2017 Imaging revealed: + MCP arthritis, no dislocation, BL hands  R shoulder 4/11/19: arthritis changes    Diagnosis:  56 year old woman with RIGHT MCP arthritis and right frozen shoulder and biceps tendonitis  Plan:   We discussed both surgical and non surgical options.  Patient would like to proceed with non-surgical intervention.   She agrees for injection of right shoulder  Declined right MCP and right trigger finger injections  R thumb spica brace given today  PT, OT ordered, given prescription as she would like to see therapists outside  Follow-up in 6 weeks

## 2019-04-12 RX ORDER — TRIAMCINOLONE ACETONIDE 40 MG/ML
80 INJECTION, SUSPENSION INTRA-ARTICULAR; INTRAMUSCULAR
Status: DISCONTINUED | OUTPATIENT
Start: 2019-04-11 | End: 2019-04-12 | Stop reason: HOSPADM

## 2019-04-12 NOTE — PROGRESS NOTES
I have personally taken the history and examined this patient. I agree with the resident's note as stated above.

## 2019-04-12 NOTE — PROCEDURES
Large Joint Aspiration/Injection: R subacromial bursa  Date/Time: 4/11/2019 7:26 AM  Performed by: Jovana Rossi MD  Authorized by: Jovana Rossi MD     Consent Done?:  Yes (Verbal)  Indications:  Pain  Timeout: Prior to procedure the correct patient, procedure, and site was verified      Location:  Shoulder  Site:  R subacromial bursa  Prep: Patient was prepped and draped in usual sterile fashion    Needle size:  22 G  Approach:  Posterior  Medications:  80 mg triamcinolone acetonide 40 mg/mL

## 2019-04-12 NOTE — PROGRESS NOTES
I have personally taken the history and examined this patient. I agree with the resident's note as stated above.  We discussed both surgical and non surgical options.  Patient would like to proceed with non-surgical intervention.   She agrees for injection of right shoulder  Declined right MCP and right trigger finger injections  R thumb spica brace given today  PT, OT ordered, given prescription as she would like to see therapists outside  Follow-up in 6 weeks

## 2019-04-16 ENCOUNTER — HOSPITAL ENCOUNTER (OUTPATIENT)
Dept: RADIOLOGY | Facility: CLINIC | Age: 59
Discharge: HOME | End: 2019-04-16
Attending: INTERNAL MEDICINE
Payer: MEDICARE

## 2019-04-16 ENCOUNTER — HOSPITAL ENCOUNTER (OUTPATIENT)
Dept: RADIOLOGY | Facility: HOSPITAL | Age: 59
Discharge: HOME | End: 2019-04-16
Attending: INTERNAL MEDICINE
Payer: MEDICARE

## 2019-04-16 DIAGNOSIS — M25.512 LEFT SHOULDER PAIN, UNSPECIFIED CHRONICITY: ICD-10-CM

## 2019-04-16 DIAGNOSIS — C22.0 COMBINED HEPATOCELLULAR AND CHOLANGIOCARCINOMA: ICD-10-CM

## 2019-04-16 DIAGNOSIS — M25.511 RIGHT ANTERIOR SHOULDER PAIN: ICD-10-CM

## 2019-04-16 DIAGNOSIS — Z78.0 MENOPAUSE: ICD-10-CM

## 2019-04-16 LAB
CREAT SERPL-MCNC: 0.6 MG/DL (ref 0.5–1.4)
SAMPLE: NORMAL

## 2019-04-16 PROCEDURE — 77080 DEXA BONE DENSITY SPINE HIP: ICD-10-PCS | Mod: 26,,, | Performed by: INTERNAL MEDICINE

## 2019-04-16 PROCEDURE — 71260 CT THORAX DX C+: CPT | Mod: 26,,, | Performed by: RADIOLOGY

## 2019-04-16 PROCEDURE — 74177 CT ABD & PELVIS W/CONTRAST: CPT | Mod: 26,,, | Performed by: RADIOLOGY

## 2019-04-16 PROCEDURE — 25500020 PHARM REV CODE 255: Performed by: INTERNAL MEDICINE

## 2019-04-16 PROCEDURE — 77080 DXA BONE DENSITY AXIAL: CPT | Mod: TC

## 2019-04-16 PROCEDURE — 71260 CT CHEST WITH CONTRAST: ICD-10-PCS | Mod: 26,,, | Performed by: RADIOLOGY

## 2019-04-16 PROCEDURE — 77080 DXA BONE DENSITY AXIAL: CPT | Mod: 26,,, | Performed by: INTERNAL MEDICINE

## 2019-04-16 PROCEDURE — 71260 CT THORAX DX C+: CPT | Mod: TC

## 2019-04-16 PROCEDURE — 74177 CT ABDOMEN PELVIS WITH CONTRAST: ICD-10-PCS | Mod: 26,,, | Performed by: RADIOLOGY

## 2019-04-16 PROCEDURE — 74177 CT ABD & PELVIS W/CONTRAST: CPT | Mod: TC

## 2019-04-16 RX ADMIN — IOHEXOL 75 ML: 350 INJECTION, SOLUTION INTRAVENOUS at 10:04

## 2019-04-18 ENCOUNTER — TELEPHONE (OUTPATIENT)
Dept: TRANSPLANT | Facility: CLINIC | Age: 59
End: 2019-04-18

## 2019-04-18 NOTE — TELEPHONE ENCOUNTER
Sent letter to let patient know CT scan reviewed and no evidence of recurrent disease, will continue routine imaging.

## 2019-04-18 NOTE — TELEPHONE ENCOUNTER
----- Message from Мария Benítez MD sent at 4/18/2019  2:05 PM CDT -----  Reviewed, nothing to do; repeat per routine

## 2019-04-25 ENCOUNTER — LAB VISIT (OUTPATIENT)
Dept: LAB | Facility: HOSPITAL | Age: 59
End: 2019-04-25
Payer: MEDICARE

## 2019-04-25 DIAGNOSIS — I10 BENIGN ESSENTIAL HYPERTENSION: ICD-10-CM

## 2019-04-25 DIAGNOSIS — Z13.6 ENCOUNTER FOR LIPID SCREENING FOR CARDIOVASCULAR DISEASE: ICD-10-CM

## 2019-04-25 DIAGNOSIS — Z13.220 ENCOUNTER FOR LIPID SCREENING FOR CARDIOVASCULAR DISEASE: ICD-10-CM

## 2019-04-25 LAB
ALBUMIN SERPL BCP-MCNC: 3.9 G/DL (ref 3.5–5.2)
ALP SERPL-CCNC: 75 U/L (ref 55–135)
ALT SERPL W/O P-5'-P-CCNC: 22 U/L (ref 10–44)
ANION GAP SERPL CALC-SCNC: 10 MMOL/L (ref 8–16)
AST SERPL-CCNC: 18 U/L (ref 10–40)
BASOPHILS # BLD AUTO: 0.02 K/UL (ref 0–0.2)
BASOPHILS NFR BLD: 0.3 % (ref 0–1.9)
BILIRUB SERPL-MCNC: 0.9 MG/DL (ref 0.1–1)
BUN SERPL-MCNC: 14 MG/DL (ref 6–20)
CALCIUM SERPL-MCNC: 9.2 MG/DL (ref 8.7–10.5)
CHLORIDE SERPL-SCNC: 107 MMOL/L (ref 95–110)
CHOLEST SERPL-MCNC: 193 MG/DL (ref 120–199)
CHOLEST/HDLC SERPL: 3.7 {RATIO} (ref 2–5)
CO2 SERPL-SCNC: 22 MMOL/L (ref 23–29)
CREAT SERPL-MCNC: 0.8 MG/DL (ref 0.5–1.4)
DIFFERENTIAL METHOD: NORMAL
EOSINOPHIL # BLD AUTO: 0.2 K/UL (ref 0–0.5)
EOSINOPHIL NFR BLD: 2.7 % (ref 0–8)
ERYTHROCYTE [DISTWIDTH] IN BLOOD BY AUTOMATED COUNT: 13.4 % (ref 11.5–14.5)
EST. GFR  (AFRICAN AMERICAN): >60 ML/MIN/1.73 M^2
EST. GFR  (NON AFRICAN AMERICAN): >60 ML/MIN/1.73 M^2
GLUCOSE SERPL-MCNC: 97 MG/DL (ref 70–110)
HCT VFR BLD AUTO: 42.5 % (ref 37–48.5)
HDLC SERPL-MCNC: 52 MG/DL (ref 40–75)
HDLC SERPL: 26.9 % (ref 20–50)
HGB BLD-MCNC: 13.7 G/DL (ref 12–16)
LDLC SERPL CALC-MCNC: 128.8 MG/DL (ref 63–159)
LYMPHOCYTES # BLD AUTO: 1.8 K/UL (ref 1–4.8)
LYMPHOCYTES NFR BLD: 24.9 % (ref 18–48)
MCH RBC QN AUTO: 30.9 PG (ref 27–31)
MCHC RBC AUTO-ENTMCNC: 32.2 G/DL (ref 32–36)
MCV RBC AUTO: 96 FL (ref 82–98)
MONOCYTES # BLD AUTO: 0.6 K/UL (ref 0.3–1)
MONOCYTES NFR BLD: 8.3 % (ref 4–15)
NEUTROPHILS # BLD AUTO: 4.5 K/UL (ref 1.8–7.7)
NEUTROPHILS NFR BLD: 63.7 % (ref 38–73)
NONHDLC SERPL-MCNC: 141 MG/DL
PLATELET # BLD AUTO: 182 K/UL (ref 150–350)
PMV BLD AUTO: 10.5 FL (ref 9.2–12.9)
POTASSIUM SERPL-SCNC: 4.3 MMOL/L (ref 3.5–5.1)
PROT SERPL-MCNC: 6.8 G/DL (ref 6–8.4)
RBC # BLD AUTO: 4.44 M/UL (ref 4–5.4)
SODIUM SERPL-SCNC: 139 MMOL/L (ref 136–145)
TRIGL SERPL-MCNC: 61 MG/DL (ref 30–150)
WBC # BLD AUTO: 7.11 K/UL (ref 3.9–12.7)

## 2019-04-25 PROCEDURE — 80053 COMPREHEN METABOLIC PANEL: CPT

## 2019-04-25 PROCEDURE — 80061 LIPID PANEL: CPT

## 2019-04-25 PROCEDURE — 36415 COLL VENOUS BLD VENIPUNCTURE: CPT

## 2019-04-25 PROCEDURE — 85025 COMPLETE CBC W/AUTO DIFF WBC: CPT

## 2019-05-01 NOTE — PROGRESS NOTES
INTERNAL MEDICINE ANNUAL VISIT NOTE      CHIEF COMPLAINT     ANNUAL    HPI     Nadeen Mcgovern is a 58 y.o. AA female who presents for her annual exam    Cellcept 1000mg BID, amlodipine 2.5mg daily, sildenafil 20mg TID - scleroderma and raynaud's.  Recently saw Dr. Butler.    Insomnia - takes ambien 5mg intermittently. Still controlling insomnia well when she had to take it. reports piece of CBD infused fruity pebble put her to sleep better than ambien.     Dysphagia - EGD 2/3/17 s/p dilation of esophageal stenosis. Still w/ occasional dysphagia.     s/p liver transplant for cholangiocarcinoma s/p chemo prograf 2mg BID  AFP and CA 19-9 WNL 19.   Follows w/ Dr. Benítez.  CT C/A/P  - TStable 0.7cm nodule at L apex. Stable subcm ill-defined ground-glass opacity at the R lung apex. Stable 3.2cm cystic structure by nick hepatis. Fat containing L ant abd wall hernia. Scoliosis. degen changes throughout spine.   CT C/A/P  w/ mild aortic atherosclerosis.     R shoulder pain w/ mild DJD on XR. Saw Dr. Rossi. Taking 2 gabapentin 100mg 2 at night. Reports gabapentin is causing some dry mouth.  Undergoing therapy. Helps.     Past Medical History:  Past Medical History:   Diagnosis Date    Acute cholecystitis     Cholecystitis    Arthritis     septic arthritis of right shoulder    Bacteremia due to Streptococcus pneumoniae     Cancer     Cholangiocarcinoma     Hypertension     Jaundice     obstructive    Prediabetes        Past Surgical History:  Past Surgical History:   Procedure Laterality Date    arthoscopic Right     drained infection    ARTHROSCOPY-SHOULDER - septic right shoulder Right 2015    Performed by [facility] MD at [facility] OR 2ND FLR    [id number] N/A 2016    Performed by [facility] Provider at [facility] OR 2ND FLR     SECTION      ERCP N/A 2016    Performed by Efrain Simms MD at [facility] ENDO (2ND FLR)    ERCP N/A 2015    Performed by Sathish VEGA  MD Noel at [facility] ENDO (2ND FLR)    ERCP N/A 5/25/2015    Performed by Efrain Simms MD at [facility] ENDO (2ND FLR)    ERCP N/A 3/12/2015    Performed by Sathish Cohen MD at [facility] ENDO (2ND FLR)    ERCP N/A 1/9/2015    Performed by Efrain Simms MD at [facility] ENDO (2ND FLR)    ERCP N/A 12/16/2014    Performed by Efrain Simms MD at [facility] ENDO (2ND FLR)    ERCP W/ Confocal EM N/A 2/13/2015    Performed by [facility] at [facility] ENDO    ESOPHAGOGASTRODUODENOSCOPY (EGD) N/A 2/3/2017    Performed by Carlos Haynes MD at [facility] ENDO (4TH FLR)    ESOPHAGOGASTRODUODENOSCOPY (EGD) N/A 8/6/2015    Performed by Sathish Cohen MD at [facility] ENDO (2ND FLR)    INCISION AND DRAINAGE (I&D), HAND Right 1/7/2015    Performed by Drew Frank MD at [facility] OR 2ND FLR    INCISION AND DRAINAGE OF WOUND      s/p I&D of R thumb    [id number]-CATH N/A 2/24/2015    Performed by Josue Cartwright Jr., MD at [facility] OR 2ND FLR    LIVER TRANSPLANT      MANOMETRY-ESOPHAGEAL-WITH IMPEDANCE N/A 7/5/2017    Performed by Leonardo Chris MD at [facility] ENDO (4TH FLR)    REMOVAL-PORT-A-CATH N/A 10/27/2016    Performed by [facility] Diagnostic Provider at [facility] OR 2ND FLR    SHOULDER ARTHROSCOPY      TRANSPLANT-LIVER N/A 10/7/2015    Performed by Floyd Recinos MD at [facility] OR 2ND FLR    ULTRASOUND-ENDOSCOPIC-UPPER N/A 3/12/2015    Performed by Sathish Cohen MD at [facility] ENDO (2ND FLR)    ULTRASOUND-ENDOSCOPIC-UPPER N/A 12/16/2014    Performed by Efrain Simms MD at [facility] ENDO (2ND FLR)       Allergies:  Review of patient's allergies indicates:  No Known Allergies    Home Medications:  Prior to Admission medications    Medication Sig Start Date End Date Taking? Authorizing Provider   amLODIPine (NORVASC) 5 MG tablet Take 0.5 tablets (2.5 mg total) by mouth once daily. 6/15/18   Haresh Butler MD   aspirin 81 MG Chew Take 81 mg by mouth once daily.    Historical Provider, MD   betamethasone dipropionate (DIPROLENE)  0.05 % cream Apply topically 2 (two) times daily. 1/16/18   Kari Souza MD   betamethasone dipropionate (DIPROLENE) 0.05 % ointment Apply topically 2 (two) times daily. 6/13/18   Kari Souza MD   clobetasol 0.05% (TEMOVATE) 0.05 % Oint Apply topically 2 (two) times daily. 1/12/18 1/22/18  Haresh Butler MD   clotrimazole (LOTRIMIN) 1 % cream Apply topically 2 (two) times daily. 11/21/17   Nadeen Figueroa MD   diphenhydrAMINE (BENADRYL) 25 mg capsule Take 25 mg by mouth every 6 (six) hours as needed for Itching.    Etienne Cloud MD   famotidine (PEPCID) 20 MG tablet Take 1 tablet (20 mg total) by mouth nightly. 10/27/17 10/27/18  Мария Benítez MD   gabapentin (NEURONTIN) 100 MG capsule TAKE 1 CAPSULE(100 MG) BY MOUTH THREE TIMES DAILY AS NEEDED 9/17/18   Maryuri Hazel MD   hydrOXYzine pamoate (VISTARIL) 50 MG Cap Take 1 capsule (50 mg total) by mouth every 8 (eight) hours as needed. 10/24/16   Alicia Nelson NP   multivitamin (THERAGRAN) tablet Take 1 tablet by mouth once daily. 10/12/15   Marysol Zavaleta MD   mycophenolate (CELLCEPT) 500 mg Tab TAKE 2 TABLETS BY MOUTH TWICE DAILY 3/19/19   Haresh Butler MD   PROGRAF 1 mg Cap Take 2 capsules (2 mg total) by mouth every 12 (twelve) hours. 7/11/18   Мария Benítez MD   promethazine (PHENERGAN) 6.25 mg/5 mL syrup Take 20 mLs (25 mg total) by mouth every 6 (six) hours as needed for Nausea (and cough). 11/12/18   Padma Curran MD   sildenafil (REVATIO) 20 mg Tab Take 1 tablet (20 mg total) by mouth 2 (two) times daily. 6/15/18 6/15/19  Haresh Butler MD   tiZANidine (ZANAFLEX) 4 MG tablet TAKE 1 TABLET(4 MG) BY MOUTH EVERY NIGHT AS NEEDED 10/12/18   Nadeen Figueroa MD   zolpidem (AMBIEN) 5 MG Tab Take 1 tablet (5 mg total) by mouth nightly as needed. 1/10/19   Nadeen Figueroa MD       Family History:  Family History   Problem Relation Age of Onset    Cancer Father         Lung    Arthritis Father     Stroke Mother     Diabetes Mother     Hypertension  "Mother     Heart attack Mother     Cancer Paternal Grandmother         pancreatic cancer    Cancer Brother         Lung    Alcohol abuse Brother     Arthritis Sister     No Known Problems Daughter     No Known Problems Son     No Known Problems Son     No Known Problems Daughter     Colon cancer Neg Hx     Esophageal cancer Neg Hx        Social History:  Social History     Tobacco Use    Smoking status: Former Smoker    Smokeless tobacco: Never Used   Substance Use Topics    Alcohol use: Yes     Frequency: 2-4 times a month     Drinks per session: 1 or 2     Binge frequency: Never     Comment: socially    Drug use: No     Types: Marijuana       Review of Systems:  Review of Systems   Constitutional: Positive for unexpected weight change (weight gain). Negative for activity change.   HENT: Positive for trouble swallowing. Negative for hearing loss and rhinorrhea.    Eyes: Negative for discharge and visual disturbance.   Respiratory: Negative for chest tightness and wheezing.    Cardiovascular: Negative for chest pain and palpitations.   Gastrointestinal: Positive for constipation. Negative for blood in stool, diarrhea and vomiting.   Endocrine: Positive for polydipsia (has dry eyes and dry mouth so drinks a lot of water) and polyuria.   Genitourinary: Negative for difficulty urinating, dysuria, hematuria and menstrual problem.   Musculoskeletal: Positive for arthralgias and joint swelling. Negative for neck pain.   Neurological: Negative for weakness and headaches.   Psychiatric/Behavioral: Negative for confusion and dysphoric mood.       Health Maintainence:   Td 2/18/15  Flu 12/13/18  Prevnar 1/8/15  Pneumovax 4/1/15  MMG 3/27/19  C-SCOPE 6/6/2014  DEXA 2019 - normal.   Hep C screening 6/15/18    PHYSICAL EXAM     /70 (BP Location: Left arm, Patient Position: Sitting, BP Method: Medium (Manual))   Pulse 63   Ht 5' 2" (1.575 m)   Wt 65.1 kg (143 lb 8.3 oz)   LMP  (LMP Unknown)   BMI 26.25 " kg/m²     GEN - A+OX4, NAD   HEENT - PERRL, EOMI, OP clear. MMM. TM normal.   Neck - No thyromegaly or cervical LAD. No thyroid masses felt.  CV - RRR, no m/r   Chest - CTAB, no wheezing or rhonchi  Abd - S/NT/ND/+BS.   Ext - 2+BDP and radial pulses. No LE edema.   Neuro - PERRL, EOMI, no nystagmus, eyebrow raise, facial sensation, hearing, m of mastication, smile, palatal raise, shoulder shrug, tongue protrusion symmetric and intact. 5/5 BUE and BLE strength. Sensation to light touch intact throughout. 2+ DTRs. Normal gait.   MSK - B shoulder pain. Normal gait.   Skin - No rash.    LABS     Previous labs reviewed.    ASSESSMENT/PLAN     Nadeen Mcgovern is a 58 y.o. female with  Nadeen was seen today for annual exam.    Diagnoses and all orders for this visit:    Annual physical exam - labs reviewed. Doing well. MMG and DEXA reviewed. HM UTD.     Raynaud's phenomenon without gangrene - cont amlodipine and sildenafil    Limited scleroderma - cont cellcept.     S/P liver transplant on Immunosuppression - no acute infections. Cont prograf.     Atherosclerosis of aorta - cont asa 81.     Primary osteoarthritis of right shoulder   -     diclofenac sodium (VOLTAREN) 1 % Gel; Apply 2 g topically 3 (three) times daily.    Dysphagia, unspecified type - reports not bad enough to repeat EGD. Will let me know if it gets that bad.    RTC in 12 months, sooner if needed and depending on labs.    Nadeen Figueroa MD  Department of Internal Medicine - [facility]  5:16 PM

## 2019-05-02 ENCOUNTER — TELEPHONE (OUTPATIENT)
Dept: INTERNAL MEDICINE | Facility: CLINIC | Age: 59
End: 2019-05-02

## 2019-05-02 ENCOUNTER — OFFICE VISIT (OUTPATIENT)
Dept: INTERNAL MEDICINE | Facility: CLINIC | Age: 59
End: 2019-05-02
Payer: MEDICARE

## 2019-05-02 VITALS
SYSTOLIC BLOOD PRESSURE: 112 MMHG | DIASTOLIC BLOOD PRESSURE: 70 MMHG | HEIGHT: 62 IN | HEART RATE: 63 BPM | BODY MASS INDEX: 26.41 KG/M2 | WEIGHT: 143.5 LBS

## 2019-05-02 DIAGNOSIS — Z00.00 ANNUAL PHYSICAL EXAM: Primary | ICD-10-CM

## 2019-05-02 DIAGNOSIS — Z94.4 S/P LIVER TRANSPLANT: ICD-10-CM

## 2019-05-02 DIAGNOSIS — M34.9 LIMITED SCLERODERMA: ICD-10-CM

## 2019-05-02 DIAGNOSIS — I70.0 ATHEROSCLEROSIS OF AORTA: ICD-10-CM

## 2019-05-02 DIAGNOSIS — D84.9 IMMUNOSUPPRESSION: ICD-10-CM

## 2019-05-02 DIAGNOSIS — I73.00 RAYNAUD'S PHENOMENON WITHOUT GANGRENE: ICD-10-CM

## 2019-05-02 DIAGNOSIS — R13.10 DYSPHAGIA, UNSPECIFIED TYPE: ICD-10-CM

## 2019-05-02 DIAGNOSIS — M19.011 PRIMARY OSTEOARTHRITIS OF RIGHT SHOULDER: ICD-10-CM

## 2019-05-02 PROCEDURE — 99499 RISK ADDL DX/OHS AUDIT: ICD-10-PCS | Mod: S$GLB,,, | Performed by: INTERNAL MEDICINE

## 2019-05-02 PROCEDURE — 99999 PR PBB SHADOW E&M-EST. PATIENT-LVL III: ICD-10-PCS | Mod: PBBFAC,,, | Performed by: INTERNAL MEDICINE

## 2019-05-02 PROCEDURE — 99499 UNLISTED E&M SERVICE: CPT | Mod: S$GLB,,, | Performed by: INTERNAL MEDICINE

## 2019-05-02 PROCEDURE — 99999 PR PBB SHADOW E&M-EST. PATIENT-LVL III: CPT | Mod: PBBFAC,,, | Performed by: INTERNAL MEDICINE

## 2019-05-02 PROCEDURE — 3074F SYST BP LT 130 MM HG: CPT | Mod: CPTII,S$GLB,, | Performed by: INTERNAL MEDICINE

## 2019-05-02 PROCEDURE — 3074F PR MOST RECENT SYSTOLIC BLOOD PRESSURE < 130 MM HG: ICD-10-PCS | Mod: CPTII,S$GLB,, | Performed by: INTERNAL MEDICINE

## 2019-05-02 PROCEDURE — 3078F DIAST BP <80 MM HG: CPT | Mod: CPTII,S$GLB,, | Performed by: INTERNAL MEDICINE

## 2019-05-02 PROCEDURE — 3078F PR MOST RECENT DIASTOLIC BLOOD PRESSURE < 80 MM HG: ICD-10-PCS | Mod: CPTII,S$GLB,, | Performed by: INTERNAL MEDICINE

## 2019-05-02 PROCEDURE — 99214 PR OFFICE/OUTPT VISIT, EST, LEVL IV, 30-39 MIN: ICD-10-PCS | Mod: S$GLB,,, | Performed by: INTERNAL MEDICINE

## 2019-05-02 PROCEDURE — 99214 OFFICE O/P EST MOD 30 MIN: CPT | Mod: S$GLB,,, | Performed by: INTERNAL MEDICINE

## 2019-05-02 RX ORDER — DICLOFENAC SODIUM 10 MG/G
2 GEL TOPICAL 3 TIMES DAILY
Qty: 100 G | Refills: 0 | Status: SHIPPED | OUTPATIENT
Start: 2019-05-02 | End: 2020-07-06

## 2019-05-02 NOTE — TELEPHONE ENCOUNTER
"Prior Authorization Needed    Rx: diclofenac sodium (VOLTAREN) 1 % Gel    To submit the PA:    1: Go to " [email or web address] " and click "Enter a Key"    2. Enter the patient's last name and date of birth and the key.      KEY: [id number]    3. Complete the forms and click "send to Plan"    Note chart when prior authorization has been submitted.    Please notify pharmacy when prior authorization has been approved.    Thank You    "

## 2019-05-22 ENCOUNTER — PATIENT OUTREACH (OUTPATIENT)
Dept: ADMINISTRATIVE | Facility: HOSPITAL | Age: 59
End: 2019-05-22

## 2019-05-23 ENCOUNTER — OFFICE VISIT (OUTPATIENT)
Dept: ORTHOPEDICS | Facility: CLINIC | Age: 59
End: 2019-05-23
Payer: MEDICARE

## 2019-05-23 VITALS
BODY MASS INDEX: 26.41 KG/M2 | HEART RATE: 65 BPM | HEIGHT: 62 IN | SYSTOLIC BLOOD PRESSURE: 106 MMHG | WEIGHT: 143.5 LBS | DIASTOLIC BLOOD PRESSURE: 66 MMHG

## 2019-05-23 DIAGNOSIS — M25.511 RIGHT SHOULDER PAIN, UNSPECIFIED CHRONICITY: Primary | ICD-10-CM

## 2019-05-23 DIAGNOSIS — M18.11 DEGENERATIVE ARTHRITIS OF THUMB, RIGHT: ICD-10-CM

## 2019-05-23 PROCEDURE — 99999 PR PBB SHADOW E&M-EST. PATIENT-LVL III: CPT | Mod: PBBFAC,,, | Performed by: ORTHOPAEDIC SURGERY

## 2019-05-23 PROCEDURE — 3078F PR MOST RECENT DIASTOLIC BLOOD PRESSURE < 80 MM HG: ICD-10-PCS | Mod: CPTII,S$GLB,, | Performed by: ORTHOPAEDIC SURGERY

## 2019-05-23 PROCEDURE — 3074F PR MOST RECENT SYSTOLIC BLOOD PRESSURE < 130 MM HG: ICD-10-PCS | Mod: CPTII,S$GLB,, | Performed by: ORTHOPAEDIC SURGERY

## 2019-05-23 PROCEDURE — 3074F SYST BP LT 130 MM HG: CPT | Mod: CPTII,S$GLB,, | Performed by: ORTHOPAEDIC SURGERY

## 2019-05-23 PROCEDURE — 3008F BODY MASS INDEX DOCD: CPT | Mod: CPTII,S$GLB,, | Performed by: ORTHOPAEDIC SURGERY

## 2019-05-23 PROCEDURE — 3078F DIAST BP <80 MM HG: CPT | Mod: CPTII,S$GLB,, | Performed by: ORTHOPAEDIC SURGERY

## 2019-05-23 PROCEDURE — 99214 OFFICE O/P EST MOD 30 MIN: CPT | Mod: S$GLB,,, | Performed by: ORTHOPAEDIC SURGERY

## 2019-05-23 PROCEDURE — 99214 PR OFFICE/OUTPT VISIT, EST, LEVL IV, 30-39 MIN: ICD-10-PCS | Mod: S$GLB,,, | Performed by: ORTHOPAEDIC SURGERY

## 2019-05-23 PROCEDURE — 99999 PR PBB SHADOW E&M-EST. PATIENT-LVL III: ICD-10-PCS | Mod: PBBFAC,,, | Performed by: ORTHOPAEDIC SURGERY

## 2019-05-23 PROCEDURE — 3008F PR BODY MASS INDEX (BMI) DOCUMENTED: ICD-10-PCS | Mod: CPTII,S$GLB,, | Performed by: ORTHOPAEDIC SURGERY

## 2019-05-23 NOTE — PROGRESS NOTES
Subjective:      Patient ID: Nadeen Mcgovern is a 58 y.o. female.    Chief Complaint: Pain of the Right Shoulder      HPI  Nadeen Mcgovern is a 58 y.o. female with scleroderma presenting today for follow up right shoulder pain and right thumb MCP pain. She has a hx of arthroscopic I&D in 2017 and notes pain following this. She was last seen 4/11/19, a right shoulder injection was done at this time, therapy ordered, she was given a thumb brace. She notes improvement in symptoms. She is very happy with therapy. Her pain has decreased and motion is gradually improving.     Review of patient's allergies indicates:  No Known Allergies      Current Outpatient Medications   Medication Sig Dispense Refill    amLODIPine (NORVASC) 5 MG tablet Take 0.5 tablets (2.5 mg total) by mouth once daily. 90 tablet 3    aspirin 81 MG Chew Take 81 mg by mouth once daily.      betamethasone dipropionate (DIPROLENE) 0.05 % cream Apply topically 2 (two) times daily. 45 g 3    betamethasone dipropionate (DIPROLENE) 0.05 % ointment Apply topically 2 (two) times daily. 45 g 1    clotrimazole (LOTRIMIN) 1 % cream Apply topically 2 (two) times daily. 30 g 3    diclofenac sodium (VOLTAREN) 1 % Gel Apply 2 g topically 3 (three) times daily. 100 g 0    diphenhydrAMINE (BENADRYL) 25 mg capsule Take 25 mg by mouth every 6 (six) hours as needed for Itching.      hydrOXYzine pamoate (VISTARIL) 50 MG Cap Take 1 capsule (50 mg total) by mouth every 8 (eight) hours as needed. 30 capsule 0    multivitamin (THERAGRAN) tablet Take 1 tablet by mouth once daily.      mycophenolate (CELLCEPT) 500 mg Tab TAKE 2 TABLETS BY MOUTH TWICE DAILY 360 tablet 0    PROGRAF 1 mg Cap Take 2 capsules (2 mg total) by mouth every 12 (twelve) hours. 120 capsule 11    promethazine (PHENERGAN) 6.25 mg/5 mL syrup Take 20 mLs (25 mg total) by mouth every 6 (six) hours as needed for Nausea (and cough). 118 mL 0    sildenafil (REVATIO) 20 mg Tab Take 1 tablet (20 mg total) by  mouth 2 (two) times daily. 180 tablet 3    tiZANidine (ZANAFLEX) 4 MG tablet TAKE 1 TABLET(4 MG) BY MOUTH EVERY NIGHT AS NEEDED 90 tablet 0    zolpidem (AMBIEN) 5 MG Tab Take 1 tablet (5 mg total) by mouth nightly as needed. 30 tablet 3    clobetasol 0.05% (TEMOVATE) 0.05 % Oint Apply topically 2 (two) times daily. 60 g 0    famotidine (PEPCID) 20 MG tablet Take 1 tablet (20 mg total) by mouth nightly. 30 tablet 11     No current facility-administered medications for this visit.        Past Medical History:   Diagnosis Date    Acute cholecystitis     Cholecystitis    Arthritis     septic arthritis of right shoulder    Bacteremia due to Streptococcus pneumoniae     Cancer     Cholangiocarcinoma     Hypertension     Jaundice     obstructive    Prediabetes        Past Surgical History:   Procedure Laterality Date    arthoscopic Right     drained infection    ARTHROSCOPY-SHOULDER - septic right shoulder Right 2015    Performed by Drew Frank MD at [facility] OR 2ND FLR    RZQLIE-KIQRMU-ZO N/A 2016    Performed by [facility] Diagnostic Provider at [facility] OR 2ND FLR     SECTION      ERCP N/A 2016    Performed by Efrain Simms MD at [facility] ENDO (2ND FLR)    ERCP N/A 2015    Performed by Sathish Cohen MD at [facility] ENDO (2ND FLR)    ERCP N/A 2015    Performed by Efrain Simms MD at [facility] ENDO (2ND FLR)    ERCP N/A 3/12/2015    Performed by Sathish Cohen MD at [facility] ENDO (2ND FLR)    ERCP N/A 2015    Performed by Efrain Simms MD at [facility] ENDO (2ND FLR)    ERCP N/A 2014    Performed by Efrain Simms MD at [facility] ENDO (2ND FLR)    ERCP W/ Confocal EM N/A 2015    Performed by [facility] Escobar MD at [facility] END[facility]    ESOPHAGOGASTRODUODENOSCOPY (EGD) N/A 2/3/2017    Performed by Carlos Haynes MD at [facility] ENDO (4TH FLR)    ESOPHAGOGASTRODUODENOSCOPY (EGD) N/A 2015    Performed by Sathish Cohen MD at [facility] ENDO (2ND FLR)    INCISION AND DRAINAGE  "(I&D), HAND Right 1/7/2015    Performed by Drew Frank MD at [facility] OR 2ND FLR    INCISION AND DRAINAGE OF WOUND      s/p I&D of R thumb    AKYRRIYNR-MMIZ-B-CATH N/A 2/24/2015    Performed by Josue Cartwright Jr., MD at [facility] OR 2ND FLR    LIVER TRANSPLANT      MANOMETRY-ESOPHAGEAL-WITH IMPEDANCE N/A 7/5/2017    Performed by Leonardo Chris MD at [facility] ENDO (4TH FLR)    REMOVAL-PORT-A-CATH N/A 10/27/2016    Performed by [facility] Diagnostic Provider at [facility] OR 2ND FLR    SHOULDER ARTHROSCOPY      TRANSPLANT-LIVER N/A 10/7/2015    Performed by Floyd Recinos MD at [facility] OR 2ND FLR    ULTRASOUND-ENDOSCOPIC-UPPER N/A 3/12/2015    Performed by Sathish Cohen MD at [facility] ENDO (2ND FLR)    ULTRASOUND-ENDOSCOPIC-UPPER N/A 12/16/2014    Performed by Efrain Simms MD at [facility] ENDO (2ND FLR)       Review of Systems:  Constitutional: Negative for chills and fever.   Respiratory: Negative for cough and shortness of breath.    Gastrointestinal: Negative for nausea and vomiting.   Skin: Negative for rash.   Neurological: Negative for dizziness and headaches.   Psychiatric/Behavioral: Negative for depression.   MSK as in HPI       OBJECTIVE:     PHYSICAL EXAM:  /66 (BP Location: Left arm, Patient Position: Sitting, BP Method: Medium (Automatic))   Pulse 65   Ht 5' 2" (1.575 m)   Wt 65.1 kg (143 lb 8.3 oz)   LMP  (LMP Unknown)   BMI 26.25 kg/m²     GEN:  NAD, well-developed, well-groomed.  NEURO: Awake, alert, and oriented. Normal attention and concentration.    PSYCH: Normal mood and affect. Behavior is normal.  HEENT: No cervical lymphadenopathy noted.  CARDIOVASCULAR: Radial pulses 2+ bilaterally. No LE edema noted.  PULMONARY: Breath sounds normal. No respiratory distress.  SKIN: Intact, no rashes.      MSK:   RUE:  Good active ROM of the wrist and fingers. Fair shoulder motion, about 160 FF and 110 abduction. Mild ttp over the anterior shoulder. No significant ttp over the thumb MCP.  " AIN/PIN/Radial/Median/Ulnar Nerves assessed in isolation without deficit. Radial & Ulnar arteries palpated 2+. Capillary Refill <3s.      RADIOGRAPHS:  Xray right shoulder 4/11/19  FINDINGS:  Bones are well mineralized.  The glenohumeral joint and AC joint appear intact.  No fracture or dislocation is seen.  Mild degenerative changes with some spurring at the acromion and proximal humerus.  No soft tissue abnormality appreciated.  Comments: I have personally reviewed the imaging and I agree with the above radiologist's report.    ASSESSMENT/PLAN:       ICD-10-CM ICD-9-CM   1. Right shoulder pain, unspecified chronicity M25.511 719.41   2. Degenerative arthritis of thumb, right M18.11 715.34     Plan:   -Doing well currently, gradually improving. Continue therapy.   -RTC if needed         The patient indicates understanding of these issues and agrees to the plan.    Gaby Hodge PA-C  [facility]   [facility] [place]

## 2019-05-27 ENCOUNTER — DOCUMENTATION ONLY (OUTPATIENT)
Dept: INTERNAL MEDICINE | Facility: CLINIC | Age: 59
End: 2019-05-27

## 2019-06-04 ENCOUNTER — OFFICE VISIT (OUTPATIENT)
Dept: DERMATOLOGY | Facility: CLINIC | Age: 59
End: 2019-06-04
Payer: MEDICARE

## 2019-06-04 DIAGNOSIS — L30.9 CHRONIC ECZEMA OF HAND: Primary | ICD-10-CM

## 2019-06-04 PROCEDURE — 99213 PR OFFICE/OUTPT VISIT, EST, LEVL III, 20-29 MIN: ICD-10-PCS | Mod: S$GLB,,, | Performed by: DERMATOLOGY

## 2019-06-04 PROCEDURE — 99999 PR PBB SHADOW E&M-EST. PATIENT-LVL II: ICD-10-PCS | Mod: PBBFAC,,, | Performed by: DERMATOLOGY

## 2019-06-04 PROCEDURE — 99999 PR PBB SHADOW E&M-EST. PATIENT-LVL II: CPT | Mod: PBBFAC,,, | Performed by: DERMATOLOGY

## 2019-06-04 PROCEDURE — 99213 OFFICE O/P EST LOW 20 MIN: CPT | Mod: S$GLB,,, | Performed by: DERMATOLOGY

## 2019-06-04 NOTE — PROGRESS NOTES
Subjective:       Patient ID:  Nadeen Mcgovern is a 58 y.o. female who presents for   Chief Complaint   Patient presents with    Rash     Rash  - Follow-up  Symptom course: improving  Currently using: diprolene oint bid prn flare.  Affected locations: right fingers  Signs / symptoms: itching (intermittently)        Review of Systems   Skin: Positive for itching and rash.        No h/o AD   Allergic/Immunologic: Positive for environmental allergies.        Objective:    Physical Exam   Constitutional: She appears well-developed and well-nourished. No distress.   Neurological: She is alert and oriented to person, place, and time. She is not disoriented.   Psychiatric: She has a normal mood and affect.   Skin:   Areas Examined (abnormalities noted in diagram):   RUE Inspected  LUE Inspection Performed  RLE Inspected  LLE Inspection Performed  Nails and Digits Inspection Performed             Diagram Legend     Erythematous scaling macule/papule c/w actinic keratosis       Vascular papule c/w angioma      Pigmented verrucoid papule/plaque c/w seborrheic keratosis      Yellow umbilicated papule c/w sebaceous hyperplasia      Irregularly shaped tan macule c/w lentigo     1-2 mm smooth white papules consistent with Milia      Movable subcutaneous cyst with punctum c/w epidermal inclusion cyst      Subcutaneous movable cyst c/w pilar cyst      Firm pink to brown papule c/w dermatofibroma      Pedunculated fleshy papule(s) c/w skin tag(s)      Evenly pigmented macule c/w junctional nevus     Mildly variegated pigmented, slightly irregular-bordered macule c/w mildly atypical nevus      Flesh colored to evenly pigmented papule c/w intradermal nevus       Pink pearly papule/plaque c/w basal cell carcinoma      Erythematous hyperkeratotic cursted plaque c/w SCC      Surgical scar with no sign of skin cancer recurrence      Open and closed comedones      Inflammatory papules and pustules      Verrucoid papule consistent  consistent with wart     Erythematous eczematous patches and plaques     Dystrophic onycholytic nail with subungual debris c/w onychomycosis     Umbilicated papule    Erythematous-base heme-crusted tan verrucoid plaque consistent with inflamed seborrheic keratosis     Erythematous Silvery Scaling Plaque c/w Psoriasis     See annotation      Assessment / Plan:        Chronic eczema of hand  Recommend Elta MD So Silky Hand CREME q hand washing       Cont diprolene oint bid hands prn flare    D/c smoking (pt does occ.)               Follow up if symptoms worsen or fail to improve.

## 2019-06-11 ENCOUNTER — LAB VISIT (OUTPATIENT)
Dept: LAB | Facility: HOSPITAL | Age: 59
End: 2019-06-11
Attending: INTERNAL MEDICINE
Payer: MEDICARE

## 2019-06-11 DIAGNOSIS — Z79.899 ON MYCOPHENOLATE MOFETIL THERAPY: ICD-10-CM

## 2019-06-11 DIAGNOSIS — M34.9 SCLERODERMA: ICD-10-CM

## 2019-06-11 DIAGNOSIS — Z94.4 LIVER REPLACED BY TRANSPLANT: ICD-10-CM

## 2019-06-11 LAB
ALBUMIN SERPL BCP-MCNC: 3.8 G/DL (ref 3.5–5.2)
ALP SERPL-CCNC: 76 U/L (ref 55–135)
ALT SERPL W/O P-5'-P-CCNC: 25 U/L (ref 10–44)
ANION GAP SERPL CALC-SCNC: 9 MMOL/L (ref 8–16)
AST SERPL-CCNC: 19 U/L (ref 10–40)
BASOPHILS # BLD AUTO: 0.03 K/UL (ref 0–0.2)
BASOPHILS NFR BLD: 0.6 % (ref 0–1.9)
BILIRUB SERPL-MCNC: 0.8 MG/DL (ref 0.1–1)
BUN SERPL-MCNC: 10 MG/DL (ref 6–20)
CALCIUM SERPL-MCNC: 9.6 MG/DL (ref 8.7–10.5)
CHLORIDE SERPL-SCNC: 108 MMOL/L (ref 95–110)
CO2 SERPL-SCNC: 24 MMOL/L (ref 23–29)
CREAT SERPL-MCNC: 0.7 MG/DL (ref 0.5–1.4)
DIFFERENTIAL METHOD: ABNORMAL
EOSINOPHIL # BLD AUTO: 0.2 K/UL (ref 0–0.5)
EOSINOPHIL NFR BLD: 3.3 % (ref 0–8)
ERYTHROCYTE [DISTWIDTH] IN BLOOD BY AUTOMATED COUNT: 13.2 % (ref 11.5–14.5)
EST. GFR  (AFRICAN AMERICAN): >60 ML/MIN/1.73 M^2
EST. GFR  (NON AFRICAN AMERICAN): >60 ML/MIN/1.73 M^2
GLUCOSE SERPL-MCNC: 96 MG/DL (ref 70–110)
HCT VFR BLD AUTO: 39.9 % (ref 37–48.5)
HGB BLD-MCNC: 13.4 G/DL (ref 12–16)
IMM GRANULOCYTES # BLD AUTO: 0.01 K/UL (ref 0–0.04)
IMM GRANULOCYTES NFR BLD AUTO: 0.2 % (ref 0–0.5)
LYMPHOCYTES # BLD AUTO: 1.3 K/UL (ref 1–4.8)
LYMPHOCYTES NFR BLD: 25.4 % (ref 18–48)
MCH RBC QN AUTO: 31.5 PG (ref 27–31)
MCHC RBC AUTO-ENTMCNC: 33.6 G/DL (ref 32–36)
MCV RBC AUTO: 94 FL (ref 82–98)
MONOCYTES # BLD AUTO: 0.5 K/UL (ref 0.3–1)
MONOCYTES NFR BLD: 8.9 % (ref 4–15)
NEUTROPHILS # BLD AUTO: 3.2 K/UL (ref 1.8–7.7)
NEUTROPHILS NFR BLD: 61.6 % (ref 38–73)
NRBC BLD-RTO: 0 /100 WBC
PLATELET # BLD AUTO: 192 K/UL (ref 150–350)
PMV BLD AUTO: 10.9 FL (ref 9.2–12.9)
POTASSIUM SERPL-SCNC: 4 MMOL/L (ref 3.5–5.1)
PROT SERPL-MCNC: 7.1 G/DL (ref 6–8.4)
RBC # BLD AUTO: 4.25 M/UL (ref 4–5.4)
SODIUM SERPL-SCNC: 141 MMOL/L (ref 136–145)
TACROLIMUS BLD-MCNC: 4.9 NG/ML (ref 5–15)
WBC # BLD AUTO: 5.15 K/UL (ref 3.9–12.7)

## 2019-06-11 PROCEDURE — 80053 COMPREHEN METABOLIC PANEL: CPT

## 2019-06-11 PROCEDURE — 36415 COLL VENOUS BLD VENIPUNCTURE: CPT

## 2019-06-11 PROCEDURE — 85025 COMPLETE CBC W/AUTO DIFF WBC: CPT

## 2019-06-11 PROCEDURE — 80197 ASSAY OF TACROLIMUS: CPT

## 2019-06-14 ENCOUNTER — TELEPHONE (OUTPATIENT)
Dept: TRANSPLANT | Facility: CLINIC | Age: 59
End: 2019-06-14

## 2019-06-14 DIAGNOSIS — Z94.4 LIVER REPLACED BY TRANSPLANT: Primary | ICD-10-CM

## 2019-06-14 NOTE — TELEPHONE ENCOUNTER
Dr. Benítez reviewed your labs.  Continue routine labs no changes needed.  Letter sent for next lab appointment 09/09/19

## 2019-06-14 NOTE — TELEPHONE ENCOUNTER
----- Message from Мария Benítez MD sent at 6/14/2019  8:04 AM CDT -----  CMP was also reviewed which was unremarkable.  Repeat labs per routine.

## 2019-06-17 ENCOUNTER — LAB VISIT (OUTPATIENT)
Dept: LAB | Facility: HOSPITAL | Age: 59
End: 2019-06-17
Attending: INTERNAL MEDICINE
Payer: MEDICARE

## 2019-06-17 DIAGNOSIS — M34.9 LIMITED SCLERODERMA: ICD-10-CM

## 2019-06-17 DIAGNOSIS — M34.9 SCLERODERMA: ICD-10-CM

## 2019-06-17 DIAGNOSIS — Z79.899 ON MYCOPHENOLATE MOFETIL THERAPY: ICD-10-CM

## 2019-06-17 LAB
ALBUMIN SERPL BCP-MCNC: 4.2 G/DL (ref 3.5–5.2)
ALP SERPL-CCNC: 69 U/L (ref 55–135)
ALT SERPL W/O P-5'-P-CCNC: 20 U/L (ref 10–44)
ANION GAP SERPL CALC-SCNC: 10 MMOL/L (ref 8–16)
AST SERPL-CCNC: 17 U/L (ref 10–40)
BASOPHILS # BLD AUTO: 0.02 K/UL (ref 0–0.2)
BASOPHILS NFR BLD: 0.4 % (ref 0–1.9)
BILIRUB SERPL-MCNC: 1 MG/DL (ref 0.1–1)
BUN SERPL-MCNC: 11 MG/DL (ref 6–20)
CALCIUM SERPL-MCNC: 9.3 MG/DL (ref 8.7–10.5)
CHLORIDE SERPL-SCNC: 109 MMOL/L (ref 95–110)
CO2 SERPL-SCNC: 23 MMOL/L (ref 23–29)
CREAT SERPL-MCNC: 0.7 MG/DL (ref 0.5–1.4)
CRP SERPL-MCNC: 2 MG/L (ref 0–8.2)
DIFFERENTIAL METHOD: ABNORMAL
EOSINOPHIL # BLD AUTO: 0.2 K/UL (ref 0–0.5)
EOSINOPHIL NFR BLD: 3.4 % (ref 0–8)
ERYTHROCYTE [DISTWIDTH] IN BLOOD BY AUTOMATED COUNT: 13.2 % (ref 11.5–14.5)
ERYTHROCYTE [SEDIMENTATION RATE] IN BLOOD BY WESTERGREN METHOD: 25 MM/HR (ref 0–36)
EST. GFR  (AFRICAN AMERICAN): >60 ML/MIN/1.73 M^2
EST. GFR  (NON AFRICAN AMERICAN): >60 ML/MIN/1.73 M^2
GLUCOSE SERPL-MCNC: 104 MG/DL (ref 70–110)
HCT VFR BLD AUTO: 39.6 % (ref 37–48.5)
HGB BLD-MCNC: 13 G/DL (ref 12–16)
LYMPHOCYTES # BLD AUTO: 1.7 K/UL (ref 1–4.8)
LYMPHOCYTES NFR BLD: 31.9 % (ref 18–48)
MCH RBC QN AUTO: 31.3 PG (ref 27–31)
MCHC RBC AUTO-ENTMCNC: 32.8 G/DL (ref 32–36)
MCV RBC AUTO: 95 FL (ref 82–98)
MONOCYTES # BLD AUTO: 0.4 K/UL (ref 0.3–1)
MONOCYTES NFR BLD: 7.8 % (ref 4–15)
NEUTROPHILS # BLD AUTO: 3 K/UL (ref 1.8–7.7)
NEUTROPHILS NFR BLD: 56.3 % (ref 38–73)
PLATELET # BLD AUTO: 184 K/UL (ref 150–350)
PMV BLD AUTO: 10.5 FL (ref 9.2–12.9)
POTASSIUM SERPL-SCNC: 3.7 MMOL/L (ref 3.5–5.1)
PROT SERPL-MCNC: 7.2 G/DL (ref 6–8.4)
RBC # BLD AUTO: 4.16 M/UL (ref 4–5.4)
SODIUM SERPL-SCNC: 142 MMOL/L (ref 136–145)
WBC # BLD AUTO: 5.26 K/UL (ref 3.9–12.7)

## 2019-06-17 PROCEDURE — 85652 RBC SED RATE AUTOMATED: CPT

## 2019-06-17 PROCEDURE — 86140 C-REACTIVE PROTEIN: CPT

## 2019-06-17 PROCEDURE — 36415 COLL VENOUS BLD VENIPUNCTURE: CPT

## 2019-06-17 PROCEDURE — 85025 COMPLETE CBC W/AUTO DIFF WBC: CPT

## 2019-06-17 PROCEDURE — 80053 COMPREHEN METABOLIC PANEL: CPT

## 2019-06-18 ENCOUNTER — TELEPHONE (OUTPATIENT)
Dept: INTERNAL MEDICINE | Facility: CLINIC | Age: 59
End: 2019-06-18

## 2019-06-18 DIAGNOSIS — M34.9 SCLERODERMA: ICD-10-CM

## 2019-06-18 RX ORDER — SILDENAFIL CITRATE 20 MG/1
20 TABLET ORAL 3 TIMES DAILY
Qty: 270 TABLET | Refills: 1 | Status: SHIPPED | OUTPATIENT
Start: 2019-06-18 | End: 2020-02-07 | Stop reason: SDUPTHER

## 2019-06-18 NOTE — TELEPHONE ENCOUNTER
Pt called and wanted to know if she can  the paperwork for the DMV today.  Let pt know I will put it on 's desk for her to fill out once filled out I will call her to let her know.      Pt verbalized understanding of this.

## 2019-06-25 ENCOUNTER — OFFICE VISIT (OUTPATIENT)
Dept: RHEUMATOLOGY | Facility: CLINIC | Age: 59
End: 2019-06-25
Payer: MEDICARE

## 2019-06-25 ENCOUNTER — CLINICAL SUPPORT (OUTPATIENT)
Dept: INFECTIOUS DISEASES | Facility: CLINIC | Age: 59
End: 2019-06-25
Payer: MEDICARE

## 2019-06-25 VITALS
SYSTOLIC BLOOD PRESSURE: 105 MMHG | BODY MASS INDEX: 25.21 KG/M2 | DIASTOLIC BLOOD PRESSURE: 66 MMHG | HEIGHT: 64 IN | HEART RATE: 74 BPM | WEIGHT: 147.69 LBS

## 2019-06-25 DIAGNOSIS — I73.00 RAYNAUD'S DISEASE WITHOUT GANGRENE: ICD-10-CM

## 2019-06-25 DIAGNOSIS — M34.9 SCLERODERMA: ICD-10-CM

## 2019-06-25 DIAGNOSIS — Z79.899 ON MYCOPHENOLATE MOFETIL THERAPY: ICD-10-CM

## 2019-06-25 PROCEDURE — 3074F SYST BP LT 130 MM HG: CPT | Mod: CPTII,S$GLB,, | Performed by: INTERNAL MEDICINE

## 2019-06-25 PROCEDURE — 99499 RISK ADDL DX/OHS AUDIT: ICD-10-PCS | Mod: S$GLB,,, | Performed by: STUDENT IN AN ORGANIZED HEALTH CARE EDUCATION/TRAINING PROGRAM

## 2019-06-25 PROCEDURE — 3078F DIAST BP <80 MM HG: CPT | Mod: CPTII,S$GLB,, | Performed by: INTERNAL MEDICINE

## 2019-06-25 PROCEDURE — 3008F PR BODY MASS INDEX (BMI) DOCUMENTED: ICD-10-PCS | Mod: CPTII,S$GLB,, | Performed by: INTERNAL MEDICINE

## 2019-06-25 PROCEDURE — 99499 UNLISTED E&M SERVICE: CPT | Mod: S$GLB,,, | Performed by: STUDENT IN AN ORGANIZED HEALTH CARE EDUCATION/TRAINING PROGRAM

## 2019-06-25 PROCEDURE — 90471 IMMUNIZATION ADMIN: CPT | Mod: S$GLB,,, | Performed by: INTERNAL MEDICINE

## 2019-06-25 PROCEDURE — 3078F PR MOST RECENT DIASTOLIC BLOOD PRESSURE < 80 MM HG: ICD-10-PCS | Mod: CPTII,S$GLB,, | Performed by: INTERNAL MEDICINE

## 2019-06-25 PROCEDURE — 90750 ZOSTER RECOMBINANT VACCINE: ICD-10-PCS | Mod: S$GLB,,, | Performed by: INTERNAL MEDICINE

## 2019-06-25 PROCEDURE — 99214 OFFICE O/P EST MOD 30 MIN: CPT | Mod: S$GLB,,, | Performed by: INTERNAL MEDICINE

## 2019-06-25 PROCEDURE — 3074F PR MOST RECENT SYSTOLIC BLOOD PRESSURE < 130 MM HG: ICD-10-PCS | Mod: CPTII,S$GLB,, | Performed by: INTERNAL MEDICINE

## 2019-06-25 PROCEDURE — 90750 HZV VACC RECOMBINANT IM: CPT | Mod: S$GLB,,, | Performed by: INTERNAL MEDICINE

## 2019-06-25 PROCEDURE — 99999 PR PBB SHADOW E&M-EST. PATIENT-LVL IV: ICD-10-PCS | Mod: PBBFAC,,, | Performed by: INTERNAL MEDICINE

## 2019-06-25 PROCEDURE — 99214 PR OFFICE/OUTPT VISIT, EST, LEVL IV, 30-39 MIN: ICD-10-PCS | Mod: S$GLB,,, | Performed by: INTERNAL MEDICINE

## 2019-06-25 PROCEDURE — 3008F BODY MASS INDEX DOCD: CPT | Mod: CPTII,S$GLB,, | Performed by: INTERNAL MEDICINE

## 2019-06-25 PROCEDURE — 90471 ZOSTER RECOMBINANT VACCINE: ICD-10-PCS | Mod: S$GLB,,, | Performed by: INTERNAL MEDICINE

## 2019-06-25 PROCEDURE — 99999 PR PBB SHADOW E&M-EST. PATIENT-LVL IV: CPT | Mod: PBBFAC,,, | Performed by: INTERNAL MEDICINE

## 2019-06-25 RX ORDER — FAMOTIDINE 20 MG/1
20 TABLET, FILM COATED ORAL NIGHTLY
Qty: 30 TABLET | Refills: 11 | Status: SHIPPED | OUTPATIENT
Start: 2019-06-25 | End: 2020-07-20

## 2019-06-25 RX ORDER — AMLODIPINE BESYLATE 2.5 MG/1
2.5 TABLET ORAL DAILY
Qty: 90 TABLET | Refills: 3 | Status: SHIPPED | OUTPATIENT
Start: 2019-06-25 | End: 2019-10-01 | Stop reason: SDUPTHER

## 2019-06-25 RX ORDER — MYCOPHENOLATE MOFETIL 500 MG/1
1000 TABLET ORAL 2 TIMES DAILY
Qty: 360 TABLET | Refills: 0 | Status: SHIPPED | OUTPATIENT
Start: 2019-06-25 | End: 2019-09-09 | Stop reason: SDUPTHER

## 2019-06-25 ASSESSMENT — ROUTINE ASSESSMENT OF PATIENT INDEX DATA (RAPID3)
PATIENT GLOBAL ASSESSMENT SCORE: 7.5
TOTAL RAPID3 SCORE: 5.5
MDHAQ FUNCTION SCORE: .9
PSYCHOLOGICAL DISTRESS SCORE: 3.3
PAIN SCORE: 6
FATIGUE SCORE: 5
AM STIFFNESS SCORE: 0, NO

## 2019-06-25 NOTE — PROGRESS NOTES
I have personally taken the history and examined the patient and agree with the resident,s note as stated above       LcSSc: EUGENE+ 1:320 nucleolar; ACR/EULAR =8 mRSS=     Normal PFTs 3/26/19  Nl TTE 3/26/19 except mild AR  Secondary Sjogren's SS-A+  OA hands  Hand eczema  R/o septic arthritis right shoulder 2015  Rotator cuff tear and adhesive capsulitis left shoulder  Cholangiocarcinoma, s/p liver transplant    *Shingrix x 2  Cont mycophenolate 1000mg twice daily for lcSSc and liver transplant  Cont tacrolimus 2mg twice daily for liver transplant  Cont amlodipine 2.5mg daily for Raynaud's  Cont sildenafil 20mg three times daily for Raynaud's  F/u Dr. Zuñiga for hand eczema

## 2019-06-25 NOTE — PATIENT INSTRUCTIONS
Continue current medications, no changes made today  Shingrix vaccine today (first dose, will get second dose at next clinic visit if available)  Continue PT for shoulder  Continue follow up with dermatology  Will check vitamin D and cholesterol with next lab work    Return to clinic in 3 months

## 2019-06-25 NOTE — PROGRESS NOTES
"Subjective:       Patient ID: Nadeen Mcgovern is a 58 y.o. female.    Chief Complaint: No chief complaint on file.    Ms. Mcgovern is a 58 yoF with lcSSc, secondary Sjogren's, Raynaud's, OA bilateral hands presenting to rheumatology clinic for 3 month follow up. She was last seen in clinic 03/26/19. In the interim, she has seen  Dermatology for eczematous hand rash; rec Elta MD so silky hand creme with Dirpolene. Also saw Dr. Vargas, Ortho, for shoulder pain; received shoulder injection 04/11/19, PT ordered for shoulders.  She is on Cellcept 100 mg bid for lcSSc, amlodipine 2.5 mg daily and Sildenafil 20 mg tid for Raynaud's. Today reports doing well.  Reports pain in shoulder is improving with PT, is planning on restarting PT tomorrow.  Rash on hands has improved with creme, still plans on following up with dermatology.  No digital ulcers reported. She denies any issues with breathing; no SOB, cough, dyspnea.  She is adherent to her medication regiment.  She has no concerns or complaints this morning.    Review of Systems   Constitutional: Positive for unexpected weight change.   HENT: Negative for mouth sores and rhinorrhea.    Eyes: Positive for redness.   Respiratory: Negative for cough, shortness of breath and wheezing.    Cardiovascular: Negative for chest pain and palpitations.   Gastrointestinal: Negative for constipation and diarrhea.   Genitourinary: Negative for genital sores and hematuria.   Musculoskeletal: Negative for arthralgias and joint swelling.   Skin: Positive for rash.   Neurological: Negative for weakness.   Hematological: Bruises/bleeds easily.         Objective:   /66   Pulse 74   Ht 5' 3.6" (1.615 m)   Wt 67 kg (147 lb 11.2 oz)   LMP  (LMP Unknown)   BMI 25.67 kg/m²      Physical Exam   Constitutional: She is oriented to person, place, and time and well-developed, well-nourished, and in no distress. No distress.   HENT:   Head: Normocephalic and atraumatic.   Right Ear: External " ear normal.   Left Ear: External ear normal.   Eyes: EOM are normal. Pupils are equal, round, and reactive to light. Right eye exhibits no discharge. Left eye exhibits no discharge.   Neck: Normal range of motion. No JVD present.   Cardiovascular: Normal rate, regular rhythm, normal heart sounds and intact distal pulses.    No murmur heard.  Pulmonary/Chest: Breath sounds normal. No stridor. No respiratory distress. She has no wheezes.   Abdominal: Soft. She exhibits no distension. There is no tenderness.   Neurological: She is alert and oriented to person, place, and time. She displays normal reflexes. She exhibits normal muscle tone. Gait normal. GCS score is 15.   Skin: Skin is warm and dry. She is not diaphoretic.          MRSS: 12 (previously 10 on 03/22/19)   Psychiatric: Mood, memory, affect and judgment normal.   Musculoskeletal: She exhibits no edema, tenderness or deformity.         Results for SHILO HAMPTON (MRN [id number]) as of 6/25/2019 07:09   Ref. Range 6/17/2019 10:43   WBC Latest Ref Range: 3.90 - 12.70 K/uL 5.26   RBC Latest Ref Range: 4.00 - 5.40 M/uL 4.16   Hemoglobin Latest Ref Range: 12.0 - 16.0 g/dL 13.0   Hematocrit Latest Ref Range: 37.0 - 48.5 % 39.6   MCV Latest Ref Range: 82 - 98 fL 95   MCH Latest Ref Range: 27.0 - 31.0 pg 31.3 (H)   MCHC Latest Ref Range: 32.0 - 36.0 g/dL 32.8   RDW Latest Ref Range: 11.5 - 14.5 % 13.2   Platelets Latest Ref Range: 150 - 350 K/uL 184   MPV Latest Ref Range: 9.2 - 12.9 fL 10.5   Gran% Latest Ref Range: 38.0 - 73.0 % 56.3   Gran # (ANC) Latest Ref Range: 1.8 - 7.7 K/uL 3.0   Lymph% Latest Ref Range: 18.0 - 48.0 % 31.9   Lymph # Latest Ref Range: 1.0 - 4.8 K/uL 1.7   Mono% Latest Ref Range: 4.0 - 15.0 % 7.8   Mono # Latest Ref Range: 0.3 - 1.0 K/uL 0.4   Eosinophil% Latest Ref Range: 0.0 - 8.0 % 3.4   Eos # Latest Ref Range: 0.0 - 0.5 K/uL 0.2   Basophil% Latest Ref Range: 0.0 - 1.9 % 0.4   Baso # Latest Ref Range: 0.00 - 0.20 K/uL 0.02   Differential Method  Unknown Automated   Sed Rate Latest Ref Range: 0 - 36 mm/Hr 25   Sodium Latest Ref Range: 136 - 145 mmol/L 142   Potassium Latest Ref Range: 3.5 - 5.1 mmol/L 3.7   Chloride Latest Ref Range: 95 - 110 mmol/L 109   CO2 Latest Ref Range: 23 - 29 mmol/L 23   Anion Gap Latest Ref Range: 8 - 16 mmol/L 10   BUN, Bld Latest Ref Range: 6 - 20 mg/dL 11   Creatinine Latest Ref Range: 0.5 - 1.4 mg/dL 0.7   eGFR if non African American Latest Ref Range: >60 mL/min/1.73 m^2 >60   eGFR if  Latest Ref Range: >60 mL/min/1.73 m^2 >60   Glucose Latest Ref Range: 70 - 110 mg/dL 104   Calcium Latest Ref Range: 8.7 - 10.5 mg/dL 9.3   Alkaline Phosphatase Latest Ref Range: 55 - 135 U/L 69   PROTEIN TOTAL Latest Ref Range: 6.0 - 8.4 g/dL 7.2   Albumin Latest Ref Range: 3.5 - 5.2 g/dL 4.2   BILIRUBIN TOTAL Latest Ref Range: 0.1 - 1.0 mg/dL 1.0   AST Latest Ref Range: 10 - 40 U/L 17   ALT Latest Ref Range: 10 - 44 U/L 20   CRP Latest Ref Range: 0.0 - 8.2 mg/L 2.0     Results for SHILO HAMPTON (MRN [id number]) as of 6/25/2019 07:09   Ref. Range 4/25/2019 07:15   Triglycerides Latest Ref Range: 30 - 150 mg/dL 61   Cholesterol Latest Ref Range: 120 - 199 mg/dL 193   HDL Latest Ref Range: 40 - 75 mg/dL 52   Hdl/Cholesterol Ratio Latest Ref Range: 20.0 - 50.0 % 26.9   LDL Cholesterol External Latest Ref Range: 63.0 - 159.0 mg/dL 128.8   Non-HDL Cholesterol Latest Units: mg/dL 141   Total Cholesterol/HDL Ratio Latest Ref Range: 2.0 - 5.0  3.7     EXAMINATION:  DEXA BONE DENSITY SPINE HIP    CLINICAL HISTORY:  suspected osteoporosis; Asymptomatic menopausal state58 y/o female with a history of fractured right forearm at 24 y/o.  She had menopausal symptoms at 46 y/o.  She received one injections of steroids this year.  She had a Liver Transplant at 55 y/o.  She exercises daily and does not smoke.    TECHNIQUE:  DXA specification: [facility] HoloZOOM TV Horizon [id number].    Bone Mineral Density scanning was performed over the hip and  lumbar spine. Review of the images confirms satisfactory positioning and technique.    COMPARISON:  Comparison study done on 01/15/2016. Lumbar spine BMD 1.296 g/cm2 and the T-score 2.3.  The Total Hip BMD 0.941  G/cm2 and the T-score 0.0.    FINDINGS:  Lumbar Spine: Lumbar bone mineral density L1-L4 is 1.319g/cm2, which is a T-score of 2.5. The Z-score is 3.0.    Total Hip: Total hip bone mineral density is 0.906g/cm2.  The T-score is -0.3, and the Z-score is -0.1.    Femoral neck: Bone mineral density is 0.832g/cm2 and the T-score is -0.2 and the Z-score is 0.1 g/cm2.    There is a 2.7% risk of a major osteoporotic fracture and a 0.1% risk of hip fracture in the next 10 years (FRAX).    Using the TBS adjusted FRAX there is a 3.1% risk of a major osteoporotic fracture and a 0.1% risk of hip fracture in next 10 years.    Compared with previous DXA, BMD at the lumbar spine has  increased 1.8%, and the BMD at the total hip has decreased 3.8%.      Impression     NORMAL BMD OF THE HIP AND LUMBAR SPINE WITH A SIGNIFICANT DECREASE OF 3.8% IN THE HIP AND A SIGNIFICANT INCREASE OF 1.8% THE LUMBAR BMD RESPECTIVELY COMPARED TO THE PRIOR STUDY.  THE TBS T-SCORE L1-L4 IS -1.  RECOMMENDATIONS of [facility]:    1.  Calcium 1200 mg daily and vitamin D 800 units daily, adequate exercise.    2.  Repeat BMD in 2 years     EXAMINATION:  XR SHOULDER COMPLETE 2 OR MORE VIEWS RIGHT    CLINICAL HISTORY:  Pain, unspecified    TECHNIQUE:  Two or three views of the right shoulder were performed.    COMPARISON:  01/06/2015    FINDINGS:  Bones are well mineralized.  The glenohumeral joint and AC joint appear intact.  No fracture or dislocation is seen.  Mild degenerative changes with some spurring at the acromion and proximal humerus.  No soft tissue abnormality appreciated.      Impression       Mild DJD, unchanged      Electronically signed by: Marc Kenney MD         PFTs          [facility]     SVC    RV    DLco  3/26/19     117    119     121  101  3/20/18     120    121     62    90  9/25/17     122     122    30    89  4/7/17       123     124    49    91  8/18/16     107     107    110   88    TTE 03/26/2019  · Normal left ventricular systolic function. The estimated ejection fraction is 60%  · Normal right ventricular systolic function.  · Normal LV diastolic function.  · Mild aortic regurgitation.  · The estimated PA systolic pressure is 24 mm Hg  · Normal central venous pressure (3 mm Hg).  Assessment:       1. Scleroderma    2. On mycophenolate mofetil therapy    3. Raynaud's disease without gangrene        MRSS: 12 (previosuly 10 on 03/26/19)    Plan:       Continue Cellcept 1000 mg bid  Continue Amlodipine 2.5 mg daily; BP low-normal today 105/66  Continue Sildenafil 20 mg tid  Continue PT and follow up with Dr. Vargas  Continue hand creme and follow up with Dermatology  Shingrix x 1 today; can get second dose at next clinic visit  UTD on PFT and TTE.   DXA wnl, repeat 2 years. Vitamin D3 800 U daily  Return to clinic in 3 months with standing labs + vitamin D and Lipid panel

## 2019-06-28 ENCOUNTER — TELEPHONE (OUTPATIENT)
Dept: TRANSPLANT | Facility: CLINIC | Age: 59
End: 2019-06-28

## 2019-06-28 ENCOUNTER — OFFICE VISIT (OUTPATIENT)
Dept: URGENT CARE | Facility: CLINIC | Age: 59
End: 2019-06-28
Payer: MEDICARE

## 2019-06-28 VITALS
SYSTOLIC BLOOD PRESSURE: 114 MMHG | OXYGEN SATURATION: 100 % | BODY MASS INDEX: 26.05 KG/M2 | RESPIRATION RATE: 18 BRPM | DIASTOLIC BLOOD PRESSURE: 78 MMHG | WEIGHT: 147 LBS | HEIGHT: 63 IN | HEART RATE: 61 BPM | TEMPERATURE: 98 F

## 2019-06-28 DIAGNOSIS — M26.609 TMJ (TEMPOROMANDIBULAR JOINT SYNDROME): Primary | ICD-10-CM

## 2019-06-28 PROCEDURE — 3074F SYST BP LT 130 MM HG: CPT | Mod: CPTII,S$GLB,, | Performed by: PHYSICIAN ASSISTANT

## 2019-06-28 PROCEDURE — 70100 X-RAY EXAM OF JAW <4VIEWS: CPT | Mod: S$GLB,,, | Performed by: RADIOLOGY

## 2019-06-28 PROCEDURE — 70100 XR MANDIBLE LESS THAN 4 VIEWS: ICD-10-PCS | Mod: S$GLB,,, | Performed by: RADIOLOGY

## 2019-06-28 PROCEDURE — 3074F PR MOST RECENT SYSTOLIC BLOOD PRESSURE < 130 MM HG: ICD-10-PCS | Mod: CPTII,S$GLB,, | Performed by: PHYSICIAN ASSISTANT

## 2019-06-28 PROCEDURE — 99499 UNLISTED E&M SERVICE: CPT | Mod: S$GLB,,, | Performed by: PHYSICIAN ASSISTANT

## 2019-06-28 PROCEDURE — 3008F PR BODY MASS INDEX (BMI) DOCUMENTED: ICD-10-PCS | Mod: CPTII,S$GLB,, | Performed by: PHYSICIAN ASSISTANT

## 2019-06-28 PROCEDURE — 3008F BODY MASS INDEX DOCD: CPT | Mod: CPTII,S$GLB,, | Performed by: PHYSICIAN ASSISTANT

## 2019-06-28 PROCEDURE — 99499 RISK ADDL DX/OHS AUDIT: ICD-10-PCS | Mod: S$GLB,,, | Performed by: PHYSICIAN ASSISTANT

## 2019-06-28 PROCEDURE — 99214 OFFICE O/P EST MOD 30 MIN: CPT | Mod: S$GLB,,, | Performed by: PHYSICIAN ASSISTANT

## 2019-06-28 PROCEDURE — 99214 PR OFFICE/OUTPT VISIT, EST, LEVL IV, 30-39 MIN: ICD-10-PCS | Mod: S$GLB,,, | Performed by: PHYSICIAN ASSISTANT

## 2019-06-28 PROCEDURE — 3078F PR MOST RECENT DIASTOLIC BLOOD PRESSURE < 80 MM HG: ICD-10-PCS | Mod: CPTII,S$GLB,, | Performed by: PHYSICIAN ASSISTANT

## 2019-06-28 PROCEDURE — 3078F DIAST BP <80 MM HG: CPT | Mod: CPTII,S$GLB,, | Performed by: PHYSICIAN ASSISTANT

## 2019-06-28 RX ORDER — TRAMADOL HYDROCHLORIDE 50 MG/1
50 TABLET ORAL EVERY 6 HOURS
Qty: 12 TABLET | Refills: 0 | Status: SHIPPED | OUTPATIENT
Start: 2019-06-28 | End: 2020-01-17

## 2019-06-28 RX ORDER — PREDNISONE 20 MG/1
40 TABLET ORAL DAILY
Qty: 6 TABLET | Refills: 0 | Status: SHIPPED | OUTPATIENT
Start: 2019-06-28 | End: 2019-07-01

## 2019-06-28 NOTE — TELEPHONE ENCOUNTER
----- Message from Tona Mackay MA sent at 6/28/2019  1:25 PM CDT -----  Contact: self      ----- Message -----  From: Sue Prince  Sent: 6/28/2019  12:07 PM  To: [facility] Post-Liver Transplant Non-Clinical    Needs Advice    Reason for call: Pt ask for a call in regards to the patient was sent to urgent care by her pcp X-rays was taken and was prescribe predinsone and a seizure medication         Communication Preference: Phone     Additional Information: n/a

## 2019-06-28 NOTE — TELEPHONE ENCOUNTER
Spoke with pt. She was barley able to speak. States she woke up with severe jaw pain that radiates from her ear to under her neck. She states she called PCP but they were not able to give her an apt. Advised her that she needs to be seen today. She states she will go to Urgent Care now. Advised her to call back if she is prescribed any medications for our pharmD to review. She agreed with plan.

## 2019-06-28 NOTE — PROGRESS NOTES
"Subjective:       Patient ID: Nadeen Mcgovern is a 58 y.o. female.    Vitals:  height is 5' 3" (1.6 m) and weight is 66.7 kg (147 lb). Her temperature is 98.4 °F (36.9 °C). Her blood pressure is 114/78 and her pulse is 61. Her respiration is 18 and oxygen saturation is 100%.     Chief Complaint: Facial Pain (left side )    Patient presents with left sided facial pain that began last night. She says last night she felt and heard her jaw popping/clicking on the left side in the area that her pain is, and it began to hurt when eating or gradual onset. She says the pain gradually worsened, and when she woke up this morning she had worsening of pain. She says the pain in front of her ear is causing her to have a headache on the left side. No other neuro symptoms. She described the pain as an ache, constant, that is exacerbated with movement of the jaw/chewing/taking.  The pain starts in front of her left ear and radiates down her mandible on the left side. She denies any sharp stabbing, intermittent, or nerve-type pain. Denies any recent oral procedures or lesions. Denies any injury or trauma. No uri symptoms.       Facial Pain   This is a new problem. The current episode started yesterday. The problem occurs constantly. The problem has been unchanged. Associated symptoms include headaches. Pertinent negatives include no arthralgias, chest pain, chills, congestion, coughing, diaphoresis, fatigue, fever, joint swelling, myalgias, nausea, numbness, rash, sore throat, vertigo, vomiting or weakness. Nothing aggravates the symptoms. She has tried nothing for the symptoms.       Constitution: Negative for chills, sweating, fatigue and fever.   HENT: Negative for congestion and sore throat.         Facial pain      Neck: Negative for painful lymph nodes.   Cardiovascular: Negative for chest pain and leg swelling.   Eyes: Negative for double vision and blurred vision.   Respiratory: Negative for cough and shortness of breath.  "   Gastrointestinal: Negative for nausea, vomiting and diarrhea.   Genitourinary: Negative for dysuria, frequency, urgency and history of kidney stones.   Musculoskeletal: Negative for joint pain, joint swelling, muscle cramps and muscle ache.   Skin: Negative for color change, pale, rash and bruising.   Allergic/Immunologic: Negative for seasonal allergies.   Neurological: Positive for headaches. Negative for dizziness, history of vertigo, light-headedness, passing out, facial drooping, speech difficulty, coordination disturbances, loss of balance, history of migraines, disorientation, altered mental status, loss of consciousness, numbness, tingling, seizures and tremors.   Hematologic/Lymphatic: Negative for swollen lymph nodes.   Psychiatric/Behavioral: Negative for altered mental status, disorientation, nervous/anxious, sleep disturbance and depression. The patient is not nervous/anxious.        Objective:      Physical Exam   Constitutional: She is oriented to person, place, and time. She appears well-developed and well-nourished. She is cooperative.  Non-toxic appearance. She does not have a sickly appearance. She does not appear ill. No distress.   Patient is in obvious pain   HENT:   Head: Normocephalic and atraumatic.       Right Ear: Hearing, tympanic membrane, external ear and ear canal normal.   Left Ear: Hearing, tympanic membrane, external ear and ear canal normal.   Nose: Nose normal. No mucosal edema, rhinorrhea or nasal deformity. No epistaxis. Right sinus exhibits no maxillary sinus tenderness and no frontal sinus tenderness. Left sinus exhibits no maxillary sinus tenderness and no frontal sinus tenderness.   Mouth/Throat: Uvula is midline, oropharynx is clear and moist and mucous membranes are normal. She has dentures. No oral lesions. No trismus in the jaw. Normal dentition. No dental abscesses, uvula swelling, lacerations or dental caries. No oropharyngeal exudate, posterior oropharyngeal edema,  posterior oropharyngeal erythema or tonsillar abscesses.   Top and bottom Dentures; removed, no evidence of infection, swelling, lesion, or other abnormality. No abscess or gingival tenderness. No evidence of AOM, no left ear abnormality or mastoid tenderness.       Eyes: Pupils are equal, round, and reactive to light. Conjunctivae, EOM and lids are normal. No scleral icterus.   Sclera clear bilat   Neck: Trachea normal, normal range of motion, full passive range of motion without pain and phonation normal. Neck supple. No neck rigidity.   Cardiovascular: Normal rate, regular rhythm, normal heart sounds, intact distal pulses and normal pulses.   Pulmonary/Chest: Effort normal and breath sounds normal. No respiratory distress.   Abdominal: Soft. Normal appearance and bowel sounds are normal. She exhibits no distension. There is no tenderness.   Musculoskeletal: Normal range of motion. She exhibits no edema or deformity.   Neurological: She is alert and oriented to person, place, and time. She is not disoriented. She displays no atrophy and no tremor. No cranial nerve deficit or sensory deficit. She exhibits normal muscle tone. She displays no seizure activity. Coordination and gait normal. GCS eye subscore is 4. GCS verbal subscore is 5. GCS motor subscore is 6.   Skin: Skin is warm, dry and intact. She is not diaphoretic. No pallor.   Psychiatric: She has a normal mood and affect. Her speech is normal and behavior is normal. Judgment and thought content normal. Cognition and memory are normal.   Nursing note and vitals reviewed.      Patient is requesting X ray.     X-ray Mandible Less Than 4 Views    Result Date: 6/28/2019  EXAMINATION: XR MANDIBLE LESS THAN 4 VIEWS CLINICAL HISTORY: Unspecified temporomandibular joint disorder, unspecified side TECHNIQUE: Mandible Jorgito BATISTA's, bilateral lateral views COMPARISON: None FINDINGS: The mandible appears intact without fracture or dislocation.  The right  temporomandibular joint shows forward subluxation of the condyle at the glenoid fossa consistent with open mouth position.  No significant arthritis is identified at the TM joints.  Teeth have been removed.  Visualized paranasal sinuses are clear.     No acute abnormality or significant arthritis.  Teeth have been removed. Electronically signed by: Hemanth Serna MD Date:    06/28/2019 Time:    12:49    Symptoms consistent with TMJ. I have also considered trigeminal neuralgia in my differential dx. The nature of the pain is not consistent with trigeminal neuralgia, however, and I discussed this with the patient.     Treatment options are limited because of hx of liver transplant, will avoid NSAIDs. Given patient's pain level, I consider prednisone as option to treat inflammation, however, with patient immunocompromised this is not an ideal treatment option either. I discussed this with the patient, and will rx prednisone, but instructed her to call her liver transplant specialist before taking any new medication to ensure that they are okay with this. Patient says she will call her before taking the prednisone. I instructed her to follow up with PCP Monday and to go to the ER if she develops any new or worsening symptoms.     Assessment:       1. TMJ (temporomandibular joint syndrome)        Plan:         TMJ (temporomandibular joint syndrome)  -     predniSONE (DELTASONE) 20 MG tablet; Take 2 tablets (40 mg total) by mouth once daily. for 3 days  Dispense: 6 tablet; Refill: 0  -     traMADol (ULTRAM) 50 mg tablet; Take 1 tablet (50 mg total) by mouth every 6 (six) hours.  Dispense: 12 tablet; Refill: 0  -     X-Ray Mandible Less Than 4 Views; Future; Expected date: 06/28/2019      Patient Instructions   - Rest.    - Drink plenty of fluids.      - You received a steroid (prednisone) today.  This can elevate your blood pressure, elevate your blood sugar, water weight gain, nervous energy, redness to the face and  dimpling of the skin where the shot goes in.   - Do not use steroids more than 3 times per year.   - If you have diabetes, please check you blood sugar frequently.  - If you have high blood pressure, please check your blood pressure frequently.     - Tramadol is a narcotic pain medication. Use only as needed for severe pain. Do not take with ambien  - Please be aware as we discussed that narcotics can be addictive.   - I have given you a limited quantity to take as it is needed at this time. However take it sparingly and only when needed.    - Do not operate machinery or drive on this medication.      - Ice for 15-20 minutes at a time for the next 24-48 hours.      - Follow up with your PCP or specialty clinic as directed in the next 2-3 days if not improved or as needed.  You can call [phone] to schedule an appointment with the appropriate provider.    - Go to the ER or seek medical attention immediately if you develop new or worsening symptoms.      - You must understand that you have received an Urgent Care treatment only and that you may be released before all of your medical problems are known or treated.   - You, the patient, will arrange for follow up care as instructed.   - If your condition worsens or fails to improve we recommend that you receive another evaluation at the ER immediately or contact your PCP to discuss your concerns or return here.       TMJ Syndrome  The temporomandibular joint (TMJ) is the joint that connects your lower jaw to your head. You can feel it in front of your ears when you open and close your mouth. TMJ disorders involve chronic or recurrent pain in the joint. When treated, symptoms of TMJ disorders usually go away within a few months.  Causes  There is no widely agreed-on cause of TMJ disorders. They have been linked to injury, arthritis, chronic fatigue syndrome, and fibromyalgia. A definite connection has not been shown, though.  Symptoms  · Pain in the face, jaw, or  neck  · Pain with jaw movement or chewing  · Locking or catching sensation of the jaw  · Clicking, popping, or grinding sounds with movement of the TMJ  · Headache  · Ear pain  Home care  Modest, nonsurgical treatments are a good first step toward relieving symptoms. Try the approaches described below.  · Rest the jaw by avoiding crunchy or hard-to-chew foods. Do not eat hard or sticky candies. Soft foods and liquids are easier on the jaw.  · Protect your jaw while yawning. If you need to yawn, put your fist under your chin to prevent your mouth from opening up too wide.  · To help relieve pain, try applying hot or cold packs to the painful area. Try both hot and cold to find out which works best for you. If you use hot packs (small towels soaked in hot water), be careful not to burn yourself.  · You may take acetaminophen or ibuprofen for pain, unless you were given a different pain medicine. (Note: If you have chronic liver or kidney disease or have ever had a stomach ulcer or gastrointestinal bleeding, talk with your healthcare provider before using these medicines. Also talk to your provider if you are taking medicine to prevent blood clots.) Aspirin should never be given to anyone younger than 18 years of age who is ill with a viral infection or fever. It may cause severe liver or brain damage.  Reducing stress  If stress seems to be contributing to your symptoms, try to identify the sources of stress in your life. These arent always obvious. Common stressors include:  · Everyday hassles (which can add up), such as traffic jams, missed appointments, or car trouble  · Major life changes, both good (such as a new baby or job promotion) and bad (loss of job or loss of a loved one)  · Overload: The feeling that you have too many responsibilities and can't take care of everything at once  · Helplessness: Feeling like your problems are more than you can solve  When possible, do something about your sources of  stress. See if you can avoid hassles, limit the amount of change in your life at one time, and take breaks when you feel overloaded.  Unfortunately, many stressful situations cannot be avoided. So learning how to manage stress better is very important. Getting regular exercise, eating nutritious, balanced meals, and getting adequate rest all help to make everyday stress more manageable. Certain techniques are also helpful: relaxation and breathing exercises, visualization, biofeedback, meditation, or simply taking some time out to clear your mind. For more information, talk with your healthcare provider.  Follow-up care  Follow up with your healthcare provider, or as advised. Further testing and additional treatment may be required. If changes to your lifestyle do not improve your symptoms, talk with your healthcare provider about other available therapies. These include bite guards for help with teeth grinding, stress management techniques, and more. If stress is an important factor and does not respond to the above simple measures, talk to your doctor about a referral for stress management.  · If X-rays were done, they will be reviewed by a specialist. You will be notified of the results, especially if they affect treatment.  Call 911  Call emergency services right away if any of these occur:  · Trouble breathing or swallowing, wheezing  · Confusion  · Extreme drowsiness or trouble awakening  · Fainting or loss of consciousness  · Rapid heart rate  When to seek medical advice  Call your healthcare provider right away if any of these occur:  · Your face becomes swollen or red.  · Your pain worsens.  · You have increasing neck, mouth, tooth, or throat pain.  · You develop a fever of 100.4F (38°C) or higher  Date Last Reviewed: 7/30/2015  © 7820-3588 [employer]. [address] [place] [address], [place] [address]. All rights reserved. This information is not intended as a substitute for professional medical  care. Always follow your healthcare professional's instructions.        Understanding Temporomandibular Disorders (TMD)    Do you have pain in your face, jaw, or teeth? Do you have trouble chewing? Does your jaw make clicking or popping noises? These symptoms can be caused by temporomandibular disorders (TMD). This term describes a group of problems related to the temporomandibular joint (TMJ) and nearby muscles. Your symptoms may be painful and frustrating. But dont worry. Your health care team can help you treat TMD and prevent future problems.  Whats wrong?  TMD causes many kinds of symptoms. Thats part of the reason it can be hard to diagnose. You may have headaches, tooth pain, or muscle aches. Your pain may be constant. Or it may come and go without any apparent reason. TMD-related problems include:  · Tight muscles  · Joint inflammation  · Joint damage  · Teeth grinding or clenching  What can you do?  If you are having TMD symptoms, dont wait. Call your dentist or health care provider right away. You dont have to live with pain or discomfort. TMD can be treated. In fact, a key part of treatment is learning to manage your condition at home.  Which treatment is right for you?  Treatment helps rest the muscles and joint. It also helps relieve symptoms and restore function. Depending on the type of problem you have, your treatment plan may include:  · Temporary diet changes.  · New habits for managing stress and maintaining the health of your jaw.  · Medicine to reduce pain and inflammation.  · Therapy to reduce pressure on the joint and restore function.  · Dental treatment to reduce pressure on the joint.  How can you avoid future problems?  Treatment can help relieve your current condition. But TMD symptoms may return over time. You can avoid future problems by maintaining the health of your jaw:  · Avoid foods and habits that make your symptoms worse.  · Lower the stress level in your life.  · Follow  your treatment plan.  · Pay attention to your body and get help if symptoms return.  Date Last Reviewed: 7/13/2015  © 8493-3379 [employer]. [address] [place] [address], [place] [address]. All rights reserved. This information is not intended as a substitute for professional medical care. Always follow your healthcare professional's instructions.        Trigeminal Neuralgia  You have trigeminal neuralgia. This is pain caused by irritation of the trigeminal nerve on your face. Symptoms include sudden, sharp pain in your head or face. It may feel like an electric shock. It can last for several seconds or minutes. It usually happens on only 1 side of your face. Pain may be triggered by things like moving your jaw or a touch on the skin of your face. The pain may be caused by something irritating the trigeminal nerve, such as a blood vessel pressing against it. But the exact cause of this problem often isnt known. Although it can be quite painful, the condition isnt dangerous.  Trigeminal neuralgia is often treated with medicines. These include anti-seizure medicines or antidepressants. Certain other treatments may also help. In some cases, you may need surgery.    Home care  Your healthcare provider may prescribe medicines to help relieve and prevent pain. Take all medicines as directed. Please note that it may take several changes in dose and medicines before the right combination is found that controls the pain.  General care:  · Plan to rest at home today.  · Avoid any specific activities that seem to trigger the pain.  · Over the next few weeks, keep a pain diary. Write down when your symptoms happen and how they feel. Certain activities such as touching your face, chewing, talking, or brushing your teeth may bring on the pain. Cold air can also trigger the pain. Make sure you write down any triggers and discuss these with your healthcare provider. This will help guide treatment.  Follow-up care  Follow  up with your healthcare provider, or as advised. If you were referred to a neurologist, be sure to make an appointment.  For more information on your condition, visit:  · [employer] www.fpa-support.org  When to seek medical advice  Call your healthcare provider right away if any of these occur:  · Fever of 100.4°F (38°C) or higher, or as advised  · Headache with very stiff neck  · You arent able to keep liquids down (repeated vomiting)  · Extreme drowsiness or confusion  · Dizziness or fainting  · A new feeling of weakness or numbness or tingling in your arm, leg, or face  · Difficulty speaking or seeing  Date Last Reviewed: 8/1/2016  © 4259-1283 [employer]. [address] [place] [address], [place] [address]. All rights reserved. This information is not intended as a substitute for professional medical care. Always follow your healthcare professional's instructions.

## 2019-06-28 NOTE — TELEPHONE ENCOUNTER
Returned call to let patient know it was ok to take Prednisone.  Advised her if her symptoms do not approve or become worse to go to ER over the weekend.

## 2019-06-28 NOTE — TELEPHONE ENCOUNTER
Called patient to see if she went to Urgent Care as the on call nurse advised.  Patient states she is at Urgent Care right now waiting to be called back.

## 2019-06-28 NOTE — PATIENT INSTRUCTIONS
- Rest.    - Drink plenty of fluids.      - You received a steroid (prednisone) today.  This can elevate your blood pressure, elevate your blood sugar, water weight gain, nervous energy, redness to the face and dimpling of the skin where the shot goes in.   - Do not use steroids more than 3 times per year.   - If you have diabetes, please check you blood sugar frequently.  - If you have high blood pressure, please check your blood pressure frequently.     - Tramadol is a narcotic pain medication. Use only as needed for severe pain. Do not take with ambien  - Please be aware as we discussed that narcotics can be addictive.   - I have given you a limited quantity to take as it is needed at this time. However take it sparingly and only when needed.    - Do not operate machinery or drive on this medication.      - Ice for 15-20 minutes at a time for the next 24-48 hours.      - Follow up with your PCP or specialty clinic as directed in the next 2-3 days if not improved or as needed.  You can call [phone] to schedule an appointment with the appropriate provider.    - Go to the ER or seek medical attention immediately if you develop new or worsening symptoms.      - You must understand that you have received an Urgent Care treatment only and that you may be released before all of your medical problems are known or treated.   - You, the patient, will arrange for follow up care as instructed.   - If your condition worsens or fails to improve we recommend that you receive another evaluation at the ER immediately or contact your PCP to discuss your concerns or return here.       TMJ Syndrome  The temporomandibular joint (TMJ) is the joint that connects your lower jaw to your head. You can feel it in front of your ears when you open and close your mouth. TMJ disorders involve chronic or recurrent pain in the joint. When treated, symptoms of TMJ disorders usually go away within a few months.  Causes  There is no widely  agreed-on cause of TMJ disorders. They have been linked to injury, arthritis, chronic fatigue syndrome, and fibromyalgia. A definite connection has not been shown, though.  Symptoms  · Pain in the face, jaw, or neck  · Pain with jaw movement or chewing  · Locking or catching sensation of the jaw  · Clicking, popping, or grinding sounds with movement of the TMJ  · Headache  · Ear pain  Home care  Modest, nonsurgical treatments are a good first step toward relieving symptoms. Try the approaches described below.  · Rest the jaw by avoiding crunchy or hard-to-chew foods. Do not eat hard or sticky candies. Soft foods and liquids are easier on the jaw.  · Protect your jaw while yawning. If you need to yawn, put your fist under your chin to prevent your mouth from opening up too wide.  · To help relieve pain, try applying hot or cold packs to the painful area. Try both hot and cold to find out which works best for you. If you use hot packs (small towels soaked in hot water), be careful not to burn yourself.  · You may take acetaminophen or ibuprofen for pain, unless you were given a different pain medicine. (Note: If you have chronic liver or kidney disease or have ever had a stomach ulcer or gastrointestinal bleeding, talk with your healthcare provider before using these medicines. Also talk to your provider if you are taking medicine to prevent blood clots.) Aspirin should never be given to anyone younger than 18 years of age who is ill with a viral infection or fever. It may cause severe liver or brain damage.  Reducing stress  If stress seems to be contributing to your symptoms, try to identify the sources of stress in your life. These arent always obvious. Common stressors include:  · Everyday hassles (which can add up), such as traffic jams, missed appointments, or car trouble  · Major life changes, both good (such as a new baby or job promotion) and bad (loss of job or loss of a loved one)  · Overload: The feeling  that you have too many responsibilities and can't take care of everything at once  · Helplessness: Feeling like your problems are more than you can solve  When possible, do something about your sources of stress. See if you can avoid hassles, limit the amount of change in your life at one time, and take breaks when you feel overloaded.  Unfortunately, many stressful situations cannot be avoided. So learning how to manage stress better is very important. Getting regular exercise, eating nutritious, balanced meals, and getting adequate rest all help to make everyday stress more manageable. Certain techniques are also helpful: relaxation and breathing exercises, visualization, biofeedback, meditation, or simply taking some time out to clear your mind. For more information, talk with your healthcare provider.  Follow-up care  Follow up with your healthcare provider, or as advised. Further testing and additional treatment may be required. If changes to your lifestyle do not improve your symptoms, talk with your healthcare provider about other available therapies. These include bite guards for help with teeth grinding, stress management techniques, and more. If stress is an important factor and does not respond to the above simple measures, talk to your doctor about a referral for stress management.  · If X-rays were done, they will be reviewed by a specialist. You will be notified of the results, especially if they affect treatment.  Call 911  Call emergency services right away if any of these occur:  · Trouble breathing or swallowing, wheezing  · Confusion  · Extreme drowsiness or trouble awakening  · Fainting or loss of consciousness  · Rapid heart rate  When to seek medical advice  Call your healthcare provider right away if any of these occur:  · Your face becomes swollen or red.  · Your pain worsens.  · You have increasing neck, mouth, tooth, or throat pain.  · You develop a fever of 100.4F (38°C) or higher  Date  Last Reviewed: 7/30/2015  © 9213-6479 [employer]. [address] [place] [address], [place] [address]. All rights reserved. This information is not intended as a substitute for professional medical care. Always follow your healthcare professional's instructions.        Understanding Temporomandibular Disorders (TMD)    Do you have pain in your face, jaw, or teeth? Do you have trouble chewing? Does your jaw make clicking or popping noises? These symptoms can be caused by temporomandibular disorders (TMD). This term describes a group of problems related to the temporomandibular joint (TMJ) and nearby muscles. Your symptoms may be painful and frustrating. But dont worry. Your health care team can help you treat TMD and prevent future problems.  Whats wrong?  TMD causes many kinds of symptoms. Thats part of the reason it can be hard to diagnose. You may have headaches, tooth pain, or muscle aches. Your pain may be constant. Or it may come and go without any apparent reason. TMD-related problems include:  · Tight muscles  · Joint inflammation  · Joint damage  · Teeth grinding or clenching  What can you do?  If you are having TMD symptoms, dont wait. Call your dentist or health care provider right away. You dont have to live with pain or discomfort. TMD can be treated. In fact, a key part of treatment is learning to manage your condition at home.  Which treatment is right for you?  Treatment helps rest the muscles and joint. It also helps relieve symptoms and restore function. Depending on the type of problem you have, your treatment plan may include:  · Temporary diet changes.  · New habits for managing stress and maintaining the health of your jaw.  · Medicine to reduce pain and inflammation.  · Therapy to reduce pressure on the joint and restore function.  · Dental treatment to reduce pressure on the joint.  How can you avoid future problems?  Treatment can help relieve your current condition. But TMD  symptoms may return over time. You can avoid future problems by maintaining the health of your jaw:  · Avoid foods and habits that make your symptoms worse.  · Lower the stress level in your life.  · Follow your treatment plan.  · Pay attention to your body and get help if symptoms return.  Date Last Reviewed: 7/13/2015  © 7217-7283 [employer]. [address] [place] [address], [place] [address]. All rights reserved. This information is not intended as a substitute for professional medical care. Always follow your healthcare professional's instructions.        Trigeminal Neuralgia  You have trigeminal neuralgia. This is pain caused by irritation of the trigeminal nerve on your face. Symptoms include sudden, sharp pain in your head or face. It may feel like an electric shock. It can last for several seconds or minutes. It usually happens on only 1 side of your face. Pain may be triggered by things like moving your jaw or a touch on the skin of your face. The pain may be caused by something irritating the trigeminal nerve, such as a blood vessel pressing against it. But the exact cause of this problem often isnt known. Although it can be quite painful, the condition isnt dangerous.  Trigeminal neuralgia is often treated with medicines. These include anti-seizure medicines or antidepressants. Certain other treatments may also help. In some cases, you may need surgery.    Home care  Your healthcare provider may prescribe medicines to help relieve and prevent pain. Take all medicines as directed. Please note that it may take several changes in dose and medicines before the right combination is found that controls the pain.  General care:  · Plan to rest at home today.  · Avoid any specific activities that seem to trigger the pain.  · Over the next few weeks, keep a pain diary. Write down when your symptoms happen and how they feel. Certain activities such as touching your face, chewing, talking, or brushing your  teeth may bring on the pain. Cold air can also trigger the pain. Make sure you write down any triggers and discuss these with your healthcare provider. This will help guide treatment.  Follow-up care  Follow up with your healthcare provider, or as advised. If you were referred to a neurologist, be sure to make an appointment.  For more information on your condition, visit:  · [employer] www.fpa-support.org  When to seek medical advice  Call your healthcare provider right away if any of these occur:  · Fever of 100.4°F (38°C) or higher, or as advised  · Headache with very stiff neck  · You arent able to keep liquids down (repeated vomiting)  · Extreme drowsiness or confusion  · Dizziness or fainting  · A new feeling of weakness or numbness or tingling in your arm, leg, or face  · Difficulty speaking or seeing  Date Last Reviewed: 8/1/2016  © 2332-9662 [employer]. [address] [place] [address], [place] [address]. All rights reserved. This information is not intended as a substitute for professional medical care. Always follow your healthcare professional's instructions.

## 2019-07-02 ENCOUNTER — OFFICE VISIT (OUTPATIENT)
Dept: INTERNAL MEDICINE | Facility: CLINIC | Age: 59
End: 2019-07-02
Payer: MEDICARE

## 2019-07-02 VITALS
TEMPERATURE: 98 F | SYSTOLIC BLOOD PRESSURE: 116 MMHG | HEIGHT: 62 IN | HEART RATE: 60 BPM | DIASTOLIC BLOOD PRESSURE: 70 MMHG | WEIGHT: 142.88 LBS | BODY MASS INDEX: 26.29 KG/M2

## 2019-07-02 DIAGNOSIS — R68.84 JAW PAIN, NON-TMJ: Primary | ICD-10-CM

## 2019-07-02 DIAGNOSIS — R35.0 URINARY FREQUENCY: ICD-10-CM

## 2019-07-02 PROCEDURE — 99499 RISK ADDL DX/OHS AUDIT: ICD-10-PCS | Mod: S$GLB,,, | Performed by: INTERNAL MEDICINE

## 2019-07-02 PROCEDURE — 99999 PR PBB SHADOW E&M-EST. PATIENT-LVL IV: CPT | Mod: PBBFAC,,, | Performed by: INTERNAL MEDICINE

## 2019-07-02 PROCEDURE — 99213 PR OFFICE/OUTPT VISIT, EST, LEVL III, 20-29 MIN: ICD-10-PCS | Mod: S$GLB,,, | Performed by: INTERNAL MEDICINE

## 2019-07-02 PROCEDURE — 3078F PR MOST RECENT DIASTOLIC BLOOD PRESSURE < 80 MM HG: ICD-10-PCS | Mod: CPTII,S$GLB,, | Performed by: INTERNAL MEDICINE

## 2019-07-02 PROCEDURE — 3074F SYST BP LT 130 MM HG: CPT | Mod: CPTII,S$GLB,, | Performed by: INTERNAL MEDICINE

## 2019-07-02 PROCEDURE — 3074F PR MOST RECENT SYSTOLIC BLOOD PRESSURE < 130 MM HG: ICD-10-PCS | Mod: CPTII,S$GLB,, | Performed by: INTERNAL MEDICINE

## 2019-07-02 PROCEDURE — 99499 UNLISTED E&M SERVICE: CPT | Mod: S$GLB,,, | Performed by: INTERNAL MEDICINE

## 2019-07-02 PROCEDURE — 3008F PR BODY MASS INDEX (BMI) DOCUMENTED: ICD-10-PCS | Mod: CPTII,S$GLB,, | Performed by: INTERNAL MEDICINE

## 2019-07-02 PROCEDURE — 99999 PR PBB SHADOW E&M-EST. PATIENT-LVL IV: ICD-10-PCS | Mod: PBBFAC,,, | Performed by: INTERNAL MEDICINE

## 2019-07-02 PROCEDURE — 3078F DIAST BP <80 MM HG: CPT | Mod: CPTII,S$GLB,, | Performed by: INTERNAL MEDICINE

## 2019-07-02 PROCEDURE — 99213 OFFICE O/P EST LOW 20 MIN: CPT | Mod: S$GLB,,, | Performed by: INTERNAL MEDICINE

## 2019-07-02 PROCEDURE — 3008F BODY MASS INDEX DOCD: CPT | Mod: CPTII,S$GLB,, | Performed by: INTERNAL MEDICINE

## 2019-07-02 RX ORDER — HYDROCODONE BITARTRATE AND ACETAMINOPHEN 5; 325 MG/1; MG/1
1 TABLET ORAL EVERY 8 HOURS PRN
Qty: 15 TABLET | Refills: 0 | Status: SHIPPED | OUTPATIENT
Start: 2019-07-02 | End: 2019-10-14

## 2019-07-02 NOTE — PROGRESS NOTES
"Subjective:       Patient ID: Nadeen Mcgovern is a 58 y.o. female.    Chief Complaint: Headache    HPI urgent, HA since last Thursday.   [place] her L jaw pop on Wednesday night. Woke up on Thursday morning w/ constant, throbbing HA in the L side of face and had some swelling. No radiation. Pain was 8-9 of 10 and brought her to tears. Reports couldn't eat or open her mouth due to pain. Called liver transplant nurse and sent to see . Saw LESTER Anne. Dx w/ TMJ and started on prednisone 40mg daily x 3 days and tramadol 50mg q 6 hours prn.   XR of mandibles WNL.     Pain is not improved on prednisone.   Pain is still throbbing. Tries to lay down and it makes it worse. Has been sitting up in bed to sleep and holding ice pack to the L side of the face. Reports couldn't sleep at all last night due to the pain.   Not taking anything over the counter.     No congestion/rhinorrhea. Maybe some pressure in the front of the head. No ear pain. No fevers. Chills. Since she's had the HA, occasionally w/ some dizziness (blurry vision for a moment) and she'll sit. It would just last for a moment and then goes away. No eye pain.     Urinary frequency x 2 weeks. Some urge incontinence.   No back pain.     Review of Systems  as above in hpi.     Objective:      Physical Exam    /70 (BP Location: Left arm, Patient Position: Sitting, BP Method: Large (Manual))   Pulse 60   Temp 97.9 °F (36.6 °C)   Ht 5' 2" (1.575 m)   Wt 64.8 kg (142 lb 13.7 oz)   LMP  (LMP Unknown)   BMI 26.13 kg/m²      Gen - A+OX4, NAD  MSK - no CVA tenderness to palpation.   Abd - S/NT/ND/+BS  HEENT - PERRL, OP clear. Unable to open mouth completely. Dentures in place. When bottom dentures were taken out, can see white plaque area of possibly early ulcer formation at the corner of the mandible that's very tender to palpation. No fluctuance. Other areas of the maxilla and mandible were not tender to palpation. No pain on palpation of the temporal area. "             Assessment/Plan     Nadeen was seen today for headache.    Diagnoses and all orders for this visit:    Jaw pain, non-TMJ - Possibly early ulcer? Avoid abrasion and dentures until area heals.   -     HYDROcodone-acetaminophen (NORCO) 5-325 mg per tablet; Take 1 tablet by mouth every 8 (eight) hours as needed for Pain.  -     diphenhydrAMINE-aluminum-magnesium hydroxide-simethicone-lidocaine HCl 2%; Swish and spit 15 mLs every 4 (four) hours as needed.  -     Ambulatory Referral to ENT    Urinary frequency  -     Urinalysis; Future  -     Urinalysis Microscopic; Future  -     Urine culture; Future      Follow up if symptoms worsen or fail to improve.      Nadeen Figueroa MD  Department of Internal Medicine - [facility]  11:46 AM

## 2019-07-21 DIAGNOSIS — Z94.4 LIVER REPLACED BY TRANSPLANT: ICD-10-CM

## 2019-07-22 NOTE — TELEPHONE ENCOUNTER
----- Message from She Velasquez sent at 7/22/2019 10:47 AM CDT -----  Contact: Michael/Angi @[facility]  Please call Michael at [phone]    Need a clarification on PROGRAF 1 mg Cap    Patient is at the pharmacy waiting    Thank you

## 2019-07-24 RX ORDER — TACROLIMUS 1 MG/1
CAPSULE ORAL
Qty: 120 CAPSULE | Refills: 0 | Status: SHIPPED | OUTPATIENT
Start: 2019-07-24 | End: 2020-01-20

## 2019-07-27 DIAGNOSIS — I73.00 RAYNAUD'S DISEASE WITHOUT GANGRENE: ICD-10-CM

## 2019-07-27 RX ORDER — AMLODIPINE BESYLATE 5 MG/1
TABLET ORAL
Qty: 90 TABLET | Refills: 3 | Status: SHIPPED | OUTPATIENT
Start: 2019-07-27 | End: 2019-09-23 | Stop reason: SDUPTHER

## 2019-09-09 ENCOUNTER — LAB VISIT (OUTPATIENT)
Dept: LAB | Facility: HOSPITAL | Age: 59
End: 2019-09-09
Attending: INTERNAL MEDICINE
Payer: MEDICARE

## 2019-09-09 DIAGNOSIS — M34.9 SCLERODERMA: ICD-10-CM

## 2019-09-09 DIAGNOSIS — Z94.4 LIVER REPLACED BY TRANSPLANT: ICD-10-CM

## 2019-09-09 DIAGNOSIS — Z79.899 ON MYCOPHENOLATE MOFETIL THERAPY: ICD-10-CM

## 2019-09-09 LAB
ALBUMIN SERPL BCP-MCNC: 4.3 G/DL (ref 3.5–5.2)
ALP SERPL-CCNC: 73 U/L (ref 55–135)
ALT SERPL W/O P-5'-P-CCNC: 22 U/L (ref 10–44)
ANION GAP SERPL CALC-SCNC: 11 MMOL/L (ref 8–16)
AST SERPL-CCNC: 22 U/L (ref 10–40)
BASOPHILS # BLD AUTO: 0.03 K/UL (ref 0–0.2)
BASOPHILS NFR BLD: 0.6 % (ref 0–1.9)
BILIRUB SERPL-MCNC: 0.7 MG/DL (ref 0.1–1)
BUN SERPL-MCNC: 8 MG/DL (ref 6–20)
CALCIUM SERPL-MCNC: 9.4 MG/DL (ref 8.7–10.5)
CHLORIDE SERPL-SCNC: 107 MMOL/L (ref 95–110)
CO2 SERPL-SCNC: 21 MMOL/L (ref 23–29)
CREAT SERPL-MCNC: 0.7 MG/DL (ref 0.5–1.4)
DIFFERENTIAL METHOD: NORMAL
EOSINOPHIL # BLD AUTO: 0.2 K/UL (ref 0–0.5)
EOSINOPHIL NFR BLD: 4.1 % (ref 0–8)
ERYTHROCYTE [DISTWIDTH] IN BLOOD BY AUTOMATED COUNT: 12.4 % (ref 11.5–14.5)
EST. GFR  (AFRICAN AMERICAN): >60 ML/MIN/1.73 M^2
EST. GFR  (NON AFRICAN AMERICAN): >60 ML/MIN/1.73 M^2
GLUCOSE SERPL-MCNC: 94 MG/DL (ref 70–110)
HCT VFR BLD AUTO: 42.3 % (ref 37–48.5)
HGB BLD-MCNC: 13.8 G/DL (ref 12–16)
IMM GRANULOCYTES # BLD AUTO: 0.01 K/UL (ref 0–0.04)
IMM GRANULOCYTES NFR BLD AUTO: 0.2 % (ref 0–0.5)
LYMPHOCYTES # BLD AUTO: 1.6 K/UL (ref 1–4.8)
LYMPHOCYTES NFR BLD: 30 % (ref 18–48)
MCH RBC QN AUTO: 30.9 PG (ref 27–31)
MCHC RBC AUTO-ENTMCNC: 32.6 G/DL (ref 32–36)
MCV RBC AUTO: 95 FL (ref 82–98)
MONOCYTES # BLD AUTO: 0.5 K/UL (ref 0.3–1)
MONOCYTES NFR BLD: 9.2 % (ref 4–15)
NEUTROPHILS # BLD AUTO: 3 K/UL (ref 1.8–7.7)
NEUTROPHILS NFR BLD: 55.9 % (ref 38–73)
NRBC BLD-RTO: 0 /100 WBC
PLATELET # BLD AUTO: 177 K/UL (ref 150–350)
PMV BLD AUTO: 11.1 FL (ref 9.2–12.9)
POTASSIUM SERPL-SCNC: 3.8 MMOL/L (ref 3.5–5.1)
PROT SERPL-MCNC: 7.4 G/DL (ref 6–8.4)
RBC # BLD AUTO: 4.47 M/UL (ref 4–5.4)
SODIUM SERPL-SCNC: 139 MMOL/L (ref 136–145)
TACROLIMUS BLD-MCNC: 4.8 NG/ML (ref 5–15)
WBC # BLD AUTO: 5.34 K/UL (ref 3.9–12.7)

## 2019-09-09 PROCEDURE — 85025 COMPLETE CBC W/AUTO DIFF WBC: CPT

## 2019-09-09 PROCEDURE — 80053 COMPREHEN METABOLIC PANEL: CPT

## 2019-09-09 PROCEDURE — 36415 COLL VENOUS BLD VENIPUNCTURE: CPT

## 2019-09-09 PROCEDURE — 80197 ASSAY OF TACROLIMUS: CPT

## 2019-09-09 RX ORDER — MYCOPHENOLATE MOFETIL 500 MG/1
TABLET ORAL
Qty: 120 TABLET | Refills: 0 | Status: SHIPPED | OUTPATIENT
Start: 2019-09-09 | End: 2019-10-07 | Stop reason: SDUPTHER

## 2019-09-09 RX ORDER — MYCOPHENOLATE MOFETIL 500 MG/1
1000 TABLET ORAL 2 TIMES DAILY
Qty: 360 TABLET | Refills: 0 | Status: SHIPPED | OUTPATIENT
Start: 2019-09-09 | End: 2020-02-07 | Stop reason: SDUPTHER

## 2019-09-11 ENCOUNTER — TELEPHONE (OUTPATIENT)
Dept: TRANSPLANT | Facility: CLINIC | Age: 59
End: 2019-09-11

## 2019-09-11 DIAGNOSIS — Z94.4 LIVER REPLACED BY TRANSPLANT: Primary | ICD-10-CM

## 2019-09-11 DIAGNOSIS — C22.1 CHOLANGIOCARCINOMA: Primary | ICD-10-CM

## 2019-09-23 DIAGNOSIS — G47.00 INSOMNIA, UNSPECIFIED TYPE: ICD-10-CM

## 2019-09-23 DIAGNOSIS — I73.00 RAYNAUD'S DISEASE WITHOUT GANGRENE: ICD-10-CM

## 2019-09-23 RX ORDER — AMLODIPINE BESYLATE 5 MG/1
TABLET ORAL
Qty: 45 TABLET | Refills: 3 | Status: SHIPPED | OUTPATIENT
Start: 2019-09-23 | End: 2019-10-01

## 2019-09-23 RX ORDER — ZOLPIDEM TARTRATE 5 MG/1
5 TABLET ORAL NIGHTLY PRN
Qty: 30 TABLET | Refills: 3 | Status: SHIPPED | OUTPATIENT
Start: 2019-09-23 | End: 2020-03-27 | Stop reason: SDUPTHER

## 2019-09-23 RX ORDER — ZOLPIDEM TARTRATE 5 MG/1
5 TABLET ORAL NIGHTLY PRN
Qty: 30 TABLET | Refills: 3 | OUTPATIENT
Start: 2019-09-23

## 2019-09-27 ENCOUNTER — LAB VISIT (OUTPATIENT)
Dept: LAB | Facility: HOSPITAL | Age: 59
End: 2019-09-27
Attending: INTERNAL MEDICINE
Payer: MEDICARE

## 2019-09-27 ENCOUNTER — PATIENT OUTREACH (OUTPATIENT)
Dept: ADMINISTRATIVE | Facility: OTHER | Age: 59
End: 2019-09-27

## 2019-09-27 DIAGNOSIS — M34.9 LIMITED SCLERODERMA: ICD-10-CM

## 2019-09-27 DIAGNOSIS — Z79.899 ON MYCOPHENOLATE MOFETIL THERAPY: ICD-10-CM

## 2019-09-27 DIAGNOSIS — M34.9 SCLERODERMA: ICD-10-CM

## 2019-09-27 DIAGNOSIS — I73.00 RAYNAUD'S DISEASE WITHOUT GANGRENE: ICD-10-CM

## 2019-09-27 LAB
25(OH)D3+25(OH)D2 SERPL-MCNC: 35 NG/ML (ref 30–96)
ALBUMIN SERPL BCP-MCNC: 4.1 G/DL (ref 3.5–5.2)
ALP SERPL-CCNC: 64 U/L (ref 55–135)
ALT SERPL W/O P-5'-P-CCNC: 16 U/L (ref 10–44)
ANION GAP SERPL CALC-SCNC: 11 MMOL/L (ref 8–16)
AST SERPL-CCNC: 19 U/L (ref 10–40)
BASOPHILS # BLD AUTO: 0.02 K/UL (ref 0–0.2)
BASOPHILS NFR BLD: 0.4 % (ref 0–1.9)
BILIRUB SERPL-MCNC: 0.8 MG/DL (ref 0.1–1)
BUN SERPL-MCNC: 11 MG/DL (ref 6–20)
CALCIUM SERPL-MCNC: 9.3 MG/DL (ref 8.7–10.5)
CHLORIDE SERPL-SCNC: 106 MMOL/L (ref 95–110)
CHOLEST SERPL-MCNC: 169 MG/DL (ref 120–199)
CHOLEST/HDLC SERPL: 3.8 {RATIO} (ref 2–5)
CO2 SERPL-SCNC: 25 MMOL/L (ref 23–29)
CREAT SERPL-MCNC: 0.8 MG/DL (ref 0.5–1.4)
CRP SERPL-MCNC: 1.6 MG/L (ref 0–8.2)
DIFFERENTIAL METHOD: ABNORMAL
EOSINOPHIL # BLD AUTO: 0.2 K/UL (ref 0–0.5)
EOSINOPHIL NFR BLD: 3.3 % (ref 0–8)
ERYTHROCYTE [DISTWIDTH] IN BLOOD BY AUTOMATED COUNT: 12.9 % (ref 11.5–14.5)
ERYTHROCYTE [SEDIMENTATION RATE] IN BLOOD BY WESTERGREN METHOD: 14 MM/HR (ref 0–36)
EST. GFR  (AFRICAN AMERICAN): >60 ML/MIN/1.73 M^2
EST. GFR  (NON AFRICAN AMERICAN): >60 ML/MIN/1.73 M^2
GLUCOSE SERPL-MCNC: 95 MG/DL (ref 70–110)
HCT VFR BLD AUTO: 40.1 % (ref 37–48.5)
HDLC SERPL-MCNC: 45 MG/DL (ref 40–75)
HDLC SERPL: 26.6 % (ref 20–50)
HGB BLD-MCNC: 13.5 G/DL (ref 12–16)
LDLC SERPL CALC-MCNC: 101.8 MG/DL (ref 63–159)
LYMPHOCYTES # BLD AUTO: 1.7 K/UL (ref 1–4.8)
LYMPHOCYTES NFR BLD: 32 % (ref 18–48)
MCH RBC QN AUTO: 31.1 PG (ref 27–31)
MCHC RBC AUTO-ENTMCNC: 33.7 G/DL (ref 32–36)
MCV RBC AUTO: 92 FL (ref 82–98)
MONOCYTES # BLD AUTO: 0.4 K/UL (ref 0.3–1)
MONOCYTES NFR BLD: 7 % (ref 4–15)
NEUTROPHILS # BLD AUTO: 2.9 K/UL (ref 1.8–7.7)
NEUTROPHILS NFR BLD: 57.3 % (ref 38–73)
NONHDLC SERPL-MCNC: 124 MG/DL
PLATELET # BLD AUTO: 184 K/UL (ref 150–350)
PMV BLD AUTO: 11 FL (ref 9.2–12.9)
POTASSIUM SERPL-SCNC: 4 MMOL/L (ref 3.5–5.1)
PROT SERPL-MCNC: 7.3 G/DL (ref 6–8.4)
RBC # BLD AUTO: 4.34 M/UL (ref 4–5.4)
SODIUM SERPL-SCNC: 142 MMOL/L (ref 136–145)
TRIGL SERPL-MCNC: 111 MG/DL (ref 30–150)
WBC # BLD AUTO: 5.15 K/UL (ref 3.9–12.7)

## 2019-09-27 PROCEDURE — 36415 COLL VENOUS BLD VENIPUNCTURE: CPT

## 2019-09-27 PROCEDURE — 82306 VITAMIN D 25 HYDROXY: CPT

## 2019-09-27 PROCEDURE — 80053 COMPREHEN METABOLIC PANEL: CPT

## 2019-09-27 PROCEDURE — 85025 COMPLETE CBC W/AUTO DIFF WBC: CPT

## 2019-09-27 PROCEDURE — 85652 RBC SED RATE AUTOMATED: CPT

## 2019-09-27 PROCEDURE — 80061 LIPID PANEL: CPT

## 2019-09-27 PROCEDURE — 86140 C-REACTIVE PROTEIN: CPT

## 2019-10-01 ENCOUNTER — OFFICE VISIT (OUTPATIENT)
Dept: RHEUMATOLOGY | Facility: CLINIC | Age: 59
End: 2019-10-01
Payer: MEDICARE

## 2019-10-01 ENCOUNTER — CLINICAL SUPPORT (OUTPATIENT)
Dept: INFECTIOUS DISEASES | Facility: CLINIC | Age: 59
End: 2019-10-01
Payer: MEDICARE

## 2019-10-01 VITALS
DIASTOLIC BLOOD PRESSURE: 73 MMHG | HEIGHT: 64 IN | WEIGHT: 149.88 LBS | HEART RATE: 59 BPM | SYSTOLIC BLOOD PRESSURE: 124 MMHG | BODY MASS INDEX: 25.59 KG/M2

## 2019-10-01 DIAGNOSIS — J98.4 RESTRICTIVE LUNG DISEASE: ICD-10-CM

## 2019-10-01 PROCEDURE — 90471 FLU VACCINE - HIGH DOSE (65+) PRESERVATIVE FREE IM: ICD-10-PCS | Mod: S$GLB,,, | Performed by: INTERNAL MEDICINE

## 2019-10-01 PROCEDURE — 90662 FLU VACCINE - HIGH DOSE (65+) PRESERVATIVE FREE IM: ICD-10-PCS | Mod: S$GLB,,, | Performed by: INTERNAL MEDICINE

## 2019-10-01 PROCEDURE — 3078F PR MOST RECENT DIASTOLIC BLOOD PRESSURE < 80 MM HG: ICD-10-PCS | Mod: CPTII,S$GLB,, | Performed by: INTERNAL MEDICINE

## 2019-10-01 PROCEDURE — 3008F BODY MASS INDEX DOCD: CPT | Mod: CPTII,S$GLB,, | Performed by: INTERNAL MEDICINE

## 2019-10-01 PROCEDURE — 90750 HZV VACC RECOMBINANT IM: CPT | Mod: S$GLB,,, | Performed by: INTERNAL MEDICINE

## 2019-10-01 PROCEDURE — 3074F SYST BP LT 130 MM HG: CPT | Mod: CPTII,S$GLB,, | Performed by: INTERNAL MEDICINE

## 2019-10-01 PROCEDURE — 99499 UNLISTED E&M SERVICE: CPT | Mod: S$GLB,,, | Performed by: INTERNAL MEDICINE

## 2019-10-01 PROCEDURE — 90750 ZOSTER RECOMBINANT VACCINE: ICD-10-PCS | Mod: S$GLB,,, | Performed by: INTERNAL MEDICINE

## 2019-10-01 PROCEDURE — 99499 RISK ADDL DX/OHS AUDIT: ICD-10-PCS | Mod: S$GLB,,, | Performed by: INTERNAL MEDICINE

## 2019-10-01 PROCEDURE — 99999 PR PBB SHADOW E&M-EST. PATIENT-LVL IV: ICD-10-PCS | Mod: PBBFAC,,, | Performed by: INTERNAL MEDICINE

## 2019-10-01 PROCEDURE — G0008 ADMIN INFLUENZA VIRUS VAC: HCPCS | Mod: 59,S$GLB,, | Performed by: INTERNAL MEDICINE

## 2019-10-01 PROCEDURE — 99499 UNLISTED E&M SERVICE: CPT | Mod: S$GLB,,, | Performed by: STUDENT IN AN ORGANIZED HEALTH CARE EDUCATION/TRAINING PROGRAM

## 2019-10-01 PROCEDURE — 90662 IIV NO PRSV INCREASED AG IM: CPT | Mod: S$GLB,,, | Performed by: INTERNAL MEDICINE

## 2019-10-01 PROCEDURE — 99999 PR PBB SHADOW E&M-EST. PATIENT-LVL IV: CPT | Mod: PBBFAC,,, | Performed by: INTERNAL MEDICINE

## 2019-10-01 PROCEDURE — 3078F DIAST BP <80 MM HG: CPT | Mod: CPTII,S$GLB,, | Performed by: INTERNAL MEDICINE

## 2019-10-01 PROCEDURE — 3008F PR BODY MASS INDEX (BMI) DOCUMENTED: ICD-10-PCS | Mod: CPTII,S$GLB,, | Performed by: INTERNAL MEDICINE

## 2019-10-01 PROCEDURE — 90471 IMMUNIZATION ADMIN: CPT | Mod: S$GLB,,, | Performed by: INTERNAL MEDICINE

## 2019-10-01 PROCEDURE — 99499 RISK ADDL DX/OHS AUDIT: ICD-10-PCS | Mod: S$GLB,,, | Performed by: STUDENT IN AN ORGANIZED HEALTH CARE EDUCATION/TRAINING PROGRAM

## 2019-10-01 PROCEDURE — 3074F PR MOST RECENT SYSTOLIC BLOOD PRESSURE < 130 MM HG: ICD-10-PCS | Mod: CPTII,S$GLB,, | Performed by: INTERNAL MEDICINE

## 2019-10-01 PROCEDURE — G0008 ZOSTER RECOMBINANT VACCINE: ICD-10-PCS | Mod: 59,S$GLB,, | Performed by: INTERNAL MEDICINE

## 2019-10-01 PROCEDURE — 99214 PR OFFICE/OUTPT VISIT, EST, LEVL IV, 30-39 MIN: ICD-10-PCS | Mod: S$GLB,,, | Performed by: INTERNAL MEDICINE

## 2019-10-01 PROCEDURE — 99214 OFFICE O/P EST MOD 30 MIN: CPT | Mod: S$GLB,,, | Performed by: INTERNAL MEDICINE

## 2019-10-01 RX ORDER — AMLODIPINE BESYLATE 2.5 MG/1
2.5 TABLET ORAL DAILY
Qty: 90 TABLET | Refills: 3 | Status: SHIPPED | OUTPATIENT
Start: 2019-10-01 | End: 2020-07-20

## 2019-10-01 ASSESSMENT — ROUTINE ASSESSMENT OF PATIENT INDEX DATA (RAPID3)
PAIN SCORE: 4.5
MDHAQ FUNCTION SCORE: .5
AM STIFFNESS SCORE: 0, NO
FATIGUE SCORE: 3.5
PATIENT GLOBAL ASSESSMENT SCORE: 6
TOTAL RAPID3 SCORE: 4.06
PSYCHOLOGICAL DISTRESS SCORE: 1.1

## 2019-10-01 NOTE — PROGRESS NOTES
I have personally taken the history and examined the patient and agree with the resident,s note as stated above        PFTs          FVC     SVC    RV   DLco    3/26/19     117    119     121  101  3/20/18     120    121     62    90  9/25/17     122     122    30    89  4/7/17       123     124    49    91  8/18/16     107     107    110   88    Results for SHILO HAMPTON (MRN [id number]) as of 10/1/2019 07:44   Ref. Range 9/27/2019 07:39   WBC Latest Ref Range: 3.90 - 12.70 K/uL 5.15   RBC Latest Ref Range: 4.00 - 5.40 M/uL 4.34   Hemoglobin Latest Ref Range: 12.0 - 16.0 g/dL 13.5   Hematocrit Latest Ref Range: 37.0 - 48.5 % 40.1   MCV Latest Ref Range: 82 - 98 fL 92   MCH Latest Ref Range: 27.0 - 31.0 pg 31.1 (H)   MCHC Latest Ref Range: 32.0 - 36.0 g/dL 33.7   RDW Latest Ref Range: 11.5 - 14.5 % 12.9   Platelets Latest Ref Range: 150 - 350 K/uL 184   MPV Latest Ref Range: 9.2 - 12.9 fL 11.0   Gran% Latest Ref Range: 38.0 - 73.0 % 57.3   Gran # (ANC) Latest Ref Range: 1.8 - 7.7 K/uL 2.9   Lymph% Latest Ref Range: 18.0 - 48.0 % 32.0   Lymph # Latest Ref Range: 1.0 - 4.8 K/uL 1.7   Mono% Latest Ref Range: 4.0 - 15.0 % 7.0   Mono # Latest Ref Range: 0.3 - 1.0 K/uL 0.4   Eosinophil% Latest Ref Range: 0.0 - 8.0 % 3.3   Eos # Latest Ref Range: 0.0 - 0.5 K/uL 0.2   Basophil% Latest Ref Range: 0.0 - 1.9 % 0.4   Baso # Latest Ref Range: 0.00 - 0.20 K/uL 0.02   Differential Method Unknown Automated   Sed Rate Latest Ref Range: 0 - 36 mm/Hr 14   Sodium Latest Ref Range: 136 - 145 mmol/L 142   Potassium Latest Ref Range: 3.5 - 5.1 mmol/L 4.0   Chloride Latest Ref Range: 95 - 110 mmol/L 106   CO2 Latest Ref Range: 23 - 29 mmol/L 25   Anion Gap Latest Ref Range: 8 - 16 mmol/L 11   BUN, Bld Latest Ref Range: 6 - 20 mg/dL 11   Creatinine Latest Ref Range: 0.5 - 1.4 mg/dL 0.8   eGFR if non African American Latest Ref Range: >60 mL/min/1.73 m^2 >60   eGFR if  Latest Ref Range: >60 mL/min/1.73 m^2 >60   Glucose Latest  Ref Range: 70 - 110 mg/dL 95   Calcium Latest Ref Range: 8.7 - 10.5 mg/dL 9.3   Alkaline Phosphatase Latest Ref Range: 55 - 135 U/L 64   PROTEIN TOTAL Latest Ref Range: 6.0 - 8.4 g/dL 7.3   Albumin Latest Ref Range: 3.5 - 5.2 g/dL 4.1   BILIRUBIN TOTAL Latest Ref Range: 0.1 - 1.0 mg/dL 0.8   AST Latest Ref Range: 10 - 40 U/L 19   ALT Latest Ref Range: 10 - 44 U/L 16   CRP Latest Ref Range: 0.0 - 8.2 mg/L 1.6   Triglycerides Latest Ref Range: 30 - 150 mg/dL 111   Cholesterol Latest Ref Range: 120 - 199 mg/dL 169   HDL Latest Ref Range: 40 - 75 mg/dL 45   Hdl/Cholesterol Ratio Latest Ref Range: 20.0 - 50.0 % 26.6   LDL Cholesterol External Latest Ref Range: 63.0 - 159.0 mg/dL 101.8   Non-HDL Cholesterol Latest Units: mg/dL 124   Total Cholesterol/HDL Ratio Latest Ref Range: 2.0 - 5.0  3.8   Vit D, 25-Hydroxy Latest Ref Range: 30 - 96 ng/mL 35     3/26/19: TTE:    · Normal left ventricular systolic function. The estimated ejection fraction is 60%  · Normal right ventricular systolic function.  · Normal LV diastolic function.  · Mild aortic regurgitation.  · The estimated PA systolic pressure is 24 mm Hg  · Normal central venous pressure (3 mm Hg).            LcSSc EUGENE+ nucleolar SS-A+, Scl-70 and RNAPIII -:  mRSS:  Sjogren's  OA hands  Hand eczema  Liver transplant for cholangiocarcinoma     *Shingrix #2  *high dose flu vaccine  Cont amlodipine 2.5mg daily  Cont mycophenolate 1000mg twice daily  Cont sildenafil 20mg three times daily  RTC 3 months with standing labs  Answers for HPI/ROS submitted by the patient on 9/26/2019   fever: No  eye redness: Yes  headaches: No  shortness of breath: No  chest pain: No  trouble swallowing: No  diarrhea: No  constipation: No  unexpected weight change: Yes  genital sore: No  dysuria: No  During the last 3 days, have you had a skin rash?: Yes  Bruises or bleeds easily: Yes  cough: No

## 2019-10-01 NOTE — PROGRESS NOTES
Subjective:       Patient ID: Nadeen Mcgovern is a 59 y.o. female.    Chief Complaint: No chief complaint on file.    Nadeen Mcgovern is a 59 y.o. Female with a history of lcSSc, secondary Sjogren's, Raynaud's, OA bilateral hands, hand eczema, Cholangiocarcinoma s/p liver transplant, and of left shoulder rotator cuff tear with adhesive capsulitis presenting to rheumatology clinic for 3 month follow up. She was last seen in clinic 03/26/19. In the interim, she says she has been doing well. She has completed PT for her left shoulder and continues with a HEP  From which she has noticed significant improvement in ROM and decrease in pain. Her rash has improved with Diprolene and Lotrimin creams. She says the frequency of her raynaud's related symptoms in her fingers has decreased. She otherwise says she has been doing great and has been taking all her medications without any issues.    Review of Systems   Constitutional: Positive for unexpected weight change. Negative for fever.   HENT: Negative for trouble swallowing.    Eyes: Positive for redness.   Respiratory: Negative for cough and shortness of breath.    Cardiovascular: Negative for chest pain.   Gastrointestinal: Negative for constipation and diarrhea.   Genitourinary: Negative for dysuria and genital sores.   Skin: Positive for rash.   Neurological: Negative for headaches.   Hematological: Bruises/bleeds easily.         Objective:   LMP  (LMP Unknown)       Vitals:    10/01/19 0736   BP: 124/73   Pulse: (!) 59       MRSS Score:  17    Physical Exam   Constitutional: She is oriented to person, place, and time and well-developed, well-nourished, and in no distress.   HENT:   Head: Normocephalic and atraumatic.   Mouth/Throat: Oropharynx is clear and moist.   Eyes: Pupils are equal, round, and reactive to light.   Neck: Neck supple.   Cardiovascular: Normal rate and regular rhythm.    Pulmonary/Chest: Effort normal and breath sounds normal. She has no wheezes. She has no  rales.   Abdominal: Soft. She exhibits no distension. There is no tenderness.       Right Side Rheumatological Exam     Examination finds the shoulder, elbow, wrist, knee, 1st PIP, 1st MCP, 2nd PIP, 2nd MCP, 3rd PIP, 3rd MCP, 4th PIP, 4th MCP, 5th PIP and 5th MCP normal.    Left Side Rheumatological Exam     Examination finds the shoulder, elbow, wrist, knee, 1st PIP, 1st MCP, 2nd PIP, 2nd MCP, 3rd PIP, 3rd MCP, 4th PIP, 4th MCP, 5th PIP and 5th MCP normal.      Neurological: She is alert and oriented to person, place, and time.     Results for SHILO HAMPTON (MRN [id number]) as of 10/1/2019 08:25   Ref. Range 9/27/2019 07:39   WBC Latest Ref Range: 3.90 - 12.70 K/uL 5.15   RBC Latest Ref Range: 4.00 - 5.40 M/uL 4.34   Hemoglobin Latest Ref Range: 12.0 - 16.0 g/dL 13.5   Hematocrit Latest Ref Range: 37.0 - 48.5 % 40.1   MCV Latest Ref Range: 82 - 98 fL 92   MCH Latest Ref Range: 27.0 - 31.0 pg 31.1 (H)   MCHC Latest Ref Range: 32.0 - 36.0 g/dL 33.7   RDW Latest Ref Range: 11.5 - 14.5 % 12.9   Platelets Latest Ref Range: 150 - 350 K/uL 184   MPV Latest Ref Range: 9.2 - 12.9 fL 11.0   Gran% Latest Ref Range: 38.0 - 73.0 % 57.3   Gran # (ANC) Latest Ref Range: 1.8 - 7.7 K/uL 2.9   Lymph% Latest Ref Range: 18.0 - 48.0 % 32.0   Lymph # Latest Ref Range: 1.0 - 4.8 K/uL 1.7   Mono% Latest Ref Range: 4.0 - 15.0 % 7.0   Mono # Latest Ref Range: 0.3 - 1.0 K/uL 0.4   Eosinophil% Latest Ref Range: 0.0 - 8.0 % 3.3   Eos # Latest Ref Range: 0.0 - 0.5 K/uL 0.2   Basophil% Latest Ref Range: 0.0 - 1.9 % 0.4   Baso # Latest Ref Range: 0.00 - 0.20 K/uL 0.02   Differential Method Unknown Automated   Sed Rate Latest Ref Range: 0 - 36 mm/Hr 14   Sodium Latest Ref Range: 136 - 145 mmol/L 142   Potassium Latest Ref Range: 3.5 - 5.1 mmol/L 4.0   Chloride Latest Ref Range: 95 - 110 mmol/L 106   CO2 Latest Ref Range: 23 - 29 mmol/L 25   Anion Gap Latest Ref Range: 8 - 16 mmol/L 11   BUN, Bld Latest Ref Range: 6 - 20 mg/dL 11   Creatinine Latest  Ref Range: 0.5 - 1.4 mg/dL 0.8   eGFR if non African American Latest Ref Range: >60 mL/min/1.73 m^2 >60   eGFR if  Latest Ref Range: >60 mL/min/1.73 m^2 >60   Glucose Latest Ref Range: 70 - 110 mg/dL 95   Calcium Latest Ref Range: 8.7 - 10.5 mg/dL 9.3   Alkaline Phosphatase Latest Ref Range: 55 - 135 U/L 64   PROTEIN TOTAL Latest Ref Range: 6.0 - 8.4 g/dL 7.3   Albumin Latest Ref Range: 3.5 - 5.2 g/dL 4.1   BILIRUBIN TOTAL Latest Ref Range: 0.1 - 1.0 mg/dL 0.8   AST Latest Ref Range: 10 - 40 U/L 19   ALT Latest Ref Range: 10 - 44 U/L 16   CRP Latest Ref Range: 0.0 - 8.2 mg/L 1.6   Triglycerides Latest Ref Range: 30 - 150 mg/dL 111   Cholesterol Latest Ref Range: 120 - 199 mg/dL 169   HDL Latest Ref Range: 40 - 75 mg/dL 45   Hdl/Cholesterol Ratio Latest Ref Range: 20.0 - 50.0 % 26.6   LDL Cholesterol External Latest Ref Range: 63.0 - 159.0 mg/dL 101.8   Non-HDL Cholesterol Latest Units: mg/dL 124   Total Cholesterol/HDL Ratio Latest Ref Range: 2.0 - 5.0  3.8   Vit D, 25-Hydroxy Latest Ref Range: 30 - 96 ng/mL 35     Imaging:  Dexa scan reviewed       Assessment:       1.lcSSc  2. Raynaud's phenomenon        Plan:    Continue Cellcept 1000 mg bid  Continue Amlodipine 2.5 mg daily; BP normal today - 124/73  Continue Sildenafil 20 mg tid  Continue hand creams as directed by dermatology  Shingrix x 2 today and high dose flu vaccine  PFT and TTE normal last done in 3/19 - repeat around 3/20  DEXA last performed in 3/19 - repeat around 3/21   Return to clinic in 3 months with standing labs

## 2019-10-07 DIAGNOSIS — Z79.899 ON MYCOPHENOLATE MOFETIL THERAPY: ICD-10-CM

## 2019-10-07 DIAGNOSIS — M34.9 SCLERODERMA: ICD-10-CM

## 2019-10-07 RX ORDER — MYCOPHENOLATE MOFETIL 500 MG/1
TABLET ORAL
Qty: 360 TABLET | Refills: 0 | Status: SHIPPED | OUTPATIENT
Start: 2019-10-07 | End: 2019-10-14

## 2019-10-14 ENCOUNTER — OFFICE VISIT (OUTPATIENT)
Dept: TRANSPLANT | Facility: CLINIC | Age: 59
End: 2019-10-14
Payer: MEDICARE

## 2019-10-14 VITALS
WEIGHT: 146.63 LBS | RESPIRATION RATE: 18 BRPM | SYSTOLIC BLOOD PRESSURE: 120 MMHG | HEIGHT: 63 IN | BODY MASS INDEX: 25.98 KG/M2 | HEART RATE: 63 BPM | TEMPERATURE: 98 F | OXYGEN SATURATION: 100 % | DIASTOLIC BLOOD PRESSURE: 60 MMHG

## 2019-10-14 DIAGNOSIS — Z48.23 ENCOUNTER FOR AFTERCARE FOLLOWING LIVER TRANSPLANT: Primary | ICD-10-CM

## 2019-10-14 DIAGNOSIS — Z94.4 S/P LIVER TRANSPLANT: ICD-10-CM

## 2019-10-14 DIAGNOSIS — Z85.09 HISTORY OF BILE DUCT CANCER: ICD-10-CM

## 2019-10-14 DIAGNOSIS — Z79.899 ON MYCOPHENOLATE MOFETIL THERAPY: ICD-10-CM

## 2019-10-14 PROCEDURE — 3074F PR MOST RECENT SYSTOLIC BLOOD PRESSURE < 130 MM HG: ICD-10-PCS | Mod: CPTII,S$GLB,, | Performed by: INTERNAL MEDICINE

## 2019-10-14 PROCEDURE — 99215 OFFICE O/P EST HI 40 MIN: CPT | Mod: S$GLB,,, | Performed by: INTERNAL MEDICINE

## 2019-10-14 PROCEDURE — 99499 UNLISTED E&M SERVICE: CPT | Mod: S$GLB,,, | Performed by: INTERNAL MEDICINE

## 2019-10-14 PROCEDURE — 99999 PR PBB SHADOW E&M-EST. PATIENT-LVL III: CPT | Mod: PBBFAC,,, | Performed by: INTERNAL MEDICINE

## 2019-10-14 PROCEDURE — 3074F SYST BP LT 130 MM HG: CPT | Mod: CPTII,S$GLB,, | Performed by: INTERNAL MEDICINE

## 2019-10-14 PROCEDURE — 3078F PR MOST RECENT DIASTOLIC BLOOD PRESSURE < 80 MM HG: ICD-10-PCS | Mod: CPTII,S$GLB,, | Performed by: INTERNAL MEDICINE

## 2019-10-14 PROCEDURE — 99499 RISK ADDL DX/OHS AUDIT: ICD-10-PCS | Mod: S$GLB,,, | Performed by: INTERNAL MEDICINE

## 2019-10-14 PROCEDURE — 99215 PR OFFICE/OUTPT VISIT, EST, LEVL V, 40-54 MIN: ICD-10-PCS | Mod: S$GLB,,, | Performed by: INTERNAL MEDICINE

## 2019-10-14 PROCEDURE — 3008F BODY MASS INDEX DOCD: CPT | Mod: CPTII,S$GLB,, | Performed by: INTERNAL MEDICINE

## 2019-10-14 PROCEDURE — 3008F PR BODY MASS INDEX (BMI) DOCUMENTED: ICD-10-PCS | Mod: CPTII,S$GLB,, | Performed by: INTERNAL MEDICINE

## 2019-10-14 PROCEDURE — 99999 PR PBB SHADOW E&M-EST. PATIENT-LVL III: ICD-10-PCS | Mod: PBBFAC,,, | Performed by: INTERNAL MEDICINE

## 2019-10-14 PROCEDURE — 3078F DIAST BP <80 MM HG: CPT | Mod: CPTII,S$GLB,, | Performed by: INTERNAL MEDICINE

## 2019-10-14 NOTE — Clinical Note
Hi, I am taking over Mrs. Mcgovern for transplant care. Not sure why she is still on high dose Cellcept 1 g BID. Is it for her scleroderma? For liver transplant she would require much less immunosuppression. Thank you

## 2019-10-14 NOTE — LETTER
October 14, 2019        Wendy Hernandez  [address] [facility] [address]  Phone: [phone]  Fax: [phone]             [facility]  [address]  [place] [address]  Phone: [phone]   Patient: Nadeen Mcgovern   MR Number: [id number]   YOB: 1960   Date of Visit: 10/14/2019       Dear Dr. Wendy Hernandez    Thank you for referring Nadeen Mcgovern to me for evaluation. Attached you will find relevant portions of my assessment and plan of care.    If you have questions, please do not hesitate to call me. I look forward to following Nadeen Mcgovern along with you.    Sincerely,    Lakia Pruett MD    Enclosure    If you would like to receive this communication electronically, please contact [email or web address] or [phone] to request [employer] access.    [employer] is a tool which provides read-only access to select patient information with whom you have a relationship. Its easy to use and provides real time access to review your patients record including encounter summaries, notes, results, and demographic information.    If you feel you have received this communication in error or would no longer like to receive these types of communications, please e-mail [email or web address]

## 2019-10-14 NOTE — PROGRESS NOTES
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     ORGAN: LIVER  Whole or Partial: whole liver  Donor Type:  - brain death  CDC High Risk: yes  Donor CMV Status: Positive  Donor HCV Status: Negative  Donor HBcAb: Negative  Biliary Anastomosis: tobias-en-y  Arterial Anatomy: standard  IVC reconstruction: end to end ivc  Portal vein status: patent  .    Subjective:     Interval History:  Currently, she is doing adequately. She is on Tacrolimus and Cellcept. She is following with Rheumatology for her scleroderma.     She denies any other issues. She reports compliance with medications. Taking Cellcept 1 g BID - also recommended by Rheum.     Review of Systems   Constitutional: Negative for activity change, appetite change, fatigue and fever.   HENT: Negative for congestion, nosebleeds and postnasal drip.    Respiratory: Negative for chest tightness, shortness of breath and wheezing.    Cardiovascular: Negative for chest pain, palpitations and leg swelling.   Gastrointestinal: Positive for nausea. Negative for abdominal distention, constipation, diarrhea and vomiting.   Musculoskeletal: Negative for arthralgias, joint swelling and myalgias.   Skin: Negative for pallor.   Neurological: Negative for dizziness and numbness.       Objective:     Physical Exam   Constitutional: She is oriented to person, place, and time. She appears well-developed and well-nourished. No distress.   HENT:   Head: Normocephalic and atraumatic.   Mouth/Throat: Oropharynx is clear and moist. No oropharyngeal exudate.   Eyes: Pupils are equal, round, and reactive to light. Conjunctivae are normal. Right eye exhibits no discharge. Left eye exhibits no discharge. No scleral icterus.   Pulmonary/Chest: Effort normal and breath sounds normal. No respiratory distress. She  has no wheezes.   Abdominal: Soft. She exhibits no distension. There is no tenderness.   Musculoskeletal: She exhibits no edema.   Neurological: She is alert and oriented to person, place, and time.   Psychiatric: She has a normal mood and affect. Her behavior is normal.   Vitals reviewed.  Abdomen: Transverse abdominal scar, soft, no evidence of hernia    4/16/19: CT - no evidence of local recurrence or metastatic disease. Stable subcentimeter pulmonary nodules in the right upper lobe.  7/2018: CT - No evidence of metastatic or recurrent malignancy. Two stable right apical subcentimeter lung nodules. Nonspecific but stable splenic hypodensity.  Fat containing left-sided anterior abdominal wall hernia.      DEXA  4/2019: normal BMD  WBC   Date Value Ref Range Status   09/27/2019 5.15 3.90 - 12.70 K/uL Final     Hemoglobin   Date Value Ref Range Status   09/27/2019 13.5 12.0 - 16.0 g/dL Final     POC Hematocrit   Date Value Ref Range Status   10/08/2015 34 (L) 36 - 54 %PCV Final     Hematocrit   Date Value Ref Range Status   09/27/2019 40.1 37.0 - 48.5 % Final     Platelets   Date Value Ref Range Status   09/27/2019 184 150 - 350 K/uL Final     BUN, Bld   Date Value Ref Range Status   09/27/2019 11 6 - 20 mg/dL Final     Creatinine   Date Value Ref Range Status   09/27/2019 0.8 0.5 - 1.4 mg/dL Final     Glucose   Date Value Ref Range Status   09/27/2019 95 70 - 110 mg/dL Final     Calcium   Date Value Ref Range Status   09/27/2019 9.3 8.7 - 10.5 mg/dL Final     Sodium   Date Value Ref Range Status   09/27/2019 142 136 - 145 mmol/L Final     Potassium   Date Value Ref Range Status   09/27/2019 4.0 3.5 - 5.1 mmol/L Final     Chloride   Date Value Ref Range Status   09/27/2019 106 95 - 110 mmol/L Final     Magnesium   Date Value Ref Range Status   10/13/2015 2.0 1.6 - 2.6 mg/dL Final     AST   Date Value Ref Range Status   09/27/2019 19 10 - 40 U/L Final     ALT   Date Value Ref Range Status   09/27/2019 16 10 - 44 U/L  Final     Alkaline Phosphatase   Date Value Ref Range Status   09/27/2019 64 55 - 135 U/L Final     Total Bilirubin   Date Value Ref Range Status   09/27/2019 0.8 0.1 - 1.0 mg/dL Final     Comment:     For infants and newborns, interpretation of results should be based  on gestational age, weight and in agreement with clinical  observations.  Premature Infant recommended reference ranges:  Up to 24 hours.............<8.0 mg/dL  Up to 48 hours............<12.0 mg/dL  3-5 days..................<15.0 mg/dL  6-29 days.................<15.0 mg/dL       Albumin   Date Value Ref Range Status   09/27/2019 4.1 3.5 - 5.2 g/dL Final     INR   Date Value Ref Range Status   10/27/2016 1.0 0.8 - 1.2 Final     Comment:     Coumadin Therapy:  2.0 - 3.0 for INR for all indicators except mechanical heart valves  and antiphospholipid syndromes which should use 2.5 - 3.5.       Lab Results   Component Value Date    TACROLIMUS 4.8 (L) 09/09/2019    SIROLIMUSLEV <2.0 (L) 07/10/2018           Assessment/Plan:     1. Encounter for aftercare following liver transplant    2. On mycophenolate mofetil therapy    3. S/P liver transplant    4. History of bile duct cancer         Immunosuppression  - Continue Cellcept and Tacrolimus     Liver transplanted  - Reviewed labs. Good allograft function    History of cholangiocarcinoma  - CT abd/pelvis with no re-occurrence/malignancy. Continue with routine f/u    Had a flu shot for 2019.  BMD done this year.  Seeing Dermatology yearly for cancer screening    RTC in 1 year    Functional Status: 90% - Able to carry on normal activity: minor symptoms of disease  Physical Capacity: No Limitations      A total of 45 minutes were spent face-to-face with the patient during this encounter and over half of that time was spent on counseling and coordination of care.  We discussed in depth the nature of the patient's immunocompromised status, liver graft function, immunosuppression, preventative measures and the  management plan in details. I also educated the patient about lifestyle modifications which may improve hepatic steatosis, overweight/obesity, insulin resistance and high blood pressure issues. I have provided the patient with an opportunity to ask questions and have all questions answered to his satisfaction.     Lakia Pruett MD  Staff Physician  Hepatology and Liver Transplant  [facility]  [facility]

## 2019-11-13 ENCOUNTER — HOSPITAL ENCOUNTER (OUTPATIENT)
Dept: RADIOLOGY | Facility: HOSPITAL | Age: 59
Discharge: HOME | End: 2019-11-13
Attending: INTERNAL MEDICINE
Payer: MEDICARE

## 2019-11-13 DIAGNOSIS — C22.1 CHOLANGIOCARCINOMA: ICD-10-CM

## 2019-11-13 PROCEDURE — 74177 CT ABD & PELVIS W/CONTRAST: CPT | Mod: 26,,, | Performed by: RADIOLOGY

## 2019-11-13 PROCEDURE — 74177 CT ABD & PELVIS W/CONTRAST: CPT | Mod: TC

## 2019-11-13 PROCEDURE — 74177 CT ABDOMEN PELVIS WITH CONTRAST: ICD-10-PCS | Mod: 26,,, | Performed by: RADIOLOGY

## 2019-11-13 PROCEDURE — 25500020 PHARM REV CODE 255: Performed by: INTERNAL MEDICINE

## 2019-11-13 RX ADMIN — IOHEXOL 15 ML: 350 INJECTION, SOLUTION INTRAVENOUS at 10:11

## 2019-11-13 RX ADMIN — IOHEXOL 75 ML: 350 INJECTION, SOLUTION INTRAVENOUS at 10:11

## 2019-11-18 ENCOUNTER — TELEPHONE (OUTPATIENT)
Dept: TRANSPLANT | Facility: CLINIC | Age: 59
End: 2019-11-18

## 2019-11-18 NOTE — TELEPHONE ENCOUNTER
Sent letter to let patient know Dr Pruett reviewed her imaging, no malignancy.  We will continue routine imaging.

## 2019-12-10 ENCOUNTER — LAB VISIT (OUTPATIENT)
Dept: LAB | Facility: HOSPITAL | Age: 59
End: 2019-12-10
Attending: INTERNAL MEDICINE
Payer: MEDICARE

## 2019-12-10 DIAGNOSIS — Z94.4 LIVER REPLACED BY TRANSPLANT: ICD-10-CM

## 2019-12-10 DIAGNOSIS — C22.1 CHOLANGIOCARCINOMA: ICD-10-CM

## 2019-12-10 LAB
ALBUMIN SERPL BCP-MCNC: 4.1 G/DL (ref 3.5–5.2)
ALP SERPL-CCNC: 76 U/L (ref 55–135)
ALT SERPL W/O P-5'-P-CCNC: 16 U/L (ref 10–44)
ANION GAP SERPL CALC-SCNC: 10 MMOL/L (ref 8–16)
AST SERPL-CCNC: 18 U/L (ref 10–40)
BASOPHILS # BLD AUTO: 0.05 K/UL (ref 0–0.2)
BASOPHILS NFR BLD: 0.7 % (ref 0–1.9)
BILIRUB SERPL-MCNC: 1 MG/DL (ref 0.1–1)
BUN SERPL-MCNC: 9 MG/DL (ref 6–20)
CALCIUM SERPL-MCNC: 9.6 MG/DL (ref 8.7–10.5)
CANCER AG19-9 SERPL-ACNC: 4 U/ML (ref 2–40)
CHLORIDE SERPL-SCNC: 108 MMOL/L (ref 95–110)
CO2 SERPL-SCNC: 23 MMOL/L (ref 23–29)
CREAT SERPL-MCNC: 0.8 MG/DL (ref 0.5–1.4)
DIFFERENTIAL METHOD: NORMAL
EOSINOPHIL # BLD AUTO: 0.1 K/UL (ref 0–0.5)
EOSINOPHIL NFR BLD: 2 % (ref 0–8)
ERYTHROCYTE [DISTWIDTH] IN BLOOD BY AUTOMATED COUNT: 12.9 % (ref 11.5–14.5)
EST. GFR  (AFRICAN AMERICAN): >60 ML/MIN/1.73 M^2
EST. GFR  (NON AFRICAN AMERICAN): >60 ML/MIN/1.73 M^2
GLUCOSE SERPL-MCNC: 92 MG/DL (ref 70–110)
HCT VFR BLD AUTO: 41.1 % (ref 37–48.5)
HGB BLD-MCNC: 13.5 G/DL (ref 12–16)
IMM GRANULOCYTES # BLD AUTO: 0.01 K/UL (ref 0–0.04)
IMM GRANULOCYTES NFR BLD AUTO: 0.1 % (ref 0–0.5)
LYMPHOCYTES # BLD AUTO: 1.8 K/UL (ref 1–4.8)
LYMPHOCYTES NFR BLD: 26.3 % (ref 18–48)
MCH RBC QN AUTO: 31 PG (ref 27–31)
MCHC RBC AUTO-ENTMCNC: 32.8 G/DL (ref 32–36)
MCV RBC AUTO: 95 FL (ref 82–98)
MONOCYTES # BLD AUTO: 0.6 K/UL (ref 0.3–1)
MONOCYTES NFR BLD: 9 % (ref 4–15)
NEUTROPHILS # BLD AUTO: 4.3 K/UL (ref 1.8–7.7)
NEUTROPHILS NFR BLD: 61.9 % (ref 38–73)
NRBC BLD-RTO: 0 /100 WBC
PLATELET # BLD AUTO: 201 K/UL (ref 150–350)
PMV BLD AUTO: 11 FL (ref 9.2–12.9)
POTASSIUM SERPL-SCNC: 3.7 MMOL/L (ref 3.5–5.1)
PROT SERPL-MCNC: 7.4 G/DL (ref 6–8.4)
RBC # BLD AUTO: 4.35 M/UL (ref 4–5.4)
SODIUM SERPL-SCNC: 141 MMOL/L (ref 136–145)
TACROLIMUS BLD-MCNC: 1.7 NG/ML (ref 5–15)
WBC # BLD AUTO: 6.89 K/UL (ref 3.9–12.7)

## 2019-12-10 PROCEDURE — 36415 COLL VENOUS BLD VENIPUNCTURE: CPT

## 2019-12-10 PROCEDURE — 80197 ASSAY OF TACROLIMUS: CPT

## 2019-12-10 PROCEDURE — 80053 COMPREHEN METABOLIC PANEL: CPT

## 2019-12-10 PROCEDURE — 85025 COMPLETE CBC W/AUTO DIFF WBC: CPT

## 2019-12-10 PROCEDURE — 86301 IMMUNOASSAY TUMOR CA 19-9: CPT

## 2019-12-12 ENCOUNTER — OFFICE VISIT (OUTPATIENT)
Dept: CARDIOLOGY | Facility: CLINIC | Age: 59
End: 2019-12-12
Payer: MEDICARE

## 2019-12-12 ENCOUNTER — TELEPHONE (OUTPATIENT)
Dept: TRANSPLANT | Facility: CLINIC | Age: 59
End: 2019-12-12

## 2019-12-12 VITALS
DIASTOLIC BLOOD PRESSURE: 58 MMHG | HEIGHT: 62 IN | SYSTOLIC BLOOD PRESSURE: 123 MMHG | HEART RATE: 71 BPM | BODY MASS INDEX: 26.74 KG/M2 | WEIGHT: 145.31 LBS

## 2019-12-12 DIAGNOSIS — Z94.4 S/P LIVER TRANSPLANT: ICD-10-CM

## 2019-12-12 DIAGNOSIS — D84.9 IMMUNOSUPPRESSION: ICD-10-CM

## 2019-12-12 DIAGNOSIS — I35.1 AORTIC VALVE INSUFFICIENCY, ETIOLOGY OF CARDIAC VALVE DISEASE UNSPECIFIED: Primary | ICD-10-CM

## 2019-12-12 PROCEDURE — 3008F PR BODY MASS INDEX (BMI) DOCUMENTED: ICD-10-PCS | Mod: CPTII,S$GLB,, | Performed by: INTERNAL MEDICINE

## 2019-12-12 PROCEDURE — 3078F DIAST BP <80 MM HG: CPT | Mod: CPTII,S$GLB,, | Performed by: INTERNAL MEDICINE

## 2019-12-12 PROCEDURE — 3074F PR MOST RECENT SYSTOLIC BLOOD PRESSURE < 130 MM HG: ICD-10-PCS | Mod: CPTII,S$GLB,, | Performed by: INTERNAL MEDICINE

## 2019-12-12 PROCEDURE — 99999 PR PBB SHADOW E&M-EST. PATIENT-LVL IV: CPT | Mod: PBBFAC,,, | Performed by: INTERNAL MEDICINE

## 2019-12-12 PROCEDURE — 99213 OFFICE O/P EST LOW 20 MIN: CPT | Mod: S$GLB,,, | Performed by: INTERNAL MEDICINE

## 2019-12-12 PROCEDURE — 3078F PR MOST RECENT DIASTOLIC BLOOD PRESSURE < 80 MM HG: ICD-10-PCS | Mod: CPTII,S$GLB,, | Performed by: INTERNAL MEDICINE

## 2019-12-12 PROCEDURE — 3008F BODY MASS INDEX DOCD: CPT | Mod: CPTII,S$GLB,, | Performed by: INTERNAL MEDICINE

## 2019-12-12 PROCEDURE — 99213 PR OFFICE/OUTPT VISIT, EST, LEVL III, 20-29 MIN: ICD-10-PCS | Mod: S$GLB,,, | Performed by: INTERNAL MEDICINE

## 2019-12-12 PROCEDURE — 3074F SYST BP LT 130 MM HG: CPT | Mod: CPTII,S$GLB,, | Performed by: INTERNAL MEDICINE

## 2019-12-12 PROCEDURE — 99999 PR PBB SHADOW E&M-EST. PATIENT-LVL IV: ICD-10-PCS | Mod: PBBFAC,,, | Performed by: INTERNAL MEDICINE

## 2019-12-12 NOTE — PROGRESS NOTES
Cardiology Consults Clinic Note  Reason for Visit: Annual follow-up     HPI:   Nadeen Mcgovern is a 59 y.o. female who is s/p liver transplant on chronic immunosuppression and seen in clinic today for her annual follow up.   She reports feeling well with no new symptoms or cardiovascular complaints.  Has had improved exercise capacity and energy levels.  She denies chest discomfort, TAM, palpitations, PND/orthopnea, lightheadedness and syncope.    ROS:    Review of Systems   Constitution: Negative for decreased appetite, fever, malaise/fatigue and weight gain.   HENT: Negative for congestion, hearing loss and nosebleeds.    Eyes: Negative for visual disturbance.   Cardiovascular: Negative for chest pain, dyspnea on exertion, irregular heartbeat, leg swelling, palpitations and syncope.   Respiratory: Negative for cough, shortness of breath and sleep disturbances due to breathing.    Endocrine: Negative for cold intolerance and heat intolerance.   Hematologic/Lymphatic: Negative for bleeding problem. Does not bruise/bleed easily.   Gastrointestinal: Negative for bloating, abdominal pain, change in bowel habit and heartburn.   Neurological: Negative for dizziness, loss of balance and numbness.   Psychiatric/Behavioral: Negative for altered mental status. The patient is not nervous/anxious.        PMH:     Past Medical History:   Diagnosis Date    Acute cholecystitis     Cholecystitis    Arthritis 2015    septic arthritis of right shoulder    Bacteremia due to Streptococcus pneumoniae     Cancer     Cholangiocarcinoma     Hypertension     Jaundice     obstructive    Prediabetes      Past Surgical History:   Procedure Laterality Date    arthoscopic Right     drained infection     SECTION      INCISION AND DRAINAGE OF WOUND      s/p I&D of R thumb    LIVER TRANSPLANT      SHOULDER ARTHROSCOPY         Allergies:   Allergies and current medications updated and reviewed:  Review of patient's allergies  indicates:  No Known Allergies    Medications:     Current Outpatient Medications on File Prior to Visit   Medication Sig Dispense Refill    amLODIPine (NORVASC) 2.5 MG tablet Take 1 tablet (2.5 mg total) by mouth once daily. 90 tablet 3    aspirin 81 MG Chew Take 81 mg by mouth once daily.      betamethasone dipropionate (DIPROLENE) 0.05 % cream Apply topically 2 (two) times daily. 45 g 3    clotrimazole (LOTRIMIN) 1 % cream Apply topically 2 (two) times daily. 30 g 3    diclofenac sodium (VOLTAREN) 1 % Gel Apply 2 g topically 3 (three) times daily. 100 g 0    diphenhydrAMINE (BENADRYL) 25 mg capsule Take 25 mg by mouth every 6 (six) hours as needed for Itching.      famotidine (PEPCID) 20 MG tablet Take 1 tablet (20 mg total) by mouth nightly. 30 tablet 11    multivitamin (THERAGRAN) tablet Take 1 tablet by mouth once daily.      mycophenolate (CELLCEPT) 500 mg Tab Take 2 tablets (1,000 mg total) by mouth 2 (two) times daily. 360 tablet 0    promethazine (PHENERGAN) 6.25 mg/5 mL syrup Take 20 mLs (25 mg total) by mouth every 6 (six) hours as needed for Nausea (and cough). 118 mL 0    sildenafil (REVATIO) 20 mg Tab Take 1 tablet (20 mg total) by mouth 3 (three) times daily. 270 tablet 1    tacrolimus (PROGRAF) 1 MG Cap TAKE 2 CAPSULES BY MOUTH EVERY 12 HOURS 120 capsule 0    zolpidem (AMBIEN) 5 MG Tab Take 1 tablet (5 mg total) by mouth nightly as needed. 30 tablet 3    clobetasol 0.05% (TEMOVATE) 0.05 % Oint Apply topically 2 (two) times daily. 60 g 0    hydrOXYzine pamoate (VISTARIL) 50 MG Cap Take 1 capsule (50 mg total) by mouth every 8 (eight) hours as needed. (Patient not taking: Reported on 12/12/2019) 30 capsule 0    traMADol (ULTRAM) 50 mg tablet Take 1 tablet (50 mg total) by mouth every 6 (six) hours. (Patient not taking: Reported on 12/12/2019) 12 tablet 0     No current facility-administered medications on file prior to visit.        Social History:     Social History     Socioeconomic  History    Marital status: Single     Spouse name: Not on file    Number of children: Not on file    Years of education: Not on file    Highest education level: Not on file   Occupational History    Occupation: Food Tech     Comment: [employer]   Social Needs    Financial resource strain: Hard    Food insecurity:     Worry: Sometimes true     Inability: Sometimes true    Transportation needs:     Medical: No     Non-medical: No   Tobacco Use    Smoking status: Former Smoker    Smokeless tobacco: Never Used   Substance and Sexual Activity    Alcohol use: Yes     Frequency: 2-4 times a month     Drinks per session: 1 or 2     Binge frequency: Never     Comment: socially    Drug use: No     Types: Marijuana    Sexual activity: Never   Lifestyle    Physical activity:     Days per week: 2 days     Minutes per session: 20 min    Stress: Not at all   Relationships    Social connections:     Talks on phone: More than three times a week     Gets together: Three times a week     Attends Advent service: Not on file     Active member of club or organization: No     Attends meetings of clubs or organizations: Not on file     Relationship status:    Other Topics Concern    Not on file   Social History Narrative    Not on file       Family History:     Family History   Problem Relation Age of Onset    Cancer Father         Lung    Arthritis Father     Stroke Mother     Diabetes Mother     Hypertension Mother     Heart attack Mother     Cancer Paternal Grandmother         pancreatic cancer    Cancer Brother         Lung    Alcohol abuse Brother     Arthritis Sister     No Known Problems Daughter     No Known Problems Son     No Known Problems Son     No Known Problems Daughter     Colon cancer Neg Hx     Esophageal cancer Neg Hx        Physical Exam:   BP (!) 123/58 (BP Location: Left arm, Patient Position: Sitting, BP Method: Medium (Automatic))   Pulse 71    "Ht 5' 2" (1.575 m)   Wt 65.9 kg (145 lb 4.5 oz)   LMP  (LMP Unknown)   BMI 26.57 kg/m²      Right Arm BP - Sittin/56 (19 1116)  Left Arm BP - Sittin/58 (19 1116)    Physical Exam   Constitutional: She is oriented to person, place, and time. Vital signs are normal. She appears well-developed and well-nourished.   HENT:   Head: Normocephalic.   Eyes: Pupils are equal, round, and reactive to light.   Neck: Normal range of motion. Neck supple. No JVD present. Carotid bruit is not present.   Cardiovascular: Normal rate, regular rhythm, S1 normal, S2 normal, normal heart sounds and intact distal pulses. PMI is not displaced. Exam reveals no gallop and no friction rub.   No murmur heard.  Pulses:       Carotid pulses are 2+ on the right side, and 2+ on the left side.       Radial pulses are 2+ on the right side, and 2+ on the left side.        Dorsalis pedis pulses are 2+ on the right side, and 2+ on the left side.        Posterior tibial pulses are 1+ on the right side, and 1+ on the left side.   Pulmonary/Chest: Effort normal and breath sounds normal. No accessory muscle usage. She has no decreased breath sounds. She has no wheezes.   Abdominal: Soft. Normal appearance and bowel sounds are normal. She exhibits no distension, no fluid wave and no ascites. There is no hepatosplenomegaly. There is no tenderness.   Musculoskeletal: Normal range of motion. She exhibits no edema.   Neurological: She is alert and oriented to person, place, and time. She has normal strength.   Skin: Skin is warm, dry and intact. No ecchymosis and no rash noted. No erythema.   Psychiatric: She has a normal mood and affect. Her speech is normal and behavior is normal. Judgment and thought content normal. Cognition and memory are normal.   Vitals reviewed.      Labs:     Blood Tests:  Lab Results   Component Value Date    BNP 2015     12/10/2019    K 3.7 12/10/2019     12/10/2019    CO2 23 12/10/2019 "    BUN 9 12/10/2019    CREATININE 0.8 12/10/2019    GLU 92 12/10/2019    HGBA1C 5.4 02/15/2017    MG 2.0 10/13/2015    AST 18 12/10/2019    ALT 16 12/10/2019    ALBUMIN 4.1 12/10/2019    PROT 7.4 12/10/2019    BILITOT 1.0 12/10/2019    WBC 6.89 12/10/2019    HGB 13.5 12/10/2019    HCT 41.1 12/10/2019    HCT 34 (L) 10/08/2015    MCV 95 12/10/2019     12/10/2019    INR 1.0 10/27/2016    TSH 1.224 08/17/2016       Lab Results   Component Value Date    CHOL 169 09/27/2019    HDL 45 09/27/2019    TRIG 111 09/27/2019       Lab Results   Component Value Date    LDLCALC 101.8 09/27/2019       Imaging:     Echocardiogram (3/26/19)  · Normal left ventricular systolic function. The estimated ejection fraction is 60%  · Normal right ventricular systolic function.  · Normal LV diastolic function.  · Mild aortic regurgitation.  · The estimated PA systolic pressure is 24 mm Hg  · Normal central venous pressure (3 mm Hg).      Assessment:     1. Aortic valve insufficiency, etiology of cardiac valve disease unspecified    2. S/P liver transplant    3. Immunosuppression        Plan:     Aortic valve insufficiency, etiology of cardiac valve disease unspecified    S/P liver transplant    Immunosuppression    Ms. Mcgovern is doing very well. Reinforced healthy diet and encouraged continued exercise.     Follow up as needed.    This patient was seen and discussed with Dr. Trivedi.     Vivi TORRES, LIMA  12/12/2019  9:17 AM      Follow-up:     Future Appointments   Date Time Provider Department Center   2/4/2020  9:00 AM LAB, APPOINTMENT [facility]   2/7/2020  8:00 AM Haresh Butler MD [facility]   \

## 2019-12-12 NOTE — TELEPHONE ENCOUNTER
"Called patient back to find out if she missed any doses of her medication.  Patient stated, "I missed 2 doses of my medication."  She will repeat labs on Tuesday to recheck.  "

## 2019-12-12 NOTE — TELEPHONE ENCOUNTER
Dr Pruett reviewed your labs  Called patient left voicemail to see if she missed any doses of her medication.  Asked for a call back.

## 2019-12-12 NOTE — TELEPHONE ENCOUNTER
----- Message from Lakia Pruett MD sent at 12/12/2019  8:54 AM CST -----  FK 1.7 very low, did patient miss doses? If not, please increase tacro to 3/2 and repeat labs in 1 week

## 2019-12-17 ENCOUNTER — LAB VISIT (OUTPATIENT)
Dept: LAB | Facility: HOSPITAL | Age: 59
End: 2019-12-17
Attending: INTERNAL MEDICINE
Payer: MEDICARE

## 2019-12-17 ENCOUNTER — TELEPHONE (OUTPATIENT)
Dept: TRANSPLANT | Facility: CLINIC | Age: 59
End: 2019-12-17

## 2019-12-17 DIAGNOSIS — Z94.4 LIVER REPLACED BY TRANSPLANT: ICD-10-CM

## 2019-12-17 LAB
ALBUMIN SERPL BCP-MCNC: 4.2 G/DL (ref 3.5–5.2)
ALP SERPL-CCNC: 81 U/L (ref 55–135)
ALT SERPL W/O P-5'-P-CCNC: 14 U/L (ref 10–44)
ANION GAP SERPL CALC-SCNC: 8 MMOL/L (ref 8–16)
AST SERPL-CCNC: 17 U/L (ref 10–40)
BASOPHILS # BLD AUTO: 0.04 K/UL (ref 0–0.2)
BASOPHILS NFR BLD: 0.7 % (ref 0–1.9)
BILIRUB SERPL-MCNC: 0.8 MG/DL (ref 0.1–1)
BUN SERPL-MCNC: 11 MG/DL (ref 6–20)
CALCIUM SERPL-MCNC: 10 MG/DL (ref 8.7–10.5)
CHLORIDE SERPL-SCNC: 109 MMOL/L (ref 95–110)
CO2 SERPL-SCNC: 26 MMOL/L (ref 23–29)
CREAT SERPL-MCNC: 0.8 MG/DL (ref 0.5–1.4)
DIFFERENTIAL METHOD: ABNORMAL
EOSINOPHIL # BLD AUTO: 0.2 K/UL (ref 0–0.5)
EOSINOPHIL NFR BLD: 2.9 % (ref 0–8)
ERYTHROCYTE [DISTWIDTH] IN BLOOD BY AUTOMATED COUNT: 12.9 % (ref 11.5–14.5)
EST. GFR  (AFRICAN AMERICAN): >60 ML/MIN/1.73 M^2
EST. GFR  (NON AFRICAN AMERICAN): >60 ML/MIN/1.73 M^2
GLUCOSE SERPL-MCNC: 89 MG/DL (ref 70–110)
HCT VFR BLD AUTO: 42.7 % (ref 37–48.5)
HGB BLD-MCNC: 14.2 G/DL (ref 12–16)
IMM GRANULOCYTES # BLD AUTO: 0.01 K/UL (ref 0–0.04)
IMM GRANULOCYTES NFR BLD AUTO: 0.2 % (ref 0–0.5)
LYMPHOCYTES # BLD AUTO: 2 K/UL (ref 1–4.8)
LYMPHOCYTES NFR BLD: 36 % (ref 18–48)
MCH RBC QN AUTO: 31.4 PG (ref 27–31)
MCHC RBC AUTO-ENTMCNC: 33.3 G/DL (ref 32–36)
MCV RBC AUTO: 95 FL (ref 82–98)
MONOCYTES # BLD AUTO: 0.5 K/UL (ref 0.3–1)
MONOCYTES NFR BLD: 8.3 % (ref 4–15)
NEUTROPHILS # BLD AUTO: 2.8 K/UL (ref 1.8–7.7)
NEUTROPHILS NFR BLD: 51.9 % (ref 38–73)
NRBC BLD-RTO: 0 /100 WBC
PLATELET # BLD AUTO: 246 K/UL (ref 150–350)
PMV BLD AUTO: 10.4 FL (ref 9.2–12.9)
POTASSIUM SERPL-SCNC: 4 MMOL/L (ref 3.5–5.1)
PROT SERPL-MCNC: 7.8 G/DL (ref 6–8.4)
RBC # BLD AUTO: 4.52 M/UL (ref 4–5.4)
SODIUM SERPL-SCNC: 143 MMOL/L (ref 136–145)
TACROLIMUS BLD-MCNC: 3.7 NG/ML (ref 5–15)
WBC # BLD AUTO: 5.44 K/UL (ref 3.9–12.7)

## 2019-12-17 PROCEDURE — 85025 COMPLETE CBC W/AUTO DIFF WBC: CPT

## 2019-12-17 PROCEDURE — 80197 ASSAY OF TACROLIMUS: CPT

## 2019-12-17 PROCEDURE — 80053 COMPREHEN METABOLIC PANEL: CPT

## 2019-12-17 PROCEDURE — 36415 COLL VENOUS BLD VENIPUNCTURE: CPT

## 2019-12-17 NOTE — TELEPHONE ENCOUNTER
Dr. Benítez reviewed your labs.  Continue routine labs no changes needed.  Letter sent for next lab appointment 01/20/20

## 2019-12-17 NOTE — TELEPHONE ENCOUNTER
----- Message from Lakia Pruett MD sent at 12/17/2019  2:14 PM CST -----  FK 3.7, back to baseline, no change, labs in 1 month.

## 2020-01-11 ENCOUNTER — HOSPITAL ENCOUNTER (EMERGENCY)
Facility: HOSPITAL | Age: 60
Discharge: HOME | End: 2020-01-12
Attending: EMERGENCY MEDICINE
Payer: MEDICARE

## 2020-01-11 DIAGNOSIS — R11.10 VOMITING, INTRACTABILITY OF VOMITING NOT SPECIFIED, PRESENCE OF NAUSEA NOT SPECIFIED, UNSPECIFIED VOMITING TYPE: Primary | ICD-10-CM

## 2020-01-11 PROCEDURE — 96376 TX/PRO/DX INJ SAME DRUG ADON: CPT

## 2020-01-11 PROCEDURE — 96374 THER/PROPH/DIAG INJ IV PUSH: CPT

## 2020-01-11 PROCEDURE — 80053 COMPREHEN METABOLIC PANEL: CPT

## 2020-01-11 PROCEDURE — 99285 EMERGENCY DEPT VISIT HI MDM: CPT | Mod: ,,, | Performed by: EMERGENCY MEDICINE

## 2020-01-11 PROCEDURE — 96375 TX/PRO/DX INJ NEW DRUG ADDON: CPT

## 2020-01-11 PROCEDURE — 99285 EMERGENCY DEPT VISIT HI MDM: CPT | Mod: 25

## 2020-01-11 PROCEDURE — 99285 PR EMERGENCY DEPT VISIT,LEVEL V: ICD-10-PCS | Mod: ,,, | Performed by: EMERGENCY MEDICINE

## 2020-01-11 PROCEDURE — 96361 HYDRATE IV INFUSION ADD-ON: CPT

## 2020-01-11 PROCEDURE — 85025 COMPLETE CBC W/AUTO DIFF WBC: CPT

## 2020-01-11 PROCEDURE — 83690 ASSAY OF LIPASE: CPT

## 2020-01-11 PROCEDURE — 63600175 PHARM REV CODE 636 W HCPCS: Performed by: EMERGENCY MEDICINE

## 2020-01-11 RX ORDER — MORPHINE SULFATE 4 MG/ML
4 INJECTION, SOLUTION INTRAMUSCULAR; INTRAVENOUS
Status: COMPLETED | OUTPATIENT
Start: 2020-01-12 | End: 2020-01-11

## 2020-01-11 RX ORDER — ONDANSETRON 2 MG/ML
4 INJECTION INTRAMUSCULAR; INTRAVENOUS
Status: COMPLETED | OUTPATIENT
Start: 2020-01-11 | End: 2020-01-11

## 2020-01-11 RX ADMIN — MORPHINE SULFATE 4 MG: 4 INJECTION, SOLUTION INTRAMUSCULAR; INTRAVENOUS at 11:01

## 2020-01-11 RX ADMIN — ONDANSETRON 4 MG: 2 INJECTION INTRAMUSCULAR; INTRAVENOUS at 11:01

## 2020-01-11 RX ADMIN — SODIUM CHLORIDE 1000 ML: 0.9 INJECTION, SOLUTION INTRAVENOUS at 11:01

## 2020-01-12 VITALS
BODY MASS INDEX: 26.68 KG/M2 | TEMPERATURE: 98 F | OXYGEN SATURATION: 100 % | HEART RATE: 58 BPM | HEIGHT: 62 IN | RESPIRATION RATE: 18 BRPM | DIASTOLIC BLOOD PRESSURE: 60 MMHG | WEIGHT: 145 LBS | SYSTOLIC BLOOD PRESSURE: 131 MMHG

## 2020-01-12 LAB
ALBUMIN SERPL BCP-MCNC: 4.5 G/DL (ref 3.5–5.2)
ALP SERPL-CCNC: 88 U/L (ref 55–135)
ALT SERPL W/O P-5'-P-CCNC: 27 U/L (ref 10–44)
ANION GAP SERPL CALC-SCNC: 14 MMOL/L (ref 8–16)
AST SERPL-CCNC: 23 U/L (ref 10–40)
BACTERIA #/AREA URNS AUTO: NORMAL /HPF
BASOPHILS # BLD AUTO: 0.03 K/UL (ref 0–0.2)
BASOPHILS NFR BLD: 0.3 % (ref 0–1.9)
BILIRUB SERPL-MCNC: 1.1 MG/DL (ref 0.1–1)
BILIRUB UR QL STRIP: NEGATIVE
BUN SERPL-MCNC: 14 MG/DL (ref 6–20)
CALCIUM SERPL-MCNC: 9.8 MG/DL (ref 8.7–10.5)
CHLORIDE SERPL-SCNC: 103 MMOL/L (ref 95–110)
CLARITY UR REFRACT.AUTO: CLEAR
CO2 SERPL-SCNC: 23 MMOL/L (ref 23–29)
COLOR UR AUTO: YELLOW
CREAT SERPL-MCNC: 0.8 MG/DL (ref 0.5–1.4)
DIFFERENTIAL METHOD: ABNORMAL
EOSINOPHIL # BLD AUTO: 0 K/UL (ref 0–0.5)
EOSINOPHIL NFR BLD: 0.2 % (ref 0–8)
ERYTHROCYTE [DISTWIDTH] IN BLOOD BY AUTOMATED COUNT: 12.7 % (ref 11.5–14.5)
EST. GFR  (AFRICAN AMERICAN): >60 ML/MIN/1.73 M^2
EST. GFR  (NON AFRICAN AMERICAN): >60 ML/MIN/1.73 M^2
GLUCOSE SERPL-MCNC: 231 MG/DL (ref 70–110)
GLUCOSE UR QL STRIP: ABNORMAL
HCT VFR BLD AUTO: 43.5 % (ref 37–48.5)
HGB BLD-MCNC: 14.2 G/DL (ref 12–16)
HGB UR QL STRIP: ABNORMAL
IMM GRANULOCYTES # BLD AUTO: 0.03 K/UL (ref 0–0.04)
IMM GRANULOCYTES NFR BLD AUTO: 0.3 % (ref 0–0.5)
KETONES UR QL STRIP: ABNORMAL
LEUKOCYTE ESTERASE UR QL STRIP: NEGATIVE
LIPASE SERPL-CCNC: 8 U/L (ref 4–60)
LYMPHOCYTES # BLD AUTO: 0.9 K/UL (ref 1–4.8)
LYMPHOCYTES NFR BLD: 8 % (ref 18–48)
MCH RBC QN AUTO: 30.7 PG (ref 27–31)
MCHC RBC AUTO-ENTMCNC: 32.6 G/DL (ref 32–36)
MCV RBC AUTO: 94 FL (ref 82–98)
MICROSCOPIC COMMENT: NORMAL
MONOCYTES # BLD AUTO: 0.7 K/UL (ref 0.3–1)
MONOCYTES NFR BLD: 6.6 % (ref 4–15)
NEUTROPHILS # BLD AUTO: 9.4 K/UL (ref 1.8–7.7)
NEUTROPHILS NFR BLD: 84.6 % (ref 38–73)
NITRITE UR QL STRIP: NEGATIVE
NRBC BLD-RTO: 0 /100 WBC
PH UR STRIP: 7 [PH] (ref 5–8)
PLATELET # BLD AUTO: 224 K/UL (ref 150–350)
PMV BLD AUTO: 10.8 FL (ref 9.2–12.9)
POTASSIUM SERPL-SCNC: 3.2 MMOL/L (ref 3.5–5.1)
PROT SERPL-MCNC: 8 G/DL (ref 6–8.4)
PROT UR QL STRIP: NEGATIVE
RBC # BLD AUTO: 4.63 M/UL (ref 4–5.4)
RBC #/AREA URNS AUTO: 3 /HPF (ref 0–4)
SODIUM SERPL-SCNC: 140 MMOL/L (ref 136–145)
SP GR UR STRIP: 1 (ref 1–1.03)
SQUAMOUS #/AREA URNS AUTO: 0 /HPF
URN SPEC COLLECT METH UR: ABNORMAL
WBC # BLD AUTO: 11.07 K/UL (ref 3.9–12.7)
WBC #/AREA URNS AUTO: 0 /HPF (ref 0–5)

## 2020-01-12 PROCEDURE — 63600175 PHARM REV CODE 636 W HCPCS: Performed by: EMERGENCY MEDICINE

## 2020-01-12 PROCEDURE — 25500020 PHARM REV CODE 255: Performed by: EMERGENCY MEDICINE

## 2020-01-12 PROCEDURE — 81001 URINALYSIS AUTO W/SCOPE: CPT

## 2020-01-12 RX ORDER — ONDANSETRON 2 MG/ML
4 INJECTION INTRAMUSCULAR; INTRAVENOUS
Status: COMPLETED | OUTPATIENT
Start: 2020-01-12 | End: 2020-01-12

## 2020-01-12 RX ORDER — ONDANSETRON 4 MG/1
4 TABLET, FILM COATED ORAL EVERY 6 HOURS PRN
Qty: 12 TABLET | Refills: 0 | Status: SHIPPED | OUTPATIENT
Start: 2020-01-12 | End: 2020-01-17 | Stop reason: SDUPTHER

## 2020-01-12 RX ORDER — METOCLOPRAMIDE HYDROCHLORIDE 5 MG/ML
5 INJECTION INTRAMUSCULAR; INTRAVENOUS
Status: COMPLETED | OUTPATIENT
Start: 2020-01-12 | End: 2020-01-12

## 2020-01-12 RX ADMIN — IOHEXOL 75 ML: 350 INJECTION, SOLUTION INTRAVENOUS at 12:01

## 2020-01-12 RX ADMIN — ONDANSETRON 4 MG: 2 INJECTION INTRAMUSCULAR; INTRAVENOUS at 01:01

## 2020-01-12 RX ADMIN — SODIUM CHLORIDE 1000 ML: 0.9 INJECTION, SOLUTION INTRAVENOUS at 02:01

## 2020-01-12 RX ADMIN — METOCLOPRAMIDE: 5 INJECTION, SOLUTION INTRAMUSCULAR; INTRAVENOUS at 01:01

## 2020-01-12 NOTE — ED TRIAGE NOTES
Nadeen Mcgovern, a 59 y.o. female presents to the ED    Triage note:  Chief Complaint   Patient presents with    Vomiting     n/v/d after eating popeyes today.     Pt states she ate popeyes at 1300 today and started having N/V/D at 1800 tonight. She reports multiple episodes of each; denies blood in emesis or stool. She c/o right upper and lower abd pain described as cramping/stabbing. Pt is moaning, grasping abd, tearful repeatedly verbalizing that she is in severe pain.     Review of patient's allergies indicates:  No Known Allergies  Past Medical History:   Diagnosis Date    Acute cholecystitis     Cholecystitis    Arthritis 2015    septic arthritis of right shoulder    Bacteremia due to Streptococcus pneumoniae     Cancer     Cholangiocarcinoma     Hypertension     Jaundice     obstructive    Prediabetes

## 2020-01-12 NOTE — ED PROVIDER NOTES
Source of History:  Patient    Chief complaint:  Vomiting (n/v/d after eating popeyes today.)    HPI:  Nadeen Mcgovern is a 59 y.o. female with history of liver transplant in 2015 presenting to emergency room with complaint of nausea vomiting diarrhea and abdominal pain after eating [employer] chicken this evening.  Patient states that her grandson also ate the same foods and has also been vomiting.  She has had numerous episodes of nonbloody nonbilious vomiting, diffuse crampy and severe abdominal pain which is constant since the symptoms began, and profuse watery nonbloody diarrhea.    Patient has no urinary complaints.  No fevers, but does have chills. Patient has no recent travel out of the country or antibiotic use.  She is immunocompromised.    ROS: As per HPI and below:  Review of Systems   Constitutional: Positive for chills. Negative for fever.   HENT: Negative for sore throat.    Eyes: Negative for double vision.   Respiratory: Negative for cough and shortness of breath.    Cardiovascular: Negative for chest pain.   Gastrointestinal: Positive for abdominal pain, diarrhea, nausea and vomiting. Negative for blood in stool.   Genitourinary: Negative for dysuria.   Musculoskeletal: Negative for falls.   Skin: Negative for rash.   Neurological: Negative for headaches.     Review of patient's allergies indicates:  No Known Allergies    No current facility-administered medications on file prior to encounter.      Current Outpatient Medications on File Prior to Encounter   Medication Sig Dispense Refill    amLODIPine (NORVASC) 2.5 MG tablet Take 1 tablet (2.5 mg total) by mouth once daily. 90 tablet 3    aspirin 81 MG Chew Take 81 mg by mouth once daily.      betamethasone dipropionate (DIPROLENE) 0.05 % cream Apply topically 2 (two) times daily. 45 g 3    clobetasol 0.05% (TEMOVATE) 0.05 % Oint Apply topically 2 (two) times daily. 60 g 0    clotrimazole (LOTRIMIN) 1 % cream Apply topically 2 (two) times daily. 30  g 3    diclofenac sodium (VOLTAREN) 1 % Gel Apply 2 g topically 3 (three) times daily. 100 g 0    diphenhydrAMINE (BENADRYL) 25 mg capsule Take 25 mg by mouth every 6 (six) hours as needed for Itching.      famotidine (PEPCID) 20 MG tablet Take 1 tablet (20 mg total) by mouth nightly. 30 tablet 11    hydrOXYzine pamoate (VISTARIL) 50 MG Cap Take 1 capsule (50 mg total) by mouth every 8 (eight) hours as needed. (Patient not taking: Reported on 2019) 30 capsule 0    multivitamin (THERAGRAN) tablet Take 1 tablet by mouth once daily.      mycophenolate (CELLCEPT) 500 mg Tab Take 2 tablets (1,000 mg total) by mouth 2 (two) times daily. 360 tablet 0    promethazine (PHENERGAN) 6.25 mg/5 mL syrup Take 20 mLs (25 mg total) by mouth every 6 (six) hours as needed for Nausea (and cough). 118 mL 0    sildenafil (REVATIO) 20 mg Tab Take 1 tablet (20 mg total) by mouth 3 (three) times daily. 270 tablet 1    tacrolimus (PROGRAF) 1 MG Cap TAKE 2 CAPSULES BY MOUTH EVERY 12 HOURS 120 capsule 0    traMADol (ULTRAM) 50 mg tablet Take 1 tablet (50 mg total) by mouth every 6 (six) hours. (Patient not taking: Reported on 2019) 12 tablet 0    zolpidem (AMBIEN) 5 MG Tab Take 1 tablet (5 mg total) by mouth nightly as needed. 30 tablet 3       PMH:  As per HPI and below:  Past Medical History:   Diagnosis Date    Acute cholecystitis     Cholecystitis    Arthritis     septic arthritis of right shoulder    Bacteremia due to Streptococcus pneumoniae     Cancer     Cholangiocarcinoma     Hypertension     Jaundice     obstructive    Prediabetes      Past Surgical History:   Procedure Laterality Date    arthoscopic Right     drained infection     SECTION      INCISION AND DRAINAGE OF WOUND      s/p I&D of R thumb    LIVER TRANSPLANT      SHOULDER ARTHROSCOPY         Social History     Socioeconomic History    Marital status: Single     Spouse name: Not on file    Number of children: Not on file     Years of education: Not on file    Highest education level: Not on file   Occupational History    Occupation: Food Tech     Comment: [employer]   Social Needs    Financial resource strain: Hard    Food insecurity:     Worry: Sometimes true     Inability: Sometimes true    Transportation needs:     Medical: No     Non-medical: No   Tobacco Use    Smoking status: Former Smoker    Smokeless tobacco: Never Used   Substance and Sexual Activity    Alcohol use: Yes     Frequency: 2-4 times a month     Drinks per session: 1 or 2     Binge frequency: Never     Comment: socially    Drug use: No     Types: Marijuana    Sexual activity: Never   Lifestyle    Physical activity:     Days per week: 2 days     Minutes per session: 20 min    Stress: Not at all   Relationships    Social connections:     Talks on phone: More than three times a week     Gets together: Three times a week     Attends [employer] service: Not on file     Active member of club or organization: No     Attends meetings of clubs or organizations: Not on file     Relationship status:    Other Topics Concern    Not on file   Social History Narrative    Not on file       Family History   Problem Relation Age of Onset    Cancer Father         Lung    Arthritis Father     Stroke Mother     Diabetes Mother     Hypertension Mother     Heart attack Mother     Cancer Paternal Grandmother         pancreatic cancer    Cancer Brother         Lung    Alcohol abuse Brother     Arthritis Sister     No Known Problems Daughter     No Known Problems Son     No Known Problems Son     No Known Problems Daughter     Colon cancer Neg Hx     Esophageal cancer Neg Hx        Physical Exam:    Vitals:    01/12/20 0317   BP:    Pulse: (!) 58   Resp:    Temp:      Gen:  Actively vomiting.  Mental Status:  Alert and oriented x 3.  Appropriate, conversant.  Skin: Warm, dry. No rashes seen.  Eyes: No conjunctival  injection.  ENT: Oropharynx clear.    Pulm: Clear to auscultation bilaterally.  Good air movement.  No wheezes. No increased work of breathing.  CV: Regular rate. Regular rhythm. Normal peripheral perfusion. No lower extremity edema.  Abd: Soft.  Not distended.  Diffuse mild tenderness to palpation without rebound tenderness or guarding.  MSK: Good range of motion all joints.  No deformities.    No CVA tenderness on the right.  No CVA tenderness on the left.  Neuro: Awake. Speech normal. No focal neuro deficit observed. Cranial nerves intact. Motor grossly intact.  Sensation grossly intact.  Cerebellar intact.      Laboratory Studies:  Labs Reviewed   CBC W/ AUTO DIFFERENTIAL - Abnormal; Notable for the following components:       Result Value    Gran # (ANC) 9.4 (*)     Lymph # 0.9 (*)     Gran% 84.6 (*)     Lymph% 8.0 (*)     All other components within normal limits   COMPREHENSIVE METABOLIC PANEL - Abnormal; Notable for the following components:    Potassium 3.2 (*)     Glucose 231 (*)     Total Bilirubin 1.1 (*)     All other components within normal limits   URINALYSIS, REFLEX TO URINE CULTURE - Abnormal; Notable for the following components:    Glucose, UA 1+ (*)     Ketones, UA Trace (*)     Occult Blood UA 1+ (*)     All other components within normal limits    Narrative:     Preferred Collection Type->Urine, Clean Catch   LIPASE   URINALYSIS MICROSCOPIC    Narrative:     Preferred Collection Type->Urine, Clean Catch     Chart reviewed.  Liver transplant in 2015.    Imaging Results          CT Abdomen Pelvis With Contrast (Final result)  Result time 01/12/20 01:15:39    Final result by Jabier De La Rosa MD (01/12/20 01:15:39)                 Impression:      Cystic structure at the level of the nick hepatis again noted, stable appearance, slightly diminished in size.    Indeterminate hypodensity of the spleen again noted, stable appearance.    There are some small bowel loops with mild wall and fold  prominence and there are some mildly prominent small bowel loops however there is no evidence for small bowel obstruction or significant inflammatory stranding, this may relate to enteritis/ileus.      Electronically signed by: Jabier De La Rosa  Date:    01/12/2020  Time:    01:15             Narrative:    EXAMINATION:  CT ABDOMEN PELVIS WITH CONTRAST    CLINICAL HISTORY:  Abdominal distension;    TECHNIQUE:  Low dose axial images, sagittal and coronal reformations were obtained from the lung bases to the pubic symphysis following the IV administration of 75 mL of Omnipaque 350 .  Oral contrast was not given.    COMPARISON:  November 13, 2019    FINDINGS:  The visualized lung bases demonstrate mild motion artifact, otherwise appear clear.  There is appearance thought to represent a small hiatal hernia.  The stomach is incompletely distended, nonspecific appearance of mild fluid and air within the gastric lumen.  Pneumobilia is noted.  The gallbladder is surgically absent.  There is a cystic structure at the nick hepatis again noted, measuring approximately 2 x 2.7 cm in size, stable slightly less prominent.  There is no abnormal biliary dilatation.  The appearance of the liver is otherwise stable.  There is mild pancreatic ductal prominence, this is stable and there is no peripancreatic inflammatory change, the appearance of the pancreas otherwise stable.  Indeterminate hypodense lesion of the spleen again noted, stable appearance, on the delayed imaging there is suggestion of peripheral nodular enhancement and this could represent a hemangioma.  The adrenal glands appear unremarkable.  There is no evidence for hydronephrosis or obstructive uropathy bilaterally there is no evidence for ureteral calculus.  Incidental note is made of the appearance of a retroaortic left renal vein.  The abdominal aorta demonstrates appropriate opacification.  The urinary bladder appears unremarkable for degree of distention.   Evaluation of the uterus and adnexa is limited however there is no evidence for dominant adnexal mass or cystic collection.    There are some small bowel loops with mild wall and fold thickening, without significant surrounding inflammatory change and there are some mildly prominent small bowel loops in the lower abdomen and pelvis, there is no evidence for bowel obstruction this may relate to enteritis/ileus.  The appendix is identified and does not appear inflamed.  There is no evidence for inflammatory or obstructive process of the colon.  There is appearance of the left abdominal wall that may relate to a partial hernia or may relate to postoperative change, this configuration is stable.  There is no free intraperitoneal air.  The osseous structures demonstrate chronic change.                                Medications Given:  Medications   sodium chloride 0.9% bolus 1,000 mL (0 mLs Intravenous Stopped 1/12/20 0120)   ondansetron injection 4 mg (4 mg Intravenous Given 1/11/20 2349)   morphine injection 4 mg (4 mg Intravenous Given 1/11/20 2356)   iohexol (OMNIPAQUE 350) injection 75 mL (75 mLs Intravenous Given 1/12/20 0056)   ondansetron injection 4 mg (4 mg Intravenous Given 1/12/20 0141)   metoclopramide HCl injection 5 mg ( Intravenous Given 1/12/20 0141)   sodium chloride 0.9% bolus 1,000 mL (0 mLs Intravenous Stopped 1/12/20 0317)     MDM:    59 y.o. female with complaint of persistent vomiting, diarrhea, and abdominal pain after eating [employer] earlier this evening.  Of note, she did have liver transplant in 2015.  She is afebrile, stable, nontoxic but uncomfortable and actively dry heaving.    Differential diagnosis including but not limited to:  Bowel obstruction, gastritis, gastroenteritis, UTI    Patient received IV fluids, Zofran, and morphine for her symptoms initially.  We obtained labs including CBC CMP, urinalysis, and a CT scan of the abdomen is she had diffuse severe pain.    Her workup  was relatively unremarkable. Labs do not demonstrate acute abnormality, aside from a mildly decreased potassium which is likely secondary to the vomiting.  Urinalysis is not consistent with infection.  CT of the abdomen shows a few small dilated bowel loops without transition point or concern for ileus or bowel obstruction.    On re-evaluation, pain is significantly improved, however she continues to dry heave.  She received Reglan and additional Zofran.  With these measures, she had complete resolution of her symptoms and stated that she felt better.    Therefore patient was given a p.o. challenge, which she passed.  Patient stated that she prefer discharge, so was discharged home with Zofran and return precautions.  She was stable at time of discharge. She was specifically instructed to come back if she could not hold down her transplant medications.    Diagnostic Impression:    1. Vomiting, intractability of vomiting not specified, presence of nausea not specified, unspecified vomiting type      Patient and/or family understands the plan and is in agreement, verbalized understanding, questions answered    Shereen Alcala MD  Emergency Medicine           Shereen Alcala MD  01/12/20 0027

## 2020-01-12 NOTE — ED NOTES
Pt is now resting in stretcher. She is encouraged to drink sips of water for PO challenge and need for urine sample.  is at the bedside and assisting patient.

## 2020-01-12 NOTE — DISCHARGE INSTRUCTIONS
Diagnosis: Vomiting    Return to the ER if you cannot hold down your transplant medications.    Tests today showed:   Labs Reviewed   CBC W/ AUTO DIFFERENTIAL - Abnormal; Notable for the following components:       Result Value    Gran # (ANC) 9.4 (*)     Lymph # 0.9 (*)     Gran% 84.6 (*)     Lymph% 8.0 (*)     All other components within normal limits   COMPREHENSIVE METABOLIC PANEL - Abnormal; Notable for the following components:    Potassium 3.2 (*)     Glucose 231 (*)     Total Bilirubin 1.1 (*)     All other components within normal limits   URINALYSIS, REFLEX TO URINE CULTURE - Abnormal; Notable for the following components:    Glucose, UA 1+ (*)     Ketones, UA Trace (*)     Occult Blood UA 1+ (*)     All other components within normal limits    Narrative:     Preferred Collection Type->Urine, Clean Catch   LIPASE   URINALYSIS MICROSCOPIC    Narrative:     Preferred Collection Type->Urine, Clean Catch     Imaging Results              CT Abdomen Pelvis With Contrast (Final result)  Result time 01/12/20 01:15:39      Final result by Jabier De La Rosa MD (01/12/20 01:15:39)                   Impression:      Cystic structure at the level of the nick hepatis again noted, stable appearance, slightly diminished in size.    Indeterminate hypodensity of the spleen again noted, stable appearance.    There are some small bowel loops with mild wall and fold prominence and there are some mildly prominent small bowel loops however there is no evidence for small bowel obstruction or significant inflammatory stranding, this may relate to enteritis/ileus.      Electronically signed by: Jabier De La Rosa  Date:    01/12/2020  Time:    01:15               Narrative:    EXAMINATION:  CT ABDOMEN PELVIS WITH CONTRAST    CLINICAL HISTORY:  Abdominal distension;    TECHNIQUE:  Low dose axial images, sagittal and coronal reformations were obtained from the lung bases to the pubic symphysis following the IV administration of 75 mL  of Omnipaque 350 .  Oral contrast was not given.    COMPARISON:  November 13, 2019    FINDINGS:  The visualized lung bases demonstrate mild motion artifact, otherwise appear clear.  There is appearance thought to represent a small hiatal hernia.  The stomach is incompletely distended, nonspecific appearance of mild fluid and air within the gastric lumen.  Pneumobilia is noted.  The gallbladder is surgically absent.  There is a cystic structure at the nick hepatis again noted, measuring approximately 2 x 2.7 cm in size, stable slightly less prominent.  There is no abnormal biliary dilatation.  The appearance of the liver is otherwise stable.  There is mild pancreatic ductal prominence, this is stable and there is no peripancreatic inflammatory change, the appearance of the pancreas otherwise stable.  Indeterminate hypodense lesion of the spleen again noted, stable appearance, on the delayed imaging there is suggestion of peripheral nodular enhancement and this could represent a hemangioma.  The adrenal glands appear unremarkable.  There is no evidence for hydronephrosis or obstructive uropathy bilaterally there is no evidence for ureteral calculus.  Incidental note is made of the appearance of a retroaortic left renal vein.  The abdominal aorta demonstrates appropriate opacification.  The urinary bladder appears unremarkable for degree of distention.  Evaluation of the uterus and adnexa is limited however there is no evidence for dominant adnexal mass or cystic collection.    There are some small bowel loops with mild wall and fold thickening, without significant surrounding inflammatory change and there are some mildly prominent small bowel loops in the lower abdomen and pelvis, there is no evidence for bowel obstruction this may relate to enteritis/ileus.  The appendix is identified and does not appear inflamed.  There is no evidence for inflammatory or obstructive process of the colon.  There is appearance of the  left abdominal wall that may relate to a partial hernia or may relate to postoperative change, this configuration is stable.  There is no free intraperitoneal air.  The osseous structures demonstrate chronic change.                                      Treatments you had today:   Medications   sodium chloride 0.9% bolus 1,000 mL (0 mLs Intravenous Stopped 1/12/20 0120)   ondansetron injection 4 mg (4 mg Intravenous Given 1/11/20 2349)   morphine injection 4 mg (4 mg Intravenous Given 1/11/20 2356)   iohexol (OMNIPAQUE 350) injection 75 mL (75 mLs Intravenous Given 1/12/20 0056)   ondansetron injection 4 mg (4 mg Intravenous Given 1/12/20 0141)   metoclopramide HCl injection 5 mg ( Intravenous Given 1/12/20 0141)   sodium chloride 0.9% bolus 1,000 mL (0 mLs Intravenous Stopped 1/12/20 0317)       Home Care Instructions include:  - Continue taking your home medications as prescribed    Take the ondansetron (Zofran) as prescribed.  Place it under your tongue and let the medication dissolve on its own  Do not swallow whole  Give the medication 30 minutes to work before eating or drinking  Drink clear fluids (water, gatorade, broth, etc) and eat simple foods  Do not eat fatty, greasy, heavy meals when using this medication    Follow-up plan:  - Follow-up with: Primary care doctor within 3 - 5  days  - Follow-up for additional testing and/or evaluation as directed by your primary doctor    Return to the emergency department for symptoms including but not limited to: worsening symptoms, persistent abdominal pain, shortness of breath or chest pain, vomiting with inability to hold down fluids, fevers greater than 100.4°F, bloody vomit or poop, passing out/unconsciousness, or other concerning symptoms.

## 2020-01-13 ENCOUNTER — TELEPHONE (OUTPATIENT)
Dept: INTERNAL MEDICINE | Facility: CLINIC | Age: 60
End: 2020-01-13

## 2020-01-13 NOTE — TELEPHONE ENCOUNTER
----- Message from Elvi Singh sent at 1/13/2020  3:53 PM CST -----  Contact: patient via [employer]  Message     Appointment Request From: Nadeen Mcgovern    With Provider: Nadeen Figueroa MD [[facility] - Internal Medicine]    Preferred Date Range: Any date 1/28/2020 or later    Preferred Times: Monday Morning    Reason for visit: Follow up    Comments:  Food poisoning

## 2020-01-17 ENCOUNTER — OFFICE VISIT (OUTPATIENT)
Dept: INTERNAL MEDICINE | Facility: CLINIC | Age: 60
End: 2020-01-17
Payer: MEDICARE

## 2020-01-17 VITALS
TEMPERATURE: 98 F | BODY MASS INDEX: 26.17 KG/M2 | SYSTOLIC BLOOD PRESSURE: 114 MMHG | WEIGHT: 142.19 LBS | DIASTOLIC BLOOD PRESSURE: 64 MMHG | HEART RATE: 62 BPM | HEIGHT: 62 IN

## 2020-01-17 DIAGNOSIS — K52.9 GASTROENTERITIS: Primary | ICD-10-CM

## 2020-01-17 DIAGNOSIS — M34.9 LIMITED SCLERODERMA: ICD-10-CM

## 2020-01-17 DIAGNOSIS — Z94.4 S/P LIVER TRANSPLANT: ICD-10-CM

## 2020-01-17 DIAGNOSIS — I70.0 AORTIC ATHEROSCLEROSIS: ICD-10-CM

## 2020-01-17 DIAGNOSIS — D84.9 IMMUNOSUPPRESSION: ICD-10-CM

## 2020-01-17 DIAGNOSIS — L30.9 ECZEMA, UNSPECIFIED TYPE: ICD-10-CM

## 2020-01-17 DIAGNOSIS — M35.00 SICCA SYNDROME: ICD-10-CM

## 2020-01-17 PROCEDURE — 99499 UNLISTED E&M SERVICE: CPT | Mod: S$GLB,,, | Performed by: INTERNAL MEDICINE

## 2020-01-17 PROCEDURE — 3074F PR MOST RECENT SYSTOLIC BLOOD PRESSURE < 130 MM HG: ICD-10-PCS | Mod: CPTII,S$GLB,, | Performed by: INTERNAL MEDICINE

## 2020-01-17 PROCEDURE — 99999 PR PBB SHADOW E&M-EST. PATIENT-LVL III: CPT | Mod: PBBFAC,,, | Performed by: INTERNAL MEDICINE

## 2020-01-17 PROCEDURE — 3078F DIAST BP <80 MM HG: CPT | Mod: CPTII,S$GLB,, | Performed by: INTERNAL MEDICINE

## 2020-01-17 PROCEDURE — 3078F PR MOST RECENT DIASTOLIC BLOOD PRESSURE < 80 MM HG: ICD-10-PCS | Mod: CPTII,S$GLB,, | Performed by: INTERNAL MEDICINE

## 2020-01-17 PROCEDURE — 99214 OFFICE O/P EST MOD 30 MIN: CPT | Mod: S$GLB,,, | Performed by: INTERNAL MEDICINE

## 2020-01-17 PROCEDURE — 3074F SYST BP LT 130 MM HG: CPT | Mod: CPTII,S$GLB,, | Performed by: INTERNAL MEDICINE

## 2020-01-17 PROCEDURE — 99999 PR PBB SHADOW E&M-EST. PATIENT-LVL III: ICD-10-PCS | Mod: PBBFAC,,, | Performed by: INTERNAL MEDICINE

## 2020-01-17 PROCEDURE — 99499 RISK ADDL DX/OHS AUDIT: ICD-10-PCS | Mod: S$GLB,,, | Performed by: INTERNAL MEDICINE

## 2020-01-17 PROCEDURE — 3008F PR BODY MASS INDEX (BMI) DOCUMENTED: ICD-10-PCS | Mod: CPTII,S$GLB,, | Performed by: INTERNAL MEDICINE

## 2020-01-17 PROCEDURE — 3008F BODY MASS INDEX DOCD: CPT | Mod: CPTII,S$GLB,, | Performed by: INTERNAL MEDICINE

## 2020-01-17 PROCEDURE — 99214 PR OFFICE/OUTPT VISIT, EST, LEVL IV, 30-39 MIN: ICD-10-PCS | Mod: S$GLB,,, | Performed by: INTERNAL MEDICINE

## 2020-01-17 RX ORDER — ONDANSETRON 4 MG/1
4 TABLET, FILM COATED ORAL EVERY 6 HOURS PRN
Qty: 30 TABLET | Refills: 0 | Status: SHIPPED | OUTPATIENT
Start: 2020-01-17 | End: 2022-11-01

## 2020-01-17 RX ORDER — AMOXICILLIN AND CLAVULANATE POTASSIUM 875; 125 MG/1; MG/1
1 TABLET, FILM COATED ORAL EVERY 12 HOURS
Qty: 14 TABLET | Refills: 0 | Status: SHIPPED | OUTPATIENT
Start: 2020-01-17 | End: 2020-01-24

## 2020-01-17 RX ORDER — HYDROXYZINE PAMOATE 50 MG/1
50 CAPSULE ORAL EVERY 8 HOURS PRN
Qty: 30 CAPSULE | Refills: 3 | Status: SHIPPED | OUTPATIENT
Start: 2020-01-17 | End: 2020-07-20 | Stop reason: SDUPTHER

## 2020-01-17 NOTE — PROGRESS NOTES
"Subjective:       Patient ID: Nadeen Mcgovern is a 59 y.o. female.    Chief Complaint: ED f/u    HPI   Pt is well known to me. Last seen pt 7/2/19.  Had ep of nausea/vomiting and diarrhea after eating at [employer] and went to ED 1/12/20.  CT A/P w/ con - 2x2.7cm cyst in the nick hepatis. Stable mild pancreatic ductal prominence. Possible hemangioma of the spleen. Retroaortic L renal vein. Small bowel loops w/ mild wall and fold thickening.  UA w/ glucose, ketones, 1+ blood, neg micro.  K mildly low at 3.2, gluc elevated, Tbili 1.1.  Discharged home on zofran.    Pt reports that her nausea never went away. Diarrhea is better. Still w/ abdominal tenderness of B upper quadrant.   No fevers/chills. Feels cold all the time though.   She's only eating some soft and light things.     Scleroderma and Raynaud's, sicca  Liver transplant  amlodipine 2.5mg daily, cellcept 1000mg BID, revatio 20mg TID, prograf 2mg BID.   Last saw Dr. Pruett 10/14/19 and Dr. Butler 10/1/19.     CT C/A/P 2017 w/ mild aortic atherosclerosis.   On ASA 81mg daily.     Review of Systems  Comprehensive review of systems otherwise negative. See history/subjective section for more details.    Objective:      Physical Exam    /64 (BP Location: Left arm, Patient Position: Sitting, BP Method: Medium (Manual))   Pulse 62   Temp 97.7 °F (36.5 °C)   Ht 5' 2" (1.575 m)   Wt 64.5 kg (142 lb 3.2 oz)   LMP  (LMP Unknown)   BMI 26.01 kg/m²     GEN - A+OX4, NAD   HEENT - PERRL, EOMI, OP clear. MMM.   Neck - No thyromegaly or cervical LAD. No thyroid masses felt.  CV - RRR, no m/r   Chest - CTAB, no wheezing or rhonchi  Abd - S/ND/+BS. B upper abdomen w/ pain on palpation. No rebound or guarding.   Ext - 2+B radial pulses and 1+ B DP. No LE edema. Raynaud's changes in the fingertips.   Skin - dry almost blistery areas on the palms.    Labs reviewed.     Assessment/Plan     Nadeen was seen today for hospital follow up.    Diagnoses and all orders for this " visit:    Gastroenteritis  -     amoxicillin-clavulanate 875-125mg (AUGMENTIN) 875-125 mg per tablet; Take 1 tablet by mouth every 12 (twelve) hours. for 7 days  -     ondansetron (ZOFRAN) 4 MG tablet; Take 1 tablet (4 mg total) by mouth every 6 (six) hours as needed for Nausea.    S/P liver transplant - cont current meds. F/u w/ transplant.     Limited scleroderma - cont current meds. F/u w/ rheumatologist.     Immunosuppression - lower threshold from antibiotics. Given persistent symptoms, will give augmentin for GE.     Sicca syndrome - doing well.     Aortic atherosclerosis - on asa 81mg daily.   -     Lipid panel; Future    Eczema, unspecified type - cont steroid cream prn.   -     hydrOXYzine pamoate (VISTARIL) 50 MG Cap; Take 1 capsule (50 mg total) by mouth every 8 (eight) hours as needed.    Follow up in about 6 months (around 7/17/2020).      Nadeen Figueroa MD  Department of Internal Medicine - [facility]  8:50 AM

## 2020-01-20 ENCOUNTER — LAB VISIT (OUTPATIENT)
Dept: LAB | Facility: HOSPITAL | Age: 60
End: 2020-01-20
Attending: INTERNAL MEDICINE
Payer: MEDICARE

## 2020-01-20 DIAGNOSIS — Z94.4 LIVER REPLACED BY TRANSPLANT: ICD-10-CM

## 2020-01-20 DIAGNOSIS — I70.0 AORTIC ATHEROSCLEROSIS: ICD-10-CM

## 2020-01-20 LAB
ALBUMIN SERPL BCP-MCNC: 4.1 G/DL (ref 3.5–5.2)
ALP SERPL-CCNC: 74 U/L (ref 55–135)
ALT SERPL W/O P-5'-P-CCNC: 16 U/L (ref 10–44)
ANION GAP SERPL CALC-SCNC: 9 MMOL/L (ref 8–16)
AST SERPL-CCNC: 18 U/L (ref 10–40)
BASOPHILS # BLD AUTO: 0.04 K/UL (ref 0–0.2)
BASOPHILS NFR BLD: 0.7 % (ref 0–1.9)
BILIRUB SERPL-MCNC: 0.6 MG/DL (ref 0.1–1)
BUN SERPL-MCNC: 10 MG/DL (ref 6–20)
CALCIUM SERPL-MCNC: 9.5 MG/DL (ref 8.7–10.5)
CHLORIDE SERPL-SCNC: 109 MMOL/L (ref 95–110)
CHOLEST SERPL-MCNC: 137 MG/DL (ref 120–199)
CHOLEST/HDLC SERPL: 3.5 {RATIO} (ref 2–5)
CO2 SERPL-SCNC: 23 MMOL/L (ref 23–29)
CREAT SERPL-MCNC: 0.7 MG/DL (ref 0.5–1.4)
DIFFERENTIAL METHOD: NORMAL
EOSINOPHIL # BLD AUTO: 0.2 K/UL (ref 0–0.5)
EOSINOPHIL NFR BLD: 3.4 % (ref 0–8)
ERYTHROCYTE [DISTWIDTH] IN BLOOD BY AUTOMATED COUNT: 12.8 % (ref 11.5–14.5)
EST. GFR  (AFRICAN AMERICAN): >60 ML/MIN/1.73 M^2
EST. GFR  (NON AFRICAN AMERICAN): >60 ML/MIN/1.73 M^2
GLUCOSE SERPL-MCNC: 95 MG/DL (ref 70–110)
HCT VFR BLD AUTO: 41.8 % (ref 37–48.5)
HDLC SERPL-MCNC: 39 MG/DL (ref 40–75)
HDLC SERPL: 28.5 % (ref 20–50)
HGB BLD-MCNC: 13.7 G/DL (ref 12–16)
IMM GRANULOCYTES # BLD AUTO: 0.01 K/UL (ref 0–0.04)
IMM GRANULOCYTES NFR BLD AUTO: 0.2 % (ref 0–0.5)
LDLC SERPL CALC-MCNC: 83 MG/DL (ref 63–159)
LYMPHOCYTES # BLD AUTO: 1.4 K/UL (ref 1–4.8)
LYMPHOCYTES NFR BLD: 27 % (ref 18–48)
MCH RBC QN AUTO: 30.9 PG (ref 27–31)
MCHC RBC AUTO-ENTMCNC: 32.8 G/DL (ref 32–36)
MCV RBC AUTO: 94 FL (ref 82–98)
MONOCYTES # BLD AUTO: 0.5 K/UL (ref 0.3–1)
MONOCYTES NFR BLD: 8.6 % (ref 4–15)
NEUTROPHILS # BLD AUTO: 3.2 K/UL (ref 1.8–7.7)
NEUTROPHILS NFR BLD: 60.1 % (ref 38–73)
NONHDLC SERPL-MCNC: 98 MG/DL
NRBC BLD-RTO: 0 /100 WBC
PLATELET # BLD AUTO: 241 K/UL (ref 150–350)
PMV BLD AUTO: 11.1 FL (ref 9.2–12.9)
POTASSIUM SERPL-SCNC: 4 MMOL/L (ref 3.5–5.1)
PROT SERPL-MCNC: 7.4 G/DL (ref 6–8.4)
RBC # BLD AUTO: 4.43 M/UL (ref 4–5.4)
SODIUM SERPL-SCNC: 141 MMOL/L (ref 136–145)
TACROLIMUS BLD-MCNC: 10.4 NG/ML (ref 5–15)
TRIGL SERPL-MCNC: 75 MG/DL (ref 30–150)
WBC # BLD AUTO: 5.34 K/UL (ref 3.9–12.7)

## 2020-01-20 PROCEDURE — 36415 COLL VENOUS BLD VENIPUNCTURE: CPT

## 2020-01-20 PROCEDURE — 80197 ASSAY OF TACROLIMUS: CPT

## 2020-01-20 PROCEDURE — 80061 LIPID PANEL: CPT

## 2020-01-20 PROCEDURE — 85025 COMPLETE CBC W/AUTO DIFF WBC: CPT

## 2020-01-20 PROCEDURE — 80053 COMPREHEN METABOLIC PANEL: CPT

## 2020-01-20 RX ORDER — TACROLIMUS 1 MG/1
CAPSULE ORAL
Qty: 90 CAPSULE | Refills: 11 | Status: SHIPPED | OUTPATIENT
Start: 2020-01-20 | End: 2020-04-06 | Stop reason: SDUPTHER

## 2020-01-20 NOTE — TELEPHONE ENCOUNTER
Dr Pruett reviewed your labs   called patient to see if she took meds before labs, she did not.  She will decrease her Prograf dose to 2 mg in the morning and 1 mg in the evening.  Labs will be due on 03/11/20

## 2020-01-20 NOTE — LETTER
January 20, 2020    Nadeen Mcgovern  [address] [facility] [address]          Dear Nadeen Mcgovern:  MRN: [id number]    This is a follow up to your recent labs, your lab results were stable, but Prograf level high. Please have your labs drawn again on 03/10/20, so we can recheck your levels..      If you cannot have your labs drawn on the scheduled date, it is your responsibility to call the transplant department to reschedule.  To reschedule or make an appointment, please as to speak to or leave a message for my assistant, Tona Smith or Kiersten, at [phone].  When leaving a message for Tona Smith Angela or myself, we ask that you leave a brief message regarding your request.    Sincerely,    Chetna Pickard RN      Your Liver Transplant Coordinator    [facility]  [address] [place] [id number] [phone]

## 2020-01-20 NOTE — TELEPHONE ENCOUNTER
----- Message from Lakia Pruett MD sent at 1/20/2020  3:38 PM CST -----  Tacrolimus supratherapeutic 10.4, supratherapeutic, please reduce tacrolimus to 2 mg in the morning and 1 mg in the evening.  Repeat blood tests in 2-3 weeks.

## 2020-02-04 ENCOUNTER — LAB VISIT (OUTPATIENT)
Dept: LAB | Facility: HOSPITAL | Age: 60
End: 2020-02-04
Attending: INTERNAL MEDICINE
Payer: MEDICARE

## 2020-02-04 DIAGNOSIS — M34.9 SCLERODERMA: ICD-10-CM

## 2020-02-04 DIAGNOSIS — M34.9 LIMITED SCLERODERMA: ICD-10-CM

## 2020-02-04 DIAGNOSIS — Z79.899 ON MYCOPHENOLATE MOFETIL THERAPY: ICD-10-CM

## 2020-02-04 LAB
ALBUMIN SERPL BCP-MCNC: 4.3 G/DL (ref 3.5–5.2)
ALP SERPL-CCNC: 76 U/L (ref 55–135)
ALT SERPL W/O P-5'-P-CCNC: 20 U/L (ref 10–44)
ANION GAP SERPL CALC-SCNC: 10 MMOL/L (ref 8–16)
AST SERPL-CCNC: 21 U/L (ref 10–40)
BASOPHILS # BLD AUTO: 0.03 K/UL (ref 0–0.2)
BASOPHILS NFR BLD: 0.6 % (ref 0–1.9)
BILIRUB SERPL-MCNC: 0.7 MG/DL (ref 0.1–1)
BUN SERPL-MCNC: 9 MG/DL (ref 6–20)
CALCIUM SERPL-MCNC: 9.6 MG/DL (ref 8.7–10.5)
CHLORIDE SERPL-SCNC: 108 MMOL/L (ref 95–110)
CO2 SERPL-SCNC: 26 MMOL/L (ref 23–29)
CREAT SERPL-MCNC: 0.8 MG/DL (ref 0.5–1.4)
CRP SERPL-MCNC: 2 MG/L (ref 0–8.2)
DIFFERENTIAL METHOD: ABNORMAL
EOSINOPHIL # BLD AUTO: 0.2 K/UL (ref 0–0.5)
EOSINOPHIL NFR BLD: 3.8 % (ref 0–8)
ERYTHROCYTE [DISTWIDTH] IN BLOOD BY AUTOMATED COUNT: 13.2 % (ref 11.5–14.5)
ERYTHROCYTE [SEDIMENTATION RATE] IN BLOOD BY WESTERGREN METHOD: 16 MM/HR (ref 0–36)
EST. GFR  (AFRICAN AMERICAN): >60 ML/MIN/1.73 M^2
EST. GFR  (NON AFRICAN AMERICAN): >60 ML/MIN/1.73 M^2
GLUCOSE SERPL-MCNC: 85 MG/DL (ref 70–110)
HCT VFR BLD AUTO: 42.2 % (ref 37–48.5)
HGB BLD-MCNC: 13.4 G/DL (ref 12–16)
IMM GRANULOCYTES # BLD AUTO: 0.01 K/UL (ref 0–0.04)
IMM GRANULOCYTES NFR BLD AUTO: 0.2 % (ref 0–0.5)
LYMPHOCYTES # BLD AUTO: 1.6 K/UL (ref 1–4.8)
LYMPHOCYTES NFR BLD: 34.5 % (ref 18–48)
MCH RBC QN AUTO: 30.9 PG (ref 27–31)
MCHC RBC AUTO-ENTMCNC: 31.8 G/DL (ref 32–36)
MCV RBC AUTO: 98 FL (ref 82–98)
MONOCYTES # BLD AUTO: 0.4 K/UL (ref 0.3–1)
MONOCYTES NFR BLD: 8.9 % (ref 4–15)
NEUTROPHILS # BLD AUTO: 2.4 K/UL (ref 1.8–7.7)
NEUTROPHILS NFR BLD: 52 % (ref 38–73)
NRBC BLD-RTO: 0 /100 WBC
PLATELET # BLD AUTO: 202 K/UL (ref 150–350)
PMV BLD AUTO: 11.5 FL (ref 9.2–12.9)
POTASSIUM SERPL-SCNC: 4.2 MMOL/L (ref 3.5–5.1)
PROT SERPL-MCNC: 7.6 G/DL (ref 6–8.4)
RBC # BLD AUTO: 4.33 M/UL (ref 4–5.4)
SODIUM SERPL-SCNC: 144 MMOL/L (ref 136–145)
WBC # BLD AUTO: 4.7 K/UL (ref 3.9–12.7)

## 2020-02-04 PROCEDURE — 85652 RBC SED RATE AUTOMATED: CPT

## 2020-02-04 PROCEDURE — 36415 COLL VENOUS BLD VENIPUNCTURE: CPT

## 2020-02-04 PROCEDURE — 80053 COMPREHEN METABOLIC PANEL: CPT

## 2020-02-04 PROCEDURE — 86140 C-REACTIVE PROTEIN: CPT

## 2020-02-04 PROCEDURE — 85025 COMPLETE CBC W/AUTO DIFF WBC: CPT

## 2020-02-07 ENCOUNTER — OFFICE VISIT (OUTPATIENT)
Dept: RHEUMATOLOGY | Facility: CLINIC | Age: 60
End: 2020-02-07
Payer: MEDICARE

## 2020-02-07 VITALS
WEIGHT: 143.31 LBS | SYSTOLIC BLOOD PRESSURE: 116 MMHG | HEART RATE: 70 BPM | BODY MASS INDEX: 26.37 KG/M2 | HEIGHT: 62 IN | DIASTOLIC BLOOD PRESSURE: 72 MMHG

## 2020-02-07 DIAGNOSIS — L30.9 CHRONIC ECZEMA OF HAND: Primary | ICD-10-CM

## 2020-02-07 DIAGNOSIS — M34.9 SCLERODERMA: ICD-10-CM

## 2020-02-07 DIAGNOSIS — Z79.899 ON MYCOPHENOLATE MOFETIL THERAPY: ICD-10-CM

## 2020-02-07 PROCEDURE — 99499 RISK ADDL DX/OHS AUDIT: ICD-10-PCS | Mod: S$GLB,,, | Performed by: INTERNAL MEDICINE

## 2020-02-07 PROCEDURE — 99214 PR OFFICE/OUTPT VISIT, EST, LEVL IV, 30-39 MIN: ICD-10-PCS | Mod: S$GLB,,, | Performed by: INTERNAL MEDICINE

## 2020-02-07 PROCEDURE — 3074F PR MOST RECENT SYSTOLIC BLOOD PRESSURE < 130 MM HG: ICD-10-PCS | Mod: CPTII,S$GLB,, | Performed by: INTERNAL MEDICINE

## 2020-02-07 PROCEDURE — 99999 PR PBB SHADOW E&M-EST. PATIENT-LVL IV: CPT | Mod: PBBFAC,,, | Performed by: INTERNAL MEDICINE

## 2020-02-07 PROCEDURE — 3008F PR BODY MASS INDEX (BMI) DOCUMENTED: ICD-10-PCS | Mod: CPTII,S$GLB,, | Performed by: INTERNAL MEDICINE

## 2020-02-07 PROCEDURE — 99499 UNLISTED E&M SERVICE: CPT | Mod: S$GLB,,, | Performed by: INTERNAL MEDICINE

## 2020-02-07 PROCEDURE — 3008F BODY MASS INDEX DOCD: CPT | Mod: CPTII,S$GLB,, | Performed by: INTERNAL MEDICINE

## 2020-02-07 PROCEDURE — 99214 OFFICE O/P EST MOD 30 MIN: CPT | Mod: S$GLB,,, | Performed by: INTERNAL MEDICINE

## 2020-02-07 PROCEDURE — 3078F PR MOST RECENT DIASTOLIC BLOOD PRESSURE < 80 MM HG: ICD-10-PCS | Mod: CPTII,S$GLB,, | Performed by: INTERNAL MEDICINE

## 2020-02-07 PROCEDURE — 99999 PR PBB SHADOW E&M-EST. PATIENT-LVL IV: ICD-10-PCS | Mod: PBBFAC,,, | Performed by: INTERNAL MEDICINE

## 2020-02-07 PROCEDURE — 3074F SYST BP LT 130 MM HG: CPT | Mod: CPTII,S$GLB,, | Performed by: INTERNAL MEDICINE

## 2020-02-07 PROCEDURE — 3078F DIAST BP <80 MM HG: CPT | Mod: CPTII,S$GLB,, | Performed by: INTERNAL MEDICINE

## 2020-02-07 RX ORDER — SILDENAFIL CITRATE 20 MG/1
20 TABLET ORAL 3 TIMES DAILY
Qty: 270 TABLET | Refills: 1 | Status: SHIPPED | OUTPATIENT
Start: 2020-02-07 | End: 2020-03-22

## 2020-02-07 RX ORDER — MYCOPHENOLATE MOFETIL 500 MG/1
1000 TABLET ORAL 2 TIMES DAILY
Qty: 360 TABLET | Refills: 0 | Status: SHIPPED | OUTPATIENT
Start: 2020-02-07 | End: 2020-02-07 | Stop reason: SDUPTHER

## 2020-02-07 RX ORDER — MYCOPHENOLATE MOFETIL 500 MG/1
1000 TABLET ORAL 2 TIMES DAILY
Qty: 360 TABLET | Refills: 0 | Status: SHIPPED | OUTPATIENT
Start: 2020-02-07 | End: 2020-04-28 | Stop reason: SDUPTHER

## 2020-02-07 ASSESSMENT — ROUTINE ASSESSMENT OF PATIENT INDEX DATA (RAPID3)
AM STIFFNESS SCORE: 0, NO
PAIN SCORE: 9
FATIGUE SCORE: 2.5
TOTAL RAPID3 SCORE: 7.11
PATIENT GLOBAL ASSESSMENT SCORE: 8
MDHAQ FUNCTION SCORE: 1.3
PSYCHOLOGICAL DISTRESS SCORE: 2.2

## 2020-02-07 NOTE — PROGRESS NOTES
"Subjective:       Patient ID: Nadeen Mcgovern is a 59 y.o. female.    Chief Complaint: lcSSc, secondary Sjogren's, Raynaud's  Patient with history of scleroderma, Raynaud's, and chronic eczema returns today for 3 month follow-up.  She was last seen in October 2019.  She says that since her last visit, she has been doing very well.  Her only complaint is some itching and discomfort in her hands, which is not new for her as she has a history of chronic eczema of the hands.  She has been using both her betamethasone cream and OTC lotions, but she has not followed up with her dermatologist, Dr. Zuñiga, in some time.  Otherwise, she is doing well with no complaints.  Of note, she has a history of cholangiocarcinoma and is status post liver transplant.  She has been following up regularly with her transplant physician, Dr. Pruett, and currently shows no signs of cancer recurrence or transplant rejection.    Review of Systems   Constitutional: Negative for fever and unexpected weight change.   HENT: Negative for trouble swallowing.    Eyes: Negative for redness.   Respiratory: Negative for cough and shortness of breath.    Cardiovascular: Negative for chest pain.   Gastrointestinal: Negative for constipation and diarrhea.   Genitourinary: Negative for dysuria and genital sores.   Skin: Positive for rash.   Neurological: Negative for headaches.   Hematological: Bruises/bleeds easily.         Objective:   /72   Pulse 70   Ht 5' 2.4" (1.585 m)   Wt 65 kg (143 lb 4.8 oz)   LMP  (LMP Unknown)   BMI 25.87 kg/m²      Physical Exam   Constitutional: She is oriented to person, place, and time and well-developed, well-nourished, and in no distress. No distress.   HENT:   Head: Normocephalic and atraumatic.   Mouth/Throat: Oropharynx is clear and moist. No oropharyngeal exudate.   Eyes: Conjunctivae and EOM are normal. Pupils are equal, round, and reactive to light.   Neck: Normal range of motion. Neck supple. No JVD " present.   Cardiovascular: Normal rate, regular rhythm and normal heart sounds.    Pulmonary/Chest: Breath sounds normal. No respiratory distress.   Abdominal: Soft. Bowel sounds are normal. She exhibits no distension. There is no tenderness.   Neurological: She is alert and oriented to person, place, and time. No cranial nerve deficit.   Skin: Skin is dry. No rash noted. She is not diaphoretic. No erythema.     Chronic eczema of bilateral upper extremities that is worse in the hands  Skin remains supple with good elasticity   Psychiatric: Mood, memory, affect and judgment normal.   Musculoskeletal: She exhibits no edema, tenderness or deformity.          No data to display     Assessment:       1. Chronic eczema of hand    2. Scleroderma    3. On mycophenolate mofetil therapy            Plan:       Continue mycophenolate 1000 mg BID  Continue amlodipine 2.5 mg daily. BP normal today - 116/72  Continue sildenafil 20 mg TID  Continue betamethasone cream BID  Referral placed to Dermatology Dr. Zuñiga, to reassess chronic hand eczema  Will need repeat PFT and TTE in March. Orders placed today.  Last studies were normal in March 2019.  RTC in 3 months with standing labs

## 2020-02-07 NOTE — PROGRESS NOTES
I have personally taken the history and examined the patient and agree with the resident,s note as stated above        PFTs          FVC     SVC    RV   DLco     3/26/19     117    119     121  101  3/20/18     120    121     62    90  9/25/17     122     122    30    89  4/7/17       123     124    49    91  8/18/16     107     107    110   88    · TTE 3/26/16 :  · Normal left ventricular systolic function. The estimated ejection fraction is 60%  · Normal right ventricular systolic function.  · Normal LV diastolic function.  · Mild aortic regurgitation.  · The estimated PA systolic pressure is 24 mm Hg  Normal central venous pressure (3 mm Hg).       LcSSc EUGENE+ nucleolar SS-A+, Scl-70 and RNAPIII -:  mRSS:5(mild)  Sjogren's mild  OA hands  Hand eczema worsening, only using betamethasone once a day  Liver transplant for cholangiocarcinoma      F/u Dr. Zuñiga for worsening hand eczema  Increase betamethaone 0.05% to twice daily as prescribed  Cont amlodipine 2.5mg daily for Raynaud's  Cont mycophenolate 1000mg twice daily for scleroderma and liver transplant  Cont tacrolimus 2mg am and 1 mg pm for liver transplant  Cont sildenafil 20mg three times daily foir Raynaud's  PFTs, TTE late March  RTC 3 months with standing labs  Answers for HPI/ROS submitted by the patient on 2/4/2020   fever: No  eye redness: No  headaches: No  shortness of breath: No  chest pain: No  trouble swallowing: No  diarrhea: No  constipation: No  unexpected weight change: No  genital sore: No  dysuria: No  During the last 3 days, have you had a skin rash?: Yes  Bruises or bleeds easily: Yes  cough: No

## 2020-02-18 ENCOUNTER — OFFICE VISIT (OUTPATIENT)
Dept: DERMATOLOGY | Facility: CLINIC | Age: 60
End: 2020-02-18
Payer: MEDICARE

## 2020-02-18 DIAGNOSIS — L30.9 CHRONIC ECZEMA OF HAND: Primary | ICD-10-CM

## 2020-02-18 PROBLEM — L25.9 CHRONIC CONTACT DERMATITIS: Status: RESOLVED | Noted: 2018-05-09 | Resolved: 2020-02-18

## 2020-02-18 PROBLEM — M79.642 HAND PAIN, LEFT: Status: RESOLVED | Noted: 2017-05-15 | Resolved: 2020-02-18

## 2020-02-18 PROCEDURE — 99213 PR OFFICE/OUTPT VISIT, EST, LEVL III, 20-29 MIN: ICD-10-PCS | Mod: S$GLB,,, | Performed by: DERMATOLOGY

## 2020-02-18 PROCEDURE — 99999 PR PBB SHADOW E&M-EST. PATIENT-LVL II: ICD-10-PCS | Mod: PBBFAC,,, | Performed by: DERMATOLOGY

## 2020-02-18 PROCEDURE — 99999 PR PBB SHADOW E&M-EST. PATIENT-LVL II: CPT | Mod: PBBFAC,,, | Performed by: DERMATOLOGY

## 2020-02-18 PROCEDURE — 99213 OFFICE O/P EST LOW 20 MIN: CPT | Mod: S$GLB,,, | Performed by: DERMATOLOGY

## 2020-02-18 RX ORDER — CLOBETASOL PROPIONATE 0.5 MG/G
OINTMENT TOPICAL
Qty: 60 G | Refills: 3 | Status: SHIPPED | OUTPATIENT
Start: 2020-02-18 | End: 2020-09-08

## 2020-02-18 NOTE — PROGRESS NOTES
Subjective:       Patient ID:  Nadeen Mcgovern is a 59 y.o. female who presents for   Chief Complaint   Patient presents with    Follow-up     rash      On prograf (decreased dose 2 months ago) and cellcept -- s/p liver t/p    Hand rashes started 2 years    Follow-up  - Follow-up  Symptom course: worsening (x 2 weeks)  Currently using: vaseline cream and diprolene oint biw.  Affected locations: right fingers, left fingers, right hand and left hand (feet clear)  Signs / symptoms: itching, tender and scaling        Review of Systems   Skin: Positive for itching and rash.        No h/o AD as child          Objective:    Physical Exam   Constitutional: She appears well-developed and well-nourished. No distress.   Neurological: She is alert and oriented to person, place, and time. She is not disoriented.   Psychiatric: She has a normal mood and affect.   Skin:   Areas Examined (abnormalities noted in diagram):   RUE Inspected  LUE Inspection Performed  Nails and Digits Inspection Performed             Diagram Legend     Erythematous scaling macule/papule c/w actinic keratosis       Vascular papule c/w angioma      Pigmented verrucoid papule/plaque c/w seborrheic keratosis      Yellow umbilicated papule c/w sebaceous hyperplasia      Irregularly shaped tan macule c/w lentigo     1-2 mm smooth white papules consistent with Milia      Movable subcutaneous cyst with punctum c/w epidermal inclusion cyst      Subcutaneous movable cyst c/w pilar cyst      Firm pink to brown papule c/w dermatofibroma      Pedunculated fleshy papule(s) c/w skin tag(s)      Evenly pigmented macule c/w junctional nevus     Mildly variegated pigmented, slightly irregular-bordered macule c/w mildly atypical nevus      Flesh colored to evenly pigmented papule c/w intradermal nevus       Pink pearly papule/plaque c/w basal cell carcinoma      Erythematous hyperkeratotic cursted plaque c/w SCC      Surgical scar with no sign of skin cancer recurrence       Open and closed comedones      Inflammatory papules and pustules      Verrucoid papule consistent consistent with wart     Erythematous eczematous patches and plaques     Dystrophic onycholytic nail with subungual debris c/w onychomycosis     Umbilicated papule    Erythematous-base heme-crusted tan verrucoid plaque consistent with inflamed seborrheic keratosis     Erythematous Silvery Scaling Plaque c/w Psoriasis     See annotation      Assessment / Plan:        Chronic eczema of hand  -     clobetasol 0.05% (TEMOVATE) 0.05 % Oint; AAA hands bid - occlude qhs  Dispense: 60 g; Refill: 3  -     NB-UVB Light Therapy; Standing    Recommend Elta [employer] Hand CREME or [employer] working hands q hand washing and q hour while awake.    Recommend Vaseline jelly under white cotton gloves qhs.           Follow up in about 6 weeks (around 3/31/2020).

## 2020-02-18 NOTE — LETTER
February 18, 2020      Haresh Butler MD  [address]  [facility] [address]           [facility]  [address]  [facility] [address]  Phone: [phone]  Fax: [phone]          Patient: Nadeen Mcgovern   MR Number: [id number]   YOB: 1960   Date of Visit: 2/18/2020       Dear Dr. Haresh Butler:    Thank you for referring Nadeen Mcgovern to me for evaluation. Attached you will find relevant portions of my assessment and plan of care.    If you have questions, please do not hesitate to call me. I look forward to following Nadeen Mcgovern along with you.    Sincerely,    Kari Zuñiga MD    Enclosure  CC:  No Recipients    If you would like to receive this communication electronically, please contact [email or web address] or [phone] to request more information on [employer] access.    For providers and/or their staff who would like to refer a patient to [facility], please contact us through our one-stop-shop provider referral line, [facility], at [phone].    If you feel you have received this communication in error or would no longer like to receive these types of communications, please e-mail [email or web address]

## 2020-02-19 ENCOUNTER — TELEPHONE (OUTPATIENT)
Dept: DERMATOLOGY | Facility: CLINIC | Age: 60
End: 2020-02-19

## 2020-03-10 ENCOUNTER — TELEPHONE (OUTPATIENT)
Dept: TRANSPLANT | Facility: CLINIC | Age: 60
End: 2020-03-10

## 2020-03-10 ENCOUNTER — LAB VISIT (OUTPATIENT)
Dept: LAB | Facility: HOSPITAL | Age: 60
End: 2020-03-10
Attending: INTERNAL MEDICINE
Payer: MEDICARE

## 2020-03-10 DIAGNOSIS — Z94.4 LIVER REPLACED BY TRANSPLANT: Primary | ICD-10-CM

## 2020-03-10 DIAGNOSIS — Z94.4 LIVER REPLACED BY TRANSPLANT: ICD-10-CM

## 2020-03-10 DIAGNOSIS — C22.1 CHOLANGIOCARCINOMA: ICD-10-CM

## 2020-03-10 LAB
ALBUMIN SERPL BCP-MCNC: 3.8 G/DL (ref 3.5–5.2)
ALP SERPL-CCNC: 78 U/L (ref 55–135)
ALT SERPL W/O P-5'-P-CCNC: 16 U/L (ref 10–44)
ANION GAP SERPL CALC-SCNC: 8 MMOL/L (ref 8–16)
AST SERPL-CCNC: 19 U/L (ref 10–40)
BASOPHILS # BLD AUTO: 0.03 K/UL (ref 0–0.2)
BASOPHILS NFR BLD: 0.6 % (ref 0–1.9)
BILIRUB SERPL-MCNC: 0.7 MG/DL (ref 0.1–1)
BUN SERPL-MCNC: 10 MG/DL (ref 6–20)
CALCIUM SERPL-MCNC: 9.5 MG/DL (ref 8.7–10.5)
CHLORIDE SERPL-SCNC: 108 MMOL/L (ref 95–110)
CO2 SERPL-SCNC: 26 MMOL/L (ref 23–29)
CREAT SERPL-MCNC: 0.8 MG/DL (ref 0.5–1.4)
DIFFERENTIAL METHOD: NORMAL
EOSINOPHIL # BLD AUTO: 0.2 K/UL (ref 0–0.5)
EOSINOPHIL NFR BLD: 3 % (ref 0–8)
ERYTHROCYTE [DISTWIDTH] IN BLOOD BY AUTOMATED COUNT: 12.6 % (ref 11.5–14.5)
EST. GFR  (AFRICAN AMERICAN): >60 ML/MIN/1.73 M^2
EST. GFR  (NON AFRICAN AMERICAN): >60 ML/MIN/1.73 M^2
GLUCOSE SERPL-MCNC: 94 MG/DL (ref 70–110)
HCT VFR BLD AUTO: 41.9 % (ref 37–48.5)
HGB BLD-MCNC: 13.4 G/DL (ref 12–16)
IMM GRANULOCYTES # BLD AUTO: 0.01 K/UL (ref 0–0.04)
IMM GRANULOCYTES NFR BLD AUTO: 0.2 % (ref 0–0.5)
LYMPHOCYTES # BLD AUTO: 1.5 K/UL (ref 1–4.8)
LYMPHOCYTES NFR BLD: 28.1 % (ref 18–48)
MCH RBC QN AUTO: 30.9 PG (ref 27–31)
MCHC RBC AUTO-ENTMCNC: 32 G/DL (ref 32–36)
MCV RBC AUTO: 97 FL (ref 82–98)
MONOCYTES # BLD AUTO: 0.4 K/UL (ref 0.3–1)
MONOCYTES NFR BLD: 8.1 % (ref 4–15)
NEUTROPHILS # BLD AUTO: 3.2 K/UL (ref 1.8–7.7)
NEUTROPHILS NFR BLD: 60 % (ref 38–73)
NRBC BLD-RTO: 0 /100 WBC
PLATELET # BLD AUTO: 201 K/UL (ref 150–350)
PMV BLD AUTO: 10.6 FL (ref 9.2–12.9)
POTASSIUM SERPL-SCNC: 4 MMOL/L (ref 3.5–5.1)
PROT SERPL-MCNC: 7.2 G/DL (ref 6–8.4)
RBC # BLD AUTO: 4.33 M/UL (ref 4–5.4)
SODIUM SERPL-SCNC: 142 MMOL/L (ref 136–145)
TACROLIMUS BLD-MCNC: 3.4 NG/ML (ref 5–15)
WBC # BLD AUTO: 5.3 K/UL (ref 3.9–12.7)

## 2020-03-10 PROCEDURE — 36415 COLL VENOUS BLD VENIPUNCTURE: CPT

## 2020-03-10 PROCEDURE — 80197 ASSAY OF TACROLIMUS: CPT

## 2020-03-10 PROCEDURE — 85025 COMPLETE CBC W/AUTO DIFF WBC: CPT

## 2020-03-10 PROCEDURE — 80053 COMPREHEN METABOLIC PANEL: CPT

## 2020-03-10 NOTE — TELEPHONE ENCOUNTER
----- Message from Lakia Pruett MD sent at 3/10/2020  2:38 PM CDT -----  Liver transplant blood tests reviewed. Labs stable, no change in immunosuppression. Please continue to monitor liver tests per transplant protocol.

## 2020-03-10 NOTE — TELEPHONE ENCOUNTER
Patient notified by my [facility]; Labs are stable and no medications changes. Repeat labs due 6/8/20.

## 2020-03-11 DIAGNOSIS — Z94.4 LIVER REPLACED BY TRANSPLANT: Primary | ICD-10-CM

## 2020-03-11 DIAGNOSIS — C22.1 CHOLANGIOCARCINOMA: ICD-10-CM

## 2020-03-16 ENCOUNTER — PATIENT MESSAGE (OUTPATIENT)
Dept: INTERNAL MEDICINE | Facility: CLINIC | Age: 60
End: 2020-03-16

## 2020-03-16 DIAGNOSIS — Z12.31 ENCOUNTER FOR SCREENING MAMMOGRAM FOR BREAST CANCER: Primary | ICD-10-CM

## 2020-03-20 DIAGNOSIS — M34.9 SCLERODERMA: ICD-10-CM

## 2020-03-20 RX ORDER — SILDENAFIL CITRATE 20 MG/1
20 TABLET ORAL 3 TIMES DAILY
Qty: 270 TABLET | Refills: 1 | Status: CANCELLED | OUTPATIENT
Start: 2020-03-20

## 2020-03-22 DIAGNOSIS — M34.9 SCLERODERMA: ICD-10-CM

## 2020-03-22 RX ORDER — SILDENAFIL CITRATE 20 MG/1
TABLET ORAL
Qty: 270 TABLET | Refills: 1 | Status: SHIPPED | OUTPATIENT
Start: 2020-03-22 | End: 2020-03-27 | Stop reason: SDUPTHER

## 2020-03-27 DIAGNOSIS — G47.00 INSOMNIA, UNSPECIFIED TYPE: ICD-10-CM

## 2020-03-27 DIAGNOSIS — M34.9 SCLERODERMA: ICD-10-CM

## 2020-03-27 RX ORDER — SILDENAFIL CITRATE 20 MG/1
TABLET ORAL
Qty: 270 TABLET | Refills: 1 | Status: SHIPPED | OUTPATIENT
Start: 2020-03-27 | End: 2020-04-07 | Stop reason: SDUPTHER

## 2020-03-30 RX ORDER — ZOLPIDEM TARTRATE 5 MG/1
5 TABLET ORAL NIGHTLY PRN
Qty: 30 TABLET | Refills: 3 | Status: SHIPPED | OUTPATIENT
Start: 2020-03-30 | End: 2021-05-20

## 2020-04-06 DIAGNOSIS — Z94.4 LIVER REPLACED BY TRANSPLANT: ICD-10-CM

## 2020-04-06 RX ORDER — TACROLIMUS 1 MG/1
CAPSULE ORAL
Qty: 90 CAPSULE | Refills: 11 | Status: SHIPPED | OUTPATIENT
Start: 2020-04-06 | End: 2021-06-15 | Stop reason: SDUPTHER

## 2020-04-07 DIAGNOSIS — M34.9 SCLERODERMA: ICD-10-CM

## 2020-04-07 RX ORDER — SILDENAFIL CITRATE 20 MG/1
TABLET ORAL
Qty: 270 TABLET | Refills: 1 | Status: SHIPPED | OUTPATIENT
Start: 2020-04-07 | End: 2020-04-14 | Stop reason: SDUPTHER

## 2020-04-14 DIAGNOSIS — M34.9 SCLERODERMA: ICD-10-CM

## 2020-04-14 RX ORDER — SILDENAFIL CITRATE 20 MG/1
TABLET ORAL
Qty: 270 TABLET | Refills: 1 | Status: SHIPPED | OUTPATIENT
Start: 2020-04-14 | End: 2020-04-22 | Stop reason: SDUPTHER

## 2020-04-22 DIAGNOSIS — M34.9 SCLERODERMA: ICD-10-CM

## 2020-04-22 RX ORDER — SILDENAFIL CITRATE 20 MG/1
TABLET ORAL
Qty: 270 TABLET | Refills: 1 | Status: SHIPPED | OUTPATIENT
Start: 2020-04-22 | End: 2020-04-28 | Stop reason: SDUPTHER

## 2020-04-27 ENCOUNTER — LAB VISIT (OUTPATIENT)
Dept: LAB | Facility: HOSPITAL | Age: 60
End: 2020-04-27
Attending: INTERNAL MEDICINE
Payer: MEDICARE

## 2020-04-27 DIAGNOSIS — M34.9 SCLERODERMA: ICD-10-CM

## 2020-04-27 DIAGNOSIS — Z79.899 ON MYCOPHENOLATE MOFETIL THERAPY: ICD-10-CM

## 2020-04-27 DIAGNOSIS — M34.9 LIMITED SCLERODERMA: ICD-10-CM

## 2020-04-27 LAB
ALBUMIN SERPL BCP-MCNC: 4.1 G/DL (ref 3.5–5.2)
ALP SERPL-CCNC: 80 U/L (ref 55–135)
ALT SERPL W/O P-5'-P-CCNC: 18 U/L (ref 10–44)
ANION GAP SERPL CALC-SCNC: 9 MMOL/L (ref 8–16)
AST SERPL-CCNC: 19 U/L (ref 10–40)
BASOPHILS # BLD AUTO: 0.04 K/UL (ref 0–0.2)
BASOPHILS NFR BLD: 0.8 % (ref 0–1.9)
BILIRUB SERPL-MCNC: 0.8 MG/DL (ref 0.1–1)
BUN SERPL-MCNC: 10 MG/DL (ref 6–20)
CALCIUM SERPL-MCNC: 9.5 MG/DL (ref 8.7–10.5)
CHLORIDE SERPL-SCNC: 105 MMOL/L (ref 95–110)
CO2 SERPL-SCNC: 29 MMOL/L (ref 23–29)
CREAT SERPL-MCNC: 0.8 MG/DL (ref 0.5–1.4)
CRP SERPL-MCNC: 3 MG/L (ref 0–8.2)
DIFFERENTIAL METHOD: ABNORMAL
EOSINOPHIL # BLD AUTO: 0.2 K/UL (ref 0–0.5)
EOSINOPHIL NFR BLD: 3.6 % (ref 0–8)
ERYTHROCYTE [DISTWIDTH] IN BLOOD BY AUTOMATED COUNT: 12.6 % (ref 11.5–14.5)
ERYTHROCYTE [SEDIMENTATION RATE] IN BLOOD BY WESTERGREN METHOD: 17 MM/HR (ref 0–36)
EST. GFR  (AFRICAN AMERICAN): >60 ML/MIN/1.73 M^2
EST. GFR  (NON AFRICAN AMERICAN): >60 ML/MIN/1.73 M^2
GLUCOSE SERPL-MCNC: 96 MG/DL (ref 70–110)
HCT VFR BLD AUTO: 42.9 % (ref 37–48.5)
HGB BLD-MCNC: 13.6 G/DL (ref 12–16)
IMM GRANULOCYTES # BLD AUTO: 0.01 K/UL (ref 0–0.04)
IMM GRANULOCYTES NFR BLD AUTO: 0.2 % (ref 0–0.5)
LYMPHOCYTES # BLD AUTO: 1.3 K/UL (ref 1–4.8)
LYMPHOCYTES NFR BLD: 28.2 % (ref 18–48)
MCH RBC QN AUTO: 30.6 PG (ref 27–31)
MCHC RBC AUTO-ENTMCNC: 31.7 G/DL (ref 32–36)
MCV RBC AUTO: 96 FL (ref 82–98)
MONOCYTES # BLD AUTO: 0.3 K/UL (ref 0.3–1)
MONOCYTES NFR BLD: 7 % (ref 4–15)
NEUTROPHILS # BLD AUTO: 2.8 K/UL (ref 1.8–7.7)
NEUTROPHILS NFR BLD: 60.2 % (ref 38–73)
NRBC BLD-RTO: 0 /100 WBC
PLATELET # BLD AUTO: 210 K/UL (ref 150–350)
PMV BLD AUTO: 11.7 FL (ref 9.2–12.9)
POTASSIUM SERPL-SCNC: 4.2 MMOL/L (ref 3.5–5.1)
PROT SERPL-MCNC: 7.6 G/DL (ref 6–8.4)
RBC # BLD AUTO: 4.45 M/UL (ref 4–5.4)
SODIUM SERPL-SCNC: 143 MMOL/L (ref 136–145)
WBC # BLD AUTO: 4.72 K/UL (ref 3.9–12.7)

## 2020-04-27 PROCEDURE — 85652 RBC SED RATE AUTOMATED: CPT

## 2020-04-27 PROCEDURE — 86140 C-REACTIVE PROTEIN: CPT

## 2020-04-27 PROCEDURE — 80053 COMPREHEN METABOLIC PANEL: CPT

## 2020-04-27 PROCEDURE — 85025 COMPLETE CBC W/AUTO DIFF WBC: CPT

## 2020-04-27 PROCEDURE — 36415 COLL VENOUS BLD VENIPUNCTURE: CPT

## 2020-04-28 ENCOUNTER — PATIENT MESSAGE (OUTPATIENT)
Dept: INTERNAL MEDICINE | Facility: CLINIC | Age: 60
End: 2020-04-28

## 2020-04-28 ENCOUNTER — TELEPHONE (OUTPATIENT)
Dept: PHARMACY | Facility: CLINIC | Age: 60
End: 2020-04-28

## 2020-04-28 ENCOUNTER — OFFICE VISIT (OUTPATIENT)
Dept: RHEUMATOLOGY | Facility: CLINIC | Age: 60
End: 2020-04-28
Payer: MEDICARE

## 2020-04-28 ENCOUNTER — PATIENT OUTREACH (OUTPATIENT)
Dept: ADMINISTRATIVE | Facility: OTHER | Age: 60
End: 2020-04-28

## 2020-04-28 VITALS — BODY MASS INDEX: 26.68 KG/M2 | WEIGHT: 145 LBS | HEIGHT: 62 IN

## 2020-04-28 DIAGNOSIS — L30.9 HAND ECZEMA: ICD-10-CM

## 2020-04-28 DIAGNOSIS — M34.9 LIMITED SCLERODERMA: ICD-10-CM

## 2020-04-28 DIAGNOSIS — M34.9 SCLERODERMA: ICD-10-CM

## 2020-04-28 DIAGNOSIS — Z79.899 ON MYCOPHENOLATE MOFETIL THERAPY: ICD-10-CM

## 2020-04-28 PROCEDURE — 99213 PR OFFICE/OUTPT VISIT, EST, LEVL III, 20-29 MIN: ICD-10-PCS | Mod: 95,,, | Performed by: INTERNAL MEDICINE

## 2020-04-28 PROCEDURE — 99499 RISK ADDL DX/OHS AUDIT: ICD-10-PCS | Mod: 95,,, | Performed by: INTERNAL MEDICINE

## 2020-04-28 PROCEDURE — 99499 UNLISTED E&M SERVICE: CPT | Mod: 95,,, | Performed by: INTERNAL MEDICINE

## 2020-04-28 PROCEDURE — 99213 OFFICE O/P EST LOW 20 MIN: CPT | Mod: 95,,, | Performed by: INTERNAL MEDICINE

## 2020-04-28 PROCEDURE — 3008F BODY MASS INDEX DOCD: CPT | Mod: CPTII,95,, | Performed by: INTERNAL MEDICINE

## 2020-04-28 PROCEDURE — 3008F PR BODY MASS INDEX (BMI) DOCUMENTED: ICD-10-PCS | Mod: CPTII,95,, | Performed by: INTERNAL MEDICINE

## 2020-04-28 RX ORDER — SILDENAFIL CITRATE 20 MG/1
TABLET ORAL
Qty: 270 TABLET | Refills: 1 | Status: SHIPPED | OUTPATIENT
Start: 2020-04-28 | End: 2020-07-06 | Stop reason: SDUPTHER

## 2020-04-28 RX ORDER — MYCOPHENOLATE MOFETIL 500 MG/1
1000 TABLET ORAL 2 TIMES DAILY
Qty: 360 TABLET | Refills: 0 | Status: SHIPPED | OUTPATIENT
Start: 2020-04-28 | End: 2020-07-06 | Stop reason: SDUPTHER

## 2020-04-28 NOTE — ASSESSMENT & PLAN NOTE
MRSS=7(mild)  Cracking finger pads likely due to being out of sildenafil    Cont amlodipine 2.5mg daily for Raynaud's  Cont mycophenolate 1000mg twice daily for scleroderma and liver transplant  Cont tacrolimus 2mg am and 1 mg pm for liver transplant  Resume sildenafil 20mg three times daily foir Raynaud's sent to [facility] again  PFTs, TTE  When available  RTC 3 months with standing labs

## 2020-04-28 NOTE — ASSESSMENT & PLAN NOTE
F/u Dr. Zuñiga 5/29/20 for worsening hand eczema  Increase betamethaone 0.05% to twice daily as prescribed

## 2020-04-28 NOTE — TELEPHONE ENCOUNTER
No answer/VM to inform patient that [facility] received prescription for Cellcept & Sildenafil and benefits investigation is required.  [facility] will be back in touch once insurance determination is received.

## 2020-04-28 NOTE — PROGRESS NOTES
"Subjective:       Patient ID: Shilo Mcgovern is a 59 y.o. female.    Chief Complaint: lcSSc EUGENE+ nucleolar+ SS-A+ Scl70- RNAP III-  HPI       The patient location is: home  The chief complaint leading to consultation is: scleroderma  Visit type: audio visual  Total time spent with patient: 15 min  Each patient to whom he or she provides medical services by telemedicine is:  (1) informed of the relationship between the physician and patient and the respective role of any other health care provider with respect to management of the patient; and (2) notified that he or she may decline to receive medical services by telemedicine and may withdraw from such care at any time.    Notes:     The hand eczema is somewhat improved, Dr. Zuñiga's appt cancelled rescheduled for late May, using clobetasol 0.05% twice daily. Has some cracking fingertips but has been out of sildenafil for a while due to insurance issues. Have refilled multiple times, re-sent to [facility].. Denies cough, SOB. No other changes in skin except some eczema plantar aspect of feet.    Review of Systems   Constitutional: Positive for unexpected weight change. Negative for fever.   HENT: Negative for trouble swallowing.    Eyes: Negative for redness.   Respiratory: Negative for cough and shortness of breath.    Cardiovascular: Negative for chest pain.   Gastrointestinal: Negative for constipation and diarrhea.   Genitourinary: Negative for dysuria and genital sores.   Skin: Positive for rash.   Neurological: Negative for headaches.   Hematological: Bruises/bleeds easily.         Objective:   Ht 5' 2.4" (1.585 m)   Wt 65.8 kg (145 lb)   LMP  (LMP Unknown)   BMI 26.18 kg/m²      Physical Exam   Skin:     mRSS =7(mild)  Some digital fingerpad calluses  Mild hand eczema       Results for SHILO MCGOVERN (MRN [id number]) as of 4/28/2020 14:39   Ref. Range 4/27/2020 07:24   WBC Latest Ref Range: 3.90 - 12.70 K/uL 4.72   RBC Latest Ref Range: 4.00 - 5.40 M/uL 4.45 "   Hemoglobin Latest Ref Range: 12.0 - 16.0 g/dL 13.6   Hematocrit Latest Ref Range: 37.0 - 48.5 % 42.9   MCV Latest Ref Range: 82 - 98 fL 96   MCH Latest Ref Range: 27.0 - 31.0 pg 30.6   MCHC Latest Ref Range: 32.0 - 36.0 g/dL 31.7 (L)   RDW Latest Ref Range: 11.5 - 14.5 % 12.6   Platelets Latest Ref Range: 150 - 350 K/uL 210   MPV Latest Ref Range: 9.2 - 12.9 fL 11.7   Gran% Latest Ref Range: 38.0 - 73.0 % 60.2   Gran # (ANC) Latest Ref Range: 1.8 - 7.7 K/uL 2.8   Lymph% Latest Ref Range: 18.0 - 48.0 % 28.2   Lymph # Latest Ref Range: 1.0 - 4.8 K/uL 1.3   Mono% Latest Ref Range: 4.0 - 15.0 % 7.0   Mono # Latest Ref Range: 0.3 - 1.0 K/uL 0.3   Eosinophil% Latest Ref Range: 0.0 - 8.0 % 3.6   Eos # Latest Ref Range: 0.0 - 0.5 K/uL 0.2   Basophil% Latest Ref Range: 0.0 - 1.9 % 0.8   Baso # Latest Ref Range: 0.00 - 0.20 K/uL 0.04   nRBC Latest Ref Range: 0 /100 WBC 0   Differential Method Unknown Automated   Immature Grans (Abs) Latest Ref Range: 0.00 - 0.04 K/uL 0.01   Immature Granulocytes Latest Ref Range: 0.0 - 0.5 % 0.2   Sed Rate Latest Ref Range: 0 - 36 mm/Hr 17   Sodium Latest Ref Range: 136 - 145 mmol/L 143   Potassium Latest Ref Range: 3.5 - 5.1 mmol/L 4.2   Chloride Latest Ref Range: 95 - 110 mmol/L 105   CO2 Latest Ref Range: 23 - 29 mmol/L 29   Anion Gap Latest Ref Range: 8 - 16 mmol/L 9   BUN, Bld Latest Ref Range: 6 - 20 mg/dL 10   Creatinine Latest Ref Range: 0.5 - 1.4 mg/dL 0.8   eGFR if non African American Latest Ref Range: >60 mL/min/1.73 m^2 >60.0   eGFR if African American Latest Ref Range: >60 mL/min/1.73 m^2 >60.0   Glucose Latest Ref Range: 70 - 110 mg/dL 96   Calcium Latest Ref Range: 8.7 - 10.5 mg/dL 9.5   Alkaline Phosphatase Latest Ref Range: 55 - 135 U/L 80   PROTEIN TOTAL Latest Ref Range: 6.0 - 8.4 g/dL 7.6   Albumin Latest Ref Range: 3.5 - 5.2 g/dL 4.1   BILIRUBIN TOTAL Latest Ref Range: 0.1 - 1.0 mg/dL 0.8   AST Latest Ref Range: 10 - 40 U/L 19   ALT Latest Ref Range: 10 - 44 U/L 18    CRP Latest Ref Range: 0.0 - 8.2 mg/L 3.0   Results for SHILO HAMPTON (MRN [id number]) as of 4/28/2020 14:39   Ref. Range 3/10/2020 07:57   Tacrolimus Lvl Latest Ref Range: 5.0 - 15.0 ng/mL 3.4 (L)     Assessment/Plan         Limited scleroderma    Hand eczema    Scleroderma  -     sildenafil (REVATIO) 20 mg Tab; TAKE 1 TABLET(20 MG) BY MOUTH THREE TIMES DAILY  Dispense: 270 tablet; Refill: 1  -     mycophenolate (CELLCEPT) 500 mg Tab; Take 2 tablets (1,000 mg total) by mouth 2 (two) times daily.  Dispense: 360 tablet; Refill: 0    On mycophenolate mofetil therapy  -     mycophenolate (CELLCEPT) 500 mg Tab; Take 2 tablets (1,000 mg total) by mouth 2 (two) times daily.  Dispense: 360 tablet; Refill: 0       Problem List Items Addressed This Visit     Hand eczema    Current Assessment & Plan     F/u Dr. Zuñiga 5/29/20 for worsening hand eczema  Increase betamethaone 0.05% to twice daily as prescribed         Limited scleroderma    Overview     lcSSc(limited scleroderma): ACR/EULAR criteria: (no sclerodactyly today) digital tip pitting scars(3) telangiectases(2) Raynaud's(3) =8 does not meet  criteria ; oxaliplatin(cisplatin associated with Raynaud's) ; 5-FU not known to be associated with Raynaud's or scleroderma. MRSS=17 increased from 2 previous!. No evidence of scleroderma esophagus by esophageal manometry or upper endoscopy  EUGENE+ 1:320 nucleolar(scleroderma pattern) and speckled + SS-A (most typical of Sjogren's and SLE), Raynaud's, telangiectases, dysphagia. All scleroderma associated autoantibodies negative    2D Echo w/ Color Flow Doppler   Order: [id number]   Status:  Final result   Visible to patient:  Yes (Patient Portal) Next appt:  02/20/2018 at 04:45 PM in Orthopedics (Jovana Rossi MD) Dx:  Scleroderma; Aortic valve regurgitati...     Ref Range & Units 3mo ago  (9/25/17) 1yr ago  (9/2/16) 1yr ago  (8/17/16) 2yr ago  (5/7/15) 2yr ago  (1/23/15)    EF 55 - 65 60   60  55  60     Diastolic  Dysfunction  No  No  No  No  No     Aortic Valve Regurgitation  MILD   MILD TO MODERATE   MILD  MILD TO MODERATE      Est. PA Systolic Pressure  25.47    28  24     Mitral Valve Mobility  NORMAL     NORMAL    Resulting Agency  CVIS CVIS CVIS CVIS CVIS   Narrative                  PFTs          FVC     SVC    RV   DLco    3/26/19     117    119     121  101  3/20/18     120    121     62    90  9/25/17     122     122    30    89  4/7/17       123     124    49    91  8/18/16     107     107    110   88      3/26/18: 6 MW 99, 99 100      PFTs          FVC     SVC    RV   DLco     3/26/19     117    119     121  101  3/20/18     120    121     62    90  9/25/17     122     122    30    89  4/7/17       123     124    49    91  8/18/16     107     107    110   88     · TTE 3/26/16 :  · Normal left ventricular systolic function. The estimated ejection fraction is 60%  · Normal right ventricular systolic function.  · Normal LV diastolic function.  · Mild aortic regurgitation.  · The estimated PA systolic pressure is 24 mm Hg  Normal central venous pressure (3 mm Hg).        LcSSc EUGENE+ nucleolar SS-A+, Scl-70 and RNAPIII -:  mRSS:5(mild)         Current Assessment & Plan     MRSS=7(mild)  Cracking finger pads likely due to being out of sildenafil    Cont amlodipine 2.5mg daily for Raynaud's  Cont mycophenolate 1000mg twice daily for scleroderma and liver transplant  Cont tacrolimus 2mg am and 1 mg pm for liver transplant  Resume sildenafil 20mg three times daily foir Raynaud's sent to [facility] again  PFTs, TTE  When available  RTC 3 months with standing labs         Relevant Medications    sildenafil (REVATIO) 20 mg Tab    mycophenolate (CELLCEPT) 500 mg Tab    On mycophenolate mofetil therapy    Relevant Medications    mycophenolate (CELLCEPT) 500 mg Tab      Other Visit Diagnoses     Scleroderma        Relevant Medications    sildenafil (REVATIO) 20 mg Tab    mycophenolate (CELLCEPT) 500 mg Tab

## 2020-05-05 ENCOUNTER — HOSPITAL ENCOUNTER (OUTPATIENT)
Dept: RADIOLOGY | Facility: OTHER | Age: 60
Discharge: HOME | End: 2020-05-05
Attending: INTERNAL MEDICINE
Payer: MEDICARE

## 2020-05-05 ENCOUNTER — PATIENT MESSAGE (OUTPATIENT)
Dept: INTERNAL MEDICINE | Facility: CLINIC | Age: 60
End: 2020-05-05

## 2020-05-05 VITALS — BODY MASS INDEX: 26.69 KG/M2 | WEIGHT: 145.06 LBS | HEIGHT: 62 IN

## 2020-05-05 DIAGNOSIS — Z12.31 ENCOUNTER FOR SCREENING MAMMOGRAM FOR BREAST CANCER: ICD-10-CM

## 2020-05-05 PROCEDURE — 77067 MAMMO DIGITAL SCREENING BILAT WITH TOMOSYNTHESIS_CAD: ICD-10-PCS | Mod: 26,,, | Performed by: RADIOLOGY

## 2020-05-05 PROCEDURE — 77067 SCR MAMMO BI INCL CAD: CPT | Mod: TC

## 2020-05-05 PROCEDURE — 77067 SCR MAMMO BI INCL CAD: CPT | Mod: 26,,, | Performed by: RADIOLOGY

## 2020-05-05 PROCEDURE — 77063 BREAST TOMOSYNTHESIS BI: CPT | Mod: 26,,, | Performed by: RADIOLOGY

## 2020-05-05 PROCEDURE — 77063 MAMMO DIGITAL SCREENING BILAT WITH TOMOSYNTHESIS_CAD: ICD-10-PCS | Mod: 26,,, | Performed by: RADIOLOGY

## 2020-05-06 ENCOUNTER — PATIENT MESSAGE (OUTPATIENT)
Dept: RHEUMATOLOGY | Facility: CLINIC | Age: 60
End: 2020-05-06

## 2020-05-06 NOTE — TELEPHONE ENCOUNTER
DOCUMENTATION ONLY:   Sildenafil prior authorization approved until 12/31/20  CASE ID # [id number]  Estimated Co-Pay: $3.60

## 2020-05-07 ENCOUNTER — PATIENT MESSAGE (OUTPATIENT)
Dept: INTERNAL MEDICINE | Facility: CLINIC | Age: 60
End: 2020-05-07

## 2020-05-14 ENCOUNTER — PATIENT MESSAGE (OUTPATIENT)
Dept: INTERNAL MEDICINE | Facility: CLINIC | Age: 60
End: 2020-05-14

## 2020-05-14 ENCOUNTER — TELEPHONE (OUTPATIENT)
Dept: INTERNAL MEDICINE | Facility: CLINIC | Age: 60
End: 2020-05-14

## 2020-05-14 ENCOUNTER — OFFICE VISIT (OUTPATIENT)
Dept: INTERNAL MEDICINE | Facility: CLINIC | Age: 60
End: 2020-05-14
Payer: MEDICARE

## 2020-05-14 DIAGNOSIS — M54.9 MID BACK PAIN: ICD-10-CM
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
## 2020-05-14 PROCEDURE — 99214 PR OFFICE/OUTPT VISIT, EST, LEVL IV, 30-39 MIN: ICD-10-PCS | Mod: 95,,, | Performed by: INTERNAL MEDICINE

## 2020-05-14 PROCEDURE — 99214 OFFICE O/P EST MOD 30 MIN: CPT | Mod: 95,,, | Performed by: INTERNAL MEDICINE

## 2020-05-14 RX ORDER — TRAMADOL HYDROCHLORIDE 50 MG/1
50 TABLET ORAL EVERY 6 HOURS PRN
Qty: 30 TABLET | Refills: 0 | Status: SHIPPED | OUTPATIENT
Start: 2020-05-14 | End: 2020-07-20

## 2020-05-14 NOTE — TELEPHONE ENCOUNTER
----- Message from Nadeen Figueroa MD sent at 5/14/2020 12:01 PM CDT -----  Can you schedule her for earliest mario tomor for xrays and let her know? Thanks!

## 2020-05-14 NOTE — PROGRESS NOTES
The patient location is: [place], [place]  The chief complaint leading to consultation is: back pain  Visit type: audiovisual  Total time spent with patient: 20 min.  Each patient to whom he or she provides medical services by telemedicine is:  (1) informed of the relationship between the physician and patient and the respective role of any other health care provider with respect to management of the patient; and (2) notified that he or she may decline to receive medical services by telemedicine and may withdraw from such care at any time.    Notes:     Subjective:       Patient ID: Nadeen Mcgovern is a 59 y.o. female.     Back Pain   This is a new problem. The current episode started in the past 7 days. The problem occurs constantly. The problem has been rapidly worsening since onset. The pain is present in the costovertebral angle. The quality of the pain is described as aching and stabbing. The pain does not radiate. The pain is at a severity of 10/10. The pain is severe. The pain is the same all the time. The symptoms are aggravated by lying down, sitting, standing and twisting. Stiffness is present all day. Associated symptoms include abdominal pain, pelvic pain and weakness. Pertinent negatives include no bladder incontinence, bowel incontinence, chest pain, dysuria, fever, headaches, leg pain, numbness, paresis, paresthesias, perianal numbness, tingling or weight loss. She has tried analgesics for the symptoms. The treatment provided no relief.      Slipped in the tub and landed on back and tailbone 5/4/20. Has been having pain in the L flank are and L buttocks area. Pain in the abdominal area is also sore. Has been alternating ice and heat every 10-15 min.   Since then, hasn't had any improvement in the pain. Has been using voltaren gel. Has been taking an old gabapentin, which she stopped taking previously b/c she didn't like them. Not taking tylenol.   Pain is constant and not better. Not able to sleep at  night. Even turning in the bed hurts.     Review of Systems   Constitutional: Negative for fever and weight loss.   Cardiovascular: Negative for chest pain.   Gastrointestinal: Positive for abdominal pain. Negative for bowel incontinence.   Genitourinary: Positive for pelvic pain. Negative for bladder incontinence, dysuria and hematuria.   Musculoskeletal: Positive for back pain.   Neurological: Positive for weakness. Negative for tingling, numbness, headaches and paresthesias.         Objective:      Physical Exam      Gen - A+OX4, uncomfortable - shuffling to find comfortable position.   CHEST - completing full sentences. Normal work of breathing.   MSK - pain on palpation of the mid back along the bra line from L to R and also in the mid thoracic region.     Assessment/Plan     Diagnoses and all orders for this visit:    Fall, initial encounter  -     X-Ray Thoracic Spine AP Lateral; Future  -     X-Ray Ribs 3 Views Bilateral; Future  -     traMADoL (ULTRAM) 50 mg tablet; Take 1 tablet (50 mg total) by mouth every 6 (six) hours as needed for Pain.    Mid back pain  -     X-Ray Thoracic Spine AP Lateral; Future  -     X-Ray Ribs 3 Views Bilateral; Future  -     traMADoL (ULTRAM) 50 mg tablet; Take 1 tablet (50 mg total) by mouth every 6 (six) hours as needed for Pain.    can take tylenol up to 2000mg in 24 hours. If pain worsens, pt to let me know and can consider norco.   Follow up if symptoms worsen or fail to improve.      Nadeen Figueroa MD  Department of Internal Medicine - [facility]  11:53 AM

## 2020-05-14 NOTE — TELEPHONE ENCOUNTER
Called and spoke to pt and she is okay for the 11:30am slot.  Sent portal message with all instructions.    Sarika or Dr. Figueroa can yall add pt for a second 11:30 today for her telemed?  Thank you!!

## 2020-05-15 ENCOUNTER — HOSPITAL ENCOUNTER (OUTPATIENT)
Dept: RADIOLOGY | Facility: HOSPITAL | Age: 60
Discharge: HOME | End: 2020-05-15
Attending: INTERNAL MEDICINE
Payer: MEDICARE
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
## 2020-05-15 DIAGNOSIS — M54.9 MID BACK PAIN: ICD-10-CM

## 2020-05-15 PROCEDURE — 71110 XR RIBS 3 VIEWS BILATERAL: ICD-10-PCS | Mod: 26,,, | Performed by: RADIOLOGY

## 2020-05-15 PROCEDURE — 71110 X-RAY EXAM RIBS BIL 3 VIEWS: CPT | Mod: TC

## 2020-05-15 PROCEDURE — 72070 X-RAY EXAM THORAC SPINE 2VWS: CPT | Mod: 26,,, | Performed by: RADIOLOGY

## 2020-05-15 PROCEDURE — 72070 X-RAY EXAM THORAC SPINE 2VWS: CPT | Mod: TC

## 2020-05-15 PROCEDURE — 71110 X-RAY EXAM RIBS BIL 3 VIEWS: CPT | Mod: 26,,, | Performed by: RADIOLOGY

## 2020-05-15 PROCEDURE — 72070 XR THORACIC SPINE AP LATERAL: ICD-10-PCS | Mod: 26,,, | Performed by: RADIOLOGY

## 2020-05-18 ENCOUNTER — PATIENT MESSAGE (OUTPATIENT)
Dept: INTERNAL MEDICINE | Facility: CLINIC | Age: 60
End: 2020-05-18

## 2020-05-18 DIAGNOSIS — M54.9 BACK PAIN, UNSPECIFIED BACK LOCATION, UNSPECIFIED BACK PAIN LATERALITY, UNSPECIFIED CHRONICITY: ICD-10-CM
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
## 2020-05-18 NOTE — TELEPHONE ENCOUNTER
Please call and notify pt:    There is some arthritis of her mid back and also some scoliosis (curvature of the spine) but no broken bones of the back bones. There are no broken ribs either. There's a small area in the left first rib where the bone is thickened. However this is stable since 2015 so not worrisome. Please see how's her pain doing.

## 2020-05-19 ENCOUNTER — TELEPHONE (OUTPATIENT)
Dept: INTERNAL MEDICINE | Facility: CLINIC | Age: 60
End: 2020-05-19

## 2020-05-19 RX ORDER — HYDROCODONE BITARTRATE AND ACETAMINOPHEN 10; 325 MG/1; MG/1
1 TABLET ORAL EVERY 6 HOURS PRN
Qty: 28 TABLET | Refills: 0 | Status: SHIPPED | OUTPATIENT
Start: 2020-05-19 | End: 2020-05-26

## 2020-05-19 NOTE — TELEPHONE ENCOUNTER
Sent in Alleene. Can you see if she wants to repeat the CT of the abdomen/pelvis just to make sure we're not missing anything? I also ordered a urine.

## 2020-05-19 NOTE — TELEPHONE ENCOUNTER
----- Message from eLelee Plummer sent at 5/19/2020 11:45 AM CDT -----  Contact: Patient [phone]  Patient is returning a phone call.  Who left a message for the patient: Ivanna  Does patient know what this is regarding:    Comments:

## 2020-05-19 NOTE — TELEPHONE ENCOUNTER
Spoke with pt. Stated its still feels strenous and she feels bloated like under her breast bone. Has been trying to walk. Has been doing the warm/cold compresses and taking the pain medicine. Pt stated the tramadol isn't helping her. Want to know is there anything else she can take besides the tramadol?

## 2020-05-19 NOTE — TELEPHONE ENCOUNTER
Pt informed norco was sent to pharmacy. Repeat CT scheduled for May 29th. Pt will come that day to give urine sample.

## 2020-05-22 ENCOUNTER — PATIENT MESSAGE (OUTPATIENT)
Dept: RHEUMATOLOGY | Facility: CLINIC | Age: 60
End: 2020-05-22

## 2020-05-28 NOTE — TELEPHONE ENCOUNTER
----- Message from Мария Benítez MD sent at 1/4/2017 12:09 PM CST -----  Reviewed, nothing to do   Refilled per MD protocol.   normal...

## 2020-05-29 ENCOUNTER — HOSPITAL ENCOUNTER (OUTPATIENT)
Dept: RADIOLOGY | Facility: HOSPITAL | Age: 60
Discharge: HOME | End: 2020-05-29
Attending: INTERNAL MEDICINE
Payer: MEDICARE

## 2020-05-29 DIAGNOSIS — M54.9 BACK PAIN, UNSPECIFIED BACK LOCATION, UNSPECIFIED BACK PAIN LATERALITY, UNSPECIFIED CHRONICITY: ICD-10-CM

## 2020-05-29 PROCEDURE — 74176 CT ABDOMEN PELVIS WITHOUT CONTRAST: ICD-10-PCS | Mod: 26,,, | Performed by: RADIOLOGY

## 2020-05-29 PROCEDURE — 74176 CT ABD & PELVIS W/O CONTRAST: CPT | Mod: 26,,, | Performed by: RADIOLOGY

## 2020-05-29 PROCEDURE — 74176 CT ABD & PELVIS W/O CONTRAST: CPT | Mod: TC

## 2020-05-29 PROCEDURE — 25500020 PHARM REV CODE 255: Performed by: INTERNAL MEDICINE

## 2020-05-29 RX ADMIN — IOHEXOL 15 ML: 350 INJECTION, SOLUTION INTRAVENOUS at 03:05

## 2020-06-02 ENCOUNTER — TELEPHONE (OUTPATIENT)
Dept: INTERNAL MEDICINE | Facility: CLINIC | Age: 60
End: 2020-06-02

## 2020-06-08 ENCOUNTER — LAB VISIT (OUTPATIENT)
Dept: LAB | Facility: HOSPITAL | Age: 60
End: 2020-06-08
Attending: INTERNAL MEDICINE
Payer: MEDICARE

## 2020-06-08 DIAGNOSIS — Z94.4 LIVER REPLACED BY TRANSPLANT: ICD-10-CM

## 2020-06-08 LAB
ALBUMIN SERPL BCP-MCNC: 3.9 G/DL (ref 3.5–5.2)
ALP SERPL-CCNC: 86 U/L (ref 55–135)
ALT SERPL W/O P-5'-P-CCNC: 19 U/L (ref 10–44)
ANION GAP SERPL CALC-SCNC: 9 MMOL/L (ref 8–16)
AST SERPL-CCNC: 20 U/L (ref 10–40)
BASOPHILS # BLD AUTO: 0.02 K/UL (ref 0–0.2)
BASOPHILS NFR BLD: 0.4 % (ref 0–1.9)
BILIRUB SERPL-MCNC: 1.1 MG/DL (ref 0.1–1)
BUN SERPL-MCNC: 11 MG/DL (ref 6–20)
CALCIUM SERPL-MCNC: 9.3 MG/DL (ref 8.7–10.5)
CHLORIDE SERPL-SCNC: 109 MMOL/L (ref 95–110)
CO2 SERPL-SCNC: 23 MMOL/L (ref 23–29)
CREAT SERPL-MCNC: 0.7 MG/DL (ref 0.5–1.4)
DIFFERENTIAL METHOD: ABNORMAL
EOSINOPHIL # BLD AUTO: 0.2 K/UL (ref 0–0.5)
EOSINOPHIL NFR BLD: 3.2 % (ref 0–8)
ERYTHROCYTE [DISTWIDTH] IN BLOOD BY AUTOMATED COUNT: 13.2 % (ref 11.5–14.5)
EST. GFR  (AFRICAN AMERICAN): >60 ML/MIN/1.73 M^2
EST. GFR  (NON AFRICAN AMERICAN): >60 ML/MIN/1.73 M^2
GLUCOSE SERPL-MCNC: 87 MG/DL (ref 70–110)
HCT VFR BLD AUTO: 40.8 % (ref 37–48.5)
HGB BLD-MCNC: 12.7 G/DL (ref 12–16)
IMM GRANULOCYTES # BLD AUTO: 0.02 K/UL (ref 0–0.04)
IMM GRANULOCYTES NFR BLD AUTO: 0.4 % (ref 0–0.5)
LYMPHOCYTES # BLD AUTO: 1.3 K/UL (ref 1–4.8)
LYMPHOCYTES NFR BLD: 25.6 % (ref 18–48)
MCH RBC QN AUTO: 30.1 PG (ref 27–31)
MCHC RBC AUTO-ENTMCNC: 31.1 G/DL (ref 32–36)
MCV RBC AUTO: 97 FL (ref 82–98)
MONOCYTES # BLD AUTO: 0.4 K/UL (ref 0.3–1)
MONOCYTES NFR BLD: 7.8 % (ref 4–15)
NEUTROPHILS # BLD AUTO: 3.2 K/UL (ref 1.8–7.7)
NEUTROPHILS NFR BLD: 62.6 % (ref 38–73)
NRBC BLD-RTO: 0 /100 WBC
PLATELET # BLD AUTO: 185 K/UL (ref 150–350)
PMV BLD AUTO: 10.5 FL (ref 9.2–12.9)
POTASSIUM SERPL-SCNC: 4 MMOL/L (ref 3.5–5.1)
PROT SERPL-MCNC: 7.3 G/DL (ref 6–8.4)
RBC # BLD AUTO: 4.22 M/UL (ref 4–5.4)
SODIUM SERPL-SCNC: 141 MMOL/L (ref 136–145)
TACROLIMUS BLD-MCNC: 2.8 NG/ML (ref 5–15)
WBC # BLD AUTO: 5.03 K/UL (ref 3.9–12.7)

## 2020-06-08 PROCEDURE — 80053 COMPREHEN METABOLIC PANEL: CPT

## 2020-06-08 PROCEDURE — 85025 COMPLETE CBC W/AUTO DIFF WBC: CPT

## 2020-06-08 PROCEDURE — 80197 ASSAY OF TACROLIMUS: CPT

## 2020-06-08 PROCEDURE — 36415 COLL VENOUS BLD VENIPUNCTURE: CPT

## 2020-06-10 ENCOUNTER — TELEPHONE (OUTPATIENT)
Dept: TRANSPLANT | Facility: CLINIC | Age: 60
End: 2020-06-10

## 2020-06-10 DIAGNOSIS — C22.1 CHOLANGIOCARCINOMA: Primary | ICD-10-CM

## 2020-06-10 NOTE — TELEPHONE ENCOUNTER
----- Message from Lakia Pruett MD sent at 6/10/2020  2:55 PM CDT -----  Liver transplant blood tests reviewed. Labs stable, no change in immunosuppression. Please continue to monitor liver tests per transplant protocol.

## 2020-06-10 NOTE — TELEPHONE ENCOUNTER
Dr Pruett reviewed your labs  Continue routine labs no changes needed.  Letter sent for next lab appointment 09/14/20

## 2020-06-26 ENCOUNTER — HOSPITAL ENCOUNTER (OUTPATIENT)
Dept: RADIOLOGY | Facility: HOSPITAL | Age: 60
Discharge: HOME | End: 2020-06-26
Attending: INTERNAL MEDICINE
Payer: MEDICARE

## 2020-06-26 DIAGNOSIS — Z94.4 LIVER REPLACED BY TRANSPLANT: ICD-10-CM

## 2020-06-26 DIAGNOSIS — C22.1 CHOLANGIOCARCINOMA: ICD-10-CM

## 2020-06-26 PROCEDURE — 71260 CT CHEST ABDOMEN PELVIS WITH CONTRAST (XPD): ICD-10-PCS | Mod: 26,,, | Performed by: RADIOLOGY

## 2020-06-26 PROCEDURE — 74177 CT CHEST ABDOMEN PELVIS WITH CONTRAST (XPD): ICD-10-PCS | Mod: 26,,, | Performed by: RADIOLOGY

## 2020-06-26 PROCEDURE — 71260 CT THORAX DX C+: CPT | Mod: 26,,, | Performed by: RADIOLOGY

## 2020-06-26 PROCEDURE — 25500020 PHARM REV CODE 255: Performed by: INTERNAL MEDICINE

## 2020-06-26 PROCEDURE — 74177 CT ABD & PELVIS W/CONTRAST: CPT | Mod: TC

## 2020-06-26 PROCEDURE — 74177 CT ABD & PELVIS W/CONTRAST: CPT | Mod: 26,,, | Performed by: RADIOLOGY

## 2020-06-26 RX ADMIN — IOHEXOL 75 ML: 350 INJECTION, SOLUTION INTRAVENOUS at 01:06

## 2020-07-01 ENCOUNTER — LAB VISIT (OUTPATIENT)
Dept: LAB | Facility: HOSPITAL | Age: 60
End: 2020-07-01
Attending: INTERNAL MEDICINE
Payer: MEDICARE

## 2020-07-01 DIAGNOSIS — M34.9 SCLERODERMA: ICD-10-CM

## 2020-07-01 DIAGNOSIS — M34.9 LIMITED SCLERODERMA: ICD-10-CM

## 2020-07-01 DIAGNOSIS — Z79.899 ON MYCOPHENOLATE MOFETIL THERAPY: ICD-10-CM

## 2020-07-01 LAB
ALBUMIN SERPL BCP-MCNC: 4.1 G/DL (ref 3.5–5.2)
ALP SERPL-CCNC: 72 U/L (ref 55–135)
ALT SERPL W/O P-5'-P-CCNC: 17 U/L (ref 10–44)
ANION GAP SERPL CALC-SCNC: 8 MMOL/L (ref 8–16)
AST SERPL-CCNC: 16 U/L (ref 10–40)
BASOPHILS # BLD AUTO: 0.03 K/UL (ref 0–0.2)
BASOPHILS NFR BLD: 0.5 % (ref 0–1.9)
BILIRUB SERPL-MCNC: 0.8 MG/DL (ref 0.1–1)
BUN SERPL-MCNC: 13 MG/DL (ref 6–20)
CALCIUM SERPL-MCNC: 9.5 MG/DL (ref 8.7–10.5)
CHLORIDE SERPL-SCNC: 107 MMOL/L (ref 95–110)
CO2 SERPL-SCNC: 26 MMOL/L (ref 23–29)
CREAT SERPL-MCNC: 0.7 MG/DL (ref 0.5–1.4)
CRP SERPL-MCNC: 2 MG/L (ref 0–8.2)
DIFFERENTIAL METHOD: ABNORMAL
EOSINOPHIL # BLD AUTO: 0.2 K/UL (ref 0–0.5)
EOSINOPHIL NFR BLD: 2.8 % (ref 0–8)
ERYTHROCYTE [DISTWIDTH] IN BLOOD BY AUTOMATED COUNT: 13.4 % (ref 11.5–14.5)
ERYTHROCYTE [SEDIMENTATION RATE] IN BLOOD BY WESTERGREN METHOD: 24 MM/HR (ref 0–36)
EST. GFR  (AFRICAN AMERICAN): >60 ML/MIN/1.73 M^2
EST. GFR  (NON AFRICAN AMERICAN): >60 ML/MIN/1.73 M^2
GLUCOSE SERPL-MCNC: 97 MG/DL (ref 70–110)
HCT VFR BLD AUTO: 42 % (ref 37–48.5)
HGB BLD-MCNC: 13 G/DL (ref 12–16)
IMM GRANULOCYTES # BLD AUTO: 0.01 K/UL (ref 0–0.04)
IMM GRANULOCYTES NFR BLD AUTO: 0.2 % (ref 0–0.5)
LYMPHOCYTES # BLD AUTO: 1.4 K/UL (ref 1–4.8)
LYMPHOCYTES NFR BLD: 24.2 % (ref 18–48)
MCH RBC QN AUTO: 30.4 PG (ref 27–31)
MCHC RBC AUTO-ENTMCNC: 31 G/DL (ref 32–36)
MCV RBC AUTO: 98 FL (ref 82–98)
MONOCYTES # BLD AUTO: 0.4 K/UL (ref 0.3–1)
MONOCYTES NFR BLD: 7.3 % (ref 4–15)
NEUTROPHILS # BLD AUTO: 3.7 K/UL (ref 1.8–7.7)
NEUTROPHILS NFR BLD: 65 % (ref 38–73)
NRBC BLD-RTO: 0 /100 WBC
PLATELET # BLD AUTO: 172 K/UL (ref 150–350)
PMV BLD AUTO: 11.4 FL (ref 9.2–12.9)
POTASSIUM SERPL-SCNC: 4.3 MMOL/L (ref 3.5–5.1)
PROT SERPL-MCNC: 7.6 G/DL (ref 6–8.4)
RBC # BLD AUTO: 4.27 M/UL (ref 4–5.4)
SODIUM SERPL-SCNC: 141 MMOL/L (ref 136–145)
WBC # BLD AUTO: 5.62 K/UL (ref 3.9–12.7)

## 2020-07-01 PROCEDURE — 36415 COLL VENOUS BLD VENIPUNCTURE: CPT

## 2020-07-01 PROCEDURE — 86140 C-REACTIVE PROTEIN: CPT

## 2020-07-01 PROCEDURE — 85025 COMPLETE CBC W/AUTO DIFF WBC: CPT

## 2020-07-01 PROCEDURE — 80053 COMPREHEN METABOLIC PANEL: CPT

## 2020-07-01 PROCEDURE — 85652 RBC SED RATE AUTOMATED: CPT

## 2020-07-02 ENCOUNTER — PATIENT OUTREACH (OUTPATIENT)
Dept: ADMINISTRATIVE | Facility: OTHER | Age: 60
End: 2020-07-02

## 2020-07-02 NOTE — PROGRESS NOTES
Requested updates within Care Everywhere.  Patient's chart was reviewed for overdue PRESTON topics.  Immunizations reconciled.

## 2020-07-06 ENCOUNTER — CLINICAL SUPPORT (OUTPATIENT)
Dept: INFECTIOUS DISEASES | Facility: CLINIC | Age: 60
End: 2020-07-06
Payer: MEDICARE

## 2020-07-06 ENCOUNTER — OFFICE VISIT (OUTPATIENT)
Dept: RHEUMATOLOGY | Facility: CLINIC | Age: 60
End: 2020-07-06
Payer: MEDICARE

## 2020-07-06 VITALS
BODY MASS INDEX: 24.1 KG/M2 | DIASTOLIC BLOOD PRESSURE: 63 MMHG | SYSTOLIC BLOOD PRESSURE: 110 MMHG | HEIGHT: 64 IN | HEART RATE: 67 BPM | WEIGHT: 141.13 LBS

## 2020-07-06 DIAGNOSIS — Z79.899 ON MYCOPHENOLATE MOFETIL THERAPY: ICD-10-CM

## 2020-07-06 DIAGNOSIS — M34.9 LIMITED SCLERODERMA: ICD-10-CM

## 2020-07-06 DIAGNOSIS — M35.01 SICCA SYNDROME WITH KERATOCONJUNCTIVITIS: Primary | ICD-10-CM

## 2020-07-06 DIAGNOSIS — K22.4 ESOPHAGEAL DYSMOTILITY: ICD-10-CM

## 2020-07-06 DIAGNOSIS — M34.9 SCLERODERMA: ICD-10-CM

## 2020-07-06 DIAGNOSIS — L30.9 HAND ECZEMA: ICD-10-CM

## 2020-07-06 DIAGNOSIS — I35.1 AORTIC VALVE INSUFFICIENCY, ETIOLOGY OF CARDIAC VALVE DISEASE UNSPECIFIED: ICD-10-CM

## 2020-07-06 DIAGNOSIS — Z03.818 ENCOUNTER FOR OBSERVATION FOR SUSPECTED EXPOSURE TO OTHER BIOLOGICAL AGENTS RULED OUT: ICD-10-CM

## 2020-07-06 DIAGNOSIS — I73.00 RAYNAUD'S PHENOMENON WITHOUT GANGRENE: ICD-10-CM

## 2020-07-06 DIAGNOSIS — J98.4 RESTRICTIVE LUNG DISEASE: ICD-10-CM

## 2020-07-06 PROCEDURE — 99213 PR OFFICE/OUTPT VISIT, EST, LEVL III, 20-29 MIN: ICD-10-PCS | Mod: 95,,, | Performed by: INTERNAL MEDICINE

## 2020-07-06 PROCEDURE — 3008F PR BODY MASS INDEX (BMI) DOCUMENTED: ICD-10-PCS | Mod: CPTII,95,, | Performed by: INTERNAL MEDICINE

## 2020-07-06 PROCEDURE — 3078F PR MOST RECENT DIASTOLIC BLOOD PRESSURE < 80 MM HG: ICD-10-PCS | Mod: CPTII,95,, | Performed by: INTERNAL MEDICINE

## 2020-07-06 PROCEDURE — 3074F SYST BP LT 130 MM HG: CPT | Mod: CPTII,95,, | Performed by: INTERNAL MEDICINE

## 2020-07-06 PROCEDURE — 99213 OFFICE O/P EST LOW 20 MIN: CPT | Mod: 95,,, | Performed by: INTERNAL MEDICINE

## 2020-07-06 PROCEDURE — 99499 UNLISTED E&M SERVICE: CPT | Mod: 95,,, | Performed by: INTERNAL MEDICINE

## 2020-07-06 PROCEDURE — 3074F PR MOST RECENT SYSTOLIC BLOOD PRESSURE < 130 MM HG: ICD-10-PCS | Mod: CPTII,95,, | Performed by: INTERNAL MEDICINE

## 2020-07-06 PROCEDURE — 90732 PNEUMOCOCCAL POLYSACCHARIDE VACCINE 23-VALENT =>2YO SQ IM: ICD-10-PCS | Mod: S$GLB,,, | Performed by: INTERNAL MEDICINE

## 2020-07-06 PROCEDURE — G0009 PNEUMOCOCCAL POLYSACCHARIDE VACCINE 23-VALENT =>2YO SQ IM: ICD-10-PCS | Mod: S$GLB,,, | Performed by: INTERNAL MEDICINE

## 2020-07-06 PROCEDURE — 3008F BODY MASS INDEX DOCD: CPT | Mod: CPTII,95,, | Performed by: INTERNAL MEDICINE

## 2020-07-06 PROCEDURE — 99499 RISK ADDL DX/OHS AUDIT: ICD-10-PCS | Mod: 95,,, | Performed by: INTERNAL MEDICINE

## 2020-07-06 PROCEDURE — 3078F DIAST BP <80 MM HG: CPT | Mod: CPTII,95,, | Performed by: INTERNAL MEDICINE

## 2020-07-06 PROCEDURE — 90732 PPSV23 VACC 2 YRS+ SUBQ/IM: CPT | Mod: S$GLB,,, | Performed by: INTERNAL MEDICINE

## 2020-07-06 PROCEDURE — G0009 ADMIN PNEUMOCOCCAL VACCINE: HCPCS | Mod: S$GLB,,, | Performed by: INTERNAL MEDICINE

## 2020-07-06 RX ORDER — MYCOPHENOLATE MOFETIL 500 MG/1
1000 TABLET ORAL 2 TIMES DAILY
Qty: 360 TABLET | Refills: 0 | Status: SHIPPED | OUTPATIENT
Start: 2020-07-06 | End: 2020-10-20 | Stop reason: SDUPTHER

## 2020-07-06 RX ORDER — SILDENAFIL CITRATE 20 MG/1
TABLET ORAL
Qty: 270 TABLET | Refills: 1 | Status: SHIPPED | OUTPATIENT
Start: 2020-07-06 | End: 2021-01-25 | Stop reason: SDUPTHER

## 2020-07-06 NOTE — ASSESSMENT & PLAN NOTE
Patient reports infrequent dysphagia related to type of food, mostly thick meats. Concern for esophageal involvement of scleroderma.   - Follow up with GI, Dr. Haynes

## 2020-07-06 NOTE — PATIENT INSTRUCTIONS
Obtain Pneumovax vaccine today after clinic.   Obtain Pulmonary Function Test (PFTs)  Obtain echocardiogram (Ultrasound of the heart)  Follow up on referral to Gastroenterology (GI) with Dr. Dallas Olvera Diclofenac  Take only one tablet of amlodipine 2.5 mg for raynaud. No need to split.   Continue other medications as prescribed.   21-Sep-2019

## 2020-07-06 NOTE — PROGRESS NOTES
Subjective:       Patient ID: Nadeen Mcgovern is a 59 y.o. female.    Chief Complaint: Scleroderma    Ms. Mcgovern is a 59 year old female with a PMH significant for liver transplant, on tacrolimus, reynauds, scleroderma w/fingertip ulcers, telangectasias, and benign lung nodule who presents for follow up and management of limited cutaneous scleroderma.    Cutaneous symptoms: Chief complaint of skin tightness, a little better than a week or two ago. Uses clobetasol cream and takes sildenafil to treat. Denies present ulcers, history of ulcers on fingertips. Denies rash, no current eczema.     Pulmonary Symptoms: Denies shortness of breath, cough, wheezing.     GI Symptoms: Reports occasional dysphagia, attributes to not chewing enough, maybe twice a week depending on food type, thick meats like pork chops most likely to cause symptom. Denies heartburn, regurgitation, odynophagia.     Cardiac Symptoms: Denies chest pain, palpitations.     Reports a couple recent falls. Slipped and fell in tub, Fell well while playing basketball with grandson. Reports some bruising without pain. Denies dizziness, vertigo, lightheadness. Reports mild instability with walking.     Denies Joint pain.    Sicca Symptoms: Reports dry eyes. Uses eyedrops for red eyes. Asked about a treatment to make eyes brighter.    Health Maintenance: Was unable to obtain PFTs and echo due to covid, standing orders for both workups, patient plans to obtain today. Will receive pneumovax in clinic today.     Review of Systems   Constitutional: Negative for fever and unexpected weight change.   HENT: Negative for trouble swallowing.    Eyes: Negative for redness.   Respiratory: Negative for cough and shortness of breath.    Cardiovascular: Negative for chest pain.   Gastrointestinal: Positive for constipation ( Attributes to medicine ). Negative for diarrhea.   Genitourinary: Negative for dysuria and genital sores.   Skin: Positive for rash.   Neurological:  "Negative for headaches.   Hematological: Bruises/bleeds easily ( Reports always brusied easily).         Objective:   /63   Pulse 67   Ht 5' 3.6" (1.615 m)   Wt 64 kg (141 lb 1.6 oz)   LMP  (LMP Unknown)   BMI 24.53 kg/m²      Physical Exam   Constitutional: She is well-developed, well-nourished, and in no distress.   HENT:   Head: Normocephalic.   Eyes: Conjunctivae are normal.   Cardiovascular: Normal rate, regular rhythm and normal heart sounds.    Pulmonary/Chest: Effort normal. No respiratory distress. She has no wheezes. She has no rales.   Abdominal: Soft. Bowel sounds are normal. She exhibits no distension. There is no abdominal tenderness. There is no rebound and no guarding.       Right Side Rheumatological Exam     Muscle Strength (0-5 scale):  Deltoid:  5  Biceps: 5/5   Triceps:  5  : 5/5   Iliopsoas: 5  Quadriceps:  5   Distal Lower Extremity: 5    Left Side Rheumatological Exam     Muscle Strength (0-5 scale):  Deltoid:  5  Biceps: 5/5   Triceps:  5  :  5/5   Iliopsoas: 5  Quadriceps:  5   Distal Lower Extremity: 5      Skin: No rash noted.        Exam of hands and feet showed improved skin wrinkling compared to previous exams, with only limited tightening on right hand.      No data to display    Assessment:       1. Sicca syndrome with keratoconjunctivitis    2. Limited scleroderma    3. Hand eczema    4. Esophageal dysmotility    5. Restrictive lung disease    6. Raynaud's phenomenon without gangrene    7. Aortic valve insufficiency, etiology of cardiac valve disease unspecified    8. Encounter for observation for suspected exposure to other biological agents ruled out    9. Scleroderma    10. On mycophenolate mofetil therapy            Plan:       Problem List Items Addressed This Visit        Ophtho    Sicca syndrome with keratoconjunctivitis - Primary     Continue eye drops             Derm    Hand eczema       Pulmonary    Restrictive lung disease     Clear lung sounds on exam " today. Patient was unable to obtain PFTs due to Covid. [facility] now offering PFTs.  - Obtain PFTs            Cardiac/Vascular    Raynaud's phenomenon without gangrene     Has not noticed raynaud's phenomenon lately. Appears to be well controlled. Patient was splitting amlodipine 2.5s, but supposed to take 2.5 mg total. Advised patient to take entire amlodipine pill.  - Amlodipine 2.5 mg qday         Aortic valve regurgitation     Patient was unable to obtain echo due to Covid. Normal heart sounds today on exam.   - Obtain echo            Immunology/Multi System    Limited scleroderma     Patient's scledorerma appears to be stable at this time, reporting improved symptoms of hand tightness, no ulcers, and no eczema. Patient was unable to obtain diagnostic health maintenance work up due to covid, will obtain PFTs and echo.   - Continue sildenafil 20mg  - Continue clobetasol cream   - Continue Mycophenolate         Relevant Medications    mycophenolate (CELLCEPT) 500 mg Tab    sildenafil (REVATIO) 20 mg Tab       GI    Esophageal dysmotility     Patient reports infrequent dysphagia related to type of food, mostly thick meats. Concern for esophageal involvement of scleroderma.   - Follow up with GI, Dr. Haynes         Relevant Orders    Ambulatory referral/consult to Gastroenterology       Other    On mycophenolate mofetil therapy     Patient on mycophenolate and tacrolimus, immunosuppressants.   - update pneumovax          Relevant Medications    mycophenolate (CELLCEPT) 500 mg Tab      Other Visit Diagnoses     Encounter for observation for suspected exposure to other biological agents ruled out        Relevant Orders    COVID-19 Routine Screening    Scleroderma        Relevant Medications    mycophenolate (CELLCEPT) 500 mg Tab    sildenafil (REVATIO) 20 mg Tab        I performed a history, review of systems, and physical exam with the patient. The assessment and plan were discussed and agreed upon with Dr. Butler the  attending of record, before sharing with the patient. The face to face encounter time, including answering all patient questions, was 1 hour.     Meek Ann, PGY 1

## 2020-07-06 NOTE — ASSESSMENT & PLAN NOTE
Patient's scledorerma appears to be stable at this time, reporting improved symptoms of hand tightness, no ulcers, and no eczema. Patient was unable to obtain diagnostic health maintenance work up due to covid, will obtain PFTs and echo.   - Continue sildenafil 20mg  - Continue clobetasol cream   - Continue Mycophenolate

## 2020-07-06 NOTE — PROGRESS NOTES
Pre chart    Answers for HPI/ROS submitted by the patient on 7/2/2020   fever: No  eye redness: No  headaches: No  shortness of breath: No  chest pain: No  trouble swallowing: No  diarrhea: No  constipation: Yes  unexpected weight change: No  genital sore: No  dysuria: No  During the last 3 days, have you had a skin rash?: Yes  Bruises or bleeds easily: Yes  cough: No

## 2020-07-06 NOTE — ASSESSMENT & PLAN NOTE
Patient was unable to obtain echo due to Covid. Normal heart sounds today on exam.   - Obtain echo

## 2020-07-06 NOTE — PROGRESS NOTES
I have personally taken the history and examined the patient and agree with the resident,s note as stated above         lcSSc(limited scleroderma): ACR/EULAR criteria: (no sclerodactyly today) digital tip pitting scars(3) telangiectases(2) Raynaud's(3) =8 does not meet  criteria ; oxaliplatin(cisplatin associated with Raynaud's) ; 5-FU not known to be associated with Raynaud's or scleroderma. MRSS=17 increased from 2 previous!. No evidence of scleroderma esophagus by esophageal manometry or upper endoscopy  EUGENE+ 1:320 nucleolar(scleroderma pattern) and speckled + SS-A (most typical of Sjogren's and SLE), Raynaud's, telangiectases, dysphagia. All scleroderma associated autoantibodies negative      EXAMINATION:  CT CHEST ABDOMEN PELVIS WITH CONTRAST (XPD)     CLINICAL HISTORY:  Cholagio surveillance in liver txp;  Intrahepatic bile duct carcinoma     TECHNIQUE:  The patient was surveyed from the lung apices through the pelvis after the administration of 75 cc Omni 350 IV contrast and data was reconstructed for coronal, sagittal, and axial images.     COMPARISON:  CT 05/29/2020, 01/12/2020, 07/25/2015; CTA 04/27/2015.     FINDINGS:  Examination of the vascular and soft tissue structures at the base of the neck is unremarkable.     A left sided aortic arch with 3 branch vessels is identified.  The thoracic aorta maintains normal caliber, contour, and course without significant atherosclerotic calcification within its course.  The heart is not enlarged. No pericardial effusion is seen.     The trachea is midline, the proximal airways are patent, and the lungs are symmetrically expanded.  Right upper lobe nodule, unchanged when compared to CT 04/27/2015 consistent with benign etiology.  No consolidation, mass, pneumothorax, or pleural fluid.     No axillary or mediastinal lymph node enlargement is seen.  Calcified mediastinal nodes consistent with old granulomatous disease.     The esophagus maintains a normal course and  caliber.     Stable postsurgical changes of liver transplantation.  Stable 2.7 cm nonenhancing hypodensity in the area of the nick hepatis.  No enhancing lesions identified.  Pneumobilia again noted.  Mild intrahepatic biliary ductal dilatation.  The gallbladder is surgically absent.  No extrahepatic biliary ductal dilatation is noted.  The portal vein, splenic vein, and SMV are patent.     The stomach, pancreas, and adrenal glands are unremarkable.  Splenic hypodensity measuring 1.1 cm with small hyperdense components likely representing calcifications.  Finding is stable when compared to CT 01/12/2020 is decreased in size when compared to CT 07/25/2015.     The kidneys are normal in size and location.  They concentrate and excrete contrast appropriately.  Subcentimeter renal hypodensities bilaterally, too small to characterize.  No hydronephrosis is seen.  The ureters appear normal in course and caliber without evidence of ureteral dilatation. The urinary bladder is unremarkable. The uterus is unremarkable.  Multiple pelvic phleboliths, similar to prior.     The visualized loops of small and large bowel show no evidence of obstruction or inflammation.     No ascites, free fluid, or intraperitoneal free air is identified.  Several normal to upper normal sized mesenteric and retroperitoneal lymph nodes, similar to prior.  The abdominal aorta is normal in course and caliber with mild atherosclerotic calcifications.  Retroaortic left renal vein incidentally noted.     The osseus structures demonstrate degenerative change with mild scoliotic curvature of the spine.  No evidence of acute fracture or osseus destructive process.     The extraperitoneal soft tissues demonstrate no significant abnormality.     Impression:     1. Postsurgical changes of liver transplantation.  Stable hypodensity in the area of the nick hepatis.  No new or enhancing hepatic lesions identified.  2. Splenic hypodensity which is stable when  compared to CT 01/12/2020 and decreased in size when compared to CT 07/25/2015, possibly an involuting splenic cyst.  3. Right upper lobe nodule which is stable when compared to CTA 04/27/2015 consistent with benign etiology.  4. Few additional findings as above.  There are no measurable lesions per RECIST criteria.     Electronically signed by resident: Bertin Mccullough  Date:                                            06/26/2020  Time:                                           13:37      Results for SHILO HAMPTON (MRN [id number]) as of 7/6/2020 08:05   Ref. Range 7/1/2020 07:59   WBC Latest Ref Range: 3.90 - 12.70 K/uL 5.62   RBC Latest Ref Range: 4.00 - 5.40 M/uL 4.27   Hemoglobin Latest Ref Range: 12.0 - 16.0 g/dL 13.0   Hematocrit Latest Ref Range: 37.0 - 48.5 % 42.0   MCV Latest Ref Range: 82 - 98 fL 98   MCH Latest Ref Range: 27.0 - 31.0 pg 30.4   MCHC Latest Ref Range: 32.0 - 36.0 g/dL 31.0 (L)   RDW Latest Ref Range: 11.5 - 14.5 % 13.4   Platelets Latest Ref Range: 150 - 350 K/uL 172   MPV Latest Ref Range: 9.2 - 12.9 fL 11.4   Gran% Latest Ref Range: 38.0 - 73.0 % 65.0   Gran # (ANC) Latest Ref Range: 1.8 - 7.7 K/uL 3.7   Lymph% Latest Ref Range: 18.0 - 48.0 % 24.2   Lymph # Latest Ref Range: 1.0 - 4.8 K/uL 1.4   Mono% Latest Ref Range: 4.0 - 15.0 % 7.3   Mono # Latest Ref Range: 0.3 - 1.0 K/uL 0.4   Eosinophil% Latest Ref Range: 0.0 - 8.0 % 2.8   Eos # Latest Ref Range: 0.0 - 0.5 K/uL 0.2   Basophil% Latest Ref Range: 0.0 - 1.9 % 0.5   Baso # Latest Ref Range: 0.00 - 0.20 K/uL 0.03   nRBC Latest Ref Range: 0 /100 WBC 0   Differential Method Unknown Automated   Immature Grans (Abs) Latest Ref Range: 0.00 - 0.04 K/uL 0.01   Immature Granulocytes Latest Ref Range: 0.0 - 0.5 % 0.2   Sed Rate Latest Ref Range: 0 - 36 mm/Hr 24   Sodium Latest Ref Range: 136 - 145 mmol/L 141   Potassium Latest Ref Range: 3.5 - 5.1 mmol/L 4.3   Chloride Latest Ref Range: 95 - 110 mmol/L 107   CO2 Latest Ref Range: 23 - 29 mmol/L 26    Anion Gap Latest Ref Range: 8 - 16 mmol/L 8   BUN, Bld Latest Ref Range: 6 - 20 mg/dL 13   Creatinine Latest Ref Range: 0.5 - 1.4 mg/dL 0.7   eGFR if non African American Latest Ref Range: >60 mL/min/1.73 m^2 >60.0   eGFR if African American Latest Ref Range: >60 mL/min/1.73 m^2 >60.0   Glucose Latest Ref Range: 70 - 110 mg/dL 97   Calcium Latest Ref Range: 8.7 - 10.5 mg/dL 9.5   Alkaline Phosphatase Latest Ref Range: 55 - 135 U/L 72   PROTEIN TOTAL Latest Ref Range: 6.0 - 8.4 g/dL 7.6   Albumin Latest Ref Range: 3.5 - 5.2 g/dL 4.1   BILIRUBIN TOTAL Latest Ref Range: 0.1 - 1.0 mg/dL 0.8   AST Latest Ref Range: 10 - 40 U/L 16   ALT Latest Ref Range: 10 - 44 U/L 17   CRP Latest Ref Range: 0.0 - 8.2 mg/L 2.0         LcSSc;  MRSS=3(mild) improved from 7 previously  Increased dysphagia  Hand eczema much improved, plantar resolved  Liver transplant for cholangiocarcinoma    *Pneumovax booster today  F/u Dr. Haynes in Gastro for dysphagia  Schedule previously ordered TTE and PFTs after SARS-Co-V-2 swab  Cont amlodipine 2.5mg daily for Raynaud's  sildenafil 20mg three times daily foir Raynaud's   Cont mycophenolate 1000mg twice daily for scleroderma and liver transplant  Cont tacrolimus 2mg am and 1 mg pm for liver transplant  RTC 3 months with standing labs     Answers for HPI/ROS submitted by the patient on 7/2/2020   fever: No  eye redness: No  headaches: No  shortness of breath: No  chest pain: No  trouble swallowing: No  diarrhea: No  constipation: Yes  unexpected weight change: No  genital sore: No  dysuria: No  During the last 3 days, have you had a skin rash?: Yes  Bruises or bleeds easily: Yes  cough: No

## 2020-07-06 NOTE — ASSESSMENT & PLAN NOTE
Clear lung sounds on exam today. Patient was unable to obtain PFTs due to Covid. [facility] now offering PFTs.  - Obtain PFTs

## 2020-07-06 NOTE — ASSESSMENT & PLAN NOTE
Has not noticed raynaud's phenomenon lately. Appears to be well controlled. Patient was splitting amlodipine 2.5s, but supposed to take 2.5 mg total. Advised patient to take entire amlodipine pill.  - Amlodipine 2.5 mg qday

## 2020-07-08 ENCOUNTER — HOSPITAL ENCOUNTER (OUTPATIENT)
Dept: CARDIOLOGY | Facility: HOSPITAL | Age: 60
Discharge: HOME | End: 2020-07-08
Attending: INTERNAL MEDICINE
Payer: MEDICARE

## 2020-07-08 ENCOUNTER — LAB VISIT (OUTPATIENT)
Dept: INTERNAL MEDICINE | Facility: CLINIC | Age: 60
End: 2020-07-08
Payer: MEDICARE

## 2020-07-08 VITALS
SYSTOLIC BLOOD PRESSURE: 110 MMHG | BODY MASS INDEX: 24.98 KG/M2 | HEART RATE: 60 BPM | WEIGHT: 141 LBS | DIASTOLIC BLOOD PRESSURE: 64 MMHG | HEIGHT: 63 IN

## 2020-07-08 DIAGNOSIS — Z03.818 ENCOUNTER FOR OBSERVATION FOR SUSPECTED EXPOSURE TO OTHER BIOLOGICAL AGENTS RULED OUT: ICD-10-CM

## 2020-07-08 DIAGNOSIS — M34.9 SCLERODERMA: ICD-10-CM

## 2020-07-08 LAB
ASCENDING AORTA: 3.23 CM
AV INDEX (PROSTH): 0.74
AV MEAN GRADIENT: 4 MMHG
AV PEAK GRADIENT: 9 MMHG
AV VALVE AREA: 2.65 CM2
AV VELOCITY RATIO: 0.74
BSA FOR ECHO PROCEDURE: 1.69 M2
CV ECHO LV RWT: 0.43 CM
DOP CALC AO PEAK VEL: 1.48 M/S
DOP CALC AO VTI: 31.85 CM
DOP CALC LVOT AREA: 3.6 CM2
DOP CALC LVOT DIAMETER: 2.13 CM
DOP CALC LVOT PEAK VEL: 1.09 M/S
DOP CALC LVOT STROKE VOLUME: 84.26 CM3
DOP CALCLVOT PEAK VEL VTI: 23.66 CM
E WAVE DECELERATION TIME: 236.3 MSEC
E/A RATIO: 1.14
E/E' RATIO: 7.71 M/S
ECHO LV POSTERIOR WALL: 0.87 CM (ref 0.6–1.1)
FRACTIONAL SHORTENING: 35 % (ref 28–44)
INTERVENTRICULAR SEPTUM: 0.98 CM (ref 0.6–1.1)
LA MAJOR: 4.92 CM
LA MINOR: 4.8 CM
LA WIDTH: 3.86 CM
LEFT ATRIUM SIZE: 3.35 CM
LEFT ATRIUM VOLUME INDEX: 32 ML/M2
LEFT ATRIUM VOLUME: 53.41 CM3
LEFT INTERNAL DIMENSION IN SYSTOLE: 2.64 CM (ref 2.1–4)
LEFT VENTRICLE DIASTOLIC VOLUME INDEX: 43.83 ML/M2
LEFT VENTRICLE DIASTOLIC VOLUME: 73.05 ML
LEFT VENTRICLE MASS INDEX: 70 G/M2
LEFT VENTRICLE SYSTOLIC VOLUME INDEX: 15.3 ML/M2
LEFT VENTRICLE SYSTOLIC VOLUME: 25.57 ML
LEFT VENTRICULAR INTERNAL DIMENSION IN DIASTOLE: 4.07 CM (ref 3.5–6)
LEFT VENTRICULAR MASS: 117.13 G
LV LATERAL E/E' RATIO: 7.36 M/S
LV SEPTAL E/E' RATIO: 8.1 M/S
MV PEAK A VEL: 0.71 M/S
MV PEAK E VEL: 0.81 M/S
MV STENOSIS PRESSURE HALF TIME: 68.53 MS
MV VALVE AREA P 1/2 METHOD: 3.21 CM2
PISA TR MAX VEL: 2.45 M/S
PULM VEIN S/D RATIO: 1.16
PV PEAK D VEL: 0.58 M/S
PV PEAK S VEL: 0.67 M/S
RA MAJOR: 4.76 CM
RA PRESSURE: 8 MMHG
RA WIDTH: 3.11 CM
RIGHT VENTRICULAR END-DIASTOLIC DIMENSION: 3.25 CM
SINUS: 3.05 CM
STJ: 2.68 CM
TDI LATERAL: 0.11 M/S
TDI SEPTAL: 0.1 M/S
TDI: 0.11 M/S
TR MAX PG: 24 MMHG
TRICUSPID ANNULAR PLANE SYSTOLIC EXCURSION: 2.78 CM
TV REST PULMONARY ARTERY PRESSURE: 32 MMHG

## 2020-07-08 PROCEDURE — 93306 ECHO (CUPID ONLY): ICD-10-PCS | Mod: 26,,, | Performed by: INTERNAL MEDICINE

## 2020-07-08 PROCEDURE — 93306 TTE W/DOPPLER COMPLETE: CPT

## 2020-07-08 PROCEDURE — 93306 TTE W/DOPPLER COMPLETE: CPT | Mod: 26,,, | Performed by: INTERNAL MEDICINE

## 2020-07-08 PROCEDURE — U0003 INFECTIOUS AGENT DETECTION BY NUCLEIC ACID (DNA OR RNA); SEVERE ACUTE RESPIRATORY SYNDROME CORONAVIRUS 2 (SARS-COV-2) (CORONAVIRUS DISEASE [COVID-19]), AMPLIFIED PROBE TECHNIQUE, MAKING USE OF HIGH THROUGHPUT TECHNOLOGIES AS DESCRIBED BY CMS-2020-01-R: HCPCS

## 2020-07-09 LAB — SARS-COV-2 RNA RESP QL NAA+PROBE: NOT DETECTED

## 2020-07-10 ENCOUNTER — HOSPITAL ENCOUNTER (OUTPATIENT)
Dept: PULMONOLOGY | Facility: CLINIC | Age: 60
Discharge: HOME | End: 2020-07-10
Payer: MEDICARE

## 2020-07-10 VITALS — BODY MASS INDEX: 25.72 KG/M2 | HEIGHT: 62 IN | WEIGHT: 139.75 LBS

## 2020-07-10 DIAGNOSIS — M34.9 SCLERODERMA: ICD-10-CM

## 2020-07-10 PROCEDURE — 94729 PR C02/MEMBANE DIFFUSE CAPACITY: ICD-10-PCS | Mod: S$GLB,,, | Performed by: INTERNAL MEDICINE

## 2020-07-10 PROCEDURE — 94010 BREATHING CAPACITY TEST: ICD-10-PCS | Mod: S$GLB,,, | Performed by: INTERNAL MEDICINE

## 2020-07-10 PROCEDURE — 94729 DIFFUSING CAPACITY: CPT | Mod: S$GLB,,, | Performed by: INTERNAL MEDICINE

## 2020-07-10 PROCEDURE — 94727 PR PULM FUNCTION TEST BY GAS: ICD-10-PCS | Mod: S$GLB,,, | Performed by: INTERNAL MEDICINE

## 2020-07-10 PROCEDURE — 94618 PULMONARY STRESS TESTING: ICD-10-PCS | Mod: S$GLB,,, | Performed by: INTERNAL MEDICINE

## 2020-07-10 PROCEDURE — 94727 GAS DIL/WSHOT DETER LNG VOL: CPT | Mod: S$GLB,,, | Performed by: INTERNAL MEDICINE

## 2020-07-10 PROCEDURE — 94010 BREATHING CAPACITY TEST: CPT | Mod: S$GLB,,, | Performed by: INTERNAL MEDICINE

## 2020-07-10 PROCEDURE — 94618 PULMONARY STRESS TESTING: CPT | Mod: S$GLB,,, | Performed by: INTERNAL MEDICINE

## 2020-07-12 NOTE — PROCEDURES
Nadeen Mcgovern is a 59 y.o.  female patient, who presents for a 6 minute walk test ordered by MD Carrie.  The diagnosis is Scleroderma/CREST.  The patient's BMI is 25.6 kg/m2.  Predicted distance (lower limit of normal) is 374.29 meters.      Test Results:    The test was completed without stopping.  The total time walked was 360 seconds.  During walking, the patient reported:  Lightheadedness.  The patient used no assistive devices during testing.     07/10/2020---------Distance: 365.76 meters (1200 feet)     O2 Sat % Supplemental Oxygen Heart Rate Blood Pressure Adamaris Scale   Pre-exercise  (Resting) 100 % Room Air 62 bpm 141/65 mmHg 3   During Exercise 100 % Room Air 66 bpm 133/86 mmHg 4   Post-exercise  (Recovery) 100 % Room Air  62 bpm       Recovery Time: 87 seconds    Performing nurse/tech: Dwight MOSER      PREVIOUS STUDY:   03/26/2019---------Distance: 284.07 meters (932 feet)       O2 Sat % Supplemental Oxygen Heart Rate Blood Pressure Adamaris Scale   Pre-exercise  (Resting) 99 % Room Air 65 bpm 133/62 mmHg 3   During Exercise   99 % Room Air 71 bpm 136/70 mmHg 4   Post-exercise  (Recovery) 100 % Room Air  64 bpm   mmHg         CLINICAL INTERPRETATION:  Six minute walk distance is 365.76 meters (1200 feet) with somewhat heavy dyspnea.  During exercise, there was no desaturation while breathing room air.  Both blood pressure and heart rate remained stable with walking.  The patient reported non-pulmonary symptoms during exercise.  Since the previous study in March 2019, exercise capacity is significantly improved.  Based upon age and body mass index, exercise capacity is less than predicted.

## 2020-07-20 ENCOUNTER — OFFICE VISIT (OUTPATIENT)
Dept: DERMATOLOGY | Facility: CLINIC | Age: 60
End: 2020-07-20
Payer: MEDICARE

## 2020-07-20 ENCOUNTER — OFFICE VISIT (OUTPATIENT)
Dept: INTERNAL MEDICINE | Facility: CLINIC | Age: 60
End: 2020-07-20
Payer: MEDICARE

## 2020-07-20 VITALS
DIASTOLIC BLOOD PRESSURE: 56 MMHG | WEIGHT: 137.13 LBS | BODY MASS INDEX: 25.23 KG/M2 | HEIGHT: 62 IN | SYSTOLIC BLOOD PRESSURE: 112 MMHG | HEART RATE: 76 BPM | OXYGEN SATURATION: 99 %

## 2020-07-20 DIAGNOSIS — M25.50 POLYARTHRALGIA: ICD-10-CM

## 2020-07-20 DIAGNOSIS — L30.9 HAND ECZEMA: ICD-10-CM

## 2020-07-20 DIAGNOSIS — Z12.4 CERVICAL CANCER SCREENING: ICD-10-CM

## 2020-07-20 DIAGNOSIS — D84.9 IMMUNOCOMPROMISED: ICD-10-CM

## 2020-07-20 DIAGNOSIS — J98.4 RESTRICTIVE LUNG DISEASE: ICD-10-CM

## 2020-07-20 DIAGNOSIS — L30.9 CHRONIC ECZEMA OF HAND: Primary | ICD-10-CM

## 2020-07-20 DIAGNOSIS — I70.0 AORTIC ATHEROSCLEROSIS: ICD-10-CM

## 2020-07-20 DIAGNOSIS — M34.9 LIMITED SCLERODERMA: ICD-10-CM

## 2020-07-20 DIAGNOSIS — I73.00 RAYNAUD'S PHENOMENON WITHOUT GANGRENE: ICD-10-CM

## 2020-07-20 DIAGNOSIS — R53.81 DEBILITY: Primary | ICD-10-CM

## 2020-07-20 DIAGNOSIS — Z94.4 S/P LIVER TRANSPLANT: ICD-10-CM

## 2020-07-20 PROCEDURE — 3074F SYST BP LT 130 MM HG: CPT | Mod: CPTII,S$GLB,, | Performed by: INTERNAL MEDICINE

## 2020-07-20 PROCEDURE — 99999 PR PBB SHADOW E&M-EST. PATIENT-LVL II: CPT | Mod: PBBFAC,,, | Performed by: DERMATOLOGY

## 2020-07-20 PROCEDURE — 3074F PR MOST RECENT SYSTOLIC BLOOD PRESSURE < 130 MM HG: ICD-10-PCS | Mod: CPTII,S$GLB,, | Performed by: INTERNAL MEDICINE

## 2020-07-20 PROCEDURE — 99999 PR PBB SHADOW E&M-EST. PATIENT-LVL II: ICD-10-PCS | Mod: PBBFAC,,, | Performed by: DERMATOLOGY

## 2020-07-20 PROCEDURE — 3074F SYST BP LT 130 MM HG: CPT | Mod: CPTII,S$GLB,, | Performed by: DERMATOLOGY

## 2020-07-20 PROCEDURE — 99213 OFFICE O/P EST LOW 20 MIN: CPT | Mod: S$GLB,,, | Performed by: DERMATOLOGY

## 2020-07-20 PROCEDURE — 3078F DIAST BP <80 MM HG: CPT | Mod: CPTII,S$GLB,, | Performed by: DERMATOLOGY

## 2020-07-20 PROCEDURE — 3008F BODY MASS INDEX DOCD: CPT | Mod: CPTII,S$GLB,, | Performed by: INTERNAL MEDICINE

## 2020-07-20 PROCEDURE — 3008F PR BODY MASS INDEX (BMI) DOCUMENTED: ICD-10-PCS | Mod: CPTII,S$GLB,, | Performed by: INTERNAL MEDICINE

## 2020-07-20 PROCEDURE — 99499 UNLISTED E&M SERVICE: CPT | Mod: S$GLB,,, | Performed by: INTERNAL MEDICINE

## 2020-07-20 PROCEDURE — 99214 OFFICE O/P EST MOD 30 MIN: CPT | Mod: S$GLB,,, | Performed by: INTERNAL MEDICINE

## 2020-07-20 PROCEDURE — 3078F DIAST BP <80 MM HG: CPT | Mod: CPTII,S$GLB,, | Performed by: INTERNAL MEDICINE

## 2020-07-20 PROCEDURE — 99999 PR PBB SHADOW E&M-EST. PATIENT-LVL V: ICD-10-PCS | Mod: PBBFAC,,, | Performed by: INTERNAL MEDICINE

## 2020-07-20 PROCEDURE — 99999 PR PBB SHADOW E&M-EST. PATIENT-LVL V: CPT | Mod: PBBFAC,,, | Performed by: INTERNAL MEDICINE

## 2020-07-20 PROCEDURE — 3078F PR MOST RECENT DIASTOLIC BLOOD PRESSURE < 80 MM HG: ICD-10-PCS | Mod: CPTII,S$GLB,, | Performed by: DERMATOLOGY

## 2020-07-20 PROCEDURE — 3074F PR MOST RECENT SYSTOLIC BLOOD PRESSURE < 130 MM HG: ICD-10-PCS | Mod: CPTII,S$GLB,, | Performed by: DERMATOLOGY

## 2020-07-20 PROCEDURE — 99214 PR OFFICE/OUTPT VISIT, EST, LEVL IV, 30-39 MIN: ICD-10-PCS | Mod: S$GLB,,, | Performed by: INTERNAL MEDICINE

## 2020-07-20 PROCEDURE — 3078F PR MOST RECENT DIASTOLIC BLOOD PRESSURE < 80 MM HG: ICD-10-PCS | Mod: CPTII,S$GLB,, | Performed by: INTERNAL MEDICINE

## 2020-07-20 PROCEDURE — 99499 RISK ADDL DX/OHS AUDIT: ICD-10-PCS | Mod: S$GLB,,, | Performed by: INTERNAL MEDICINE

## 2020-07-20 PROCEDURE — 99213 PR OFFICE/OUTPT VISIT, EST, LEVL III, 20-29 MIN: ICD-10-PCS | Mod: S$GLB,,, | Performed by: DERMATOLOGY

## 2020-07-20 RX ORDER — HYDROXYZINE PAMOATE 50 MG/1
50 CAPSULE ORAL EVERY 8 HOURS PRN
Qty: 30 CAPSULE | Refills: 3 | Status: SHIPPED | OUTPATIENT
Start: 2020-07-20 | End: 2023-12-08

## 2020-07-20 RX ORDER — AMLODIPINE BESYLATE 5 MG/1
TABLET ORAL
COMMUNITY
Start: 2020-06-16 | End: 2020-09-17

## 2020-07-20 RX ORDER — MYCOPHENOLATE MOFETIL 500 MG/1
TABLET ORAL
COMMUNITY
Start: 2020-05-06 | End: 2020-07-20

## 2020-07-20 NOTE — PROGRESS NOTES
Subjective:       Patient ID:  Nadeen Mcgovern is a 59 y.o. female who presents for   Chief Complaint   Patient presents with    Eczema     right and lrft hand      On prograf (decreased dose 2 months ago) and cellcept -- s/p liver t/p     Hand rashes started 2 years    Eczema - Follow-up  Diagnosis: chronic hand eczema.  Symptom course: worsening (flare x 1 week)  Currently using: clob oint bid - occluding with gloves qhs. did not think o'mahad's hand cream worked. had NB-UVB for hands biw x 1 month (stopped 2/2 covid)  Affected locations: right hand, left hand, left fingers and right fingers  Signs / symptoms: itching        Review of Systems   Skin: Positive for itching and rash.        No h/o AD as child          Objective:    Physical Exam   Constitutional: She appears well-developed and well-nourished. No distress.   Neurological: She is alert and oriented to person, place, and time. She is not disoriented.   Psychiatric: She has a normal mood and affect.   Skin:   Areas Examined (abnormalities noted in diagram):   RUE Inspected  LUE Inspection Performed  Nails and Digits Inspection Performed             Diagram Legend     Erythematous scaling macule/papule c/w actinic keratosis       Vascular papule c/w angioma      Pigmented verrucoid papule/plaque c/w seborrheic keratosis      Yellow umbilicated papule c/w sebaceous hyperplasia      Irregularly shaped tan macule c/w lentigo     1-2 mm smooth white papules consistent with Milia      Movable subcutaneous cyst with punctum c/w epidermal inclusion cyst      Subcutaneous movable cyst c/w pilar cyst      Firm pink to brown papule c/w dermatofibroma      Pedunculated fleshy papule(s) c/w skin tag(s)      Evenly pigmented macule c/w junctional nevus     Mildly variegated pigmented, slightly irregular-bordered macule c/w mildly atypical nevus      Flesh colored to evenly pigmented papule c/w intradermal nevus       Pink pearly papule/plaque c/w basal cell carcinoma       Erythematous hyperkeratotic cursted plaque c/w SCC      Surgical scar with no sign of skin cancer recurrence      Open and closed comedones      Inflammatory papules and pustules      Verrucoid papule consistent consistent with wart     Erythematous eczematous patches and plaques     Dystrophic onycholytic nail with subungual debris c/w onychomycosis     Umbilicated papule    Erythematous-base heme-crusted tan verrucoid plaque consistent with inflamed seborrheic keratosis     Erythematous Silvery Scaling Plaque c/w Psoriasis     See annotation      Assessment / Plan:        Chronic eczema of hand  Cont clob oint bid - occlude qhs  Restart NB-UVB for hands - sent Kian london msg  Trial of elta md so silky hand cream q hand washing and q hour when flared             Follow up in about 6 months (around 1/20/2021).

## 2020-07-20 NOTE — PROGRESS NOTES
Subjective:       Patient ID: Nadeen Mcgovern is a 59 y.o. female.    Chief Complaint: Follow-up (6 month )    HPI   C/o fluid in the L knee intermittently. Some pain intermittently w/ the R hip also. Having eczema under her feet so painful to walk. Feels like sometimes her legs give out sometimes. She had a fall back in April onto the tailbone and that's resolved. However had another fall 2 days ago due to uneven grounds onto the R knee. Bruising on the R knee and thigh. Able to bear weight.     H/o cholangiocarcinoma s/p liver transplant 2015 - prograf 2mg qam and 1mg qpm, cellcept 1000mg BID.   Last seen Dr. Pruett 10/14/19. F/u 1 yr.  CT C/A/P 6/2020 - RUL nodule unchanged since 2015 (benign). Old granulomatous disease of mediastinal LN. Stable postsurgical changes of liver transplant - stable 2.7cm nonenhancing hypodensity at the nick hepatis area. Splenic calcifications, stable from 1/2020 and smaller than in 2015. Mild abdominal aortic calcifications. retroaortic L renal vein. Mild scoliotic curvature of spine.    Chronic hand eczema - clobetasol 0.05% ointment to hands. Follows w/ Dr. Zuñiga - last OV 2/18/20.  Itching on the hands. Takes vistaril prn.     Scleroderma - cellcept 1000mg BID  Raynaud's - amlodipine 5mg qd, sildenafil 20mg TID  Follows w/ Dr. Butler  6MWT 100% including during exercise.    Due for pap.    Review of Systems   Constitutional: Negative for activity change and unexpected weight change.   HENT: Negative for hearing loss, rhinorrhea and trouble swallowing.    Eyes: Positive for visual disturbance. Negative for discharge.   Respiratory: Negative for chest tightness and wheezing.    Cardiovascular: Negative for chest pain and palpitations.   Gastrointestinal: Positive for constipation. Negative for blood in stool, diarrhea and vomiting.   Endocrine: Positive for polyuria. Negative for polydipsia.   Genitourinary: Negative for difficulty urinating, dysuria, hematuria and menstrual  "problem.   Musculoskeletal: Negative for joint swelling and neck pain.   Neurological: Negative for weakness and headaches.   Psychiatric/Behavioral: Negative for confusion and dysphoric mood.       Objective:      Physical Exam    BP (!) 112/56 (BP Location: Right arm, Patient Position: Sitting, BP Method: Medium (Manual))   Pulse 76   Ht 5' 2" (1.575 m)   Wt 62.2 kg (137 lb 2 oz)   LMP  (LMP Unknown)   SpO2 99%   BMI 25.08 kg/m²     GEN - A+OX4, NAD   HEENT - PERRL, EOMI, OP clear. MMM.   Neck - No thyromegaly or cervical LAD. No thyroid masses felt.  CV - RRR, no m/r.   Chest - CTAB, no wheezing or rhonchi  Abd - S/NT/ND/+BS.   Ext - 2+BDP and radial pulses. No LE edema.   Neuro - PERRL, EOMI, no nystagmus, eyebrow raise, facial sensation, hearing, m of mastication, smile, palatal raise, shoulder shrug, tongue protrusion symmetric and intact. 5/5 BUE and BLE strength. Normal gait.   MSK - pain on palpation of the L medial knee.   Skin - scaly rash on hands. R mid thigh and R medial knee w/ bruise.     Previous labs reviewed.     Assessment/Plan     Nadeen was seen today for follow-up.    Diagnoses and all orders for this visit:    Debility - will fax rollator to DME. Filled out DMV form.   -     WALKER FOR HOME USE    Polyarthralgia  -     WALKER FOR HOME USE    S/P liver transplant and Immunocompromised due to immunosuppressant. No active infection.     Aortic atherosclerosis - cont asa 81mg daily.     Hand eczema - cont clobetasol. Seeing Dr. Zuñiga later today.   -     hydrOXYzine pamoate (VISTARIL) 50 MG Cap; Take 1 capsule (50 mg total) by mouth every 8 (eight) hours as needed.    Restrictive lung disease - 6MWT WNL.     Raynaud's phenomenon without gangrene - cont amlodipine and sildenafil.     Limited scleroderma - cont cellcept.     Cervical cancer screening - due for Pap.   -     Ambulatory referral/consult to Obstetrics / Gynecology; Future      Follow up in about 4 months (around " 11/20/2020).      Nadeen Figueroa MD  Department of Internal Medicine - [facility]  8:49 AM

## 2020-07-22 ENCOUNTER — TELEPHONE (OUTPATIENT)
Dept: DERMATOLOGY | Facility: CLINIC | Age: 60
End: 2020-07-22

## 2020-07-22 NOTE — TELEPHONE ENCOUNTER
Left message for patient to return my call to set up schedule for light treatments.     ----- Message from Kari Zuñiga MD sent at 7/20/2020 10:08 AM CDT -----  Wants to restart NB-UVB for hands. Do you need me to write a new order? (last written 2/2020)

## 2020-07-24 ENCOUNTER — TELEPHONE (OUTPATIENT)
Dept: TRANSPLANT | Facility: CLINIC | Age: 60
End: 2020-07-24

## 2020-07-24 NOTE — LETTER
July 24, 2020    Nadeen Mcgovern  [address] [facility] [address]             [facility] Transplant  [address]  [place] [address]  Phone: [phone] Dear Mrs. Mcgovern:    Dr Pruett reviewed your CT scans, they are stable.  We will continue with routine imaging.      If you have any questions or concerns, please don't hesitate to call.    Sincerely,        Chetna Pickard RN

## 2020-07-24 NOTE — TELEPHONE ENCOUNTER
----- Message from Chicho Ellsworth MA sent at 7/24/2020  3:34 PM CDT -----  Contact: pt    ----- Message -----  From: Checo Mackay  Sent: 7/24/2020   3:27 PM CDT  To: [facility] Staff    Returning missed call to Chetna Pickard    Call back: [phone]

## 2020-07-24 NOTE — TELEPHONE ENCOUNTER
Sent letter to let patient know CT scan reviewed and are ok.  Will continue with routine imaging.    ----- Message from Lakia Pruett MD sent at 7/23/2020  5:30 PM CDT -----  CT result is ok.

## 2020-07-28 ENCOUNTER — CLINICAL SUPPORT (OUTPATIENT)
Dept: DERMATOLOGY | Facility: CLINIC | Age: 60
End: 2020-07-28
Payer: MEDICARE

## 2020-07-28 DIAGNOSIS — L30.9 CHRONIC ECZEMA OF HAND: ICD-10-CM

## 2020-07-28 PROCEDURE — 96900 PR ULTRAVIOLET LIGHT THERAPY: ICD-10-PCS | Mod: S$GLB,,, | Performed by: DERMATOLOGY

## 2020-07-28 PROCEDURE — 99999 PR PBB SHADOW E&M-EST. PATIENT-LVL I: ICD-10-PCS | Mod: PBBFAC,,,

## 2020-07-28 PROCEDURE — 96900 ACTINOTHERAPY UV LIGHT: CPT | Mod: S$GLB,,, | Performed by: DERMATOLOGY

## 2020-07-28 PROCEDURE — 99999 PR PBB SHADOW E&M-EST. PATIENT-LVL I: CPT | Mod: PBBFAC,,,

## 2020-07-28 NOTE — PROGRESS NOTES
Fadumo tx 1 for eczema. Goggles issued . NB-UVB to palms and dorsum hands for 30 seconds with eyes shielded.

## 2020-07-30 ENCOUNTER — CLINICAL SUPPORT (OUTPATIENT)
Dept: DERMATOLOGY | Facility: CLINIC | Age: 60
End: 2020-07-30
Payer: MEDICARE

## 2020-07-30 DIAGNOSIS — L30.9 CHRONIC ECZEMA OF HAND: ICD-10-CM

## 2020-07-30 PROCEDURE — 96900 ACTINOTHERAPY UV LIGHT: CPT | Mod: S$GLB,,, | Performed by: DERMATOLOGY

## 2020-07-30 PROCEDURE — 99999 PR PBB SHADOW E&M-EST. PATIENT-LVL II: ICD-10-PCS | Mod: PBBFAC,,,

## 2020-07-30 PROCEDURE — 99999 PR PBB SHADOW E&M-EST. PATIENT-LVL II: CPT | Mod: PBBFAC,,,

## 2020-07-30 PROCEDURE — 96900 PR ULTRAVIOLET LIGHT THERAPY: ICD-10-PCS | Mod: S$GLB,,, | Performed by: DERMATOLOGY

## 2020-07-31 ENCOUNTER — TELEPHONE (OUTPATIENT)
Dept: PHARMACY | Facility: CLINIC | Age: 60
End: 2020-07-31

## 2020-07-31 NOTE — TELEPHONE ENCOUNTER
Pt was reached on  for Cellcept and Revatio Follow Up and Refill. Pt only confirmed the need a a Revatio refill- pt states she has 20 tablets remaining. Pt reports to have a half bottle of MMF, since she had received excess supply from [employer]. Pt confirmed shipping of Revatio on  to arrive on . Address and  verified. $3.60 copay in 004, [facility].  Pt denies any side effects. Pt did not start any new medications. Pt had no further questions or concerns.

## 2020-08-04 ENCOUNTER — CLINICAL SUPPORT (OUTPATIENT)
Dept: DERMATOLOGY | Facility: CLINIC | Age: 60
End: 2020-08-04
Payer: MEDICARE

## 2020-08-04 DIAGNOSIS — L30.9 CHRONIC ECZEMA OF HAND: ICD-10-CM

## 2020-08-04 PROCEDURE — 99999 PR PBB SHADOW E&M-EST. PATIENT-LVL II: ICD-10-PCS | Mod: PBBFAC,,,

## 2020-08-04 PROCEDURE — 96900 ACTINOTHERAPY UV LIGHT: CPT | Mod: S$GLB,,, | Performed by: DERMATOLOGY

## 2020-08-04 PROCEDURE — 96900 PR ULTRAVIOLET LIGHT THERAPY: ICD-10-PCS | Mod: S$GLB,,, | Performed by: DERMATOLOGY

## 2020-08-04 PROCEDURE — 99999 PR PBB SHADOW E&M-EST. PATIENT-LVL II: CPT | Mod: PBBFAC,,,

## 2020-08-06 ENCOUNTER — CLINICAL SUPPORT (OUTPATIENT)
Dept: DERMATOLOGY | Facility: CLINIC | Age: 60
End: 2020-08-06
Payer: MEDICARE

## 2020-08-06 DIAGNOSIS — L30.9 CHRONIC ECZEMA OF HAND: ICD-10-CM

## 2020-08-06 PROCEDURE — 99999 PR PBB SHADOW E&M-EST. PATIENT-LVL II: ICD-10-PCS | Mod: PBBFAC,,,

## 2020-08-06 PROCEDURE — 99999 PR PBB SHADOW E&M-EST. PATIENT-LVL II: CPT | Mod: PBBFAC,,,

## 2020-08-06 PROCEDURE — 96900 ACTINOTHERAPY UV LIGHT: CPT | Mod: S$GLB,,, | Performed by: DERMATOLOGY

## 2020-08-06 PROCEDURE — 96900 PR ULTRAVIOLET LIGHT THERAPY: ICD-10-PCS | Mod: S$GLB,,, | Performed by: DERMATOLOGY

## 2020-08-06 NOTE — PROGRESS NOTES
Light tx 4 for eczema. NB-UVB to palms and dorsum hands for  Min 15 seconds with eyes shielded.

## 2020-08-10 ENCOUNTER — OFFICE VISIT (OUTPATIENT)
Dept: OBSTETRICS AND GYNECOLOGY | Facility: CLINIC | Age: 60
End: 2020-08-10
Payer: MEDICARE

## 2020-08-10 VITALS
DIASTOLIC BLOOD PRESSURE: 64 MMHG | HEIGHT: 62 IN | BODY MASS INDEX: 25.52 KG/M2 | WEIGHT: 138.69 LBS | SYSTOLIC BLOOD PRESSURE: 118 MMHG

## 2020-08-10 DIAGNOSIS — R61 NIGHT SWEATS: ICD-10-CM

## 2020-08-10 DIAGNOSIS — Z01.419 ENCOUNTER FOR WELL WOMAN EXAM: Primary | ICD-10-CM

## 2020-08-10 DIAGNOSIS — R23.2 HOT FLASHES: ICD-10-CM

## 2020-08-10 PROCEDURE — G0101 PR CA SCREEN;PELVIC/BREAST EXAM: ICD-10-PCS | Mod: S$GLB,,, | Performed by: NURSE PRACTITIONER

## 2020-08-10 PROCEDURE — 99999 PR PBB SHADOW E&M-EST. PATIENT-LVL III: CPT | Mod: PBBFAC,,, | Performed by: NURSE PRACTITIONER

## 2020-08-10 PROCEDURE — 3008F BODY MASS INDEX DOCD: CPT | Mod: CPTII,S$GLB,, | Performed by: NURSE PRACTITIONER

## 2020-08-10 PROCEDURE — 3078F DIAST BP <80 MM HG: CPT | Mod: CPTII,S$GLB,, | Performed by: NURSE PRACTITIONER

## 2020-08-10 PROCEDURE — 3074F PR MOST RECENT SYSTOLIC BLOOD PRESSURE < 130 MM HG: ICD-10-PCS | Mod: CPTII,S$GLB,, | Performed by: NURSE PRACTITIONER

## 2020-08-10 PROCEDURE — 99999 PR PBB SHADOW E&M-EST. PATIENT-LVL III: ICD-10-PCS | Mod: PBBFAC,,, | Performed by: NURSE PRACTITIONER

## 2020-08-10 PROCEDURE — G0101 CA SCREEN;PELVIC/BREAST EXAM: HCPCS | Mod: S$GLB,,, | Performed by: NURSE PRACTITIONER

## 2020-08-10 PROCEDURE — 3008F PR BODY MASS INDEX (BMI) DOCUMENTED: ICD-10-PCS | Mod: CPTII,S$GLB,, | Performed by: NURSE PRACTITIONER

## 2020-08-10 PROCEDURE — 3074F SYST BP LT 130 MM HG: CPT | Mod: CPTII,S$GLB,, | Performed by: NURSE PRACTITIONER

## 2020-08-10 PROCEDURE — 88175 CYTOPATH C/V AUTO FLUID REDO: CPT

## 2020-08-10 PROCEDURE — 3078F PR MOST RECENT DIASTOLIC BLOOD PRESSURE < 80 MM HG: ICD-10-PCS | Mod: CPTII,S$GLB,, | Performed by: NURSE PRACTITIONER

## 2020-08-10 NOTE — LETTER
August 11, 2020      Nadeen Figueroa MD  [address]  [facility] [address]           [facility] [address]  [address] [address]  Phone: [phone]  Fax: [phone]          Patient: Nadeen Mcgovern   MR Number: [id number]   YOB: 1960   Date of Visit: 8/10/2020       Dear Dr. Nadeen Figueroa:    Thank you for referring Nadeen Mcgovern to me for evaluation. Attached you will find relevant portions of my assessment and plan of care.    If you have questions, please do not hesitate to call me. I look forward to following Nadeen cMgovern along with you.    Sincerely,    Chandni Morris, NP    Enclosure  CC:  No Recipients    If you would like to receive this communication electronically, please contact [email or web address] or [phone] to request more information on [employer] Link access.    For providers and/or their staff who would like to refer a patient to [facility], please contact us through our one-stop-shop provider referral line, [facility], at [phone].    If you feel you have received this communication in error or would no longer like to receive these types of communications, please e-mail [email or web address]

## 2020-08-10 NOTE — PROGRESS NOTES
Chief Complaint   Patient presents with    Annual Exam       History of Present Illness: Nadeen Mcgovern is a 59 y.o. female that presents today 2020   Pt presents today for well woman exam. . Pt denies any abnormal pap smears in the past. Pt reports that her cycles stopped when she was in her late 40's. She has never been on hormone replacement therapy. She does report that she has hot flashes and night sweats occasionally. She had a mammogram screening in 3/2020 - negative. She feels safe at home. She wears her seatbelt. No other complaints or concerns noted.       Past Medical History:   Diagnosis Date    Acute cholecystitis     Cholecystitis    Arthritis     septic arthritis of right shoulder    Bacteremia due to Streptococcus pneumoniae     Cancer     Cholangiocarcinoma     Hypertension     Jaundice     obstructive    Prediabetes        Past Surgical History:   Procedure Laterality Date    arthoscopic Right     drained infection     SECTION      INCISION AND DRAINAGE OF WOUND      s/p I&D of R thumb    LIVER TRANSPLANT      SHOULDER ARTHROSCOPY         Family History   Problem Relation Age of Onset    Cancer Father         Lung    Arthritis Father     Stroke Mother     Diabetes Mother     Hypertension Mother     Heart attack Mother     Cancer Paternal Grandmother         pancreatic cancer    Cancer Brother         Lung    Alcohol abuse Brother     Arthritis Sister     No Known Problems Daughter     No Known Problems Son     No Known Problems Son     No Known Problems Daughter     Colon cancer Neg Hx     Esophageal cancer Neg Hx        Social History     Socioeconomic History    Marital status: Single     Spouse name: Not on file    Number of children: Not on file    Years of education: Not on file    Highest education level: Not on file   Occupational History    Occupation: Food Tech     Comment: [employer]   Social Needs     Financial resource strain: Hard    Food insecurity     Worry: Sometimes true     Inability: Sometimes true    Transportation needs     Medical: No     Non-medical: No   Tobacco Use    Smoking status: Former Smoker     Packs/day: 0.00     Years: 0.00     Pack years: 0.00     Types: Cigars    Smokeless tobacco: Never Used   Substance and Sexual Activity    Alcohol use: Yes     Alcohol/week: 4.0 standard drinks     Types: 2 Glasses of wine, 2 Cans of beer per week     Frequency: 2-4 times a month     Drinks per session: 1 or 2     Binge frequency: Never     Comment: To be social with family and friends.    Drug use: No     Types: Marijuana    Sexual activity: Never   Lifestyle    Physical activity     Days per week: 2 days     Minutes per session: 20 min    Stress: Not at all   Relationships    Social connections     Talks on phone: More than three times a week     Gets together: Three times a week     Attends Rastafarian service: Not on file     Active member of club or organization: No     Attends meetings of clubs or organizations: Not on file     Relationship status:    Other Topics Concern    Not on file   Social History Narrative    Not on file       OB History    Para Term  AB Living   6 4 4   2 4   SAB TAB Ectopic Multiple Live Births     1     4      # Outcome Date GA Lbr Pa/2nd Weight Sex Delivery Anes PTL Lv   6 TAB            5 AB            4 Term      Vag-Spont   YOUSIF   3 Term      Vag-Spont   YOUSIF   2 Term      Vag-Spont   YOUSIF   1 Term      CS-LTranv   YOUSIF       Current Outpatient Medications   Medication Sig Dispense Refill    amLODIPine (NORVASC) 5 MG tablet       aspirin 81 MG Chew Take 81 mg by mouth once daily.      clobetasol 0.05% (TEMOVATE) 0.05 % Oint AAA hands bid - occlude qhs 60 g 3    diphenhydrAMINE (BENADRYL) 25 mg capsule Take 25 mg by mouth every 6 (six) hours as needed for Itching.      hydrOXYzine pamoate (VISTARIL) 50 MG Cap Take 1 capsule (50 mg  "total) by mouth every 8 (eight) hours as needed. 30 capsule 3    multivitamin (THERAGRAN) tablet Take 1 tablet by mouth once daily.      mycophenolate (CELLCEPT) 500 mg Tab Take 2 tablets (1,000 mg total) by mouth 2 (two) times daily. 360 tablet 0    sildenafil (REVATIO) 20 mg Tab TAKE 1 TABLET(20 MG) BY MOUTH THREE TIMES DAILY 270 tablet 1    tacrolimus (PROGRAF) 1 MG Cap Take 2 capsules (2 mg total) by mouth every morning AND 1 capsule (1 mg total) every evening. 90 capsule 11    ondansetron (ZOFRAN) 4 MG tablet Take 1 tablet (4 mg total) by mouth every 6 (six) hours as needed for Nausea. (Patient not taking: Reported on 8/10/2020) 30 tablet 0    zolpidem (AMBIEN) 5 MG Tab Take 1 tablet (5 mg total) by mouth nightly as needed. (Patient not taking: Reported on 8/10/2020) 30 tablet 3     No current facility-administered medications for this visit.        Review of patient's allergies indicates:  No Known Allergies    Review of Symptoms:  GENERAL: Denies weight gain or weight loss. Feeling well overall.   SKIN: Denies rash or lesions.   HEAD: Denies head injury or headache.   NODES: Denies enlarged lymph nodes.   CHEST: Denies chest pain or shortness of breath.   CARDIOVASCULAR: Denies palpitations or left sided chest pain.   ABDOMEN: No abdominal pain, constipation, diarrhea, nausea, vomiting or rectal bleeding.   URINARY: No frequency, dysuria, hematuria, or burning on urination.  REPRODUCTIVE: No vaginal discharge, itching, burning or odor. No pelvic pain. No menstrual problem.   HEMATOLOGIC: No easy bruisability or excessive bleeding.   MUSCULOSKELETAL: Denies joint pain or swelling.     /64   Ht 5' 2" (1.575 m)   Wt 62.9 kg (138 lb 10.7 oz)   LMP  (LMP Unknown)     PE:   APPEARANCE: Well nourished, well developed, Black or  female in no acute distress.  NODES: no cervical, supraclavicular, or inguinal lymphadenopathy  BREASTS: Symmetrical, no skin changes or visible lesions. No " palpable masses, nipple discharge or adenopathy bilaterally.  ABDOMEN: Soft. No tenderness or masses. No distention. No hernias palpated. No CVA tenderness.  VULVA: No lesions. Normal external female genitalia.  URETHRAL MEATUS: Normal size and location, no lesions, no prolapse.  URETHRA: No masses, tenderness, or prolapse.  VAGINA: Moist. No lesions or lacerations noted. No abnormal discharge present. No odor present.   CERVIX: No lesions or discharge. No cervical motion tenderness.   UTERUS: Normal size, regular shape, mobile, non-tender.  ADNEXA: No tenderness. No fullness or masses palpated in the adnexal regions.   ANUS PERINEUM: Normal.    ASSESSMENT/PLAN:  Encounter for well woman exam  -     Ambulatory referral/consult to Obstetrics / Gynecology  -     Liquid-Based Pap Smear, Screening  -     HPV High Risk Genotypes, PCR    Hot flashes    Night sweats      A full discussion of the benefit-risk ratio of hormonal replacement therapy was carried out. Improvement in vasomotor and other climacteric symptoms is discussed, including possible improvements in sleep and mood. Reduction of risk for osteoporosis was explained. We discussed the study data showing increased risk of thrombo-embolic events such as myocardial infarction, stroke and also possibly breast cancer with estrogen replacement, and how this might affect her. The range of side effects such as breast tenderness, weight gain and including possible increases in lifetime risk of breast cancer and possible thrombotic complications was discussed. We also discussed ACOG's recommendation to use hormone replacement therapy for the shortest amount of time and the lowest dose possible. Alternative such as herbal and soy-based products were reviewed. All of her questions about this therapy were answered. She would like to try OTC medications before taking any hormone replacement therapy.         Patient was counseled today on Pap guidelines, recommendation for  pelvic exams, mammograms starting annually at age 40, Colonoscopy after the age of 50, Dexa Bone Scan and calcium and vitamin D supplementation in menopause and to see her PCP for other health maintenance.       Follow-up:  RTC if symptoms worsen or do not improve  RTC in 1 year for well woman exam or as needed

## 2020-08-11 ENCOUNTER — CLINICAL SUPPORT (OUTPATIENT)
Dept: DERMATOLOGY | Facility: CLINIC | Age: 60
End: 2020-08-11
Payer: MEDICARE

## 2020-08-11 DIAGNOSIS — L30.9 CHRONIC ECZEMA OF HAND: ICD-10-CM

## 2020-08-11 PROCEDURE — 99999 PR PBB SHADOW E&M-EST. PATIENT-LVL II: CPT | Mod: PBBFAC,,,

## 2020-08-11 PROCEDURE — 96900 PR ULTRAVIOLET LIGHT THERAPY: ICD-10-PCS | Mod: S$GLB,,, | Performed by: DERMATOLOGY

## 2020-08-11 PROCEDURE — 99999 PR PBB SHADOW E&M-EST. PATIENT-LVL II: ICD-10-PCS | Mod: PBBFAC,,,

## 2020-08-11 PROCEDURE — 96900 ACTINOTHERAPY UV LIGHT: CPT | Mod: S$GLB,,, | Performed by: DERMATOLOGY

## 2020-08-13 ENCOUNTER — CLINICAL SUPPORT (OUTPATIENT)
Dept: DERMATOLOGY | Facility: CLINIC | Age: 60
End: 2020-08-13
Payer: MEDICARE

## 2020-08-13 DIAGNOSIS — L30.9 CHRONIC ECZEMA OF HAND: ICD-10-CM

## 2020-08-13 PROCEDURE — 99999 PR PBB SHADOW E&M-EST. PATIENT-LVL II: ICD-10-PCS | Mod: PBBFAC,,,

## 2020-08-13 PROCEDURE — 96900 ACTINOTHERAPY UV LIGHT: CPT | Mod: S$GLB,,, | Performed by: DERMATOLOGY

## 2020-08-13 PROCEDURE — 96900 PR ULTRAVIOLET LIGHT THERAPY: ICD-10-PCS | Mod: S$GLB,,, | Performed by: DERMATOLOGY

## 2020-08-13 PROCEDURE — 99999 PR PBB SHADOW E&M-EST. PATIENT-LVL II: CPT | Mod: PBBFAC,,,

## 2020-08-14 ENCOUNTER — TELEPHONE (OUTPATIENT)
Dept: PHARMACY | Facility: CLINIC | Age: 60
End: 2020-08-14

## 2020-08-18 ENCOUNTER — CLINICAL SUPPORT (OUTPATIENT)
Dept: DERMATOLOGY | Facility: CLINIC | Age: 60
End: 2020-08-18
Payer: MEDICARE

## 2020-08-18 DIAGNOSIS — L30.9 CHRONIC ECZEMA OF HAND: ICD-10-CM

## 2020-08-18 PROCEDURE — 99999 PR PBB SHADOW E&M-EST. PATIENT-LVL II: CPT | Mod: PBBFAC,,,

## 2020-08-18 PROCEDURE — 96900 PR ULTRAVIOLET LIGHT THERAPY: ICD-10-PCS | Mod: S$GLB,,, | Performed by: DERMATOLOGY

## 2020-08-18 PROCEDURE — 99999 PR PBB SHADOW E&M-EST. PATIENT-LVL II: ICD-10-PCS | Mod: PBBFAC,,,

## 2020-08-18 PROCEDURE — 96900 ACTINOTHERAPY UV LIGHT: CPT | Mod: S$GLB,,, | Performed by: DERMATOLOGY

## 2020-08-25 LAB
FINAL PATHOLOGIC DIAGNOSIS: NORMAL
Lab: NORMAL

## 2020-08-27 ENCOUNTER — TELEPHONE (OUTPATIENT)
Dept: GASTROENTEROLOGY | Facility: CLINIC | Age: 60
End: 2020-08-27

## 2020-08-27 ENCOUNTER — CLINICAL SUPPORT (OUTPATIENT)
Dept: DERMATOLOGY | Facility: CLINIC | Age: 60
End: 2020-08-27
Payer: MEDICARE

## 2020-08-27 DIAGNOSIS — L30.9 CHRONIC ECZEMA OF HAND: ICD-10-CM

## 2020-08-27 PROCEDURE — 99999 PR PBB SHADOW E&M-EST. PATIENT-LVL II: CPT | Mod: PBBFAC,,,

## 2020-08-27 PROCEDURE — 96900 PR ULTRAVIOLET LIGHT THERAPY: ICD-10-PCS | Mod: S$GLB,,, | Performed by: DERMATOLOGY

## 2020-08-27 PROCEDURE — 99999 PR PBB SHADOW E&M-EST. PATIENT-LVL II: ICD-10-PCS | Mod: PBBFAC,,,

## 2020-08-27 PROCEDURE — 96900 ACTINOTHERAPY UV LIGHT: CPT | Mod: S$GLB,,, | Performed by: DERMATOLOGY

## 2020-08-27 NOTE — PROGRESS NOTES
Light tx 8 for eczema. NB-UVB to palms and dorsum hands for 1 m in 30 seconds. Pt stated much itching and burning on finger tips.

## 2020-08-27 NOTE — TELEPHONE ENCOUNTER
----- Message from Amie Ahmadi MA sent at 8/26/2020  8:13 PM CDT -----  There is a referral to dr Haynes. It is from July so I don't know if she still wants an appointment

## 2020-08-29 ENCOUNTER — TELEPHONE (OUTPATIENT)
Dept: PHARMACY | Facility: CLINIC | Age: 60
End: 2020-08-29

## 2020-08-29 NOTE — TELEPHONE ENCOUNTER
Call attempt 1 regarding Revatio refill. Unable to LVM and sent [employer] message. Copay $3.60 at 004

## 2020-09-14 ENCOUNTER — LAB VISIT (OUTPATIENT)
Dept: LAB | Facility: HOSPITAL | Age: 60
End: 2020-09-14
Attending: INTERNAL MEDICINE
Payer: MEDICARE

## 2020-09-14 DIAGNOSIS — Z94.4 LIVER REPLACED BY TRANSPLANT: ICD-10-CM

## 2020-09-14 DIAGNOSIS — C22.1 CHOLANGIOCARCINOMA: ICD-10-CM

## 2020-09-14 LAB
ALBUMIN SERPL BCP-MCNC: 4 G/DL (ref 3.5–5.2)
ALP SERPL-CCNC: 69 U/L (ref 55–135)
ALT SERPL W/O P-5'-P-CCNC: 15 U/L (ref 10–44)
ANION GAP SERPL CALC-SCNC: 10 MMOL/L (ref 8–16)
AST SERPL-CCNC: 16 U/L (ref 10–40)
BASOPHILS # BLD AUTO: 0.04 K/UL (ref 0–0.2)
BASOPHILS NFR BLD: 0.7 % (ref 0–1.9)
BILIRUB SERPL-MCNC: 0.6 MG/DL (ref 0.1–1)
BUN SERPL-MCNC: 12 MG/DL (ref 6–20)
CALCIUM SERPL-MCNC: 9 MG/DL (ref 8.7–10.5)
CANCER AG19-9 SERPL-ACNC: 5 U/ML (ref 2–40)
CHLORIDE SERPL-SCNC: 109 MMOL/L (ref 95–110)
CO2 SERPL-SCNC: 23 MMOL/L (ref 23–29)
CREAT SERPL-MCNC: 0.8 MG/DL (ref 0.5–1.4)
DIFFERENTIAL METHOD: ABNORMAL
EOSINOPHIL # BLD AUTO: 0.2 K/UL (ref 0–0.5)
EOSINOPHIL NFR BLD: 3.1 % (ref 0–8)
ERYTHROCYTE [DISTWIDTH] IN BLOOD BY AUTOMATED COUNT: 13 % (ref 11.5–14.5)
EST. GFR  (AFRICAN AMERICAN): >60 ML/MIN/1.73 M^2
EST. GFR  (NON AFRICAN AMERICAN): >60 ML/MIN/1.73 M^2
GLUCOSE SERPL-MCNC: 93 MG/DL (ref 70–110)
HCT VFR BLD AUTO: 40.2 % (ref 37–48.5)
HGB BLD-MCNC: 12.9 G/DL (ref 12–16)
IMM GRANULOCYTES # BLD AUTO: 0.01 K/UL (ref 0–0.04)
IMM GRANULOCYTES NFR BLD AUTO: 0.2 % (ref 0–0.5)
LYMPHOCYTES # BLD AUTO: 1.5 K/UL (ref 1–4.8)
LYMPHOCYTES NFR BLD: 26.9 % (ref 18–48)
MCH RBC QN AUTO: 31.2 PG (ref 27–31)
MCHC RBC AUTO-ENTMCNC: 32.1 G/DL (ref 32–36)
MCV RBC AUTO: 97 FL (ref 82–98)
MONOCYTES # BLD AUTO: 0.4 K/UL (ref 0.3–1)
MONOCYTES NFR BLD: 7.4 % (ref 4–15)
NEUTROPHILS # BLD AUTO: 3.4 K/UL (ref 1.8–7.7)
NEUTROPHILS NFR BLD: 61.7 % (ref 38–73)
NRBC BLD-RTO: 0 /100 WBC
PLATELET # BLD AUTO: 184 K/UL (ref 150–350)
PMV BLD AUTO: 10.7 FL (ref 9.2–12.9)
POTASSIUM SERPL-SCNC: 3.7 MMOL/L (ref 3.5–5.1)
PROT SERPL-MCNC: 7.2 G/DL (ref 6–8.4)
RBC # BLD AUTO: 4.13 M/UL (ref 4–5.4)
SODIUM SERPL-SCNC: 142 MMOL/L (ref 136–145)
TACROLIMUS BLD-MCNC: 3.1 NG/ML (ref 5–15)
WBC # BLD AUTO: 5.54 K/UL (ref 3.9–12.7)

## 2020-09-14 PROCEDURE — 36415 COLL VENOUS BLD VENIPUNCTURE: CPT

## 2020-09-14 PROCEDURE — 80197 ASSAY OF TACROLIMUS: CPT

## 2020-09-14 PROCEDURE — 80053 COMPREHEN METABOLIC PANEL: CPT

## 2020-09-14 PROCEDURE — 85025 COMPLETE CBC W/AUTO DIFF WBC: CPT

## 2020-09-14 PROCEDURE — 86301 IMMUNOASSAY TUMOR CA 19-9: CPT

## 2020-09-16 ENCOUNTER — TELEPHONE (OUTPATIENT)
Dept: TRANSPLANT | Facility: CLINIC | Age: 60
End: 2020-09-16

## 2020-09-16 ENCOUNTER — OFFICE VISIT (OUTPATIENT)
Dept: TRANSPLANT | Facility: CLINIC | Age: 60
End: 2020-09-16
Payer: MEDICARE

## 2020-09-16 VITALS
RESPIRATION RATE: 18 BRPM | HEIGHT: 62 IN | BODY MASS INDEX: 25.76 KG/M2 | TEMPERATURE: 97 F | SYSTOLIC BLOOD PRESSURE: 125 MMHG | HEART RATE: 58 BPM | DIASTOLIC BLOOD PRESSURE: 57 MMHG | WEIGHT: 140 LBS | OXYGEN SATURATION: 100 %

## 2020-09-16 DIAGNOSIS — Z79.60 LONG-TERM USE OF IMMUNOSUPPRESSANT MEDICATION: ICD-10-CM

## 2020-09-16 DIAGNOSIS — Z94.4 LIVER TRANSPLANTED: Primary | ICD-10-CM

## 2020-09-16 DIAGNOSIS — Z85.09 HISTORY OF CHOLANGIOCARCINOMA: ICD-10-CM

## 2020-09-16 PROCEDURE — 99499 RISK ADDL DX/OHS AUDIT: ICD-10-PCS | Mod: S$GLB,,, | Performed by: STUDENT IN AN ORGANIZED HEALTH CARE EDUCATION/TRAINING PROGRAM

## 2020-09-16 PROCEDURE — 3074F PR MOST RECENT SYSTOLIC BLOOD PRESSURE < 130 MM HG: ICD-10-PCS | Mod: CPTII,S$GLB,, | Performed by: STUDENT IN AN ORGANIZED HEALTH CARE EDUCATION/TRAINING PROGRAM

## 2020-09-16 PROCEDURE — 99214 PR OFFICE/OUTPT VISIT, EST, LEVL IV, 30-39 MIN: ICD-10-PCS | Mod: S$GLB,,, | Performed by: STUDENT IN AN ORGANIZED HEALTH CARE EDUCATION/TRAINING PROGRAM

## 2020-09-16 PROCEDURE — 99999 PR PBB SHADOW E&M-EST. PATIENT-LVL IV: ICD-10-PCS | Mod: PBBFAC,,, | Performed by: STUDENT IN AN ORGANIZED HEALTH CARE EDUCATION/TRAINING PROGRAM

## 2020-09-16 PROCEDURE — 3008F BODY MASS INDEX DOCD: CPT | Mod: CPTII,S$GLB,, | Performed by: STUDENT IN AN ORGANIZED HEALTH CARE EDUCATION/TRAINING PROGRAM

## 2020-09-16 PROCEDURE — 3074F SYST BP LT 130 MM HG: CPT | Mod: CPTII,S$GLB,, | Performed by: STUDENT IN AN ORGANIZED HEALTH CARE EDUCATION/TRAINING PROGRAM

## 2020-09-16 PROCEDURE — 99999 PR PBB SHADOW E&M-EST. PATIENT-LVL IV: CPT | Mod: PBBFAC,,, | Performed by: STUDENT IN AN ORGANIZED HEALTH CARE EDUCATION/TRAINING PROGRAM

## 2020-09-16 PROCEDURE — 99214 OFFICE O/P EST MOD 30 MIN: CPT | Mod: S$GLB,,, | Performed by: STUDENT IN AN ORGANIZED HEALTH CARE EDUCATION/TRAINING PROGRAM

## 2020-09-16 PROCEDURE — 99499 UNLISTED E&M SERVICE: CPT | Mod: S$GLB,,, | Performed by: STUDENT IN AN ORGANIZED HEALTH CARE EDUCATION/TRAINING PROGRAM

## 2020-09-16 PROCEDURE — 3008F PR BODY MASS INDEX (BMI) DOCUMENTED: ICD-10-PCS | Mod: CPTII,S$GLB,, | Performed by: STUDENT IN AN ORGANIZED HEALTH CARE EDUCATION/TRAINING PROGRAM

## 2020-09-16 PROCEDURE — 3078F DIAST BP <80 MM HG: CPT | Mod: CPTII,S$GLB,, | Performed by: STUDENT IN AN ORGANIZED HEALTH CARE EDUCATION/TRAINING PROGRAM

## 2020-09-16 PROCEDURE — 3078F PR MOST RECENT DIASTOLIC BLOOD PRESSURE < 80 MM HG: ICD-10-PCS | Mod: CPTII,S$GLB,, | Performed by: STUDENT IN AN ORGANIZED HEALTH CARE EDUCATION/TRAINING PROGRAM

## 2020-09-16 NOTE — Clinical Note
Continue CT per protocol. Can consider stopping after next scan as she will be >5 years from transplant.  No changes to IS.

## 2020-09-16 NOTE — LETTER
September 16, 2020        Wendy Hernandez  [address] [facility] [address]  Phone: [phone]  Fax: [phone]             [facility]st [facility]  [address]  [facility] [address]  Phone: [phone]   Patient: Nadeen Mcgovern   MR Number: [id number]   YOB: 1960   Date of Visit: 9/16/2020       Dear Dr. Wendy Hernandez    Thank you for referring Nadeen Mcgovern to me for evaluation. Attached you will find relevant portions of my assessment and plan of care.    If you have questions, please do not hesitate to call me. I look forward to following Nadeen Mcgovern along with you.    Sincerely,    Vitaly Martinez MD    Enclosure    If you would like to receive this communication electronically, please contact [email or web address] or [phone] to request [employer] access.    [employer] is a tool which provides read-only access to select patient information with whom you have a relationship. Its easy to use and provides real time access to review your patients record including encounter summaries, notes, results, and demographic information.    If you feel you have received this communication in error or would no longer like to receive these types of communications, please e-mail [email or web address]

## 2020-09-16 NOTE — PROGRESS NOTES
Transplant Hepatology  Liver Transplant Recipient Follow Up    PCP: Nadeen Figueroa MD    Transplant History  Transplant Date: 10/8/2015  UNOS Native Liver Dx: Bile Duct Cancer (Cholangioma, Biliary Tract Carcinoma)     Nadeen is here for follow up of her liver transplant.      ORGAN: LIVER  Whole or Partial: whole liver  Donor Type:  - brain death  CDC High Risk: yes  Donor CMV Status: Positive  Donor HCV Status: Negative  Donor HBcAb: Negative  Biliary Anastomosis: tobias-en-y  Arterial Anatomy: standard  IVC reconstruction: end to end ivc  Portal vein status: patent    Chief complaint: follow up, history of OLT    HPI:  Nadeen Mcgovern is a 59 y.o. 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 She is without complaints today. She denies recent hospitalizations or ED visits. She reports compliance with Prograf and CellCept. She denies recent fever, chills, nausea, vomiting, abdominal pain, diarrhea, headache, tremors.    Past Medical History:   Diagnosis Date    Acute cholecystitis     Cholecystitis    Arthritis     septic arthritis of right shoulder    Bacteremia due to Streptococcus pneumoniae     Cancer     Cholangiocarcinoma     Hypertension     Jaundice     obstructive    Prediabetes        Past Surgical History:   Procedure Laterality Date    arthoscopic Right     drained infection     SECTION      INCISION AND DRAINAGE OF WOUND      s/p I&D of R thumb    LIVER TRANSPLANT      SHOULDER ARTHROSCOPY         Family History   Problem Relation Age of Onset    Cancer Father         Lung    Arthritis Father     Stroke Mother     Diabetes Mother     Hypertension Mother     Heart attack Mother     Cancer Paternal Grandmother         pancreatic cancer    Cancer Brother         Lung    Alcohol abuse Brother     Arthritis Sister     No Known Problems Daughter     No Known Problems Son     No Known Problems Son     No Known Problems  Daughter     Colon cancer Neg Hx     Esophageal cancer Neg Hx        Social History     Socioeconomic History    Marital status: Single     Spouse name: Not on file    Number of children: Not on file    Years of education: Not on file    Highest education level: Not on file   Occupational History    Occupation: Food Tech     Comment: [employer]   Social Needs    Financial resource strain: Hard    Food insecurity     Worry: Sometimes true     Inability: Sometimes true    Transportation needs     Medical: No     Non-medical: No   Tobacco Use    Smoking status: Former Smoker     Packs/day: 0.00     Years: 0.00     Pack years: 0.00     Types: Cigars    Smokeless tobacco: Never Used   Substance and Sexual Activity    Alcohol use: Yes     Alcohol/week: 4.0 standard drinks     Types: 2 Glasses of wine, 2 Cans of beer per week     Frequency: 2-4 times a month     Drinks per session: 1 or 2     Binge frequency: Never     Comment: To be social with family and friends.    Drug use: No     Types: Marijuana    Sexual activity: Never   Lifestyle    Physical activity     Days per week: 2 days     Minutes per session: 20 min    Stress: Not at all   Relationships    Social connections     Talks on phone: More than three times a week     Gets together: Three times a week     Attends Adventism service: Not on file     Active member of club or organization: No     Attends meetings of clubs or organizations: Not on file     Relationship status:    Other Topics Concern    Not on file   Social History Narrative    Not on file       Current Outpatient Medications   Medication Sig Dispense Refill    aspirin 81 MG Chew Take 81 mg by mouth once daily.      clobetasol 0.05% (TEMOVATE) 0.05 % Oint APPLY TO THE AFFECTED AREA TWICE DAILY AND OCCLUDE AT BEDTIME 60 g 3    diphenhydrAMINE (BENADRYL) 25 mg capsule Take 25 mg by mouth every 6 (six) hours as needed for Itching.      hydrOXYzine  pamoate (VISTARIL) 50 MG Cap Take 1 capsule (50 mg total) by mouth every 8 (eight) hours as needed. 30 capsule 3    multivitamin (THERAGRAN) tablet Take 1 tablet by mouth once daily.      mycophenolate (CELLCEPT) 500 mg Tab Take 2 tablets (1,000 mg total) by mouth 2 (two) times daily. 360 tablet 0    ondansetron (ZOFRAN) 4 MG tablet Take 1 tablet (4 mg total) by mouth every 6 (six) hours as needed for Nausea. 30 tablet 0    sildenafil (REVATIO) 20 mg Tab TAKE 1 TABLET(20 MG) BY MOUTH THREE TIMES DAILY 270 tablet 1    tacrolimus (PROGRAF) 1 MG Cap Take 2 capsules (2 mg total) by mouth every morning AND 1 capsule (1 mg total) every evening. 90 capsule 11    zolpidem (AMBIEN) 5 MG Tab Take 1 tablet (5 mg total) by mouth nightly as needed. 30 tablet 3    amLODIPine (NORVASC) 5 MG tablet        No current facility-administered medications for this visit.        Review of patient's allergies indicates:  No Known Allergies    Review of Systems   Constitutional: Negative for chills, fever and weight loss.   HENT: Negative for congestion and sore throat.    Eyes: Negative for blurred vision and double vision.   Respiratory: Negative for cough, sputum production and shortness of breath.    Cardiovascular: Negative for chest pain, orthopnea and leg swelling.   Gastrointestinal: Negative for abdominal pain, blood in stool, constipation, diarrhea, melena, nausea and vomiting.   Genitourinary: Negative for dysuria and hematuria.   Musculoskeletal: Negative for falls, joint pain and myalgias.   Skin: Negative for itching and rash.   Neurological: Negative for tingling, focal weakness, seizures and headaches.   Endo/Heme/Allergies: Does not bruise/bleed easily.   Psychiatric/Behavioral: Negative for memory loss and substance abuse. The patient does not have insomnia.        Vitals:    09/16/20 0850   BP: (!) 125/57   Pulse: (!) 58   Resp: 18   Temp: 96.6 °F (35.9 °C)   TempSrc: Oral   SpO2: 100%   Weight: 63.5 kg (139 lb  "15.9 oz)   Height: 5' 2" (1.575 m)       Physical Exam  Vitals signs reviewed.   Constitutional:       General: She is not in acute distress.  Eyes:      General: No scleral icterus.     Extraocular Movements: Extraocular movements intact.      Conjunctiva/sclera: Conjunctivae normal.   Cardiovascular:      Rate and Rhythm: Normal rate and regular rhythm.   Pulmonary:      Effort: Pulmonary effort is normal.      Breath sounds: Normal breath sounds. No wheezing, rhonchi or rales.   Abdominal:      General: Bowel sounds are normal. There is no distension.      Palpations: Abdomen is soft.      Tenderness: There is no abdominal tenderness.   Musculoskeletal:         General: No swelling or deformity.      Right lower leg: No edema.      Left lower leg: No edema.   Skin:     General: Skin is warm and dry.      Coloration: Skin is not jaundiced.   Neurological:      General: No focal deficit present.      Mental Status: She is alert and oriented to person, place, and time.   Psychiatric:         Mood and Affect: Mood normal.         Behavior: Behavior normal.         LABS:  Lab Results   Component Value Date    WBC 5.54 09/14/2020    HGB 12.9 09/14/2020    HCT 40.2 09/14/2020    MCV 97 09/14/2020     09/14/2020       Lab Results   Component Value Date     09/14/2020    K 3.7 09/14/2020     09/14/2020    CO2 23 09/14/2020    BUN 12 09/14/2020    CREATININE 0.8 09/14/2020    CALCIUM 9.0 09/14/2020    ANIONGAP 10 09/14/2020    ESTGFRAFRICA >60.0 09/14/2020    EGFRNONAA >60.0 09/14/2020       Lab Results   Component Value Date    ALT 15 09/14/2020    AST 16 09/14/2020     (H) 10/13/2015    ALKPHOS 69 09/14/2020    BILITOT 0.6 09/14/2020       Lab Results   Component Value Date    TACROLIMUS 3.1 (L) 09/14/2020       Assessment:  59 y.o. 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    1. Liver transplanted    2. Long-term use of immunosuppressant " medication    3. History of cholangiocarcinoma        Recommendations:  1. Allograft Function  - Excellent graft function with normal LFTs in 09/2020     2. Immunosuppression   - Continue Prograf 2mg in AM and 1mg in PM  - Continue CellCept 1000 mg b.i.d. (also taking for scleroderma)    3. History of cholangiocarcinoma.  - CT chest/abd/pelvis 06/2020 without evidence of recurrence.   - Continue routine imaging surveillance for now. If no evidence of recurrence on next CT, can consider stopping surveillance as she will be >5 years from transplant    4. Kidney function   - Creatinine 0.8 in 09/2020  - Avoid NSAIDs as able and maintain adequate hydration     5.  Health Maintenance/Screening:  - Recommend age appropriate cancer screening.  - Skin cancer: Recommend use of sunscreen SPF 30 or higher, hat and sunglasses while outside, dermatologist visit annually or sooner if any concerning lesions.  - Osteoporosis: Recommend bone density testing every 3 years if previously normal or annually if previously abnormal. DEXA normal 04/2019.    Return to clinic in 12 months.    UNOS Patient Status  Functional Status: 100% - Normal, no complaints, no evidence of disease  Physical Capacity: No Limitations    Vitaly Martinez MD  Staff Physician  Hepatology and Liver Transplant  [facility]  [facility] [place]

## 2020-09-16 NOTE — TELEPHONE ENCOUNTER
Continue routine labs no changes needed.  Letter sent for next lab appointment 12/08/20        ----- Message from Lakia Pruett MD sent at 9/14/2020 10:25 AM CDT -----  Liver transplant blood tests reviewed. Labs stable, no change in immunosuppression. Please continue to monitor liver tests per transplant protocol.

## 2020-09-16 NOTE — LETTER
September 16, 2020    Nadeen Mcgovern  [address] [facility] [address]          Dear Nadeen Mcgovern:  MRN: [id number]    This is a follow up to your recent labs, your lab results were stable.  There are no medicine changes.  Please have your labs drawn again on 12/08/20.      If you cannot have your labs drawn on the scheduled date, it is your responsibility to call the transplant department to reschedule.  Please call [phone] and ask to speak to [facility] Medical  for all scheduling requests.     Sincerely,    Chetna Pickard, RN      Your Liver Transplant Coordinator    [facility] [place]  [facility] [place] [address] [phone]

## 2020-09-21 ENCOUNTER — PATIENT MESSAGE (OUTPATIENT)
Dept: INTERNAL MEDICINE | Facility: CLINIC | Age: 60
End: 2020-09-21

## 2020-09-22 ENCOUNTER — PATIENT OUTREACH (OUTPATIENT)
Dept: ADMINISTRATIVE | Facility: OTHER | Age: 60
End: 2020-09-22

## 2020-09-22 NOTE — PROGRESS NOTES
LINKS immunization registry updated  Care Everywhere updated  Health Maintenance updated  Chart reviewed for overdue Proactive [facility] (PRE[facility]ON) health maintenance testing (CRS, Breast Ca, Diabetic Eye Exam)   Orders entered:N/A

## 2020-09-23 ENCOUNTER — TELEPHONE (OUTPATIENT)
Dept: ENDOSCOPY | Facility: HOSPITAL | Age: 60
End: 2020-09-23

## 2020-09-23 ENCOUNTER — OFFICE VISIT (OUTPATIENT)
Dept: GASTROENTEROLOGY | Facility: CLINIC | Age: 60
End: 2020-09-23
Payer: MEDICARE

## 2020-09-23 VITALS
BODY MASS INDEX: 25.55 KG/M2 | SYSTOLIC BLOOD PRESSURE: 106 MMHG | DIASTOLIC BLOOD PRESSURE: 67 MMHG | HEIGHT: 62 IN | HEART RATE: 65 BPM | WEIGHT: 138.88 LBS

## 2020-09-23 DIAGNOSIS — K22.4 ESOPHAGEAL DYSMOTILITY: ICD-10-CM

## 2020-09-23 DIAGNOSIS — K21.9 GASTROESOPHAGEAL REFLUX DISEASE, ESOPHAGITIS PRESENCE NOT SPECIFIED: ICD-10-CM

## 2020-09-23 DIAGNOSIS — Z01.812 PRE-PROCEDURE LAB EXAM: Primary | ICD-10-CM

## 2020-09-23 DIAGNOSIS — K22.2 ESOPHAGEAL STENOSIS: Primary | ICD-10-CM

## 2020-09-23 PROCEDURE — 3078F DIAST BP <80 MM HG: CPT | Mod: CPTII,S$GLB,, | Performed by: FAMILY MEDICINE

## 2020-09-23 PROCEDURE — 3074F PR MOST RECENT SYSTOLIC BLOOD PRESSURE < 130 MM HG: ICD-10-PCS | Mod: CPTII,S$GLB,, | Performed by: FAMILY MEDICINE

## 2020-09-23 PROCEDURE — 3074F SYST BP LT 130 MM HG: CPT | Mod: CPTII,S$GLB,, | Performed by: FAMILY MEDICINE

## 2020-09-23 PROCEDURE — 3008F PR BODY MASS INDEX (BMI) DOCUMENTED: ICD-10-PCS | Mod: CPTII,S$GLB,, | Performed by: FAMILY MEDICINE

## 2020-09-23 PROCEDURE — 3078F PR MOST RECENT DIASTOLIC BLOOD PRESSURE < 80 MM HG: ICD-10-PCS | Mod: CPTII,S$GLB,, | Performed by: FAMILY MEDICINE

## 2020-09-23 PROCEDURE — 3008F BODY MASS INDEX DOCD: CPT | Mod: CPTII,S$GLB,, | Performed by: FAMILY MEDICINE

## 2020-09-23 PROCEDURE — 99214 PR OFFICE/OUTPT VISIT, EST, LEVL IV, 30-39 MIN: ICD-10-PCS | Mod: S$GLB,,, | Performed by: FAMILY MEDICINE

## 2020-09-23 PROCEDURE — 99999 PR PBB SHADOW E&M-EST. PATIENT-LVL IV: ICD-10-PCS | Mod: PBBFAC,,, | Performed by: FAMILY MEDICINE

## 2020-09-23 PROCEDURE — 99999 PR PBB SHADOW E&M-EST. PATIENT-LVL IV: CPT | Mod: PBBFAC,,, | Performed by: FAMILY MEDICINE

## 2020-09-23 PROCEDURE — 99214 OFFICE O/P EST MOD 30 MIN: CPT | Mod: S$GLB,,, | Performed by: FAMILY MEDICINE

## 2020-09-23 RX ORDER — FAMOTIDINE 20 MG/1
20 TABLET, FILM COATED ORAL 2 TIMES DAILY PRN
Qty: 60 TABLET | Refills: 11 | Status: SHIPPED | OUTPATIENT
Start: 2020-09-23 | End: 2020-11-20

## 2020-09-23 NOTE — LETTER
September 23, 2020      Meek Ann MD  [address]  [facility] [address]           [facility]  [address]  [facility] [address]  Phone: [phone]  Fax: [phone]          Patient: Nadeen Mcgovern   MR Number: [id number]   YOB: 1960   Date of Visit: 9/23/2020       Dear Dr. Meek Ann:    Thank you for referring Nadeen Mcgovern to me for evaluation. Attached you will find relevant portions of my assessment and plan of care.    If you have questions, please do not hesitate to call me. I look forward to following Nadeen Mcgovern along with you.    Sincerely,    Denita Espinoza, DNP    Enclosure  CC:  No Recipients    If you would like to receive this communication electronically, please contact [email or web address] or [phone] to request more information on [employer] Link access.    For providers and/or their staff who would like to refer a patient to [facility], please contact us through our one-stop-shop provider referral line, [facility], at [phone].    If you feel you have received this communication in error or would no longer like to receive these types of communications, please e-mail [email or web address]

## 2020-09-23 NOTE — PATIENT INSTRUCTIONS
Start taking Famotidine (Pepcid) once daily for 2 weeks  - you can take this a second time in the afternoon as needed for breakthrough reflux  - after 2 weeks, you can just take this as needed.    Schedule EGD (upper scope) today to check if you need dilation again.    Safe eating habits:  - cut up foods thoroughly (especially meats)  - chew very well, do not rush through your meals  - keep water with you at meals to take small sips throughout

## 2020-09-23 NOTE — PROGRESS NOTES
"    [facility] Consultation Note    Reason for Consult:  The primary encounter diagnosis was Esophageal stenosis. Diagnoses of Esophageal dysmotility and Gastroesophageal reflux disease, esophagitis presence not specified were also pertinent to this visit.    PCP:   Nadeen Figueroa   [address] [facility] [place] [address]    Referring MD:  Meek Ann Md  [address] [place] [address]    HPI:  This is a 60 y.o. female here for evaluation of heartburn and dysphagia. She is an established patient, new to me. C/o intermittent trouble swallowing - particularly meats. This happens occasionally because she has accommodated her eating habits so this will not happen. Feels as though they get stuck in her chest and she will have to drink a lot of water or eat bread to help them pass. Hx of esophageal dilation in the past for this problem. No odynophagia or regurgitation. Also c/o intermittent heartburn, notably after eating red gravy or tomato-based foods. She has taken Pepcid in the past but is no longer on this. States her reflux is less than 2x per week. She will take TUMs as needed with these episodes.      Reflux - yes, <2x per week; TUMs prn;  Dysphagia - yes, mainly to meats  Bowel Habits - normal, at least 1 soft BM per day; eats high fiber diet and takes a daily fiber supplement   Rectal Bleeding/Melena- no  NSAIDs - none  Fam Hx - No crc, no IBD, no Celiac    ROS:  Constitutional: No fevers, chills, No unintentional weight loss, +weight gain  ENT: No allergies  CV: No chest pain  Pulm: No cough, No shortness of breath  Ophtho: + blurry vision  GI: see HPI  Derm: No rash  Heme: No lymphadenopathy, + bruising  MSK: No arthritis  : No dysuria, No hematuria, +frequency, +urgency, +incontinence  Endo: No hot intolerance; +cold intolerance "I'm always cold"  Neuro: No syncope, No seizure  Psych: No anxiety, No depression    Medical History:  has a past medical history of Acute " cholecystitis, Arthritis (2015), Bacteremia due to Streptococcus pneumoniae, Cancer, Cholangiocarcinoma, Hypertension, Jaundice, and Prediabetes.    Surgical History:  has a past surgical history that includes  section; arthoscopic (Right); Shoulder arthroscopy; Incision and drainage of wound; and Liver transplant.    Family History: family history includes Alcohol abuse in her brother; Arthritis in her father and sister; Cancer in her brother, father, and paternal grandmother; Diabetes in her mother; Heart attack in her mother; Hypertension in her mother; No Known Problems in her daughter, daughter, son, and son; Stroke in her mother..     Social History:  reports that she has quit smoking. Her smoking use included cigars. She smoked 0.00 packs per day for 0.00 years. She has never used smokeless tobacco. She reports current alcohol use of about 4.0 standard drinks of alcohol per week. She reports that she does not use drugs.    Review of patient's allergies indicates:  No Known Allergies    Current Outpatient Medications on File Prior to Visit   Medication Sig Dispense Refill    amLODIPine (NORVASC) 5 MG tablet TAKE ONE-HALF TABLET BY MOUTH ONCE DAILY 90 tablet 1    aspirin 81 MG Chew Take 81 mg by mouth once daily.      clobetasol 0.05% (TEMOVATE) 0.05 % Oint APPLY TO THE AFFECTED AREA TWICE DAILY AND OCCLUDE AT BEDTIME 60 g 3    diphenhydrAMINE (BENADRYL) 25 mg capsule Take 25 mg by mouth every 6 (six) hours as needed for Itching.      hydrOXYzine pamoate (VISTARIL) 50 MG Cap Take 1 capsule (50 mg total) by mouth every 8 (eight) hours as needed. 30 capsule 3    multivitamin (THERAGRAN) tablet Take 1 tablet by mouth once daily.      mycophenolate (CELLCEPT) 500 mg Tab Take 2 tablets (1,000 mg total) by mouth 2 (two) times daily. 360 tablet 0    ondansetron (ZOFRAN) 4 MG tablet Take 1 tablet (4 mg total) by mouth every 6 (six) hours as needed for Nausea. 30 tablet 0    sildenafil (REVATIO) 20 mg  "Tab TAKE 1 TABLET(20 MG) BY MOUTH THREE TIMES DAILY 270 tablet 1    tacrolimus (PROGRAF) 1 MG Cap Take 2 capsules (2 mg total) by mouth every morning AND 1 capsule (1 mg total) every evening. 90 capsule 11    zolpidem (AMBIEN) 5 MG Tab Take 1 tablet (5 mg total) by mouth nightly as needed. 30 tablet 3     No current facility-administered medications on file prior to visit.          Objective Findings:    Vital Signs Reviewed:  /67   Pulse 65   Ht 5' 2" (1.575 m)   Wt 63 kg (138 lb 14.2 oz)   LMP  (LMP Unknown)   BMI 25.40 kg/m²   Body mass index is 25.4 kg/m².    Physical Exam:  General Appearance: Well appearing in no acute distress  Head:   Normocephalic, without obvious abnormality  Eyes:    No scleral icterus  ENT: Neck supple  Lungs: CTA bilaterally in anterior and posterior fields, no wheezes, no crackles.  Heart:  Regular rate and rhythm, S1, S2 normal, no murmurs heard  Abdomen: Soft, non tender, non distended with positive bowel sounds in all four quadrants. No hepatosplenomegaly, ascites, or mass. Healed chevron scar to abdomen.  Extremities: No edema  Skin: No rash  Neurologic: AAO x 3      Labs Reviewed:  Lab Results   Component Value Date    WBC 5.54 09/14/2020    HGB 12.9 09/14/2020    HCT 40.2 09/14/2020     09/14/2020    CRP 2.0 07/01/2020    CHOL 137 01/20/2020    TRIG 75 01/20/2020    HDL 39 (L) 01/20/2020    ALT 15 09/14/2020    AST 16 09/14/2020     09/14/2020    K 3.7 09/14/2020     09/14/2020    CREATININE 0.8 09/14/2020    BUN 12 09/14/2020    CO2 23 09/14/2020    TSH 1.224 08/17/2016    INR 1.0 10/27/2016    HGBA1C 5.4 02/15/2017       Imaging reviewed:  6/26/2020 CT Chest Abd Pelvis for cholangiocarcinoma surveillance  1. Postsurgical changes of liver transplantation.  Stable hypodensity in the area of the nick hepatis.  No new or enhancing hepatic lesions identified.  2. Splenic hypodensity which is stable when compared to CT 01/12/2020 and decreased in size " when compared to CT 07/25/2015, possibly an involuting splenic cyst.  3. Right upper lobe nodule which is stable when compared to CTA 04/27/2015 consistent with benign etiology.    Endoscopy reviewed:  5/17/2019 Colonoscopy w/ Dr Josue @ [facility] for personal hx of colon polyps  1. Good prep, to cecum  2. A 3 mm polyp in proximal transverse colon, complete resection; hyperplastic polyp (cytology report found in media)  3. A 4 mm polyp in distal rectum, complete resection; hyperplastic polyp  4. Internal hemorrhoids  5. Repeat in 5 years    Ms Mcgovern is a very pleasant 60 y.o. F here for evaluation of:    Assessment:  1. Esophageal stenosis    2. Esophageal dysmotility    3. Gastroesophageal reflux disease, esophagitis presence not specified         Dysphagia to meats. Hx of requiring esophageal dilation at GE junction. Also c/o heartburn, generally after tomato-based foods. Will order EGD, 2 week trial of daily Pepcid, followed by PRN dosing. She also requests recommendations for urinary complaints and blurry vision. I will contact PCP for these referrals.    Recommendations:  1. Schedule EGD  2. Pepcid 20 mg once daily x 2 weeks, PRN thereafter    F/U pending scope      Order summary:  Orders Placed This Encounter    famotidine (PEPCID) 20 MG tablet    Case request GI: EGD (ESOPHAGOGASTRODUODENOSCOPY)         Thank you so much for allowing me to participate in the care of Nadeen Mcgovern    Denita Espinoza, RUTHANN-JUNAID

## 2020-09-24 ENCOUNTER — TELEPHONE (OUTPATIENT)
Dept: INTERNAL MEDICINE | Facility: CLINIC | Age: 60
End: 2020-09-24

## 2020-09-24 DIAGNOSIS — R32 URINARY INCONTINENCE, UNSPECIFIED TYPE: ICD-10-CM

## 2020-09-24 DIAGNOSIS — H53.8 BLURRY VISION: Primary | ICD-10-CM

## 2020-09-26 ENCOUNTER — LAB VISIT (OUTPATIENT)
Dept: URGENT CARE | Facility: CLINIC | Age: 60
End: 2020-09-26
Payer: MEDICARE

## 2020-09-26 VITALS — TEMPERATURE: 97 F | RESPIRATION RATE: 18 BRPM | HEART RATE: 81 BPM | OXYGEN SATURATION: 97 %

## 2020-09-26 DIAGNOSIS — Z01.812 PRE-PROCEDURE LAB EXAM: ICD-10-CM

## 2020-09-26 PROCEDURE — U0003 INFECTIOUS AGENT DETECTION BY NUCLEIC ACID (DNA OR RNA); SEVERE ACUTE RESPIRATORY SYNDROME CORONAVIRUS 2 (SARS-COV-2) (CORONAVIRUS DISEASE [COVID-19]), AMPLIFIED PROBE TECHNIQUE, MAKING USE OF HIGH THROUGHPUT TECHNOLOGIES AS DESCRIBED BY CMS-2020-01-R: HCPCS

## 2020-09-27 LAB — SARS-COV-2 RNA RESP QL NAA+PROBE: NOT DETECTED

## 2020-09-28 ENCOUNTER — IMMUNIZATION (OUTPATIENT)
Dept: PHARMACY | Facility: CLINIC | Age: 60
End: 2020-09-28
Payer: MEDICARE

## 2020-09-29 ENCOUNTER — HOSPITAL ENCOUNTER (OUTPATIENT)
Facility: HOSPITAL | Age: 60
Discharge: HOME | End: 2020-09-29
Attending: INTERNAL MEDICINE | Admitting: INTERNAL MEDICINE
Payer: MEDICARE

## 2020-09-29 ENCOUNTER — ANESTHESIA (OUTPATIENT)
Dept: ENDOSCOPY | Facility: HOSPITAL | Age: 60
End: 2020-09-29
Payer: MEDICARE

## 2020-09-29 ENCOUNTER — ANESTHESIA EVENT (OUTPATIENT)
Dept: ENDOSCOPY | Facility: HOSPITAL | Age: 60
End: 2020-09-29
Payer: MEDICARE

## 2020-09-29 VITALS
RESPIRATION RATE: 20 BRPM | HEART RATE: 55 BPM | SYSTOLIC BLOOD PRESSURE: 131 MMHG | TEMPERATURE: 98 F | DIASTOLIC BLOOD PRESSURE: 69 MMHG | OXYGEN SATURATION: 100 % | BODY MASS INDEX: 25.4 KG/M2 | HEIGHT: 62 IN | WEIGHT: 138 LBS

## 2020-09-29 DIAGNOSIS — R13.10 DYSPHAGIA: ICD-10-CM

## 2020-09-29 PROCEDURE — 43249 ESOPH EGD DILATION <30 MM: CPT | Mod: ,,, | Performed by: INTERNAL MEDICINE

## 2020-09-29 PROCEDURE — 63600175 PHARM REV CODE 636 W HCPCS: Performed by: REGISTERED NURSE

## 2020-09-29 PROCEDURE — E9220 PRA ENDO ANESTHESIA: HCPCS | Mod: ,,, | Performed by: REGISTERED NURSE

## 2020-09-29 PROCEDURE — C1726 CATH, BAL DIL, NON-VASCULAR: HCPCS | Performed by: INTERNAL MEDICINE

## 2020-09-29 PROCEDURE — E9220 PRA ENDO ANESTHESIA: ICD-10-PCS | Mod: ,,, | Performed by: REGISTERED NURSE

## 2020-09-29 PROCEDURE — 43249 PR EGD, FLEX, W/BALL DILATION, < 30MM: ICD-10-PCS | Mod: ,,, | Performed by: INTERNAL MEDICINE

## 2020-09-29 PROCEDURE — 37000008 HC ANESTHESIA 1ST 15 MINUTES: Performed by: INTERNAL MEDICINE

## 2020-09-29 PROCEDURE — 37000009 HC ANESTHESIA EA ADD 15 MINS: Performed by: INTERNAL MEDICINE

## 2020-09-29 PROCEDURE — 43249 ESOPH EGD DILATION <30 MM: CPT | Performed by: INTERNAL MEDICINE

## 2020-09-29 PROCEDURE — 25000003 PHARM REV CODE 250: Performed by: INTERNAL MEDICINE

## 2020-09-29 RX ORDER — SODIUM CHLORIDE 9 MG/ML
INJECTION, SOLUTION INTRAVENOUS CONTINUOUS
Status: DISCONTINUED | OUTPATIENT
Start: 2020-09-29 | End: 2020-09-29 | Stop reason: HOSPADM

## 2020-09-29 RX ORDER — LIDOCAINE HYDROCHLORIDE 20 MG/ML
INJECTION INTRAVENOUS
Status: DISCONTINUED | OUTPATIENT
Start: 2020-09-29 | End: 2020-09-29

## 2020-09-29 RX ORDER — PROPOFOL 10 MG/ML
VIAL (ML) INTRAVENOUS
Status: DISCONTINUED | OUTPATIENT
Start: 2020-09-29 | End: 2020-09-29

## 2020-09-29 RX ORDER — PROPOFOL 10 MG/ML
VIAL (ML) INTRAVENOUS CONTINUOUS PRN
Status: DISCONTINUED | OUTPATIENT
Start: 2020-09-29 | End: 2020-09-29

## 2020-09-29 RX ADMIN — LIDOCAINE HYDROCHLORIDE 60 MG: 20 INJECTION, SOLUTION INTRAVENOUS at 11:09

## 2020-09-29 RX ADMIN — PROPOFOL 100 MG: 10 INJECTION, EMULSION INTRAVENOUS at 11:09

## 2020-09-29 RX ADMIN — SODIUM CHLORIDE: 0.9 INJECTION, SOLUTION INTRAVENOUS at 10:09

## 2020-09-29 RX ADMIN — SODIUM CHLORIDE: 0.9 INJECTION, SOLUTION INTRAVENOUS at 11:09

## 2020-09-29 RX ADMIN — PROPOFOL 150 MCG/KG/MIN: 10 INJECTION, EMULSION INTRAVENOUS at 11:09

## 2020-09-29 NOTE — TRANSFER OF CARE
"Anesthesia Transfer of Care Note    Patient: Nadeen Mcgovern    Procedure(s) Performed: Procedure(s) (LRB):  EGD (ESOPHAGOGASTRODUODENOSCOPY) (N/A)    Patient location: PACU    Anesthesia Type: general    Transport from OR: Transported from OR on room air with adequate spontaneous ventilation    Post pain: adequate analgesia    Post assessment: no apparent anesthetic complications and tolerated procedure well    Post vital signs: stable    Level of consciousness: awake and alert    Nausea/Vomiting: no nausea/vomiting    Complications: none    Transfer of care protocol was followed      Last vitals:   Visit Vitals  /62   Pulse (!) 58   Temp 36.6 °C (97.9 °F)   Resp 16   Ht 5' 2" (1.575 m)   Wt 62.6 kg (138 lb)   LMP  (LMP Unknown)   SpO2 100%   Breastfeeding No   BMI 25.24 kg/m²     "

## 2020-09-29 NOTE — [employer] PATIENT INSTRUCTIONS
Discharge Summary/Instructions after an Endoscopic Procedure  Patient Name: Nadeen Mcgovern  Patient MRN: [id number]  Patient YOB: 1960 Tuesday, September 29, 2020  Drew Mackay MD  RESTRICTIONS:  During your procedure today, you received medications for sedation.  These   medications may affect your judgment, balance and coordination.  Therefore,   for 24 hours, you have the following restrictions:   - DO NOT drive a car, operate machinery, make legal/financial decisions,   sign important papers or drink alcohol.    ACTIVITY:  Today: no heavy lifting, straining or running due to procedural   sedation/anesthesia.  The following day: return to full activity including work.  DIET:  Eat and drink normally unless instructed otherwise.     TREATMENT FOR COMMON SIDE EFFECTS:  - Mild abdominal pain, nausea, belching, bloating or excessive gas:  rest,   eat lightly and use a heating pad.  - Sore Throat: treat with throat lozenges and/or gargle with warm salt   water.  - Because air was used during the procedure, expelling large amounts of air   from your rectum or belching is normal.  - If a bowel prep was taken, you may not have a bowel movement for 1-3 days.    This is normal.  SYMPTOMS TO WATCH FOR AND REPORT TO YOUR PHYSICIAN:  1. Abdominal pain or bloating, other than gas cramps.  2. Chest pain.  3. Back pain.  4. Signs of infection such as: chills or fever occurring within 24 hours   after the procedure.  5. Rectal bleeding, which would show as bright red, maroon, or black stools.   (A tablespoon of blood from the rectum is not serious, especially if   hemorrhoids are present.)  6. Vomiting.  7. Weakness or dizziness.  GO DIRECTLY TO THE NEAREST EMERGENCY ROOM IF YOU HAVE ANY OF THE FOLLOWING:      Difficulty breathing              Chills and/or fever over 101 F   Persistent vomiting and/or vomiting blood   Severe abdominal pain   Severe chest pain   Black, tarry stools   Bleeding- more than one  tablespoon   Any other symptom or condition that you feel may need urgent attention  Your doctor recommends these additional instructions:  If any biopsies were taken, your doctors clinic will contact you in 1 to 2   weeks with any results.  - Patient has a contact number available for emergencies.  The signs and   symptoms of potential delayed complications were discussed with the   patient.  Return to normal activities tomorrow.  Written discharge   instructions were provided to the patient.   - Discharge patient to home (ambulatory).   - Resume previous diet.   - Continue present medications.   - Repeat upper endoscopy PRN for retreatment.  For questions, problems or results please call your physician - Drew Mackay MD at Work:  [phone].  [facility], EMERGENCY ROOM PHONE NUMBER: [phone]  IF A COMPLICATION OR EMERGENCY SITUATION ARISES AND YOU ARE UNABLE TO REACH   YOUR PHYSICIAN - GO DIRECTLY TO THE EMERGENCY ROOM.  Drew Mackay MD  9/29/2020 11:42:01 AM  This report has been verified and signed electronically.  [employer]

## 2020-09-29 NOTE — DISCHARGE INSTRUCTIONS

## 2020-09-29 NOTE — H&P
Short Stay Endoscopy History and Physical    PCP - Nadeen Figueroa MD  Referring Physician - Denita Espinoza, [place]  [address] [place] [address]    Procedure - Endoscopy  ASA - per anesthesia  Mallampati - per anesthesia  History of Anesthesia problems - no  Family history Anesthesia problems -  no   Plan of anesthesia - General    HPI  60 y.o. female  Reason for procedure: hx of esophageal stenosis, presents with dysphagia, last EGD in 2017, dilated up to 18 mm.       ROS:  Constitutional: No fevers, chills, No weight loss  CV: No chest pain  Pulm: No cough, No shortness of breath  GI: see HPI    Medical History:  has a past medical history of Acute cholecystitis, Arthritis (2015), Bacteremia due to Streptococcus pneumoniae, Cancer, Cholangiocarcinoma, Hypertension, Jaundice, and Prediabetes.    Surgical History:  has a past surgical history that includes  section; arthoscopic (Right); Shoulder arthroscopy; Incision and drainage of wound; and Liver transplant.    Family History: family history includes Alcohol abuse in her brother; Arthritis in her father and sister; Cancer in her brother, father, and paternal grandmother; Diabetes in her mother; Heart attack in her mother; Hypertension in her mother; No Known Problems in her daughter, daughter, son, and son; Stroke in her mother..    Social History:  reports that she has quit smoking. Her smoking use included cigars. She smoked 0.00 packs per day for 0.00 years. She has never used smokeless tobacco. She reports current alcohol use of about 4.0 standard drinks of alcohol per week. She reports that she does not use drugs.    Review of patient's allergies indicates:  No Known Allergies    Medications:   No medications prior to admission.       Physical Exam:    Vital Signs: There were no vitals filed for this visit.    General Appearance: Well appearing in no acute distress  Abdomen: Soft, non tender, non distended with normal bowel sounds, no  masses    Labs:  Lab Results   Component Value Date    WBC 5.54 09/14/2020    HGB 12.9 09/14/2020    HCT 40.2 09/14/2020     09/14/2020    CHOL 137 01/20/2020    TRIG 75 01/20/2020    HDL 39 (L) 01/20/2020    ALT 15 09/14/2020    AST 16 09/14/2020     09/14/2020    K 3.7 09/14/2020     09/14/2020    CREATININE 0.8 09/14/2020    BUN 12 09/14/2020    CO2 23 09/14/2020    TSH 1.224 08/17/2016    INR 1.0 10/27/2016    HGBA1C 5.4 02/15/2017       I have explained the risks and benefits of this endoscopic procedure to the patient including but not limited to bleeding, inflammation, infection, perforation, and death.      Moo Perkins MD

## 2020-09-29 NOTE — ANESTHESIA PREPROCEDURE EVALUATION
2020  Nadeen Mcgovern is a 60 y.o., female. S/p OLT 2015    Takes sildenafil for Raynaud's    TTE  · Normal left ventricular systolic function. The estimated ejection fraction is 65%.  · Concentric left ventricular remodeling.  · Septal wall has abnormal motion.  · Normal LV diastolic function.  · Normal right ventricular systolic function.  · Mild aortic regurgitation.  · Intermediate central venous pressure (8 mmHg).  · The estimated PA systolic pressure is 32 mmHg    Patient Active Problem List   Diagnosis    S/P liver transplant    Immunosuppression    Limited scleroderma    Tendinitis of left hand    Esophageal dysmotility    Rotator cuff tear, left    Adhesive capsulitis of left shoulder    Raynaud's phenomenon without gangrene    Hand eczema    Shoulder pain, bilateral    Osteoarthritis of right thumb    Aortic valve regurgitation    Restrictive lung disease    Chronic bilateral low back pain    Sicca syndrome with keratoconjunctivitis    Sicca syndrome    On mycophenolate mofetil therapy    Lung nodule seen on imaging study    Atherosclerosis of aorta    Encounter for aftercare following liver transplant    History of bile duct cancer     Past Medical History:   Diagnosis Date    Acute cholecystitis     Cholecystitis    Arthritis     septic arthritis of right shoulder    Bacteremia due to Streptococcus pneumoniae     Cancer     Cholangiocarcinoma     Hypertension     Jaundice     obstructive    Prediabetes      Past Surgical History:   Procedure Laterality Date    arthoscopic Right     drained infection     SECTION      INCISION AND DRAINAGE OF WOUND      s/p I&D of R thumb    LIVER TRANSPLANT      SHOULDER ARTHROSCOPY         Anesthesia Evaluation    I have reviewed the Patient Summary Reports.   I have reviewed the NPO Status.   I have reviewed the  Medications.     Review of Systems  Anesthesia Hx:  No problems with previous Anesthesia  History of prior surgery of interest to airway management or planning: liver transplant. Previous anesthesia: General   Social:  Former Smoker    Cardiovascular:   Exercise tolerance: good Hypertension Denies CAD.     Denies Angina.        Pulmonary:   Denies COPD.  Denies Asthma.  Denies Shortness of breath.  Denies Sleep Apnea.    Renal/:   Denies Chronic Renal Disease.     Hepatic/GI:   Denies GERD. Liver Disease,    Musculoskeletal:   Arthritis     Neurological:   Denies CVA. Denies Seizures.    Endocrine:   Denies Diabetes. Denies Hypothyroidism.        Physical Exam  General:  Well nourished    Airway/Jaw/Neck:  Airway Findings: Mouth Opening: Normal Tongue: Normal  General Airway Assessment: Adult  Mallampati: II  TM Distance: Normal, at least 6 cm      Dental:  Dental Findings: In tact   Chest/Lungs:  Chest/Lungs Clear    Heart/Vascular:  Heart Findings: Normal       Mental Status:  Mental Status Findings:  Cooperative         Anesthesia Plan  Type of Anesthesia, risks & benefits discussed:  Anesthesia Type:  general  Patient's Preference: GA  Intra-op Monitoring Plan: standard ASA monitors  Intra-op Monitoring Plan Comments:   Post Op Pain Control Plan:   Post Op Pain Control Plan Comments: Opioids PRN  Induction:   IV  Beta Blocker:  Patient is not currently on a Beta-Blocker (No further documentation required).       Informed Consent: Patient understands risks and agrees with Anesthesia plan.  Questions answered. Anesthesia consent signed with patient.  ASA Score: 3     Day of Surgery Review of History & Physical: I have interviewed and examined the patient. I have reviewed the patient's H&P dated:  There are no significant changes.      Anesthesia Plan Notes: I personally saw the patient and a history and physical was performed.    Plan for GA - TIVA        Ready For Surgery From Anesthesia Perspective.

## 2020-09-29 NOTE — ANESTHESIA POSTPROCEDURE EVALUATION
Anesthesia Post Evaluation    Patient: Nadeen Mcgovern    Procedure(s) Performed: Procedure(s) (LRB):  EGD (ESOPHAGOGASTRODUODENOSCOPY) (N/A)    Final Anesthesia Type: general    Patient location during evaluation: PACU  Patient participation: Yes- Able to Participate  Level of consciousness: awake and alert  Post-procedure vital signs: reviewed and stable  Pain management: adequate  Airway patency: patent    PONV status at discharge: No PONV  Anesthetic complications: no      Cardiovascular status: blood pressure returned to baseline  Respiratory status: unassisted  Hydration status: euvolemic  Follow-up not needed.          Vitals Value Taken Time   /69 09/29/20 1209   Temp 36.6 °C (97.9 °F) 09/29/20 1146   Pulse 55 09/29/20 1209   Resp 20 09/29/20 1209   SpO2 100 % 09/29/20 1209         Event Time   Out of Recovery 12:18:39         Pain/Pam Score: Pam Score: 10 (9/29/2020 12:09 PM)

## 2020-09-30 ENCOUNTER — TELEPHONE (OUTPATIENT)
Dept: PHARMACY | Facility: CLINIC | Age: 60
End: 2020-09-30

## 2020-09-30 NOTE — TELEPHONE ENCOUNTER
Refill call regarding sildenafil from [facility]. Shipping out on 10/5 for 10/6 arrival with patients consent. Copay of $3.60 @ 004. Address and  confirmed.

## 2020-10-08 ENCOUNTER — OFFICE VISIT (OUTPATIENT)
Dept: OPTOMETRY | Facility: CLINIC | Age: 60
End: 2020-10-08
Payer: MEDICARE

## 2020-10-08 ENCOUNTER — TELEPHONE (OUTPATIENT)
Dept: TRANSPLANT | Facility: CLINIC | Age: 60
End: 2020-10-08

## 2020-10-08 ENCOUNTER — PATIENT MESSAGE (OUTPATIENT)
Dept: GASTROENTEROLOGY | Facility: CLINIC | Age: 60
End: 2020-10-08

## 2020-10-08 DIAGNOSIS — H52.4 PRESBYOPIA: ICD-10-CM

## 2020-10-08 DIAGNOSIS — Z13.5 GLAUCOMA SCREENING: ICD-10-CM

## 2020-10-08 DIAGNOSIS — H25.13 NUCLEAR SCLEROSIS, BILATERAL: Primary | ICD-10-CM

## 2020-10-08 DIAGNOSIS — H53.8 BLURRY VISION: ICD-10-CM

## 2020-10-08 PROCEDURE — 92004 COMPRE OPH EXAM NEW PT 1/>: CPT | Mod: S$GLB,,, | Performed by: OPTOMETRIST

## 2020-10-08 PROCEDURE — 92015 DETERMINE REFRACTIVE STATE: CPT | Mod: S$GLB,,, | Performed by: OPTOMETRIST

## 2020-10-08 PROCEDURE — 92004 PR EYE EXAM, NEW PATIENT,COMPREHESV: ICD-10-PCS | Mod: S$GLB,,, | Performed by: OPTOMETRIST

## 2020-10-08 PROCEDURE — 99999 PR PBB SHADOW E&M-EST. PATIENT-LVL IV: CPT | Mod: PBBFAC,,, | Performed by: OPTOMETRIST

## 2020-10-08 PROCEDURE — 99999 PR PBB SHADOW E&M-EST. PATIENT-LVL IV: ICD-10-PCS | Mod: PBBFAC,,, | Performed by: OPTOMETRIST

## 2020-10-08 PROCEDURE — 92015 PR REFRACTION: ICD-10-PCS | Mod: S$GLB,,, | Performed by: OPTOMETRIST

## 2020-10-08 NOTE — TELEPHONE ENCOUNTER
----- Message from Ramila Cisneros sent at 10/8/2020  9:23 AM CDT -----  Pt is calling to notify coordinator that she is sore in her throat from her endo procedure      Pt cont [phone]

## 2020-10-08 NOTE — LETTER
October 8, 2020      Nadeen Figueroa MD  [address]  [facility] [address]           [facility]  [address]  [place] [address]  Phone: [phone]  Fax: [phone]          Patient: Nadeen Mcgovern   MR Number: [id number]   YOB: 1960   Date of Visit: 10/8/2020       Dear Dr. Nadeen Figueroa:    Thank you for referring Nadeen Mcgovern to me for evaluation. Attached you will find relevant portions of my assessment and plan of care.    If you have questions, please do not hesitate to call me. I look forward to following Nadeen Mcgovern along with you.    Sincerely,    Jovani Hankins, OD    Enclosure  CC:  No Recipients    If you would like to receive this communication electronically, please contact [email or web address] or [phone] to request more information on [employer] access.    For providers and/or their staff who would like to refer a patient to [facility], please contact us through our one-stop-shop provider referral line, Lakeshia Malhotra, at [phone].    If you feel you have received this communication in error or would no longer like to receive these types of communications, please e-mail [email or web address]

## 2020-10-08 NOTE — PROGRESS NOTES
HPI     RAMOS: 2018  Pt states sometimes her VA is shaking and sometimes she sees spots, states   it been going on for a few months now. Also states she lost her Rx glasses     No flashes   Clear eyes gtts Occ        Last edited by Jovani Hankins, OD on 10/8/2020 12:53 PM. (History)        ROS     Negative for: Constitutional, Gastrointestinal, Neurological, Skin,   Genitourinary, Musculoskeletal, HENT, Endocrine, Cardiovascular, Eyes,   Respiratory, Psychiatric, Allergic/Imm, Heme/Lymph    Last edited by Jovani Hankins, OD on 10/8/2020 12:53 PM. (History)        Assessment /Plan     For exam results, see Encounter Report.    Nuclear sclerosis, bilateral    Blurry vision  -     Ambulatory referral/consult to Optometry    Glaucoma screening    Presbyopia      1. Small Cats OU--wrote new spex Rx  2. Rare vit floaters--discussed S+S of RD    PLAN:    rtc 1 yr

## 2020-10-08 NOTE — TELEPHONE ENCOUNTER
Returned call to advise patient she could gargle or use chloraseptic.  I also told her if it does not get better to call GI who order the test and let them know.

## 2020-10-16 ENCOUNTER — LAB VISIT (OUTPATIENT)
Dept: LAB | Facility: HOSPITAL | Age: 60
End: 2020-10-16
Attending: INTERNAL MEDICINE
Payer: MEDICARE

## 2020-10-16 DIAGNOSIS — M34.9 LIMITED SCLERODERMA: ICD-10-CM

## 2020-10-16 DIAGNOSIS — Z79.899 ON MYCOPHENOLATE MOFETIL THERAPY: ICD-10-CM

## 2020-10-16 DIAGNOSIS — M34.9 SCLERODERMA: ICD-10-CM

## 2020-10-16 LAB
ALBUMIN SERPL BCP-MCNC: 4.2 G/DL (ref 3.5–5.2)
ALP SERPL-CCNC: 72 U/L (ref 55–135)
ALT SERPL W/O P-5'-P-CCNC: 15 U/L (ref 10–44)
ANION GAP SERPL CALC-SCNC: 6 MMOL/L (ref 8–16)
AST SERPL-CCNC: 17 U/L (ref 10–40)
BASOPHILS # BLD AUTO: 0.04 K/UL (ref 0–0.2)
BASOPHILS NFR BLD: 0.7 % (ref 0–1.9)
BILIRUB SERPL-MCNC: 0.8 MG/DL (ref 0.1–1)
BUN SERPL-MCNC: 10 MG/DL (ref 6–20)
CALCIUM SERPL-MCNC: 9.6 MG/DL (ref 8.7–10.5)
CHLORIDE SERPL-SCNC: 106 MMOL/L (ref 95–110)
CO2 SERPL-SCNC: 30 MMOL/L (ref 23–29)
CREAT SERPL-MCNC: 0.8 MG/DL (ref 0.5–1.4)
CRP SERPL-MCNC: 1.3 MG/L (ref 0–8.2)
DIFFERENTIAL METHOD: ABNORMAL
EOSINOPHIL # BLD AUTO: 0.2 K/UL (ref 0–0.5)
EOSINOPHIL NFR BLD: 3.7 % (ref 0–8)
ERYTHROCYTE [DISTWIDTH] IN BLOOD BY AUTOMATED COUNT: 12.4 % (ref 11.5–14.5)
ERYTHROCYTE [SEDIMENTATION RATE] IN BLOOD BY WESTERGREN METHOD: 14 MM/HR (ref 0–36)
EST. GFR  (AFRICAN AMERICAN): >60 ML/MIN/1.73 M^2
EST. GFR  (NON AFRICAN AMERICAN): >60 ML/MIN/1.73 M^2
GLUCOSE SERPL-MCNC: 95 MG/DL (ref 70–110)
HCT VFR BLD AUTO: 42.1 % (ref 37–48.5)
HGB BLD-MCNC: 13.6 G/DL (ref 12–16)
IMM GRANULOCYTES # BLD AUTO: 0.02 K/UL (ref 0–0.04)
IMM GRANULOCYTES NFR BLD AUTO: 0.3 % (ref 0–0.5)
LYMPHOCYTES # BLD AUTO: 1.5 K/UL (ref 1–4.8)
LYMPHOCYTES NFR BLD: 25 % (ref 18–48)
MCH RBC QN AUTO: 31.6 PG (ref 27–31)
MCHC RBC AUTO-ENTMCNC: 32.3 G/DL (ref 32–36)
MCV RBC AUTO: 98 FL (ref 82–98)
MONOCYTES # BLD AUTO: 0.5 K/UL (ref 0.3–1)
MONOCYTES NFR BLD: 8.1 % (ref 4–15)
NEUTROPHILS # BLD AUTO: 3.7 K/UL (ref 1.8–7.7)
NEUTROPHILS NFR BLD: 62.2 % (ref 38–73)
NRBC BLD-RTO: 0 /100 WBC
PLATELET # BLD AUTO: 201 K/UL (ref 150–350)
PMV BLD AUTO: 10.9 FL (ref 9.2–12.9)
POTASSIUM SERPL-SCNC: 4.2 MMOL/L (ref 3.5–5.1)
PROT SERPL-MCNC: 7.4 G/DL (ref 6–8.4)
RBC # BLD AUTO: 4.31 M/UL (ref 4–5.4)
SODIUM SERPL-SCNC: 142 MMOL/L (ref 136–145)
WBC # BLD AUTO: 5.95 K/UL (ref 3.9–12.7)

## 2020-10-16 PROCEDURE — 80053 COMPREHEN METABOLIC PANEL: CPT

## 2020-10-16 PROCEDURE — 36415 COLL VENOUS BLD VENIPUNCTURE: CPT

## 2020-10-16 PROCEDURE — 86140 C-REACTIVE PROTEIN: CPT

## 2020-10-16 PROCEDURE — 85652 RBC SED RATE AUTOMATED: CPT

## 2020-10-16 PROCEDURE — 85025 COMPLETE CBC W/AUTO DIFF WBC: CPT

## 2020-10-20 ENCOUNTER — OFFICE VISIT (OUTPATIENT)
Dept: RHEUMATOLOGY | Facility: CLINIC | Age: 60
End: 2020-10-20
Payer: MEDICARE

## 2020-10-20 VITALS
HEIGHT: 62 IN | BODY MASS INDEX: 26.09 KG/M2 | TEMPERATURE: 97 F | WEIGHT: 141.75 LBS | HEART RATE: 61 BPM | DIASTOLIC BLOOD PRESSURE: 71 MMHG | SYSTOLIC BLOOD PRESSURE: 116 MMHG

## 2020-10-20 DIAGNOSIS — I73.00 RAYNAUD'S PHENOMENON WITHOUT GANGRENE: ICD-10-CM

## 2020-10-20 DIAGNOSIS — M34.9 SCLERODERMA: ICD-10-CM

## 2020-10-20 DIAGNOSIS — M34.9 SCLERODERMA, LIMITED: Primary | ICD-10-CM

## 2020-10-20 DIAGNOSIS — M34.9 LIMITED SCLERODERMA: ICD-10-CM

## 2020-10-20 DIAGNOSIS — Z79.899 ON MYCOPHENOLATE MOFETIL THERAPY: ICD-10-CM

## 2020-10-20 PROCEDURE — 3078F PR MOST RECENT DIASTOLIC BLOOD PRESSURE < 80 MM HG: ICD-10-PCS | Mod: CPTII,S$GLB,, | Performed by: INTERNAL MEDICINE

## 2020-10-20 PROCEDURE — 99214 OFFICE O/P EST MOD 30 MIN: CPT | Mod: S$GLB,,, | Performed by: INTERNAL MEDICINE

## 2020-10-20 PROCEDURE — 3008F PR BODY MASS INDEX (BMI) DOCUMENTED: ICD-10-PCS | Mod: CPTII,S$GLB,, | Performed by: INTERNAL MEDICINE

## 2020-10-20 PROCEDURE — 3074F SYST BP LT 130 MM HG: CPT | Mod: CPTII,S$GLB,, | Performed by: INTERNAL MEDICINE

## 2020-10-20 PROCEDURE — 99999 PR PBB SHADOW E&M-EST. PATIENT-LVL IV: CPT | Mod: PBBFAC,,, | Performed by: INTERNAL MEDICINE

## 2020-10-20 PROCEDURE — 99214 PR OFFICE/OUTPT VISIT, EST, LEVL IV, 30-39 MIN: ICD-10-PCS | Mod: S$GLB,,, | Performed by: INTERNAL MEDICINE

## 2020-10-20 PROCEDURE — 3078F DIAST BP <80 MM HG: CPT | Mod: CPTII,S$GLB,, | Performed by: INTERNAL MEDICINE

## 2020-10-20 PROCEDURE — 99499 UNLISTED E&M SERVICE: CPT | Mod: S$GLB,,, | Performed by: INTERNAL MEDICINE

## 2020-10-20 PROCEDURE — 99499 RISK ADDL DX/OHS AUDIT: ICD-10-PCS | Mod: S$GLB,,, | Performed by: INTERNAL MEDICINE

## 2020-10-20 PROCEDURE — 3074F PR MOST RECENT SYSTOLIC BLOOD PRESSURE < 130 MM HG: ICD-10-PCS | Mod: CPTII,S$GLB,, | Performed by: INTERNAL MEDICINE

## 2020-10-20 PROCEDURE — 3008F BODY MASS INDEX DOCD: CPT | Mod: CPTII,S$GLB,, | Performed by: INTERNAL MEDICINE

## 2020-10-20 PROCEDURE — 99999 PR PBB SHADOW E&M-EST. PATIENT-LVL IV: ICD-10-PCS | Mod: PBBFAC,,, | Performed by: INTERNAL MEDICINE

## 2020-10-20 RX ORDER — MYCOPHENOLATE MOFETIL 500 MG/1
1000 TABLET ORAL 2 TIMES DAILY
Qty: 360 TABLET | Refills: 0 | Status: SHIPPED | OUTPATIENT
Start: 2020-10-20 | End: 2020-10-20 | Stop reason: SDUPTHER

## 2020-10-20 RX ORDER — MYCOPHENOLATE MOFETIL 500 MG/1
1000 TABLET ORAL 2 TIMES DAILY
Qty: 360 TABLET | Refills: 0 | Status: SHIPPED | OUTPATIENT
Start: 2020-10-20 | End: 2021-03-25

## 2020-10-20 NOTE — ASSESSMENT & PLAN NOTE
MRSS 4      Cont mycophenolate 1000mg twice daily  Cont sildenafil 20mg three times daily  RTC 3 months with standing labs

## 2020-10-20 NOTE — PROGRESS NOTES
"Subjective:       Patient ID: Nadeen Mcgovern is a 60 y.o. female.    Chief Complaint: lcSSc  HPI Feels well. Hand eczema improved with twice daily topicals. + Bruising ,  No change skin tightness, No digital ulcers. Improved swallowing after EGD and dilation. Had flu shot. Breathing is good.   Review of Systems   Constitutional: Negative for appetite change, fatigue, fever and unexpected weight change.   HENT: Negative for mouth sores and trouble swallowing.    Eyes: Positive for redness. Negative for visual disturbance.   Respiratory: Positive for shortness of breath. Negative for cough and wheezing.    Cardiovascular: Negative for chest pain and palpitations.   Gastrointestinal: Positive for constipation. Negative for abdominal pain, anal bleeding, blood in stool, diarrhea, nausea and vomiting.   Genitourinary: Negative for dysuria, frequency, genital sores and urgency.   Musculoskeletal: Negative for arthralgias, back pain, gait problem, joint swelling, myalgias, neck pain and neck stiffness.   Skin: Positive for rash.   Neurological: Negative for weakness, numbness and headaches.   Hematological: Negative for adenopathy. Bruises/bleeds easily.   Psychiatric/Behavioral: Negative for sleep disturbance. The patient is not nervous/anxious.          Objective:   /71   Pulse 61   Temp 97.1 °F (36.2 °C)   Ht 5' 2" (1.575 m)   Wt 64.3 kg (141 lb 12.1 oz)   LMP  (LMP Unknown)   BMI 25.93 kg/m²      Physical Exam   Constitutional: She is oriented to person, place, and time and well-developed, well-nourished, and in no distress.   HENT:   Head: Normocephalic and atraumatic.   Mouth/Throat: Oropharynx is clear and moist.   Eyes: Conjunctivae and EOM are normal.   Neck: Normal range of motion. Neck supple. No thyromegaly present.   Cardiovascular: Normal rate, regular rhythm, normal heart sounds and intact distal pulses.  Exam reveals no gallop and no friction rub.    No murmur heard.  Pulmonary/Chest: Breath " sounds normal. She has no wheezes. She has no rales. She exhibits no tenderness.   Abdominal: Soft. She exhibits no distension and no mass. There is no abdominal tenderness.   Lymphadenopathy:     She has no cervical adenopathy.   Neurological: She is alert and oriented to person, place, and time. She displays normal reflexes. Gait normal.   Nl motor strength UE and LE bilateral   Skin:     Minimal hand eczema  MRSS 4 fingers/legs   Psychiatric: Memory, affect and judgment normal.   Musculoskeletal: No edema.           Assessment/Plan         Scleroderma, limited  -     Th/To Antibody; Future; Expected date: 10/20/2020    Limited scleroderma    Scleroderma  -     Discontinue: mycophenolate (CELLCEPT) 500 mg Tab; Take 2 tablets (1,000 mg total) by mouth 2 (two) times daily.  Dispense: 360 tablet; Refill: 0  -     mycophenolate (CELLCEPT) 500 mg Tab; Take 2 tablets (1,000 mg total) by mouth 2 (two) times daily.  Dispense: 360 tablet; Refill: 0    On mycophenolate mofetil therapy  -     Discontinue: mycophenolate (CELLCEPT) 500 mg Tab; Take 2 tablets (1,000 mg total) by mouth 2 (two) times daily.  Dispense: 360 tablet; Refill: 0  -     mycophenolate (CELLCEPT) 500 mg Tab; Take 2 tablets (1,000 mg total) by mouth 2 (two) times daily.  Dispense: 360 tablet; Refill: 0    Raynaud's phenomenon without gangrene       Problem List Items Addressed This Visit     Limited scleroderma    Overview     lcSSc(limited scleroderma): ACR/EULAR criteria: (no sclerodactyly today) digital tip pitting scars(3) telangiectases(2) Raynaud's(3) =8 does not meet  criteria ; oxaliplatin(cisplatin associated with Raynaud's) ; 5-FU not known to be associated with Raynaud's or scleroderma. MRSS=17 increased from 2 previous!. No evidence of scleroderma esophagus by esophageal manometry or upper endoscopy  EUGENE+ 1:320 nucleolar(scleroderma pattern) and speckled + SS-A (most typical of Sjogren's and SLE), Raynaud's, telangiectases, dysphagia. All  scleroderma associated autoantibodies negative        · Normal left ventricular systolic function. The estimated ejection fraction is 65%.  · Concentric left ventricular remodeling.  · Septal wall has abnormal motion.  · Normal LV diastolic function.  · Normal right ventricular systolic function.  · Mild aortic regurgitation.  · Intermediate central venous pressure (8 mmHg).  · The estimated PA systolic pressure is 32 mmHg.             PFTs          FVC     SVC    RV   DLco    3/26/19     117    119     121  101  3/20/18     120    121     62    90  9/25/17     122     122    30    89  4/7/17       123     124    49    91  8/18/16     107     107    110   88      3/26/18: 6 MW 99, 99 100      PFTs          FVC     SVC    RV   DLco     3/26/19     117    119     121  101  3/20/18     120    121     62    90  9/25/17     122     122    30    89  4/7/17       123     124    49    91  8/18/16     107     107    110   88     · TTE 3/26/16 :  · Normal left ventricular systolic function. The estimated ejection fraction is 60%  · Normal right ventricular systolic function.  · Normal LV diastolic function.  · Mild aortic regurgitation.  · The estimated PA systolic pressure is 24 mm Hg  Normal central venous pressure (3 mm Hg).        LcSSc EUGENE+ nucleolar SS-A+, Scl-70 and RNAPIII -:  mRSS:5(mild)    Benign-appearing esophageal stenosis. Dilated.                         - Normal stomach.                         - Normal examined duodenum.                         - No specimens collected.       The breathing tests look fine. Will recheck in 6 months Jan or Feb 2021. RJQ           Current Assessment & Plan     MRSS 4      Cont mycophenolate 1000mg twice daily  Cont sildenafil 20mg three times daily  RTC 3 months with standing labs         Relevant Medications    mycophenolate (CELLCEPT) 500 mg Tab    On mycophenolate mofetil therapy    Relevant Medications    mycophenolate (CELLCEPT) 500 mg Tab    Raynaud's phenomenon without  gangrene    Current Assessment & Plan     Cont amlodipine 5mg daily  Cont sildenafil 20mg three times daily           Other Visit Diagnoses     Scleroderma, limited    -  Primary    Relevant Orders    Th/To Antibody    Scleroderma        Relevant Medications    mycophenolate (CELLCEPT) 500 mg Tab

## 2020-10-27 ENCOUNTER — SPECIALTY PHARMACY (OUTPATIENT)
Dept: PHARMACY | Facility: CLINIC | Age: 60
End: 2020-10-27

## 2020-11-04 ENCOUNTER — SPECIALTY PHARMACY (OUTPATIENT)
Dept: PHARMACY | Facility: CLINIC | Age: 60
End: 2020-11-04

## 2020-11-04 ENCOUNTER — PATIENT MESSAGE (OUTPATIENT)
Dept: PHARMACY | Facility: CLINIC | Age: 60
End: 2020-11-04

## 2020-11-17 ENCOUNTER — INITIAL CONSULT (OUTPATIENT)
Dept: UROGYNECOLOGY | Facility: CLINIC | Age: 60
End: 2020-11-17
Payer: MEDICARE

## 2020-11-17 VITALS — WEIGHT: 140 LBS | HEIGHT: 62 IN | BODY MASS INDEX: 25.76 KG/M2

## 2020-11-17 DIAGNOSIS — R32 URINARY INCONTINENCE, UNSPECIFIED TYPE: ICD-10-CM

## 2020-11-17 DIAGNOSIS — N39.46 MIXED STRESS AND URGE URINARY INCONTINENCE: ICD-10-CM

## 2020-11-17 DIAGNOSIS — K59.09 CHRONIC CONSTIPATION: ICD-10-CM

## 2020-11-17 DIAGNOSIS — Z72.0 TOBACCO USE: Primary | ICD-10-CM

## 2020-11-17 PROCEDURE — 99215 OFFICE O/P EST HI 40 MIN: CPT | Mod: 25,S$GLB,, | Performed by: OBSTETRICS & GYNECOLOGY

## 2020-11-17 PROCEDURE — 51701 INSERT BLADDER CATHETER: CPT | Mod: S$GLB,,, | Performed by: OBSTETRICS & GYNECOLOGY

## 2020-11-17 PROCEDURE — 51701 PR INSERTION OF NON-INDWELLING BLADDER CATHETERIZATION FOR RESIDUAL UR: ICD-10-PCS | Mod: S$GLB,,, | Performed by: OBSTETRICS & GYNECOLOGY

## 2020-11-17 PROCEDURE — 87086 URINE CULTURE/COLONY COUNT: CPT

## 2020-11-17 PROCEDURE — 99215 PR OFFICE/OUTPT VISIT, EST, LEVL V, 40-54 MIN: ICD-10-PCS | Mod: 25,S$GLB,, | Performed by: OBSTETRICS & GYNECOLOGY

## 2020-11-17 PROCEDURE — 99999 PR PBB SHADOW E&M-EST. PATIENT-LVL V: ICD-10-PCS | Mod: PBBFAC,,, | Performed by: OBSTETRICS & GYNECOLOGY

## 2020-11-17 PROCEDURE — 99999 PR PBB SHADOW E&M-EST. PATIENT-LVL V: CPT | Mod: PBBFAC,,, | Performed by: OBSTETRICS & GYNECOLOGY

## 2020-11-17 NOTE — PROGRESS NOTES
[facility] - OBGYN 5TH [place]  [address]  [facility] [address]    Nadeen Mcgovern  [id number]  1960    Consulting Physician: Nadeen Figueroa MD   GYN: NPs at [facility]  Primary M.D.: Nadeen Figueroa MD    Chief Complaint   Patient presents with    Consult     urinary incontinence       HPI:     1)  UI:  (+) YOLANDA (mild) < (+) UUI  X years. No previous Tx.  (+) pads:3/day, usually moderate wetness and 1/night usually moderate wetness.  Daytime frequency: Q 1 hours.  Nocturia: Yes: 3/night. Keeps water by bed.    (--) dysuria,  (--) hematuria,  (--) frequent UTIs.  (+) complete bladder emptying.    2)  POP:  Absent.(--) vaginal bleeding. (--) vaginal discharge. (+) sexually active.  (--) dyspareunia.  (+)  Vaginal dryness--use OTC lube.  (--) vaginal estrogen use.     3)  BM:  (+) constipation/straining due to meds.  Tries to eat a lot of fiber.   (--) chronic diarrhea. (--) hematochezia.  (--) fecal incontinence.  (--) fecal smearing/urgency.  (+)/-- complete evacuation. Has to DD sometimes.     Past Medical History  Past Medical History:   Diagnosis Date    Acute cholecystitis     Cholecystitis    Arthritis     septic arthritis of right shoulder    Bacteremia due to Streptococcus pneumoniae     Cancer     Cholangiocarcinoma     Hypertension     Jaundice     obstructive    Prediabetes    preDM:   Lab Results   Component Value Date    HGBA1C 5.4 02/15/2017     Past Surgical History  Past Surgical History:   Procedure Laterality Date    arthoscopic Right     drained infection     SECTION      ESOPHAGOGASTRODUODENOSCOPY N/A 2020    Procedure: EGD (ESOPHAGOGASTRODUODENOSCOPY);  Surgeon: Drew Mackay MD;  Location: [facility] (4TH FLR);  Service: Endoscopy;  Laterality: N/A;  [facility]    INCISION AND DRAINAGE OF WOUND      s/p I&D of R thumb    LIVER TRANSPLANT      SHOULDER ARTHROSCOPY        Hysterectomy: No    Past Ob History     x 3.  C/s x 1  (1st for NRFHT).    Largest infant weight: 7#8oz.   no FAVD. no episiotomy.      Gynecologic History  LMP: No LMP recorded (lmp unknown). Patient is postmenopausal.  Age of menarche: 11 yo  Age of menopause: around 49 yo  Menstrual history: h/o normal  Pap test: 2020 normal, no HPV done.  History of abnormal paps: No.  History of STIs:  No  Mammogram: Date of last: 5/2020.  Result: Normal  Colonoscopy: Date of last: 2019.  Result:  polyps.  Repeat due:  2024.    DEXA:  Date of last: 2019.  Result:  normal.  Repeat due:  Per PCP.     Family History  Family History   Problem Relation Age of Onset    Cancer Father         Lung    Arthritis Father     Stroke Mother     Diabetes Mother     Hypertension Mother     Heart attack Mother     Cancer Paternal Grandmother         pancreatic cancer    Cancer Brother         Lung    Alcohol abuse Brother     Arthritis Sister     No Known Problems Daughter     No Known Problems Son     No Known Problems Son     No Known Problems Daughter     Colon cancer Neg Hx     Esophageal cancer Neg Hx       Colon CA: No  Breast CA: No  GYN CA: No   CA: No    Social History  Social History     Tobacco Use   Smoking Status Former Smoker    Packs/day: 0.00    Years: 0.00    Pack years: 0.00    Types: Cigars   Smokeless Tobacco Never Used   --smokes cigars 2/day    Social History     Substance and Sexual Activity   Alcohol Use Yes    Alcohol/week: 4.0 standard drinks    Types: 2 Glasses of wine, 2 Cans of beer per week    Frequency: 2-4 times a month    Drinks per session: 1 or 2    Binge frequency: Never    Comment: To be social with family and friends.   .    Social History     Substance and Sexual Activity   Drug Use Yes    Types: Marijuana     The patient is in relationship.  Resides in [place] [address].  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  On DA.     Allergies  Review of patient's allergies indicates:  No Known Allergies    Medications  Current  Outpatient Medications on File Prior to Visit   Medication Sig Dispense Refill    amLODIPine (NORVASC) 5 MG tablet TAKE ONE-HALF TABLET BY MOUTH ONCE DAILY 90 tablet 1    aspirin 81 MG Chew Take 81 mg by mouth once daily.      clobetasol 0.05% (TEMOVATE) 0.05 % Oint APPLY TO THE AFFECTED AREA TWICE DAILY AND OCCLUDE AT BEDTIME 60 g 3    diphenhydrAMINE (BENADRYL) 25 mg capsule Take 25 mg by mouth every 6 (six) hours as needed for Itching.      famotidine (PEPCID) 20 MG tablet Take 1 tablet (20 mg total) by mouth 2 (two) times daily as needed for Heartburn (difficulty swallowing). 60 tablet 11    flu vacc [id number] 6mos up,PF, (FLUARIX QUAD [id number], PF,) 60 mcg (15 mcg x 4)/0.5 mL Syrg adminster 0.5 mL 0    hydrOXYzine pamoate (VISTARIL) 50 MG Cap Take 1 capsule (50 mg total) by mouth every 8 (eight) hours as needed. 30 capsule 3    multivitamin (THERAGRAN) tablet Take 1 tablet by mouth once daily.      mycophenolate (CELLCEPT) 500 mg Tab Take 2 tablets (1,000 mg total) by mouth 2 (two) times daily. 360 tablet 0    ondansetron (ZOFRAN) 4 MG tablet Take 1 tablet (4 mg total) by mouth every 6 (six) hours as needed for Nausea. 30 tablet 0    sildenafil (REVATIO) 20 mg Tab TAKE 1 TABLET(20 MG) BY MOUTH THREE TIMES DAILY 270 tablet 1    tacrolimus (PROGRAF) 1 MG Cap Take 2 capsules (2 mg total) by mouth every morning AND 1 capsule (1 mg total) every evening. 90 capsule 11    zolpidem (AMBIEN) 5 MG Tab Take 1 tablet (5 mg total) by mouth nightly as needed. 30 tablet 3     No current facility-administered medications on file prior to visit.        Review of Systems A 14 point ROS was reviewed with pertinent positives as noted above in the history of present illness.      Constitutional: negative  Eyes: negative  Endocrine: negative  Gastrointestinal: negative  Cardiovascular: negative  Respiratory: negative  Allergic/Immunologic: negative  Integumentary: negative  Psychiatric: negative  Musculoskeletal:  "negative   Ear/Nose/Throat: negative  Neurologic: negative  Genitourinary: SEE HPI  Hematologic/Lymphatic: negative   Breast: negative    Urogynecologic Exam  Ht 5' 2" (1.575 m)   Wt 63.5 kg (140 lb)   LMP  (LMP Unknown)   BMI 25.61 kg/m²     GENERAL APPEARANCE:  The patient is well-developed, well-nourished.  Neck:  Supple with no thyromegaly, no carotid bruits.  Heart:  Regular rate and rhythm, no murmurs, rubs or gallops.  Lungs:  Clear.  No CVA tenderness.  Abdomen:  Soft, nontender, nondistended, no hepatosplenomegaly.  Incisions:  BUQ Liver and Pfannenstiel well-healed    PELVIC:    External genitalia:  Normal Bartholins, Skenes and labia bilaterally.    Urethra:  No caruncle, diverticulum or masses.  (+) hypermobility.    Vagina:  Atrophy (--) , no bladder masses or tender, no discharge.    Cervix:  normal appearance  Uterus: uterus normal  Adnexa: Not palpable.    POP-Q:   Deferred.  No obvious POP present with valsalva.     NEUROLOGIC:  Cranial nerves 2 through 12 intact.  Strength 5/5.  DTRs 2+ lower extremities.  S2 through 4 normal.  Sacral reflexes intact.    EXT: TUBBS, 2+ pulses bilaterally, no C/C/E    COUGH STRESS TEST:  negative  KEGEL: unable to perform    RECTAL:    External:  Normal, (--) hemorrhoids, (--) dovetailing.   Internal:   (--) tenderness, (--) masses, Normal resting tone, Normal active tone.    PVR: 20 mL    Impression    1. Urinary incontinence, unspecified type        Initial Plan  The patient was counseled regarding these issues. The patient was given a summary sheet containing each of these issues with possible options for evaluation and management. When appropriate, we also reviewed computer-generated diagrams specific to their diagnoses..  All questions were addressed to the patient's satisfaction.     1)  Tobacco use:  --stop smoking!!!   --this is not good for you or your liver  --call smoking cessation program and start  --you should also avoid alcohol and drugs    2)  Hand " eczema:  --see sheet  --use thick moisturizer with gloves  --use claritin/zyrtec by mouth for itching  --let PCP know if worsens    3)  Mixed urinary incontinence, urge > stress:    --urine C&S  --watch dietary sugar (borderline diabetes)  --stop smoking  --Empty bladder every 3 hours.  Empty well: wait a minute, lean forward on toilet.    --Avoid dietary irritants (see sheet).  Keep diary x 3-5 days to determine your irritants.  --start pelvic PT. Work on controlling leakage.  Call to make appt:  [facility] (all take Medicaid):  1)  [facility] (Johanna Wagoner): (p) [phone]. (f) [phone]. Established patients call:  [phone].  2)  [facility] Siu/[place]: (p) [phone]  . (f) [phone].    3)  [facility] (Nadeen Sigala or Yessica Lerner): (p) [phone].  Or [phone].   4)  [facility]RA Castro. (p) [phone].    --URGE: consider medication in future.  Takes 2-4 weeks to see if will have effect.  For dry mouth: get sour, sugar free lozenge or gum.    --STRESS:  Pessary vs. Sling.     4)  Constipation:  --hydrate well  --high fiber diet  --Controlling constipation may help bladder urgency/leakage and fiber may better control cholesterol and blood glucose.  Start daily fiber.  Take 1 tsp of fiber powder (psyllium or other sugar-free powder).  Mix in 8 oz of water.  Take x 3-5 days.  Then, increase fiber by 1 tsp every 3-5 days until stool is easy to pass.  Stop and continue at that dose.   Do not exceed 6 tsps/day.  May also use over the counter stool softener 1-2 x/day.  AVOID laxatives.    5)  RTC 3-4 months. Telemed ok.     Approximately 60 min were spent in consult, 90 % in discussion.     Thank you for requesting consultation of your patient.  I look forward to participating in their care.    Jarrell Mora  Female Pelvic Medicine and Reconstructive Surgery  [facility] [place], [place]

## 2020-11-17 NOTE — PATIENT INSTRUCTIONS
Fiber Information Sheet  Your doctor has recommended that you follow a high fiber diet. The addition of fiber to your diet can make an enormous difference in your bowel control and regularity. Fiber helps people whether they lose stool or have trouble with constipation. Fiber works by bulking the stool and keeping it formed, yet making the movement soft and easy to pass. Fiber helps keep moisture within the stool so that neither diarrhea nor hard stool occurs. Fiber makes the bowels work more regularly, but it is not a laxative. An additional bonus from eating a high fiber diet is that your risk of cancer is reduced, too.    Most of us eat some high fiber foods already, but nearly all of us do not eat the necessary amount. For example, a slice of whole wheat bread contains only about 10% of the daily recommended amount of fiber. This means if you are relying on only whole wheat bread to meet the recommended fiber requirements, you would need to eat  between 10-18 slices every day! Please note that fiber is NOT in any meat or dairy product. It is only found in grains, vegetables and fruits. The recommended daily fiber intake is 20-25 grams. Foods having high fiber content include:     Fiber One Cereal, ½ cup 13.0 g   Curran beans, ¾ cup 10.4 g   Wheat bran cereal, 1 oz 10.0 g   Kidney beans, ¾ cup 9.3 g   All Bran Cereal, ½ cup 6.0 g   Oat Bran Cereal, hot, 1 oz 4.0 g   Banana, 1medium 3.8 g   Canned pears, ½ cup 3.7 g   3 prunes or ¼ cup raisins 3.5 g   Whole Wheat Total, 1 cup 3.0 g   Carrots, ½ cup 3.2 g   Apple, small 2.8 g   Broccoli, ½ cup 2.8 g   Cauliflower, ½ cup 2.6 g   Oatmeal, 1 oz 2.5 g   Whole Wheat Toast 2.0 g   Cheerios, 1 1/3 cup 2.0 g   Baked potato with skin 2.0 g   Corn, ½ cup 1.9 g   Popcorn, 3 cups 1.9 g   Orange, medium 1.9 g   Granola bar 1.0 g   Lettuce, ½ cup 0.9 g    If you dont think that you can get enough fiber through your everyday diet, there are many good fiber supplements you can  take along with eating your high fiber diet. Some of these are: Metamucil (1 heaping teaspoon or 1-2 wafers), Citrucel (1 tablespoon), Fiberall (1-2 wafers or 1 teaspoon), Perdium (2 rounded teaspoons) and 1-2 teaspoons unprocessed bran (to mix with foods)    You may need to use the fiber supplement up to 3-4 times daily to produce normal elimination. Please follow specific package directions or call us for help in regulating the dose. You may notice some bloating and/or increased gas at first. These symptoms can be relieved by adding fiber to your diet slowly. Once your body gets used to this increased fiber, these symptoms will go away.   -------------------------------------------------------------------    Bladder Irritants  Certain foods and drinks have been associated with worsening symptoms of urinary frequency, urgency, urge incontinence, or bladder pain. If you suffer from any of these conditions, you may wish to try eliminating one or more of these foods from your diet and see if your symptoms improve. If bladder symptoms are related to dietary factors, strict adherence to a diet thateliminates the food should bring marked relief in 10 days. Once you are feeling better, you can begin to add foods back into your diet, one at a time. If symptoms return, you will be able to identify the irritant. As you add foods back to your diet it is very important that you drink significant amounts of water.    -----------------------------------------------------------------------------------------------  List of Common Bladder Irritants*  Alcoholic beverages  Apples and apple juice  Cantaloupe  Carbonated beverages  Chili and spicy foods  Chocolate  Citrus fruit  Coffee (including decaffeinated)  Cranberries and cranberry juice  Grapes  Guava  Milk Products: milk, cheese, cottage cheese, yogurt, ice cream  Peaches  Pineapple  Plums  Strawberries  Sugar especially artificial sweeteners, saccharin, aspartame, corn  sweeteners, honey, fructose, sucrose, lactose  Tea  Tomatoes and tomato juice  Vitamin B complex  Vinegar  *Most people are not sensitive to ALL of these products; your goal is to find the foods that make YOUR symptoms worse.  ---------------------------------------------------------------------------------------------------    Low-acid fruit substitutions include apricots, papaya, pears and watermelon. Coffee drinkers can drink Kava or other lowacid instant drinks. Tea drinkers can substitute non-citrus herbal and sun brewed teas. Calcium carbonate co-buffered with calcium ascorbate can be substituted for Vitamin C. Prelief is a dietary supplement that works as an acid blocker for the bladder.    Where to get more information:        Overcoming Bladder Disorders by Alisha Siegel and Brenton Rodriguez, 1990        You Dont Have to Live with Cystitis! By Diana Khan, 1988  · http://www.urologymanagement.org/oab    -----------------------------------------------------------------  1)  Tobacco use:  --stop smoking!!!   --this is not good for you or your liver  --call smoking cessation program and start  --you should also avoid alcohol and drugs    2)  Hand eczema:  --see sheet  --use thick moisturizer with gloves  --use claritin/zyrtec by mouth for itching  --let PCP know if worsens    3)  Mixed urinary incontinence, urge > stress:    --urine C&S  --watch dietary sugar (borderline diabetes)  --stop smoking  --Empty bladder every 3 hours.  Empty well: wait a minute, lean forward on toilet.    --Avoid dietary irritants (see sheet).  Keep diary x 3-5 days to determine your irritants.  --start pelvic PT. Work on controlling leakage.  Call to make appt:  [facility] (all take Medicaid):  1)  [facility] Major/[place] (Johanna Wagoner): (p) [phone]. (f) [phone]. Established patients call:  [phone].  2)  [facility] Siu/Sakshi Hirsch: (p) [phone]  . (f) [phone].    3)   [facility] (Nadeen Marcus Zakiya or Yessica Lerner): (p) [phone].  Or [phone].   4)  PETRA Castro. (p) [phone].    --URGE: consider medication in future.  Takes 2-4 weeks to see if will have effect.  For dry mouth: get sour, sugar free lozenge or gum.    --STRESS:  Pessary vs. Sling.     4)  Constipation:  --hydrate well  --high fiber diet  --Controlling constipation may help bladder urgency/leakage and fiber may better control cholesterol and blood glucose.  Start daily fiber.  Take 1 tsp of fiber powder (psyllium or other sugar-free powder).  Mix in 8 oz of water.  Take x 3-5 days.  Then, increase fiber by 1 tsp every 3-5 days until stool is easy to pass.  Stop and continue at that dose.   Do not exceed 6 tsps/day.  May also use over the counter stool softener 1-2 x/day.  AVOID laxatives.    5)  RTC 3-4 months.

## 2020-11-17 NOTE — LETTER
November 17, 2020      Nadeen Figueroa MD  [address]  [facility] [address]           [facility] - OBGYN 5th Fl  [address]  [facility] [address]  Phone: [phone]          Patient: Nadeen Mcgovern   MR Number: [id number]   YOB: 1960   Date of Visit: 11/17/2020       Dear Dr. Nadeen Figueroa:    Thank you for referring Nadeen Mcgovern to me for evaluation. Attached you will find relevant portions of my assessment and plan of care.    If you have questions, please do not hesitate to call me. I look forward to following Nadeen Mcgovern along with you.    Sincerely,    Jarrell Mora MD    Enclosure  CC:  No Recipients    If you would like to receive this communication electronically, please contact [email or web address] or [phone] to request more information on [employer] Link access.    For providers and/or their staff who would like to refer a patient to [facility], please contact us through our one-stop-shop provider referral line, [facility], at [phone].    If you feel you have received this communication in error or would no longer like to receive these types of communications, please e-mail [email or web address]

## 2020-11-18 LAB — BACTERIA UR CULT: NO GROWTH

## 2020-11-20 ENCOUNTER — OFFICE VISIT (OUTPATIENT)
Dept: INTERNAL MEDICINE | Facility: CLINIC | Age: 60
End: 2020-11-20
Payer: MEDICARE

## 2020-11-20 VITALS
SYSTOLIC BLOOD PRESSURE: 114 MMHG | HEIGHT: 62 IN | HEART RATE: 76 BPM | BODY MASS INDEX: 25.48 KG/M2 | WEIGHT: 138.44 LBS | DIASTOLIC BLOOD PRESSURE: 72 MMHG | OXYGEN SATURATION: 98 %

## 2020-11-20 DIAGNOSIS — Z72.0 TOBACCO ABUSE: ICD-10-CM

## 2020-11-20 DIAGNOSIS — M34.9 SCLERODERMA: ICD-10-CM

## 2020-11-20 DIAGNOSIS — I73.00 RAYNAUD'S PHENOMENON WITHOUT GANGRENE: ICD-10-CM

## 2020-11-20 DIAGNOSIS — K22.2 ESOPHAGEAL STENOSIS: ICD-10-CM

## 2020-11-20 DIAGNOSIS — G47.00 INSOMNIA, UNSPECIFIED TYPE: ICD-10-CM

## 2020-11-20 DIAGNOSIS — Z94.4 S/P LIVER TRANSPLANT: ICD-10-CM

## 2020-11-20 DIAGNOSIS — L30.9 HAND ECZEMA: ICD-10-CM

## 2020-11-20 DIAGNOSIS — R32 URINARY INCONTINENCE, UNSPECIFIED TYPE: Primary | ICD-10-CM

## 2020-11-20 DIAGNOSIS — D84.9 IMMUNOSUPPRESSION: ICD-10-CM

## 2020-11-20 PROCEDURE — 1125F AMNT PAIN NOTED PAIN PRSNT: CPT | Mod: S$GLB,,, | Performed by: INTERNAL MEDICINE

## 2020-11-20 PROCEDURE — 1125F PR PAIN SEVERITY QUANTIFIED, PAIN PRESENT: ICD-10-PCS | Mod: S$GLB,,, | Performed by: INTERNAL MEDICINE

## 2020-11-20 PROCEDURE — 3074F SYST BP LT 130 MM HG: CPT | Mod: CPTII,S$GLB,, | Performed by: INTERNAL MEDICINE

## 2020-11-20 PROCEDURE — 99499 UNLISTED E&M SERVICE: CPT | Mod: S$GLB,,, | Performed by: INTERNAL MEDICINE

## 2020-11-20 PROCEDURE — 99214 PR OFFICE/OUTPT VISIT, EST, LEVL IV, 30-39 MIN: ICD-10-PCS | Mod: S$GLB,,, | Performed by: INTERNAL MEDICINE

## 2020-11-20 PROCEDURE — 3008F PR BODY MASS INDEX (BMI) DOCUMENTED: ICD-10-PCS | Mod: CPTII,S$GLB,, | Performed by: INTERNAL MEDICINE

## 2020-11-20 PROCEDURE — 99499 RISK ADDL DX/OHS AUDIT: ICD-10-PCS | Mod: S$GLB,,, | Performed by: INTERNAL MEDICINE

## 2020-11-20 PROCEDURE — 3078F DIAST BP <80 MM HG: CPT | Mod: CPTII,S$GLB,, | Performed by: INTERNAL MEDICINE

## 2020-11-20 PROCEDURE — 3074F PR MOST RECENT SYSTOLIC BLOOD PRESSURE < 130 MM HG: ICD-10-PCS | Mod: CPTII,S$GLB,, | Performed by: INTERNAL MEDICINE

## 2020-11-20 PROCEDURE — 3078F PR MOST RECENT DIASTOLIC BLOOD PRESSURE < 80 MM HG: ICD-10-PCS | Mod: CPTII,S$GLB,, | Performed by: INTERNAL MEDICINE

## 2020-11-20 PROCEDURE — 99999 PR PBB SHADOW E&M-EST. PATIENT-LVL IV: CPT | Mod: PBBFAC,,, | Performed by: INTERNAL MEDICINE

## 2020-11-20 PROCEDURE — 99999 PR PBB SHADOW E&M-EST. PATIENT-LVL IV: ICD-10-PCS | Mod: PBBFAC,,, | Performed by: INTERNAL MEDICINE

## 2020-11-20 PROCEDURE — 99214 OFFICE O/P EST MOD 30 MIN: CPT | Mod: S$GLB,,, | Performed by: INTERNAL MEDICINE

## 2020-11-20 PROCEDURE — 3008F BODY MASS INDEX DOCD: CPT | Mod: CPTII,S$GLB,, | Performed by: INTERNAL MEDICINE

## 2020-11-20 RX ORDER — AMLODIPINE BESYLATE 2.5 MG/1
2.5 TABLET ORAL DAILY
Qty: 90 TABLET | Refills: 3 | Status: SHIPPED | OUTPATIENT
Start: 2020-11-20 | End: 2021-10-07

## 2020-11-20 NOTE — PROGRESS NOTES
Subjective:       Patient ID: Nadeen Mcgovern is a 60 y.o. female.    Chief Complaint: follow up    HPI   UI  Saw Dr. Mora 11/17/20.  Plan for pelvic floor PT. Will start Dec 5 at [facility].   Pap neg w/ Chandni Morris NP 8/10/20    Undergoing smoking cessation class. Current smoking about 3 joints a day.  Last filled ambien 9/9/20 for 30 pills for insomnia. Edible CBD is helpful so has cut back on ambien.     Scleroderma - cellcept 500mg 2 tabs BID.  Raynaud's - amlodipine 2.5mg qd, sildenafil 20mg TID  6MWT 100% including during exercise.  Follows w/ Dr. Butler    Esophageal stenosis, esophageal dysmotility, GERD.  EGD 9/29/20 - benign esophageal stenosis s/p dilation.  Dysphagia is resolved now. No nausea/vomiting/pain.     Cholangiocarcinoma s/p liver transplant 2015  - prograf 2mg qam and 1mg qpm. On cellcept 1000mg BID for scleroderma.   Saw Dr. Martinez 9/16/20 in transplant. Nurse is Chetna Pickard.  CT C/A/P 6/26/20 - postsurgical changes. Splenic hypodensity stable from CT 1/2020 and decreased in size compared to CT 7/25/15. RUL nodule stable from CTA 4/27/15.  CBC, ESR, CRP, Liver enzymes 10/16/20 WNL.    Chronic hand eczema - clobetasol ointment qhs. Restarted UV therapy.   Follows w/ Dr. Zuñiga - LOV 7/20/20 w/ f/u in 6 mo. Trial of Elta MD hand cream every time she washes her hands.    MMG 5/5/20 NEG.  DEXA 4/16/19 Normal.  Cscope 5/17/19 - repeat in 5 yrs. Hyperplastic polyps.  Pap neg w/ Chandni Morris NP 8/10/20  Flu vaccine 9/23/20 done.    Review of Systems   Constitutional: Positive for unexpected weight change. Negative for activity change.   HENT: Negative for hearing loss, rhinorrhea and trouble swallowing.    Eyes: Negative for discharge and visual disturbance.   Respiratory: Negative for chest tightness and wheezing.    Cardiovascular: Negative for chest pain and palpitations.   Gastrointestinal: Negative for blood in stool, constipation, diarrhea and vomiting.   Endocrine: Positive for  "polyuria. Negative for polydipsia.   Genitourinary: Negative for difficulty urinating, dysuria, hematuria and menstrual problem.   Musculoskeletal: Negative for arthralgias, joint swelling and neck pain.   Neurological: Negative for weakness and headaches.   Psychiatric/Behavioral: Negative for confusion and dysphoric mood.         Objective:      Physical Exam    /72 (BP Location: Left arm, Patient Position: Sitting, BP Method: Medium (Manual))   Pulse 76   Ht 5' 2" (1.575 m)   Wt 62.8 kg (138 lb 7.2 oz)   LMP  (LMP Unknown)   SpO2 98%   BMI 25.32 kg/m²     GEN - A+OX4, NAD   HEENT - PERRL, EOMI, OP clear. MMM.   Neck - No thyromegaly or cervical LAD. No thyroid masses felt.  CV - RRR, no m/r.   Chest - CTAB, no wheezing or rhonchi  Abd - S/NT/ND/+BS.   Ext - 2+BDP and radial pulses. No LE edema.   Skin - dry skin on the hands hiral the fingertips.     Labs reviewed.     Assessment/Plan     Nadeen was seen today for follow-up.    Diagnoses and all orders for this visit:    Urinary incontinence, unspecified type - plans on starting pelvic floor PT.     Tobacco abuse - working on quitting.     Scleroderma - cont current meds. F/u w/ Dr. Butler.     Insomnia, unspecified type - ambien prn (hasn't had to use it every night).     Esophageal stenosis - dysphagia resolved s/p dilation.     S/P liver transplant and on chronic Immunosuppression - cont current meds.     Raynaud's phenomenon without gangrene  -     amLODIPine (NORVASC) 2.5 MG tablet; Take 1 tablet (2.5 mg total) by mouth once daily.    Hand eczema - cont topical. F/u w/ Dr. Zuñiga.       Follow up in about 6 months (around 5/20/2021).      Nadeen Figueroa MD  Department of Internal Medicine - [facility]  8:18 AM    "

## 2020-11-24 NOTE — TELEPHONE ENCOUNTER
Specialty Pharmacy - Refill Coordination    Specialty Medication Orders Linked to Encounter      Most Recent Value   Medication #1  sildenafil (REVATIO) 20 mg Tab (Order#[id number], Rx#[id number])   Medication #2  mycophenolate (CELLCEPT) 500 mg Tab (Order#[id number], Rx#[id number])      Pt confirmed shipping of cellcept and revatio on  to arrive on 20. Address and  verified. $7.20 copay in 004.  Pt has about 5 day supply left, she is unsure of exact count. Pt reported no missed doses of Cellcept but 2 days missed on Revatio. Pt did not start any new medications. Pt had no further questions or concerns.    Refill Questions - Documented Responses      Most Recent Value   Relationship to patient of person spoken to?  Self   HIPAA/medical authority confirmed?  Yes   Any changes in contact preferences or allowed representatives?  No   Has the patient had any insurance changes?  No   Has the patient had any changes to specialty medication, dose, or instructions?  No   Has the patient started taking any new medications, herbals, or supplements?  No   Has the patient been diagnosed with any new medical conditions?  No   Does the patient have any new allergies to medications or foods?  No   Does the patient have any concerns about side effects?  No   Can the patient store medication/sharps container properly (at the correct temperature, away from children/pets, etc.)?  Yes   Can the patient call emergency services (911) in the event of an emergency?  Yes   Does the patient have any concerns or questions about taking or administering this medication as prescribed?  No   How many doses did the patient miss in the past 4 weeks or since the last fill?  0   How many doses does the patient have on hand?  10   Does the number of doses/days supply remaining match pharmacy expected amounts?  Yes   Does the patient feel that this medication is effective?  Yes   During the past 4 weeks, has patient missed any activities  due to condition or medication?  No   During the past 4 weeks, did patient have any of the following urgent care visits?  None   How will the patient receive the medication?  Mail   When does the patient need to receive the medication?  11/29/20   Shipping Address  Home   Address in [facility] confirmed and updated if neccessary?  Yes   Expected Copay ($)  7.2   Is the patient able to afford the medication copay?  Yes   Payment Method  CC on file   Days supply of Refill  30   Would patient like to speak to a pharmacist?  No   Do you want to trigger an intervention?  No   Do you want to trigger an additional referral task?  No   Refill activity completed?  Yes   Refill activity plan  Refill scheduled   Shipment/Pickup Date:  11/24/20          Current Outpatient Medications   Medication Sig    amLODIPine (NORVASC) 2.5 MG tablet Take 1 tablet (2.5 mg total) by mouth once daily.    aspirin 81 MG Chew Take 81 mg by mouth once daily.    clobetasol 0.05% (TEMOVATE) 0.05 % Oint APPLY TO THE AFFECTED AREA TWICE DAILY AND OCCLUDE AT BEDTIME    diphenhydrAMINE (BENADRYL) 25 mg capsule Take 25 mg by mouth every 6 (six) hours as needed for Itching.    hydrOXYzine pamoate (VISTARIL) 50 MG Cap Take 1 capsule (50 mg total) by mouth every 8 (eight) hours as needed.    multivitamin (THERAGRAN) tablet Take 1 tablet by mouth once daily.    mycophenolate (CELLCEPT) 500 mg Tab Take 2 tablets (1,000 mg total) by mouth 2 (two) times daily.    ondansetron (ZOFRAN) 4 MG tablet Take 1 tablet (4 mg total) by mouth every 6 (six) hours as needed for Nausea.    sildenafil (REVATIO) 20 mg Tab TAKE 1 TABLET(20 MG) BY MOUTH THREE TIMES DAILY    tacrolimus (PROGRAF) 1 MG Cap Take 2 capsules (2 mg total) by mouth every morning AND 1 capsule (1 mg total) every evening.    zolpidem (AMBIEN) 5 MG Tab Take 1 tablet (5 mg total) by mouth nightly as needed.   Last reviewed on 11/24/2020  2:55 PM by Jes Bravo PharmD    Review of patient's  allergies indicates:  No Known Allergies Last reviewed on  11/24/2020 2:55 PM by Jes Bravo      Tasks added this encounter   12/22/2020 - Refill Call (Auto Added)   Tasks due within next 3 months   1/20/2021 - Clinical - Follow Up Assesement (90 day)     Jes Bravo PharmRANDY  [facility]  [address] [place] [id number]  Phone: [phone]  Fax: [phone]

## 2020-12-03 ENCOUNTER — CLINICAL SUPPORT (OUTPATIENT)
Dept: SMOKING CESSATION | Facility: CLINIC | Age: 60
End: 2020-12-03
Payer: MEDICARE

## 2020-12-03 DIAGNOSIS — F17.210 HEAVY SMOKER (MORE THAN 20 CIGARETTES PER DAY): Primary | ICD-10-CM

## 2020-12-03 PROCEDURE — 99402 PR PREVENT COUNSEL,INDIV,30 MIN: ICD-10-PCS | Mod: S$GLB,,,

## 2020-12-03 PROCEDURE — 99402 PREV MED CNSL INDIV APPRX 30: CPT | Mod: S$GLB,,,

## 2020-12-03 PROCEDURE — 99999 PR PBB SHADOW E&M-EST. PATIENT-LVL I: ICD-10-PCS | Mod: PBBFAC,,,

## 2020-12-03 PROCEDURE — 99999 PR PBB SHADOW E&M-EST. PATIENT-LVL I: CPT | Mod: PBBFAC,,,

## 2020-12-08 ENCOUNTER — LAB VISIT (OUTPATIENT)
Dept: LAB | Facility: HOSPITAL | Age: 60
End: 2020-12-08
Attending: INTERNAL MEDICINE
Payer: MEDICARE

## 2020-12-08 DIAGNOSIS — Z94.4 LIVER REPLACED BY TRANSPLANT: ICD-10-CM

## 2020-12-08 LAB
ALBUMIN SERPL BCP-MCNC: 4.2 G/DL (ref 3.5–5.2)
ALP SERPL-CCNC: 70 U/L (ref 55–135)
ALT SERPL W/O P-5'-P-CCNC: 19 U/L (ref 10–44)
ANION GAP SERPL CALC-SCNC: 8 MMOL/L (ref 8–16)
AST SERPL-CCNC: 18 U/L (ref 10–40)
BASOPHILS # BLD AUTO: 0.05 K/UL (ref 0–0.2)
BASOPHILS NFR BLD: 0.8 % (ref 0–1.9)
BILIRUB SERPL-MCNC: 0.8 MG/DL (ref 0.1–1)
BUN SERPL-MCNC: 11 MG/DL (ref 6–20)
CALCIUM SERPL-MCNC: 9.4 MG/DL (ref 8.7–10.5)
CHLORIDE SERPL-SCNC: 104 MMOL/L (ref 95–110)
CO2 SERPL-SCNC: 29 MMOL/L (ref 23–29)
CREAT SERPL-MCNC: 0.8 MG/DL (ref 0.5–1.4)
DIFFERENTIAL METHOD: ABNORMAL
EOSINOPHIL # BLD AUTO: 0.2 K/UL (ref 0–0.5)
EOSINOPHIL NFR BLD: 2.6 % (ref 0–8)
ERYTHROCYTE [DISTWIDTH] IN BLOOD BY AUTOMATED COUNT: 12.7 % (ref 11.5–14.5)
EST. GFR  (AFRICAN AMERICAN): >60 ML/MIN/1.73 M^2
EST. GFR  (NON AFRICAN AMERICAN): >60 ML/MIN/1.73 M^2
GLUCOSE SERPL-MCNC: 88 MG/DL (ref 70–110)
HCT VFR BLD AUTO: 42 % (ref 37–48.5)
HGB BLD-MCNC: 13.4 G/DL (ref 12–16)
IMM GRANULOCYTES # BLD AUTO: 0.02 K/UL (ref 0–0.04)
IMM GRANULOCYTES NFR BLD AUTO: 0.3 % (ref 0–0.5)
LYMPHOCYTES # BLD AUTO: 1.8 K/UL (ref 1–4.8)
LYMPHOCYTES NFR BLD: 27.4 % (ref 18–48)
MCH RBC QN AUTO: 30.5 PG (ref 27–31)
MCHC RBC AUTO-ENTMCNC: 31.9 G/DL (ref 32–36)
MCV RBC AUTO: 96 FL (ref 82–98)
MONOCYTES # BLD AUTO: 0.4 K/UL (ref 0.3–1)
MONOCYTES NFR BLD: 6.2 % (ref 4–15)
NEUTROPHILS # BLD AUTO: 4.2 K/UL (ref 1.8–7.7)
NEUTROPHILS NFR BLD: 62.7 % (ref 38–73)
NRBC BLD-RTO: 0 /100 WBC
PLATELET # BLD AUTO: 211 K/UL (ref 150–350)
PMV BLD AUTO: 10.9 FL (ref 9.2–12.9)
POTASSIUM SERPL-SCNC: 4 MMOL/L (ref 3.5–5.1)
PROT SERPL-MCNC: 7.7 G/DL (ref 6–8.4)
RBC # BLD AUTO: 4.4 M/UL (ref 4–5.4)
SODIUM SERPL-SCNC: 141 MMOL/L (ref 136–145)
TACROLIMUS BLD-MCNC: 3.1 NG/ML (ref 5–15)
WBC # BLD AUTO: 6.61 K/UL (ref 3.9–12.7)

## 2020-12-08 PROCEDURE — 85025 COMPLETE CBC W/AUTO DIFF WBC: CPT

## 2020-12-08 PROCEDURE — 80197 ASSAY OF TACROLIMUS: CPT

## 2020-12-08 PROCEDURE — 80053 COMPREHEN METABOLIC PANEL: CPT

## 2020-12-08 PROCEDURE — 36415 COLL VENOUS BLD VENIPUNCTURE: CPT

## 2020-12-09 ENCOUNTER — TELEPHONE (OUTPATIENT)
Dept: TRANSPLANT | Facility: CLINIC | Age: 60
End: 2020-12-09

## 2020-12-09 NOTE — LETTER
December 9, 2020    Nadeen Mcgovern Mcgovern  [address] [facility] [address]          Dear Nadeen Mcgovern:  MRN: [id number]    This is a follow up to your recent labs, your lab results were stable.  There are no medicine changes.  Please have your labs drawn again on 03/02/21.      If you cannot have your labs drawn on the scheduled date, it is your responsibility to call the transplant department to reschedule.  Please call [phone] and ask to speak to [facility] for all scheduling requests.     Sincerely,    Chetna Pickard, RN      Your Liver Transplant Coordinator    [facility] [place]  [facility] [place] [address]  [phone]

## 2020-12-09 NOTE — TELEPHONE ENCOUNTER
Continue routine labs no changes needed.  Letter sent for next lab appointment 03/02/21      ----- Message from Vitaly Martinez MD sent at 12/9/2020  7:56 AM CST -----  Reviewed. Liver tests normal. No changes in IS.  ----- Message -----  From: Chetna Pickard RN  Sent: 12/9/2020   7:51 AM CST  To: Vitaly Martinez MD    Please review and send note

## 2020-12-22 ENCOUNTER — CLINICAL SUPPORT (OUTPATIENT)
Dept: REHABILITATION | Facility: OTHER | Age: 60
End: 2020-12-22
Attending: OBSTETRICS & GYNECOLOGY
Payer: MEDICARE

## 2020-12-22 DIAGNOSIS — N39.46 MIXED INCONTINENCE URGE AND STRESS: ICD-10-CM

## 2020-12-22 DIAGNOSIS — R32 URINARY INCONTINENCE, UNSPECIFIED TYPE: ICD-10-CM

## 2020-12-22 PROCEDURE — 97112 NEUROMUSCULAR REEDUCATION: CPT

## 2020-12-22 PROCEDURE — 97161 PT EVAL LOW COMPLEX 20 MIN: CPT

## 2020-12-22 NOTE — PATIENT INSTRUCTIONS
1) Voluntary relaxation - spend time consciously relaxing muscles. Get in comfortable position and focus on relaxing all muscles around hips, low back, and abdominals, then pelvic floor. As you are relaxing pelvic floor muscles think about softening, opening, dropping, letting go. Some people describe like a flower whose petals are opening or like gently laying an egg. Spend 3-5 minutes doing this.  This can be a great position:      2) Diaphragmatic breathing - focus on circumferential expansion. Can do sitting or laying on back. Should feel like your lower ribs expand in all directions as the abdominals and low back also gently expand and fill with the breath. The anus should gently drop away from you on inhalation. If you are sitting, you may feel like the anus is floating down towards the seat. 15-20 breaths, or more.    Do above, 1x/day    Also begin to become aware of if/when holding pelvic muscle tension throughout the day and try to relax/let go        Home Exercise Program: 12/22/2020      CONTROLLING URINARY      WHEN YOU EXPERIENCE A STRONG URGE TO URINATE OR DEFECATE:    FIRST Stop activity, stand quietly or sit down. Try to stay very still to maintain control. Avoid rushing to the toilet.    SECOND Begin Quick Flicks (1 second LIFT of pelvic floor muscles, 4 second DROP). Pelvic floor contractions send a message to the bladder to relax and hold urine.     THIRD Relax. Do not rush to the toilet. Take a deep belly or diaphragmatic breath and let it out slowly. Let the urge to urinate pass by using distraction techniques and positive thoughts. Try not to think about going to the bathroom.    FINALLY If the urge returns, repeat the above steps to regain control. When you feel the urge subside, walk normally to the bathroom. You can urinate once the urge has subsided.          Urge feeling!      Stop and be still. Do NOT luna to   Think positively.         Begin Quick Flicks.      the toilet.  Distract  yourself.    BLADDER RETRAINING    The goal of bladder retraining is to return you to a more normal and convenient pattern of urinating. People who experience urinary urgency, frequency, excessive nighttime urinating and urinary leakage can show improvement with this retraining technique. Bladder retraining helps restore your bladder capacity to normal. The program includes education about bladder function, urge control, record keeping, and following a schedule of urinating (voluntarily emptying your bladder). The success of the program depends on your effort to consistently keep a specific schedule and to have follow-up appointments with your health care provider.    THE RETRAINING PROGRAM   Each morning upon arising, go to the toilet and completely empty your bladder.  Your voiding schedule will begin upon getting out of bed and end at bedtime.   Your voiding schedule is every 60-90 minutes/hours.  Follow this interval as closely as possible. The important part of the retraining is that you practice telling your bladder when to empty and when to hold.   Go to the toilet at the scheduled time even if you do not feel the need to urinate. The amount you urinate is not important. It is important to relax and not strain or push while voiding.   If you feel the need to urinate before the scheduled time, use urge delay techniques.   If you have to interrupt the schedule, get back on schedule at the assigned time for the next void.    RECORDING ON THE VOIDING LOG   Record your urination in the voiding log (in the amount voided section) by placing a check corresponding to the time of day.     If leakage of urine occurs, record the time and amount in amount of leakage column with small, medium or large corresponding to the hour this occurs.  Do not be discouraged if leakage occurs. Note the next scheduled time for voiding and set a timer to keep on schedule.    PROGRESSING THE PROGRAM   The goal is to go 3-4 hours  "between urinating.   You will change the time between urination by minute/hour intervals.     Your daily log will help with recording progress and determining the next retraining interval.  Be sure to bring the log to all your appointments.      TIPS FOR CONTROLLING THE URGE TO URINATE   Perform some quick pelvic floor contractions to suppress the urge.   Mental distraction techniques including visualization of your favorite vacation spot, counting backwards, deep breathing or positive self-thoughts, for example, I can control my bladder will help control the urge.   Pressure to the perineal area helps control the urge.  Place your hand or a rolled up towel against the crotch of your underwear and apply firm pressure.  Alternatively, sit on a rolled up towel placed on a firm chair.   Never rush or run to get to the toilet when it's time to go.  Always feel "in control" when you stand up to go to the toilet.    TIPS FOR SUCCESS   Avoid foods and beverages that irritate your bladder.  See the handout How Diet May Affect Your Bladder for a bladder irritant list.   Drink at least 4-8 glasses (8 ounces) of water each day.    Maintain regular bowel habits.  If you are constipated, add fiber to your diet.    DEALING WITH PROBLEMS AND SET BACKS  Setbacks are not uncommon if you have been ill with a cold or flu, are tired, cannot completely concentrate on the program, feel nervous or tense, are sensitive to cold weather or the sound of running water, or are about to start your menstrual period.    If all of the above techniques fail and you still have to an overwhelming urge to go, you may use the toilet.  If this occurs, record in the amount urinated column as well as in the "activity with leakage and was urge present" columns the hour that this urge and voiding occurred.    The urge feeling needs to be suppressed on a consistent basis; be patient and stick with the program. Before you begin, decide what type " of strategy will work for you and use it faithfully.

## 2020-12-22 NOTE — PLAN OF CARE
[facility]  Pelvic Health Physical Therapy Initial Evaluation    Date: 2020   Name: Nadeen Mcgovern  Clinic Number: [id number]  Therapy Diagnosis:   Encounter Diagnosis   Name Primary?    Urinary incontinence, unspecified type      Physician: Jarrell Mora MD    Physician Orders: PT Eval and Treat   Medical Diagnosis from Referral: Urinary incontinence, unspecified type  Evaluation Date: 2020  Authorization Period Expiration: 2021  Plan of Care Expiration: 3/10/2021  Visit # / Visits authorized:     Time In: 11:00  Time Out: 11:50  Total Appointment Time (timed & untimed codes): 50 minutes    Precautions: universal    Subjective     Date of onset/History of current condition - Nadeen Mcgovern reports: Pt reprots that she drinks a lot of water (because of medication) and she feels like she cannot quite make it to the bathroom and a hard laugh will cause her to leak. She feels like her muscles got weak from having the liver cancer and that she way laying around a lot. There are times where she has to go to the bathroom and can't make it.    OB/GYN History:  4 kids (2 girls, 4 boys). 1 , and the rest vaginal. They had to cut her for one, vagina dryness, menopause  Sexually active? Every once in a while  Pain with vaginal exams, intercourse or tampon use? No    Bladder/Bowel History: urinary incontinence and difficulty stopping the urine stream   Frequency of urination:   Daytime: every 45-60 min           Nighttime: 3x   Difficulty initiating urine stream: No   Urine stream: strong   Complete emptying: Yes   Bladder leakage: Yes   Frequency of incidents: 1-2x a day   Amount leaked (urine): full emptying   Urinary Urgency: Yes, Able to delay the urge for at least 0-10 minute(s).   Frequency of bowel movements: once a day   Difficulty initiating BM: Yes, due to medication, she ties to eat a lot of fiber and greens   Quality/Shape of BM: 3-4   Does Patient Feel Empty  after BM? Yes   Fiber Supplements or Laxative Use? Yes, metamusal   Colon leakage: No   Frequency of incidents: none    Amount leaked (bowels): none   Form of protection: panty liner   Number of pads required in 24 hours: 2-3x    Pain:  Location: none     Medical History: Nadeen Mcgovern  has a past medical history of Acute cholecystitis, Arthritis (2015), Bacteremia due to Streptococcus pneumoniae, Cancer, Cholangiocarcinoma, Hypertension, Jaundice, and Prediabetes.     Surgical History: Nadeen Mcgovern  has a past surgical history that includes  section; arthoscopic (Right); Shoulder arthroscopy; Incision and drainage of wound; Liver transplant; and Esophagogastroduodenoscopy (N/A, 2020).    Medications: Nadeen has a current medication list which includes the following prescription(s): amlodipine, aspirin, clobetasol 0.05%, diphenhydramine, hydroxyzine pamoate, multivitamin, mycophenolate, ondansetron, sildenafil, tacrolimus, and zolpidem.    Allergies: Review of patient's allergies indicates:  No Known Allergies     Imaging : None    Prior Therapy/Previous treatment included: Yes, but not PF PT  Social History:  Lives alone but 1-2 of her grands are there all the time  Current exercise: Does a lot of walking and a bike that she rides  Occupation: disabled.  Prior Level of Function: I  Current Level of Function: I    Types of fluid intake: water and coffee, every now and then a cold drink, drinks about   Diet: Don't eat a lot of meat, does eat a lot of starch (rice, bread)  Habitus: well developed, well nourished  Abuse/Neglect: No     Pts goals: I want to tighten these muscle up, so I can hold em like I want em    OBJECTIVE     See EMR under MEDIA for written consent provided 2020  Chaperone: declined    ORTHO SCREEN  Posture in sitting: WNL  Posture in standing: R shoulder elevated, iliac crests level  Pelvic alignment: pelvic obliquity noted   SI Joint Palpation: Denies tenderness to SI joint  palpation bilaterally.  Sacral spring test: NT  Adductor Palpation: no tenderness noted    ABDOMINAL WALL ASSESSMENT  Palpation: WFL  Abdominal strength: Rectus abdominus: 3+/5     Transverse abdominus: 3+/5  Scarring: Yes from liver transplant  Pelvic Floor Muscle and Transverse Abdominus Synergy: NT  Diastasis: absent      BREATHING MECHANICS ASSESSMENT   Thorax Assessment During Quiet Respiration: WNL excursion of abdominal wall  Thorax Assessment During Deep Respiration: WNL excursion of ribcage        TREATMENT     Treatment Time In: 11:40  Treatment Time Out: 11:50  Total Treatment time (time-based codes) separate from Evaluation: 10 minutes      Neuromuscular Re-education to develop Control and Down training for 10 minutes including:   diaphragmatic breathing, diaphragmatic breathing with Kegel and urge delay strategies     Patient Education provided:   general anatomy/physiology of urinary/ bowel  system and benefits of treatment were discussed with the pt. Additionally, bladder irritants, anatomy/physiology of pelvic floor, posture/body mechanices, bladder retraining, diaphragmatic breathing, kegels, fluid intake/dietary modifications, behavior modifications and Coordination of kegels with functional activities such as cough, laugh, sneeze, lift, etc.  were reviewed.     Home Exercises provided:  Written Home Exercises provided: yes.  Exercises were reviewed and Nadeen Mcgovern was able to demonstrate them prior to the end of the session.    Nadeen Mcgovern demonstrated good  understanding of the education provided.     See EMR under Patient Instructions for exercises provided 12/22/2020.    Assessment     Nadeen is a 60 y.o. female referred to outpatient Physical Therapy with a medical diagnosis of Urinary incontinence, unspecified type. Pt presents with altered posture, pelvic asymmetry, poor knowledge of body mechanics and posture, adhered abdominal scar, increased frequency of urination and poor coordination of  pelvic floor muscles during ADL's leading to urinary or fecal leakage. Pt refused internal exam this visit, spent time in sitting giving cues for pelvic floor contraction and relaxation as well as urge control techniques. Pt presents to therapy with mild postural asymmetry, complaints of leaking and full voiding with high urge to go to the bathroom, as well as leaking with a hard laugh. Pt verbally reports understanding with urge suppression techniques.     Pt prognosis is Good.   Pt will benefit from skilled outpatient Physical Therapy to address the deficits stated above and in the chart below, provide pt/family education, and to maximize pt's level of independence.     Plan of care discussed with patient: Yes  Pt's spiritual, cultural and educational needs considered and patient is agreeable to the plan of care and goals as stated below:     Anticipated Barriers for therapy: none    Medical Necessity is demonstrated by the following:    History  Co-morbidities and personal factors that may impact the plan of care Co-morbidities   advanced age, history of cancer and prior abdominal surgery    Personal Factors  lifestyle  character     moderate   Examination  Body structures and functions, activity limitations and participation restrictions that may impact the plan of care Body Regions/Systems/Functions:  altered posture, pelvic asymmetry, poor knowledge of body mechanics and posture, adhered abdominal scar, increased frequency of urination and poor coordination of pelvic floor muscles during ADL's leading to urinary or fecal leakage     Activity Limitations:  urgency  and incontinence with ADLs    Participation Restrictions:  social activities with friends/family and Sleep restrictions    Activity limitations:   Learning and applying knowledge  no deficits    General Tasks and Commands  no deficits    Communication  no deficits    Mobility  no deficits    Self care  no deficits    Domestic Life  no  "deficits    Interactions/Relationships  no deficits    Life Areas  no deficits    Community and Social Life  no deficits       low   Clinical Presentation stable and uncomplicated low   Decision Making/ Complexity Score: low       Goals:    Short Term Goals: 5 weeks   Pt to be edu pelvic muscle bracing and be able to consistently perform correctly and quickly to help decrease incontinence with cough/laugh/sneeze.  Pt to perform "the knack" prior to coughing, laughing or sneezing to decrease risk of incontinence.  Pt to demonstrate being able to correctly and consistently perform a kegel which is needed  to increase pelvic floor muscle coordination and strength needed for continence.  Pt to report a decrease in urinary frequency from every 45 minutes to no more than once every 90 min to improve ability to participate in social activities.  Pt to report being able to sneeze without incontinence demonstrating improved pelvic floor strength and coordination to improve confidence in social situations          Long Term Goals: 10 week  Pt to be discharged with home plan for carry over after discharge.    Pt to be able to perform a 6 second kegel x 10 reps to demonstrate improving strength and endurance needed for continence.  Pt to report a decrease in pad usage to 0 pads a day to demonstrate improving pelvic floor muscle controls as evidenced by decreased episodes of incontinence needed to improve confidence in social situations.  Pt to be able to delay the urge to urinate at least 5 minutes with a strong urge to urinate in order to make it to the bathroom without leaking.  Pt to report no longer feeling the need to urinate just in case when shopping or participating in social activities to demonstrate improving pelvic floor and bladder control.  Pt to report a decrease in urinary frequency from every 45 minutes to no more than once every 2-3 hours to improve ability to participate in social activities.      Plan "     Plan of care Certification: 12/22/2020 to 3/10/2021.    Outpatient Physical Therapy 1 times weekly for 10 weeks to include the following interventions: therapeutic exercises, therapeutic activity, neuromuscular re-education, manual therapy, modalities PRN, patient/family education, dry needling and self care/home management    Paulette Daniels, PT

## 2020-12-24 ENCOUNTER — SPECIALTY PHARMACY (OUTPATIENT)
Dept: PHARMACY | Facility: CLINIC | Age: 60
End: 2020-12-24

## 2020-12-24 NOTE — TELEPHONE ENCOUNTER
Specialty Pharmacy - Refill Coordination    Specialty Medication Orders Linked to Encounter      Most Recent Value   Medication #1  sildenafil (REVATIO) 20 mg Tab (Order#[id number], Rx#[id number])   Medication #2  mycophenolate (CELLCEPT) 500 mg Tab (Order#[id number], Rx#[id number])          Refill Questions - Documented Responses      Most Recent Value   Relationship to patient of person spoken to?  Self   HIPAA/medical authority confirmed?  Yes   Any changes in contact preferences or allowed representatives?  No   Has the patient had any insurance changes?  No   Has the patient had any changes to specialty medication, dose, or instructions?  No   Has the patient started taking any new medications, herbals, or supplements?  No   Has the patient been diagnosed with any new medical conditions?  No   Does the patient have any new allergies to medications or foods?  No   Does the patient have any concerns about side effects?  No   Can the patient store medication/sharps container properly (at the correct temperature, away from children/pets, etc.)?  Yes   Can the patient call emergency services (911) in the event of an emergency?  Yes   Does the patient have any concerns or questions about taking or administering this medication as prescribed?  No   How many doses did the patient miss in the past 4 weeks or since the last fill?  0   How many doses does the patient have on hand?  14   How many days does the patient report on hand quantity will last?  7   Does the number of doses/days supply remaining match pharmacy expected amounts?  Yes   Does the patient feel that this medication is effective?  Yes   During the past 4 weeks, has patient missed any activities due to condition or medication?  No   During the past 4 weeks, did patient have any of the following urgent care visits?  None   How will the patient receive the medication?  Mail   When does the patient need to receive the medication?  12/31/20   Shipping Address   Home   Address in [facility] confirmed and updated if neccessary?  Yes   Expected Copay ($)  7.2   Is the patient able to afford the medication copay?  Yes   Payment Method  CC on file   Days supply of Refill  30   Would patient like to speak to a pharmacist?  No   Do you want to trigger an intervention?  No   Do you want to trigger an additional referral task?  No   Refill activity completed?  Yes   Refill activity plan  Refill scheduled   Shipment/Pickup Date:  12/28/20          Current Outpatient Medications   Medication Sig    amLODIPine (NORVASC) 2.5 MG tablet Take 1 tablet (2.5 mg total) by mouth once daily.    aspirin 81 MG Chew Take 81 mg by mouth once daily.    clobetasol 0.05% (TEMOVATE) 0.05 % Oint APPLY TO THE AFFECTED AREA TWICE DAILY AND OCCLUDE AT BEDTIME    diphenhydrAMINE (BENADRYL) 25 mg capsule Take 25 mg by mouth every 6 (six) hours as needed for Itching.    hydrOXYzine pamoate (VISTARIL) 50 MG Cap Take 1 capsule (50 mg total) by mouth every 8 (eight) hours as needed.    multivitamin (THERAGRAN) tablet Take 1 tablet by mouth once daily.    mycophenolate (CELLCEPT) 500 mg Tab Take 2 tablets (1,000 mg total) by mouth 2 (two) times daily.    ondansetron (ZOFRAN) 4 MG tablet Take 1 tablet (4 mg total) by mouth every 6 (six) hours as needed for Nausea.    sildenafil (REVATIO) 20 mg Tab TAKE 1 TABLET(20 MG) BY MOUTH THREE TIMES DAILY    tacrolimus (PROGRAF) 1 MG Cap Take 2 capsules (2 mg total) by mouth every morning AND 1 capsule (1 mg total) every evening.    zolpidem (AMBIEN) 5 MG Tab Take 1 tablet (5 mg total) by mouth nightly as needed.   Last reviewed on 11/24/2020  2:55 PM by Jes Bravo, PharmD    Review of patient's allergies indicates:  No Known Allergies Last reviewed on  11/24/2020 2:55 PM by Jes Bravo      Tasks added this encounter   1/21/2021 - Refill Call (Auto Added)   Tasks due within next 3 months   1/20/2021 - Clinical - Follow Up Assesement (90 day)     Marion  Ines  [facility]  [address] [address]  Phone: [phone]  Fax: [phone]

## 2020-12-28 NOTE — PROGRESS NOTES
Patient was seen for tobacco cessation intake.  Patient states she does not use any nicotine of any type at this time.  Patient admits to marijuana use daily.  Patient was warned of the hazards of smoking on the lungs.  Patient states she will use edibles to decrease negative affects.  Patient denies any issues with nicotine use or any other addictions.  Patient states marijuana use is a choice to aid with pain.  Patient was advised that in the absence of tobacco addition the program would not be able to meet her needs.

## 2021-01-08 ENCOUNTER — PATIENT MESSAGE (OUTPATIENT)
Dept: TRANSPLANT | Facility: CLINIC | Age: 61
End: 2021-01-08

## 2021-01-19 ENCOUNTER — PATIENT MESSAGE (OUTPATIENT)
Dept: INTERNAL MEDICINE | Facility: CLINIC | Age: 61
End: 2021-01-19

## 2021-01-20 ENCOUNTER — TELEPHONE (OUTPATIENT)
Dept: INTERNAL MEDICINE | Facility: CLINIC | Age: 61
End: 2021-01-20

## 2021-01-20 ENCOUNTER — TELEPHONE (OUTPATIENT)
Dept: RHEUMATOLOGY | Facility: CLINIC | Age: 61
End: 2021-01-20

## 2021-01-20 ENCOUNTER — PATIENT MESSAGE (OUTPATIENT)
Dept: INTERNAL MEDICINE | Facility: CLINIC | Age: 61
End: 2021-01-20

## 2021-01-21 ENCOUNTER — OFFICE VISIT (OUTPATIENT)
Dept: INTERNAL MEDICINE | Facility: CLINIC | Age: 61
End: 2021-01-21
Payer: MEDICARE

## 2021-01-21 VITALS
SYSTOLIC BLOOD PRESSURE: 116 MMHG | OXYGEN SATURATION: 99 % | BODY MASS INDEX: 25.52 KG/M2 | HEIGHT: 62 IN | HEART RATE: 66 BPM | WEIGHT: 138.69 LBS | DIASTOLIC BLOOD PRESSURE: 74 MMHG

## 2021-01-21 DIAGNOSIS — D84.9 IMMUNOSUPPRESSION: ICD-10-CM

## 2021-01-21 DIAGNOSIS — L98.9 SKIN LESION: Primary | ICD-10-CM

## 2021-01-21 DIAGNOSIS — I70.0 ATHEROSCLEROSIS OF AORTA: ICD-10-CM

## 2021-01-21 DIAGNOSIS — M34.9 LIMITED SCLERODERMA: ICD-10-CM

## 2021-01-21 DIAGNOSIS — Z94.4 S/P LIVER TRANSPLANT: ICD-10-CM

## 2021-01-21 PROCEDURE — 1126F PR PAIN SEVERITY QUANTIFIED, NO PAIN PRESENT: ICD-10-PCS | Mod: S$GLB,,, | Performed by: PHYSICIAN ASSISTANT

## 2021-01-21 PROCEDURE — 3078F PR MOST RECENT DIASTOLIC BLOOD PRESSURE < 80 MM HG: ICD-10-PCS | Mod: CPTII,S$GLB,, | Performed by: PHYSICIAN ASSISTANT

## 2021-01-21 PROCEDURE — 99213 PR OFFICE/OUTPT VISIT, EST, LEVL III, 20-29 MIN: ICD-10-PCS | Mod: S$GLB,,, | Performed by: PHYSICIAN ASSISTANT

## 2021-01-21 PROCEDURE — 3074F PR MOST RECENT SYSTOLIC BLOOD PRESSURE < 130 MM HG: ICD-10-PCS | Mod: CPTII,S$GLB,, | Performed by: PHYSICIAN ASSISTANT

## 2021-01-21 PROCEDURE — 99999 PR PBB SHADOW E&M-EST. PATIENT-LVL V: CPT | Mod: PBBFAC,,, | Performed by: PHYSICIAN ASSISTANT

## 2021-01-21 PROCEDURE — 1126F AMNT PAIN NOTED NONE PRSNT: CPT | Mod: S$GLB,,, | Performed by: PHYSICIAN ASSISTANT

## 2021-01-21 PROCEDURE — 3008F BODY MASS INDEX DOCD: CPT | Mod: CPTII,S$GLB,, | Performed by: PHYSICIAN ASSISTANT

## 2021-01-21 PROCEDURE — 3008F PR BODY MASS INDEX (BMI) DOCUMENTED: ICD-10-PCS | Mod: CPTII,S$GLB,, | Performed by: PHYSICIAN ASSISTANT

## 2021-01-21 PROCEDURE — 3074F SYST BP LT 130 MM HG: CPT | Mod: CPTII,S$GLB,, | Performed by: PHYSICIAN ASSISTANT

## 2021-01-21 PROCEDURE — 99213 OFFICE O/P EST LOW 20 MIN: CPT | Mod: S$GLB,,, | Performed by: PHYSICIAN ASSISTANT

## 2021-01-21 PROCEDURE — 3078F DIAST BP <80 MM HG: CPT | Mod: CPTII,S$GLB,, | Performed by: PHYSICIAN ASSISTANT

## 2021-01-21 PROCEDURE — 99999 PR PBB SHADOW E&M-EST. PATIENT-LVL V: ICD-10-PCS | Mod: PBBFAC,,, | Performed by: PHYSICIAN ASSISTANT

## 2021-01-22 ENCOUNTER — OFFICE VISIT (OUTPATIENT)
Dept: DERMATOLOGY | Facility: CLINIC | Age: 61
End: 2021-01-22
Payer: MEDICARE

## 2021-01-22 DIAGNOSIS — L98.9 SKIN LESION: ICD-10-CM

## 2021-01-22 DIAGNOSIS — M34.9 LIMITED SCLERODERMA: ICD-10-CM

## 2021-01-22 DIAGNOSIS — L23.9 ALLERGIC CONTACT DERMATITIS, UNSPECIFIED TRIGGER: Primary | ICD-10-CM

## 2021-01-22 DIAGNOSIS — L08.9 SKIN INFECTION: ICD-10-CM

## 2021-01-22 DIAGNOSIS — D84.9 IMMUNOSUPPRESSION: ICD-10-CM

## 2021-01-22 PROCEDURE — 99999 PR PBB SHADOW E&M-EST. PATIENT-LVL III: ICD-10-PCS | Mod: PBBFAC,,, | Performed by: DERMATOLOGY

## 2021-01-22 PROCEDURE — 87186 SC STD MICRODIL/AGAR DIL: CPT

## 2021-01-22 PROCEDURE — 99214 PR OFFICE/OUTPT VISIT, EST, LEVL IV, 30-39 MIN: ICD-10-PCS | Mod: S$GLB,,, | Performed by: DERMATOLOGY

## 2021-01-22 PROCEDURE — 1126F AMNT PAIN NOTED NONE PRSNT: CPT | Mod: S$GLB,,, | Performed by: DERMATOLOGY

## 2021-01-22 PROCEDURE — 99999 PR PBB SHADOW E&M-EST. PATIENT-LVL III: CPT | Mod: PBBFAC,,, | Performed by: DERMATOLOGY

## 2021-01-22 PROCEDURE — 1126F PR PAIN SEVERITY QUANTIFIED, NO PAIN PRESENT: ICD-10-PCS | Mod: S$GLB,,, | Performed by: DERMATOLOGY

## 2021-01-22 PROCEDURE — 87077 CULTURE AEROBIC IDENTIFY: CPT

## 2021-01-22 PROCEDURE — 87070 CULTURE OTHR SPECIMN AEROBIC: CPT

## 2021-01-22 PROCEDURE — 99214 OFFICE O/P EST MOD 30 MIN: CPT | Mod: S$GLB,,, | Performed by: DERMATOLOGY

## 2021-01-22 RX ORDER — DOXYCYCLINE 100 MG/1
100 CAPSULE ORAL 2 TIMES DAILY
Qty: 20 CAPSULE | Refills: 0 | Status: SHIPPED | OUTPATIENT
Start: 2021-01-22 | End: 2021-03-25

## 2021-01-22 RX ORDER — BETAMETHASONE DIPROPIONATE 0.5 MG/G
OINTMENT TOPICAL 2 TIMES DAILY
Qty: 45 G | Refills: 3 | Status: SHIPPED | OUTPATIENT
Start: 2021-01-22 | End: 2022-04-13 | Stop reason: SDUPTHER

## 2021-01-25 DIAGNOSIS — M34.9 SCLERODERMA: ICD-10-CM

## 2021-01-25 LAB — BACTERIA SPEC AEROBE CULT: ABNORMAL

## 2021-01-25 RX ORDER — SILDENAFIL CITRATE 20 MG/1
TABLET ORAL
Qty: 270 TABLET | Refills: 1 | Status: SHIPPED | OUTPATIENT
Start: 2021-01-25 | End: 2021-06-25 | Stop reason: SDUPTHER

## 2021-01-26 ENCOUNTER — SPECIALTY PHARMACY (OUTPATIENT)
Dept: PHARMACY | Facility: CLINIC | Age: 61
End: 2021-01-26

## 2021-01-26 ENCOUNTER — TELEPHONE (OUTPATIENT)
Dept: RHEUMATOLOGY | Facility: CLINIC | Age: 61
End: 2021-01-26

## 2021-01-26 ENCOUNTER — LAB VISIT (OUTPATIENT)
Dept: LAB | Facility: HOSPITAL | Age: 61
End: 2021-01-26
Attending: INTERNAL MEDICINE
Payer: MEDICARE

## 2021-01-26 DIAGNOSIS — I73.00 RAYNAUD'S PHENOMENON WITHOUT GANGRENE: Primary | ICD-10-CM

## 2021-01-26 DIAGNOSIS — M34.9 SCLERODERMA: ICD-10-CM

## 2021-01-26 DIAGNOSIS — Z79.899 ON MYCOPHENOLATE MOFETIL THERAPY: ICD-10-CM

## 2021-01-26 LAB
ALBUMIN SERPL BCP-MCNC: 4 G/DL (ref 3.5–5.2)
ALP SERPL-CCNC: 70 U/L (ref 55–135)
ALT SERPL W/O P-5'-P-CCNC: 15 U/L (ref 10–44)
ANION GAP SERPL CALC-SCNC: 11 MMOL/L (ref 8–16)
AST SERPL-CCNC: 17 U/L (ref 10–40)
BASOPHILS # BLD AUTO: 0.03 K/UL (ref 0–0.2)
BASOPHILS NFR BLD: 0.6 % (ref 0–1.9)
BILIRUB SERPL-MCNC: 0.9 MG/DL (ref 0.1–1)
BUN SERPL-MCNC: 10 MG/DL (ref 6–20)
CALCIUM SERPL-MCNC: 9.3 MG/DL (ref 8.7–10.5)
CHLORIDE SERPL-SCNC: 107 MMOL/L (ref 95–110)
CO2 SERPL-SCNC: 24 MMOL/L (ref 23–29)
CREAT SERPL-MCNC: 0.7 MG/DL (ref 0.5–1.4)
DIFFERENTIAL METHOD: ABNORMAL
EOSINOPHIL # BLD AUTO: 0.2 K/UL (ref 0–0.5)
EOSINOPHIL NFR BLD: 3.4 % (ref 0–8)
ERYTHROCYTE [DISTWIDTH] IN BLOOD BY AUTOMATED COUNT: 12.9 % (ref 11.5–14.5)
EST. GFR  (AFRICAN AMERICAN): >60 ML/MIN/1.73 M^2
EST. GFR  (NON AFRICAN AMERICAN): >60 ML/MIN/1.73 M^2
GLUCOSE SERPL-MCNC: 89 MG/DL (ref 70–110)
HCT VFR BLD AUTO: 40.2 % (ref 37–48.5)
HGB BLD-MCNC: 13.1 G/DL (ref 12–16)
IMM GRANULOCYTES # BLD AUTO: 0 K/UL (ref 0–0.04)
IMM GRANULOCYTES NFR BLD AUTO: 0 % (ref 0–0.5)
LYMPHOCYTES # BLD AUTO: 1.6 K/UL (ref 1–4.8)
LYMPHOCYTES NFR BLD: 32.3 % (ref 18–48)
MCH RBC QN AUTO: 31.4 PG (ref 27–31)
MCHC RBC AUTO-ENTMCNC: 32.6 G/DL (ref 32–36)
MCV RBC AUTO: 96 FL (ref 82–98)
MONOCYTES # BLD AUTO: 0.4 K/UL (ref 0.3–1)
MONOCYTES NFR BLD: 8.1 % (ref 4–15)
NEUTROPHILS # BLD AUTO: 2.7 K/UL (ref 1.8–7.7)
NEUTROPHILS NFR BLD: 55.6 % (ref 38–73)
NRBC BLD-RTO: 0 /100 WBC
PLATELET # BLD AUTO: 198 K/UL (ref 150–350)
PMV BLD AUTO: 11.5 FL (ref 9.2–12.9)
POTASSIUM SERPL-SCNC: 3.9 MMOL/L (ref 3.5–5.1)
PROT SERPL-MCNC: 7.2 G/DL (ref 6–8.4)
RBC # BLD AUTO: 4.17 M/UL (ref 4–5.4)
SODIUM SERPL-SCNC: 142 MMOL/L (ref 136–145)
WBC # BLD AUTO: 4.93 K/UL (ref 3.9–12.7)

## 2021-01-26 PROCEDURE — 36415 COLL VENOUS BLD VENIPUNCTURE: CPT

## 2021-01-26 PROCEDURE — 80053 COMPREHEN METABOLIC PANEL: CPT

## 2021-01-26 PROCEDURE — 85025 COMPLETE CBC W/AUTO DIFF WBC: CPT

## 2021-02-18 ENCOUNTER — OFFICE VISIT (OUTPATIENT)
Dept: UROGYNECOLOGY | Facility: CLINIC | Age: 61
End: 2021-02-18
Payer: MEDICARE

## 2021-02-18 ENCOUNTER — PATIENT MESSAGE (OUTPATIENT)
Dept: UROGYNECOLOGY | Facility: CLINIC | Age: 61
End: 2021-02-18

## 2021-02-18 DIAGNOSIS — R32 URINARY INCONTINENCE, UNSPECIFIED TYPE: Primary | ICD-10-CM

## 2021-02-18 DIAGNOSIS — N39.46 MIXED STRESS AND URGE URINARY INCONTINENCE: ICD-10-CM

## 2021-02-18 DIAGNOSIS — K59.09 CHRONIC CONSTIPATION: ICD-10-CM

## 2021-02-18 DIAGNOSIS — L30.9 HAND ECZEMA: ICD-10-CM

## 2021-02-18 DIAGNOSIS — F41.9 ANXIETY: ICD-10-CM

## 2021-02-18 DIAGNOSIS — Z72.0 TOBACCO USE: ICD-10-CM

## 2021-02-18 PROCEDURE — 99213 OFFICE O/P EST LOW 20 MIN: CPT | Mod: 95,,, | Performed by: OBSTETRICS & GYNECOLOGY

## 2021-02-18 PROCEDURE — 99213 PR OFFICE/OUTPT VISIT, EST, LEVL III, 20-29 MIN: ICD-10-PCS | Mod: 95,,, | Performed by: OBSTETRICS & GYNECOLOGY

## 2021-02-23 ENCOUNTER — PATIENT MESSAGE (OUTPATIENT)
Dept: RHEUMATOLOGY | Facility: CLINIC | Age: 61
End: 2021-02-23

## 2021-03-02 ENCOUNTER — LAB VISIT (OUTPATIENT)
Dept: LAB | Facility: HOSPITAL | Age: 61
End: 2021-03-02
Attending: INTERNAL MEDICINE
Payer: MEDICARE

## 2021-03-02 DIAGNOSIS — C22.1 CHOLANGIOCARCINOMA: ICD-10-CM

## 2021-03-02 DIAGNOSIS — Z94.4 LIVER REPLACED BY TRANSPLANT: ICD-10-CM

## 2021-03-02 LAB
AFP SERPL-MCNC: 1.7 NG/ML (ref 0–8.4)
ALBUMIN SERPL BCP-MCNC: 4.2 G/DL (ref 3.5–5.2)
ALP SERPL-CCNC: 69 U/L (ref 55–135)
ALT SERPL W/O P-5'-P-CCNC: 21 U/L (ref 10–44)
ANION GAP SERPL CALC-SCNC: 9 MMOL/L (ref 8–16)
AST SERPL-CCNC: 22 U/L (ref 10–40)
BASOPHILS # BLD AUTO: 0.04 K/UL (ref 0–0.2)
BASOPHILS NFR BLD: 0.7 % (ref 0–1.9)
BILIRUB SERPL-MCNC: 1.1 MG/DL (ref 0.1–1)
BUN SERPL-MCNC: 11 MG/DL (ref 6–20)
CALCIUM SERPL-MCNC: 9.7 MG/DL (ref 8.7–10.5)
CHLORIDE SERPL-SCNC: 110 MMOL/L (ref 95–110)
CO2 SERPL-SCNC: 24 MMOL/L (ref 23–29)
CREAT SERPL-MCNC: 0.8 MG/DL (ref 0.5–1.4)
DIFFERENTIAL METHOD: NORMAL
EOSINOPHIL # BLD AUTO: 0.2 K/UL (ref 0–0.5)
EOSINOPHIL NFR BLD: 3.2 % (ref 0–8)
ERYTHROCYTE [DISTWIDTH] IN BLOOD BY AUTOMATED COUNT: 12.8 % (ref 11.5–14.5)
EST. GFR  (AFRICAN AMERICAN): >60 ML/MIN/1.73 M^2
EST. GFR  (NON AFRICAN AMERICAN): >60 ML/MIN/1.73 M^2
GLUCOSE SERPL-MCNC: 89 MG/DL (ref 70–110)
HCT VFR BLD AUTO: 43.8 % (ref 37–48.5)
HGB BLD-MCNC: 14.3 G/DL (ref 12–16)
IMM GRANULOCYTES # BLD AUTO: 0.01 K/UL (ref 0–0.04)
IMM GRANULOCYTES NFR BLD AUTO: 0.2 % (ref 0–0.5)
LYMPHOCYTES # BLD AUTO: 1.7 K/UL (ref 1–4.8)
LYMPHOCYTES NFR BLD: 31.3 % (ref 18–48)
MCH RBC QN AUTO: 31 PG (ref 27–31)
MCHC RBC AUTO-ENTMCNC: 32.6 G/DL (ref 32–36)
MCV RBC AUTO: 95 FL (ref 82–98)
MONOCYTES # BLD AUTO: 0.4 K/UL (ref 0.3–1)
MONOCYTES NFR BLD: 8 % (ref 4–15)
NEUTROPHILS # BLD AUTO: 3 K/UL (ref 1.8–7.7)
NEUTROPHILS NFR BLD: 56.6 % (ref 38–73)
NRBC BLD-RTO: 0 /100 WBC
PLATELET # BLD AUTO: 220 K/UL (ref 150–350)
PMV BLD AUTO: 10.6 FL (ref 9.2–12.9)
POTASSIUM SERPL-SCNC: 4.1 MMOL/L (ref 3.5–5.1)
PROT SERPL-MCNC: 7.8 G/DL (ref 6–8.4)
RBC # BLD AUTO: 4.62 M/UL (ref 4–5.4)
SODIUM SERPL-SCNC: 143 MMOL/L (ref 136–145)
TACROLIMUS BLD-MCNC: 2.5 NG/ML (ref 5–15)
WBC # BLD AUTO: 5.37 K/UL (ref 3.9–12.7)

## 2021-03-02 PROCEDURE — 36415 COLL VENOUS BLD VENIPUNCTURE: CPT

## 2021-03-02 PROCEDURE — 85025 COMPLETE CBC W/AUTO DIFF WBC: CPT

## 2021-03-02 PROCEDURE — 80197 ASSAY OF TACROLIMUS: CPT

## 2021-03-02 PROCEDURE — 80053 COMPREHEN METABOLIC PANEL: CPT

## 2021-03-02 PROCEDURE — 82105 ALPHA-FETOPROTEIN SERUM: CPT

## 2021-03-10 ENCOUNTER — TELEPHONE (OUTPATIENT)
Dept: TRANSPLANT | Facility: CLINIC | Age: 61
End: 2021-03-10

## 2021-03-10 DIAGNOSIS — Z94.4 LIVER TRANSPLANTED: Primary | ICD-10-CM

## 2021-03-10 DIAGNOSIS — C22.0 COMBINED HEPATOCELLULAR AND CHOLANGIOCARCINOMA: ICD-10-CM

## 2021-03-10 DIAGNOSIS — C22.1 CHOLANGIOCARCINOMA: Primary | ICD-10-CM

## 2021-03-10 DIAGNOSIS — C22.1 CHOLANGIOCARCINOMA: ICD-10-CM

## 2021-03-22 ENCOUNTER — PATIENT MESSAGE (OUTPATIENT)
Dept: INTERNAL MEDICINE | Facility: CLINIC | Age: 61
End: 2021-03-22

## 2021-03-24 ENCOUNTER — IMMUNIZATION (OUTPATIENT)
Dept: INTERNAL MEDICINE | Facility: CLINIC | Age: 61
End: 2021-03-24
Payer: MEDICARE

## 2021-03-24 DIAGNOSIS — Z23 NEED FOR VACCINATION: Primary | ICD-10-CM

## 2021-03-24 PROCEDURE — 0011A COVID-19, MRNA, LNP-S, PF, 100 MCG/0.5 ML DOSE VACCINE: CPT | Mod: PBBFAC | Performed by: INTERNAL MEDICINE

## 2021-03-25 ENCOUNTER — OFFICE VISIT (OUTPATIENT)
Dept: RHEUMATOLOGY | Facility: CLINIC | Age: 61
End: 2021-03-25
Payer: MEDICARE

## 2021-03-25 ENCOUNTER — SPECIALTY PHARMACY (OUTPATIENT)
Dept: PHARMACY | Facility: CLINIC | Age: 61
End: 2021-03-25

## 2021-03-25 VITALS
HEART RATE: 61 BPM | WEIGHT: 143 LBS | HEIGHT: 62 IN | SYSTOLIC BLOOD PRESSURE: 111 MMHG | BODY MASS INDEX: 26.31 KG/M2 | DIASTOLIC BLOOD PRESSURE: 68 MMHG

## 2021-03-25 DIAGNOSIS — M34.9 SCLERODERMA: ICD-10-CM

## 2021-03-25 DIAGNOSIS — Z79.899 ON MYCOPHENOLATE MOFETIL THERAPY: ICD-10-CM

## 2021-03-25 DIAGNOSIS — Z78.0 MENOPAUSE: Primary | ICD-10-CM

## 2021-03-25 DIAGNOSIS — E66.3 OVERWEIGHT: ICD-10-CM

## 2021-03-25 DIAGNOSIS — L30.9 HAND ECZEMA: ICD-10-CM

## 2021-03-25 DIAGNOSIS — Z20.822 ENCOUNTER FOR LABORATORY TESTING FOR COVID-19 VIRUS: ICD-10-CM

## 2021-03-25 DIAGNOSIS — M34.9 LIMITED SCLERODERMA: ICD-10-CM

## 2021-03-25 PROCEDURE — 3008F BODY MASS INDEX DOCD: CPT | Mod: CPTII,S$GLB,, | Performed by: INTERNAL MEDICINE

## 2021-03-25 PROCEDURE — 3074F SYST BP LT 130 MM HG: CPT | Mod: CPTII,S$GLB,, | Performed by: INTERNAL MEDICINE

## 2021-03-25 PROCEDURE — 99999 PR PBB SHADOW E&M-EST. PATIENT-LVL IV: CPT | Mod: PBBFAC,,, | Performed by: INTERNAL MEDICINE

## 2021-03-25 PROCEDURE — 99499 UNLISTED E&M SERVICE: CPT | Mod: S$GLB,,, | Performed by: INTERNAL MEDICINE

## 2021-03-25 PROCEDURE — 3078F PR MOST RECENT DIASTOLIC BLOOD PRESSURE < 80 MM HG: ICD-10-PCS | Mod: CPTII,S$GLB,, | Performed by: INTERNAL MEDICINE

## 2021-03-25 PROCEDURE — 3074F PR MOST RECENT SYSTOLIC BLOOD PRESSURE < 130 MM HG: ICD-10-PCS | Mod: CPTII,S$GLB,, | Performed by: INTERNAL MEDICINE

## 2021-03-25 PROCEDURE — 1125F AMNT PAIN NOTED PAIN PRSNT: CPT | Mod: S$GLB,,, | Performed by: INTERNAL MEDICINE

## 2021-03-25 PROCEDURE — 3008F PR BODY MASS INDEX (BMI) DOCUMENTED: ICD-10-PCS | Mod: CPTII,S$GLB,, | Performed by: INTERNAL MEDICINE

## 2021-03-25 PROCEDURE — 3078F DIAST BP <80 MM HG: CPT | Mod: CPTII,S$GLB,, | Performed by: INTERNAL MEDICINE

## 2021-03-25 PROCEDURE — 99499 RISK ADDL DX/OHS AUDIT: ICD-10-PCS | Mod: S$GLB,,, | Performed by: INTERNAL MEDICINE

## 2021-03-25 PROCEDURE — 99999 PR PBB SHADOW E&M-EST. PATIENT-LVL IV: ICD-10-PCS | Mod: PBBFAC,,, | Performed by: INTERNAL MEDICINE

## 2021-03-25 PROCEDURE — 99214 OFFICE O/P EST MOD 30 MIN: CPT | Mod: S$GLB,,, | Performed by: INTERNAL MEDICINE

## 2021-03-25 PROCEDURE — 1125F PR PAIN SEVERITY QUANTIFIED, PAIN PRESENT: ICD-10-PCS | Mod: S$GLB,,, | Performed by: INTERNAL MEDICINE

## 2021-03-25 PROCEDURE — 99214 PR OFFICE/OUTPT VISIT, EST, LEVL IV, 30-39 MIN: ICD-10-PCS | Mod: S$GLB,,, | Performed by: INTERNAL MEDICINE

## 2021-03-25 RX ORDER — MYCOPHENOLATE MOFETIL 500 MG/1
1500 TABLET ORAL 2 TIMES DAILY
Qty: 540 TABLET | Refills: 0 | Status: SHIPPED | OUTPATIENT
Start: 2021-03-25 | End: 2021-06-25 | Stop reason: SDUPTHER

## 2021-04-06 ENCOUNTER — SPECIALTY PHARMACY (OUTPATIENT)
Dept: PHARMACY | Facility: CLINIC | Age: 61
End: 2021-04-06

## 2021-04-08 ENCOUNTER — LAB VISIT (OUTPATIENT)
Dept: LAB | Facility: HOSPITAL | Age: 61
End: 2021-04-08
Attending: INTERNAL MEDICINE
Payer: MEDICARE

## 2021-04-08 DIAGNOSIS — E66.3 OVERWEIGHT: ICD-10-CM

## 2021-04-08 DIAGNOSIS — M34.9 SCLERODERMA: ICD-10-CM

## 2021-04-08 DIAGNOSIS — M34.9 LIMITED SCLERODERMA: ICD-10-CM

## 2021-04-08 DIAGNOSIS — Z79.899 ON MYCOPHENOLATE MOFETIL THERAPY: ICD-10-CM

## 2021-04-08 LAB
ALBUMIN SERPL BCP-MCNC: 3.8 G/DL (ref 3.5–5.2)
ALP SERPL-CCNC: 65 U/L (ref 55–135)
ALT SERPL W/O P-5'-P-CCNC: 14 U/L (ref 10–44)
ANION GAP SERPL CALC-SCNC: 8 MMOL/L (ref 8–16)
AST SERPL-CCNC: 16 U/L (ref 10–40)
BASOPHILS # BLD AUTO: 0.03 K/UL (ref 0–0.2)
BASOPHILS NFR BLD: 0.6 % (ref 0–1.9)
BILIRUB SERPL-MCNC: 0.7 MG/DL (ref 0.1–1)
BUN SERPL-MCNC: 10 MG/DL (ref 6–20)
CALCIUM SERPL-MCNC: 8.9 MG/DL (ref 8.7–10.5)
CHLORIDE SERPL-SCNC: 109 MMOL/L (ref 95–110)
CHOLEST SERPL-MCNC: 165 MG/DL (ref 120–199)
CHOLEST/HDLC SERPL: 3.8 {RATIO} (ref 2–5)
CO2 SERPL-SCNC: 26 MMOL/L (ref 23–29)
CREAT SERPL-MCNC: 0.7 MG/DL (ref 0.5–1.4)
CRP SERPL-MCNC: 1.8 MG/L (ref 0–8.2)
DIFFERENTIAL METHOD: ABNORMAL
EOSINOPHIL # BLD AUTO: 0.2 K/UL (ref 0–0.5)
EOSINOPHIL NFR BLD: 3.1 % (ref 0–8)
ERYTHROCYTE [DISTWIDTH] IN BLOOD BY AUTOMATED COUNT: 12.7 % (ref 11.5–14.5)
ERYTHROCYTE [SEDIMENTATION RATE] IN BLOOD BY WESTERGREN METHOD: 21 MM/HR (ref 0–36)
EST. GFR  (AFRICAN AMERICAN): >60 ML/MIN/1.73 M^2
EST. GFR  (NON AFRICAN AMERICAN): >60 ML/MIN/1.73 M^2
GLUCOSE SERPL-MCNC: 91 MG/DL (ref 70–110)
HCT VFR BLD AUTO: 40.3 % (ref 37–48.5)
HDLC SERPL-MCNC: 43 MG/DL (ref 40–75)
HDLC SERPL: 26.1 % (ref 20–50)
HGB BLD-MCNC: 12.9 G/DL (ref 12–16)
IMM GRANULOCYTES # BLD AUTO: 0.01 K/UL (ref 0–0.04)
IMM GRANULOCYTES NFR BLD AUTO: 0.2 % (ref 0–0.5)
LDLC SERPL CALC-MCNC: 102.2 MG/DL (ref 63–159)
LYMPHOCYTES # BLD AUTO: 1.4 K/UL (ref 1–4.8)
LYMPHOCYTES NFR BLD: 28.2 % (ref 18–48)
MCH RBC QN AUTO: 31.2 PG (ref 27–31)
MCHC RBC AUTO-ENTMCNC: 32 G/DL (ref 32–36)
MCV RBC AUTO: 98 FL (ref 82–98)
MONOCYTES # BLD AUTO: 0.4 K/UL (ref 0.3–1)
MONOCYTES NFR BLD: 9 % (ref 4–15)
NEUTROPHILS # BLD AUTO: 2.8 K/UL (ref 1.8–7.7)
NEUTROPHILS NFR BLD: 58.9 % (ref 38–73)
NONHDLC SERPL-MCNC: 122 MG/DL
NRBC BLD-RTO: 0 /100 WBC
PLATELET # BLD AUTO: 207 K/UL (ref 150–450)
PMV BLD AUTO: 11.4 FL (ref 9.2–12.9)
POTASSIUM SERPL-SCNC: 4.1 MMOL/L (ref 3.5–5.1)
PROT SERPL-MCNC: 7 G/DL (ref 6–8.4)
RBC # BLD AUTO: 4.13 M/UL (ref 4–5.4)
SODIUM SERPL-SCNC: 143 MMOL/L (ref 136–145)
TRIGL SERPL-MCNC: 99 MG/DL (ref 30–150)
WBC # BLD AUTO: 4.79 K/UL (ref 3.9–12.7)

## 2021-04-08 PROCEDURE — 86140 C-REACTIVE PROTEIN: CPT | Performed by: INTERNAL MEDICINE

## 2021-04-08 PROCEDURE — 80053 COMPREHEN METABOLIC PANEL: CPT | Performed by: INTERNAL MEDICINE

## 2021-04-08 PROCEDURE — 85652 RBC SED RATE AUTOMATED: CPT | Performed by: INTERNAL MEDICINE

## 2021-04-08 PROCEDURE — 36415 COLL VENOUS BLD VENIPUNCTURE: CPT | Performed by: INTERNAL MEDICINE

## 2021-04-08 PROCEDURE — 80061 LIPID PANEL: CPT | Performed by: INTERNAL MEDICINE

## 2021-04-08 PROCEDURE — 85025 COMPLETE CBC W/AUTO DIFF WBC: CPT | Performed by: INTERNAL MEDICINE

## 2021-04-21 ENCOUNTER — IMMUNIZATION (OUTPATIENT)
Dept: INTERNAL MEDICINE | Facility: CLINIC | Age: 61
End: 2021-04-21
Payer: MEDICARE

## 2021-04-21 DIAGNOSIS — Z23 NEED FOR VACCINATION: Primary | ICD-10-CM

## 2021-04-21 PROCEDURE — 0012A COVID-19, MRNA, LNP-S, PF, 100 MCG/0.5 ML DOSE VACCINE: CPT | Mod: PBBFAC | Performed by: INTERNAL MEDICINE

## 2021-04-22 ENCOUNTER — LAB VISIT (OUTPATIENT)
Dept: LAB | Facility: HOSPITAL | Age: 61
End: 2021-04-22
Attending: INTERNAL MEDICINE
Payer: MEDICARE

## 2021-04-22 DIAGNOSIS — Z79.899 ON MYCOPHENOLATE MOFETIL THERAPY: ICD-10-CM

## 2021-04-22 DIAGNOSIS — M34.9 SCLERODERMA: ICD-10-CM

## 2021-04-22 LAB
ALBUMIN SERPL BCP-MCNC: 3.9 G/DL (ref 3.5–5.2)
ALP SERPL-CCNC: 67 U/L (ref 55–135)
ALT SERPL W/O P-5'-P-CCNC: 12 U/L (ref 10–44)
ANION GAP SERPL CALC-SCNC: 8 MMOL/L (ref 8–16)
AST SERPL-CCNC: 14 U/L (ref 10–40)
BASOPHILS # BLD AUTO: 0.02 K/UL (ref 0–0.2)
BASOPHILS NFR BLD: 0.4 % (ref 0–1.9)
BILIRUB SERPL-MCNC: 1.1 MG/DL (ref 0.1–1)
BUN SERPL-MCNC: 10 MG/DL (ref 6–20)
CALCIUM SERPL-MCNC: 8.9 MG/DL (ref 8.7–10.5)
CHLORIDE SERPL-SCNC: 109 MMOL/L (ref 95–110)
CO2 SERPL-SCNC: 24 MMOL/L (ref 23–29)
CREAT SERPL-MCNC: 0.7 MG/DL (ref 0.5–1.4)
CRP SERPL-MCNC: 6.3 MG/L (ref 0–8.2)
DIFFERENTIAL METHOD: ABNORMAL
EOSINOPHIL # BLD AUTO: 0.1 K/UL (ref 0–0.5)
EOSINOPHIL NFR BLD: 2.6 % (ref 0–8)
ERYTHROCYTE [DISTWIDTH] IN BLOOD BY AUTOMATED COUNT: 12.8 % (ref 11.5–14.5)
ERYTHROCYTE [SEDIMENTATION RATE] IN BLOOD BY WESTERGREN METHOD: 25 MM/HR (ref 0–36)
EST. GFR  (AFRICAN AMERICAN): >60 ML/MIN/1.73 M^2
EST. GFR  (NON AFRICAN AMERICAN): >60 ML/MIN/1.73 M^2
GLUCOSE SERPL-MCNC: 91 MG/DL (ref 70–110)
HCT VFR BLD AUTO: 38.7 % (ref 37–48.5)
HGB BLD-MCNC: 12.9 G/DL (ref 12–16)
IMM GRANULOCYTES # BLD AUTO: 0.01 K/UL (ref 0–0.04)
IMM GRANULOCYTES NFR BLD AUTO: 0.2 % (ref 0–0.5)
LYMPHOCYTES # BLD AUTO: 1.2 K/UL (ref 1–4.8)
LYMPHOCYTES NFR BLD: 22.4 % (ref 18–48)
MCH RBC QN AUTO: 31.1 PG (ref 27–31)
MCHC RBC AUTO-ENTMCNC: 33.3 G/DL (ref 32–36)
MCV RBC AUTO: 93 FL (ref 82–98)
MONOCYTES # BLD AUTO: 0.4 K/UL (ref 0.3–1)
MONOCYTES NFR BLD: 7.5 % (ref 4–15)
NEUTROPHILS # BLD AUTO: 3.7 K/UL (ref 1.8–7.7)
NEUTROPHILS NFR BLD: 66.9 % (ref 38–73)
NRBC BLD-RTO: 0 /100 WBC
PLATELET # BLD AUTO: 199 K/UL (ref 150–450)
PMV BLD AUTO: 10.8 FL (ref 9.2–12.9)
POTASSIUM SERPL-SCNC: 3.7 MMOL/L (ref 3.5–5.1)
PROT SERPL-MCNC: 7 G/DL (ref 6–8.4)
RBC # BLD AUTO: 4.15 M/UL (ref 4–5.4)
SODIUM SERPL-SCNC: 141 MMOL/L (ref 136–145)
WBC # BLD AUTO: 5.48 K/UL (ref 3.9–12.7)

## 2021-04-22 PROCEDURE — 80053 COMPREHEN METABOLIC PANEL: CPT | Performed by: INTERNAL MEDICINE

## 2021-04-22 PROCEDURE — 86140 C-REACTIVE PROTEIN: CPT | Performed by: INTERNAL MEDICINE

## 2021-04-22 PROCEDURE — 36415 COLL VENOUS BLD VENIPUNCTURE: CPT | Performed by: INTERNAL MEDICINE

## 2021-04-22 PROCEDURE — 85652 RBC SED RATE AUTOMATED: CPT | Performed by: INTERNAL MEDICINE

## 2021-04-22 PROCEDURE — 85025 COMPLETE CBC W/AUTO DIFF WBC: CPT | Performed by: INTERNAL MEDICINE

## 2021-04-28 ENCOUNTER — TELEPHONE (OUTPATIENT)
Dept: INTERNAL MEDICINE | Facility: CLINIC | Age: 61
End: 2021-04-28

## 2021-04-28 DIAGNOSIS — Z12.31 ENCOUNTER FOR SCREENING MAMMOGRAM FOR BREAST CANCER: Primary | ICD-10-CM

## 2021-05-06 ENCOUNTER — HOSPITAL ENCOUNTER (OUTPATIENT)
Dept: RADIOLOGY | Facility: OTHER | Age: 61
Discharge: HOME | End: 2021-05-06
Attending: INTERNAL MEDICINE
Payer: MEDICARE

## 2021-05-06 DIAGNOSIS — Z12.31 ENCOUNTER FOR SCREENING MAMMOGRAM FOR BREAST CANCER: ICD-10-CM

## 2021-05-06 PROCEDURE — 77063 MAMMO DIGITAL SCREENING BILAT WITH TOMO: ICD-10-PCS | Mod: 26,,, | Performed by: RADIOLOGY

## 2021-05-06 PROCEDURE — 77067 SCR MAMMO BI INCL CAD: CPT | Mod: TC

## 2021-05-06 PROCEDURE — 77067 MAMMO DIGITAL SCREENING BILAT WITH TOMO: ICD-10-PCS | Mod: 26,,, | Performed by: RADIOLOGY

## 2021-05-06 PROCEDURE — 77067 SCR MAMMO BI INCL CAD: CPT | Mod: 26,,, | Performed by: RADIOLOGY

## 2021-05-06 PROCEDURE — 77063 BREAST TOMOSYNTHESIS BI: CPT | Mod: 26,,, | Performed by: RADIOLOGY

## 2021-05-20 ENCOUNTER — OFFICE VISIT (OUTPATIENT)
Dept: INTERNAL MEDICINE | Facility: CLINIC | Age: 61
End: 2021-05-20
Payer: MEDICARE

## 2021-05-20 VITALS
HEART RATE: 71 BPM | SYSTOLIC BLOOD PRESSURE: 112 MMHG | BODY MASS INDEX: 26.37 KG/M2 | WEIGHT: 143.31 LBS | DIASTOLIC BLOOD PRESSURE: 74 MMHG | HEIGHT: 62 IN | OXYGEN SATURATION: 98 %

## 2021-05-20 DIAGNOSIS — Z94.4 LIVER TRANSPLANT STATUS: ICD-10-CM

## 2021-05-20 DIAGNOSIS — M34.9 SCLERODERMA: Primary | ICD-10-CM

## 2021-05-20 DIAGNOSIS — L30.9 ECZEMA, UNSPECIFIED TYPE: ICD-10-CM

## 2021-05-20 DIAGNOSIS — K21.9 GASTROESOPHAGEAL REFLUX DISEASE, UNSPECIFIED WHETHER ESOPHAGITIS PRESENT: ICD-10-CM

## 2021-05-20 DIAGNOSIS — D84.9 IMMUNOCOMPROMISED: ICD-10-CM

## 2021-05-20 DIAGNOSIS — I73.00 RAYNAUD'S DISEASE WITHOUT GANGRENE: ICD-10-CM

## 2021-05-20 PROCEDURE — 1126F PR PAIN SEVERITY QUANTIFIED, NO PAIN PRESENT: ICD-10-PCS | Mod: S$GLB,,, | Performed by: INTERNAL MEDICINE

## 2021-05-20 PROCEDURE — 3008F PR BODY MASS INDEX (BMI) DOCUMENTED: ICD-10-PCS | Mod: CPTII,S$GLB,, | Performed by: INTERNAL MEDICINE

## 2021-05-20 PROCEDURE — 99999 PR PBB SHADOW E&M-EST. PATIENT-LVL IV: ICD-10-PCS | Mod: PBBFAC,,, | Performed by: INTERNAL MEDICINE

## 2021-05-20 PROCEDURE — 99214 PR OFFICE/OUTPT VISIT, EST, LEVL IV, 30-39 MIN: ICD-10-PCS | Mod: S$GLB,,, | Performed by: INTERNAL MEDICINE

## 2021-05-20 PROCEDURE — 99214 OFFICE O/P EST MOD 30 MIN: CPT | Mod: S$GLB,,, | Performed by: INTERNAL MEDICINE

## 2021-05-20 PROCEDURE — 99999 PR PBB SHADOW E&M-EST. PATIENT-LVL IV: CPT | Mod: PBBFAC,,, | Performed by: INTERNAL MEDICINE

## 2021-05-20 PROCEDURE — 1126F AMNT PAIN NOTED NONE PRSNT: CPT | Mod: S$GLB,,, | Performed by: INTERNAL MEDICINE

## 2021-05-20 PROCEDURE — 99499 RISK ADDL DX/OHS AUDIT: ICD-10-PCS | Mod: S$GLB,,, | Performed by: INTERNAL MEDICINE

## 2021-05-20 PROCEDURE — 99499 UNLISTED E&M SERVICE: CPT | Mod: S$GLB,,, | Performed by: INTERNAL MEDICINE

## 2021-05-20 PROCEDURE — 3008F BODY MASS INDEX DOCD: CPT | Mod: CPTII,S$GLB,, | Performed by: INTERNAL MEDICINE

## 2021-05-24 ENCOUNTER — LAB VISIT (OUTPATIENT)
Dept: LAB | Facility: HOSPITAL | Age: 61
End: 2021-05-24
Attending: STUDENT IN AN ORGANIZED HEALTH CARE EDUCATION/TRAINING PROGRAM
Payer: MEDICARE

## 2021-05-24 ENCOUNTER — TELEPHONE (OUTPATIENT)
Dept: TRANSPLANT | Facility: CLINIC | Age: 61
End: 2021-05-24

## 2021-05-24 DIAGNOSIS — C22.1 CHOLANGIOCARCINOMA: Primary | ICD-10-CM

## 2021-05-24 DIAGNOSIS — Z94.4 LIVER TRANSPLANTED: ICD-10-CM

## 2021-05-24 DIAGNOSIS — C22.0 COMBINED HEPATOCELLULAR AND CHOLANGIOCARCINOMA: ICD-10-CM

## 2021-05-24 DIAGNOSIS — C22.1 CHOLANGIOCARCINOMA: ICD-10-CM

## 2021-05-24 LAB
AFP SERPL-MCNC: 1.9 NG/ML (ref 0–8.4)
ALBUMIN SERPL BCP-MCNC: 4.2 G/DL (ref 3.5–5.2)
ALP SERPL-CCNC: 67 U/L (ref 55–135)
ALT SERPL W/O P-5'-P-CCNC: 18 U/L (ref 10–44)
ANION GAP SERPL CALC-SCNC: 10 MMOL/L (ref 8–16)
AST SERPL-CCNC: 18 U/L (ref 10–40)
BASOPHILS # BLD AUTO: 0.03 K/UL (ref 0–0.2)
BASOPHILS NFR BLD: 0.6 % (ref 0–1.9)
BILIRUB SERPL-MCNC: 1.2 MG/DL (ref 0.1–1)
BUN SERPL-MCNC: 13 MG/DL (ref 6–20)
CALCIUM SERPL-MCNC: 9.6 MG/DL (ref 8.7–10.5)
CANCER AG19-9 SERPL-ACNC: 5 U/ML (ref 2–40)
CHLORIDE SERPL-SCNC: 105 MMOL/L (ref 95–110)
CO2 SERPL-SCNC: 25 MMOL/L (ref 23–29)
CREAT SERPL-MCNC: 0.8 MG/DL (ref 0.5–1.4)
DIFFERENTIAL METHOD: ABNORMAL
EOSINOPHIL # BLD AUTO: 0.2 K/UL (ref 0–0.5)
EOSINOPHIL NFR BLD: 3.2 % (ref 0–8)
ERYTHROCYTE [DISTWIDTH] IN BLOOD BY AUTOMATED COUNT: 12.8 % (ref 11.5–14.5)
EST. GFR  (AFRICAN AMERICAN): >60 ML/MIN/1.73 M^2
EST. GFR  (NON AFRICAN AMERICAN): >60 ML/MIN/1.73 M^2
GLUCOSE SERPL-MCNC: 97 MG/DL (ref 70–110)
HCT VFR BLD AUTO: 42 % (ref 37–48.5)
HGB BLD-MCNC: 13.4 G/DL (ref 12–16)
IMM GRANULOCYTES # BLD AUTO: 0.01 K/UL (ref 0–0.04)
IMM GRANULOCYTES NFR BLD AUTO: 0.2 % (ref 0–0.5)
LYMPHOCYTES # BLD AUTO: 1.5 K/UL (ref 1–4.8)
LYMPHOCYTES NFR BLD: 27.8 % (ref 18–48)
MCH RBC QN AUTO: 30.2 PG (ref 27–31)
MCHC RBC AUTO-ENTMCNC: 31.9 G/DL (ref 32–36)
MCV RBC AUTO: 95 FL (ref 82–98)
MONOCYTES # BLD AUTO: 0.4 K/UL (ref 0.3–1)
MONOCYTES NFR BLD: 7.4 % (ref 4–15)
NEUTROPHILS # BLD AUTO: 3.2 K/UL (ref 1.8–7.7)
NEUTROPHILS NFR BLD: 60.8 % (ref 38–73)
NRBC BLD-RTO: 0 /100 WBC
PLATELET # BLD AUTO: 201 K/UL (ref 150–450)
PMV BLD AUTO: 11 FL (ref 9.2–12.9)
POTASSIUM SERPL-SCNC: 4.1 MMOL/L (ref 3.5–5.1)
PROT SERPL-MCNC: 7.6 G/DL (ref 6–8.4)
RBC # BLD AUTO: 4.44 M/UL (ref 4–5.4)
SODIUM SERPL-SCNC: 140 MMOL/L (ref 136–145)
TACROLIMUS BLD-MCNC: 4.3 NG/ML (ref 5–15)
TACROLIMUS, NORMALIZED: 4 NG/ML (ref 5–15)
WBC # BLD AUTO: 5.25 K/UL (ref 3.9–12.7)

## 2021-05-24 PROCEDURE — 86301 IMMUNOASSAY TUMOR CA 19-9: CPT | Performed by: STUDENT IN AN ORGANIZED HEALTH CARE EDUCATION/TRAINING PROGRAM

## 2021-05-24 PROCEDURE — 85025 COMPLETE CBC W/AUTO DIFF WBC: CPT | Performed by: STUDENT IN AN ORGANIZED HEALTH CARE EDUCATION/TRAINING PROGRAM

## 2021-05-24 PROCEDURE — 36415 COLL VENOUS BLD VENIPUNCTURE: CPT | Performed by: STUDENT IN AN ORGANIZED HEALTH CARE EDUCATION/TRAINING PROGRAM

## 2021-05-24 PROCEDURE — 80053 COMPREHEN METABOLIC PANEL: CPT | Performed by: STUDENT IN AN ORGANIZED HEALTH CARE EDUCATION/TRAINING PROGRAM

## 2021-05-24 PROCEDURE — 80197 ASSAY OF TACROLIMUS: CPT | Performed by: STUDENT IN AN ORGANIZED HEALTH CARE EDUCATION/TRAINING PROGRAM

## 2021-05-24 PROCEDURE — 82105 ALPHA-FETOPROTEIN SERUM: CPT | Performed by: STUDENT IN AN ORGANIZED HEALTH CARE EDUCATION/TRAINING PROGRAM

## 2021-05-26 NOTE — TELEPHONE ENCOUNTER
----- Message from Nancy Rush sent at 5/14/2020  9:40 AM CDT -----  Contact: self/[phone]  Pt called in regards to a missed call from the MA about and appointment. She would like a call back.     Please advise  
Next available appt with another provider?  
Never

## 2021-06-15 ENCOUNTER — SPECIALTY PHARMACY (OUTPATIENT)
Dept: PHARMACY | Facility: CLINIC | Age: 61
End: 2021-06-15

## 2021-06-15 ENCOUNTER — TELEPHONE (OUTPATIENT)
Dept: PHARMACY | Facility: CLINIC | Age: 61
End: 2021-06-15

## 2021-06-15 DIAGNOSIS — Z94.4 LIVER REPLACED BY TRANSPLANT: ICD-10-CM

## 2021-06-15 RX ORDER — TACROLIMUS 1 MG/1
CAPSULE ORAL
Qty: 90 CAPSULE | Refills: 11 | Status: SHIPPED | OUTPATIENT
Start: 2021-06-15 | End: 2021-06-15 | Stop reason: SDUPTHER

## 2021-06-15 RX ORDER — TACROLIMUS 1 MG/1
CAPSULE, GELATIN COATED ORAL
Qty: 90 CAPSULE | Refills: 11 | Status: CANCELLED | OUTPATIENT
Start: 2021-06-15

## 2021-06-15 RX ORDER — TACROLIMUS 1 MG/1
CAPSULE ORAL
Qty: 90 CAPSULE | Refills: 11 | Status: SHIPPED | OUTPATIENT
Start: 2021-06-15 | End: 2022-04-25 | Stop reason: SDUPTHER

## 2021-06-17 ENCOUNTER — TELEPHONE (OUTPATIENT)
Dept: TRANSPLANT | Facility: CLINIC | Age: 61
End: 2021-06-17

## 2021-06-17 ENCOUNTER — HOSPITAL ENCOUNTER (OUTPATIENT)
Dept: RADIOLOGY | Facility: OTHER | Age: 61
Discharge: HOME | End: 2021-06-17
Attending: STUDENT IN AN ORGANIZED HEALTH CARE EDUCATION/TRAINING PROGRAM
Payer: MEDICARE

## 2021-06-17 DIAGNOSIS — C22.1 CHOLANGIOCARCINOMA: ICD-10-CM

## 2021-06-17 PROCEDURE — A9698 NON-RAD CONTRAST MATERIALNOC: HCPCS | Performed by: STUDENT IN AN ORGANIZED HEALTH CARE EDUCATION/TRAINING PROGRAM

## 2021-06-17 PROCEDURE — 71260 CT THORAX DX C+: CPT | Mod: TC

## 2021-06-17 PROCEDURE — 74177 CT CHEST ABDOMEN PELVIS WITH CONTRAST (XPD): ICD-10-PCS | Mod: 26,,, | Performed by: RADIOLOGY

## 2021-06-17 PROCEDURE — 25500020 PHARM REV CODE 255: Performed by: STUDENT IN AN ORGANIZED HEALTH CARE EDUCATION/TRAINING PROGRAM

## 2021-06-17 PROCEDURE — 74177 CT ABD & PELVIS W/CONTRAST: CPT | Mod: TC

## 2021-06-17 PROCEDURE — 71260 CT THORAX DX C+: CPT | Mod: 26,,, | Performed by: RADIOLOGY

## 2021-06-17 PROCEDURE — 71260 CT CHEST ABDOMEN PELVIS WITH CONTRAST (XPD): ICD-10-PCS | Mod: 26,,, | Performed by: RADIOLOGY

## 2021-06-17 PROCEDURE — 74177 CT ABD & PELVIS W/CONTRAST: CPT | Mod: 26,,, | Performed by: RADIOLOGY

## 2021-06-17 RX ADMIN — IOHEXOL 1000 ML: 9 SOLUTION ORAL at 10:06

## 2021-06-17 RX ADMIN — IOHEXOL 75 ML: 350 INJECTION, SOLUTION INTRAVENOUS at 10:06

## 2021-06-22 ENCOUNTER — LAB VISIT (OUTPATIENT)
Dept: LAB | Facility: HOSPITAL | Age: 61
End: 2021-06-22
Attending: INTERNAL MEDICINE
Payer: MEDICARE

## 2021-06-22 DIAGNOSIS — Z79.899 ON MYCOPHENOLATE MOFETIL THERAPY: ICD-10-CM

## 2021-06-22 DIAGNOSIS — M34.9 SCLERODERMA: ICD-10-CM

## 2021-06-22 LAB
ALBUMIN SERPL BCP-MCNC: 4.2 G/DL (ref 3.5–5.2)
ALP SERPL-CCNC: 62 U/L (ref 55–135)
ALT SERPL W/O P-5'-P-CCNC: 16 U/L (ref 10–44)
ANION GAP SERPL CALC-SCNC: 11 MMOL/L (ref 8–16)
AST SERPL-CCNC: 16 U/L (ref 10–40)
BASOPHILS # BLD AUTO: 0.03 K/UL (ref 0–0.2)
BASOPHILS NFR BLD: 0.6 % (ref 0–1.9)
BILIRUB SERPL-MCNC: 1.1 MG/DL (ref 0.1–1)
BUN SERPL-MCNC: 11 MG/DL (ref 6–20)
CALCIUM SERPL-MCNC: 9.9 MG/DL (ref 8.7–10.5)
CHLORIDE SERPL-SCNC: 107 MMOL/L (ref 95–110)
CO2 SERPL-SCNC: 25 MMOL/L (ref 23–29)
CREAT SERPL-MCNC: 0.8 MG/DL (ref 0.5–1.4)
CRP SERPL-MCNC: 1.3 MG/L (ref 0–8.2)
DIFFERENTIAL METHOD: NORMAL
EOSINOPHIL # BLD AUTO: 0.2 K/UL (ref 0–0.5)
EOSINOPHIL NFR BLD: 3.5 % (ref 0–8)
ERYTHROCYTE [DISTWIDTH] IN BLOOD BY AUTOMATED COUNT: 12.8 % (ref 11.5–14.5)
ERYTHROCYTE [SEDIMENTATION RATE] IN BLOOD BY WESTERGREN METHOD: 29 MM/HR (ref 0–36)
EST. GFR  (AFRICAN AMERICAN): >60 ML/MIN/1.73 M^2
EST. GFR  (NON AFRICAN AMERICAN): >60 ML/MIN/1.73 M^2
GLUCOSE SERPL-MCNC: 89 MG/DL (ref 70–110)
HCT VFR BLD AUTO: 41.8 % (ref 37–48.5)
HGB BLD-MCNC: 13.5 G/DL (ref 12–16)
IMM GRANULOCYTES # BLD AUTO: 0.01 K/UL (ref 0–0.04)
IMM GRANULOCYTES NFR BLD AUTO: 0.2 % (ref 0–0.5)
LYMPHOCYTES # BLD AUTO: 1.5 K/UL (ref 1–4.8)
LYMPHOCYTES NFR BLD: 29.7 % (ref 18–48)
MCH RBC QN AUTO: 30.8 PG (ref 27–31)
MCHC RBC AUTO-ENTMCNC: 32.3 G/DL (ref 32–36)
MCV RBC AUTO: 95 FL (ref 82–98)
MONOCYTES # BLD AUTO: 0.5 K/UL (ref 0.3–1)
MONOCYTES NFR BLD: 9.4 % (ref 4–15)
NEUTROPHILS # BLD AUTO: 2.9 K/UL (ref 1.8–7.7)
NEUTROPHILS NFR BLD: 56.6 % (ref 38–73)
NRBC BLD-RTO: 0 /100 WBC
PLATELET # BLD AUTO: 203 K/UL (ref 150–450)
PMV BLD AUTO: 11.4 FL (ref 9.2–12.9)
POTASSIUM SERPL-SCNC: 4.4 MMOL/L (ref 3.5–5.1)
PROT SERPL-MCNC: 7.4 G/DL (ref 6–8.4)
RBC # BLD AUTO: 4.39 M/UL (ref 4–5.4)
SODIUM SERPL-SCNC: 143 MMOL/L (ref 136–145)
WBC # BLD AUTO: 5.19 K/UL (ref 3.9–12.7)

## 2021-06-22 PROCEDURE — 86140 C-REACTIVE PROTEIN: CPT | Performed by: INTERNAL MEDICINE

## 2021-06-22 PROCEDURE — 80053 COMPREHEN METABOLIC PANEL: CPT | Performed by: INTERNAL MEDICINE

## 2021-06-22 PROCEDURE — 36415 COLL VENOUS BLD VENIPUNCTURE: CPT | Performed by: INTERNAL MEDICINE

## 2021-06-22 PROCEDURE — 85025 COMPLETE CBC W/AUTO DIFF WBC: CPT | Performed by: INTERNAL MEDICINE

## 2021-06-22 PROCEDURE — 85652 RBC SED RATE AUTOMATED: CPT | Performed by: INTERNAL MEDICINE

## 2021-06-25 ENCOUNTER — HOSPITAL ENCOUNTER (OUTPATIENT)
Dept: RADIOLOGY | Facility: CLINIC | Age: 61
Discharge: HOME | End: 2021-06-25
Attending: INTERNAL MEDICINE
Payer: MEDICARE

## 2021-06-25 ENCOUNTER — HOSPITAL ENCOUNTER (OUTPATIENT)
Dept: PULMONOLOGY | Facility: CLINIC | Age: 61
Discharge: HOME | End: 2021-06-25
Payer: MEDICARE

## 2021-06-25 ENCOUNTER — OFFICE VISIT (OUTPATIENT)
Dept: RHEUMATOLOGY | Facility: CLINIC | Age: 61
End: 2021-06-25
Payer: MEDICARE

## 2021-06-25 ENCOUNTER — TELEPHONE (OUTPATIENT)
Dept: RHEUMATOLOGY | Facility: CLINIC | Age: 61
End: 2021-06-25

## 2021-06-25 ENCOUNTER — HOSPITAL ENCOUNTER (OUTPATIENT)
Dept: CARDIOLOGY | Facility: HOSPITAL | Age: 61
Discharge: HOME | End: 2021-06-25
Attending: INTERNAL MEDICINE
Payer: MEDICARE

## 2021-06-25 ENCOUNTER — PATIENT MESSAGE (OUTPATIENT)
Dept: RHEUMATOLOGY | Facility: CLINIC | Age: 61
End: 2021-06-25

## 2021-06-25 VITALS
SYSTOLIC BLOOD PRESSURE: 111 MMHG | BODY MASS INDEX: 27.05 KG/M2 | HEIGHT: 62 IN | WEIGHT: 147 LBS | DIASTOLIC BLOOD PRESSURE: 63 MMHG | HEART RATE: 61 BPM

## 2021-06-25 VITALS
WEIGHT: 147 LBS | HEART RATE: 55 BPM | DIASTOLIC BLOOD PRESSURE: 70 MMHG | SYSTOLIC BLOOD PRESSURE: 120 MMHG | HEIGHT: 62 IN | BODY MASS INDEX: 27.05 KG/M2

## 2021-06-25 VITALS — WEIGHT: 147.06 LBS | HEIGHT: 62 IN | BODY MASS INDEX: 27.06 KG/M2

## 2021-06-25 DIAGNOSIS — M34.9 SCLERODERMA: Primary | ICD-10-CM

## 2021-06-25 DIAGNOSIS — M34.9 SCLERODERMA: ICD-10-CM

## 2021-06-25 DIAGNOSIS — Z79.899 ON MYCOPHENOLATE MOFETIL THERAPY: ICD-10-CM

## 2021-06-25 DIAGNOSIS — L30.9 HAND ECZEMA: ICD-10-CM

## 2021-06-25 DIAGNOSIS — Z78.0 MENOPAUSE: ICD-10-CM

## 2021-06-25 PROBLEM — I27.20 PULMONARY HYPERTENSION: Status: ACTIVE | Noted: 2021-06-25

## 2021-06-25 LAB
ASCENDING AORTA: 3.5 CM
AV INDEX (PROSTH): 0.86
AV MEAN GRADIENT: 2 MMHG
AV PEAK GRADIENT: 5 MMHG
AV REGURGITATION PRESSURE HALF TIME: 524 MS
AV VALVE AREA: 3.18 CM2
AV VELOCITY RATIO: 0.72
BSA FOR ECHO PROCEDURE: 1.71 M2
CV ECHO LV RWT: 0.32 CM
DOP CALC AO PEAK VEL: 1.15 M/S
DOP CALC AO VTI: 22.45 CM
DOP CALC LVOT AREA: 3.7 CM2
DOP CALC LVOT DIAMETER: 2.17 CM
DOP CALC LVOT PEAK VEL: 0.83 M/S
DOP CALC LVOT STROKE VOLUME: 71.34 CM3
DOP CALCLVOT PEAK VEL VTI: 19.3 CM
E WAVE DECELERATION TIME: 174.14 MSEC
E/A RATIO: 1.58
E/E' RATIO: 8 M/S
ECHO LV POSTERIOR WALL: 0.75 CM (ref 0.6–1.1)
EJECTION FRACTION: 60 %
FRACTIONAL SHORTENING: 32 % (ref 28–44)
INTERVENTRICULAR SEPTUM: 0.66 CM (ref 0.6–1.1)
IVRT: 91.34 MSEC
LA MAJOR: 4.82 CM
LA MINOR: 4.84 CM
LA WIDTH: 3.96 CM
LEFT ATRIUM SIZE: 3.59 CM
LEFT ATRIUM VOLUME INDEX MOD: 33 ML/M2
LEFT ATRIUM VOLUME INDEX: 34.7 ML/M2
LEFT ATRIUM VOLUME MOD: 55.49 CM3
LEFT ATRIUM VOLUME: 58.37 CM3
LEFT INTERNAL DIMENSION IN SYSTOLE: 3.19 CM (ref 2.1–4)
LEFT VENTRICLE DIASTOLIC VOLUME INDEX: 60.82 ML/M2
LEFT VENTRICLE DIASTOLIC VOLUME: 102.17 ML
LEFT VENTRICLE MASS INDEX: 62 G/M2
LEFT VENTRICLE SYSTOLIC VOLUME INDEX: 24.2 ML/M2
LEFT VENTRICLE SYSTOLIC VOLUME: 40.67 ML
LEFT VENTRICULAR INTERNAL DIMENSION IN DIASTOLE: 4.7 CM (ref 3.5–6)
LEFT VENTRICULAR MASS: 104 G
LV LATERAL E/E' RATIO: 7.64 M/S
LV SEPTAL E/E' RATIO: 8.4 M/S
MV A" WAVE DURATION": 19.41 MSEC
MV PEAK A VEL: 0.53 M/S
MV PEAK E VEL: 0.84 M/S
MV STENOSIS PRESSURE HALF TIME: 50.5 MS
MV VALVE AREA P 1/2 METHOD: 4.36 CM2
PISA TR MAX VEL: 2.37 M/S
PULM VEIN S/D RATIO: 1.15
PV PEAK D VEL: 0.47 M/S
PV PEAK S VEL: 0.54 M/S
RA MAJOR: 4.02 CM
RA PRESSURE: 15 MMHG
RA WIDTH: 4.08 CM
RIGHT VENTRICULAR END-DIASTOLIC DIMENSION: 4.4 CM
RV TISSUE DOPPLER FREE WALL SYSTOLIC VELOCITY 1 (APICAL 4 CHAMBER VIEW): 14.35 CM/S
SINUS: 3.36 CM
STJ: 2.94 CM
TDI LATERAL: 0.11 M/S
TDI SEPTAL: 0.1 M/S
TDI: 0.11 M/S
TR MAX PG: 22 MMHG
TRICUSPID ANNULAR PLANE SYSTOLIC EXCURSION: 2.63 CM
TV REST PULMONARY ARTERY PRESSURE: 37 MMHG

## 2021-06-25 PROCEDURE — 94729 DIFFUSING CAPACITY: CPT | Mod: S$GLB,,, | Performed by: INTERNAL MEDICINE

## 2021-06-25 PROCEDURE — 99999 PR PBB SHADOW E&M-EST. PATIENT-LVL III: ICD-10-PCS | Mod: PBBFAC,,, | Performed by: INTERNAL MEDICINE

## 2021-06-25 PROCEDURE — 94729 PR C02/MEMBANE DIFFUSE CAPACITY: ICD-10-PCS | Mod: S$GLB,,, | Performed by: INTERNAL MEDICINE

## 2021-06-25 PROCEDURE — 93306 TTE W/DOPPLER COMPLETE: CPT

## 2021-06-25 PROCEDURE — 94618 PULMONARY STRESS TESTING: CPT | Mod: S$GLB,,, | Performed by: INTERNAL MEDICINE

## 2021-06-25 PROCEDURE — 77080 DEXA BONE DENSITY SPINE HIP: ICD-10-PCS | Mod: 26,,, | Performed by: INTERNAL MEDICINE

## 2021-06-25 PROCEDURE — 1125F PR PAIN SEVERITY QUANTIFIED, PAIN PRESENT: ICD-10-PCS | Mod: S$GLB,,, | Performed by: INTERNAL MEDICINE

## 2021-06-25 PROCEDURE — 99499 UNLISTED E&M SERVICE: CPT | Mod: S$GLB,,, | Performed by: INTERNAL MEDICINE

## 2021-06-25 PROCEDURE — 94010 BREATHING CAPACITY TEST: ICD-10-PCS | Mod: S$GLB,,, | Performed by: INTERNAL MEDICINE

## 2021-06-25 PROCEDURE — 94727 PR PULM FUNCTION TEST BY GAS: ICD-10-PCS | Mod: S$GLB,,, | Performed by: INTERNAL MEDICINE

## 2021-06-25 PROCEDURE — 93306 TTE W/DOPPLER COMPLETE: CPT | Mod: 26,,, | Performed by: INTERNAL MEDICINE

## 2021-06-25 PROCEDURE — 77080 DXA BONE DENSITY AXIAL: CPT | Mod: TC

## 2021-06-25 PROCEDURE — 94618 PULMONARY STRESS TESTING: ICD-10-PCS | Mod: S$GLB,,, | Performed by: INTERNAL MEDICINE

## 2021-06-25 PROCEDURE — 99999 PR PBB SHADOW E&M-EST. PATIENT-LVL III: CPT | Mod: PBBFAC,,, | Performed by: INTERNAL MEDICINE

## 2021-06-25 PROCEDURE — 77080 DXA BONE DENSITY AXIAL: CPT | Mod: 26,,, | Performed by: INTERNAL MEDICINE

## 2021-06-25 PROCEDURE — 94727 GAS DIL/WSHOT DETER LNG VOL: CPT | Mod: S$GLB,,, | Performed by: INTERNAL MEDICINE

## 2021-06-25 PROCEDURE — 93306 ECHO (CUPID ONLY): ICD-10-PCS | Mod: 26,,, | Performed by: INTERNAL MEDICINE

## 2021-06-25 PROCEDURE — 99214 PR OFFICE/OUTPT VISIT, EST, LEVL IV, 30-39 MIN: ICD-10-PCS | Mod: S$GLB,,, | Performed by: INTERNAL MEDICINE

## 2021-06-25 PROCEDURE — 3008F BODY MASS INDEX DOCD: CPT | Mod: CPTII,S$GLB,, | Performed by: INTERNAL MEDICINE

## 2021-06-25 PROCEDURE — 94010 BREATHING CAPACITY TEST: CPT | Mod: S$GLB,,, | Performed by: INTERNAL MEDICINE

## 2021-06-25 PROCEDURE — 99214 OFFICE O/P EST MOD 30 MIN: CPT | Mod: S$GLB,,, | Performed by: INTERNAL MEDICINE

## 2021-06-25 PROCEDURE — 1125F AMNT PAIN NOTED PAIN PRSNT: CPT | Mod: S$GLB,,, | Performed by: INTERNAL MEDICINE

## 2021-06-25 PROCEDURE — 99499 RISK ADDL DX/OHS AUDIT: ICD-10-PCS | Mod: S$GLB,,, | Performed by: INTERNAL MEDICINE

## 2021-06-25 PROCEDURE — 3008F PR BODY MASS INDEX (BMI) DOCUMENTED: ICD-10-PCS | Mod: CPTII,S$GLB,, | Performed by: INTERNAL MEDICINE

## 2021-06-25 RX ORDER — MYCOPHENOLATE MOFETIL 500 MG/1
1500 TABLET ORAL 2 TIMES DAILY
Qty: 540 TABLET | Refills: 0 | Status: SHIPPED | OUTPATIENT
Start: 2021-06-25 | End: 2021-07-09 | Stop reason: SDUPTHER

## 2021-06-25 RX ORDER — SILDENAFIL CITRATE 20 MG/1
TABLET ORAL
Qty: 270 TABLET | Refills: 3 | Status: SHIPPED | OUTPATIENT
Start: 2021-06-25 | End: 2022-01-24 | Stop reason: SDUPTHER

## 2021-06-25 RX ORDER — SILDENAFIL CITRATE 20 MG/1
TABLET ORAL
Qty: 270 TABLET | Refills: 1 | OUTPATIENT
Start: 2021-06-25 | End: 2021-06-25

## 2021-07-02 DIAGNOSIS — Z79.899 ON MYCOPHENOLATE MOFETIL THERAPY: ICD-10-CM

## 2021-07-02 DIAGNOSIS — M34.9 SCLERODERMA: ICD-10-CM

## 2021-07-02 RX ORDER — MYCOPHENOLATE MOFETIL 500 MG/1
1500 TABLET ORAL 2 TIMES DAILY
Qty: 540 TABLET | Refills: 0 | Status: CANCELLED | OUTPATIENT
Start: 2021-07-02

## 2021-07-07 DIAGNOSIS — M34.9 SCLERODERMA: ICD-10-CM

## 2021-07-07 DIAGNOSIS — Z79.899 ON MYCOPHENOLATE MOFETIL THERAPY: ICD-10-CM

## 2021-07-07 RX ORDER — MYCOPHENOLATE MOFETIL 500 MG/1
1500 TABLET ORAL 2 TIMES DAILY
Qty: 540 TABLET | Refills: 0 | Status: CANCELLED | OUTPATIENT
Start: 2021-07-02

## 2021-07-09 ENCOUNTER — SPECIALTY PHARMACY (OUTPATIENT)
Dept: PHARMACY | Facility: CLINIC | Age: 61
End: 2021-07-09

## 2021-07-09 DIAGNOSIS — M34.9 SCLERODERMA: ICD-10-CM

## 2021-07-09 DIAGNOSIS — Z79.899 ON MYCOPHENOLATE MOFETIL THERAPY: ICD-10-CM

## 2021-07-09 DIAGNOSIS — I73.00 RAYNAUD'S PHENOMENON WITHOUT GANGRENE: ICD-10-CM

## 2021-07-09 RX ORDER — MYCOPHENOLATE MOFETIL 500 MG/1
1500 TABLET ORAL 2 TIMES DAILY
Qty: 540 TABLET | Refills: 0 | Status: SHIPPED | OUTPATIENT
Start: 2021-07-09 | End: 2021-08-06 | Stop reason: SDUPTHER

## 2021-08-06 ENCOUNTER — SPECIALTY PHARMACY (OUTPATIENT)
Dept: PHARMACY | Facility: CLINIC | Age: 61
End: 2021-08-06

## 2021-08-06 DIAGNOSIS — Z79.899 ON MYCOPHENOLATE MOFETIL THERAPY: ICD-10-CM

## 2021-08-06 DIAGNOSIS — I73.00 RAYNAUD'S PHENOMENON WITHOUT GANGRENE: ICD-10-CM

## 2021-08-06 DIAGNOSIS — M34.9 SCLERODERMA: ICD-10-CM

## 2021-08-06 RX ORDER — MYCOPHENOLATE MOFETIL 500 MG/1
1500 TABLET ORAL 2 TIMES DAILY
Qty: 540 TABLET | Refills: 0 | Status: SHIPPED | OUTPATIENT
Start: 2021-08-06 | End: 2021-10-12 | Stop reason: SDUPTHER

## 2021-08-09 ENCOUNTER — LAB VISIT (OUTPATIENT)
Dept: LAB | Facility: HOSPITAL | Age: 61
End: 2021-08-09
Attending: STUDENT IN AN ORGANIZED HEALTH CARE EDUCATION/TRAINING PROGRAM
Payer: MEDICARE

## 2021-08-09 ENCOUNTER — TELEPHONE (OUTPATIENT)
Dept: TRANSPLANT | Facility: CLINIC | Age: 61
End: 2021-08-09

## 2021-08-09 DIAGNOSIS — Z94.4 LIVER TRANSPLANTED: ICD-10-CM

## 2021-08-09 LAB
ALBUMIN SERPL BCP-MCNC: 4 G/DL (ref 3.5–5.2)
ALP SERPL-CCNC: 63 U/L (ref 55–135)
ALT SERPL W/O P-5'-P-CCNC: 13 U/L (ref 10–44)
ANION GAP SERPL CALC-SCNC: 9 MMOL/L (ref 8–16)
AST SERPL-CCNC: 14 U/L (ref 10–40)
BASOPHILS # BLD AUTO: 0.03 K/UL (ref 0–0.2)
BASOPHILS NFR BLD: 0.6 % (ref 0–1.9)
BILIRUB SERPL-MCNC: 0.9 MG/DL (ref 0.1–1)
BUN SERPL-MCNC: 12 MG/DL (ref 6–20)
CALCIUM SERPL-MCNC: 9.2 MG/DL (ref 8.7–10.5)
CHLORIDE SERPL-SCNC: 106 MMOL/L (ref 95–110)
CO2 SERPL-SCNC: 24 MMOL/L (ref 23–29)
CREAT SERPL-MCNC: 0.7 MG/DL (ref 0.5–1.4)
DIFFERENTIAL METHOD: ABNORMAL
EOSINOPHIL # BLD AUTO: 0.1 K/UL (ref 0–0.5)
EOSINOPHIL NFR BLD: 2.5 % (ref 0–8)
ERYTHROCYTE [DISTWIDTH] IN BLOOD BY AUTOMATED COUNT: 12.5 % (ref 11.5–14.5)
EST. GFR  (AFRICAN AMERICAN): >60 ML/MIN/1.73 M^2
EST. GFR  (NON AFRICAN AMERICAN): >60 ML/MIN/1.73 M^2
GLUCOSE SERPL-MCNC: 98 MG/DL (ref 70–110)
HCT VFR BLD AUTO: 38.9 % (ref 37–48.5)
HGB BLD-MCNC: 13.2 G/DL (ref 12–16)
IMM GRANULOCYTES # BLD AUTO: 0.01 K/UL (ref 0–0.04)
IMM GRANULOCYTES NFR BLD AUTO: 0.2 % (ref 0–0.5)
LYMPHOCYTES # BLD AUTO: 1.3 K/UL (ref 1–4.8)
LYMPHOCYTES NFR BLD: 25.3 % (ref 18–48)
MCH RBC QN AUTO: 31.3 PG (ref 27–31)
MCHC RBC AUTO-ENTMCNC: 33.9 G/DL (ref 32–36)
MCV RBC AUTO: 92 FL (ref 82–98)
MONOCYTES # BLD AUTO: 0.4 K/UL (ref 0.3–1)
MONOCYTES NFR BLD: 8.1 % (ref 4–15)
NEUTROPHILS # BLD AUTO: 3.3 K/UL (ref 1.8–7.7)
NEUTROPHILS NFR BLD: 63.3 % (ref 38–73)
NRBC BLD-RTO: 0 /100 WBC
PLATELET # BLD AUTO: 191 K/UL (ref 150–450)
PMV BLD AUTO: 10.9 FL (ref 9.2–12.9)
POTASSIUM SERPL-SCNC: 4 MMOL/L (ref 3.5–5.1)
PROT SERPL-MCNC: 7.1 G/DL (ref 6–8.4)
RBC # BLD AUTO: 4.22 M/UL (ref 4–5.4)
SODIUM SERPL-SCNC: 139 MMOL/L (ref 136–145)
TACROLIMUS BLD-MCNC: 3.7 NG/ML (ref 5–15)
TACROLIMUS, NORMALIZED: 3.3 NG/ML (ref 5–15)
WBC # BLD AUTO: 5.21 K/UL (ref 3.9–12.7)

## 2021-08-09 PROCEDURE — 80197 ASSAY OF TACROLIMUS: CPT | Performed by: STUDENT IN AN ORGANIZED HEALTH CARE EDUCATION/TRAINING PROGRAM

## 2021-08-09 PROCEDURE — 85025 COMPLETE CBC W/AUTO DIFF WBC: CPT | Performed by: STUDENT IN AN ORGANIZED HEALTH CARE EDUCATION/TRAINING PROGRAM

## 2021-08-09 PROCEDURE — 80053 COMPREHEN METABOLIC PANEL: CPT | Performed by: STUDENT IN AN ORGANIZED HEALTH CARE EDUCATION/TRAINING PROGRAM

## 2021-08-09 PROCEDURE — 36415 COLL VENOUS BLD VENIPUNCTURE: CPT | Performed by: STUDENT IN AN ORGANIZED HEALTH CARE EDUCATION/TRAINING PROGRAM

## 2021-08-20 ENCOUNTER — OFFICE VISIT (OUTPATIENT)
Dept: OBSTETRICS AND GYNECOLOGY | Facility: CLINIC | Age: 61
End: 2021-08-20
Attending: OBSTETRICS & GYNECOLOGY
Payer: MEDICARE

## 2021-08-20 ENCOUNTER — PATIENT MESSAGE (OUTPATIENT)
Dept: OBSTETRICS AND GYNECOLOGY | Facility: CLINIC | Age: 61
End: 2021-08-20

## 2021-08-20 ENCOUNTER — TELEPHONE (OUTPATIENT)
Dept: OBSTETRICS AND GYNECOLOGY | Facility: CLINIC | Age: 61
End: 2021-08-20

## 2021-08-20 VITALS
WEIGHT: 145.06 LBS | DIASTOLIC BLOOD PRESSURE: 68 MMHG | SYSTOLIC BLOOD PRESSURE: 112 MMHG | BODY MASS INDEX: 26.69 KG/M2 | HEIGHT: 62 IN

## 2021-08-20 DIAGNOSIS — Z30.09 GENERAL COUNSELING AND ADVICE FOR CONTRACEPTIVE MANAGEMENT: ICD-10-CM

## 2021-08-20 DIAGNOSIS — Z01.419 WELL WOMAN EXAM WITH ROUTINE GYNECOLOGICAL EXAM: Primary | ICD-10-CM

## 2021-08-20 DIAGNOSIS — D84.9 IMMUNOSUPPRESSION: ICD-10-CM

## 2021-08-20 DIAGNOSIS — M35.01 SICCA SYNDROME WITH KERATOCONJUNCTIVITIS: ICD-10-CM

## 2021-08-20 PROCEDURE — 3074F SYST BP LT 130 MM HG: CPT | Mod: CPTII,S$GLB,, | Performed by: OBSTETRICS & GYNECOLOGY

## 2021-08-20 PROCEDURE — 88175 CYTOPATH C/V AUTO FLUID REDO: CPT | Performed by: OBSTETRICS & GYNECOLOGY

## 2021-08-20 PROCEDURE — G0101 CA SCREEN;PELVIC/BREAST EXAM: HCPCS | Mod: GZ,S$GLB,, | Performed by: OBSTETRICS & GYNECOLOGY

## 2021-08-20 PROCEDURE — 99999 PR PBB SHADOW E&M-EST. PATIENT-LVL III: CPT | Mod: PBBFAC,,, | Performed by: OBSTETRICS & GYNECOLOGY

## 2021-08-20 PROCEDURE — 3078F DIAST BP <80 MM HG: CPT | Mod: CPTII,S$GLB,, | Performed by: OBSTETRICS & GYNECOLOGY

## 2021-08-20 PROCEDURE — 3074F PR MOST RECENT SYSTOLIC BLOOD PRESSURE < 130 MM HG: ICD-10-PCS | Mod: CPTII,S$GLB,, | Performed by: OBSTETRICS & GYNECOLOGY

## 2021-08-20 PROCEDURE — G0101 PR CA SCREEN;PELVIC/BREAST EXAM: ICD-10-PCS | Mod: GZ,S$GLB,, | Performed by: OBSTETRICS & GYNECOLOGY

## 2021-08-20 PROCEDURE — 87624 HPV HI-RISK TYP POOLED RSLT: CPT | Performed by: OBSTETRICS & GYNECOLOGY

## 2021-08-20 PROCEDURE — 3008F PR BODY MASS INDEX (BMI) DOCUMENTED: ICD-10-PCS | Mod: CPTII,S$GLB,, | Performed by: OBSTETRICS & GYNECOLOGY

## 2021-08-20 PROCEDURE — 1159F PR MEDICATION LIST DOCUMENTED IN MEDICAL RECORD: ICD-10-PCS | Mod: CPTII,S$GLB,, | Performed by: OBSTETRICS & GYNECOLOGY

## 2021-08-20 PROCEDURE — 3008F BODY MASS INDEX DOCD: CPT | Mod: CPTII,S$GLB,, | Performed by: OBSTETRICS & GYNECOLOGY

## 2021-08-20 PROCEDURE — 1159F MED LIST DOCD IN RCRD: CPT | Mod: CPTII,S$GLB,, | Performed by: OBSTETRICS & GYNECOLOGY

## 2021-08-20 PROCEDURE — 99999 PR PBB SHADOW E&M-EST. PATIENT-LVL III: ICD-10-PCS | Mod: PBBFAC,,, | Performed by: OBSTETRICS & GYNECOLOGY

## 2021-08-20 PROCEDURE — 99499 RISK ADDL DX/OHS AUDIT: ICD-10-PCS | Mod: S$GLB,,, | Performed by: OBSTETRICS & GYNECOLOGY

## 2021-08-20 PROCEDURE — 99499 UNLISTED E&M SERVICE: CPT | Mod: S$GLB,,, | Performed by: OBSTETRICS & GYNECOLOGY

## 2021-08-20 PROCEDURE — 3078F PR MOST RECENT DIASTOLIC BLOOD PRESSURE < 80 MM HG: ICD-10-PCS | Mod: CPTII,S$GLB,, | Performed by: OBSTETRICS & GYNECOLOGY

## 2021-08-20 RX ORDER — ESTRADIOL 0.1 MG/G
1 CREAM VAGINAL DAILY
Qty: 42.5 G | Refills: 1 | Status: SHIPPED | OUTPATIENT
Start: 2021-08-20 | End: 2022-08-20

## 2021-08-25 ENCOUNTER — IMMUNIZATION (OUTPATIENT)
Dept: PRIMARY CARE CLINIC | Facility: CLINIC | Age: 61
End: 2021-08-25
Payer: MEDICARE

## 2021-08-25 DIAGNOSIS — Z23 NEED FOR VACCINATION: Primary | ICD-10-CM

## 2021-08-25 PROCEDURE — 0013A COVID-19, MRNA, LNP-S, PF, 100 MCG/0.5 ML DOSE VACCINE: CPT | Mod: CV19,PBBFAC | Performed by: INTERNAL MEDICINE

## 2021-08-25 PROCEDURE — 91301 COVID-19, MRNA, LNP-S, PF, 100 MCG/0.5 ML DOSE VACCINE: CPT | Mod: PBBFAC | Performed by: INTERNAL MEDICINE

## 2021-09-07 NOTE — PROGRESS NOTES
1. Have you been to the ER, urgent care clinic since your last visit? Hospitalized since your last visit? No    2. Have you seen or consulted any other health care providers outside of the [address] [place] [address] since your last visit? Include any pap smears or colon screening.  No I have personally taken the history and examined the patient. I agree with the Hand Surgery PA's note. The plan will be cont therapy/HEP. Pt feeling a lot better in shoulder. Pt states insurance also denied brace payment. She will talk to our DME.     B, ER to 50 B, IR to T6.  Pt doing well- cont exercises.

## 2021-09-23 ENCOUNTER — LAB VISIT (OUTPATIENT)
Dept: LAB | Facility: HOSPITAL | Age: 61
End: 2021-09-23
Attending: INTERNAL MEDICINE
Payer: MEDICARE

## 2021-09-23 DIAGNOSIS — M34.9 SCLERODERMA: ICD-10-CM

## 2021-09-23 DIAGNOSIS — Z79.899 ON MYCOPHENOLATE MOFETIL THERAPY: ICD-10-CM

## 2021-09-23 LAB
ALBUMIN SERPL BCP-MCNC: 4.1 G/DL (ref 3.5–5.2)
ALP SERPL-CCNC: 66 U/L (ref 55–135)
ALT SERPL W/O P-5'-P-CCNC: 14 U/L (ref 10–44)
ANION GAP SERPL CALC-SCNC: 10 MMOL/L (ref 8–16)
AST SERPL-CCNC: 15 U/L (ref 10–40)
BASOPHILS # BLD AUTO: 0.03 K/UL (ref 0–0.2)
BASOPHILS NFR BLD: 0.5 % (ref 0–1.9)
BILIRUB SERPL-MCNC: 0.9 MG/DL (ref 0.1–1)
BUN SERPL-MCNC: 11 MG/DL (ref 8–23)
CALCIUM SERPL-MCNC: 9.4 MG/DL (ref 8.7–10.5)
CHLORIDE SERPL-SCNC: 109 MMOL/L (ref 95–110)
CO2 SERPL-SCNC: 24 MMOL/L (ref 23–29)
CREAT SERPL-MCNC: 0.7 MG/DL (ref 0.5–1.4)
CRP SERPL-MCNC: 1.1 MG/L (ref 0–8.2)
DIFFERENTIAL METHOD: ABNORMAL
EOSINOPHIL # BLD AUTO: 0.2 K/UL (ref 0–0.5)
EOSINOPHIL NFR BLD: 3.4 % (ref 0–8)
ERYTHROCYTE [DISTWIDTH] IN BLOOD BY AUTOMATED COUNT: 13.2 % (ref 11.5–14.5)
ERYTHROCYTE [SEDIMENTATION RATE] IN BLOOD BY WESTERGREN METHOD: 36 MM/HR (ref 0–36)
EST. GFR  (AFRICAN AMERICAN): >60 ML/MIN/1.73 M^2
EST. GFR  (NON AFRICAN AMERICAN): >60 ML/MIN/1.73 M^2
GLUCOSE SERPL-MCNC: 100 MG/DL (ref 70–110)
HCT VFR BLD AUTO: 40.6 % (ref 37–48.5)
HGB BLD-MCNC: 13.7 G/DL (ref 12–16)
IMM GRANULOCYTES # BLD AUTO: 0.01 K/UL (ref 0–0.04)
IMM GRANULOCYTES NFR BLD AUTO: 0.2 % (ref 0–0.5)
LYMPHOCYTES # BLD AUTO: 1.5 K/UL (ref 1–4.8)
LYMPHOCYTES NFR BLD: 27.4 % (ref 18–48)
MCH RBC QN AUTO: 31.7 PG (ref 27–31)
MCHC RBC AUTO-ENTMCNC: 33.7 G/DL (ref 32–36)
MCV RBC AUTO: 94 FL (ref 82–98)
MONOCYTES # BLD AUTO: 0.4 K/UL (ref 0.3–1)
MONOCYTES NFR BLD: 7.1 % (ref 4–15)
NEUTROPHILS # BLD AUTO: 3.5 K/UL (ref 1.8–7.7)
NEUTROPHILS NFR BLD: 61.4 % (ref 38–73)
NRBC BLD-RTO: 0 /100 WBC
PLATELET # BLD AUTO: 211 K/UL (ref 150–450)
PMV BLD AUTO: 10.6 FL (ref 9.2–12.9)
POTASSIUM SERPL-SCNC: 4.5 MMOL/L (ref 3.5–5.1)
PROT SERPL-MCNC: 7.4 G/DL (ref 6–8.4)
RBC # BLD AUTO: 4.32 M/UL (ref 4–5.4)
SODIUM SERPL-SCNC: 143 MMOL/L (ref 136–145)
WBC # BLD AUTO: 5.63 K/UL (ref 3.9–12.7)

## 2021-09-23 PROCEDURE — 80053 COMPREHEN METABOLIC PANEL: CPT | Performed by: INTERNAL MEDICINE

## 2021-09-23 PROCEDURE — 85652 RBC SED RATE AUTOMATED: CPT | Performed by: INTERNAL MEDICINE

## 2021-09-23 PROCEDURE — 36415 COLL VENOUS BLD VENIPUNCTURE: CPT | Performed by: INTERNAL MEDICINE

## 2021-09-23 PROCEDURE — 85025 COMPLETE CBC W/AUTO DIFF WBC: CPT | Performed by: INTERNAL MEDICINE

## 2021-09-23 PROCEDURE — 86140 C-REACTIVE PROTEIN: CPT | Performed by: INTERNAL MEDICINE

## 2021-09-28 ENCOUNTER — SPECIALTY PHARMACY (OUTPATIENT)
Dept: PHARMACY | Facility: CLINIC | Age: 61
End: 2021-09-28

## 2021-09-28 DIAGNOSIS — I73.00 RAYNAUD'S PHENOMENON WITHOUT GANGRENE: ICD-10-CM

## 2021-09-29 ENCOUNTER — PATIENT OUTREACH (OUTPATIENT)
Dept: ADMINISTRATIVE | Facility: OTHER | Age: 61
End: 2021-09-29

## 2021-10-01 ENCOUNTER — SPECIALTY PHARMACY (OUTPATIENT)
Dept: PHARMACY | Facility: CLINIC | Age: 61
End: 2021-10-01

## 2021-10-01 DIAGNOSIS — I73.00 RAYNAUD'S PHENOMENON WITHOUT GANGRENE: ICD-10-CM

## 2021-10-07 ENCOUNTER — OFFICE VISIT (OUTPATIENT)
Dept: CARDIOLOGY | Facility: CLINIC | Age: 61
End: 2021-10-07
Payer: MEDICARE

## 2021-10-07 VITALS
SYSTOLIC BLOOD PRESSURE: 106 MMHG | WEIGHT: 145.94 LBS | DIASTOLIC BLOOD PRESSURE: 54 MMHG | HEART RATE: 63 BPM | HEIGHT: 62 IN | BODY MASS INDEX: 26.86 KG/M2

## 2021-10-07 DIAGNOSIS — I27.20 PULMONARY HYPERTENSION: Primary | ICD-10-CM

## 2021-10-07 DIAGNOSIS — I35.1 AORTIC VALVE INSUFFICIENCY, ETIOLOGY OF CARDIAC VALVE DISEASE UNSPECIFIED: ICD-10-CM

## 2021-10-07 DIAGNOSIS — I70.0 ATHEROSCLEROSIS OF AORTA: ICD-10-CM

## 2021-10-07 DIAGNOSIS — Z94.4 S/P LIVER TRANSPLANT: ICD-10-CM

## 2021-10-07 PROCEDURE — 3008F BODY MASS INDEX DOCD: CPT | Mod: CPTII,S$GLB,, | Performed by: INTERNAL MEDICINE

## 2021-10-07 PROCEDURE — 3008F PR BODY MASS INDEX (BMI) DOCUMENTED: ICD-10-PCS | Mod: CPTII,S$GLB,, | Performed by: INTERNAL MEDICINE

## 2021-10-07 PROCEDURE — 3074F PR MOST RECENT SYSTOLIC BLOOD PRESSURE < 130 MM HG: ICD-10-PCS | Mod: CPTII,S$GLB,, | Performed by: INTERNAL MEDICINE

## 2021-10-07 PROCEDURE — 99999 PR PBB SHADOW E&M-EST. PATIENT-LVL IV: ICD-10-PCS | Mod: PBBFAC,,, | Performed by: INTERNAL MEDICINE

## 2021-10-07 PROCEDURE — 99214 PR OFFICE/OUTPT VISIT, EST, LEVL IV, 30-39 MIN: ICD-10-PCS | Mod: S$GLB,,, | Performed by: INTERNAL MEDICINE

## 2021-10-07 PROCEDURE — 1160F PR REVIEW ALL MEDS BY PRESCRIBER/CLIN PHARMACIST DOCUMENTED: ICD-10-PCS | Mod: CPTII,S$GLB,, | Performed by: INTERNAL MEDICINE

## 2021-10-07 PROCEDURE — 1160F RVW MEDS BY RX/DR IN RCRD: CPT | Mod: CPTII,S$GLB,, | Performed by: INTERNAL MEDICINE

## 2021-10-07 PROCEDURE — 99999 PR PBB SHADOW E&M-EST. PATIENT-LVL IV: CPT | Mod: PBBFAC,,, | Performed by: INTERNAL MEDICINE

## 2021-10-07 PROCEDURE — 3078F DIAST BP <80 MM HG: CPT | Mod: CPTII,S$GLB,, | Performed by: INTERNAL MEDICINE

## 2021-10-07 PROCEDURE — 1159F MED LIST DOCD IN RCRD: CPT | Mod: CPTII,S$GLB,, | Performed by: INTERNAL MEDICINE

## 2021-10-07 PROCEDURE — 99214 OFFICE O/P EST MOD 30 MIN: CPT | Mod: S$GLB,,, | Performed by: INTERNAL MEDICINE

## 2021-10-07 PROCEDURE — 3078F PR MOST RECENT DIASTOLIC BLOOD PRESSURE < 80 MM HG: ICD-10-PCS | Mod: CPTII,S$GLB,, | Performed by: INTERNAL MEDICINE

## 2021-10-07 PROCEDURE — 3074F SYST BP LT 130 MM HG: CPT | Mod: CPTII,S$GLB,, | Performed by: INTERNAL MEDICINE

## 2021-10-07 PROCEDURE — 1159F PR MEDICATION LIST DOCUMENTED IN MEDICAL RECORD: ICD-10-PCS | Mod: CPTII,S$GLB,, | Performed by: INTERNAL MEDICINE

## 2021-10-08 ENCOUNTER — IMMUNIZATION (OUTPATIENT)
Dept: PHARMACY | Facility: CLINIC | Age: 61
End: 2021-10-08
Payer: MEDICARE

## 2021-10-08 ENCOUNTER — PES CALL (OUTPATIENT)
Dept: ADMINISTRATIVE | Facility: CLINIC | Age: 61
End: 2021-10-08

## 2021-10-11 ENCOUNTER — OFFICE VISIT (OUTPATIENT)
Dept: INTERNAL MEDICINE | Facility: CLINIC | Age: 61
End: 2021-10-11
Payer: MEDICARE

## 2021-10-11 VITALS
SYSTOLIC BLOOD PRESSURE: 137 MMHG | BODY MASS INDEX: 26.65 KG/M2 | OXYGEN SATURATION: 97 % | DIASTOLIC BLOOD PRESSURE: 60 MMHG | HEIGHT: 62 IN | WEIGHT: 144.81 LBS | HEART RATE: 65 BPM

## 2021-10-11 DIAGNOSIS — M54.50 CHRONIC BILATERAL LOW BACK PAIN, UNSPECIFIED WHETHER SCIATICA PRESENT: ICD-10-CM

## 2021-10-11 DIAGNOSIS — D84.9 IMMUNOSUPPRESSION: ICD-10-CM

## 2021-10-11 DIAGNOSIS — R91.1 LUNG NODULE SEEN ON IMAGING STUDY: ICD-10-CM

## 2021-10-11 DIAGNOSIS — K22.4 ESOPHAGEAL DYSMOTILITY: ICD-10-CM

## 2021-10-11 DIAGNOSIS — I70.0 ATHEROSCLEROSIS OF AORTA: ICD-10-CM

## 2021-10-11 DIAGNOSIS — L30.9 HAND ECZEMA: ICD-10-CM

## 2021-10-11 DIAGNOSIS — K59.09 CHRONIC CONSTIPATION: ICD-10-CM

## 2021-10-11 DIAGNOSIS — M34.9 SCLERODERMA: ICD-10-CM

## 2021-10-11 DIAGNOSIS — M77.8 TENDINITIS OF LEFT HAND: ICD-10-CM

## 2021-10-11 DIAGNOSIS — Z79.899 ON MYCOPHENOLATE MOFETIL THERAPY: ICD-10-CM

## 2021-10-11 DIAGNOSIS — M75.02 ADHESIVE CAPSULITIS OF LEFT SHOULDER: ICD-10-CM

## 2021-10-11 DIAGNOSIS — G89.29 CHRONIC BILATERAL LOW BACK PAIN, UNSPECIFIED WHETHER SCIATICA PRESENT: ICD-10-CM

## 2021-10-11 DIAGNOSIS — S46.012D TRAUMATIC TEAR OF LEFT ROTATOR CUFF, UNSPECIFIED TEAR EXTENT, SUBSEQUENT ENCOUNTER: ICD-10-CM

## 2021-10-11 DIAGNOSIS — Z00.00 ENCOUNTER FOR PREVENTIVE HEALTH EXAMINATION: Primary | ICD-10-CM

## 2021-10-11 DIAGNOSIS — M25.511 BILATERAL SHOULDER PAIN, UNSPECIFIED CHRONICITY: ICD-10-CM

## 2021-10-11 DIAGNOSIS — Z72.0 TOBACCO USE: ICD-10-CM

## 2021-10-11 DIAGNOSIS — M25.512 BILATERAL SHOULDER PAIN, UNSPECIFIED CHRONICITY: ICD-10-CM

## 2021-10-11 DIAGNOSIS — N39.46 MIXED STRESS AND URGE URINARY INCONTINENCE: ICD-10-CM

## 2021-10-11 DIAGNOSIS — N39.46 MIXED INCONTINENCE URGE AND STRESS: ICD-10-CM

## 2021-10-11 DIAGNOSIS — R13.10 DYSPHAGIA, UNSPECIFIED TYPE: ICD-10-CM

## 2021-10-11 DIAGNOSIS — M35.01 SICCA SYNDROME WITH KERATOCONJUNCTIVITIS: ICD-10-CM

## 2021-10-11 DIAGNOSIS — J98.4 RESTRICTIVE LUNG DISEASE: ICD-10-CM

## 2021-10-11 DIAGNOSIS — Z48.23 ENCOUNTER FOR AFTERCARE FOLLOWING LIVER TRANSPLANT: ICD-10-CM

## 2021-10-11 DIAGNOSIS — Z85.09 HISTORY OF BILE DUCT CANCER: ICD-10-CM

## 2021-10-11 DIAGNOSIS — M35.00 SICCA SYNDROME: ICD-10-CM

## 2021-10-11 DIAGNOSIS — F41.9 ANXIETY: ICD-10-CM

## 2021-10-11 DIAGNOSIS — I35.1 AORTIC VALVE INSUFFICIENCY, ETIOLOGY OF CARDIAC VALVE DISEASE UNSPECIFIED: ICD-10-CM

## 2021-10-11 DIAGNOSIS — I73.00 RAYNAUD'S PHENOMENON WITHOUT GANGRENE: ICD-10-CM

## 2021-10-11 DIAGNOSIS — Z94.4 S/P LIVER TRANSPLANT: ICD-10-CM

## 2021-10-11 DIAGNOSIS — I27.20 PULMONARY HYPERTENSION: ICD-10-CM

## 2021-10-11 DIAGNOSIS — M18.11 OSTEOARTHRITIS OF RIGHT THUMB: ICD-10-CM

## 2021-10-11 PROCEDURE — 3075F PR MOST RECENT SYSTOLIC BLOOD PRESS GE 130-139MM HG: ICD-10-PCS | Mod: CPTII,S$GLB,, | Performed by: NURSE PRACTITIONER

## 2021-10-11 PROCEDURE — 99499 UNLISTED E&M SERVICE: CPT | Mod: S$GLB,,, | Performed by: NURSE PRACTITIONER

## 2021-10-11 PROCEDURE — 99499 RISK ADDL DX/OHS AUDIT: ICD-10-PCS | Mod: S$GLB,,, | Performed by: NURSE PRACTITIONER

## 2021-10-11 PROCEDURE — 3078F PR MOST RECENT DIASTOLIC BLOOD PRESSURE < 80 MM HG: ICD-10-PCS | Mod: CPTII,S$GLB,, | Performed by: NURSE PRACTITIONER

## 2021-10-11 PROCEDURE — 99999 PR PBB SHADOW E&M-EST. PATIENT-LVL IV: CPT | Mod: PBBFAC,,, | Performed by: NURSE PRACTITIONER

## 2021-10-11 PROCEDURE — 1159F MED LIST DOCD IN RCRD: CPT | Mod: CPTII,S$GLB,, | Performed by: NURSE PRACTITIONER

## 2021-10-11 PROCEDURE — G0439 PPPS, SUBSEQ VISIT: HCPCS | Mod: S$GLB,,, | Performed by: NURSE PRACTITIONER

## 2021-10-11 PROCEDURE — G0439 PR MEDICARE ANNUAL WELLNESS SUBSEQUENT VISIT: ICD-10-PCS | Mod: S$GLB,,, | Performed by: NURSE PRACTITIONER

## 2021-10-11 PROCEDURE — 3075F SYST BP GE 130 - 139MM HG: CPT | Mod: CPTII,S$GLB,, | Performed by: NURSE PRACTITIONER

## 2021-10-11 PROCEDURE — 1160F PR REVIEW ALL MEDS BY PRESCRIBER/CLIN PHARMACIST DOCUMENTED: ICD-10-PCS | Mod: CPTII,S$GLB,, | Performed by: NURSE PRACTITIONER

## 2021-10-11 PROCEDURE — 1160F RVW MEDS BY RX/DR IN RCRD: CPT | Mod: CPTII,S$GLB,, | Performed by: NURSE PRACTITIONER

## 2021-10-11 PROCEDURE — 3008F BODY MASS INDEX DOCD: CPT | Mod: CPTII,S$GLB,, | Performed by: NURSE PRACTITIONER

## 2021-10-11 PROCEDURE — 3078F DIAST BP <80 MM HG: CPT | Mod: CPTII,S$GLB,, | Performed by: NURSE PRACTITIONER

## 2021-10-11 PROCEDURE — 99999 PR PBB SHADOW E&M-EST. PATIENT-LVL IV: ICD-10-PCS | Mod: PBBFAC,,, | Performed by: NURSE PRACTITIONER

## 2021-10-11 PROCEDURE — 3008F PR BODY MASS INDEX (BMI) DOCUMENTED: ICD-10-PCS | Mod: CPTII,S$GLB,, | Performed by: NURSE PRACTITIONER

## 2021-10-11 PROCEDURE — 1159F PR MEDICATION LIST DOCUMENTED IN MEDICAL RECORD: ICD-10-PCS | Mod: CPTII,S$GLB,, | Performed by: NURSE PRACTITIONER

## 2021-10-12 ENCOUNTER — OFFICE VISIT (OUTPATIENT)
Dept: RHEUMATOLOGY | Facility: CLINIC | Age: 61
End: 2021-10-12
Payer: MEDICARE

## 2021-10-12 VITALS
HEART RATE: 62 BPM | DIASTOLIC BLOOD PRESSURE: 71 MMHG | WEIGHT: 147 LBS | BODY MASS INDEX: 27.05 KG/M2 | HEIGHT: 62 IN | SYSTOLIC BLOOD PRESSURE: 122 MMHG

## 2021-10-12 DIAGNOSIS — Z79.899 ON MYCOPHENOLATE MOFETIL THERAPY: ICD-10-CM

## 2021-10-12 DIAGNOSIS — I73.00 RAYNAUD'S PHENOMENON WITHOUT GANGRENE: ICD-10-CM

## 2021-10-12 DIAGNOSIS — I27.20 PULMONARY HYPERTENSION: ICD-10-CM

## 2021-10-12 DIAGNOSIS — M34.9 SCLERODERMA: ICD-10-CM

## 2021-10-12 PROCEDURE — 1159F PR MEDICATION LIST DOCUMENTED IN MEDICAL RECORD: ICD-10-PCS | Mod: CPTII,S$GLB,, | Performed by: INTERNAL MEDICINE

## 2021-10-12 PROCEDURE — 99999 PR PBB SHADOW E&M-EST. PATIENT-LVL III: CPT | Mod: PBBFAC,,, | Performed by: INTERNAL MEDICINE

## 2021-10-12 PROCEDURE — 99999 PR PBB SHADOW E&M-EST. PATIENT-LVL III: ICD-10-PCS | Mod: PBBFAC,,, | Performed by: INTERNAL MEDICINE

## 2021-10-12 PROCEDURE — 99499 UNLISTED E&M SERVICE: CPT | Mod: S$GLB,,, | Performed by: INTERNAL MEDICINE

## 2021-10-12 PROCEDURE — 3078F DIAST BP <80 MM HG: CPT | Mod: CPTII,S$GLB,, | Performed by: INTERNAL MEDICINE

## 2021-10-12 PROCEDURE — 1159F MED LIST DOCD IN RCRD: CPT | Mod: CPTII,S$GLB,, | Performed by: INTERNAL MEDICINE

## 2021-10-12 PROCEDURE — 99214 PR OFFICE/OUTPT VISIT, EST, LEVL IV, 30-39 MIN: ICD-10-PCS | Mod: S$GLB,,, | Performed by: INTERNAL MEDICINE

## 2021-10-12 PROCEDURE — 99214 OFFICE O/P EST MOD 30 MIN: CPT | Mod: S$GLB,,, | Performed by: INTERNAL MEDICINE

## 2021-10-12 PROCEDURE — 3074F PR MOST RECENT SYSTOLIC BLOOD PRESSURE < 130 MM HG: ICD-10-PCS | Mod: CPTII,S$GLB,, | Performed by: INTERNAL MEDICINE

## 2021-10-12 PROCEDURE — 99499 RISK ADDL DX/OHS AUDIT: ICD-10-PCS | Mod: S$GLB,,, | Performed by: INTERNAL MEDICINE

## 2021-10-12 PROCEDURE — 3074F SYST BP LT 130 MM HG: CPT | Mod: CPTII,S$GLB,, | Performed by: INTERNAL MEDICINE

## 2021-10-12 PROCEDURE — 3008F BODY MASS INDEX DOCD: CPT | Mod: CPTII,S$GLB,, | Performed by: INTERNAL MEDICINE

## 2021-10-12 PROCEDURE — 3078F PR MOST RECENT DIASTOLIC BLOOD PRESSURE < 80 MM HG: ICD-10-PCS | Mod: CPTII,S$GLB,, | Performed by: INTERNAL MEDICINE

## 2021-10-12 PROCEDURE — 3008F PR BODY MASS INDEX (BMI) DOCUMENTED: ICD-10-PCS | Mod: CPTII,S$GLB,, | Performed by: INTERNAL MEDICINE

## 2021-10-12 RX ORDER — MYCOPHENOLATE MOFETIL 500 MG/1
1500 TABLET ORAL 2 TIMES DAILY
Qty: 540 TABLET | Refills: 0 | Status: SHIPPED | OUTPATIENT
Start: 2021-10-12 | End: 2022-01-24 | Stop reason: SDUPTHER

## 2021-10-13 ENCOUNTER — OFFICE VISIT (OUTPATIENT)
Dept: TRANSPLANT | Facility: CLINIC | Age: 61
End: 2021-10-13
Payer: MEDICARE

## 2021-10-13 VITALS
BODY MASS INDEX: 26.82 KG/M2 | OXYGEN SATURATION: 98 % | SYSTOLIC BLOOD PRESSURE: 152 MMHG | TEMPERATURE: 97 F | HEIGHT: 62 IN | DIASTOLIC BLOOD PRESSURE: 72 MMHG | RESPIRATION RATE: 16 BRPM | WEIGHT: 145.75 LBS | HEART RATE: 58 BPM

## 2021-10-13 DIAGNOSIS — Z79.60 LONG-TERM USE OF IMMUNOSUPPRESSANT MEDICATION: ICD-10-CM

## 2021-10-13 DIAGNOSIS — Z85.09 HISTORY OF CHOLANGIOCARCINOMA: ICD-10-CM

## 2021-10-13 DIAGNOSIS — Z94.4 LIVER TRANSPLANTED: Primary | ICD-10-CM

## 2021-10-13 PROCEDURE — 1160F RVW MEDS BY RX/DR IN RCRD: CPT | Mod: CPTII,S$GLB,, | Performed by: STUDENT IN AN ORGANIZED HEALTH CARE EDUCATION/TRAINING PROGRAM

## 2021-10-13 PROCEDURE — 1159F PR MEDICATION LIST DOCUMENTED IN MEDICAL RECORD: ICD-10-PCS | Mod: CPTII,S$GLB,, | Performed by: STUDENT IN AN ORGANIZED HEALTH CARE EDUCATION/TRAINING PROGRAM

## 2021-10-13 PROCEDURE — 1160F PR REVIEW ALL MEDS BY PRESCRIBER/CLIN PHARMACIST DOCUMENTED: ICD-10-PCS | Mod: CPTII,S$GLB,, | Performed by: STUDENT IN AN ORGANIZED HEALTH CARE EDUCATION/TRAINING PROGRAM

## 2021-10-13 PROCEDURE — 3077F PR MOST RECENT SYSTOLIC BLOOD PRESSURE >= 140 MM HG: ICD-10-PCS | Mod: CPTII,S$GLB,, | Performed by: STUDENT IN AN ORGANIZED HEALTH CARE EDUCATION/TRAINING PROGRAM

## 2021-10-13 PROCEDURE — 99214 PR OFFICE/OUTPT VISIT, EST, LEVL IV, 30-39 MIN: ICD-10-PCS | Mod: S$GLB,,, | Performed by: STUDENT IN AN ORGANIZED HEALTH CARE EDUCATION/TRAINING PROGRAM

## 2021-10-13 PROCEDURE — 3078F DIAST BP <80 MM HG: CPT | Mod: CPTII,S$GLB,, | Performed by: STUDENT IN AN ORGANIZED HEALTH CARE EDUCATION/TRAINING PROGRAM

## 2021-10-13 PROCEDURE — 99499 UNLISTED E&M SERVICE: CPT | Mod: S$GLB,,, | Performed by: STUDENT IN AN ORGANIZED HEALTH CARE EDUCATION/TRAINING PROGRAM

## 2021-10-13 PROCEDURE — 99214 OFFICE O/P EST MOD 30 MIN: CPT | Mod: S$GLB,,, | Performed by: STUDENT IN AN ORGANIZED HEALTH CARE EDUCATION/TRAINING PROGRAM

## 2021-10-13 PROCEDURE — 99999 PR PBB SHADOW E&M-EST. PATIENT-LVL IV: CPT | Mod: PBBFAC,,, | Performed by: STUDENT IN AN ORGANIZED HEALTH CARE EDUCATION/TRAINING PROGRAM

## 2021-10-13 PROCEDURE — 3077F SYST BP >= 140 MM HG: CPT | Mod: CPTII,S$GLB,, | Performed by: STUDENT IN AN ORGANIZED HEALTH CARE EDUCATION/TRAINING PROGRAM

## 2021-10-13 PROCEDURE — 1159F MED LIST DOCD IN RCRD: CPT | Mod: CPTII,S$GLB,, | Performed by: STUDENT IN AN ORGANIZED HEALTH CARE EDUCATION/TRAINING PROGRAM

## 2021-10-13 PROCEDURE — 3008F BODY MASS INDEX DOCD: CPT | Mod: CPTII,S$GLB,, | Performed by: STUDENT IN AN ORGANIZED HEALTH CARE EDUCATION/TRAINING PROGRAM

## 2021-10-13 PROCEDURE — 99999 PR PBB SHADOW E&M-EST. PATIENT-LVL IV: ICD-10-PCS | Mod: PBBFAC,,, | Performed by: STUDENT IN AN ORGANIZED HEALTH CARE EDUCATION/TRAINING PROGRAM

## 2021-10-13 PROCEDURE — 99499 RISK ADDL DX/OHS AUDIT: ICD-10-PCS | Mod: S$GLB,,, | Performed by: STUDENT IN AN ORGANIZED HEALTH CARE EDUCATION/TRAINING PROGRAM

## 2021-10-13 PROCEDURE — 3008F PR BODY MASS INDEX (BMI) DOCUMENTED: ICD-10-PCS | Mod: CPTII,S$GLB,, | Performed by: STUDENT IN AN ORGANIZED HEALTH CARE EDUCATION/TRAINING PROGRAM

## 2021-10-13 PROCEDURE — 3078F PR MOST RECENT DIASTOLIC BLOOD PRESSURE < 80 MM HG: ICD-10-PCS | Mod: CPTII,S$GLB,, | Performed by: STUDENT IN AN ORGANIZED HEALTH CARE EDUCATION/TRAINING PROGRAM

## 2021-10-22 ENCOUNTER — HOSPITAL ENCOUNTER (OUTPATIENT)
Dept: CARDIOLOGY | Facility: HOSPITAL | Age: 61
Discharge: HOME | End: 2021-10-22
Attending: INTERNAL MEDICINE
Payer: MEDICARE

## 2021-10-22 ENCOUNTER — OFFICE VISIT (OUTPATIENT)
Dept: OPTOMETRY | Facility: CLINIC | Age: 61
End: 2021-10-22
Payer: MEDICARE

## 2021-10-22 VITALS
SYSTOLIC BLOOD PRESSURE: 122 MMHG | HEIGHT: 62 IN | HEART RATE: 76 BPM | WEIGHT: 145 LBS | DIASTOLIC BLOOD PRESSURE: 62 MMHG | BODY MASS INDEX: 26.68 KG/M2

## 2021-10-22 DIAGNOSIS — H25.13 NUCLEAR SCLEROSIS, BILATERAL: Primary | ICD-10-CM

## 2021-10-22 DIAGNOSIS — Z13.5 GLAUCOMA SCREENING: ICD-10-CM

## 2021-10-22 DIAGNOSIS — H04.123 DRY EYES, BILATERAL: ICD-10-CM

## 2021-10-22 DIAGNOSIS — I27.20 PULMONARY HYPERTENSION: ICD-10-CM

## 2021-10-22 DIAGNOSIS — H52.4 PRESBYOPIA: ICD-10-CM

## 2021-10-22 LAB
ASCENDING AORTA: 3.31 CM
AV INDEX (PROSTH): 0.7
AV MEAN GRADIENT: 3 MMHG
AV PEAK GRADIENT: 6 MMHG
AV REGURGITATION PRESSURE HALF TIME: 490 MS
AV VALVE AREA: 2.97 CM2
AV VELOCITY RATIO: 0.78
BSA FOR ECHO PROCEDURE: 1.7 M2
CV ECHO LV RWT: 0.31 CM
DOP CALC AO PEAK VEL: 1.25 M/S
DOP CALC AO VTI: 27.36 CM
DOP CALC LVOT AREA: 4.2 CM2
DOP CALC LVOT DIAMETER: 2.32 CM
DOP CALC LVOT PEAK VEL: 0.97 M/S
DOP CALC LVOT STROKE VOLUME: 81.12 CM3
DOP CALCLVOT PEAK VEL VTI: 19.2 CM
E WAVE DECELERATION TIME: 220.8 MSEC
E/A RATIO: 0.74
E/E' RATIO: 8.57 M/S
ECHO LV POSTERIOR WALL: 0.73 CM (ref 0.6–1.1)
EJECTION FRACTION: 55 %
FRACTIONAL SHORTENING: 30 % (ref 28–44)
INTERVENTRICULAR SEPTUM: 0.7 CM (ref 0.6–1.1)
LA MAJOR: 4.3 CM
LA MINOR: 4.69 CM
LA WIDTH: 3.08 CM
LEFT ATRIUM SIZE: 2.96 CM
LEFT ATRIUM VOLUME INDEX MOD: 13.6 ML/M2
LEFT ATRIUM VOLUME INDEX: 20.8 ML/M2
LEFT ATRIUM VOLUME MOD: 22.72 CM3
LEFT ATRIUM VOLUME: 34.77 CM3
LEFT INTERNAL DIMENSION IN SYSTOLE: 3.3 CM (ref 2.1–4)
LEFT VENTRICLE DIASTOLIC VOLUME INDEX: 62.13 ML/M2
LEFT VENTRICLE DIASTOLIC VOLUME: 103.75 ML
LEFT VENTRICLE MASS INDEX: 64 G/M2
LEFT VENTRICLE SYSTOLIC VOLUME INDEX: 26.4 ML/M2
LEFT VENTRICLE SYSTOLIC VOLUME: 44.08 ML
LEFT VENTRICULAR INTERNAL DIMENSION IN DIASTOLE: 4.73 CM (ref 3.5–6)
LEFT VENTRICULAR MASS: 107.03 G
LV LATERAL E/E' RATIO: 7.5 M/S
LV SEPTAL E/E' RATIO: 10 M/S
MV A" WAVE DURATION": 10.28 MSEC
MV PEAK A VEL: 0.81 M/S
MV PEAK E VEL: 0.6 M/S
MV STENOSIS PRESSURE HALF TIME: 64.03 MS
MV VALVE AREA P 1/2 METHOD: 3.44 CM2
PISA TR MAX VEL: 2.32 M/S
PULM VEIN S/D RATIO: 2.07
PV PEAK D VEL: 0.3 M/S
PV PEAK S VEL: 0.62 M/S
RA MAJOR: 4.39 CM
RA PRESSURE: 3 MMHG
RA WIDTH: 3.88 CM
RIGHT VENTRICULAR END-DIASTOLIC DIMENSION: 3.4 CM
SINUS: 3.19 CM
STJ: 2.64 CM
TDI LATERAL: 0.08 M/S
TDI SEPTAL: 0.06 M/S
TDI: 0.07 M/S
TR MAX PG: 22 MMHG
TRICUSPID ANNULAR PLANE SYSTOLIC EXCURSION: 2.62 CM
TV REST PULMONARY ARTERY PRESSURE: 25 MMHG

## 2021-10-22 PROCEDURE — 93306 TTE W/DOPPLER COMPLETE: CPT | Mod: 26,,, | Performed by: INTERNAL MEDICINE

## 2021-10-22 PROCEDURE — 99999 PR PBB SHADOW E&M-EST. PATIENT-LVL III: CPT | Mod: PBBFAC,,, | Performed by: OPTOMETRIST

## 2021-10-22 PROCEDURE — 99999 PR PBB SHADOW E&M-EST. PATIENT-LVL III: ICD-10-PCS | Mod: PBBFAC,,, | Performed by: OPTOMETRIST

## 2021-10-22 PROCEDURE — 1160F PR REVIEW ALL MEDS BY PRESCRIBER/CLIN PHARMACIST DOCUMENTED: ICD-10-PCS | Mod: CPTII,S$GLB,, | Performed by: OPTOMETRIST

## 2021-10-22 PROCEDURE — 93306 TTE W/DOPPLER COMPLETE: CPT

## 2021-10-22 PROCEDURE — 1159F MED LIST DOCD IN RCRD: CPT | Mod: CPTII,S$GLB,, | Performed by: OPTOMETRIST

## 2021-10-22 PROCEDURE — 92014 COMPRE OPH EXAM EST PT 1/>: CPT | Mod: S$GLB,,, | Performed by: OPTOMETRIST

## 2021-10-22 PROCEDURE — 92014 PR EYE EXAM, EST PATIENT,COMPREHESV: ICD-10-PCS | Mod: S$GLB,,, | Performed by: OPTOMETRIST

## 2021-10-22 PROCEDURE — 92015 PR REFRACTION: ICD-10-PCS | Mod: S$GLB,,, | Performed by: OPTOMETRIST

## 2021-10-22 PROCEDURE — 1160F RVW MEDS BY RX/DR IN RCRD: CPT | Mod: CPTII,S$GLB,, | Performed by: OPTOMETRIST

## 2021-10-22 PROCEDURE — 92015 DETERMINE REFRACTIVE STATE: CPT | Mod: S$GLB,,, | Performed by: OPTOMETRIST

## 2021-10-22 PROCEDURE — 93306 ECHO (CUPID ONLY): ICD-10-PCS | Mod: 26,,, | Performed by: INTERNAL MEDICINE

## 2021-10-22 PROCEDURE — 1159F PR MEDICATION LIST DOCUMENTED IN MEDICAL RECORD: ICD-10-PCS | Mod: CPTII,S$GLB,, | Performed by: OPTOMETRIST

## 2021-11-19 ENCOUNTER — OFFICE VISIT (OUTPATIENT)
Dept: INTERNAL MEDICINE | Facility: CLINIC | Age: 61
End: 2021-11-19
Payer: MEDICARE

## 2021-11-19 VITALS
WEIGHT: 145.94 LBS | DIASTOLIC BLOOD PRESSURE: 64 MMHG | SYSTOLIC BLOOD PRESSURE: 130 MMHG | OXYGEN SATURATION: 98 % | TEMPERATURE: 98 F | BODY MASS INDEX: 26.86 KG/M2 | HEIGHT: 62 IN | HEART RATE: 62 BPM

## 2021-11-19 DIAGNOSIS — D84.9 IMMUNOCOMPROMISED: ICD-10-CM

## 2021-11-19 DIAGNOSIS — Z94.4 LIVER TRANSPLANTED: ICD-10-CM

## 2021-11-19 DIAGNOSIS — M34.9 SCLERODERMA: Primary | ICD-10-CM

## 2021-11-19 DIAGNOSIS — L30.9 HAND ECZEMA: ICD-10-CM

## 2021-11-19 PROCEDURE — 3075F PR MOST RECENT SYSTOLIC BLOOD PRESS GE 130-139MM HG: ICD-10-PCS | Mod: CPTII,S$GLB,, | Performed by: INTERNAL MEDICINE

## 2021-11-19 PROCEDURE — 99999 PR PBB SHADOW E&M-EST. PATIENT-LVL IV: CPT | Mod: PBBFAC,,, | Performed by: INTERNAL MEDICINE

## 2021-11-19 PROCEDURE — 3078F PR MOST RECENT DIASTOLIC BLOOD PRESSURE < 80 MM HG: ICD-10-PCS | Mod: CPTII,S$GLB,, | Performed by: INTERNAL MEDICINE

## 2021-11-19 PROCEDURE — 99999 PR PBB SHADOW E&M-EST. PATIENT-LVL IV: ICD-10-PCS | Mod: PBBFAC,,, | Performed by: INTERNAL MEDICINE

## 2021-11-19 PROCEDURE — 3008F BODY MASS INDEX DOCD: CPT | Mod: CPTII,S$GLB,, | Performed by: INTERNAL MEDICINE

## 2021-11-19 PROCEDURE — 3008F PR BODY MASS INDEX (BMI) DOCUMENTED: ICD-10-PCS | Mod: CPTII,S$GLB,, | Performed by: INTERNAL MEDICINE

## 2021-11-19 PROCEDURE — 1159F PR MEDICATION LIST DOCUMENTED IN MEDICAL RECORD: ICD-10-PCS | Mod: CPTII,S$GLB,, | Performed by: INTERNAL MEDICINE

## 2021-11-19 PROCEDURE — 1160F PR REVIEW ALL MEDS BY PRESCRIBER/CLIN PHARMACIST DOCUMENTED: ICD-10-PCS | Mod: CPTII,S$GLB,, | Performed by: INTERNAL MEDICINE

## 2021-11-19 PROCEDURE — 99499 UNLISTED E&M SERVICE: CPT | Mod: S$GLB,,, | Performed by: INTERNAL MEDICINE

## 2021-11-19 PROCEDURE — 3075F SYST BP GE 130 - 139MM HG: CPT | Mod: CPTII,S$GLB,, | Performed by: INTERNAL MEDICINE

## 2021-11-19 PROCEDURE — 3078F DIAST BP <80 MM HG: CPT | Mod: CPTII,S$GLB,, | Performed by: INTERNAL MEDICINE

## 2021-11-19 PROCEDURE — 99214 PR OFFICE/OUTPT VISIT, EST, LEVL IV, 30-39 MIN: ICD-10-PCS | Mod: S$GLB,,, | Performed by: INTERNAL MEDICINE

## 2021-11-19 PROCEDURE — 99499 RISK ADDL DX/OHS AUDIT: ICD-10-PCS | Mod: S$GLB,,, | Performed by: INTERNAL MEDICINE

## 2021-11-19 PROCEDURE — 1159F MED LIST DOCD IN RCRD: CPT | Mod: CPTII,S$GLB,, | Performed by: INTERNAL MEDICINE

## 2021-11-19 PROCEDURE — 99214 OFFICE O/P EST MOD 30 MIN: CPT | Mod: S$GLB,,, | Performed by: INTERNAL MEDICINE

## 2021-11-19 PROCEDURE — 1160F RVW MEDS BY RX/DR IN RCRD: CPT | Mod: CPTII,S$GLB,, | Performed by: INTERNAL MEDICINE

## 2021-11-22 ENCOUNTER — PATIENT OUTREACH (OUTPATIENT)
Dept: ADMINISTRATIVE | Facility: HOSPITAL | Age: 61
End: 2021-11-22
Payer: MEDICARE

## 2021-11-27 ENCOUNTER — EPISODE CHANGES (OUTPATIENT)
Dept: TRANSPLANT | Facility: HOSPITAL | Age: 61
End: 2021-11-27

## 2022-01-03 ENCOUNTER — SPECIALTY PHARMACY (OUTPATIENT)
Dept: PHARMACY | Facility: CLINIC | Age: 62
End: 2022-01-03
Payer: MEDICARE

## 2022-01-03 DIAGNOSIS — I73.00 RAYNAUD'S PHENOMENON WITHOUT GANGRENE: ICD-10-CM

## 2022-01-04 NOTE — TELEPHONE ENCOUNTER
Specialty Pharmacy - Refill Coordination    Specialty Medication Orders Linked to Encounter    Flowsheet Row Most Recent Value   Medication #1 tacrolimus (PROGRAF) 1 MG Cap (Order#[id number], Rx#[id number])   Medication #2 sildenafil (REVATIO) 20 mg Tab (Order#[id number], Rx#[id number])   Medication #3 mycophenolate (CELLCEPT) 500 mg Tab (Order#[id number], Rx#[id number])          Refill Questions - Documented Responses    Flowsheet Row Most Recent Value   Patient Availability and HIPAA Verification    Does patient want to proceed with activity? Yes   HIPAA/medical authority confirmed? Yes   Relationship to patient of person spoken to? Self   Refill Screening Questions    Changes to allergies? No   Changes to medications? No   New conditions since last clinic visit? No   Unplanned office visit, urgent care, ED, or hospital admission in the last 4 weeks? No   How does patient/caregiver feel medication is working? Excellent   Financial problems or insurance changes? No   How many doses of your specialty medications were missed in the last 4 weeks? 0   Would patient like to speak to a pharmacist? No   When does the patient need to receive the medication? 01/05/22   Refill Delivery Questions    How will the patient receive the medication? Delivery Jayshree   When does the patient need to receive the medication? 01/05/22   Shipping Address Home   Address in [facility] confirmed and updated if neccessary? Yes   Expected Copay ($) 0   Is the patient able to afford the medication copay? Yes   Payment Method zero copay   Days supply of Refill 90   Supplies needed? No supplies needed   Refill activity completed? Yes   Refill activity plan Refill scheduled   Shipment/Pickup Date: 01/05/22          Current Outpatient Medications   Medication Sig    aspirin 81 MG Chew Take 81 mg by mouth once daily.    betamethasone dipropionate (DIPROLENE) 0.05 % ointment Apply topically 2 (two) times daily. To affected areas on hands     diphenhydrAMINE (BENADRYL) 25 mg capsule Take 25 mg by mouth every 6 (six) hours as needed for Itching.    estradioL (ESTRACE) 0.01 % (0.1 mg/gram) vaginal cream Place 1 g vaginally once daily.    hydrOXYzine pamoate (VISTARIL) 50 MG Cap Take 1 capsule (50 mg total) by mouth every 8 (eight) hours as needed.    multivitamin (THERAGRAN) tablet Take 1 tablet by mouth once daily.    mycophenolate (CELLCEPT) 500 mg Tab Take 3 tablets (1,500 mg total) by mouth 2 (two) times daily.    ondansetron (ZOFRAN) 4 MG tablet Take 1 tablet (4 mg total) by mouth every 6 (six) hours as needed for Nausea.    sildenafil (REVATIO) 20 mg Tab TAKE 1 TABLET (20 MG) BY MOUTH THREE TIMES DAILY (Patient taking differently: TAKE 1 TABLET (20 MG) BY MOUTH THREE TIMES DAILY)    tacrolimus (PROGRAF) 1 MG Cap Take 2 capsules (2 mg total) by mouth every morning AND 1 capsule (1 mg total) every evening.   Last reviewed on 11/19/2021  8:07 AM by Nadeen Figueroa MD    Review of patient's allergies indicates:  No Known Allergies Last reviewed on  11/19/2021 8:07 AM by Nadeen Figueroa      Tasks added this encounter   3/27/2022 - Refill Call (Auto Added)  3/27/2022 - Refill Call (Auto Added)   Tasks due within next 3 months   No tasks due.     Shayla Arora, PharmD  [facility] Special[facility] Pharm[facility]  [address] [place] [address]  Phone: [phone]  Fax: [phone]

## 2022-01-17 ENCOUNTER — LAB VISIT (OUTPATIENT)
Dept: LAB | Facility: HOSPITAL | Age: 62
End: 2022-01-17
Attending: STUDENT IN AN ORGANIZED HEALTH CARE EDUCATION/TRAINING PROGRAM
Payer: MEDICARE

## 2022-01-17 DIAGNOSIS — C22.1 CHOLANGIOCARCINOMA: ICD-10-CM

## 2022-01-17 DIAGNOSIS — C22.0 COMBINED HEPATOCELLULAR AND CHOLANGIOCARCINOMA: ICD-10-CM

## 2022-01-17 DIAGNOSIS — Z94.4 LIVER TRANSPLANTED: ICD-10-CM

## 2022-01-17 LAB
AFP SERPL-MCNC: <2 NG/ML (ref 0–8.4)
ALBUMIN SERPL BCP-MCNC: 4.1 G/DL (ref 3.5–5.2)
ALP SERPL-CCNC: 64 U/L (ref 55–135)
ALT SERPL W/O P-5'-P-CCNC: 15 U/L (ref 10–44)
ANION GAP SERPL CALC-SCNC: 11 MMOL/L (ref 8–16)
AST SERPL-CCNC: 16 U/L (ref 10–40)
BASOPHILS # BLD AUTO: 0.03 K/UL (ref 0–0.2)
BASOPHILS NFR BLD: 0.5 % (ref 0–1.9)
BILIRUB SERPL-MCNC: 0.9 MG/DL (ref 0.1–1)
BUN SERPL-MCNC: 10 MG/DL (ref 8–23)
CALCIUM SERPL-MCNC: 9.3 MG/DL (ref 8.7–10.5)
CANCER AG19-9 SERPL-ACNC: 6.9 U/ML (ref 0–40)
CHLORIDE SERPL-SCNC: 108 MMOL/L (ref 95–110)
CO2 SERPL-SCNC: 23 MMOL/L (ref 23–29)
CREAT SERPL-MCNC: 0.8 MG/DL (ref 0.5–1.4)
DIFFERENTIAL METHOD: ABNORMAL
EOSINOPHIL # BLD AUTO: 0.1 K/UL (ref 0–0.5)
EOSINOPHIL NFR BLD: 2.1 % (ref 0–8)
ERYTHROCYTE [DISTWIDTH] IN BLOOD BY AUTOMATED COUNT: 12.7 % (ref 11.5–14.5)
EST. GFR  (AFRICAN AMERICAN): >60 ML/MIN/1.73 M^2
EST. GFR  (NON AFRICAN AMERICAN): >60 ML/MIN/1.73 M^2
GLUCOSE SERPL-MCNC: 93 MG/DL (ref 70–110)
HCT VFR BLD AUTO: 41.7 % (ref 37–48.5)
HGB BLD-MCNC: 13.3 G/DL (ref 12–16)
IMM GRANULOCYTES # BLD AUTO: 0.02 K/UL (ref 0–0.04)
IMM GRANULOCYTES NFR BLD AUTO: 0.3 % (ref 0–0.5)
LYMPHOCYTES # BLD AUTO: 1.8 K/UL (ref 1–4.8)
LYMPHOCYTES NFR BLD: 28.4 % (ref 18–48)
MCH RBC QN AUTO: 30 PG (ref 27–31)
MCHC RBC AUTO-ENTMCNC: 31.9 G/DL (ref 32–36)
MCV RBC AUTO: 94 FL (ref 82–98)
MONOCYTES # BLD AUTO: 0.5 K/UL (ref 0.3–1)
MONOCYTES NFR BLD: 7.5 % (ref 4–15)
NEUTROPHILS # BLD AUTO: 3.8 K/UL (ref 1.8–7.7)
NEUTROPHILS NFR BLD: 61.2 % (ref 38–73)
NRBC BLD-RTO: 0 /100 WBC
PLATELET # BLD AUTO: 205 K/UL (ref 150–450)
PMV BLD AUTO: 10.9 FL (ref 9.2–12.9)
POTASSIUM SERPL-SCNC: 4.5 MMOL/L (ref 3.5–5.1)
PROT SERPL-MCNC: 6.9 G/DL (ref 6–8.4)
RBC # BLD AUTO: 4.43 M/UL (ref 4–5.4)
SODIUM SERPL-SCNC: 142 MMOL/L (ref 136–145)
TACROLIMUS BLD-MCNC: 3.8 NG/ML (ref 5–15)
WBC # BLD AUTO: 6.17 K/UL (ref 3.9–12.7)

## 2022-01-17 PROCEDURE — 86301 IMMUNOASSAY TUMOR CA 19-9: CPT | Performed by: STUDENT IN AN ORGANIZED HEALTH CARE EDUCATION/TRAINING PROGRAM

## 2022-01-17 PROCEDURE — 82105 ALPHA-FETOPROTEIN SERUM: CPT | Performed by: STUDENT IN AN ORGANIZED HEALTH CARE EDUCATION/TRAINING PROGRAM

## 2022-01-17 PROCEDURE — 80197 ASSAY OF TACROLIMUS: CPT | Performed by: STUDENT IN AN ORGANIZED HEALTH CARE EDUCATION/TRAINING PROGRAM

## 2022-01-17 PROCEDURE — 85025 COMPLETE CBC W/AUTO DIFF WBC: CPT | Performed by: STUDENT IN AN ORGANIZED HEALTH CARE EDUCATION/TRAINING PROGRAM

## 2022-01-17 PROCEDURE — 36415 COLL VENOUS BLD VENIPUNCTURE: CPT | Performed by: STUDENT IN AN ORGANIZED HEALTH CARE EDUCATION/TRAINING PROGRAM

## 2022-01-17 PROCEDURE — 80053 COMPREHEN METABOLIC PANEL: CPT | Performed by: STUDENT IN AN ORGANIZED HEALTH CARE EDUCATION/TRAINING PROGRAM

## 2022-01-19 ENCOUNTER — TELEPHONE (OUTPATIENT)
Dept: TRANSPLANT | Facility: CLINIC | Age: 62
End: 2022-01-19
Payer: MEDICARE

## 2022-01-19 NOTE — TELEPHONE ENCOUNTER
Continue routine labs no changes needed.  Letter sent for next lab appointment 05/10/22    ----- Message from Vitaly Martinez MD sent at 1/18/2022  4:26 PM CST -----  Liver transplant labs reviewed and are stable. No changes in her immunosuppression. Please continue to monitor labs per transplant protocol.

## 2022-01-20 ENCOUNTER — TELEPHONE (OUTPATIENT)
Dept: TRANSPLANT | Facility: CLINIC | Age: 62
End: 2022-01-20
Payer: MEDICARE

## 2022-01-20 ENCOUNTER — LAB VISIT (OUTPATIENT)
Dept: LAB | Facility: HOSPITAL | Age: 62
End: 2022-01-20
Attending: INTERNAL MEDICINE
Payer: MEDICARE

## 2022-01-20 DIAGNOSIS — Z79.899 ON MYCOPHENOLATE MOFETIL THERAPY: ICD-10-CM

## 2022-01-20 DIAGNOSIS — M34.9 SCLERODERMA: ICD-10-CM

## 2022-01-20 LAB
ALBUMIN SERPL BCP-MCNC: 4 G/DL (ref 3.5–5.2)
ALP SERPL-CCNC: 67 U/L (ref 55–135)
ALT SERPL W/O P-5'-P-CCNC: 15 U/L (ref 10–44)
ANION GAP SERPL CALC-SCNC: 8 MMOL/L (ref 8–16)
AST SERPL-CCNC: 18 U/L (ref 10–40)
BASOPHILS # BLD AUTO: 0.03 K/UL (ref 0–0.2)
BASOPHILS NFR BLD: 0.5 % (ref 0–1.9)
BILIRUB SERPL-MCNC: 0.8 MG/DL (ref 0.1–1)
BUN SERPL-MCNC: 10 MG/DL (ref 8–23)
CALCIUM SERPL-MCNC: 9.7 MG/DL (ref 8.7–10.5)
CHLORIDE SERPL-SCNC: 105 MMOL/L (ref 95–110)
CO2 SERPL-SCNC: 26 MMOL/L (ref 23–29)
CREAT SERPL-MCNC: 0.7 MG/DL (ref 0.5–1.4)
CRP SERPL-MCNC: 1.4 MG/L (ref 0–8.2)
DIFFERENTIAL METHOD: ABNORMAL
EOSINOPHIL # BLD AUTO: 0.2 K/UL (ref 0–0.5)
EOSINOPHIL NFR BLD: 2.7 % (ref 0–8)
ERYTHROCYTE [DISTWIDTH] IN BLOOD BY AUTOMATED COUNT: 13 % (ref 11.5–14.5)
ERYTHROCYTE [SEDIMENTATION RATE] IN BLOOD BY WESTERGREN METHOD: 27 MM/HR (ref 0–36)
EST. GFR  (AFRICAN AMERICAN): >60 ML/MIN/1.73 M^2
EST. GFR  (NON AFRICAN AMERICAN): >60 ML/MIN/1.73 M^2
GLUCOSE SERPL-MCNC: 86 MG/DL (ref 70–110)
HCT VFR BLD AUTO: 42.3 % (ref 37–48.5)
HGB BLD-MCNC: 13.5 G/DL (ref 12–16)
IMM GRANULOCYTES # BLD AUTO: 0.01 K/UL (ref 0–0.04)
IMM GRANULOCYTES NFR BLD AUTO: 0.2 % (ref 0–0.5)
LYMPHOCYTES # BLD AUTO: 1.8 K/UL (ref 1–4.8)
LYMPHOCYTES NFR BLD: 31.3 % (ref 18–48)
MCH RBC QN AUTO: 30.6 PG (ref 27–31)
MCHC RBC AUTO-ENTMCNC: 31.9 G/DL (ref 32–36)
MCV RBC AUTO: 96 FL (ref 82–98)
MONOCYTES # BLD AUTO: 0.5 K/UL (ref 0.3–1)
MONOCYTES NFR BLD: 8.5 % (ref 4–15)
NEUTROPHILS # BLD AUTO: 3.3 K/UL (ref 1.8–7.7)
NEUTROPHILS NFR BLD: 56.8 % (ref 38–73)
NRBC BLD-RTO: 0 /100 WBC
PLATELET # BLD AUTO: 235 K/UL (ref 150–450)
PMV BLD AUTO: 11.4 FL (ref 9.2–12.9)
POTASSIUM SERPL-SCNC: 3.9 MMOL/L (ref 3.5–5.1)
PROT SERPL-MCNC: 7.3 G/DL (ref 6–8.4)
RBC # BLD AUTO: 4.41 M/UL (ref 4–5.4)
SODIUM SERPL-SCNC: 139 MMOL/L (ref 136–145)
WBC # BLD AUTO: 5.85 K/UL (ref 3.9–12.7)

## 2022-01-20 PROCEDURE — 80053 COMPREHEN METABOLIC PANEL: CPT | Performed by: INTERNAL MEDICINE

## 2022-01-20 PROCEDURE — 85652 RBC SED RATE AUTOMATED: CPT | Performed by: INTERNAL MEDICINE

## 2022-01-20 PROCEDURE — 85025 COMPLETE CBC W/AUTO DIFF WBC: CPT | Performed by: INTERNAL MEDICINE

## 2022-01-20 PROCEDURE — 36415 COLL VENOUS BLD VENIPUNCTURE: CPT | Performed by: INTERNAL MEDICINE

## 2022-01-20 PROCEDURE — 86140 C-REACTIVE PROTEIN: CPT | Performed by: INTERNAL MEDICINE

## 2022-01-20 NOTE — TELEPHONE ENCOUNTER
----- Message from Vitaly Martinez MD sent at 1/18/2022  4:26 PM CST -----  Liver transplant labs reviewed and are stable. No changes in her immunosuppression. Please continue to monitor labs per transplant protocol.

## 2022-01-20 NOTE — LETTER
January 20, 2022    Nadeen Mcgovern  [address] [facility] [address]          Dear Nadeen Mcgovern:  MRN: [id number]    This is a follow up to your recent labs, your lab results were stable.  There are no medicine changes.  Please have your labs drawn again on 05/10/22.      If you cannot have your labs drawn on the scheduled date, it is your responsibility to call the transplant department to reschedule.  Please call [phone] and ask to speak to [facility] -  for all scheduling requests.     Sincerely,    Chetna Pickard RN      Your Liver Transplant Coordinator    [facility]  [facility] [place] [address]  [phone]

## 2022-01-21 ENCOUNTER — TELEPHONE (OUTPATIENT)
Dept: RHEUMATOLOGY | Facility: CLINIC | Age: 62
End: 2022-01-21
Payer: MEDICARE

## 2022-01-24 ENCOUNTER — OFFICE VISIT (OUTPATIENT)
Dept: RHEUMATOLOGY | Facility: CLINIC | Age: 62
End: 2022-01-24
Payer: MEDICARE

## 2022-01-24 ENCOUNTER — TELEPHONE (OUTPATIENT)
Dept: GASTROENTEROLOGY | Facility: CLINIC | Age: 62
End: 2022-01-24
Payer: MEDICARE

## 2022-01-24 VITALS
HEART RATE: 62 BPM | HEIGHT: 62 IN | SYSTOLIC BLOOD PRESSURE: 106 MMHG | DIASTOLIC BLOOD PRESSURE: 61 MMHG | BODY MASS INDEX: 27.42 KG/M2 | WEIGHT: 149 LBS

## 2022-01-24 DIAGNOSIS — J98.4 RESTRICTIVE LUNG DISEASE: Primary | ICD-10-CM

## 2022-01-24 DIAGNOSIS — L30.9 ECZEMA, UNSPECIFIED TYPE: ICD-10-CM

## 2022-01-24 DIAGNOSIS — M34.9 SCLERODERMA: ICD-10-CM

## 2022-01-24 DIAGNOSIS — Z79.899 ON MYCOPHENOLATE MOFETIL THERAPY: ICD-10-CM

## 2022-01-24 PROCEDURE — 99214 OFFICE O/P EST MOD 30 MIN: CPT | Mod: S$GLB,,, | Performed by: INTERNAL MEDICINE

## 2022-01-24 PROCEDURE — 3008F PR BODY MASS INDEX (BMI) DOCUMENTED: ICD-10-PCS | Mod: CPTII,S$GLB,, | Performed by: INTERNAL MEDICINE

## 2022-01-24 PROCEDURE — 3074F SYST BP LT 130 MM HG: CPT | Mod: CPTII,S$GLB,, | Performed by: INTERNAL MEDICINE

## 2022-01-24 PROCEDURE — 99499 UNLISTED E&M SERVICE: CPT | Mod: S$GLB,,, | Performed by: STUDENT IN AN ORGANIZED HEALTH CARE EDUCATION/TRAINING PROGRAM

## 2022-01-24 PROCEDURE — 99999 PR PBB SHADOW E&M-EST. PATIENT-LVL III: CPT | Mod: PBBFAC,,, | Performed by: INTERNAL MEDICINE

## 2022-01-24 PROCEDURE — 99214 PR OFFICE/OUTPT VISIT, EST, LEVL IV, 30-39 MIN: ICD-10-PCS | Mod: S$GLB,,, | Performed by: INTERNAL MEDICINE

## 2022-01-24 PROCEDURE — 1159F PR MEDICATION LIST DOCUMENTED IN MEDICAL RECORD: ICD-10-PCS | Mod: CPTII,S$GLB,, | Performed by: INTERNAL MEDICINE

## 2022-01-24 PROCEDURE — 3078F DIAST BP <80 MM HG: CPT | Mod: CPTII,S$GLB,, | Performed by: INTERNAL MEDICINE

## 2022-01-24 PROCEDURE — 3074F PR MOST RECENT SYSTOLIC BLOOD PRESSURE < 130 MM HG: ICD-10-PCS | Mod: CPTII,S$GLB,, | Performed by: INTERNAL MEDICINE

## 2022-01-24 PROCEDURE — 3078F PR MOST RECENT DIASTOLIC BLOOD PRESSURE < 80 MM HG: ICD-10-PCS | Mod: CPTII,S$GLB,, | Performed by: INTERNAL MEDICINE

## 2022-01-24 PROCEDURE — 99999 PR PBB SHADOW E&M-EST. PATIENT-LVL III: ICD-10-PCS | Mod: PBBFAC,,, | Performed by: INTERNAL MEDICINE

## 2022-01-24 PROCEDURE — 1159F MED LIST DOCD IN RCRD: CPT | Mod: CPTII,S$GLB,, | Performed by: INTERNAL MEDICINE

## 2022-01-24 PROCEDURE — 99499 RISK ADDL DX/OHS AUDIT: ICD-10-PCS | Mod: S$GLB,,, | Performed by: STUDENT IN AN ORGANIZED HEALTH CARE EDUCATION/TRAINING PROGRAM

## 2022-01-24 PROCEDURE — 3008F BODY MASS INDEX DOCD: CPT | Mod: CPTII,S$GLB,, | Performed by: INTERNAL MEDICINE

## 2022-01-24 RX ORDER — SILDENAFIL CITRATE 20 MG/1
TABLET ORAL
Qty: 270 TABLET | Refills: 3 | Status: SHIPPED | OUTPATIENT
Start: 2022-01-24 | End: 2022-07-25 | Stop reason: SDUPTHER

## 2022-01-24 RX ORDER — MYCOPHENOLATE MOFETIL 500 MG/1
1500 TABLET ORAL 2 TIMES DAILY
Qty: 540 TABLET | Refills: 0 | Status: SHIPPED | OUTPATIENT
Start: 2022-01-24 | End: 2022-02-16

## 2022-01-24 NOTE — TELEPHONE ENCOUNTER
----- Message from Carlos Haynes MD sent at 1/24/2022  9:50 AM CST -----  Please schedule a follow up with in.  ----- Message -----  From: Haresh Butler MD  Sent: 1/24/2022   9:16 AM CST  To: Carlos Haynes MD, Checo Trivedi MD

## 2022-01-24 NOTE — PROGRESS NOTES
"Subjective:       Patient ID: Nadeen Mcgovern is a 61 y.o. female.    Chief Complaint: Scleroderma    HPI Last visit was 10/12/21 where she was having lightheadedness and was taken off amlodipine by her cardiologist. She was stable from scleroderma perspective and continued on cellcept, sildenafil, and tacrolimus.     Today, she is doing well but does endorse some difficulty swallowing solid foods and some of her larger pills. She has had this issue for a while now and says that it has not progressed. She denies choking or difficulty swallowing liquids. She reports her ezcema has improved since last visit and her lightheadedness has dissipated since being off the amlodipine, however she has noticed some darkening of her finger tips. She denies any tingling, numbness, burning, or pain in her finger tips. She also endorses some mild SOB but says that it is stable and not bothersome. Only noticeable after she exerts herself. She denies headaches, vision changes, chest pain, abdominal pain, or changes with bowel movements or urination.     Review of Systems   Constitutional: Negative for fever and unexpected weight change.   HENT: Positive for trouble swallowing. Negative for mouth sores.    Eyes: Negative for redness.   Respiratory: Positive for shortness of breath. Negative for cough.    Cardiovascular: Negative for chest pain.   Gastrointestinal: Negative for constipation and diarrhea.   Genitourinary: Negative for dysuria and genital sores.   Musculoskeletal: Negative for arthralgias.   Skin: Negative for rash.   Neurological: Negative for headaches.   Hematological: Does not bruise/bleed easily.   Psychiatric/Behavioral: Negative for agitation.         Objective:   /61   Pulse 62   Ht 5' 2.4" (1.585 m)   Wt 67.6 kg (149 lb)   LMP  (LMP Unknown)   BMI 26.90 kg/m²      Physical Exam   HENT:   Head: Normocephalic and atraumatic.   Eyes: Pupils are equal, round, and reactive to light.   Cardiovascular: Normal " rate, regular rhythm, normal heart sounds and normal pulses.   Pulmonary/Chest: Effort normal and breath sounds normal.   Abdominal: Soft. Normal appearance.   Musculoskeletal:         General: Normal range of motion.      Cervical back: Normal range of motion.   Neurological: She is alert.   Skin: Skin is warm.   Mild ulcerations on finger tips of 2nd and 3rd digits of right hand.    Psychiatric: Her behavior is normal. Mood normal.       Right Side Rheumatological Exam     Examination finds the 1st PIP, 1st MCP, 2nd PIP, 2nd MCP, 3rd PIP, 3rd MCP, 4th PIP, 4th MCP, 5th PIP and 5th MCP normal.    Muscle Strength (0-5 scale):  Neck Flexion:  5  Neck Extension: 5  Deltoid:  5  Biceps: 5/5   Triceps:  5  : 5/5   Iliopsoas: 5  Quadriceps:  5   Distal Lower Extremity: 5    Left Side Rheumatological Exam     Examination finds the 1st PIP, 1st MCP, 2nd PIP, 2nd MCP, 3rd PIP, 3rd MCP, 4th PIP, 4th MCP, 5th PIP and 5th MCP normal.    Muscle Strength (0-5 scale):  Neck Flexion:  5  Neck Extension: 5  Deltoid:  5  Biceps: 5/5   Triceps:  5  :  5/5   Iliopsoas: 5  Quadriceps:  5   Distal Lower Extremity: 5              Assessment:       1. Restrictive lung disease    2. Scleroderma    3. On mycophenolate mofetil therapy    4. Eczema, unspecified type            Plan:       Problem List Items Addressed This Visit        Pulmonary    Restrictive lung disease - Primary    Relevant Orders    Spriometry w/Tracings    DLCO    Lung Volume    Six Minute Walk       Immunology/Multi System    Scleroderma    Relevant Medications    mycophenolate (CELLCEPT) 500 mg Tab    sildenafil (REVATIO) 20 mg Tab    Other Relevant Orders    Ambulatory referral/consult to Gastroenterology       Other    On mycophenolate mofetil therapy    Relevant Medications    mycophenolate (CELLCEPT) 500 mg Tab      Other Visit Diagnoses     Eczema, unspecified type        Relevant Orders    Ambulatory referral/consult to Dermatology         Plan:    Scleroderma  - Lung volumes, DLCO, spirometry, and 6-minute walk test ordered  - Continue with cellcept 1500 mg BID  - Continue with tacrolimus 2 mg in AM and 1 mg in PM  - Continue sildenafil 20 mg TID     Dysphagia  - Referral to Dr. Haynes in GI     Eczema   - Referral to Dr. Green in Dermatology     RTC in 3 months with standing labs    Pt was seen and discussed with Dr. Butler who is in agreement with the plan.    Jose Miguel Seymour MD  [facility] PM&R PGY-1

## 2022-01-24 NOTE — PROGRESS NOTES
I have personally taken the history and examined the patient and agree with the resident,s note as stated above        lcSSc(limited scleroderma): ACR/EULAR criteria: (no sclerodactyly today) digital tip pitting scars(3) telangiectases(2) Raynaud's(3) =8 does not meet  criteria ; oxaliplatin(cisplatin associated with Raynaud's) ; 5-FU not known to be associated with Raynaud's or scleroderma. MRSS=17 increased from 2 previous!. No evidence of scleroderma esophagus by esophageal manometry or upper endoscopy  EUGENE+ 1:320 nucleolar(scleroderma pattern) and speckled + SS-A (most typical of Sjogren's and SLE), Raynaud's, telangiectases, dysphagia. All scleroderma associated autoantibodies negative     · The left ventricle is normal in size with normal systolic function.  · The estimated ejection fraction is 55%.  · Normal left ventricular diastolic function.  · Normal right ventricular size with normal right ventricular systolic function.  · The estimated PA systolic pressure is 25 mmHg.  · Normal central venous pressure (3 mmHg).     Narrative & Impression  EXAMINATION:  CT CHEST ABDOMEN PELVIS WITH CONTRAST (XPD)     CLINICAL HISTORY:  Cholangiocarcinoma liver txp follow up; Intrahepatic bile duct carcinoma     TECHNIQUE:  Low dose axial CT images obtained throughout the region of the chest, abdomen and pelvis after the administration of 75 mL of Omnipaque 350 intravenous contrast.  Axial, sagittal and coronal reconstructions were performed.     COMPARISON:  None     FINDINGS:  Chest:     No mediastinal or hilar lymph node enlargement.     No suspicious pulmonary nodule.     The trachea and bronchial airways are clear and fully patent.The lungs are well expanded and clear.     Mild calcified plaque lines the aorta.     Abdomen/pelvis:     Previous orthotopic liver transplant.  Parenchyma is homogeneous.  Oval low-density focus at the nick hepatis measures 2.5 cm (previously 2.7 cm in June 2020 and 3.2 cm April 2019).   No new hepatic lesion identified.  Cholecystectomy and choledocho jejunostomy with stable pneumobilia in the left hepatic lobe.     Low-density lesion in the inferior aspect of the spleen measures 1.1 cm, unchanged going back to at least April 2019.  This may represent a small hemangioma.  Pancreas and adrenal glands are unremarkable.     Kidneys appear normal. The ureters are decompressed. Urinary bladder unremarkable.     The stomach, small bowel and colon demonstrate no evidence of obstruction or focal inflammation.     No free air or free fluid identified within the abdomen or pelvis.     Aorta tapers normally throughout its course.  Retroaortic left renal vein.  No retroperitoneal lymph node enlargement.  Mesenteric lymph nodes in the right lower quadrant unchanged measuring up to 0.8 cm in short axis dimension.     Osseous structures exhibit mild degenerative changes. No fracture or focal osseous destructive lesion.     Impression:     History of cholangiocarcinoma status post orthotopic liver transplant.  No CT evidence for metastatic disease.  No detrimental change compared to previous exams.  Chronic findings as given in detail above.        Electronically signed by: Jordyn Villalta  Date:                                            06/17/2021  Time:                                           11:16    Results for SHILO HAMPTON (MRN [id number]) as of 1/24/2022 08:23   Ref. Range 4/8/2021 08:08   Cholesterol Latest Ref Range: 120 - 199 mg/dL 165   HDL Latest Ref Range: 40 - 75 mg/dL 43   HDL/Cholesterol Ratio Latest Ref Range: 20.0 - 50.0 % 26.1   LDL Cholesterol External Latest Ref Range: 63.0 - 159.0 mg/dL 102.2   Non-HDL Cholesterol Latest Units: mg/dL 122   Total Cholesterol/HDL Ratio Latest Ref Range: 2.0 - 5.0  3.8   Triglycerides Latest Ref Range: 30 - 150 mg/dL 99   The 10-year ASCVD risk score (Christa NICOLLE Jr., et al., 2013) is: 3.2%    Values used to calculate the score:      Age: 61 years      Sex: Female       Is Non- : Yes      Diabetic: No      Tobacco smoker: No      Systolic Blood Pressure: 106 mmHg      Is BP treated: No      HDL Cholesterol: 43 mg/dL      Total Cholesterol: 165 mg/dL      lcSSc mRSS 4(mild)  Raynaud's slightly worse with current cold weather and discontinuation of amlodipine  PAH improved ePASP on TTE  Hand eczema, atopic dernatitis, slightly worse  Dysphagia, pills and solids only      PFTs  * 2nd booster, 4th dose of Moderna Covid vaccine. As scheduled  F/u with Dr Haynes in Gastro for dysphagia  F/u Dr. Green for atopic dermatitis, hand eczema  RTC 3 months with standing labs, uric acid, NT-pro BNP    Answers for HPI/ROS submitted by the patient on 1/24/2022  fever: No  eye redness: No  mouth sores: No  headaches: No  shortness of breath: No  chest pain: No  trouble swallowing: No  diarrhea: No  constipation: No  unexpected weight change: No  genital sore: No  dysuria: No  During the last 3 days, have you had a skin rash?: No  Bruises or bleeds easily: No  cough: No

## 2022-02-16 ENCOUNTER — OFFICE VISIT (OUTPATIENT)
Dept: GASTROENTEROLOGY | Facility: CLINIC | Age: 62
End: 2022-02-16
Payer: MEDICARE

## 2022-02-16 ENCOUNTER — SPECIALTY PHARMACY (OUTPATIENT)
Dept: PHARMACY | Facility: CLINIC | Age: 62
End: 2022-02-16
Payer: MEDICARE

## 2022-02-16 VITALS
HEIGHT: 62 IN | WEIGHT: 144.19 LBS | SYSTOLIC BLOOD PRESSURE: 125 MMHG | HEART RATE: 71 BPM | DIASTOLIC BLOOD PRESSURE: 71 MMHG | BODY MASS INDEX: 26.53 KG/M2

## 2022-02-16 DIAGNOSIS — Z94.4 LIVER TRANSPLANT STATUS: ICD-10-CM

## 2022-02-16 DIAGNOSIS — M34.9 SCLERODERMA: ICD-10-CM

## 2022-02-16 DIAGNOSIS — M34.9 SCLERODERMA: Primary | ICD-10-CM

## 2022-02-16 DIAGNOSIS — R13.10 DYSPHAGIA, UNSPECIFIED TYPE: Primary | ICD-10-CM

## 2022-02-16 PROCEDURE — 1160F PR REVIEW ALL MEDS BY PRESCRIBER/CLIN PHARMACIST DOCUMENTED: ICD-10-PCS | Mod: CPTII,S$GLB,, | Performed by: FAMILY MEDICINE

## 2022-02-16 PROCEDURE — 3008F BODY MASS INDEX DOCD: CPT | Mod: CPTII,S$GLB,, | Performed by: FAMILY MEDICINE

## 2022-02-16 PROCEDURE — 3074F SYST BP LT 130 MM HG: CPT | Mod: CPTII,S$GLB,, | Performed by: FAMILY MEDICINE

## 2022-02-16 PROCEDURE — 1160F RVW MEDS BY RX/DR IN RCRD: CPT | Mod: CPTII,S$GLB,, | Performed by: FAMILY MEDICINE

## 2022-02-16 PROCEDURE — 3078F PR MOST RECENT DIASTOLIC BLOOD PRESSURE < 80 MM HG: ICD-10-PCS | Mod: CPTII,S$GLB,, | Performed by: FAMILY MEDICINE

## 2022-02-16 PROCEDURE — 99214 OFFICE O/P EST MOD 30 MIN: CPT | Mod: S$GLB,,, | Performed by: FAMILY MEDICINE

## 2022-02-16 PROCEDURE — 3078F DIAST BP <80 MM HG: CPT | Mod: CPTII,S$GLB,, | Performed by: FAMILY MEDICINE

## 2022-02-16 PROCEDURE — 99214 PR OFFICE/OUTPT VISIT, EST, LEVL IV, 30-39 MIN: ICD-10-PCS | Mod: S$GLB,,, | Performed by: FAMILY MEDICINE

## 2022-02-16 PROCEDURE — 1159F PR MEDICATION LIST DOCUMENTED IN MEDICAL RECORD: ICD-10-PCS | Mod: CPTII,S$GLB,, | Performed by: FAMILY MEDICINE

## 2022-02-16 PROCEDURE — 99999 PR PBB SHADOW E&M-EST. PATIENT-LVL IV: CPT | Mod: PBBFAC,,, | Performed by: FAMILY MEDICINE

## 2022-02-16 PROCEDURE — 1159F MED LIST DOCD IN RCRD: CPT | Mod: CPTII,S$GLB,, | Performed by: FAMILY MEDICINE

## 2022-02-16 PROCEDURE — 3074F PR MOST RECENT SYSTOLIC BLOOD PRESSURE < 130 MM HG: ICD-10-PCS | Mod: CPTII,S$GLB,, | Performed by: FAMILY MEDICINE

## 2022-02-16 PROCEDURE — 99999 PR PBB SHADOW E&M-EST. PATIENT-LVL IV: ICD-10-PCS | Mod: PBBFAC,,, | Performed by: FAMILY MEDICINE

## 2022-02-16 PROCEDURE — 3008F PR BODY MASS INDEX (BMI) DOCUMENTED: ICD-10-PCS | Mod: CPTII,S$GLB,, | Performed by: FAMILY MEDICINE

## 2022-02-16 RX ORDER — MYCOPHENOLATE MOFETIL 200 MG/ML
1500 POWDER, FOR SUSPENSION ORAL 2 TIMES DAILY
Qty: 450 ML | Refills: 2 | Status: SHIPPED | OUTPATIENT
Start: 2022-02-16 | End: 2022-05-18 | Stop reason: SDUPTHER

## 2022-02-16 NOTE — TELEPHONE ENCOUNTER
Refill too soon msg.  Active PA on file for Revatio approved from 1/1/2022 through 12/31/2022    No PA req for Cellcept

## 2022-02-16 NOTE — TELEPHONE ENCOUNTER
BI NOTES:    IN-NETWORK: [facility] in network  OOP: 7050  DED: 480  COPAY: 25%  TIER: N/A (revatio); cellcept tier 3  REP: Cecilio

## 2022-02-16 NOTE — PROGRESS NOTES
[facility] Consultation Note    Reason for Consult:  The primary encounter diagnosis was Dysphagia, unspecified type. A diagnosis of Scleroderma was also pertinent to this visit.    PCP:   Primary Doctor No       Referring MD:  No referring provider defined for this encounter.    Progress Note 2/16/2022:  Here for follow-up of ongoing dysphagia.  Mainly to large pills (notably her mycophenalate) and meats, such as steak or pork chops.  She is unsure if this improved after last esophageal dilation.      Progress Note 9/23/2020:  This is a 60 y.o. female here for evaluation of heartburn and dysphagia. She is an established patient, new to me. C/o intermittent trouble swallowing - particularly meats. This happens occasionally because she has accommodated her eating habits so this will not happen. Feels as though they get stuck in her chest and she will have to drink a lot of water or eat bread to help them pass. Hx of esophageal dilation in the past for this problem. No odynophagia or regurgitation. Also c/o intermittent heartburn, notably after eating red gravy or tomato-based foods. She has taken Pepcid in the past but is no longer on this. States her reflux is less than 2x per week. She will take TUMs as needed with these episodes.      Reflux - yes, <2x per week; TUMs prn;  Dysphagia - yes, mainly to meats  Bowel Habits - normal, at least 1 soft BM per day; eats high fiber diet and takes a daily fiber supplement   Rectal Bleeding/Melena- no  NSAIDs - none  Fam Hx - No crc, no IBD, no Celiac        ROS:  Constitutional: No fevers, chills, No weight loss  GI: see HPI      Medical History:  has a past medical history of Acute cholecystitis, Arthritis (2015), Bacteremia due to Streptococcus pneumoniae, Cancer, Cataract, Cholangiocarcinoma, Hypertension, Jaundice, Liver transplant status, Prediabetes, Raynaud disease, and Scleroderma.    Surgical History:  has a past surgical history that includes   section; arthoscopic (Right); Shoulder arthroscopy; Incision and drainage of wound; Liver transplant; and Esophagogastroduodenoscopy (N/A, 2020).    Family History: family history includes Alcohol abuse in her brother; Arthritis in her father and sister; Cancer in her brother, father, and paternal grandmother; Diabetes in her mother; Heart attack in her mother; Hypertension in her mother; No Known Problems in her daughter, daughter, son, and son; Stroke in her mother..     Social History:  reports that she has never smoked. She has never used smokeless tobacco. She reports current alcohol use. She reports current drug use. Drug: Marijuana.    Review of patient's allergies indicates:  No Known Allergies    Current Outpatient Medications on File Prior to Visit   Medication Sig Dispense Refill    aspirin 81 MG Chew Take 81 mg by mouth once daily.      betamethasone dipropionate (DIPROLENE) 0.05 % ointment Apply topically 2 (two) times daily. To affected areas on hands 45 g 3    diphenhydrAMINE (BENADRYL) 25 mg capsule Take 25 mg by mouth every 6 (six) hours as needed for Itching.      estradioL (ESTRACE) 0.01 % (0.1 mg/gram) vaginal cream Place 1 g vaginally once daily. 42.5 g 1    hydrOXYzine pamoate (VISTARIL) 50 MG Cap Take 1 capsule (50 mg total) by mouth every 8 (eight) hours as needed. 30 capsule 3    multivitamin (THERAGRAN) tablet Take 1 tablet by mouth once daily.      mycophenolate (CELLCEPT) 500 mg Tab Take 3 tablets (1,500 mg total) by mouth 2 (two) times daily. 540 tablet 0    ondansetron (ZOFRAN) 4 MG tablet Take 1 tablet (4 mg total) by mouth every 6 (six) hours as needed for Nausea. 30 tablet 0    sildenafil (REVATIO) 20 mg Tab TAKE 1 TABLET (20 MG) BY MOUTH THREE TIMES DAILY 270 tablet 3    tacrolimus (PROGRAF) 1 MG Cap Take 2 capsules (2 mg total) by mouth every morning AND 1 capsule (1 mg total) every evening. 90 capsule 11     No current facility-administered medications on  "file prior to visit.         Objective Findings:    Vital Signs:  /71   Pulse 71   Ht 5' 2.4" (1.585 m)   Wt 65.4 kg (144 lb 2.9 oz)   LMP  (LMP Unknown)   BMI 26.03 kg/m²   Body mass index is 26.03 kg/m².    Physical Exam:  General Appearance: Well appearing in no acute distress  Head:   Normocephalic, without obvious abnormality  Eyes:    No scleral icterus  ENT: Neck supple  Lungs: CTA bilaterally in anterior and posterior fields, no wheezes, no crackles.  Heart:  Regular rate and rhythm, S1, S2 normal, no murmurs heard  Abdomen: Soft, non tender, non distended with positive bowel sounds in all four quadrants. No hepatosplenomegaly, ascites, or mass  Extremities: No edema  Skin: No rash  Neurologic: AAO x 3      Labs:  Lab Results   Component Value Date    WBC 5.85 01/20/2022    HGB 13.5 01/20/2022    HCT 42.3 01/20/2022     01/20/2022    CRP 1.4 01/20/2022    CHOL 165 04/08/2021    TRIG 99 04/08/2021    HDL 43 04/08/2021    ALT 15 01/20/2022    AST 18 01/20/2022     01/20/2022    K 3.9 01/20/2022     01/20/2022    CREATININE 0.7 01/20/2022    BUN 10 01/20/2022    CO2 26 01/20/2022    TSH 1.224 08/17/2016    INR 1.0 10/27/2016    HGBA1C 5.4 02/15/2017       Imaging reviewed:  6/26/2020 CT Chest Abd Pelvis for cholangiocarcinoma surveillance  1. Postsurgical changes of liver transplantation.  Stable hypodensity in the area of the nick hepatis.  No new or enhancing hepatic lesions identified.  2. Splenic hypodensity which is stable when compared to CT 01/12/2020 and decreased in size when compared to CT 07/25/2015, possibly an involuting splenic cyst.  3. Right upper lobe nodule which is stable when compared to CTA 04/27/2015 consistent with benign etiology.     Endoscopy reviewed:  9/29/2020 EGD  - Benign-appearing esophageal stenosis. Dilated.   - Normal stomach.   - Normal examined duodenum.   - No specimens collected.     5/17/2019 Colonoscopy w/ Dr Josue @ [facility] for personal hx of " colon polyps  1. Good prep, to cecum  2. A 3 mm polyp in proximal transverse colon, complete resection; hyperplastic polyp (cytology report found in media)  3. A 4 mm polyp in distal rectum, complete resection; hyperplastic polyp  4. Internal hemorrhoids  5. Repeat in 5 years    7/5/2017 HREM  - Esophagogastric junction (EGJ) outflow obstruction. There is impairment of bolus passage (incomplete passage).   - Findings are not consistent with Scleroderma.     2/3/2017 EGD  - Benign-appearing mild esophageal stenosis at the GE junction, dilated up to 18 mm. Mid esophagus, biopsied.     61 y.o. female here for evaluation of:    Assessment:  1. Dysphagia, unspecified type    2. Scleroderma         Ongoing dysphagia, particularly to pills and meats.  Discussed case with Dr Mackay who recommend esophagram with barium tablet.  If tablet demonstrates stasis at certain point, could consider savary dilation.  She does have a prior HREM that demonstrates GEJ outflow.  Will consider after esophagram with tablet is resulted. Recommend famotidine PRN for occasional reflux. Could consider liquid formulation of mycophenolate if it is financially affordable.    Recommendations:  1. Esophagram with barium tablet  2. Consider repeat EGD with savary dilation  3. Mycophenalate liquid formulation?    Follow up if symptoms worsen or fail to improve, for pending test results.      Order summary:  Orders Placed This Encounter    FL Esophagram With Barium Tablet         Thank you so much for allowing me to participate in the care of DIAMANTE White

## 2022-02-16 NOTE — PATIENT INSTRUCTIONS
1. Schedule esophagram with tablet  - consider repeat EGD with dilation vs. Esophageal manometry depending on results    2. OK to use Famotidine (Pepcid) as needed for heartburn/reflux  - this is over the counter

## 2022-02-17 ENCOUNTER — PATIENT MESSAGE (OUTPATIENT)
Dept: GASTROENTEROLOGY | Facility: CLINIC | Age: 62
End: 2022-02-17
Payer: MEDICARE

## 2022-02-18 ENCOUNTER — OFFICE VISIT (OUTPATIENT)
Dept: INTERNAL MEDICINE | Facility: CLINIC | Age: 62
End: 2022-02-18
Payer: MEDICARE

## 2022-02-18 VITALS
DIASTOLIC BLOOD PRESSURE: 70 MMHG | HEART RATE: 61 BPM | SYSTOLIC BLOOD PRESSURE: 112 MMHG | OXYGEN SATURATION: 99 % | BODY MASS INDEX: 26.78 KG/M2 | HEIGHT: 62 IN | WEIGHT: 145.5 LBS

## 2022-02-18 DIAGNOSIS — Z94.4 S/P LIVER TRANSPLANT: ICD-10-CM

## 2022-02-18 DIAGNOSIS — K12.1 ORAL ULCER: ICD-10-CM

## 2022-02-18 DIAGNOSIS — M34.9 SCLERODERMA: ICD-10-CM

## 2022-02-18 DIAGNOSIS — Z76.89 ENCOUNTER TO ESTABLISH CARE WITH NEW DOCTOR: Primary | ICD-10-CM

## 2022-02-18 DIAGNOSIS — D84.9 IMMUNOSUPPRESSION: ICD-10-CM

## 2022-02-18 DIAGNOSIS — J98.4 RESTRICTIVE LUNG DISEASE: ICD-10-CM

## 2022-02-18 DIAGNOSIS — Z85.09 HISTORY OF BILE DUCT CANCER: ICD-10-CM

## 2022-02-18 DIAGNOSIS — I27.20 PULMONARY HYPERTENSION: ICD-10-CM

## 2022-02-18 DIAGNOSIS — L30.9 HAND ECZEMA: ICD-10-CM

## 2022-02-18 DIAGNOSIS — I70.0 ATHEROSCLEROSIS OF AORTA: ICD-10-CM

## 2022-02-18 PROCEDURE — 99999 PR PBB SHADOW E&M-EST. PATIENT-LVL III: ICD-10-PCS | Mod: PBBFAC,,, | Performed by: INTERNAL MEDICINE

## 2022-02-18 PROCEDURE — 99213 OFFICE O/P EST LOW 20 MIN: CPT | Mod: S$GLB,,, | Performed by: INTERNAL MEDICINE

## 2022-02-18 PROCEDURE — 99499 UNLISTED E&M SERVICE: CPT | Mod: S$GLB,,, | Performed by: INTERNAL MEDICINE

## 2022-02-18 PROCEDURE — 99213 PR OFFICE/OUTPT VISIT, EST, LEVL III, 20-29 MIN: ICD-10-PCS | Mod: S$GLB,,, | Performed by: INTERNAL MEDICINE

## 2022-02-18 PROCEDURE — 3008F BODY MASS INDEX DOCD: CPT | Mod: CPTII,S$GLB,, | Performed by: INTERNAL MEDICINE

## 2022-02-18 PROCEDURE — 99999 PR PBB SHADOW E&M-EST. PATIENT-LVL III: CPT | Mod: PBBFAC,,, | Performed by: INTERNAL MEDICINE

## 2022-02-18 PROCEDURE — 99499 RISK ADDL DX/OHS AUDIT: ICD-10-PCS | Mod: S$GLB,,, | Performed by: INTERNAL MEDICINE

## 2022-02-18 PROCEDURE — 3078F DIAST BP <80 MM HG: CPT | Mod: CPTII,S$GLB,, | Performed by: INTERNAL MEDICINE

## 2022-02-18 PROCEDURE — 3008F PR BODY MASS INDEX (BMI) DOCUMENTED: ICD-10-PCS | Mod: CPTII,S$GLB,, | Performed by: INTERNAL MEDICINE

## 2022-02-18 PROCEDURE — 3074F SYST BP LT 130 MM HG: CPT | Mod: CPTII,S$GLB,, | Performed by: INTERNAL MEDICINE

## 2022-02-18 PROCEDURE — 3074F PR MOST RECENT SYSTOLIC BLOOD PRESSURE < 130 MM HG: ICD-10-PCS | Mod: CPTII,S$GLB,, | Performed by: INTERNAL MEDICINE

## 2022-02-18 PROCEDURE — 3078F PR MOST RECENT DIASTOLIC BLOOD PRESSURE < 80 MM HG: ICD-10-PCS | Mod: CPTII,S$GLB,, | Performed by: INTERNAL MEDICINE

## 2022-02-18 NOTE — PATIENT INSTRUCTIONS
Salt water gargle twice a day and oragel for denture ulcer. No need for antibiotics at this time.     Make sure to sanitize your dentures and not wear them again until ulcer is healed. OK to continue soft foods. Make sure to rinse after each meal.     Return to clinic in 3 months or sooner if needed.

## 2022-02-18 NOTE — PROGRESS NOTES
Subjective:       Patient ID: Nadeen Mcgovern is a 61 y.o. female.    Chief Complaint: Establish Care      Nadeen Mcgovern is a 61 y.o. year old female     HPI  61 y.o. with scleroderma on immunosuppression (tacrolimus (2 mg AM, 1 mg PM), MMF (500mg x 3 BID) followed by rheumatology, hx of cholangiocarcinoma s/p liver transplant (2015), pHTN (on revatio 20), incontinence followed previously by Dr. Figueroa here for establishment of care. Other issues include HTN, prediabetes, arthritis, dry skin, dry mouth.     Denture sore x 2 days, wore denture without glue and caused an ulcer. Has been gargling with salt water and avoiding solid foods.     Review of Systems   Constitutional: Negative for activity change, appetite change, fatigue, fever and unexpected weight change.   HENT: Negative for congestion, hearing loss, postnasal drip, sneezing, sore throat, trouble swallowing and voice change.         Oral ulcer to top of L anterior gums    Eyes: Negative for pain and discharge.   Respiratory: Negative for cough, choking, chest tightness, shortness of breath and wheezing.    Cardiovascular: Negative for chest pain, palpitations and leg swelling.   Gastrointestinal: Negative for abdominal distention, abdominal pain, blood in stool, constipation, diarrhea, nausea and vomiting.   Endocrine: Negative for polydipsia and polyuria.   Genitourinary: Negative for difficulty urinating and flank pain.   Musculoskeletal: Negative for arthralgias, back pain, joint swelling, myalgias and neck pain.   Skin: Negative for rash.   Neurological: Negative for dizziness, tremors, seizures, weakness, numbness and headaches.   Psychiatric/Behavioral: Negative for agitation. The patient is not nervous/anxious.          Past Medical History:   Diagnosis Date    Acute cholecystitis     Cholecystitis    Arthritis 2015    septic arthritis of right shoulder    Bacteremia due to Streptococcus pneumoniae     Cancer     Cataract     Cholangiocarcinoma      "Hypertension     Jaundice     obstructive    Liver transplant status     Prediabetes     Raynaud disease     Scleroderma         Prior to Admission medications    Medication Sig Start Date End Date Taking? Authorizing Provider   aspirin 81 MG Chew Take 81 mg by mouth once daily.   Yes Historical Provider   betamethasone dipropionate (DIPROLENE) 0.05 % ointment Apply topically 2 (two) times daily. To affected areas on hands 1/22/21  Yes Deborah Green MD   diphenhydrAMINE (BENADRYL) 25 mg capsule Take 25 mg by mouth every 6 (six) hours as needed for Itching.   Yes Historical Provider   estradioL (ESTRACE) 0.01 % (0.1 mg/gram) vaginal cream Place 1 g vaginally once daily. 8/20/21 8/20/22 Yes Karlie Matute DO   hydrOXYzine pamoate (VISTARIL) 50 MG Cap Take 1 capsule (50 mg total) by mouth every 8 (eight) hours as needed. 7/20/20  Yes Nadeen Figueroa MD   multivitamin (THERAGRAN) tablet Take 1 tablet by mouth once daily. 10/12/15  Yes Marysol Zavaleta MD   mycophenolate mofetil (CELLCEPT) 200 mg/mL SusR Take 7.5 mLs (1,500 mg total) by mouth 2 (two) times a day. 2/16/22  Yes Haresh Butler MD   ondansetron (ZOFRAN) 4 MG tablet Take 1 tablet (4 mg total) by mouth every 6 (six) hours as needed for Nausea. 1/17/20  Yes Nadeen Figueroa MD   sildenafil (REVATIO) 20 mg Tab TAKE 1 TABLET (20 MG) BY MOUTH THREE TIMES DAILY 1/24/22  Yes Haresh Butler MD   tacrolimus (PROGRAF) 1 MG Cap Take 2 capsules (2 mg total) by mouth every morning AND 1 capsule (1 mg total) every evening. 6/15/21  Yes Vitaly Martinez MD        Past medical history, surgical history, and family medical history reviewed and updated as appropriate.    Medications and allergies reviewed.     Objective:          Vitals:    02/18/22 1514   BP: 112/70   BP Location: Right arm   Pulse: 61   SpO2: 99%   Weight: 66 kg (145 lb 8.1 oz)   Height: 5' 2.4" (1.585 m)     Body mass index is 26.27 kg/m².  Physical Exam  Constitutional:       Appearance: She is " well-developed and well-nourished.   HENT:      Head: Normocephalic and atraumatic.      Mouth/Throat:     Eyes:      Extraocular Movements: EOM normal.      Pupils: Pupils are equal, round, and reactive to light.   Cardiovascular:      Rate and Rhythm: Normal rate and regular rhythm.      Heart sounds: Normal heart sounds.   Pulmonary:      Effort: Pulmonary effort is normal. No respiratory distress.      Breath sounds: Normal breath sounds. No wheezing.   Abdominal:      General: Bowel sounds are normal. There is no distension.      Palpations: Abdomen is soft.      Tenderness: There is no abdominal tenderness.          Comments: scar   Musculoskeletal:         General: No tenderness or edema. Normal range of motion.      Cervical back: Normal range of motion.   Skin:     General: Skin is warm and dry.   Neurological:      Mental Status: She is alert and oriented to person, place, and time.      Cranial Nerves: No cranial nerve deficit.      Deep Tendon Reflexes: Reflexes are normal and symmetric.         Lab Results   Component Value Date    WBC 5.85 01/20/2022    HGB 13.5 01/20/2022    HCT 42.3 01/20/2022     01/20/2022    CHOL 165 04/08/2021    TRIG 99 04/08/2021    HDL 43 04/08/2021    ALT 15 01/20/2022    AST 18 01/20/2022     01/20/2022    K 3.9 01/20/2022     01/20/2022    CREATININE 0.7 01/20/2022    BUN 10 01/20/2022    CO2 26 01/20/2022    TSH 1.224 08/17/2016    INR 1.0 10/27/2016    HGBA1C 5.4 02/15/2017       Assessment:       1. Encounter to establish care with new doctor    2. Oral ulcer    3. Scleroderma    4. S/P liver transplant    5. Immunosuppression    6. Restrictive lung disease    7. Pulmonary hypertension    8. Hand eczema    9. Atherosclerosis of aorta    10. History of bile duct cancer          Plan:     Nadeen was seen today for establish care.    Diagnoses and all orders for this visit:    Encounter to establish care with new doctor    Oral ulcer  Comments:  salt water  gargles and oragel    Scleroderma  Comments:  followed by rheumatology, stable on current dose of mmf    S/P liver transplant  Comments:  on immunosuppresion followed by transplant; stable on current dose of prograf/mmf    Immunosuppression    Restrictive lung disease    Pulmonary hypertension  Comments:  takes revatio, stable    Hand eczema  Comments:  well moisturized, no issues    Atherosclerosis of aorta  Comments:  seen on imaging    History of bile duct cancer    pt here to establish care  Chart reviewed  On appropriate management  Discussed oral ulcer  Discussed continuing to follow up as scheduled  Pt interested in medical marijuana      Health maintenance reviewed with patient.     Follow up in about 3 months (around 5/18/2022).    Alvin Dennis MD  Internal Medicine / Primary Care  [facility]  2/18/2022

## 2022-02-23 ENCOUNTER — HOSPITAL ENCOUNTER (OUTPATIENT)
Dept: RADIOLOGY | Facility: HOSPITAL | Age: 62
Discharge: HOME | End: 2022-02-23
Attending: FAMILY MEDICINE
Payer: MEDICARE

## 2022-02-23 ENCOUNTER — SPECIALTY PHARMACY (OUTPATIENT)
Dept: PHARMACY | Facility: CLINIC | Age: 62
End: 2022-02-23
Payer: MEDICARE

## 2022-02-23 DIAGNOSIS — D84.9 IMMUNOSUPPRESSED STATUS: ICD-10-CM

## 2022-02-23 DIAGNOSIS — M34.9 SCLERODERMA: ICD-10-CM

## 2022-02-23 DIAGNOSIS — R13.10 DYSPHAGIA, UNSPECIFIED TYPE: ICD-10-CM

## 2022-02-23 PROCEDURE — 25500020 PHARM REV CODE 255: Performed by: FAMILY MEDICINE

## 2022-02-23 PROCEDURE — 74220 FL ESOPHAGRAM WITH BARIUM TABLET: ICD-10-PCS | Mod: 26,,, | Performed by: STUDENT IN AN ORGANIZED HEALTH CARE EDUCATION/TRAINING PROGRAM

## 2022-02-23 PROCEDURE — A9698 NON-RAD CONTRAST MATERIALNOC: HCPCS | Performed by: FAMILY MEDICINE

## 2022-02-23 PROCEDURE — 74220 X-RAY XM ESOPHAGUS 1CNTRST: CPT | Mod: TC

## 2022-02-23 PROCEDURE — 74220 X-RAY XM ESOPHAGUS 1CNTRST: CPT | Mod: 26,,, | Performed by: STUDENT IN AN ORGANIZED HEALTH CARE EDUCATION/TRAINING PROGRAM

## 2022-02-23 RX ADMIN — BARIUM SULFATE 265 ML: 0.6 SUSPENSION ORAL at 09:02

## 2022-02-23 NOTE — TELEPHONE ENCOUNTER
Specialty Pharmacy - Refill Coordination    Specialty Medication Orders Linked to Encounter    Flowsheet Row Most Recent Value   Medication #1 mycophenolate mofetil (CELLCEPT) 200 mg/mL SusR (Order#[id number], Rx#[id number])          Refill Questions - Documented Responses    Flowsheet Row Most Recent Value   Patient Availability and HIPAA Verification    Does patient want to proceed with activity? Yes   HIPAA/medical authority confirmed? Yes   Relationship to patient of person spoken to? Self   Refill Screening Questions    Changes to allergies? No   Changes to medications? Yes  [changing to mycophenolate suspension]   New conditions since last clinic visit? No   Unplanned office visit, urgent care, ED, or hospital admission in the last 4 weeks? No   How does patient/caregiver feel medication is working? Very good   Financial problems or insurance changes? No   How many doses of your specialty medications were missed in the last 4 weeks? 0   Would patient like to speak to a pharmacist? Yes   When does the patient need to receive the medication? 02/24/22   Refill Delivery Questions    How will the patient receive the medication? Delivery Jayshree   When does the patient need to receive the medication? 02/24/22   Shipping Address Home   Address in [facility] confirmed and updated if neccessary? Yes   Expected Copay ($) 0   Is the patient able to afford the medication copay? Yes   Payment Method zero copay   Days supply of Refill 30   Supplies needed? No supplies needed   Refill activity completed? Yes   Refill activity plan Refill scheduled   Shipment/Pickup Date: 02/24/22          Current Outpatient Medications   Medication Sig    aspirin 81 MG Chew Take 81 mg by mouth once daily.    betamethasone dipropionate (DIPROLENE) 0.05 % ointment Apply topically 2 (two) times daily. To affected areas on hands    diphenhydrAMINE (BENADRYL) 25 mg capsule Take 25 mg by mouth every 6 (six) hours as needed for Itching.     estradioL (ESTRACE) 0.01 % (0.1 mg/gram) vaginal cream Place 1 g vaginally once daily.    hydrOXYzine pamoate (VISTARIL) 50 MG Cap Take 1 capsule (50 mg total) by mouth every 8 (eight) hours as needed.    multivitamin (THERAGRAN) tablet Take 1 tablet by mouth once daily.    mycophenolate mofetil (CELLCEPT) 200 mg/mL SusR Take 7.5 mLs (1,500 mg total) by mouth 2 (two) times a day.    ondansetron (ZOFRAN) 4 MG tablet Take 1 tablet (4 mg total) by mouth every 6 (six) hours as needed for Nausea.    sildenafil (REVATIO) 20 mg Tab TAKE 1 TABLET (20 MG) BY MOUTH THREE TIMES DAILY    tacrolimus (PROGRAF) 1 MG Cap Take 2 capsules (2 mg total) by mouth every morning AND 1 capsule (1 mg total) every evening.   Last reviewed on 2/16/2022  4:46 PM by Denita Espinoza DNP    Review of patient's allergies indicates:  No Known Allergies Last reviewed on  2/23/2022 9:30 AM by Shelby Silver    Interventions added this encounter   Closed: [facility] Patient Intervention - Patient educational resources: mycophenolate mofetil (CELLCEPT) 200 mg/mL SusR     Tasks added this encounter   No tasks added.   Tasks due within next 3 months   No tasks due.     Oliva Roberts, PharmD  [facility]  [address] [place] [id number]  Phone: [phone]  Fax: [phone]

## 2022-03-03 ENCOUNTER — TELEPHONE (OUTPATIENT)
Dept: GASTROENTEROLOGY | Facility: CLINIC | Age: 62
End: 2022-03-03
Payer: MEDICARE

## 2022-03-03 NOTE — TELEPHONE ENCOUNTER
MA called and spoke with patient, results given as written by Denita Espinoza DNP. Pt verbalizes understadning and appreciates the call.  Thanks MA  ----- Message from Denita Espinoza DNP sent at 3/2/2022  3:30 PM CST -----  Tablet passed easily on esophagram.  Has she been able to get the medication in liquid form? Any improvement in swallowing?

## 2022-03-14 ENCOUNTER — SPECIALTY PHARMACY (OUTPATIENT)
Dept: PHARMACY | Facility: CLINIC | Age: 62
End: 2022-03-14
Payer: MEDICARE

## 2022-03-14 DIAGNOSIS — I73.00 RAYNAUD'S PHENOMENON WITHOUT GANGRENE: Primary | ICD-10-CM

## 2022-03-14 NOTE — TELEPHONE ENCOUNTER
Specialty Pharmacy - Refill Coordination    Specialty Medication Orders Linked to Encounter    Flowsheet Row Most Recent Value   Medication #1 mycophenolate mofetil (CELLCEPT) 200 mg/mL SusR (Order#[id number], Rx#[id number])          Refill Questions - Documented Responses    Flowsheet Row Most Recent Value   Patient Availability and HIPAA Verification    Does patient want to proceed with activity? Yes   HIPAA/medical authority confirmed? Yes   Relationship to patient of person spoken to? Self   Refill Screening Questions    Changes to allergies? No   Changes to medications? No   New conditions since last clinic visit? No   Unplanned office visit, urgent care, ED, or hospital admission in the last 4 weeks? No   How does patient/caregiver feel medication is working? Good   Financial problems or insurance changes? No   How many doses of your specialty medications were missed in the last 4 weeks? 0   Would patient like to speak to a pharmacist? No   When does the patient need to receive the medication? 03/21/22   Refill Delivery Questions    How will the patient receive the medication? Delivery Jayshree   When does the patient need to receive the medication? 03/21/22   Shipping Address Home   Address in [facility] confirmed and updated if neccessary? Yes   Expected Copay ($) 0   Is the patient able to afford the medication copay? Yes   Payment Method zero copay   Days supply of Refill 21   Supplies needed? No supplies needed   Refill activity completed? Yes   Refill activity plan Refill scheduled   Shipment/Pickup Date: 03/17/22          Current Outpatient Medications   Medication Sig    aspirin 81 MG Chew Take 81 mg by mouth once daily.    betamethasone dipropionate (DIPROLENE) 0.05 % ointment Apply topically 2 (two) times daily. To affected areas on hands    diphenhydrAMINE (BENADRYL) 25 mg capsule Take 25 mg by mouth every 6 (six) hours as needed for Itching.    estradioL (ESTRACE) 0.01 % (0.1 mg/gram)  vaginal cream Place 1 g vaginally once daily.    hydrOXYzine pamoate (VISTARIL) 50 MG Cap Take 1 capsule (50 mg total) by mouth every 8 (eight) hours as needed.    multivitamin (THERAGRAN) tablet Take 1 tablet by mouth once daily.    mycophenolate mofetil (CELLCEPT) 200 mg/mL SusR Take 7.5 mLs (1,500 mg total) by mouth 2 (two) times a day.    ondansetron (ZOFRAN) 4 MG tablet Take 1 tablet (4 mg total) by mouth every 6 (six) hours as needed for Nausea.    sildenafil (REVATIO) 20 mg Tab TAKE 1 TABLET (20 MG) BY MOUTH THREE TIMES DAILY    tacrolimus (PROGRAF) 1 MG Cap Take 2 capsules (2 mg total) by mouth every morning AND 1 capsule (1 mg total) every evening.   Last reviewed on 2/16/2022  4:46 PM by Denita Espinoza DNP    Review of patient's allergies indicates:  No Known Allergies Last reviewed on  2/23/2022 9:30 AM by Shelby Silver      Tasks added this encounter   4/4/2022 - Refill Call (Auto Added)  6/5/2022 - Refill Call (Auto Added)   Tasks due within next 3 months   3/27/2022 - Refill Call (Auto Added)  5/17/2022 - Refill Call (Auto Added)     [facility]  [address] [place], [place] [address]  [facility] [id number]  Phone: [phone]  Fax: [phone]

## 2022-03-28 ENCOUNTER — TELEPHONE (OUTPATIENT)
Dept: GASTROENTEROLOGY | Facility: HOSPITAL | Age: 62
End: 2022-03-28
Payer: MEDICARE

## 2022-03-28 ENCOUNTER — SPECIALTY PHARMACY (OUTPATIENT)
Dept: PHARMACY | Facility: CLINIC | Age: 62
End: 2022-03-28
Payer: MEDICARE

## 2022-03-28 DIAGNOSIS — I73.00 RAYNAUD'S PHENOMENON WITHOUT GANGRENE: Primary | ICD-10-CM

## 2022-03-28 NOTE — TELEPHONE ENCOUNTER
Called patient for Revatio, Cellcept, and Prograf refills. She has about a week on hand of all. RTS until 3/30. Patient asked about worsening constipation over the past 2 weeks, with no relief from stool softeners. She reports no changes in any medications or diet. With [facility] Shayla's approval, counseled patient on trying Miralax and increase water consumption. Patient verbalized understanding and is aware to report to MD if constipation does not get better in the next week. Will call patient back 3/30 to set up refills.

## 2022-03-28 NOTE — TELEPHONE ENCOUNTER
----- Message from Corrine Greenberg MA sent at 3/3/2022  9:33 AM CST -----  Pt said she was able to get the medication in liquid form and she do see an improvement in her swallowing.  ----- Message -----  From: Denita Espinoza DNP  Sent: 3/2/2022   3:30 PM CST  To: [facility] Staff    Tablet passed easily on esophagram.  Has she been able to get the medication in liquid form? Any improvement in swallowing?

## 2022-03-29 NOTE — H&P (VIEW-ONLY)
REASON FOR VISIT:  Dysphagia.    HISTORY OF PRESENT ILLNESS:  Ms. Mcgovern is a 56-year-old who has been   complaining of intermittent dysphagia to solids and occasionally to liquids for   the past at least two years.  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  Her dysphagia   complaints include mostly feeling of food when she swallows the food, there is   occasional gurgling in her chest, it may also occasionally happen with liquids.    There is no regurgitation of liquids through her nose.  She denies any   heartburn, although she has been taking Pepcid regularly and upon further query   she did admit she used to have some heartburn symptoms.  Denies any   regurgitation of food when she lies down.  She denies any chest pain except when   the food gets stuck in her chest.  She has never had food impactions in the   past.  There is no shortness of breath.  She has good exercise tolerance, has no   abdominal pain, nausea or vomiting.  Her bowels move every day to every other   day.  There is no evidence of blood in the stool.  Her last colonoscopy was   probably within the last five years, although not done here at [facility].  She   does not have any polyps and she recollects she was asked to come back in 10   years.    PAST MEDICAL, SURGICAL, SOCIAL AND FAMILY HISTORY:  Reviewed.    MEDICATIONS AND ALLERGIES:  Reviewed.    REVIEW OF SYSTEMS:  CONSTITUTIONAL:  No fever, no chills, no malaise or fatigue.  EYES:  No visual changes.  ENT:  No odynophagia or hoarseness of voice.  CARDIOVASCULAR:  No angina or palpitations.  RESPIRATORY:  No shortness of breath or wheezing.  GENITOURINARY:  No dysuria or frequency.  MUSCULOSKELETAL:  No arthralgias or myalgias.  SKIN:  Has eczema on both her hands.  NEUROLOGIC:  No headache, no seizures.  PSYCHIATRIC:  No anxiety or  depression.  GASTROINTESTINAL:  See HPI.    PHYSICAL EXAMINATION:  VITAL SIGNS:  See EPIC.  GENERAL:  Awake, alert and oriented x3, no acute distress.  NECK:  Supple.  No carotid bruits.  No cervical adenopathy.  ABDOMEN:  Flat, soft, nontender, nondistended.  No masses palpable.  No   hepatosplenomegaly appreciated.  Bowel sounds are normal.  No abdominal bruits   heard.  EYES:  Conjunctivae anicteric.  ENT:  Oropharynx, mucosa moist.  Mallampati 3.  CARDIOVASCULAR:  S1, S2 normal.  RESPIRATORY:  Bilateral air entry equal.  SKIN:  Sclerodactyly noted on both hands.  NEUROLOGIC:  No asterixis.  PSYCHIATRY:  Affect appropriate.  LOWER EXTREMITY:  No pedal edema.    IMPRESSION:  Dysphagia, mostly to solids, but occasionally to liquids - most   likely secondary to dysmotility.  However, a mechanical problem cannot be   entirely ruled out.    RECOMMENDATION:  1. We will proceed with a barium esophagram and we will also use a barium   tablet.  2. We will also schedule an upper endoscopy for further evaluation with possible   biopsies with or without dilation.  Further recommendation based on the above.      ACT/HN  dd: 02/01/2017 09:32:19 (CST)  td: 02/02/2017 01:30:55 (CST)  Doc ID   #[id number]  Job ID #[id number]    CC:        Dutasteride Pregnancy And Lactation Text: This medication is absolutely contraindicated in women, especially during pregnancy and breast feeding. Feminization of male fetuses is possible if taking while pregnant.

## 2022-03-30 NOTE — TELEPHONE ENCOUNTER
Specialty Pharmacy - Refill Coordination    Specialty Medication Orders Linked to Encounter    Flowsheet Row Most Recent Value   Medication #1 tacrolimus (PROGRAF) 1 MG Cap (Order#[id number], Rx#[id number])   Medication #2 sildenafil (REVATIO) 20 mg Tab (Order#[id number], Rx#[id number])   Medication #3 mycophenolate mofetil (CELLCEPT) 200 mg/mL SusR (Order#[id number], Rx#[id number])          Refill Questions - Documented Responses    Flowsheet Row Most Recent Value   Patient Availability and HIPAA Verification    Does patient want to proceed with activity? Yes   HIPAA/medical authority confirmed? Yes   Relationship to patient of person spoken to? Self   Refill Screening Questions    Changes to allergies? No   Changes to medications? No   New conditions since last clinic visit? No   Unplanned office visit, urgent care, ED, or hospital admission in the last 4 weeks? No   How does patient/caregiver feel medication is working? Good   Financial problems or insurance changes? No   How many doses of your specialty medications were missed in the last 4 weeks? 0   Would patient like to speak to a pharmacist? No   When does the patient need to receive the medication? 04/02/22   Refill Delivery Questions    How will the patient receive the medication? Delivery Jayshree   When does the patient need to receive the medication? 04/02/22   Shipping Address Home   Address in [facility] confirmed and updated if neccessary? Yes   Expected Copay ($) 0   Is the patient able to afford the medication copay? Yes   Payment Method zero copay   Days supply of Refill 90   Supplies needed? No supplies needed   Refill activity completed? Yes   Refill activity plan Refill scheduled   Shipment/Pickup Date: 04/01/22          Current Outpatient Medications   Medication Sig    aspirin 81 MG Chew Take 81 mg by mouth once daily.    betamethasone dipropionate (DIPROLENE) 0.05 % ointment Apply topically 2 (two) times daily. To affected areas on  hands    diphenhydrAMINE (BENADRYL) 25 mg capsule Take 25 mg by mouth every 6 (six) hours as needed for Itching.    estradioL (ESTRACE) 0.01 % (0.1 mg/gram) vaginal cream Place 1 g vaginally once daily.    hydrOXYzine pamoate (VISTARIL) 50 MG Cap Take 1 capsule (50 mg total) by mouth every 8 (eight) hours as needed.    multivitamin (THERAGRAN) tablet Take 1 tablet by mouth once daily.    mycophenolate mofetil (CELLCEPT) 200 mg/mL SusR Take 7.5 mLs (1,500 mg total) by mouth 2 (two) times a day.    ondansetron (ZOFRAN) 4 MG tablet Take 1 tablet (4 mg total) by mouth every 6 (six) hours as needed for Nausea.    sildenafil (REVATIO) 20 mg Tab TAKE 1 TABLET (20 MG) BY MOUTH THREE TIMES DAILY    tacrolimus (PROGRAF) 1 MG Cap Take 2 capsules (2 mg total) by mouth every morning AND 1 capsule (1 mg total) every evening.   Last reviewed on 2/16/2022  4:46 PM by Denita Espinoza DNP    Review of patient's allergies indicates:  No Known Allergies Last reviewed on  2/23/2022 9:30 AM by Shelby Silver      Tasks added this encounter   4/20/2022 - Refill Call (Auto Added)  6/21/2022 - Refill Call (Auto Added)  4/20/2022 - Refill Call (Auto Added)  6/21/2022 - Refill Call (Auto Added)  6/21/2022 - Refill Call (Auto Added)   Tasks due within next 3 months   5/17/2022 - Refill Call (Auto Added)  6/5/2022 - Refill Call (Auto Added)     Mima Tolentino [facility]  [address] [place] [id number]  Phone: [phone]  Fax: [phone]

## 2022-04-13 ENCOUNTER — OFFICE VISIT (OUTPATIENT)
Dept: DERMATOLOGY | Facility: CLINIC | Age: 62
End: 2022-04-13
Payer: MEDICARE

## 2022-04-13 DIAGNOSIS — L23.9 ALLERGIC CONTACT DERMATITIS, UNSPECIFIED TRIGGER: ICD-10-CM

## 2022-04-13 PROCEDURE — 99213 PR OFFICE/OUTPT VISIT, EST, LEVL III, 20-29 MIN: ICD-10-PCS | Mod: S$GLB,,, | Performed by: DERMATOLOGY

## 2022-04-13 PROCEDURE — 1159F MED LIST DOCD IN RCRD: CPT | Mod: CPTII,S$GLB,, | Performed by: DERMATOLOGY

## 2022-04-13 PROCEDURE — 99999 PR PBB SHADOW E&M-EST. PATIENT-LVL III: CPT | Mod: PBBFAC,,, | Performed by: DERMATOLOGY

## 2022-04-13 PROCEDURE — 1159F PR MEDICATION LIST DOCUMENTED IN MEDICAL RECORD: ICD-10-PCS | Mod: CPTII,S$GLB,, | Performed by: DERMATOLOGY

## 2022-04-13 PROCEDURE — 99213 OFFICE O/P EST LOW 20 MIN: CPT | Mod: S$GLB,,, | Performed by: DERMATOLOGY

## 2022-04-13 PROCEDURE — 1160F RVW MEDS BY RX/DR IN RCRD: CPT | Mod: CPTII,S$GLB,, | Performed by: DERMATOLOGY

## 2022-04-13 PROCEDURE — 1160F PR REVIEW ALL MEDS BY PRESCRIBER/CLIN PHARMACIST DOCUMENTED: ICD-10-PCS | Mod: CPTII,S$GLB,, | Performed by: DERMATOLOGY

## 2022-04-13 PROCEDURE — 99999 PR PBB SHADOW E&M-EST. PATIENT-LVL III: ICD-10-PCS | Mod: PBBFAC,,, | Performed by: DERMATOLOGY

## 2022-04-13 RX ORDER — BETAMETHASONE DIPROPIONATE 0.5 MG/G
OINTMENT TOPICAL 2 TIMES DAILY
Qty: 45 G | Refills: 3 | Status: SHIPPED | OUTPATIENT
Start: 2022-04-13 | End: 2022-11-01 | Stop reason: SDUPTHER

## 2022-04-13 NOTE — PROGRESS NOTES
Subjective:       Patient ID:  Nadeen Mcgovern is a 61 y.o. female who presents for   Chief Complaint   Patient presents with    Eczema     HPI  Pt here today for flare of eczema over the past week.  States she tries to use gloves when she cooks since she knows she's allergic to metals.  The betamethasone prescribed previously works great, but she's almost out.    Was patch tested in 2/2018 with the following results:     +1: Colophony  +2: nickel sulfate hexahydrate; methylchloroisothiazolinone/methylisothiazolinone; oleamidopropyl dimethylamine  +4: propylene glycol; hydrocortisone-17 butyrate    Review of Systems   Constitutional: Negative for fever, chills, weight loss, weight gain, fatigue, night sweats and malaise.   Skin: Negative for daily sunscreen use, activity-related sunscreen use and recent sunburn.   Hematologic/Lymphatic: Bruises/bleeds easily.        Objective:    Physical Exam   Constitutional: She appears well-developed and well-nourished. No distress.   Neurological: She is alert and oriented to person, place, and time. She is not disoriented.   Psychiatric: She has a normal mood and affect.   Skin:   Areas Examined (abnormalities noted in diagram):   RUE Inspected  LUE Inspection Performed              Diagram Legend     Erythematous scaling macule/papule c/w actinic keratosis       Vascular papule c/w angioma      Pigmented verrucoid papule/plaque c/w seborrheic keratosis      Yellow umbilicated papule c/w sebaceous hyperplasia      Irregularly shaped tan macule c/w lentigo     1-2 mm smooth white papules consistent with Milia      Movable subcutaneous cyst with punctum c/w epidermal inclusion cyst      Subcutaneous movable cyst c/w pilar cyst      Firm pink to brown papule c/w dermatofibroma      Pedunculated fleshy papule(s) c/w skin tag(s)      Evenly pigmented macule c/w junctional nevus     Mildly variegated pigmented, slightly irregular-bordered macule c/w mildly atypical nevus      Flesh  colored to evenly pigmented papule c/w intradermal nevus       Pink pearly papule/plaque c/w basal cell carcinoma      Erythematous hyperkeratotic cursted plaque c/w SCC      Surgical scar with no sign of skin cancer recurrence      Open and closed comedones      Inflammatory papules and pustules      Verrucoid papule consistent consistent with wart     Erythematous eczematous patches and plaques     Dystrophic onycholytic nail with subungual debris c/w onychomycosis     Umbilicated papule    Erythematous-base heme-crusted tan verrucoid plaque consistent with inflamed seborrheic keratosis     Erythematous Silvery Scaling Plaque c/w Psoriasis     See annotation      Assessment / Plan:        Allergic contact dermatitis, unspecified trigger  -     betamethasone dipropionate (DIPROLENE) 0.05 % ointment; Apply topically 2 (two) times daily. To affected areas on hands  Dispense: 45 g; Refill: 3  From [facility]  ONLY    rtc prn

## 2022-04-18 ENCOUNTER — PATIENT MESSAGE (OUTPATIENT)
Dept: ADMINISTRATIVE | Facility: OTHER | Age: 62
End: 2022-04-18
Payer: MEDICARE

## 2022-04-20 ENCOUNTER — SPECIALTY PHARMACY (OUTPATIENT)
Dept: PHARMACY | Facility: CLINIC | Age: 62
End: 2022-04-20
Payer: MEDICARE

## 2022-04-20 DIAGNOSIS — I73.00 RAYNAUD'S PHENOMENON WITHOUT GANGRENE: Primary | ICD-10-CM

## 2022-04-20 NOTE — TELEPHONE ENCOUNTER
Informed patient that a refill request has been sent to her provider for her prograf. she was unsure of her on hand count. Once I got off the phone, I realized that the prograf was filled for 90 ds. Attempted to call patient back for her cellcept refill only. VEDA

## 2022-04-21 ENCOUNTER — PATIENT MESSAGE (OUTPATIENT)
Dept: INTERNAL MEDICINE | Facility: CLINIC | Age: 62
End: 2022-04-21
Payer: MEDICARE

## 2022-04-21 ENCOUNTER — TELEPHONE (OUTPATIENT)
Dept: RHEUMATOLOGY | Facility: CLINIC | Age: 62
End: 2022-04-21
Payer: MEDICARE

## 2022-04-21 ENCOUNTER — PATIENT MESSAGE (OUTPATIENT)
Dept: RHEUMATOLOGY | Facility: CLINIC | Age: 62
End: 2022-04-21
Payer: MEDICARE

## 2022-04-21 ENCOUNTER — LAB VISIT (OUTPATIENT)
Dept: LAB | Facility: HOSPITAL | Age: 62
End: 2022-04-21
Attending: INTERNAL MEDICINE
Payer: MEDICARE

## 2022-04-21 DIAGNOSIS — Z12.31 BREAST CANCER SCREENING BY MAMMOGRAM: Primary | ICD-10-CM

## 2022-04-21 DIAGNOSIS — M34.9 SCLERODERMA: ICD-10-CM

## 2022-04-21 LAB
BILIRUB UR QL STRIP: NEGATIVE
CLARITY UR REFRACT.AUTO: CLEAR
COLOR UR AUTO: YELLOW
CREAT UR-MCNC: 115 MG/DL (ref 15–325)
GLUCOSE UR QL STRIP: NEGATIVE
HGB UR QL STRIP: ABNORMAL
KETONES UR QL STRIP: NEGATIVE
LEUKOCYTE ESTERASE UR QL STRIP: ABNORMAL
MICROSCOPIC COMMENT: ABNORMAL
NITRITE UR QL STRIP: NEGATIVE
PH UR STRIP: 5 [PH] (ref 5–8)
PROT UR QL STRIP: NEGATIVE
PROT UR-MCNC: <7 MG/DL (ref 0–15)
PROT/CREAT UR: NORMAL MG/G{CREAT} (ref 0–0.2)
RBC #/AREA URNS AUTO: 6 /HPF (ref 0–4)
SP GR UR STRIP: 1.02 (ref 1–1.03)
SQUAMOUS #/AREA URNS AUTO: 6 /HPF
URN SPEC COLLECT METH UR: ABNORMAL
WBC #/AREA URNS AUTO: 4 /HPF (ref 0–5)

## 2022-04-21 PROCEDURE — 84156 ASSAY OF PROTEIN URINE: CPT | Performed by: INTERNAL MEDICINE

## 2022-04-21 PROCEDURE — 81001 URINALYSIS AUTO W/SCOPE: CPT | Performed by: INTERNAL MEDICINE

## 2022-04-25 ENCOUNTER — OFFICE VISIT (OUTPATIENT)
Dept: RHEUMATOLOGY | Facility: CLINIC | Age: 62
End: 2022-04-25
Payer: MEDICARE

## 2022-04-25 VITALS
BODY MASS INDEX: 27.97 KG/M2 | HEART RATE: 64 BPM | DIASTOLIC BLOOD PRESSURE: 70 MMHG | HEIGHT: 62 IN | WEIGHT: 152 LBS | SYSTOLIC BLOOD PRESSURE: 132 MMHG

## 2022-04-25 DIAGNOSIS — Z94.4 LIVER REPLACED BY TRANSPLANT: ICD-10-CM

## 2022-04-25 DIAGNOSIS — I27.20 PULMONARY HYPERTENSION: ICD-10-CM

## 2022-04-25 DIAGNOSIS — J98.4 RESTRICTIVE LUNG DISEASE: ICD-10-CM

## 2022-04-25 DIAGNOSIS — E78.5 HYPERLIPIDEMIA, UNSPECIFIED HYPERLIPIDEMIA TYPE: Primary | ICD-10-CM

## 2022-04-25 PROCEDURE — 99214 OFFICE O/P EST MOD 30 MIN: CPT | Mod: S$GLB,,, | Performed by: INTERNAL MEDICINE

## 2022-04-25 PROCEDURE — 99999 PR PBB SHADOW E&M-EST. PATIENT-LVL III: CPT | Mod: PBBFAC,,, | Performed by: INTERNAL MEDICINE

## 2022-04-25 PROCEDURE — 3075F SYST BP GE 130 - 139MM HG: CPT | Mod: CPTII,S$GLB,, | Performed by: INTERNAL MEDICINE

## 2022-04-25 PROCEDURE — 3075F PR MOST RECENT SYSTOLIC BLOOD PRESS GE 130-139MM HG: ICD-10-PCS | Mod: CPTII,S$GLB,, | Performed by: INTERNAL MEDICINE

## 2022-04-25 PROCEDURE — 1159F MED LIST DOCD IN RCRD: CPT | Mod: CPTII,S$GLB,, | Performed by: INTERNAL MEDICINE

## 2022-04-25 PROCEDURE — 99499 RISK ADDL DX/OHS AUDIT: ICD-10-PCS | Mod: S$GLB,,, | Performed by: INTERNAL MEDICINE

## 2022-04-25 PROCEDURE — 3078F PR MOST RECENT DIASTOLIC BLOOD PRESSURE < 80 MM HG: ICD-10-PCS | Mod: CPTII,S$GLB,, | Performed by: INTERNAL MEDICINE

## 2022-04-25 PROCEDURE — 99499 UNLISTED E&M SERVICE: CPT | Mod: S$GLB,,, | Performed by: INTERNAL MEDICINE

## 2022-04-25 PROCEDURE — 3008F BODY MASS INDEX DOCD: CPT | Mod: CPTII,S$GLB,, | Performed by: INTERNAL MEDICINE

## 2022-04-25 PROCEDURE — 1159F PR MEDICATION LIST DOCUMENTED IN MEDICAL RECORD: ICD-10-PCS | Mod: CPTII,S$GLB,, | Performed by: INTERNAL MEDICINE

## 2022-04-25 PROCEDURE — 3008F PR BODY MASS INDEX (BMI) DOCUMENTED: ICD-10-PCS | Mod: CPTII,S$GLB,, | Performed by: INTERNAL MEDICINE

## 2022-04-25 PROCEDURE — 3078F DIAST BP <80 MM HG: CPT | Mod: CPTII,S$GLB,, | Performed by: INTERNAL MEDICINE

## 2022-04-25 PROCEDURE — 99999 PR PBB SHADOW E&M-EST. PATIENT-LVL III: ICD-10-PCS | Mod: PBBFAC,,, | Performed by: INTERNAL MEDICINE

## 2022-04-25 PROCEDURE — 99214 PR OFFICE/OUTPT VISIT, EST, LEVL IV, 30-39 MIN: ICD-10-PCS | Mod: S$GLB,,, | Performed by: INTERNAL MEDICINE

## 2022-04-25 RX ORDER — TACROLIMUS 1 MG/1
CAPSULE ORAL
Qty: 90 CAPSULE | Refills: 11 | Status: SHIPPED | OUTPATIENT
Start: 2022-04-25 | End: 2023-05-11 | Stop reason: SDUPTHER

## 2022-04-25 NOTE — PROGRESS NOTES
"Subjective:       Patient ID: Nadeen Mcgovern is a 61 y.o. female.    Chief Complaint: Scleroderma    HPI     At her last clinic visit on 1/24/22, patient was doing well overall with improvement in eczema and lightheadedness. She was also complaining of dysphagia at that time so we placed a referral for GI as well as Dr. Green for Dermatology. Today patient states she is doing well. Saw Dr. Green for Dermatology who felt the skin changes were contact dermatitis and prescribed a diprolene creme which has been helping in this regard. Saw GI for dysphagia who performed a pill esophagram which was normal and recommended switching the mycophenolate from pill to liquid form. Dysphagia has not been an issue since this switch was made. Still with intermittent Raynaud's but managed well if she keeps her hands warm. She denies any tingling, numbness, burning, or pain in her finger tips. As a side note she mentions pain over the lateral epicondyle of the right elbow which is exacerbated by wrist extension.    Review of Systems   Constitutional: Negative for fever and unexpected weight change.   HENT: Negative for mouth sores and trouble swallowing.    Eyes: Negative for redness.   Respiratory: Negative for cough.    Cardiovascular: Negative for chest pain.   Gastrointestinal: Negative for constipation and diarrhea.   Genitourinary: Negative for dysuria and genital sores.   Musculoskeletal: Negative for arthralgias.   Skin: Negative for rash.   Neurological: Negative for headaches.   Hematological: Does not bruise/bleed easily.   Psychiatric/Behavioral: Negative for agitation.         Objective:   /70   Pulse 64   Ht 5' 2.4" (1.585 m)   Wt 68.9 kg (152 lb)   LMP  (LMP Unknown)   BMI 27.45 kg/m²      Physical Exam   HENT:   Head: Normocephalic and atraumatic.   Eyes: Pupils are equal, round, and reactive to light.   Cardiovascular: Normal rate, regular rhythm, normal heart sounds and normal pulses.   Pulmonary/Chest: " Effort normal and breath sounds normal.   Abdominal: Soft. Normal appearance.   Musculoskeletal:         General: Normal range of motion.      Cervical back: Normal range of motion.      Comments: Pain with resisted wrist extension on the RUE which localizes to the right lateral epicondyle   Neurological: She is alert.   Skin: Skin is warm.    Dermatitis noted over bilateral palmar surfaces   Psychiatric: Her behavior is normal. Mood normal.       Right Side Rheumatological Exam     Examination finds the 1st PIP, 1st MCP, 2nd PIP, 2nd MCP, 3rd PIP, 3rd MCP, 4th PIP, 4th MCP, 5th PIP and 5th MCP normal.    Muscle Strength (0-5 scale):  Neck Flexion:  5  Neck Extension: 5  Deltoid:  5  Biceps: 5/5   Triceps:  5  : 5/5   Iliopsoas: 5  Quadriceps:  5   Distal Lower Extremity: 5    Left Side Rheumatological Exam     Examination finds the 1st PIP, 1st MCP, 2nd PIP, 2nd MCP, 3rd PIP, 3rd MCP, 4th PIP, 4th MCP, 5th PIP and 5th MCP normal.    Muscle Strength (0-5 scale):  Neck Flexion:  5  Neck Extension: 5  Deltoid:  5  Biceps: 5/5   Triceps:  5  :  5/5   Iliopsoas: 5  Quadriceps:  5   Distal Lower Extremity: 5              Assessment:       1. Hyperlipidemia, unspecified hyperlipidemia type    2. Restrictive lung disease    3. Pulmonary hypertension            Plan:       Problem List Items Addressed This Visit        Pulmonary    Restrictive lung disease    Relevant Orders    LIPID PANEL    Spriometry w/Tracings    DLCO    Lung Volume    Six Minute Walk    Uric Acid    NT-Pro Natriuretic Peptide    Pulmonary hypertension    Relevant Orders    LIPID PANEL    Spriometry w/Tracings    DLCO    Lung Volume    Six Minute Walk    Uric Acid    NT-Pro Natriuretic Peptide      Other Visit Diagnoses     Hyperlipidemia, unspecified hyperlipidemia type    -  Primary    Relevant Orders    LIPID PANEL        Plan:    Scleroderma  - Lung volumes, DLCO, spirometry, and 6-minute walk test ordered, to be done in June 2022  -  Continue with cellcept 1500 mg BID in liquid formulation  - Continue with tacrolimus 2 mg in AM and 1 mg in PM  - Continue sildenafil 20 mg TID     Dysphagia  - Pill esophagram normal, no issues since switching to liquid formulation of mycophenolate    Eczema   - Felt to be contact Dermatitis by Dr. Green, continue use of topical Diprolene cream as needed    Lateral Epicondylitis  - Rec'd OTC tennis elbow sleeve        -RTC in 3 months with standing labs, uric acid, pro-bnp, lipid panel and also repeat U/A in two weeks  -4th Covid Vaccine Dose (2nd Booster) at patient's convenience    Pt was seen and discussed with Dr. Butler who is in agreement with the plan.    Cory Wright MD  [facility] PM&R PGY-1

## 2022-04-25 NOTE — PROGRESS NOTES
Pre chart    Answers for HPI/ROS submitted by the patient on 4/23/2022  fever: No  eye redness: No  mouth sores: No  headaches: No  shortness of breath: No  chest pain: No  trouble swallowing: No  diarrhea: No  constipation: Yes  unexpected weight change: Yes  genital sore: No  dysuria: No  During the last 3 days, have you had a skin rash?: Yes  Bruises or bleeds easily: Yes  cough: No

## 2022-04-25 NOTE — PROGRESS NOTES
I have personally taken the history and examined the patient and agree with the resident,s note as stated above            lcSSc(limited scleroderma): ACR/EULAR criteria: (no sclerodactyly today) digital tip pitting scars(3) telangiectases(2) Raynaud's(3) =8 does not meet  criteria ; oxaliplatin(cisplatin associated with Raynaud's) ; 5-FU not known to be associated with Raynaud's or scleroderma. MRSS=17 increased from 2 previous!. No evidence of scleroderma esophagus by esophageal manometry or upper endoscopy  EUGENE+ 1:320 nucleolar(scleroderma pattern) and speckled + SS-A (most typical of Sjogren's and SLE), Raynaud's, telangiectases, dysphagia. All scleroderma associated autoantibodies negative        TTE 10/22/21:  · The left ventricle is normal in size with normal systolic function.  · The estimated ejection fraction is 55%.  · Normal left ventricular diastolic function.  · Normal right ventricular size with normal right ventricular systolic function.  · The estimated PA systolic pressure is 25 mmHg.  · Normal central venous pressure (3 mmHg).     Narrative & Impression  EXAMINATION:  CT CHEST ABDOMEN PELVIS WITH CONTRAST (XPD)     CLINICAL HISTORY:  Cholangiocarcinoma liver txp follow up; Intrahepatic bile duct carcinoma     TECHNIQUE:  Low dose axial CT images obtained throughout the region of the chest, abdomen and pelvis after the administration of 75 mL of Omnipaque 350 intravenous contrast.  Axial, sagittal and coronal reconstructions were performed.     COMPARISON:  None     FINDINGS:  Chest:     No mediastinal or hilar lymph node enlargement.     No suspicious pulmonary nodule.     The trachea and bronchial airways are clear and fully patent.The lungs are well expanded and clear.     Mild calcified plaque lines the aorta.     Abdomen/pelvis:     Previous orthotopic liver transplant.  Parenchyma is homogeneous.  Oval low-density focus at the nick hepatis measures 2.5 cm (previously 2.7 cm in June 2020 and  3.2 cm April 2019).  No new hepatic lesion identified.  Cholecystectomy and choledocho jejunostomy with stable pneumobilia in the left hepatic lobe.     Low-density lesion in the inferior aspect of the spleen measures 1.1 cm, unchanged going back to at least April 2019.  This may represent a small hemangioma.  Pancreas and adrenal glands are unremarkable.     Kidneys appear normal. The ureters are decompressed. Urinary bladder unremarkable.     The stomach, small bowel and colon demonstrate no evidence of obstruction or focal inflammation.     No free air or free fluid identified within the abdomen or pelvis.     Aorta tapers normally throughout its course.  Retroaortic left renal vein.  No retroperitoneal lymph node enlargement.  Mesenteric lymph nodes in the right lower quadrant unchanged measuring up to 0.8 cm in short axis dimension.     Osseous structures exhibit mild degenerative changes. No fracture or focal osseous destructive lesion.     Impression:     History of cholangiocarcinoma status post orthotopic liver transplant.  No CT evidence for metastatic disease.  No detrimental change compared to previous exams.  Chronic findings as given in detail above.        Electronically signed by: Jordyn Villalta  Date:                                            06/17/2021  Time:                                           11:16       EXAMINATION:  FL ESOPHAGRAM WITH BARIUM TABLET     CLINICAL HISTORY:  Dysphagia, unspecified     TECHNIQUE:  Contrast material: Barium sulfate suspension and barium tablet.     Fluoroscopy time: 2:38 minutes     Fluoroscopic images were obtained.     COMPARISON:  Esophagram 02/02/2017     FINDINGS:  Swallowing: Normal.     Esophagus: Normal caliber and motility.     Gastroesophageal reflux: None observed.     Other findings: Surgical clips noted about the gastroesophageal junction.  No evidence of hiatal hernia.     Impression:     Unremarkable esophagram.     Electronically signed by  resident: Karthik Portillo  Date:                                            02/23/2022  Time:                                           09:37     Electronically signed by: Paul Fernández  Date:                                            02/23/2022  Time:                                           10:37      Results for SHILO HAMPTON (MRN [id number]) as of 1/24/2022 08:23    Ref. Range 4/8/2021 08:08   Cholesterol Latest Ref Range: 120 - 199 mg/dL 165   HDL Latest Ref Range: 40 - 75 mg/dL 43   HDL/Cholesterol Ratio Latest Ref Range: 20.0 - 50.0 % 26.1   LDL Cholesterol External Latest Ref Range: 63.0 - 159.0 mg/dL 102.2   Non-HDL Cholesterol Latest Units: mg/dL 122   Total Cholesterol/HDL Ratio Latest Ref Range: 2.0 - 5.0  3.8   Triglycerides Latest Ref Range: 30 - 150 mg/dL 99   The 10-year ASCVD risk score (Christa VALVERDE Jr., et al., 2013) is: 3.2%    Values used to calculate the score:      Age: 61 years      Sex: Female      Is Non- : Yes      Diabetic: No      Tobacco smoker: No      Systolic Blood Pressure: 106 mmHg      Is BP treated: No      HDL Cholesterol: 43 mg/dL      Total Cholesterol: 165 mg/dL       PFTs          FVC     SVC    RV   DLco       TLC    6/25/21     140.4             53.8   73.9      98.2  7/10/20     132                26(?)   79       83.6  3/26/19     117    119     121  101  3/20/18     120    121     62    90  9/25/17     122     122    30    89  4/7/17       123     124    49    91  8/18/16     107     107    110   88                     lcSSc mRSS 9(mild) increased from 4 last visit in [place]  Mild Restrictive lung disease  Raynaud's stable   PAH improved ePASP on TTE  Allergic contact dermatitis, Hand eczema, atopic dernatitis. Saw Dr. Green in Dermatology 4/13/22  Dysphagia, pills and solids only saw Denita Espinoza in Gastro 2/16/22 nl esophagram 2/16/22  reduced  microhematuria       * 2nd booster, 4th dose of Moderna Covid vaccine. As scheduled  Repeat UA  F/u  with  Denita Espinoza  in Gastro for dysphagia  F/u Dr. Green for contact dermatitis,  atopic dermatitis, hand eczema  Mycophenolate 1500mg twice daily  Tacrolimus 2mg in am and 1mg in pm  Sildenafil 20mg twice daily  RTC 3 months with standing labs, uric acid, NT-pro BNP, lipid panel, PFTs      Answers for HPI/ROS submitted by the patient on 4/23/2022  fever: No  eye redness: No  mouth sores: No  headaches: No  shortness of breath: No  chest pain: No  trouble swallowing: No  diarrhea: No  constipation: Yes  unexpected weight change: Yes  genital sore: No  dysuria: No  During the last 3 days, have you had a skin rash?: Yes  Bruises or bleeds easily: Yes  cough: No

## 2022-05-02 NOTE — TELEPHONE ENCOUNTER
Specialty Pharmacy - Refill Coordination    Specialty Medication Orders Linked to Encounter    Flowsheet Row Most Recent Value   Medication #1 mycophenolate mofetil (CELLCEPT) 200 mg/mL SusR (Order#[id number], Rx#[id number])          Refill Questions - Documented Responses    Flowsheet Row Most Recent Value   Patient Availability and HIPAA Verification    Does patient want to proceed with activity? Yes   HIPAA/medical authority confirmed? Yes   Relationship to patient of person spoken to? Self   Refill Screening Questions    Changes to allergies? No   Changes to medications? No   New conditions since last clinic visit? No   Unplanned office visit, urgent care, ED, or hospital admission in the last 4 weeks? No   How does patient/caregiver feel medication is working? Good   Financial problems or insurance changes? No   How many doses of your specialty medications were missed in the last 4 weeks? 2  [pt was not delivered cellcept and missed 2 doses. She denies any flares or issues at this time]   Why were doses missed? Other (comment)  [pt was not reached for Cellcept RF/delivery]   Would patient like to speak to a pharmacist? No   When does the patient need to receive the medication? 05/03/22   Refill Delivery Questions    How will the patient receive the medication? Delivery Jayshree   When does the patient need to receive the medication? 05/03/22   Shipping Address Home   Address in [facility] confirmed and updated if neccessary? Yes   Expected Copay ($) 0   Is the patient able to afford the medication copay? Yes   Payment Method zero copay   Days supply of Refill 21   Supplies needed? No supplies needed   Refill activity completed? Yes   Refill activity plan Refill scheduled   Shipment/Pickup Date: 05/03/22          Current Outpatient Medications   Medication Sig    aspirin 81 MG Chew Take 81 mg by mouth once daily.    betamethasone dipropionate (DIPROLENE) 0.05 % ointment Apply topically 2 (two) times daily.  To affected areas on hands    diphenhydrAMINE (BENADRYL) 25 mg capsule Take 25 mg by mouth every 6 (six) hours as needed for Itching.    estradioL (ESTRACE) 0.01 % (0.1 mg/gram) vaginal cream Place 1 g vaginally once daily.    hydrOXYzine pamoate (VISTARIL) 50 MG Cap Take 1 capsule (50 mg total) by mouth every 8 (eight) hours as needed.    multivitamin (THERAGRAN) tablet Take 1 tablet by mouth once daily.    mycophenolate mofetil (CELLCEPT) 200 mg/mL SusR Take 7.5 mLs (1,500 mg total) by mouth 2 (two) times a day.    ondansetron (ZOFRAN) 4 MG tablet Take 1 tablet (4 mg total) by mouth every 6 (six) hours as needed for Nausea.    sildenafil (REVATIO) 20 mg Tab TAKE 1 TABLET (20 MG) BY MOUTH THREE TIMES DAILY    tacrolimus (PROGRAF) 1 MG Cap Take 2 capsules (2 mg total) by mouth every morning AND 1 capsule (1 mg total) every evening.   Last reviewed on 4/25/2022  7:55 AM by Angelique Hodge MA    Review of patient's allergies indicates:  No Known Allergies Last reviewed on  4/25/2022 7:54 AM by Angelique Hodge      Tasks added this encounter   No tasks added.   Tasks due within next 3 months   5/17/2022 - Refill Call (Auto Added)  6/22/2022 - Refill Call (Auto Added)     Jes Bravo, JeanetteD  [facility]  [address] [place] [id number]  Phone: [phone]  Fax: [phone]

## 2022-05-05 ENCOUNTER — LAB VISIT (OUTPATIENT)
Dept: LAB | Facility: HOSPITAL | Age: 62
End: 2022-05-05
Attending: INTERNAL MEDICINE
Payer: MEDICARE

## 2022-05-05 ENCOUNTER — IMMUNIZATION (OUTPATIENT)
Dept: PHARMACY | Facility: CLINIC | Age: 62
End: 2022-05-05
Payer: MEDICARE

## 2022-05-05 DIAGNOSIS — M34.9 SCLERODERMA: ICD-10-CM

## 2022-05-05 DIAGNOSIS — Z23 NEED FOR VACCINATION: Primary | ICD-10-CM

## 2022-05-05 LAB
BILIRUB UR QL STRIP: NEGATIVE
CLARITY UR REFRACT.AUTO: ABNORMAL
COLOR UR AUTO: YELLOW
GLUCOSE UR QL STRIP: NEGATIVE
HGB UR QL STRIP: NEGATIVE
KETONES UR QL STRIP: NEGATIVE
LEUKOCYTE ESTERASE UR QL STRIP: NEGATIVE
NITRITE UR QL STRIP: NEGATIVE
PH UR STRIP: 5 [PH] (ref 5–8)
PROT UR QL STRIP: NEGATIVE
SP GR UR STRIP: 1.02 (ref 1–1.03)
URN SPEC COLLECT METH UR: ABNORMAL

## 2022-05-05 PROCEDURE — 81003 URINALYSIS AUTO W/O SCOPE: CPT | Performed by: INTERNAL MEDICINE

## 2022-05-10 ENCOUNTER — TELEPHONE (OUTPATIENT)
Dept: TRANSPLANT | Facility: CLINIC | Age: 62
End: 2022-05-10
Payer: MEDICARE

## 2022-05-10 ENCOUNTER — LAB VISIT (OUTPATIENT)
Dept: LAB | Facility: OTHER | Age: 62
End: 2022-05-10
Attending: STUDENT IN AN ORGANIZED HEALTH CARE EDUCATION/TRAINING PROGRAM
Payer: MEDICARE

## 2022-05-10 DIAGNOSIS — Z85.09 HISTORY OF CHOLANGIOCARCINOMA: Primary | ICD-10-CM

## 2022-05-10 DIAGNOSIS — Z94.4 LIVER TRANSPLANTED: ICD-10-CM

## 2022-05-10 DIAGNOSIS — C22.0 COMBINED HEPATOCELLULAR AND CHOLANGIOCARCINOMA: ICD-10-CM

## 2022-05-10 LAB
ALBUMIN SERPL BCP-MCNC: 4.2 G/DL (ref 3.5–5.2)
ALP SERPL-CCNC: 63 U/L (ref 55–135)
ALT SERPL W/O P-5'-P-CCNC: 11 U/L (ref 10–44)
ANION GAP SERPL CALC-SCNC: 10 MMOL/L (ref 8–16)
AST SERPL-CCNC: 18 U/L (ref 10–40)
BASOPHILS # BLD AUTO: 0.03 K/UL (ref 0–0.2)
BASOPHILS NFR BLD: 0.5 % (ref 0–1.9)
BILIRUB SERPL-MCNC: 0.7 MG/DL (ref 0.1–1)
BUN SERPL-MCNC: 12 MG/DL (ref 8–23)
CALCIUM SERPL-MCNC: 9.7 MG/DL (ref 8.7–10.5)
CHLORIDE SERPL-SCNC: 108 MMOL/L (ref 95–110)
CO2 SERPL-SCNC: 24 MMOL/L (ref 23–29)
CREAT SERPL-MCNC: 0.8 MG/DL (ref 0.5–1.4)
DIFFERENTIAL METHOD: ABNORMAL
EOSINOPHIL # BLD AUTO: 0.2 K/UL (ref 0–0.5)
EOSINOPHIL NFR BLD: 3.6 % (ref 0–8)
ERYTHROCYTE [DISTWIDTH] IN BLOOD BY AUTOMATED COUNT: 12.6 % (ref 11.5–14.5)
EST. GFR  (AFRICAN AMERICAN): >60 ML/MIN/1.73 M^2
EST. GFR  (NON AFRICAN AMERICAN): >60 ML/MIN/1.73 M^2
GLUCOSE SERPL-MCNC: 94 MG/DL (ref 70–110)
HCT VFR BLD AUTO: 42.8 % (ref 37–48.5)
HGB BLD-MCNC: 14.1 G/DL (ref 12–16)
IMM GRANULOCYTES # BLD AUTO: 0.02 K/UL (ref 0–0.04)
IMM GRANULOCYTES NFR BLD AUTO: 0.3 % (ref 0–0.5)
LYMPHOCYTES # BLD AUTO: 1.5 K/UL (ref 1–4.8)
LYMPHOCYTES NFR BLD: 24.9 % (ref 18–48)
MCH RBC QN AUTO: 31.5 PG (ref 27–31)
MCHC RBC AUTO-ENTMCNC: 32.9 G/DL (ref 32–36)
MCV RBC AUTO: 96 FL (ref 82–98)
MONOCYTES # BLD AUTO: 0.4 K/UL (ref 0.3–1)
MONOCYTES NFR BLD: 6.9 % (ref 4–15)
NEUTROPHILS # BLD AUTO: 3.9 K/UL (ref 1.8–7.7)
NEUTROPHILS NFR BLD: 63.8 % (ref 38–73)
NRBC BLD-RTO: 0 /100 WBC
PLATELET # BLD AUTO: 216 K/UL (ref 150–450)
PMV BLD AUTO: 10.8 FL (ref 9.2–12.9)
POTASSIUM SERPL-SCNC: 4.3 MMOL/L (ref 3.5–5.1)
PROT SERPL-MCNC: 7.5 G/DL (ref 6–8.4)
RBC # BLD AUTO: 4.47 M/UL (ref 4–5.4)
SODIUM SERPL-SCNC: 142 MMOL/L (ref 136–145)
TACROLIMUS BLD-MCNC: 4.1 NG/ML (ref 5–15)
WBC # BLD AUTO: 6.07 K/UL (ref 3.9–12.7)

## 2022-05-10 PROCEDURE — 80197 ASSAY OF TACROLIMUS: CPT | Performed by: STUDENT IN AN ORGANIZED HEALTH CARE EDUCATION/TRAINING PROGRAM

## 2022-05-10 PROCEDURE — 36415 COLL VENOUS BLD VENIPUNCTURE: CPT | Performed by: STUDENT IN AN ORGANIZED HEALTH CARE EDUCATION/TRAINING PROGRAM

## 2022-05-10 PROCEDURE — 80053 COMPREHEN METABOLIC PANEL: CPT | Performed by: STUDENT IN AN ORGANIZED HEALTH CARE EDUCATION/TRAINING PROGRAM

## 2022-05-10 PROCEDURE — 85025 COMPLETE CBC W/AUTO DIFF WBC: CPT | Performed by: STUDENT IN AN ORGANIZED HEALTH CARE EDUCATION/TRAINING PROGRAM

## 2022-05-10 NOTE — LETTER
May 10, 2022    Nadeen Mcgovern  [address] [facility] [address]          Dear Nadeen Mcgovern:  MRN: [id number]    This is a follow up to your recent labs, your lab results were stable.  There are no medicine changes.  Please have your labs drawn again on 08/29/22.      If you cannot have your labs drawn on the scheduled date, it is your responsibility to call the transplant department to reschedule.  Please call [phone] and ask to speak to Kiersten THOMASON   for all scheduling requests.     Sincerely,    Chetna Pickard RN      Your Liver Transplant Coordinator    [facility]  [facility] [place] [address]  [phone]

## 2022-05-10 NOTE — TELEPHONE ENCOUNTER
Continue routine labs no changes needed.  Letter sent for next lab appointment 08/29/22      ----- Message from Vitaly Martinez MD sent at 5/10/2022  1:14 PM CDT -----  Liver transplant labs reviewed and are stable. No changes in her immunosuppression. Please continue to monitor labs per transplant protocol.

## 2022-05-11 ENCOUNTER — PATIENT MESSAGE (OUTPATIENT)
Dept: RESEARCH | Facility: CLINIC | Age: 62
End: 2022-05-11
Payer: MEDICARE

## 2022-05-18 ENCOUNTER — OFFICE VISIT (OUTPATIENT)
Dept: INTERNAL MEDICINE | Facility: CLINIC | Age: 62
End: 2022-05-18
Payer: MEDICARE

## 2022-05-18 ENCOUNTER — SPECIALTY PHARMACY (OUTPATIENT)
Dept: PHARMACY | Facility: CLINIC | Age: 62
End: 2022-05-18
Payer: MEDICARE

## 2022-05-18 VITALS
HEART RATE: 60 BPM | SYSTOLIC BLOOD PRESSURE: 126 MMHG | RESPIRATION RATE: 18 BRPM | WEIGHT: 145.5 LBS | DIASTOLIC BLOOD PRESSURE: 80 MMHG | BODY MASS INDEX: 26.78 KG/M2 | OXYGEN SATURATION: 99 % | HEIGHT: 62 IN

## 2022-05-18 DIAGNOSIS — Z94.4 S/P LIVER TRANSPLANT: ICD-10-CM

## 2022-05-18 DIAGNOSIS — M34.9 SCLERODERMA: ICD-10-CM

## 2022-05-18 DIAGNOSIS — I27.20 PULMONARY HYPERTENSION: ICD-10-CM

## 2022-05-18 DIAGNOSIS — D84.9 IMMUNOSUPPRESSION: ICD-10-CM

## 2022-05-18 DIAGNOSIS — M35.00 SICCA SYNDROME: ICD-10-CM

## 2022-05-18 DIAGNOSIS — Z09 FOLLOW UP: Primary | ICD-10-CM

## 2022-05-18 DIAGNOSIS — Z85.09 HISTORY OF BILE DUCT CANCER: ICD-10-CM

## 2022-05-18 DIAGNOSIS — I73.00 RAYNAUD'S PHENOMENON WITHOUT GANGRENE: Primary | ICD-10-CM

## 2022-05-18 DIAGNOSIS — J98.4 RESTRICTIVE LUNG DISEASE: ICD-10-CM

## 2022-05-18 DIAGNOSIS — Z94.4 LIVER TRANSPLANT STATUS: ICD-10-CM

## 2022-05-18 PROCEDURE — 3079F PR MOST RECENT DIASTOLIC BLOOD PRESSURE 80-89 MM HG: ICD-10-PCS | Mod: CPTII,S$GLB,, | Performed by: INTERNAL MEDICINE

## 2022-05-18 PROCEDURE — 99999 PR PBB SHADOW E&M-EST. PATIENT-LVL IV: ICD-10-PCS | Mod: PBBFAC,,, | Performed by: INTERNAL MEDICINE

## 2022-05-18 PROCEDURE — 99214 PR OFFICE/OUTPT VISIT, EST, LEVL IV, 30-39 MIN: ICD-10-PCS | Mod: S$GLB,,, | Performed by: INTERNAL MEDICINE

## 2022-05-18 PROCEDURE — 3008F BODY MASS INDEX DOCD: CPT | Mod: CPTII,S$GLB,, | Performed by: INTERNAL MEDICINE

## 2022-05-18 PROCEDURE — 1159F MED LIST DOCD IN RCRD: CPT | Mod: CPTII,S$GLB,, | Performed by: INTERNAL MEDICINE

## 2022-05-18 PROCEDURE — 3074F PR MOST RECENT SYSTOLIC BLOOD PRESSURE < 130 MM HG: ICD-10-PCS | Mod: CPTII,S$GLB,, | Performed by: INTERNAL MEDICINE

## 2022-05-18 PROCEDURE — 99499 UNLISTED E&M SERVICE: CPT | Mod: S$GLB,,, | Performed by: INTERNAL MEDICINE

## 2022-05-18 PROCEDURE — 3079F DIAST BP 80-89 MM HG: CPT | Mod: CPTII,S$GLB,, | Performed by: INTERNAL MEDICINE

## 2022-05-18 PROCEDURE — 99214 OFFICE O/P EST MOD 30 MIN: CPT | Mod: S$GLB,,, | Performed by: INTERNAL MEDICINE

## 2022-05-18 PROCEDURE — 3008F PR BODY MASS INDEX (BMI) DOCUMENTED: ICD-10-PCS | Mod: CPTII,S$GLB,, | Performed by: INTERNAL MEDICINE

## 2022-05-18 PROCEDURE — 1159F PR MEDICATION LIST DOCUMENTED IN MEDICAL RECORD: ICD-10-PCS | Mod: CPTII,S$GLB,, | Performed by: INTERNAL MEDICINE

## 2022-05-18 PROCEDURE — 3074F SYST BP LT 130 MM HG: CPT | Mod: CPTII,S$GLB,, | Performed by: INTERNAL MEDICINE

## 2022-05-18 PROCEDURE — 99999 PR PBB SHADOW E&M-EST. PATIENT-LVL IV: CPT | Mod: PBBFAC,,, | Performed by: INTERNAL MEDICINE

## 2022-05-18 PROCEDURE — 99499 RISK ADDL DX/OHS AUDIT: ICD-10-PCS | Mod: S$GLB,,, | Performed by: INTERNAL MEDICINE

## 2022-05-18 RX ORDER — MYCOPHENOLATE MOFETIL 200 MG/ML
1500 POWDER, FOR SUSPENSION ORAL 2 TIMES DAILY
Qty: 450 ML | Refills: 2 | Status: SHIPPED | OUTPATIENT
Start: 2022-05-18 | End: 2022-07-22

## 2022-05-18 NOTE — TELEPHONE ENCOUNTER
Outgoing call regarding Cellcept refill. Pt has around 5 days on hand and stated understanding that Refill request has been sent to [facility] for refill approval. Will follow up with pt and MD on Friday 5/20/22.

## 2022-05-18 NOTE — PROGRESS NOTES
Subjective:       Patient ID: Nadeen Mcgovern is a 61 y.o. female.    Chief Complaint: Follow-up      HPI  Nadeen Mcgovern is a 61 y.o. year old female with cholangiiocarcinoma, s/p liver transplant, scleroderma, pHTN, immunosuppression presents for follow up. Doing well since last visit.     Review of Systems   Constitutional: Negative for activity change, appetite change, fatigue, fever and unexpected weight change.   HENT: Negative for congestion, hearing loss, postnasal drip, sneezing, sore throat, trouble swallowing and voice change.    Eyes: Negative for pain and discharge.   Respiratory: Negative for cough, choking, chest tightness, shortness of breath and wheezing.    Cardiovascular: Negative for chest pain, palpitations and leg swelling.   Gastrointestinal: Negative for abdominal distention, abdominal pain, blood in stool, constipation, diarrhea, nausea and vomiting.   Endocrine: Negative for polydipsia and polyuria.   Genitourinary: Negative for difficulty urinating and flank pain.   Musculoskeletal: Negative for arthralgias, back pain, joint swelling, myalgias and neck pain.   Skin: Negative for rash.   Neurological: Negative for dizziness, tremors, seizures, weakness, numbness and headaches.   Psychiatric/Behavioral: Negative for agitation. The patient is not nervous/anxious.          Past Medical History:   Diagnosis Date    Acute cholecystitis     Cholecystitis    Arthritis 2015    septic arthritis of right shoulder    Bacteremia due to Streptococcus pneumoniae     Cancer     Cataract     Cholangiocarcinoma     Hypertension     Jaundice     obstructive    Liver transplant status     Prediabetes     Raynaud disease     Scleroderma         Prior to Admission medications    Medication Sig Start Date End Date Taking? Authorizing Provider   aspirin 81 MG Chew Take 81 mg by mouth once daily.   Yes Historical Provider   betamethasone dipropionate (DIPROLENE) 0.05 % ointment Apply topically 2 (two) times  "daily. To affected areas on hands 4/13/22  Yes Deborah Green MD   diphenhydrAMINE (BENADRYL) 25 mg capsule Take 25 mg by mouth every 6 (six) hours as needed for Itching.   Yes Historical Provider   estradioL (ESTRACE) 0.01 % (0.1 mg/gram) vaginal cream Place 1 g vaginally once daily. 8/20/21 8/20/22 Yes Karlie Matute,    hydrOXYzine pamoate (VISTARIL) 50 MG Cap Take 1 capsule (50 mg total) by mouth every 8 (eight) hours as needed. 7/20/20  Yes Nadeen Figueroa MD   multivitamin (THERAGRAN) tablet Take 1 tablet by mouth once daily. 10/12/15  Yes Marysol Zavaleta MD   mycophenolate mofetil (CELLCEPT) 200 mg/mL SusR Take 7.5 mL (1,500 mg total) by mouth 2 (two) times a day. 2/16/22  Yes Haresh Butler MD   ondansetron (ZOFRAN) 4 MG tablet Take 1 tablet (4 mg total) by mouth every 6 (six) hours as needed for Nausea. 1/17/20  Yes Nadeen Figueroa MD   sars-cov-2, covid-19, (MODERNA COVID-19) 50 mcg/0.25 ml injection (BOOSTER) Inject into the muscle. 5/5/22  Yes Mima Botello, PharmD   sildenafil (REVATIO) 20 mg Tab TAKE 1 TABLET (20 MG) BY MOUTH THREE TIMES DAILY 1/24/22  Yes Haresh Butler MD   tacrolimus (PROGRAF) 1 MG Cap Take 2 capsules (2 mg total) by mouth every morning AND 1 capsule (1 mg total) every evening. 4/25/22  Yes Vitaly Martinez MD        Past medical history, surgical history, and family medical history reviewed and updated as appropriate.    Medications and allergies reviewed.     Objective:          Vitals:    05/18/22 0852   BP: 126/80   BP Location: Right arm   Patient Position: Sitting   BP Method: Medium (Manual)   Pulse: 60   Resp: 18   SpO2: 99%   Weight: 66 kg (145 lb 8.1 oz)   Height: 5' 2" (1.575 m)     Body mass index is 26.61 kg/m².  Physical Exam  Constitutional:       Appearance: She is well-developed.   HENT:      Head: Normocephalic and atraumatic.   Eyes:      Extraocular Movements: Extraocular movements intact.   Cardiovascular:      Rate and Rhythm: Normal rate and regular rhythm.      " Heart sounds: Normal heart sounds.   Pulmonary:      Effort: Pulmonary effort is normal. No respiratory distress.      Breath sounds: Normal breath sounds. No wheezing.   Abdominal:      General: Bowel sounds are normal. There is no distension.      Palpations: Abdomen is soft.      Tenderness: There is no abdominal tenderness.   Musculoskeletal:         General: No tenderness. Normal range of motion.      Cervical back: Normal range of motion.   Skin:     General: Skin is warm and dry.   Neurological:      Mental Status: She is alert and oriented to person, place, and time.      Cranial Nerves: No cranial nerve deficit.      Deep Tendon Reflexes: Reflexes are normal and symmetric.         Lab Results   Component Value Date    WBC 6.07 05/10/2022    HGB 14.1 05/10/2022    HCT 42.8 05/10/2022     05/10/2022    CHOL 165 04/08/2021    TRIG 99 04/08/2021    HDL 43 04/08/2021    ALT 11 05/10/2022    AST 18 05/10/2022     05/10/2022    K 4.3 05/10/2022     05/10/2022    CREATININE 0.8 05/10/2022    BUN 12 05/10/2022    CO2 24 05/10/2022    TSH 1.224 08/17/2016    INR 1.0 10/27/2016    HGBA1C 5.4 02/15/2017       Assessment:       1. Follow up    2. Scleroderma    3. Sicca syndrome    4. Restrictive lung disease    5. Pulmonary hypertension    6. S/P liver transplant    7. Immunosuppression    8. History of bile duct cancer          Plan:     Nadeen was seen today for follow-up.    Diagnoses and all orders for this visit:    Follow up    Scleroderma    Sicca syndrome    Restrictive lung disease    Pulmonary hypertension    S/P liver transplant    Immunosuppression    History of bile duct cancer    Doing well since last visit  Long discussion today >20 minutes regarding lifestyle choices and dietary counseling.   No changes to management.  Has labwork scheduled with transplant team.  Scleroderma, pHTN - on cellcept, revatio, stable  S/p liver transplant - on prograf, cellcept  Sicca syndrome - denture oral  ulcer resolved  Restrictive lung disease - at baseline  thc use - discussed cessation    Health maintenance reviewed with patient.    Follow up in about 6 months (around 11/18/2022).    Alvin Dennis MD  Internal Medicine / Primary Care  [facility]  5/18/2022

## 2022-05-18 NOTE — PATIENT INSTRUCTIONS
Make rules for healthier lifestyle:  Examples:  No distracted eating - sit down for meals, have conversation.   No eating while driving  No eating while watching TV  Make a set time (I.e. 7:30 pm)  If you are going to have sweets, incorporate into the part of the meal, not as a midnight splurge.  Don't eat out of containers, use appropriate sized wares.    Return to clinic in 6 months

## 2022-05-25 ENCOUNTER — HOSPITAL ENCOUNTER (OUTPATIENT)
Dept: RADIOLOGY | Facility: OTHER | Age: 62
Discharge: HOME | End: 2022-05-25
Attending: INTERNAL MEDICINE
Payer: MEDICARE

## 2022-05-25 DIAGNOSIS — Z12.31 BREAST CANCER SCREENING BY MAMMOGRAM: ICD-10-CM

## 2022-05-25 PROCEDURE — 77067 MAMMO DIGITAL SCREENING BILAT WITH TOMO: ICD-10-PCS | Mod: 26,,, | Performed by: RADIOLOGY

## 2022-05-25 PROCEDURE — 77063 BREAST TOMOSYNTHESIS BI: CPT | Mod: TC

## 2022-05-25 PROCEDURE — 77067 SCR MAMMO BI INCL CAD: CPT | Mod: 26,,, | Performed by: RADIOLOGY

## 2022-05-25 PROCEDURE — 77063 MAMMO DIGITAL SCREENING BILAT WITH TOMO: ICD-10-PCS | Mod: 26,,, | Performed by: RADIOLOGY

## 2022-05-25 PROCEDURE — 77063 BREAST TOMOSYNTHESIS BI: CPT | Mod: 26,,, | Performed by: RADIOLOGY

## 2022-05-30 NOTE — TELEPHONE ENCOUNTER
Specialty Pharmacy - Refill Coordination    Specialty Medication Orders Linked to Encounter    Flowsheet Row Most Recent Value   Medication #1 mycophenolate mofetil (CELLCEPT) 200 mg/mL SusR (Order#[id number], Rx#[id number])          Refill Questions - Documented Responses    Flowsheet Row Most Recent Value   Patient Availability and HIPAA Verification    Does patient want to proceed with activity? Yes   HIPAA/medical authority confirmed? Yes   Relationship to patient of person spoken to? Self   Refill Screening Questions    Changes to allergies? No   Changes to medications? No   New conditions since last clinic visit? No   Unplanned office visit, urgent care, ED, or hospital admission in the last 4 weeks? No   How does patient/caregiver feel medication is working? Good   Financial problems or insurance changes? No   How many doses of your specialty medications were missed in the last 4 weeks? 0   Would patient like to speak to a pharmacist? No   When does the patient need to receive the medication? 05/31/22   Refill Delivery Questions    How will the patient receive the medication? Delivery Jayshree   When does the patient need to receive the medication? 05/31/22   Shipping Address Home   Address in [facility] confirmed and updated if neccessary? Yes   Expected Copay ($) 0   Is the patient able to afford the medication copay? Yes   Payment Method zero copay   Days supply of Refill 21   Supplies needed? No supplies needed   Refill activity completed? Yes   Refill activity plan Refill scheduled   Shipment/Pickup Date: 05/31/22          Current Outpatient Medications   Medication Sig    aspirin 81 MG Chew Take 81 mg by mouth once daily.    betamethasone dipropionate (DIPROLENE) 0.05 % ointment Apply topically 2 (two) times daily. To affected areas on hands    diphenhydrAMINE (BENADRYL) 25 mg capsule Take 25 mg by mouth every 6 (six) hours as needed for Itching.    estradioL (ESTRACE) 0.01 % (0.1 mg/gram)  vaginal cream Place 1 g vaginally once daily.    hydrOXYzine pamoate (VISTARIL) 50 MG Cap Take 1 capsule (50 mg total) by mouth every 8 (eight) hours as needed.    multivitamin (THERAGRAN) tablet Take 1 tablet by mouth once daily.    mycophenolate mofetil (CELLCEPT) 200 mg/mL SusR Take 7.5 mL (1,500 mg total) by mouth 2 (two) times a day.    ondansetron (ZOFRAN) 4 MG tablet Take 1 tablet (4 mg total) by mouth every 6 (six) hours as needed for Nausea.    sars-cov-2, covid-19, (MODERNA COVID-19) 50 mcg/0.25 ml injection (BOOSTER) Inject into the muscle.    sildenafil (REVATIO) 20 mg Tab TAKE 1 TABLET (20 MG) BY MOUTH THREE TIMES DAILY    tacrolimus (PROGRAF) 1 MG Cap Take 2 capsules (2 mg total) by mouth every morning AND 1 capsule (1 mg total) every evening.   Last reviewed on 5/18/2022  8:52 AM by Jerome Mackay MA    Review of patient's allergies indicates:  No Known Allergies Last reviewed on  5/18/2022 8:52 AM by Jerome Mackay      Tasks added this encounter   6/13/2022 - Refill Call (Auto Added)   Tasks due within next 3 months   6/22/2022 - Refill Call (Auto Added)     Jes Bravo, PharmD  [facility]  [address] [place] [id number]  Phone: [phone]  Fax: [phone]

## 2022-06-08 ENCOUNTER — LAB VISIT (OUTPATIENT)
Dept: LAB | Facility: HOSPITAL | Age: 62
End: 2022-06-08
Attending: INTERNAL MEDICINE
Payer: MEDICARE

## 2022-06-08 DIAGNOSIS — M34.9 SCLERODERMA: ICD-10-CM

## 2022-06-08 PROCEDURE — 81003 URINALYSIS AUTO W/O SCOPE: CPT | Performed by: INTERNAL MEDICINE

## 2022-06-09 ENCOUNTER — PATIENT MESSAGE (OUTPATIENT)
Dept: RHEUMATOLOGY | Facility: CLINIC | Age: 62
End: 2022-06-09
Payer: MEDICARE

## 2022-06-14 ENCOUNTER — SPECIALTY PHARMACY (OUTPATIENT)
Dept: PHARMACY | Facility: CLINIC | Age: 62
End: 2022-06-14
Payer: MEDICARE

## 2022-06-14 DIAGNOSIS — M34.9 SCLERODERMA: ICD-10-CM

## 2022-06-14 DIAGNOSIS — Z94.4 LIVER TRANSPLANT STATUS: ICD-10-CM

## 2022-06-14 DIAGNOSIS — I73.00 RAYNAUD'S PHENOMENON WITHOUT GANGRENE: Primary | ICD-10-CM

## 2022-06-14 RX ORDER — MYCOPHENOLATE MOFETIL 500 MG/1
1500 TABLET ORAL 2 TIMES DAILY
Qty: 180 TABLET | Refills: 0 | Status: SHIPPED | OUTPATIENT
Start: 2022-06-14 | End: 2022-07-21 | Stop reason: SDUPTHER

## 2022-06-14 NOTE — TELEPHONE ENCOUNTER
Pt reports she will be leaving town over the weekend and requests cellcept oral tablets since it is easier to travel with. Explained per package labeling Cellcept suspension is stable at RT for up to 60 days if stored away from direct sunlight or extreme temperatures. Pt prescribed the liquid suspension d/t the tablets being too large and difficulty to swallow but she states that for ease of travel she would like to get the tablets for her trip.     Message sent to rheum. Cellcept suspension rfts 6/15/22 pt reports she does have 4 ds remaining. Explained we will call her back once [facility] responds and when rf will go through. Pt voiced understanding.

## 2022-06-15 NOTE — TELEPHONE ENCOUNTER
Cellcept oral tablets order received. pt notified of cellcept 1 time fill sent in from Dr. Butler for easier to travel with vs liquid. Advised pt it is the same dose as liquid Cellcept 1500 mg (3 tablets) twice daily  Pt has been on tablets before and has no questions or concerns.     PA not needed $0 copay for Cellcept TABLETS. pt currently driving and will call back to complete refill and shipment for revatio, prograf, and cellcept.

## 2022-06-16 ENCOUNTER — PATIENT MESSAGE (OUTPATIENT)
Dept: PHARMACY | Facility: CLINIC | Age: 62
End: 2022-06-16
Payer: MEDICARE

## 2022-06-22 ENCOUNTER — SPECIALTY PHARMACY (OUTPATIENT)
Dept: PHARMACY | Facility: CLINIC | Age: 62
End: 2022-06-22
Payer: MEDICARE

## 2022-06-22 DIAGNOSIS — I73.00 RAYNAUD'S PHENOMENON WITHOUT GANGRENE: Primary | ICD-10-CM

## 2022-06-22 NOTE — TELEPHONE ENCOUNTER
Specialty Pharmacy - Refill Coordination    Specialty Medication Orders Linked to Encounter    Flowsheet Row Most Recent Value   Medication #1 sildenafil (REVATIO) 20 mg Tab (Order#[id number], Rx#[id number])   Medication #2 tacrolimus (PROGRAF) 1 MG Cap (Order#[id number], Rx#[id number])   Medication #3 mycophenolate (CELLCEPT) 500 mg Tab (Order#[id number], Rx#[id number])          Refill Questions - Documented Responses    Flowsheet Row Most Recent Value   Patient Availability and HIPAA Verification    Does patient want to proceed with activity? Yes   HIPAA/medical authority confirmed? Yes   Relationship to patient of person spoken to? Self   Refill Screening Questions    Changes to allergies? No   Changes to medications? No   New conditions since last clinic visit? No   Unplanned office visit, urgent care, ED, or hospital admission in the last 4 weeks? No   How does patient/caregiver feel medication is working? Very good   Financial problems or insurance changes? No   How many doses of your specialty medications were missed in the last 4 weeks? 0   Would patient like to speak to a pharmacist? No   When does the patient need to receive the medication? 06/23/22   Refill Delivery Questions    How will the patient receive the medication? Delivery Jayshree   When does the patient need to receive the medication? 06/23/22   Shipping Address Home   Address in [facility] confirmed and updated if neccessary? Yes   Expected Copay ($) 0   Is the patient able to afford the medication copay? Yes   Payment Method zero copay   Days supply of Refill 30   Supplies needed? No supplies needed   Refill activity completed? Yes   Refill activity plan Refill scheduled   Shipment/Pickup Date: 06/23/22          Current Outpatient Medications   Medication Sig    aspirin 81 MG Chew Take 81 mg by mouth once daily.    betamethasone dipropionate (DIPROLENE) 0.05 % ointment Apply topically 2 (two) times daily. To affected areas on hands     diphenhydrAMINE (BENADRYL) 25 mg capsule Take 25 mg by mouth every 6 (six) hours as needed for Itching.    estradioL (ESTRACE) 0.01 % (0.1 mg/gram) vaginal cream Place 1 g vaginally once daily.    hydrOXYzine pamoate (VISTARIL) 50 MG Cap Take 1 capsule (50 mg total) by mouth every 8 (eight) hours as needed.    multivitamin (THERAGRAN) tablet Take 1 tablet by mouth once daily.    mycophenolate (CELLCEPT) 500 mg Tab Take 3 tablets (1,500 mg total) by mouth 2 (two) times daily.    mycophenolate mofetil (CELLCEPT) 200 mg/mL SusR Take 7.5 mL (1,500 mg total) by mouth 2 (two) times a day.    ondansetron (ZOFRAN) 4 MG tablet Take 1 tablet (4 mg total) by mouth every 6 (six) hours as needed for Nausea.    sars-cov-2, covid-19, (MODERNA COVID-19) 50 mcg/0.25 ml injection (BOOSTER) Inject into the muscle.    sildenafil (REVATIO) 20 mg Tab TAKE 1 TABLET (20 MG) BY MOUTH THREE TIMES DAILY    tacrolimus (PROGRAF) 1 MG Cap Take 2 capsules (2 mg total) by mouth every morning AND 1 capsule (1 mg total) every evening.   Last reviewed on 5/18/2022  8:52 AM by Jerome Mackay MA    Review of patient's allergies indicates:  No Known Allergies Last reviewed on  6/14/2022 3:04 PM by Haresh Butler      Tasks added this encounter   9/13/2022 - Refill Call (Auto Added)  7/16/2022 - Refill Call (Auto Added)  7/16/2022 - Refill Call (Auto Added)   Tasks due within next 3 months   No tasks due.     [facility]  [address] [place] [id number]  Phone: [phone]  Fax: [phone]

## 2022-06-24 DIAGNOSIS — Z94.4 LIVER REPLACED BY TRANSPLANT: Primary | ICD-10-CM

## 2022-07-11 ENCOUNTER — HOSPITAL ENCOUNTER (OUTPATIENT)
Dept: RADIOLOGY | Facility: OTHER | Age: 62
Discharge: HOME | End: 2022-07-11
Attending: INTERNAL MEDICINE
Payer: MEDICARE

## 2022-07-11 DIAGNOSIS — R92.8 ABNORMAL MAMMOGRAM: ICD-10-CM

## 2022-07-11 PROCEDURE — 77065 DX MAMMO INCL CAD UNI: CPT | Mod: TC,RT

## 2022-07-11 PROCEDURE — 77065 DX MAMMO INCL CAD UNI: CPT | Mod: 26,RT,, | Performed by: RADIOLOGY

## 2022-07-11 PROCEDURE — 77065 MAMMO DIGITAL DIAGNOSTIC RIGHT WITH TOMO: ICD-10-PCS | Mod: 26,RT,, | Performed by: RADIOLOGY

## 2022-07-11 PROCEDURE — 77061 MAMMO DIGITAL DIAGNOSTIC RIGHT WITH TOMO: ICD-10-PCS | Mod: 26,RT,, | Performed by: RADIOLOGY

## 2022-07-11 PROCEDURE — 77061 BREAST TOMOSYNTHESIS UNI: CPT | Mod: 26,RT,, | Performed by: RADIOLOGY

## 2022-07-18 ENCOUNTER — SPECIALTY PHARMACY (OUTPATIENT)
Dept: PHARMACY | Facility: CLINIC | Age: 62
End: 2022-07-18
Payer: MEDICARE

## 2022-07-18 DIAGNOSIS — I73.00 RAYNAUD'S PHENOMENON WITHOUT GANGRENE: Primary | ICD-10-CM

## 2022-07-18 NOTE — TELEPHONE ENCOUNTER
Outgoing call for refills on prograf and cellcept --- patient stated she was not at home and unsure of her on hand. She will call back to provide. Will continue to follow up if no call is returned.

## 2022-07-21 ENCOUNTER — HOSPITAL ENCOUNTER (OUTPATIENT)
Dept: PULMONOLOGY | Facility: CLINIC | Age: 62
Discharge: HOME | End: 2022-07-21
Payer: MEDICARE

## 2022-07-21 ENCOUNTER — LAB VISIT (OUTPATIENT)
Dept: LAB | Facility: HOSPITAL | Age: 62
End: 2022-07-21
Attending: INTERNAL MEDICINE
Payer: MEDICARE

## 2022-07-21 VITALS — WEIGHT: 146.63 LBS | HEIGHT: 62 IN | BODY MASS INDEX: 26.98 KG/M2

## 2022-07-21 DIAGNOSIS — Z94.4 LIVER TRANSPLANTED: ICD-10-CM

## 2022-07-21 DIAGNOSIS — I27.20 PULMONARY HYPERTENSION: ICD-10-CM

## 2022-07-21 DIAGNOSIS — J98.4 RESTRICTIVE LUNG DISEASE: ICD-10-CM

## 2022-07-21 DIAGNOSIS — Z79.899 ON MYCOPHENOLATE MOFETIL THERAPY: ICD-10-CM

## 2022-07-21 DIAGNOSIS — Z85.09 HISTORY OF CHOLANGIOCARCINOMA: ICD-10-CM

## 2022-07-21 DIAGNOSIS — C22.0 COMBINED HEPATOCELLULAR AND CHOLANGIOCARCINOMA: ICD-10-CM

## 2022-07-21 DIAGNOSIS — E78.5 HYPERLIPIDEMIA, UNSPECIFIED HYPERLIPIDEMIA TYPE: ICD-10-CM

## 2022-07-21 DIAGNOSIS — M34.9 SCLERODERMA: ICD-10-CM

## 2022-07-21 LAB
AFP SERPL-MCNC: <2 NG/ML (ref 0–8.4)
ALBUMIN SERPL BCP-MCNC: 3.8 G/DL (ref 3.5–5.2)
ALBUMIN SERPL BCP-MCNC: 3.8 G/DL (ref 3.5–5.2)
ALP SERPL-CCNC: 59 U/L (ref 55–135)
ALP SERPL-CCNC: 59 U/L (ref 55–135)
ALT SERPL W/O P-5'-P-CCNC: 22 U/L (ref 10–44)
ALT SERPL W/O P-5'-P-CCNC: 22 U/L (ref 10–44)
ANION GAP SERPL CALC-SCNC: 7 MMOL/L (ref 8–16)
ANION GAP SERPL CALC-SCNC: 7 MMOL/L (ref 8–16)
AST SERPL-CCNC: 22 U/L (ref 10–40)
AST SERPL-CCNC: 22 U/L (ref 10–40)
BASOPHILS # BLD AUTO: 0.05 K/UL (ref 0–0.2)
BASOPHILS NFR BLD: 1 % (ref 0–1.9)
BILIRUB SERPL-MCNC: 0.9 MG/DL (ref 0.1–1)
BILIRUB SERPL-MCNC: 0.9 MG/DL (ref 0.1–1)
BUN SERPL-MCNC: 11 MG/DL (ref 8–23)
BUN SERPL-MCNC: 11 MG/DL (ref 8–23)
CALCIUM SERPL-MCNC: 9.1 MG/DL (ref 8.7–10.5)
CALCIUM SERPL-MCNC: 9.1 MG/DL (ref 8.7–10.5)
CANCER AG19-9 SERPL-ACNC: 4.7 U/ML (ref 0–40)
CHLORIDE SERPL-SCNC: 106 MMOL/L (ref 95–110)
CHLORIDE SERPL-SCNC: 106 MMOL/L (ref 95–110)
CHOLEST SERPL-MCNC: 168 MG/DL (ref 120–199)
CHOLEST/HDLC SERPL: 4.1 {RATIO} (ref 2–5)
CO2 SERPL-SCNC: 24 MMOL/L (ref 23–29)
CO2 SERPL-SCNC: 24 MMOL/L (ref 23–29)
CREAT SERPL-MCNC: 0.7 MG/DL (ref 0.5–1.4)
CREAT SERPL-MCNC: 0.7 MG/DL (ref 0.5–1.4)
CRP SERPL-MCNC: 1.3 MG/L (ref 0–8.2)
DIFFERENTIAL METHOD: ABNORMAL
EOSINOPHIL # BLD AUTO: 0.1 K/UL (ref 0–0.5)
EOSINOPHIL NFR BLD: 2.7 % (ref 0–8)
ERYTHROCYTE [DISTWIDTH] IN BLOOD BY AUTOMATED COUNT: 12.6 % (ref 11.5–14.5)
EST. GFR  (AFRICAN AMERICAN): >60 ML/MIN/1.73 M^2
EST. GFR  (AFRICAN AMERICAN): >60 ML/MIN/1.73 M^2
EST. GFR  (NON AFRICAN AMERICAN): >60 ML/MIN/1.73 M^2
EST. GFR  (NON AFRICAN AMERICAN): >60 ML/MIN/1.73 M^2
GLUCOSE SERPL-MCNC: 96 MG/DL (ref 70–110)
GLUCOSE SERPL-MCNC: 96 MG/DL (ref 70–110)
HCT VFR BLD AUTO: 38.6 % (ref 37–48.5)
HDLC SERPL-MCNC: 41 MG/DL (ref 40–75)
HDLC SERPL: 24.4 % (ref 20–50)
HGB BLD-MCNC: 12.8 G/DL (ref 12–16)
IMM GRANULOCYTES # BLD AUTO: 0.01 K/UL (ref 0–0.04)
IMM GRANULOCYTES NFR BLD AUTO: 0.2 % (ref 0–0.5)
LDLC SERPL CALC-MCNC: 103.6 MG/DL (ref 63–159)
LYMPHOCYTES # BLD AUTO: 1.2 K/UL (ref 1–4.8)
LYMPHOCYTES NFR BLD: 23.2 % (ref 18–48)
MCH RBC QN AUTO: 31.6 PG (ref 27–31)
MCHC RBC AUTO-ENTMCNC: 33.2 G/DL (ref 32–36)
MCV RBC AUTO: 95 FL (ref 82–98)
MONOCYTES # BLD AUTO: 0.4 K/UL (ref 0.3–1)
MONOCYTES NFR BLD: 7.8 % (ref 4–15)
NEUTROPHILS # BLD AUTO: 3.4 K/UL (ref 1.8–7.7)
NEUTROPHILS NFR BLD: 65.1 % (ref 38–73)
NONHDLC SERPL-MCNC: 127 MG/DL
NRBC BLD-RTO: 0 /100 WBC
PLATELET # BLD AUTO: 210 K/UL (ref 150–450)
PMV BLD AUTO: 10.7 FL (ref 9.2–12.9)
POTASSIUM SERPL-SCNC: 4.1 MMOL/L (ref 3.5–5.1)
POTASSIUM SERPL-SCNC: 4.1 MMOL/L (ref 3.5–5.1)
PROT SERPL-MCNC: 6.9 G/DL (ref 6–8.4)
PROT SERPL-MCNC: 6.9 G/DL (ref 6–8.4)
RBC # BLD AUTO: 4.05 M/UL (ref 4–5.4)
SODIUM SERPL-SCNC: 137 MMOL/L (ref 136–145)
SODIUM SERPL-SCNC: 137 MMOL/L (ref 136–145)
TRIGL SERPL-MCNC: 117 MG/DL (ref 30–150)
URATE SERPL-MCNC: 5 MG/DL (ref 2.4–5.7)
WBC # BLD AUTO: 5.26 K/UL (ref 3.9–12.7)

## 2022-07-21 PROCEDURE — 94727 GAS DIL/WSHOT DETER LNG VOL: CPT | Mod: S$GLB,,, | Performed by: INTERNAL MEDICINE

## 2022-07-21 PROCEDURE — 94618 PULMONARY STRESS TESTING: ICD-10-PCS | Mod: S$GLB,,, | Performed by: INTERNAL MEDICINE

## 2022-07-21 PROCEDURE — 94010 BREATHING CAPACITY TEST: CPT | Mod: S$GLB,,, | Performed by: INTERNAL MEDICINE

## 2022-07-21 PROCEDURE — 94618 PULMONARY STRESS TESTING: CPT | Mod: S$GLB,,, | Performed by: INTERNAL MEDICINE

## 2022-07-21 PROCEDURE — 94729 PR C02/MEMBANE DIFFUSE CAPACITY: ICD-10-PCS | Mod: S$GLB,,, | Performed by: INTERNAL MEDICINE

## 2022-07-21 PROCEDURE — 94010 BREATHING CAPACITY TEST: ICD-10-PCS | Mod: S$GLB,,, | Performed by: INTERNAL MEDICINE

## 2022-07-21 PROCEDURE — 94729 DIFFUSING CAPACITY: CPT | Mod: S$GLB,,, | Performed by: INTERNAL MEDICINE

## 2022-07-21 PROCEDURE — 86301 IMMUNOASSAY TUMOR CA 19-9: CPT | Performed by: STUDENT IN AN ORGANIZED HEALTH CARE EDUCATION/TRAINING PROGRAM

## 2022-07-21 PROCEDURE — 80053 COMPREHEN METABOLIC PANEL: CPT | Performed by: INTERNAL MEDICINE

## 2022-07-21 PROCEDURE — 86140 C-REACTIVE PROTEIN: CPT | Performed by: INTERNAL MEDICINE

## 2022-07-21 PROCEDURE — 94727 PR PULM FUNCTION TEST BY GAS: ICD-10-PCS | Mod: S$GLB,,, | Performed by: INTERNAL MEDICINE

## 2022-07-21 PROCEDURE — 80061 LIPID PANEL: CPT | Performed by: INTERNAL MEDICINE

## 2022-07-21 PROCEDURE — 82105 ALPHA-FETOPROTEIN SERUM: CPT | Performed by: STUDENT IN AN ORGANIZED HEALTH CARE EDUCATION/TRAINING PROGRAM

## 2022-07-21 PROCEDURE — 85025 COMPLETE CBC W/AUTO DIFF WBC: CPT | Performed by: STUDENT IN AN ORGANIZED HEALTH CARE EDUCATION/TRAINING PROGRAM

## 2022-07-21 PROCEDURE — 84550 ASSAY OF BLOOD/URIC ACID: CPT | Performed by: INTERNAL MEDICINE

## 2022-07-21 PROCEDURE — 83880 ASSAY OF NATRIURETIC PEPTIDE: CPT | Performed by: INTERNAL MEDICINE

## 2022-07-21 RX ORDER — MYCOPHENOLATE MOFETIL 500 MG/1
1500 TABLET ORAL 2 TIMES DAILY
Qty: 540 TABLET | Refills: 0 | Status: SHIPPED | OUTPATIENT
Start: 2022-07-21 | End: 2022-07-25 | Stop reason: SDUPTHER

## 2022-07-22 LAB — NT-PROBNP SERPL IA-MCNC: 59 PG/ML

## 2022-07-22 NOTE — TELEPHONE ENCOUNTER
Specialty Pharmacy - Refill Coordination    Specialty Medication Orders Linked to Encounter    Flowsheet Row Most Recent Value   Medication #1 tacrolimus (PROGRAF) 1 MG Cap (Order#[id number], Rx#[id number])   Medication #2 mycophenolate (CELLCEPT) 500 mg Tab (Order#[id number], Rx#[id number])          Refill Questions - Documented Responses    Flowsheet Row Most Recent Value   Patient Availability and HIPAA Verification    Does patient want to proceed with activity? Yes   HIPAA/medical authority confirmed? Yes   Relationship to patient of person spoken to? Self   Refill Screening Questions    Changes to allergies? No   Changes to medications? No   New conditions since last clinic visit? No   Unplanned office visit, urgent care, ED, or hospital admission in the last 4 weeks? No   How does patient/caregiver feel medication is working? Good   Financial problems or insurance changes? No   How many doses of your specialty medications were missed in the last 4 weeks? 0   Would patient like to speak to a pharmacist? No   When does the patient need to receive the medication? 07/25/22   Refill Delivery Questions    How will the patient receive the medication? Delivery Jayshree   When does the patient need to receive the medication? 07/25/22   Shipping Address Home   Address in [facility] confirmed and updated if neccessary? Yes   Expected Copay ($) 0   Is the patient able to afford the medication copay? Yes   Payment Method zero copay   Days supply of Refill 30   Supplies needed? No supplies needed   Refill activity completed? Yes   Refill activity plan Refill scheduled   Shipment/Pickup Date: 07/22/22          Current Outpatient Medications   Medication Sig    aspirin 81 MG Chew Take 81 mg by mouth once daily.    betamethasone dipropionate (DIPROLENE) 0.05 % ointment Apply topically 2 (two) times daily. To affected areas on hands    diphenhydrAMINE (BENADRYL) 25 mg capsule Take 25 mg by mouth every 6 (six) hours as  needed for Itching.    estradioL (ESTRACE) 0.01 % (0.1 mg/gram) vaginal cream Place 1 g vaginally once daily.    hydrOXYzine pamoate (VISTARIL) 50 MG Cap Take 1 capsule (50 mg total) by mouth every 8 (eight) hours as needed.    multivitamin (THERAGRAN) tablet Take 1 tablet by mouth once daily.    mycophenolate (CELLCEPT) 500 mg Tab Take 3 tablets (1,500 mg total) by mouth 2 (two) times daily.    ondansetron (ZOFRAN) 4 MG tablet Take 1 tablet (4 mg total) by mouth every 6 (six) hours as needed for Nausea.    sars-cov-2, covid-19, (MODERNA COVID-19) 50 mcg/0.25 ml injection (BOOSTER) Inject into the muscle.    sildenafil (REVATIO) 20 mg Tab TAKE 1 TABLET (20 MG) BY MOUTH THREE TIMES DAILY    tacrolimus (PROGRAF) 1 MG Cap Take 2 capsules (2 mg total) by mouth every morning AND 1 capsule (1 mg total) every evening.   Last reviewed on 5/18/2022  8:52 AM by Jerome Mackay MA    Review of patient's allergies indicates:  No Known Allergies Last reviewed on  6/14/2022 3:04 PM by Haresh Butler      Tasks added this encounter   8/17/2022 - Refill Call (Auto Added)  10/13/2022 - Refill Call (Auto Added)  8/17/2022 - Refill Call (Auto Added)   Tasks due within next 3 months   9/13/2022 - Refill Call (Auto Added)     Gaby Tolentino [facility]  [address] [place] [id number]  Phone: [phone]  Fax: [phone]

## 2022-07-25 ENCOUNTER — TELEPHONE (OUTPATIENT)
Dept: TRANSPLANT | Facility: CLINIC | Age: 62
End: 2022-07-25
Payer: MEDICARE

## 2022-07-25 ENCOUNTER — OFFICE VISIT (OUTPATIENT)
Dept: RHEUMATOLOGY | Facility: CLINIC | Age: 62
End: 2022-07-25
Payer: MEDICARE

## 2022-07-25 ENCOUNTER — PATIENT MESSAGE (OUTPATIENT)
Dept: RHEUMATOLOGY | Facility: CLINIC | Age: 62
End: 2022-07-25

## 2022-07-25 VITALS
HEART RATE: 63 BPM | BODY MASS INDEX: 26.68 KG/M2 | SYSTOLIC BLOOD PRESSURE: 116 MMHG | DIASTOLIC BLOOD PRESSURE: 65 MMHG | HEIGHT: 62 IN | WEIGHT: 145 LBS

## 2022-07-25 DIAGNOSIS — Z85.09 HISTORY OF CHOLANGIOCARCINOMA: Primary | ICD-10-CM

## 2022-07-25 DIAGNOSIS — M34.9 SCLERODERMA: Primary | ICD-10-CM

## 2022-07-25 DIAGNOSIS — M77.11 LATERAL EPICONDYLITIS OF RIGHT ELBOW: ICD-10-CM

## 2022-07-25 PROCEDURE — 1159F MED LIST DOCD IN RCRD: CPT | Mod: CPTII,S$GLB,, | Performed by: INTERNAL MEDICINE

## 2022-07-25 PROCEDURE — 3078F DIAST BP <80 MM HG: CPT | Mod: CPTII,S$GLB,, | Performed by: INTERNAL MEDICINE

## 2022-07-25 PROCEDURE — 99499 UNLISTED E&M SERVICE: CPT | Mod: S$GLB,,, | Performed by: INTERNAL MEDICINE

## 2022-07-25 PROCEDURE — 99999 PR PBB SHADOW E&M-EST. PATIENT-LVL IV: ICD-10-PCS | Mod: PBBFAC,,, | Performed by: INTERNAL MEDICINE

## 2022-07-25 PROCEDURE — 1159F PR MEDICATION LIST DOCUMENTED IN MEDICAL RECORD: ICD-10-PCS | Mod: CPTII,S$GLB,, | Performed by: INTERNAL MEDICINE

## 2022-07-25 PROCEDURE — 3074F PR MOST RECENT SYSTOLIC BLOOD PRESSURE < 130 MM HG: ICD-10-PCS | Mod: CPTII,S$GLB,, | Performed by: INTERNAL MEDICINE

## 2022-07-25 PROCEDURE — 99999 PR PBB SHADOW E&M-EST. PATIENT-LVL IV: CPT | Mod: PBBFAC,,, | Performed by: INTERNAL MEDICINE

## 2022-07-25 PROCEDURE — 99499 RISK ADDL DX/OHS AUDIT: ICD-10-PCS | Mod: S$GLB,,, | Performed by: INTERNAL MEDICINE

## 2022-07-25 PROCEDURE — 99214 PR OFFICE/OUTPT VISIT, EST, LEVL IV, 30-39 MIN: ICD-10-PCS | Mod: S$GLB,,, | Performed by: INTERNAL MEDICINE

## 2022-07-25 PROCEDURE — 3078F PR MOST RECENT DIASTOLIC BLOOD PRESSURE < 80 MM HG: ICD-10-PCS | Mod: CPTII,S$GLB,, | Performed by: INTERNAL MEDICINE

## 2022-07-25 PROCEDURE — 3008F PR BODY MASS INDEX (BMI) DOCUMENTED: ICD-10-PCS | Mod: CPTII,S$GLB,, | Performed by: INTERNAL MEDICINE

## 2022-07-25 PROCEDURE — 3074F SYST BP LT 130 MM HG: CPT | Mod: CPTII,S$GLB,, | Performed by: INTERNAL MEDICINE

## 2022-07-25 PROCEDURE — 99214 OFFICE O/P EST MOD 30 MIN: CPT | Mod: S$GLB,,, | Performed by: INTERNAL MEDICINE

## 2022-07-25 PROCEDURE — 3008F BODY MASS INDEX DOCD: CPT | Mod: CPTII,S$GLB,, | Performed by: INTERNAL MEDICINE

## 2022-07-25 RX ORDER — SILDENAFIL CITRATE 20 MG/1
TABLET ORAL
Qty: 270 TABLET | Refills: 3 | Status: SHIPPED | OUTPATIENT
Start: 2022-07-25 | End: 2022-07-25

## 2022-07-25 RX ORDER — SILDENAFIL CITRATE 20 MG/1
TABLET ORAL
Qty: 180 TABLET | Refills: 3 | Status: SHIPPED | OUTPATIENT
Start: 2022-07-25 | End: 2022-11-01 | Stop reason: SDUPTHER

## 2022-07-25 RX ORDER — MYCOPHENOLATE MOFETIL 500 MG/1
1500 TABLET ORAL 2 TIMES DAILY
Qty: 540 TABLET | Refills: 0 | Status: SHIPPED | OUTPATIENT
Start: 2022-07-25 | End: 2022-11-01 | Stop reason: SDUPTHER

## 2022-07-25 RX ORDER — SILDENAFIL CITRATE 20 MG/1
TABLET ORAL
Qty: 270 TABLET | Refills: 3 | Status: SHIPPED | OUTPATIENT
Start: 2022-07-25 | End: 2022-07-25 | Stop reason: SDUPTHER

## 2022-07-25 NOTE — TELEPHONE ENCOUNTER
Continue routine labs no changes needed.  Letter sent for next lab appointment 11/7/22      ----- Message from Vitaly Martinez MD sent at 7/24/2022  2:09 PM CDT -----  Liver transplant labs reviewed and are stable. No changes in her immunosuppression. Please continue to monitor labs per transplant protocol.

## 2022-07-25 NOTE — PROGRESS NOTES
Pre chart    Answers for HPI/ROS submitted by the patient on 7/21/2022  fever: No  eye redness: Yes  mouth sores: No  headaches: No  shortness of breath: No  chest pain: No  trouble swallowing: No  diarrhea: No  constipation: Yes  unexpected weight change: No  genital sore: Yes  dysuria: No  During the last 3 days, have you had a skin rash?: Yes  Bruises or bleeds easily: Yes  cough: No

## 2022-07-25 NOTE — PROGRESS NOTES
"Progress Notes  ion     Subjective:       Patient ID: Nadeen Mcgovern is a 61 y.o. female.     Chief Complaint: Scleroderma     HPI: At [facility] on 4/25/22, she reported she was doing well. She reported some swallowing issues that she saw GI for and they recommended switching mycophenolate from pill to liquid. She also reported some lateral epicondylitis.Today she reports things are going well other than still having some pain over the right lateral epicondyle. Reports she is still working on her diet. She is planning try the DASH diet. Reports she doesn't like fish so the Mediterranean diet seems hard.       Review of Systems   Constitutional: Negative for fever and unexpected weight change.   HENT: Negative for mouth sores and trouble swallowing.    Eyes: Negative for redness.  Respiratory: Negative for cough or shortness of breath    Cardiovascular: Negative for chest pain.   Gastrointestinal: Negative for diarrhea. Positive for occasional constipation (1x/week)  Genitourinary: Negative for dysuria and genital sores.   Musculoskeletal: Positive for right elbow pain  Skin: Negative for rash.   Neurological: Negative for headaches.   Hematological: Does bruise/bleed easily.   Psychiatric/Behavioral: Negative for agitation.          Objective:   Blood pressure 116/65, pulse 63, height 5' 2.4" (1.585 m), weight 65.8 kg (145 lb).    Body mass index is 26.18 kg/m².    Physical Exam   Head: Normocephalic and atraumatic.   Eyes: Pupils are equal, round, and reactive to light.   Cardiovascular: Normal rate, regular rhythm, normal heart sounds and normal pulses.   Pulmonary/Chest: Effort normal and breath sounds normal.   Abdominal: Soft. Normal appearance.   Musculoskeletal: Pain with resisted wrist extension on the RUE which localizes to the right lateral epicondyle   Neurological: She is alert.   Skin: Skin is warm.  Dermatitis noted over bilateral palmar surfaces   Psychiatric: Her behavior is normal. Mood normal.      Ash " Skin Score 10     Right Side Rheumatological Exam     Examination finds the 1st PIP, 1st MCP, 2nd PIP, 2nd MCP, 3rd PIP, 3rd MCP, 4th PIP, 4th MCP, 5th PIP and 5th MCP normal.     Muscle Strength (0-5 scale):  Neck Flexion:  5  Neck Extension: 5  Deltoid:  5  Biceps: 5/5   Triceps:  5  : 5/5   Iliopsoas: 5  Quadriceps:  5   Distal Lower Extremity: 5     Left Side Rheumatological Exam     Examination finds the 1st PIP, 1st MCP, 2nd PIP, 2nd MCP, 3rd PIP, 3rd MCP, 4th PIP, 4th MCP, 5th PIP and 5th MCP normal.     Muscle Strength (0-5 scale):  Neck Flexion:  5  Neck Extension: 5  Deltoid:  5  Biceps: 5/5   Triceps:  5  :  5/5   Iliopsoas: 5  Quadriceps:  5   Distal Lower Extremity: 5              Assessment:       1. Hyperlipidemia, unspecified hyperlipidemia type    2. Restrictive lung disease    3. Pulmonary hypertension              Plan:       Scleroderma  - Continue with cellcept 1500 mg BID in liquid formulation  - Continue with tacrolimus 2 mg in AM and 1 mg in PM  - Continue sildenafil 20 mg BID      Dysphagia  - Pill esophagram normal, no issues since switching to liquid formulation of mycophenolate     Eczema   - Felt to be contact Dermatitis by Dr. Green, continue use of topical Diprolene cream as needed     Lateral Epicondylitis  - Recommend tennis elbow strap  -PT ordered           -RTC in 3 months with standing labs, uric acid, pro-bnp, lipid panel and also repeat U/A in two weeks  -4th Covid Vaccine Dose (2nd Booster) at patient's convenience               Answers for HPI/ROS submitted by the patient on 7/21/2022  fever: No  eye redness: Yes  mouth sores: No  headaches: No  shortness of breath: No  chest pain: No  trouble swallowing: No  diarrhea: No  constipation: Yes  unexpected weight change: No  genital sore: Yes  dysuria: No  During the last 3 days, have you had a skin rash?: Yes  Bruises or bleeds easily: Yes  cough: No

## 2022-07-25 NOTE — PATIENT INSTRUCTIONS
-Recommend increased exercise  -Recommend Mediterranean diet  -Recommend tennis elbow strap for right forearm  -Physical Therapy ordered for tennis elbow  -Return to clinic in 3 months for follow-up

## 2022-07-25 NOTE — PROGRESS NOTES
I have personally taken the history and examined the patient and agree with the resident,s note as stated above          Latest Reference Range & Units 07/21/22 07:40   Cholesterol 120 - 199 mg/dL 168   HDL 40 - 75 mg/dL 41   HDL/Cholesterol Ratio 20.0 - 50.0 % 24.4   LDL Cholesterol External 63.0 - 159.0 mg/dL 103.6   Non-HDL Cholesterol mg/dL 127   Total Cholesterol/HDL Ratio 2.0 - 5.0  4.1   Triglycerides 30 - 150 mg/dL 117   The 10-year ASCVD risk score (Christa VALVERDE Jr., et al., 2013) is: 4.3%    Values used to calculate the score:      Age: 61 years      Sex: Female      Is Non- : Yes      Diabetic: No      Tobacco smoker: No      Systolic Blood Pressure: 116 mmHg      Is BP treated: No      HDL Cholesterol: 41 mg/dL      Total Cholesterol: 168 mg/dL     Latest Reference Range & Units 07/21/22 07:40   NT-proBNP <=226 pg/mL 59      Latest Reference Range & Units 07/21/22 07:40   Uric Acid 2.4 - 5.7 mg/dL 5.0      Latest Reference Range & Units 06/08/22 07:56 07/21/22 07:25   Specimen UA  Urine, Unspecified Urine, Unspecified   Color, UA Yellow, Straw, Shelley  Yellow Yellow   Appearance, UA Clear  Hazy ! Clear   Specific Gravity, UA 1.005 - 1.030  1.020 1.015   pH, UA 5.0 - 8.0  5.0 5.0   Protein, UA Negative  Negative Negative   Glucose, UA Negative  Negative Negative   Ketones, UA Negative  Negative Negative   Occult Blood UA Negative  Negative 1+ !   NITRITE UA Negative  Negative Negative   Bilirubin (UA) Negative  Negative Negative   Leukocytes, UA Negative  Negative 2+ !   RBC, UA 0 - 4 /hpf  1   WBC, UA 0 - 5 /hpf  3   Squam Epithel, UA /hpf  1   Microscopic Comment   SEE COMMENT   Creatinine, Urine 15.0 - 325.0 mg/dL  76.0   Protein, Urine Random 0 - 15 mg/dL  <7   Prot/Creat Ratio, Urine 0.00 - 0.20   Unable to calculate   !: Data is abnormal     TTE 10/22/21:  · The left ventricle is normal in size with normal systolic function.  · The estimated ejection fraction is 55%.  · Normal left  ventricular diastolic function.  · Normal right ventricular size with normal right ventricular systolic function.  · The estimated PA systolic pressure is 25 mmHg.  · Normal central venous pressure (3 mmHg).     Narrative & Impression  EXAMINATION:  CT CHEST ABDOMEN PELVIS WITH CONTRAST (XPD)     CLINICAL HISTORY:  Cholangiocarcinoma liver txp follow up; Intrahepatic bile duct carcinoma     TECHNIQUE:  Low dose axial CT images obtained throughout the region of the chest, abdomen and pelvis after the administration of 75 mL of Omnipaque 350 intravenous contrast.  Axial, sagittal and coronal reconstructions were performed.     COMPARISON:  None     FINDINGS:  Chest:     No mediastinal or hilar lymph node enlargement.     No suspicious pulmonary nodule.     The trachea and bronchial airways are clear and fully patent.The lungs are well expanded and clear.     Mild calcified plaque lines the aorta.     Abdomen/pelvis:     Previous orthotopic liver transplant.  Parenchyma is homogeneous.  Oval low-density focus at the nick hepatis measures 2.5 cm (previously 2.7 cm in June 2020 and 3.2 cm April 2019).  No new hepatic lesion identified.  Cholecystectomy and choledocho jejunostomy with stable pneumobilia in the left hepatic lobe.     Low-density lesion in the inferior aspect of the spleen measures 1.1 cm, unchanged going back to at least April 2019.  This may represent a small hemangioma.  Pancreas and adrenal glands are unremarkable.     Kidneys appear normal. The ureters are decompressed. Urinary bladder unremarkable.     The stomach, small bowel and colon demonstrate no evidence of obstruction or focal inflammation.     No free air or free fluid identified within the abdomen or pelvis.     Aorta tapers normally throughout its course.  Retroaortic left renal vein.  No retroperitoneal lymph node enlargement.  Mesenteric lymph nodes in the right lower quadrant unchanged measuring up to 0.8 cm in short axis  dimension.     Osseous structures exhibit mild degenerative changes. No fracture or focal osseous destructive lesion.     Impression:     History of cholangiocarcinoma status post orthotopic liver transplant.  No CT evidence for metastatic disease.  No detrimental change compared to previous exams.  Chronic findings as given in detail above.        Electronically signed by: Jordyn Villalta  Date:                                            06/17/2021  Time:                                           11:16        EXAMINATION:  FL ESOPHAGRAM WITH BARIUM TABLET     CLINICAL HISTORY:  Dysphagia, unspecified     TECHNIQUE:  Contrast material: Barium sulfate suspension and barium tablet.     Fluoroscopy time: 2:38 minutes     Fluoroscopic images were obtained.     COMPARISON:  Esophagram 02/02/2017     FINDINGS:  Swallowing: Normal.     Esophagus: Normal caliber and motility.     Gastroesophageal reflux: None observed.     Other findings: Surgical clips noted about the gastroesophageal junction.  No evidence of hiatal hernia.     Impression:     Unremarkable esophagram.     Electronically signed by resident: Karthik Portillo  Date:                                            02/23/2022  Time:                                           09:37     Electronically signed by: Paul Fernández  Date:                                            02/23/2022  Time:                                           10:37         PFTs          FVC     SVC    RV   DLco         TLC   7/21/22    131.5                 64.6       58.8     96.4  6/25/21     140.4                 53.8       73.9      98.2  7/10/20     132                    26(?)        79      83.6  3/26/19     117    119          121         101  3/20/18     120    121           62            90  9/25/17     122     122          30            89  4/7/17       123     124          49           91  8/18/16     107     107         110          88   TTE 10/22/21:  ·  The left ventricle is  normal in size with normal systolic function.  · The estimated ejection fraction is 55%.  · Normal left ventricular diastolic function.  · Normal right ventricular size with normal right ventricular systolic function.  · The estimated PA systolic pressure is 25 mmHg.  · Normal central venous pressure (3 mmHg).                        lcSSc mRSS 10(mild) previously 9 no significant change  Mild Restrictive lung disease stable-improved except for DLco  Raynaud's stable   PAH will be due for TTE in   Allergic contact dermatitis, Hand eczema, atopic dernatitis. Saw Dr. Green in Dermatology 4/13/22  Dysphagia, pills and solids only saw Denita Espinoza in Gastro 2/16/22 nl esophagram 2/16/22  Right lateral epicondylitis      Tennis elbow forearm brace  Ice massage  Ref to PT  F/u with  Denita Espinoza  in Gastro for dysphagia  F/u Dr. Green for contact dermatitis,  atopic dermatitis, hand eczema  Mycophenolate 1500mg twice daily  Tacrolimus 2mg in am and 1mg in pm  Sildenafil 20mg twice daily  RTC 3 months with standing labs, TTE      Answers for HPI/ROS submitted by the patient on 7/21/2022  fever: No  eye redness: Yes  mouth sores: No  headaches: No  shortness of breath: No  chest pain: No  trouble swallowing: No  diarrhea: No  constipation: Yes  unexpected weight change: No  genital sore: Yes  dysuria: No  During the last 3 days, have you had a skin rash?: Yes  Bruises or bleeds easily: Yes  cough: No

## 2022-07-26 ENCOUNTER — PATIENT MESSAGE (OUTPATIENT)
Dept: RHEUMATOLOGY | Facility: CLINIC | Age: 62
End: 2022-07-26
Payer: MEDICARE

## 2022-07-26 ENCOUNTER — TELEPHONE (OUTPATIENT)
Dept: RHEUMATOLOGY | Facility: CLINIC | Age: 62
End: 2022-07-26
Payer: MEDICARE

## 2022-07-26 NOTE — PROCEDURES
Nadeen Mcgovern is a 61 y.o.  female patient, who presents for a 6 minute walk test ordered by MD Carrie.  The diagnosis is Scleroderma/CREST.  The patient's BMI is 26.8 kg/m2.  Predicted distance (lower limit of normal) is 355.14 meters.      Test Results:    The test was completed without stopping.  The total time walked was 360 seconds.  During walking, the patient reported:  No complaints.  The patient used no assistive devices during testing.     07/21/2022---------Distance: 426.72 meters (1400 feet)     O2 Sat % Supplemental Oxygen Heart Rate Blood Pressure Adamaris Scale   Pre-exercise  (Resting) 98 % Room Air 56 bpm 129/72 mmHg 0   During Exercise 98 % Room Air 72 bpm 147/68 mmHg 3   Post-exercise  (Recovery) 99 % Room Air  58 bpm 124/60 mmHg      Recovery Time: 125 seconds    Performing nurse/tech: SHANTI Ramos      PREVIOUS STUDY:   06/25/2021---------Distance: 426.72 meters (1400 feet)       O2 Sat % Supplemental Oxygen Heart Rate Blood Pressure Adamaris Scale   Pre-exercise  (Resting) 99 % Room Air 56 bpm 132/60 mmHg 0   During Exercise 98 % Room Air 72 bpm 138/67 mmHg 3   Post-exercise  (Recovery) 98 % Room Air  61 bpm   mmHg        CLINICAL INTERPRETATION:  Six minute walk distance is 426.72 meters (1400 feet) with moderate dyspnea.  During exercise, there was no desaturation while breathing room air.  Both blood pressure and heart rate remained stable with walking.  Bradycardia was present prior to exercise.  The patient did not report non-pulmonary symptoms during exercise.  Since the previous study in June 2021, exercise capacity is unchanged.  Based upon age and body mass index, exercise capacity is normal.

## 2022-07-29 ENCOUNTER — SPECIALTY PHARMACY (OUTPATIENT)
Dept: PHARMACY | Facility: CLINIC | Age: 62
End: 2022-07-29
Payer: MEDICARE

## 2022-07-29 DIAGNOSIS — I73.00 RAYNAUD'S PHENOMENON WITHOUT GANGRENE: Primary | ICD-10-CM

## 2022-07-29 NOTE — TELEPHONE ENCOUNTER
Dose change of sildenafil- decreased to 1 tablet bid. Released to [facility], RTS 8/29. LVM for patient to discuss dose change and provide counseling if needed. Opening ivent to follow up at refill.

## 2022-08-08 ENCOUNTER — HOSPITAL ENCOUNTER (OUTPATIENT)
Dept: CARDIOLOGY | Facility: HOSPITAL | Age: 62
Discharge: HOME | End: 2022-08-08
Attending: INTERNAL MEDICINE
Payer: MEDICARE

## 2022-08-08 VITALS
BODY MASS INDEX: 26.68 KG/M2 | HEIGHT: 62 IN | DIASTOLIC BLOOD PRESSURE: 65 MMHG | SYSTOLIC BLOOD PRESSURE: 116 MMHG | HEART RATE: 60 BPM | WEIGHT: 145 LBS

## 2022-08-08 DIAGNOSIS — M34.9 SCLERODERMA: ICD-10-CM

## 2022-08-08 LAB
ASCENDING AORTA: 2.69 CM
AV INDEX (PROSTH): 0.63
AV MEAN GRADIENT: 3 MMHG
AV PEAK GRADIENT: 6 MMHG
AV VALVE AREA: 2.45 CM2
AV VELOCITY RATIO: 0.71
BSA FOR ECHO PROCEDURE: 1.7 M2
CV ECHO LV RWT: 0.37 CM
DOP CALC AO PEAK VEL: 1.26 M/S
DOP CALC AO VTI: 33.05 CM
DOP CALC LVOT AREA: 3.9 CM2
DOP CALC LVOT DIAMETER: 2.23 CM
DOP CALC LVOT PEAK VEL: 0.9 M/S
DOP CALC LVOT STROKE VOLUME: 80.89 CM3
DOP CALCLVOT PEAK VEL VTI: 20.72 CM
E WAVE DECELERATION TIME: 216.41 MSEC
E/A RATIO: 0.88
E/E' RATIO: 8.11 M/S
ECHO LV POSTERIOR WALL: 0.79 CM (ref 0.6–1.1)
EJECTION FRACTION: 60 %
FRACTIONAL SHORTENING: 28 % (ref 28–44)
INTERVENTRICULAR SEPTUM: 0.64 CM (ref 0.6–1.1)
IVRT: 91.34 MSEC
LA MAJOR: 4.53 CM
LA MINOR: 4.46 CM
LA WIDTH: 3.65 CM
LEFT ATRIUM SIZE: 3.1 CM
LEFT ATRIUM VOLUME INDEX MOD: 19.7 ML/M2
LEFT ATRIUM VOLUME INDEX: 25.9 ML/M2
LEFT ATRIUM VOLUME MOD: 32.82 CM3
LEFT ATRIUM VOLUME: 43.23 CM3
LEFT INTERNAL DIMENSION IN SYSTOLE: 3.06 CM (ref 2.1–4)
LEFT VENTRICLE DIASTOLIC VOLUME INDEX: 47.9 ML/M2
LEFT VENTRICLE DIASTOLIC VOLUME: 79.99 ML
LEFT VENTRICLE MASS INDEX: 53 G/M2
LEFT VENTRICLE SYSTOLIC VOLUME INDEX: 22 ML/M2
LEFT VENTRICLE SYSTOLIC VOLUME: 36.78 ML
LEFT VENTRICULAR INTERNAL DIMENSION IN DIASTOLE: 4.23 CM (ref 3.5–6)
LEFT VENTRICULAR MASS: 88.49 G
LV LATERAL E/E' RATIO: 8.11 M/S
LV SEPTAL E/E' RATIO: 8.11 M/S
MV A" WAVE DURATION": 15.7 MSEC
MV PEAK A VEL: 0.83 M/S
MV PEAK E VEL: 0.73 M/S
MV STENOSIS PRESSURE HALF TIME: 62.76 MS
MV VALVE AREA P 1/2 METHOD: 3.51 CM2
PISA TR MAX VEL: 2.44 M/S
PULM VEIN S/D RATIO: 1.15
PV PEAK D VEL: 0.48 M/S
PV PEAK S VEL: 0.55 M/S
RA MAJOR: 3.96 CM
RA PRESSURE: 3 MMHG
RA WIDTH: 3.14 CM
RIGHT VENTRICULAR END-DIASTOLIC DIMENSION: 2.48 CM
RV TISSUE DOPPLER FREE WALL SYSTOLIC VELOCITY 1 (APICAL 4 CHAMBER VIEW): 14.3 CM/S
SINUS: 2.62 CM
STJ: 2.56 CM
TDI LATERAL: 0.09 M/S
TDI SEPTAL: 0.09 M/S
TDI: 0.09 M/S
TR MAX PG: 24 MMHG
TRICUSPID ANNULAR PLANE SYSTOLIC EXCURSION: 2.25 CM
TV REST PULMONARY ARTERY PRESSURE: 27 MMHG

## 2022-08-08 PROCEDURE — 93306 TTE W/DOPPLER COMPLETE: CPT | Mod: 26,,, | Performed by: INTERNAL MEDICINE

## 2022-08-08 PROCEDURE — 93306 ECHO (CUPID ONLY): ICD-10-PCS | Mod: 26,,, | Performed by: INTERNAL MEDICINE

## 2022-08-08 PROCEDURE — 93306 TTE W/DOPPLER COMPLETE: CPT

## 2022-08-17 ENCOUNTER — SPECIALTY PHARMACY (OUTPATIENT)
Dept: PHARMACY | Facility: CLINIC | Age: 62
End: 2022-08-17
Payer: MEDICARE

## 2022-08-17 NOTE — TELEPHONE ENCOUNTER
Specialty Pharmacy - Refill Coordination    Specialty Medication Orders Linked to Encounter    Flowsheet Row Most Recent Value   Medication #1 tacrolimus (PROGRAF) 1 MG Cap (Order#[id number], Rx#[id number])          Refill Questions - Documented Responses    Flowsheet Row Most Recent Value   Patient Availability and HIPAA Verification    Does patient want to proceed with activity? Yes   HIPAA/medical authority confirmed? Yes   Relationship to patient of person spoken to? Self   Refill Screening Questions    Changes to allergies? No   Changes to medications? No   New conditions since last clinic visit? No   Unplanned office visit, urgent care, ED, or hospital admission in the last 4 weeks? No   How does patient/caregiver feel medication is working? Good   Financial problems or insurance changes? No   How many doses of your specialty medications were missed in the last 4 weeks? 0   Would patient like to speak to a pharmacist? No   When does the patient need to receive the medication? 08/21/22   Refill Delivery Questions    How will the patient receive the medication? Delivery Jayshree   When does the patient need to receive the medication? 08/21/22   Shipping Address Home   Address in [facility] confirmed and updated if neccessary? Yes   Expected Copay ($) 0   Is the patient able to afford the medication copay? Yes   Payment Method zero copay   Days supply of Refill 30   Supplies needed? No supplies needed   Refill activity completed? Yes   Refill activity plan Refill scheduled   Shipment/Pickup Date: 08/18/22          Current Outpatient Medications   Medication Sig    aspirin 81 MG Chew Take 81 mg by mouth once daily.    betamethasone dipropionate (DIPROLENE) 0.05 % ointment Apply topically 2 (two) times daily. To affected areas on hands    diphenhydrAMINE (BENADRYL) 25 mg capsule Take 25 mg by mouth every 6 (six) hours as needed for Itching.    estradioL (ESTRACE) 0.01 % (0.1 mg/gram) vaginal cream Place 1 g  vaginally once daily.    hydrOXYzine pamoate (VISTARIL) 50 MG Cap Take 1 capsule (50 mg total) by mouth every 8 (eight) hours as needed.    multivitamin (THERAGRAN) tablet Take 1 tablet by mouth once daily.    mycophenolate (CELLCEPT) 500 mg Tab Take 3 tablets (1,500 mg total) by mouth 2 (two) times daily.    ondansetron (ZOFRAN) 4 MG tablet Take 1 tablet (4 mg total) by mouth every 6 (six) hours as needed for Nausea.    sars-cov-2, covid-19, (MODERNA COVID-19) 50 mcg/0.25 ml injection (BOOSTER) Inject into the muscle.    sildenafil (REVATIO) 20 mg Tab TAKE 1 TABLET (20 MG) BY MOUTH TWO TIMES DAILY    tacrolimus (PROGRAF) 1 MG Cap Take 2 capsules (2 mg total) by mouth every morning AND 1 capsule (1 mg total) every evening.   Last reviewed on 7/25/2022  7:57 AM by Angelique Hodge MA    Review of patient's allergies indicates:  No Known Allergies Last reviewed on  7/25/2022 7:56 AM by Angelique Hodge      Tasks added this encounter   9/13/2022 - Refill Call (Auto Added)   Tasks due within next 3 months   No tasks due.     [facility]  [address] [place] [id number]  Phone: [phone]  Fax: [phone]

## 2022-08-18 ENCOUNTER — HOSPITAL ENCOUNTER (OUTPATIENT)
Dept: RADIOLOGY | Facility: OTHER | Age: 62
Discharge: HOME | End: 2022-08-18
Attending: STUDENT IN AN ORGANIZED HEALTH CARE EDUCATION/TRAINING PROGRAM
Payer: MEDICARE

## 2022-08-18 DIAGNOSIS — Z85.09 HISTORY OF CHOLANGIOCARCINOMA: ICD-10-CM

## 2022-08-18 PROCEDURE — A9585 GADOBUTROL INJECTION: HCPCS | Performed by: STUDENT IN AN ORGANIZED HEALTH CARE EDUCATION/TRAINING PROGRAM

## 2022-08-18 PROCEDURE — 25500020 PHARM REV CODE 255: Performed by: STUDENT IN AN ORGANIZED HEALTH CARE EDUCATION/TRAINING PROGRAM

## 2022-08-18 PROCEDURE — 74183 MRI ABD W/O CNTR FLWD CNTR: CPT | Mod: 26,,, | Performed by: RADIOLOGY

## 2022-08-18 PROCEDURE — 74183 MRI ABDOMEN W WO CONTRAST: ICD-10-PCS | Mod: 26,,, | Performed by: RADIOLOGY

## 2022-08-18 PROCEDURE — 71250 CT THORAX DX C-: CPT | Mod: TC

## 2022-08-18 PROCEDURE — 71250 CT CHEST WITHOUT CONTRAST: ICD-10-PCS | Mod: 26,,, | Performed by: RADIOLOGY

## 2022-08-18 PROCEDURE — 71250 CT THORAX DX C-: CPT | Mod: 26,,, | Performed by: RADIOLOGY

## 2022-08-18 PROCEDURE — 74183 MRI ABD W/O CNTR FLWD CNTR: CPT | Mod: TC

## 2022-08-18 RX ORDER — GADOBUTROL 604.72 MG/ML
6.5 INJECTION INTRAVENOUS
Status: COMPLETED | OUTPATIENT
Start: 2022-08-18 | End: 2022-08-18

## 2022-08-18 RX ADMIN — GADOBUTROL 6.5 ML: 604.72 INJECTION INTRAVENOUS at 09:08

## 2022-08-30 ENCOUNTER — TELEPHONE (OUTPATIENT)
Dept: TRANSPLANT | Facility: CLINIC | Age: 62
End: 2022-08-30
Payer: MEDICARE

## 2022-08-30 NOTE — TELEPHONE ENCOUNTER
Sent letter to let patient know will repeat in 3 months.    ----- Message from Vitaly Martinez MD sent at 8/30/2022 12:27 PM CDT -----  Reviewed. No evidence of recurrent cancer. Does show stable area of enhancement in the liver. Please repeat MRI in 3 months.

## 2022-08-30 NOTE — LETTER
August 30, 2022    Nadeen Mcgovern  [address]  [facility] [address]             [facility]  [address]  [facility] [address]  Phone: [phone] Dear Mrs. Mcgovern:    Dr Martinez reviewed your Chest CT and MRI of abdomen.  CT was clear, your MRI did show a stable area on it, so he would like to re-image your abdomen in 3 months.      If you have any questions or concerns, please don't hesitate to call.    Sincerely,        Chetna Pickard RN

## 2022-09-08 ENCOUNTER — NURSE TRIAGE (OUTPATIENT)
Dept: ADMINISTRATIVE | Facility: CLINIC | Age: 62
End: 2022-09-08
Payer: MEDICARE

## 2022-09-09 ENCOUNTER — HOSPITAL ENCOUNTER (EMERGENCY)
Facility: HOSPITAL | Age: 62
Discharge: HOME | End: 2022-09-09
Attending: EMERGENCY MEDICINE
Payer: MEDICARE

## 2022-09-09 VITALS
BODY MASS INDEX: 26.68 KG/M2 | OXYGEN SATURATION: 98 % | WEIGHT: 145 LBS | TEMPERATURE: 99 F | DIASTOLIC BLOOD PRESSURE: 59 MMHG | RESPIRATION RATE: 18 BRPM | HEART RATE: 68 BPM | SYSTOLIC BLOOD PRESSURE: 119 MMHG | HEIGHT: 62 IN

## 2022-09-09 DIAGNOSIS — R07.9 CHEST PAIN: ICD-10-CM

## 2022-09-09 DIAGNOSIS — M54.2 NECK PAIN: Primary | ICD-10-CM

## 2022-09-09 LAB
ALBUMIN SERPL BCP-MCNC: 4.2 G/DL (ref 3.5–5.2)
ALP SERPL-CCNC: 70 U/L (ref 55–135)
ALT SERPL W/O P-5'-P-CCNC: 12 U/L (ref 10–44)
ANION GAP SERPL CALC-SCNC: 7 MMOL/L (ref 8–16)
AST SERPL-CCNC: 15 U/L (ref 10–40)
BASOPHILS # BLD AUTO: 0.02 K/UL (ref 0–0.2)
BASOPHILS NFR BLD: 0.2 % (ref 0–1.9)
BILIRUB SERPL-MCNC: 1.4 MG/DL (ref 0.1–1)
BUN SERPL-MCNC: 9 MG/DL (ref 8–23)
CALCIUM SERPL-MCNC: 9.6 MG/DL (ref 8.7–10.5)
CHLORIDE SERPL-SCNC: 108 MMOL/L (ref 95–110)
CO2 SERPL-SCNC: 21 MMOL/L (ref 23–29)
CREAT SERPL-MCNC: 0.7 MG/DL (ref 0.5–1.4)
DIFFERENTIAL METHOD: ABNORMAL
EOSINOPHIL # BLD AUTO: 0.1 K/UL (ref 0–0.5)
EOSINOPHIL NFR BLD: 0.7 % (ref 0–8)
ERYTHROCYTE [DISTWIDTH] IN BLOOD BY AUTOMATED COUNT: 12.7 % (ref 11.5–14.5)
EST. GFR  (NO RACE VARIABLE): >60 ML/MIN/1.73 M^2
GLUCOSE SERPL-MCNC: 132 MG/DL (ref 70–110)
HCT VFR BLD AUTO: 40.2 % (ref 37–48.5)
HGB BLD-MCNC: 13.8 G/DL (ref 12–16)
IMM GRANULOCYTES # BLD AUTO: 0.02 K/UL (ref 0–0.04)
IMM GRANULOCYTES NFR BLD AUTO: 0.2 % (ref 0–0.5)
LYMPHOCYTES # BLD AUTO: 1.3 K/UL (ref 1–4.8)
LYMPHOCYTES NFR BLD: 12.7 % (ref 18–48)
MCH RBC QN AUTO: 31.7 PG (ref 27–31)
MCHC RBC AUTO-ENTMCNC: 34.3 G/DL (ref 32–36)
MCV RBC AUTO: 92 FL (ref 82–98)
MONOCYTES # BLD AUTO: 0.8 K/UL (ref 0.3–1)
MONOCYTES NFR BLD: 8.2 % (ref 4–15)
NEUTROPHILS # BLD AUTO: 7.7 K/UL (ref 1.8–7.7)
NEUTROPHILS NFR BLD: 78 % (ref 38–73)
NRBC BLD-RTO: 0 /100 WBC
PLATELET # BLD AUTO: 209 K/UL (ref 150–450)
PMV BLD AUTO: 10.3 FL (ref 9.2–12.9)
POTASSIUM SERPL-SCNC: 3.9 MMOL/L (ref 3.5–5.1)
PROT SERPL-MCNC: 8.1 G/DL (ref 6–8.4)
RBC # BLD AUTO: 4.36 M/UL (ref 4–5.4)
SODIUM SERPL-SCNC: 136 MMOL/L (ref 136–145)
TROPONIN I SERPL DL<=0.01 NG/ML-MCNC: <0.006 NG/ML (ref 0–0.03)
WBC # BLD AUTO: 9.84 K/UL (ref 3.9–12.7)

## 2022-09-09 PROCEDURE — 93010 EKG 12-LEAD: ICD-10-PCS | Mod: ,,, | Performed by: INTERNAL MEDICINE

## 2022-09-09 PROCEDURE — 93010 ELECTROCARDIOGRAM REPORT: CPT | Mod: ,,, | Performed by: INTERNAL MEDICINE

## 2022-09-09 PROCEDURE — 99284 EMERGENCY DEPT VISIT MOD MDM: CPT | Mod: ,,, | Performed by: PHYSICIAN ASSISTANT

## 2022-09-09 PROCEDURE — 25000003 PHARM REV CODE 250: Performed by: PHYSICIAN ASSISTANT

## 2022-09-09 PROCEDURE — 99284 PR EMERGENCY DEPT VISIT,LEVEL IV: ICD-10-PCS | Mod: ,,, | Performed by: PHYSICIAN ASSISTANT

## 2022-09-09 PROCEDURE — 93005 ELECTROCARDIOGRAM TRACING: CPT

## 2022-09-09 PROCEDURE — 85025 COMPLETE CBC W/AUTO DIFF WBC: CPT | Performed by: PHYSICIAN ASSISTANT

## 2022-09-09 PROCEDURE — 80053 COMPREHEN METABOLIC PANEL: CPT | Performed by: PHYSICIAN ASSISTANT

## 2022-09-09 PROCEDURE — 84484 ASSAY OF TROPONIN QUANT: CPT | Performed by: PHYSICIAN ASSISTANT

## 2022-09-09 PROCEDURE — 99285 EMERGENCY DEPT VISIT HI MDM: CPT | Mod: 25

## 2022-09-09 RX ORDER — KETOROLAC TROMETHAMINE 30 MG/ML
10 INJECTION, SOLUTION INTRAMUSCULAR; INTRAVENOUS
Status: DISCONTINUED | OUTPATIENT
Start: 2022-09-09 | End: 2022-09-09

## 2022-09-09 RX ORDER — LIDOCAINE 50 MG/G
1 PATCH TOPICAL
Status: DISCONTINUED | OUTPATIENT
Start: 2022-09-09 | End: 2022-09-09 | Stop reason: HOSPADM

## 2022-09-09 RX ORDER — HYDROCODONE BITARTRATE AND ACETAMINOPHEN 5; 325 MG/1; MG/1
1 TABLET ORAL
Status: COMPLETED | OUTPATIENT
Start: 2022-09-09 | End: 2022-09-09

## 2022-09-09 RX ORDER — METHOCARBAMOL 750 MG/1
1500 TABLET, FILM COATED ORAL 3 TIMES DAILY PRN
Qty: 30 TABLET | Refills: 0 | Status: SHIPPED | OUTPATIENT
Start: 2022-09-09 | End: 2022-09-14 | Stop reason: SDUPTHER

## 2022-09-09 RX ORDER — LIDOCAINE 50 MG/G
1 PATCH TOPICAL DAILY
Qty: 30 PATCH | Refills: 0 | Status: SHIPPED | OUTPATIENT
Start: 2022-09-09 | End: 2022-11-01

## 2022-09-09 RX ORDER — METHOCARBAMOL 500 MG/1
500 TABLET, FILM COATED ORAL
Status: COMPLETED | OUTPATIENT
Start: 2022-09-09 | End: 2022-09-09

## 2022-09-09 RX ADMIN — METHOCARBAMOL 500 MG: 500 TABLET ORAL at 09:09

## 2022-09-09 RX ADMIN — LIDOCAINE 1 PATCH: 50 PATCH CUTANEOUS at 09:09

## 2022-09-09 RX ADMIN — HYDROCODONE BITARTRATE AND ACETAMINOPHEN 1 TABLET: 5; 325 TABLET ORAL at 11:09

## 2022-09-09 NOTE — ED NOTES
Neck pain radiating to chest starting yesterday upon waking, states 9/10 stabbing pain at this time that has been constant. No SOB. States pain increased when moving neck.     Patient identifiers for Nadeen Mcgovern 61 y.o. female checked and correct.  Chief Complaint   Patient presents with    Neck Pain     Radiates to back then chest, since yesterday; denies fall/injury/trauma     Past Medical History:   Diagnosis Date    Acute cholecystitis     Cholecystitis    Arthritis 2015    septic arthritis of right shoulder    Bacteremia due to Streptococcus pneumoniae     Cancer     Cataract     Cholangiocarcinoma     Hypertension     Jaundice     obstructive    Liver transplant status     Prediabetes     Raynaud disease     Scleroderma      Allergies reported: Review of patient's allergies indicates:  No Known Allergies    Appearance: Pt awake, alert & oriented to person, place & time. Pt in no acute distress at present time. Pt is clean and well groomed with clothes appropriately fastened.   Skin: Skin warm, dry & intact. Color consistent with ethnicity. Mucous membranes moist. No breakdown or brusing noted.   Musculoskeletal: Patient moving all extremities well, no obvious swelling or deformities noted.   Respiratory: Respirations spontaneous, even, and non-labored. Visible chest rise noted. Airway is open and patent. No accessory muscle use noted.   Neurologic: Sensation is intact. Speech is clear and appropriate. Eyes open spontaneously, behavior appropriate to situation, follows commands, facial expression symmetrical, bilateral hand grasp equal and even, purposeful motor response noted.  Cardiac: All peripheral pulses present. No Bilateral lower extremity edema. Cap refill is <3 seconds.  Abdomen: Abdomen soft, non distended, non tender to palpation.   : Pt voids independently, denies dysuria, hematuria, frequency.

## 2022-09-09 NOTE — TELEPHONE ENCOUNTER
"Patient c/o back and neck pain. Has spasms and difficulty turning her neck. Patient was advised to be seen within the next 2-3 days. An appointment was scheduled for the patient. Advised the patient to call back with any further questions or if symptoms worsen.     Reason for Disposition   [1] Age > 50 AND [2] no history of prior similar neck pain   [1] Age > 50 AND [2] no history of prior similar back pain    Additional Information   Negative: Shock suspected (e.g., cold/pale/clammy skin, too weak to stand, low BP, rapid pulse)   Negative: Difficult to awaken or acting confused  (e.g., disoriented, slurred speech)   Negative: [1] Similar pain previously AND [2] it was from "heart attack"   Negative: [1] Similar pain previously AND [2] it was from "angina" AND [3] not relieved by nitroglycerin   Negative: Sounds like a life-threatening emergency to the triager   Negative: Difficulty breathing or unusual sweating (e.g., sweating without exertion)   Negative: [1] Stiff neck (can't put chin to chest) AND [2] headache   Negative: [1] Stiff neck (can't put chin to chest) AND [2] fever   Negative: Weakness of an arm or hand   Negative: Problems with bowel or bladder control   Negative: Head is twisting to one side (or ask "is it turning against your will?")   Negative: Patient sounds very sick or weak to the triager   Negative: [1] SEVERE neck pain (e.g., excruciating, unable to do any normal activities) AND [2] not improved after 2 hours of pain medicine   Negative: [1] Fever > 100.5 F (38.1 C) AND [2] IVDA (intravenous drug abuse)   Negative: [1] Fever > 100.5 F (38.1 C) AND [2] diabetes mellitus or weak immune system (e.g., HIV positive, cancer chemo, splenectomy, organ transplant, chronic steroids)   Negative: Numbness in an arm or hand (i.e., loss of sensation)   Negative: Tenderness or swelling of front of neck over windpipe   Negative: Rash in same area as pain (may be described as "small blisters")   Negative: " High-risk adult (e.g., history of cancer, HIV, or IV drug abuse)   Negative: [1] MODERATE neck pain (e.g., interferes with normal activities AND [2] present > 3 days   Negative: Neck pain present > 2 weeks   Negative: Pain shoots (radiates) into arm or hand   Negative: Neck pain is a chronic symptom (recurrent or ongoing AND present > 4 weeks)   Negative: Passed out (i.e., lost consciousness, collapsed and was not responding)   Negative: Shock suspected (e.g., cold/pale/clammy skin, too weak to stand, low BP, rapid pulse)   Negative: Sounds like a life-threatening emergency to the triager   Negative: [1] SEVERE back pain (e.g., excruciating) AND [2] sudden onset AND [3] age > 60 years   Negative: [1] Unable to urinate (or only a few drops) > 4 hours AND [2] bladder feels very full (e.g., palpable bladder or strong urge to urinate)   Negative: [1] Loss of bladder or bowel control (urine or bowel incontinence; wetting self, leaking stool) AND [2] new-onset   Negative: Numbness in groin or rectal area (i.e., loss of sensation)   Negative: [1] SEVERE abdominal pain AND [2] present > 1 hour   Negative: [1] Abdominal pain AND [2] age > 60 years   Negative: Weakness of a leg or foot (e.g., unable to bear weight, dragging foot)   Negative: Unable to walk   Negative: Patient sounds very sick or weak to the triager   Negative: [1] SEVERE back pain (e.g., excruciating, unable to do any normal activities) AND [2] not improved 2 hours after pain medicine   Negative: [1] Fever > 100.0 F (37.8 C) AND [2] flank pain (i.e., in side, below ribs and above hip)   Negative: [1] Pain radiates into the thigh or further down the leg AND [2] both legs   Negative: [1] Pain or burning with passing urine (urination) AND [2] flank pain (i.e., in side, below ribs and above hip)   Negative: Numbness in a leg or foot (i.e., loss of sensation)   Negative: [1] Numbness in an arm or hand (i.e., loss of sensation) AND [2] upper back pain   Negative:  "High-risk adult (e.g., history of cancer, HIV, or IV drug use)   Negative: Soft tissue infection (e.g., abscess, cellulitis) or other serious infection (e.g., bacteremia) in last 2 weeks   Negative: [1] Fever AND [2] no symptoms of UTI  (Exception: has generalized muscle pains, not localized back pain)   Negative: Rash in same area as pain (may be described as "small blisters")   Negative: Blood in urine (red, pink, or tea-colored)   Negative: [1] MODERATE back pain (e.g., interferes with normal activities) AND [2] present > 3 days   Negative: [1] Pain radiates into the thigh or further down the leg AND [2] one leg    Protocols used: Neck Pain or Pqtkpleyr-H-VA, Back Pain-A-AH    "

## 2022-09-09 NOTE — ED PROVIDER NOTES
Encounter Date: 2022       History     Chief Complaint   Patient presents with    Neck Pain     Radiates to back then chest, since yesterday; denies fall/injury/trauma     Patient is a 61-year-old female with history of hypertension, scleroderma, coholangiocarcinoma status post liver transplant in  who presents to the emergency department with left-sided neck pain that radiates into arm and chest.  Patient reports she woke up yesterday morning with sharp pain in the left side of her neck.  She has seemed that she had slept wrong.  She reports the pain progressively worsened over the day.  She states the pain is worse with movement.  She states the pain is very sharp in nature.  She states the pain radiates from mostly the left side of neck into her left arm, but she is also having some pain on the right side of her neck.  She reports the pain also radiates the left side of her chest.  She states the pain does take her breath away, but she does not necessarily feel short of breath.  She denies any lower extremity swelling.  She denies any injury.  She denies any fevers or chills.  She denies recent travel, recent surgery, previous blood clot.  She reports she does take baby aspirin every morning.    The history is provided by the patient.   Review of patient's allergies indicates:  No Known Allergies  Past Medical History:   Diagnosis Date    Acute cholecystitis     Cholecystitis    Arthritis     septic arthritis of right shoulder    Bacteremia due to Streptococcus pneumoniae     Cancer     Cataract     Cholangiocarcinoma     Hypertension     Jaundice     obstructive    Liver transplant status     Prediabetes     Raynaud disease     Scleroderma      Past Surgical History:   Procedure Laterality Date    arthoscopic Right     drained infection     SECTION      ESOPHAGOGASTRODUODENOSCOPY N/A 2020    Procedure: EGD (ESOPHAGOGASTRODUODENOSCOPY);  Surgeon: Drew Mackay MD;   Location: [facility] (4TH FLR);  Service: Endoscopy;  Laterality: N/A;  [facility]    INCISION AND DRAINAGE OF WOUND      s/p I&D of R thumb    LIVER TRANSPLANT      SHOULDER ARTHROSCOPY       Family History   Problem Relation Age of Onset    Cancer Father         Lung    Arthritis Father     Stroke Mother     Diabetes Mother     Hypertension Mother     Heart attack Mother     Cancer Paternal Grandmother         pancreatic cancer    Cancer Brother         Lung    Alcohol abuse Brother     Arthritis Sister     No Known Problems Daughter     No Known Problems Son     No Known Problems Son     No Known Problems Daughter     Colon cancer Neg Hx     Esophageal cancer Neg Hx      Social History     Tobacco Use    Smoking status: Never    Smokeless tobacco: Never   Substance Use Topics    Alcohol use: Yes     Alcohol/week: 0.0 standard drinks     Comment: To be social with family and friends.    Drug use: Yes     Types: Marijuana     Comment: prn     Review of Systems   Constitutional:  Negative for activity change, appetite change, chills, fatigue and fever.   HENT:  Negative for congestion, ear discharge, ear pain, postnasal drip, rhinorrhea, sore throat and trouble swallowing.    Respiratory:  Negative for cough and shortness of breath.    Cardiovascular:  Positive for chest pain.   Gastrointestinal:  Negative for abdominal pain, blood in stool, constipation, diarrhea, nausea and vomiting.   Genitourinary:  Negative for dysuria, flank pain and hematuria.   Musculoskeletal:  Positive for neck pain and neck stiffness. Negative for back pain.   Skin:  Negative for rash and wound.   Neurological:  Negative for dizziness, weakness, light-headedness and headaches.     Physical Exam     Initial Vitals [09/09/22 0838]   BP Pulse Resp Temp SpO2   117/64 90 20 98 °F (36.7 °C) 98 %      MAP       --         Physical Exam    Nursing note and vitals reviewed.  Constitutional: She  appears well-developed and well-nourished. She is not diaphoretic. No distress.   HENT:   Head: Normocephalic.   Right Ear: External ear normal.   Left Ear: External ear normal.   Nose: Nose normal.   Mouth/Throat: Oropharynx is clear and moist. No oropharyngeal exudate.   Eyes: Conjunctivae are normal. Pupils are equal, round, and reactive to light.   Neck:   Pain with hyperextension and flexion - pain with lateral movement   Cardiovascular:  Normal rate and regular rhythm.           Pulmonary/Chest: Breath sounds normal.   Abdominal: Abdomen is soft. Bowel sounds are normal. There is no abdominal tenderness.   Musculoskeletal:      Cervical back: Muscular tenderness present. Decreased range of motion.     Neurological: She is alert and oriented to person, place, and time. She has normal strength. No cranial nerve deficit or sensory deficit. GCS score is 15. GCS eye subscore is 4. GCS verbal subscore is 5. GCS motor subscore is 6.   Skin: Skin is warm and dry. Capillary refill takes less than 2 seconds.   Psychiatric: She has a normal mood and affect.       ED Course   Procedures  Labs Reviewed   CBC W/ AUTO DIFFERENTIAL - Abnormal; Notable for the following components:       Result Value    MCH 31.7 (*)     Gran % 78.0 (*)     Lymph % 12.7 (*)     All other components within normal limits   COMPREHENSIVE METABOLIC PANEL - Abnormal; Notable for the following components:    CO2 21 (*)     Glucose 132 (*)     Total Bilirubin 1.4 (*)     Anion Gap 7 (*)     All other components within normal limits   TROPONIN I     EKG Readings: (Independently Interpreted)   Initial Reading: No STEMI. Rhythm: Normal Sinus Rhythm.     Imaging Results              X-Ray Chest PA And Lateral (Final result)  Result time 09/09/22 10:10:06      Final result by Marc Kenney MD (09/09/22 10:10:06)                   Impression:      No acute cardiopulmonary disease.  DJD and scoliosis.      Electronically signed by: Marc Kenney  MD  Date:    09/09/2022  Time:    10:10               Narrative:    EXAMINATION:  XR CHEST PA AND LATERAL    CLINICAL HISTORY:  Chest pain, unspecified    TECHNIQUE:  PA and lateral views of the chest were performed.    COMPARISON:  05/15/2020    FINDINGS:  Heart size and pulmonary vascularity are within normal limits.  Lungs are satisfactorily expanded.  There may be some mild scarring/atelectasis at the left base.  No airspace consolidation or pleural effusion.  No pneumothorax.  Degenerative changes involving the thoracic spine with moderate scoliosis.  Degenerative changes at both shoulders.  Benign-appearing zone of sclerosis again seen at the anterior aspect of the left 1st rib.  Surgical clips at the upper abdomen compatible with history of liver transplant.                                    X-Rays:   Independently Interpreted Readings:   Other Readings:  No acute cardiopulmonary process.  Degenerative changes noted in visible spine  Medications   LIDOcaine 5 % patch 1 patch (1 patch Transdermal Patch Applied 9/9/22 0926)   methocarbamoL tablet 500 mg (500 mg Oral Given 9/9/22 0918)     Medical Decision Making:   Initial Assessment:   Urgent evaluation of a 61-year-old female with history of hypertension, scleroderma, cholangiocarcinoma s/p liver transplant in 2015 who presents to the emergency department with neck and chest pain.  Patient is afebrile, nontoxic appearing, and hemodynamically stable.  Pain is very reproducible on exam.  Patient's pain is worse with movement of the neck.  Patient's presentation is very consistent with a radiculopathy type of pain.  I will obtain cardiac workup although doubt ACS.  Patient will be treated for her pain.  Do not suspect aortic dissection or PE.  Clinical Tests:   Lab Tests: Ordered and Reviewed  Radiological Study: Ordered and Reviewed  ED Management:  10:39 AM  EKG shows no acute ischemia.  Chest x-ray reveals no acute cardiopulmonary process.  Troponin is  undetectable, which I would suspect to be elevated if cardiac etiology with patient's pain being present for more than 24 hours.  Patient is a transplant patient, so to meet best practice recommendations I will avoid NSAIDs and Tylenol.  Will discharge with Robaxin and Lidoderm patch.  She is advised to follow-up with [facility].  She is advised to return to the emergency department with any worsening symptoms or concerns.                    Clinical Impression:   Final diagnoses:  [R07.9] Chest pain  [M54.2] Neck pain (Primary)               Medina Melchor PA-C  09/09/22 1040       Medina Melchor PA-C  09/09/22 1140

## 2022-09-12 ENCOUNTER — TELEPHONE (OUTPATIENT)
Dept: INTERNAL MEDICINE | Facility: CLINIC | Age: 62
End: 2022-09-12
Payer: MEDICARE

## 2022-09-13 ENCOUNTER — SPECIALTY PHARMACY (OUTPATIENT)
Dept: PHARMACY | Facility: CLINIC | Age: 62
End: 2022-09-13
Payer: MEDICARE

## 2022-09-13 ENCOUNTER — TELEPHONE (OUTPATIENT)
Dept: SPINE | Facility: CLINIC | Age: 62
End: 2022-09-13
Payer: MEDICARE

## 2022-09-13 DIAGNOSIS — I73.00 RAYNAUD'S PHENOMENON WITHOUT GANGRENE: Primary | ICD-10-CM

## 2022-09-13 NOTE — TELEPHONE ENCOUNTER
This message is for patient in regards to his or hers appointment 09/14/22 at 1:00 pm with Shereen Coates Np.    On the 4th floor [address] in the [facility] and spine center. We do ask that patients arrive 15 minutes before appointment time.  Patient may contact [phone] if there is any questions or concerns. Thank you for entrusting in [facility] with your care.

## 2022-09-13 NOTE — TELEPHONE ENCOUNTER
Specialty Pharmacy - Refill Coordination    Specialty Medication Orders Linked to Encounter      Flowsheet Row Most Recent Value   Medication #1 tacrolimus (PROGRAF) 1 MG Cap (Order#[id number], Rx#[id number])   Medication #2 sildenafil (REVATIO) 20 mg Tab (Order#[id number], Rx#[id number])   Medication #3 mycophenolate (CELLCEPT) 500 mg Tab (Order#[id number], Rx#[id number])            Refill Questions - Documented Responses      Flowsheet Row Most Recent Value   Patient Availability and HIPAA Verification    Does patient want to proceed with activity? Yes   HIPAA/medical authority confirmed? Yes   Relationship to patient of person spoken to? Self   Refill Screening Questions    Changes to allergies? No   Changes to medications? Sildenafil 20 mg BID addressed in IVENT    New conditions since last clinic visit? No   Unplanned office visit, urgent care, ED, or hospital admission in the last 4 weeks? No   How does patient/caregiver feel medication is working? Good   Financial problems or insurance changes? No   How many doses of your specialty medications were missed in the last 4 weeks? 2   Why were doses missed? Away from home   Would patient like to speak to a pharmacist? No   When does the patient need to receive the medication? 09/20/22   Refill Delivery Questions    How will the patient receive the medication? Delivery Jayshree   When does the patient need to receive the medication? 09/20/22   Shipping Address Home   Address in [facility] confirmed and updated if neccessary? Yes   Expected Copay ($) 0   Is the patient able to afford the medication copay? Yes   Payment Method zero copay   Days supply of Refill 30   Supplies needed? No supplies needed   Refill activity completed? Yes   Refill activity plan Refill scheduled   Shipment/Pickup Date: 09/15/22            Current Outpatient Medications   Medication Sig    aspirin 81 MG Chew Take 81 mg by mouth once daily.    betamethasone dipropionate (DIPROLENE) 0.05 %  ointment Apply topically 2 (two) times daily. To affected areas on hands    diphenhydrAMINE (BENADRYL) 25 mg capsule Take 25 mg by mouth every 6 (six) hours as needed for Itching.    estradioL (ESTRACE) 0.01 % (0.1 mg/gram) vaginal cream Place 1 g vaginally once daily.    hydrOXYzine pamoate (VISTARIL) 50 MG Cap Take 1 capsule (50 mg total) by mouth every 8 (eight) hours as needed.    LIDOcaine (LIDODERM) 5 % Place 1 patch onto the skin once daily. Remove & Discard patch within 12 hours or as directed by MD    methocarbamoL (ROBAXIN) 750 MG Tab Take 2 tablets (1,500 mg total) by mouth 3 (three) times daily as needed (muscle pain).    multivitamin (THERAGRAN) tablet Take 1 tablet by mouth once daily.    mycophenolate (CELLCEPT) 500 mg Tab Take 3 tablets (1,500 mg total) by mouth 2 (two) times daily.    ondansetron (ZOFRAN) 4 MG tablet Take 1 tablet (4 mg total) by mouth every 6 (six) hours as needed for Nausea.    sars-cov-2, covid-19, (MODERNA COVID-19) 50 mcg/0.25 ml injection (BOOSTER) Inject into the muscle.    sildenafil (REVATIO) 20 mg Tab TAKE 1 TABLET (20 MG) BY MOUTH TWO TIMES DAILY    tacrolimus (PROGRAF) 1 MG Cap Take 2 capsules (2 mg total) by mouth every morning AND 1 capsule (1 mg total) every evening.   Last reviewed on 7/25/2022  7:57 AM by Angelique Hodge MA    Review of patient's allergies indicates:  No Known Allergies Last reviewed on  9/9/2022 8:39 AM by Theresa Paiz      Tasks added this encounter   10/15/2022 - Refill Call (Auto Added)  12/19/2022 - Refill Call (Auto Added)  10/15/2022 - Refill Call (Auto Added)   Tasks due within next 3 months   No tasks due.     Jes Bravo, JeanetteD  [facility]  [address] [place] [id number]  Phone: [phone]  Fax: [phone]

## 2022-09-14 ENCOUNTER — OFFICE VISIT (OUTPATIENT)
Dept: SPINE | Facility: CLINIC | Age: 62
End: 2022-09-14
Payer: MEDICARE

## 2022-09-14 VITALS
HEART RATE: 93 BPM | WEIGHT: 145.06 LBS | HEIGHT: 62 IN | DIASTOLIC BLOOD PRESSURE: 69 MMHG | BODY MASS INDEX: 26.69 KG/M2 | SYSTOLIC BLOOD PRESSURE: 112 MMHG | RESPIRATION RATE: 17 BRPM

## 2022-09-14 DIAGNOSIS — M79.18 MYOFASCIAL PAIN: ICD-10-CM

## 2022-09-14 DIAGNOSIS — M54.2 NECK PAIN: ICD-10-CM

## 2022-09-14 DIAGNOSIS — M47.812 SPONDYLOSIS OF CERVICAL REGION WITHOUT MYELOPATHY OR RADICULOPATHY: ICD-10-CM

## 2022-09-14 DIAGNOSIS — M54.6 ACUTE LEFT-SIDED THORACIC BACK PAIN: Primary | ICD-10-CM

## 2022-09-14 PROCEDURE — 1159F PR MEDICATION LIST DOCUMENTED IN MEDICAL RECORD: ICD-10-PCS | Mod: CPTII,S$GLB,, | Performed by: NURSE PRACTITIONER

## 2022-09-14 PROCEDURE — 99999 PR PBB SHADOW E&M-EST. PATIENT-LVL V: CPT | Mod: PBBFAC,,, | Performed by: NURSE PRACTITIONER

## 2022-09-14 PROCEDURE — 1160F PR REVIEW ALL MEDS BY PRESCRIBER/CLIN PHARMACIST DOCUMENTED: ICD-10-PCS | Mod: CPTII,S$GLB,, | Performed by: NURSE PRACTITIONER

## 2022-09-14 PROCEDURE — 1159F MED LIST DOCD IN RCRD: CPT | Mod: CPTII,S$GLB,, | Performed by: NURSE PRACTITIONER

## 2022-09-14 PROCEDURE — 3078F PR MOST RECENT DIASTOLIC BLOOD PRESSURE < 80 MM HG: ICD-10-PCS | Mod: CPTII,S$GLB,, | Performed by: NURSE PRACTITIONER

## 2022-09-14 PROCEDURE — 99214 PR OFFICE/OUTPT VISIT, EST, LEVL IV, 30-39 MIN: ICD-10-PCS | Mod: S$GLB,,, | Performed by: NURSE PRACTITIONER

## 2022-09-14 PROCEDURE — 1160F RVW MEDS BY RX/DR IN RCRD: CPT | Mod: CPTII,S$GLB,, | Performed by: NURSE PRACTITIONER

## 2022-09-14 PROCEDURE — 3074F SYST BP LT 130 MM HG: CPT | Mod: CPTII,S$GLB,, | Performed by: NURSE PRACTITIONER

## 2022-09-14 PROCEDURE — 3008F BODY MASS INDEX DOCD: CPT | Mod: CPTII,S$GLB,, | Performed by: NURSE PRACTITIONER

## 2022-09-14 PROCEDURE — 3074F PR MOST RECENT SYSTOLIC BLOOD PRESSURE < 130 MM HG: ICD-10-PCS | Mod: CPTII,S$GLB,, | Performed by: NURSE PRACTITIONER

## 2022-09-14 PROCEDURE — 3008F PR BODY MASS INDEX (BMI) DOCUMENTED: ICD-10-PCS | Mod: CPTII,S$GLB,, | Performed by: NURSE PRACTITIONER

## 2022-09-14 PROCEDURE — 99999 PR PBB SHADOW E&M-EST. PATIENT-LVL V: ICD-10-PCS | Mod: PBBFAC,,, | Performed by: NURSE PRACTITIONER

## 2022-09-14 PROCEDURE — 3078F DIAST BP <80 MM HG: CPT | Mod: CPTII,S$GLB,, | Performed by: NURSE PRACTITIONER

## 2022-09-14 PROCEDURE — 99214 OFFICE O/P EST MOD 30 MIN: CPT | Mod: S$GLB,,, | Performed by: NURSE PRACTITIONER

## 2022-09-14 RX ORDER — METHOCARBAMOL 750 MG/1
750 TABLET, FILM COATED ORAL 3 TIMES DAILY PRN
Qty: 90 TABLET | Refills: 1 | Status: SHIPPED | OUTPATIENT
Start: 2022-09-14 | End: 2022-11-01

## 2022-09-14 NOTE — PROGRESS NOTES
Subjective:      Patient ID: Nadeen Mcgovern is a 61 y.o. female.    Chief Complaint: No chief complaint on file.    HPI Ms. Mcgovern is a 61 year old female here for evaluation of neck and mid back pain, referred by Medina Merino PA-C for consultation.     C/o neck and left mid back pain that started one week ago  No prior Hx neck and back pain  Notes limited ROM, states the pain radiates to the front torso  Denies numbness/tingling or arm pain  No PT or injections  Went to ED, received robaxin which helps.     Thoracic xray     FINDINGS:  Mild scoliosis convex to the right in the mid thoracic region and to the left in the lower thoracic/upper lumbar area is observed.  Vertebral body heights are normally maintained, without compression deformity at any level.  Minor marginal vertebral endplate spurring is seen at several thoracic levels.  Vertebral endplates are well defined.  No osseous destructive process or paraspinal soft tissue mass.  Upper abdominal surgical clips to both sides of midline are incidentally noted.     Impression:     Mild thoracic scoliosis and minimal multilevel marginal vertebral endplate spurring.  No evidence of compression fracture.    Past Medical History:   Diagnosis Date    Acute cholecystitis     Cholecystitis    Arthritis     septic arthritis of right shoulder    Bacteremia due to Streptococcus pneumoniae     Cancer     Cataract     Cholangiocarcinoma     Hypertension     Jaundice     obstructive    Liver transplant status     Prediabetes     Raynaud disease     Scleroderma        Past Surgical History:   Procedure Laterality Date    arthoscopic Right     drained infection     SECTION      ESOPHAGOGASTRODUODENOSCOPY N/A 2020    Procedure: EGD (ESOPHAGOGASTRODUODENOSCOPY);  Surgeon: Drew Mackay MD;  Location: [address]);  Service: Endoscopy;  Laterality: N/A;  [facility]    INCISION AND DRAINAGE OF WOUND      s/p I&D of R thumb     LIVER TRANSPLANT      SHOULDER ARTHROSCOPY         Family History   Problem Relation Age of Onset    Cancer Father         Lung    Arthritis Father     Stroke Mother     Diabetes Mother     Hypertension Mother     Heart attack Mother     Cancer Paternal Grandmother         pancreatic cancer    Cancer Brother         Lung    Alcohol abuse Brother     Arthritis Sister     No Known Problems Daughter     No Known Problems Son     No Known Problems Son     No Known Problems Daughter     Colon cancer Neg Hx     Esophageal cancer Neg Hx        Social History     Socioeconomic History    Marital status: Single   Occupational History    Occupation: Food Tech     Comment: [employer]   Tobacco Use    Smoking status: Never    Smokeless tobacco: Never   Substance and Sexual Activity    Alcohol use: Yes     Alcohol/week: 0.0 standard drinks     Comment: To be social with family and friends.    Drug use: Yes     Types: Marijuana     Comment: prn    Sexual activity: Yes     Partners: Male     Social Determinants of Health     Financial Resource Strain: Medium Risk    Difficulty of Paying Living Expenses: Somewhat hard   Food Insecurity: Food Insecurity Present    Worried About Running Out of Food in the Last Year: Often true    Ran Out of Food in the Last Year: Sometimes true   Transportation Needs: No Transportation Needs    Lack of Transportation (Medical): No    Lack of Transportation (Non-Medical): No   Physical Activity: Sufficiently Active    Days of Exercise per Week: 6 days    Minutes of Exercise per Session: 40 min   Stress: Stress Concern Present    Feeling of Stress : To some extent   Social Connections: Unknown    Frequency of Communication with Friends and Family: More than three times a week    Frequency of Social Gatherings with Friends and Family: More than three times a week    Active Member of Clubs or Organizations: Yes    Attends Club or Organization Meetings: 1 to 4 times per year     Marital Status:    Housing Stability: Low Risk     Unable to Pay for Housing in the Last Year: No    Number of Places Lived in the Last Year: 1    Unstable Housing in the Last Year: No       Current Outpatient Medications   Medication Sig Dispense Refill    aspirin 81 MG Chew Take 81 mg by mouth once daily.      betamethasone dipropionate (DIPROLENE) 0.05 % ointment Apply topically 2 (two) times daily. To affected areas on hands 45 g 3    diphenhydrAMINE (BENADRYL) 25 mg capsule Take 25 mg by mouth every 6 (six) hours as needed for Itching.      estradioL (ESTRACE) 0.01 % (0.1 mg/gram) vaginal cream Place 1 g vaginally once daily. 42.5 g 1    hydrOXYzine pamoate (VISTARIL) 50 MG Cap Take 1 capsule (50 mg total) by mouth every 8 (eight) hours as needed. 30 capsule 3    LIDOcaine (LIDODERM) 5 % Place 1 patch onto the skin once daily. Remove & Discard patch within 12 hours or as directed by MD 30 patch 0    methocarbamoL (ROBAXIN) 750 MG Tab Take 2 tablets (1,500 mg total) by mouth 3 (three) times daily as needed (muscle pain). 30 tablet 0    multivitamin (THERAGRAN) tablet Take 1 tablet by mouth once daily.      mycophenolate (CELLCEPT) 500 mg Tab Take 3 tablets (1,500 mg total) by mouth 2 (two) times daily. 540 tablet 0    ondansetron (ZOFRAN) 4 MG tablet Take 1 tablet (4 mg total) by mouth every 6 (six) hours as needed for Nausea. 30 tablet 0    sars-cov-2, covid-19, (MODERNA COVID-19) 50 mcg/0.25 ml injection (BOOSTER) Inject into the muscle. 0.25 mL 0    sildenafil (REVATIO) 20 mg Tab TAKE 1 TABLET (20 MG) BY MOUTH TWO TIMES DAILY 180 tablet 3    tacrolimus (PROGRAF) 1 MG Cap Take 2 capsules (2 mg total) by mouth every morning AND 1 capsule (1 mg total) every evening. 90 capsule 11     No current facility-administered medications for this visit.       Review of patient's allergies indicates:  No Known Allergies      Review of Systems   Constitutional: Positive for weight gain. Negative for fever.    Cardiovascular:  Positive for chest pain.   Respiratory:  Positive for shortness of breath.    Musculoskeletal:  Positive for back pain (mid back) and neck pain.   Gastrointestinal:  Negative for abdominal pain, bowel incontinence, nausea and vomiting.   Genitourinary:  Negative for bladder incontinence.   Neurological:  Negative for numbness, paresthesias and sensory change.       Objective:        General: Nadeen is well-developed, well-nourished, appears stated age, in no acute distress, alert and oriented to time, place and person.     General    Vitals reviewed.  Constitutional: She is oriented to person, place, and time. She appears well-developed and well-nourished.   HENT:   Head: Atraumatic.   Nose: Nose normal.   Eyes: Conjunctivae are normal.   Cardiovascular:  Normal rate.            Pulmonary/Chest: Effort normal.   Abdominal: She exhibits no distension.   Neurological: She is alert and oriented to person, place, and time.   Psychiatric: She has a normal mood and affect. Her behavior is normal. Judgment and thought content normal.     General Musculoskeletal Exam   Gait: normal     Back (L-Spine & T-Spine) / Neck (C-Spine) Exam     Tenderness Left paramedian tenderness of the Lower C-Spine and Lower T-Spine.     Neck (C-Spine) Range of Motion   Flexion:      Limited  Extension:  Limited  Right Lateral Bend: abnormal  Left Lateral Bend: abnormal  Right Rotation: abnormal  Left Rotation: abnormal    Spinal Sensation   Right Side Sensation  C-Spine Level: normal   T-Spine Level: normal  Left Side Sensation  C-Spine Level: normal  T-Spine Level: normal    Other   She has no scoliosis .  Spinal Kyphosis:  Absent      Muscle Strength   Right Upper Extremity   Biceps: 5/5   Deltoid:  5/5  Triceps:  5/5  : 5/5   Finger Extensors:  5/5  Left Upper Extremity  Biceps: 5/5   Deltoid:  5/5  Triceps:  5/5  :  5/5   Finger Extensors:  5/5  Right Lower Extremity   Hip Flexion: 5/5   Quadriceps:  5/5   Anterior  tibial:  5/5   EHL:  5/5  Left Lower Extremity   Hip Flexion: 5/5   Quadriceps:  5/5   Anterior tibial:  5/5   EHL:  5/5    Reflexes     Left Side  Biceps:  2+  Brachioradialis:  2+  Left Bolanos's Sign:  Absent    Right Side   Biceps:  2+  Brachioradialis:  2+  Right Bolanos's Sign:  absent    Vascular Exam     Right Pulses        Carotid:                  2+    Left Pulses        Carotid:                  2+            Assessment:       1. Acute left-sided thoracic back pain    2. Neck pain    3. Myofascial pain    4. Spondylosis of cervical region without myelopathy or radiculopathy           Plan:          Prior records were reviewed today.  We discussed neck and mid-back pain and the nature of neck and mid-back pain.  We discussed that it is not one thing that causes the pain but an accumulation of multiple things that we do.    Order cervical and thoracic xrays  Referral to PT for neck and mid back pain, good HEP  Refill robaxin 750mg as needed for muscle pain  We discussed posture sitting and the importance of trying to sit better.    We discussed the benefits of therapy and exercise and continuing to move.  Alternate ice and heat as needed for relief.   Consider thoracic TPI if pain continues  RTC for followup in 8 weeks    More than 50% of the total time  of 45 minutes was spent face to face in counseling on diagnosis and treatment options. I also counseled patient  on common and most usual side effect of prescribed medications.  I reviewed Primary care , and other specialty's notes to better coordinate patient's care. All questions were answered, and patient voiced understanding.      Follow-up: Follow up in about 8 weeks (around 11/9/2022). If there are any questions prior to this, the patient was instructed to contact the office.

## 2022-09-15 ENCOUNTER — IMMUNIZATION (OUTPATIENT)
Dept: PHARMACY | Facility: CLINIC | Age: 62
End: 2022-09-15
Payer: MEDICARE

## 2022-09-15 ENCOUNTER — HOSPITAL ENCOUNTER (OUTPATIENT)
Dept: RADIOLOGY | Facility: OTHER | Age: 62
Discharge: HOME | End: 2022-09-15
Attending: NURSE PRACTITIONER
Payer: MEDICARE

## 2022-09-15 DIAGNOSIS — M47.812 SPONDYLOSIS OF CERVICAL REGION WITHOUT MYELOPATHY OR RADICULOPATHY: ICD-10-CM

## 2022-09-15 DIAGNOSIS — M54.6 ACUTE LEFT-SIDED THORACIC BACK PAIN: ICD-10-CM

## 2022-09-15 DIAGNOSIS — M79.18 MYOFASCIAL PAIN: ICD-10-CM

## 2022-09-15 PROCEDURE — 72052 XR CERVICAL SPINE 5 VIEW WITH FLEX AND EXT: ICD-10-PCS | Mod: 26,,, | Performed by: RADIOLOGY

## 2022-09-15 PROCEDURE — 72052 X-RAY EXAM NECK SPINE 6/>VWS: CPT | Mod: 26,,, | Performed by: RADIOLOGY

## 2022-09-15 PROCEDURE — 72070 XR THORACIC SPINE AP LATERAL: ICD-10-PCS | Mod: 26,,, | Performed by: RADIOLOGY

## 2022-09-15 PROCEDURE — 72070 X-RAY EXAM THORAC SPINE 2VWS: CPT | Mod: 26,,, | Performed by: RADIOLOGY

## 2022-09-15 PROCEDURE — 72052 X-RAY EXAM NECK SPINE 6/>VWS: CPT | Mod: TC,FY

## 2022-09-15 PROCEDURE — 72070 X-RAY EXAM THORAC SPINE 2VWS: CPT | Mod: TC,FY

## 2022-09-19 ENCOUNTER — OFFICE VISIT (OUTPATIENT)
Dept: INTERNAL MEDICINE | Facility: CLINIC | Age: 62
End: 2022-09-19
Payer: MEDICARE

## 2022-09-19 VITALS
DIASTOLIC BLOOD PRESSURE: 60 MMHG | BODY MASS INDEX: 25.68 KG/M2 | OXYGEN SATURATION: 98 % | HEART RATE: 70 BPM | WEIGHT: 139.56 LBS | HEIGHT: 62 IN | SYSTOLIC BLOOD PRESSURE: 112 MMHG

## 2022-09-19 DIAGNOSIS — S29.012D STRAIN OF LATISSIMUS DORSI MUSCLE, SUBSEQUENT ENCOUNTER: ICD-10-CM

## 2022-09-19 DIAGNOSIS — M62.830 SPASM OF THORACIC BACK MUSCLE: ICD-10-CM

## 2022-09-19 DIAGNOSIS — Z09 FOLLOW UP: Primary | ICD-10-CM

## 2022-09-19 PROCEDURE — 1159F PR MEDICATION LIST DOCUMENTED IN MEDICAL RECORD: ICD-10-PCS | Mod: CPTII,S$GLB,, | Performed by: INTERNAL MEDICINE

## 2022-09-19 PROCEDURE — 1160F PR REVIEW ALL MEDS BY PRESCRIBER/CLIN PHARMACIST DOCUMENTED: ICD-10-PCS | Mod: CPTII,S$GLB,, | Performed by: INTERNAL MEDICINE

## 2022-09-19 PROCEDURE — 3074F SYST BP LT 130 MM HG: CPT | Mod: CPTII,S$GLB,, | Performed by: INTERNAL MEDICINE

## 2022-09-19 PROCEDURE — 3078F PR MOST RECENT DIASTOLIC BLOOD PRESSURE < 80 MM HG: ICD-10-PCS | Mod: CPTII,S$GLB,, | Performed by: INTERNAL MEDICINE

## 2022-09-19 PROCEDURE — 3078F DIAST BP <80 MM HG: CPT | Mod: CPTII,S$GLB,, | Performed by: INTERNAL MEDICINE

## 2022-09-19 PROCEDURE — 99999 PR PBB SHADOW E&M-EST. PATIENT-LVL IV: CPT | Mod: PBBFAC,,, | Performed by: INTERNAL MEDICINE

## 2022-09-19 PROCEDURE — 3008F BODY MASS INDEX DOCD: CPT | Mod: CPTII,S$GLB,, | Performed by: INTERNAL MEDICINE

## 2022-09-19 PROCEDURE — 99999 PR PBB SHADOW E&M-EST. PATIENT-LVL IV: ICD-10-PCS | Mod: PBBFAC,,, | Performed by: INTERNAL MEDICINE

## 2022-09-19 PROCEDURE — 1160F RVW MEDS BY RX/DR IN RCRD: CPT | Mod: CPTII,S$GLB,, | Performed by: INTERNAL MEDICINE

## 2022-09-19 PROCEDURE — 3008F PR BODY MASS INDEX (BMI) DOCUMENTED: ICD-10-PCS | Mod: CPTII,S$GLB,, | Performed by: INTERNAL MEDICINE

## 2022-09-19 PROCEDURE — 99212 PR OFFICE/OUTPT VISIT, EST, LEVL II, 10-19 MIN: ICD-10-PCS | Mod: S$GLB,,, | Performed by: INTERNAL MEDICINE

## 2022-09-19 PROCEDURE — 1159F MED LIST DOCD IN RCRD: CPT | Mod: CPTII,S$GLB,, | Performed by: INTERNAL MEDICINE

## 2022-09-19 PROCEDURE — 99212 OFFICE O/P EST SF 10 MIN: CPT | Mod: S$GLB,,, | Performed by: INTERNAL MEDICINE

## 2022-09-19 PROCEDURE — 3074F PR MOST RECENT SYSTOLIC BLOOD PRESSURE < 130 MM HG: ICD-10-PCS | Mod: CPTII,S$GLB,, | Performed by: INTERNAL MEDICINE

## 2022-09-19 NOTE — PATIENT INSTRUCTIONS
Schedule covid-19 booster.  Return to clinic as scheduled on 11/21/2022 or sooner if needed.    Go to physical therapy as arranged.   Have a happy birthday!

## 2022-09-19 NOTE — PROGRESS NOTES
Subjective:       Patient ID: Nadeen Mcgovern is a 61 y.o. female.    Chief Complaint: Follow-up      HPI  Nadeen Mcgovern is a 61 y.o. year old female presents for ER follow up for L thoracic back / flank pain. Doing well since ER visit, pain adequately controlled, ROM improving already. Has PT follow up scheduled. Had seen back and spine, imaging done and reviewed.     Review of Systems   Constitutional:  Negative for activity change, appetite change, fatigue, fever and unexpected weight change.   HENT:  Negative for congestion, hearing loss, postnasal drip, sneezing, sore throat, trouble swallowing and voice change.    Eyes:  Negative for pain and discharge.   Respiratory:  Negative for cough, choking, chest tightness, shortness of breath and wheezing.    Cardiovascular:  Negative for chest pain, palpitations and leg swelling.   Gastrointestinal:  Negative for abdominal distention, abdominal pain, blood in stool, constipation, diarrhea, nausea and vomiting.   Endocrine: Negative for polydipsia and polyuria.   Genitourinary:  Negative for difficulty urinating and flank pain.   Musculoskeletal:  Positive for back pain and myalgias. Negative for arthralgias, joint swelling and neck pain.   Skin:  Negative for rash.   Neurological:  Negative for dizziness, tremors, seizures, weakness, numbness and headaches.   Psychiatric/Behavioral:  Negative for agitation. The patient is not nervous/anxious.        Past Medical History:   Diagnosis Date    Acute cholecystitis     Cholecystitis    Arthritis 2015    septic arthritis of right shoulder    Bacteremia due to Streptococcus pneumoniae     Cancer     Cataract     Cholangiocarcinoma     Hypertension     Jaundice     obstructive    Liver transplant status     Prediabetes     Raynaud disease     Scleroderma         Prior to Admission medications    Medication Sig Start Date End Date Taking? Authorizing Provider   aspirin 81 MG Chew Take 81 mg by mouth once daily.   Yes Historical  Provider   betamethasone dipropionate (DIPROLENE) 0.05 % ointment Apply topically 2 (two) times daily. To affected areas on hands 4/13/22  Yes Deborah Green MD   diphenhydrAMINE (BENADRYL) 25 mg capsule Take 25 mg by mouth every 6 (six) hours as needed for Itching.   Yes Historical Provider   hydrOXYzine pamoate (VISTARIL) 50 MG Cap Take 1 capsule (50 mg total) by mouth every 8 (eight) hours as needed. 7/20/20  Yes Nadeen Figueroa MD   LIDOcaine (LIDODERM) 5 % Place 1 patch onto the skin once daily. Remove & Discard patch within 12 hours or as directed by MD 9/9/22  Yes Medina Melchor PA-C   methocarbamoL (ROBAXIN) 750 MG Tab Take 1 tablet (750 mg total) by mouth 3 (three) times daily as needed (muscle pain). 9/14/22 11/13/22 Yes Shereen Coates NP   multivitamin (THERAGRAN) tablet Take 1 tablet by mouth once daily. 10/12/15  Yes Marysol Zavaleta MD   mycophenolate (CELLCEPT) 500 mg Tab Take 3 tablets (1,500 mg total) by mouth 2 (two) times daily. 7/25/22  Yes Haresh Butler MD   ondansetron (ZOFRAN) 4 MG tablet Take 1 tablet (4 mg total) by mouth every 6 (six) hours as needed for Nausea. 1/17/20  Yes Nadeen Figueroa MD   sars-cov-2, covid-19, (MODERNA COVID-19) 50 mcg/0.25 ml injection (BOOSTER) Inject into the muscle. 5/5/22  Yes Mima Botello, PharmD   sildenafil (REVATIO) 20 mg Tab TAKE 1 TABLET (20 MG) BY MOUTH TWO TIMES DAILY 7/25/22  Yes Haresh Butler MD   tacrolimus (PROGRAF) 1 MG Cap Take 2 capsules (2 mg total) by mouth every morning AND 1 capsule (1 mg total) every evening. 4/25/22  Yes Vitaly Martinez MD   estradioL (ESTRACE) 0.01 % (0.1 mg/gram) vaginal cream Place 1 g vaginally once daily. 8/20/21 8/20/22  Karlie Matute DO        Past medical history, surgical history, and family medical history reviewed and updated as appropriate.    Medications and allergies reviewed.     Objective:          Vitals:    09/19/22 1115   BP: 112/60   BP Location: Right arm   Patient Position: Sitting  "  BP Method: Medium (Manual)   Pulse: 70   SpO2: 98%   Weight: 63.3 kg (139 lb 8.8 oz)   Height: 5' 2" (1.575 m)     Body mass index is 25.52 kg/m².  Physical Exam  Constitutional:       Appearance: She is well-developed.   HENT:      Head: Normocephalic and atraumatic.   Eyes:      Extraocular Movements: Extraocular movements intact.   Cardiovascular:      Rate and Rhythm: Normal rate and regular rhythm.      Heart sounds: Normal heart sounds.   Pulmonary:      Effort: Pulmonary effort is normal. No respiratory distress.      Breath sounds: Normal breath sounds. No wheezing.   Abdominal:      General: Bowel sounds are normal. There is no distension.      Palpations: Abdomen is soft.      Tenderness: There is no abdominal tenderness.   Musculoskeletal:         General: Swelling and tenderness present. Normal range of motion.      Cervical back: Normal range of motion.      Comments: Muscle spasm latissmus dorsi, released  Parapinous thoracic back muscles mildly tender   Skin:     General: Skin is warm and dry.   Neurological:      Mental Status: She is alert and oriented to person, place, and time.      Cranial Nerves: No cranial nerve deficit.      Deep Tendon Reflexes: Reflexes are normal and symmetric.       Lab Results   Component Value Date    WBC 9.84 09/09/2022    HGB 13.8 09/09/2022    HCT 40.2 09/09/2022     09/09/2022    CHOL 168 07/21/2022    TRIG 117 07/21/2022    HDL 41 07/21/2022    ALT 12 09/09/2022    AST 15 09/09/2022     09/09/2022    K 3.9 09/09/2022     09/09/2022    CREATININE 0.7 09/09/2022    BUN 9 09/09/2022    CO2 21 (L) 09/09/2022    TSH 1.224 08/17/2016    INR 1.0 10/27/2016    HGBA1C 5.4 02/15/2017       Assessment:       1. Follow up    2. Strain of latissimus dorsi muscle, subsequent encounter    3. Spasm of thoracic back muscle          Plan:     Nadeen was seen today for follow-up.    Diagnoses and all orders for this visit:    Follow up  Comments:  ER follow up for " acute left sided mid thoracic back pain. Pain improving. Already saw back and spine, prescribed methocarbamol.     Strain of latissimus dorsi muscle, subsequent encounter  Comments:  discussed symptomatic relief, HEP demonstrated to patient. has PT follow up scheduled.    Spasm of thoracic back muscle      Health maintenance reviewed with patient. Patient is due for covid-19 booster; to schedule at checkout today.    Follow up in about 9 weeks (around 11/21/2022).    Alvin Dennis MD  Internal Medicine / Primary Care  [facility]  9/19/2022

## 2022-09-20 ENCOUNTER — PATIENT MESSAGE (OUTPATIENT)
Dept: SPINE | Facility: CLINIC | Age: 62
End: 2022-09-20
Payer: MEDICARE

## 2022-09-23 ENCOUNTER — IMMUNIZATION (OUTPATIENT)
Dept: PHARMACY | Facility: CLINIC | Age: 62
End: 2022-09-23
Payer: MEDICARE

## 2022-09-23 DIAGNOSIS — Z23 NEED FOR VACCINATION: Primary | ICD-10-CM

## 2022-10-17 ENCOUNTER — SPECIALTY PHARMACY (OUTPATIENT)
Dept: PHARMACY | Facility: CLINIC | Age: 62
End: 2022-10-17
Payer: MEDICARE

## 2022-10-17 DIAGNOSIS — I73.00 RAYNAUD'S PHENOMENON WITHOUT GANGRENE: Primary | ICD-10-CM

## 2022-10-17 NOTE — TELEPHONE ENCOUNTER
Specialty Pharmacy - Refill Coordination    Specialty Medication Orders Linked to Encounter      Flowsheet Row Most Recent Value   Medication #1 tacrolimus (PROGRAF) 1 MG Cap (Order#[id number], Rx#[id number])   Medication #2 mycophenolate (CELLCEPT) 500 mg Tab (Order#[id number], Rx#[id number])            Refill Questions - Documented Responses      Flowsheet Row Most Recent Value   Patient Availability and HIPAA Verification    Does patient want to proceed with activity? Yes   HIPAA/medical authority confirmed? Yes   Relationship to patient of person spoken to? Self   Refill Screening Questions    Changes to allergies? No   Changes to medications? No   New conditions since last clinic visit? No   Unplanned office visit, urgent care, ED, or hospital admission in the last 4 weeks? No   How does patient/caregiver feel medication is working? Good   Financial problems or insurance changes? No   How many doses of your specialty medications were missed in the last 4 weeks? 0   Would patient like to speak to a pharmacist? No   When does the patient need to receive the medication? 10/24/22   Refill Delivery Questions    How will the patient receive the medication? [facility]   When does the patient need to receive the medication? 10/24/22   Shipping Address Home   Address in [facility] confirmed and updated if neccessary? Yes   Expected Copay ($) 0   Is the patient able to afford the medication copay? Yes   Payment Method zero copay   Days supply of Refill 30   Supplies needed? No supplies needed   Refill activity completed? Yes   Refill activity plan Refill scheduled   Shipment/Pickup Date: 10/20/22            Current Outpatient Medications   Medication Sig    aspirin 81 MG Chew Take 81 mg by mouth once daily.    betamethasone dipropionate (DIPROLENE) 0.05 % ointment Apply topically 2 (two) times daily. To affected areas on hands    diphenhydrAMINE (BENADRYL) 25 mg capsule Take 25 mg by mouth every 6 (six) hours as needed  for Itching.    estradioL (ESTRACE) 0.01 % (0.1 mg/gram) vaginal cream Place 1 g vaginally once daily.    hydrOXYzine pamoate (VISTARIL) 50 MG Cap Take 1 capsule (50 mg total) by mouth every 8 (eight) hours as needed.    LIDOcaine (LIDODERM) 5 % Place 1 patch onto the skin once daily. Remove & Discard patch within 12 hours or as directed by MD    methocarbamoL (ROBAXIN) 750 MG Tab Take 1 tablet (750 mg total) by mouth 3 (three) times daily as needed (muscle pain).    multivitamin (THERAGRAN) tablet Take 1 tablet by mouth once daily.    mycophenolate (CELLCEPT) 500 mg Tab Take 3 tablets (1,500 mg total) by mouth 2 (two) times daily.    ondansetron (ZOFRAN) 4 MG tablet Take 1 tablet (4 mg total) by mouth every 6 (six) hours as needed for Nausea.    sars-cov-2, covid-19 omicron, (MODERNA COVID BIVAL,18Y UP,-PF) 50 mcg/0.5 mL injection Inject into the muscle.    sars-cov-2, covid-19, (MODERNA COVID-19) 50 mcg/0.25 ml injection (BOOSTER) Inject into the muscle.    sildenafil (REVATIO) 20 mg Tab TAKE 1 TABLET (20 MG) BY MOUTH TWO TIMES DAILY    tacrolimus (PROGRAF) 1 MG Cap Take 2 capsules (2 mg total) by mouth every morning AND 1 capsule (1 mg total) every evening.   Last reviewed on 9/19/2022 12:06 PM by Alvin Dennis MD    Review of patient's allergies indicates:  No Known Allergies Last reviewed on  9/19/2022 11:14 AM by Telma Howard      Tasks added this encounter   11/16/2022 - Refill Call (Auto Added)  11/16/2022 - Refill Call (Auto Added)   Tasks due within next 3 months   12/19/2022 - Refill Call (Auto Added)     Reina Gottlieb, PharmD  [facility]  [address] [place] [id number]  Phone: [phone]  Fax: [phone]

## 2022-10-21 ENCOUNTER — LAB VISIT (OUTPATIENT)
Dept: LAB | Facility: HOSPITAL | Age: 62
End: 2022-10-21
Attending: INTERNAL MEDICINE
Payer: MEDICARE

## 2022-10-21 DIAGNOSIS — M34.9 SCLERODERMA: ICD-10-CM

## 2022-10-21 LAB
BILIRUB UR QL STRIP: NEGATIVE
CLARITY UR REFRACT.AUTO: CLEAR
COLOR UR AUTO: YELLOW
CREAT UR-MCNC: 134 MG/DL (ref 15–325)
GLUCOSE UR QL STRIP: NEGATIVE
HGB UR QL STRIP: NEGATIVE
KETONES UR QL STRIP: NEGATIVE
LEUKOCYTE ESTERASE UR QL STRIP: NEGATIVE
NITRITE UR QL STRIP: NEGATIVE
PH UR STRIP: 5 [PH] (ref 5–8)
PROT UR QL STRIP: NEGATIVE
PROT UR-MCNC: <7 MG/DL (ref 0–15)
PROT/CREAT UR: NORMAL MG/G{CREAT} (ref 0–0.2)
SP GR UR STRIP: 1.02 (ref 1–1.03)
URN SPEC COLLECT METH UR: NORMAL

## 2022-10-21 PROCEDURE — 81003 URINALYSIS AUTO W/O SCOPE: CPT | Performed by: INTERNAL MEDICINE

## 2022-10-21 PROCEDURE — 84156 ASSAY OF PROTEIN URINE: CPT | Performed by: INTERNAL MEDICINE

## 2022-11-01 ENCOUNTER — OFFICE VISIT (OUTPATIENT)
Dept: RHEUMATOLOGY | Facility: CLINIC | Age: 62
End: 2022-11-01
Payer: MEDICARE

## 2022-11-01 ENCOUNTER — OFFICE VISIT (OUTPATIENT)
Dept: DERMATOLOGY | Facility: CLINIC | Age: 62
End: 2022-11-01
Payer: MEDICARE

## 2022-11-01 VITALS
SYSTOLIC BLOOD PRESSURE: 122 MMHG | HEIGHT: 62 IN | DIASTOLIC BLOOD PRESSURE: 71 MMHG | BODY MASS INDEX: 27.43 KG/M2 | HEART RATE: 64 BPM | WEIGHT: 149.06 LBS

## 2022-11-01 DIAGNOSIS — L30.9 ECZEMA, UNSPECIFIED TYPE: ICD-10-CM

## 2022-11-01 DIAGNOSIS — R73.9 HYPERGLYCEMIA: ICD-10-CM

## 2022-11-01 DIAGNOSIS — M34.9 SCLERODERMA: Primary | ICD-10-CM

## 2022-11-01 DIAGNOSIS — L23.9 ALLERGIC CONTACT DERMATITIS, UNSPECIFIED TRIGGER: ICD-10-CM

## 2022-11-01 PROCEDURE — 1160F PR REVIEW ALL MEDS BY PRESCRIBER/CLIN PHARMACIST DOCUMENTED: ICD-10-PCS | Mod: CPTII,S$GLB,, | Performed by: DERMATOLOGY

## 2022-11-01 PROCEDURE — 1159F PR MEDICATION LIST DOCUMENTED IN MEDICAL RECORD: ICD-10-PCS | Mod: CPTII,S$GLB,, | Performed by: DERMATOLOGY

## 2022-11-01 PROCEDURE — 1159F PR MEDICATION LIST DOCUMENTED IN MEDICAL RECORD: ICD-10-PCS | Mod: CPTII,S$GLB,, | Performed by: INTERNAL MEDICINE

## 2022-11-01 PROCEDURE — 3074F SYST BP LT 130 MM HG: CPT | Mod: CPTII,S$GLB,, | Performed by: INTERNAL MEDICINE

## 2022-11-01 PROCEDURE — 99213 PR OFFICE/OUTPT VISIT, EST, LEVL III, 20-29 MIN: ICD-10-PCS | Mod: S$GLB,,, | Performed by: DERMATOLOGY

## 2022-11-01 PROCEDURE — 99999 PR PBB SHADOW E&M-EST. PATIENT-LVL III: ICD-10-PCS | Mod: PBBFAC,,, | Performed by: INTERNAL MEDICINE

## 2022-11-01 PROCEDURE — 3074F PR MOST RECENT SYSTOLIC BLOOD PRESSURE < 130 MM HG: ICD-10-PCS | Mod: CPTII,S$GLB,, | Performed by: INTERNAL MEDICINE

## 2022-11-01 PROCEDURE — 99214 PR OFFICE/OUTPT VISIT, EST, LEVL IV, 30-39 MIN: ICD-10-PCS | Mod: S$GLB,,, | Performed by: INTERNAL MEDICINE

## 2022-11-01 PROCEDURE — 99499 RISK ADDL DX/OHS AUDIT: ICD-10-PCS | Mod: S$GLB,,, | Performed by: INTERNAL MEDICINE

## 2022-11-01 PROCEDURE — 99499 UNLISTED E&M SERVICE: CPT | Mod: S$GLB,,, | Performed by: INTERNAL MEDICINE

## 2022-11-01 PROCEDURE — 1160F RVW MEDS BY RX/DR IN RCRD: CPT | Mod: CPTII,S$GLB,, | Performed by: DERMATOLOGY

## 2022-11-01 PROCEDURE — 3078F DIAST BP <80 MM HG: CPT | Mod: CPTII,S$GLB,, | Performed by: INTERNAL MEDICINE

## 2022-11-01 PROCEDURE — 3078F PR MOST RECENT DIASTOLIC BLOOD PRESSURE < 80 MM HG: ICD-10-PCS | Mod: CPTII,S$GLB,, | Performed by: INTERNAL MEDICINE

## 2022-11-01 PROCEDURE — 99999 PR PBB SHADOW E&M-EST. PATIENT-LVL III: CPT | Mod: PBBFAC,,, | Performed by: DERMATOLOGY

## 2022-11-01 PROCEDURE — 99999 PR PBB SHADOW E&M-EST. PATIENT-LVL III: CPT | Mod: PBBFAC,,, | Performed by: INTERNAL MEDICINE

## 2022-11-01 PROCEDURE — 1159F MED LIST DOCD IN RCRD: CPT | Mod: CPTII,S$GLB,, | Performed by: INTERNAL MEDICINE

## 2022-11-01 PROCEDURE — 1159F MED LIST DOCD IN RCRD: CPT | Mod: CPTII,S$GLB,, | Performed by: DERMATOLOGY

## 2022-11-01 PROCEDURE — 99213 OFFICE O/P EST LOW 20 MIN: CPT | Mod: S$GLB,,, | Performed by: DERMATOLOGY

## 2022-11-01 PROCEDURE — 99214 OFFICE O/P EST MOD 30 MIN: CPT | Mod: S$GLB,,, | Performed by: INTERNAL MEDICINE

## 2022-11-01 PROCEDURE — 99999 PR PBB SHADOW E&M-EST. PATIENT-LVL III: ICD-10-PCS | Mod: PBBFAC,,, | Performed by: DERMATOLOGY

## 2022-11-01 RX ORDER — SILDENAFIL CITRATE 20 MG/1
TABLET ORAL
Qty: 180 TABLET | Refills: 3 | Status: SHIPPED | OUTPATIENT
Start: 2022-11-01 | End: 2023-06-26 | Stop reason: SDUPTHER

## 2022-11-01 RX ORDER — MYCOPHENOLATE MOFETIL 500 MG/1
1500 TABLET ORAL 2 TIMES DAILY
Qty: 540 TABLET | Refills: 0 | Status: SHIPPED | OUTPATIENT
Start: 2022-11-01 | End: 2023-02-14 | Stop reason: SDUPTHER

## 2022-11-01 RX ORDER — BETAMETHASONE DIPROPIONATE 0.5 MG/G
OINTMENT TOPICAL 2 TIMES DAILY
Qty: 45 G | Refills: 3 | Status: SHIPPED | OUTPATIENT
Start: 2022-11-01 | End: 2023-06-27 | Stop reason: SDUPTHER

## 2022-11-01 NOTE — PROGRESS NOTES
Subjective:       Patient ID: Nadeen Mcgovern is a 62 y.o. female.    Chief Complaint: lcSSc; Raynaud's; mild RLD; h/o PAH    HPI the lateral epicondylitis much improved with forearm counterforce brace, ice massage and OT. Had LBP with radiculopathy, which has improved with PT. No change in Raynaud's. No change in skin tightening. Hand eczema slightly worse has upcoming appt with Dr. Green. Swallowing improved and now on mycophenolate tabs rather than liquid. Working on Mediterranean diet but can't eat fish, therefore chicken and turkey and avoiding red meat, using olive oil> butter   Review of Systems   Constitutional:  Negative for appetite change, fatigue, fever and unexpected weight change.   HENT:  Negative for mouth sores and trouble swallowing.    Eyes:  Positive for redness. Negative for visual disturbance.   Respiratory:  Negative for cough, shortness of breath and wheezing.    Cardiovascular:  Negative for chest pain and palpitations.   Gastrointestinal:  Positive for constipation. Negative for abdominal pain, anal bleeding, blood in stool, diarrhea, nausea and vomiting.   Genitourinary:  Positive for genital sores. Negative for dysuria, frequency and urgency.   Musculoskeletal:  Negative for arthralgias, back pain, gait problem, joint swelling, myalgias, neck pain and neck stiffness.   Skin:  Positive for rash.   Neurological:  Negative for weakness, numbness and headaches.   Hematological:  Negative for adenopathy. Bruises/bleeds easily.   Psychiatric/Behavioral:  Negative for sleep disturbance. The patient is not nervous/anxious.        Objective:   LMP  (LMP Unknown)      Physical Exam   Constitutional: She is oriented to person, place, and time.   HENT:   Head: Normocephalic and atraumatic.   Mouth/Throat: Oropharynx is clear and moist.   Eyes: Conjunctivae are normal.   Neck: No thyromegaly present.   Cardiovascular: Normal rate, regular rhythm and normal heart sounds. Exam reveals no gallop and no  friction rub.   No murmur heard.  Pulmonary/Chest: Breath sounds normal. She has no wheezes. She has no rales. She exhibits no tenderness.   Abdominal: Soft. She exhibits no distension and no mass. There is no abdominal tenderness.   Musculoskeletal:      Cervical back: Normal range of motion and neck supple.   Lymphadenopathy:     She has no cervical adenopathy.   Neurological: She is alert and oriented to person, place, and time. She displays normal reflexes. Gait normal.   Nl motor strength UE and LE bilateral   Skin:   Minimal sclerodactyly m RSS 2  Palmar hand eczema     Latest Reference Range & Units 07/21/22 07:40   Cholesterol 120 - 199 mg/dL 168   HDL 40 - 75 mg/dL 41   HDL/Cholesterol Ratio 20.0 - 50.0 % 24.4   LDL Cholesterol External 63.0 - 159.0 mg/dL 103.6   Non-HDL Cholesterol mg/dL 127   Total Cholesterol/HDL Ratio 2.0 - 5.0  4.1   Triglycerides 30 - 150 mg/dL 117   The 10-year ASCVD risk score (Jessica CARR, et al., 2019) is: 5.3%    Values used to calculate the score:      Age: 62 years      Sex: Female      Is Non- : Yes      Diabetic: No      Tobacco smoker: No      Systolic Blood Pressure: 122 mmHg      Is BP treated: No      HDL Cholesterol: 41 mg/dL      Total Cholesterol: 168 mg/dL       PFTs          FVC     SVC    RV   DLco         TLC   7/21/22    131.5                 64.6       58.8     96.4  6/25/21     140.4                 53.8       73.9      98.2  7/10/20     132                    26(?)        79      83.6  3/26/19     117    119          121         101  3/20/18     120    121           62            90  9/25/17     122     122          30            89  4/7/17       123     124          49           91  8/18/16     107     107         110          88       EKG 9/9/22:  Test Reason : R07.9,     Vent. Rate : 075 BPM     Atrial Rate : 075 BPM      P-R Int : 164 ms          QRS Dur : 074 ms       QT Int : 362 ms       P-R-T Axes : 066 031 050 degrees      QTc Int  : 404 ms     Normal sinus rhythm   Possible Left atrial enlargement   Low septal forces   Abnormal ECG   When compared with ECG of 02-SEP-2016 10:24,   No change   Confirmed by Lloyd SYKES MD ([id number]) on 9/9/2022 11:22:04 AM     Referred By: OBEDERR    SELF           Confirmed By:Lloyd SYKES MD      Specimen Collected: 09/09/22 08:42 Last Resulted: 09/09/22 11:22           TTE 8/8/22:   The estimated ejection fraction is 60%.  The left ventricle is normal in size with normal systolic function.  Normal left ventricular diastolic function.  Normal right ventricular size with normal right ventricular systolic function.  The estimated PA systolic pressure is 27 mmHg.  Normal central venous pressure (3 mmHg).  EXAMINATION:  CT CHEST WITHOUT CONTRAST     CLINICAL HISTORY:  Cholangio surveillance; Personal history of malignant neoplasm of other digestive organs     TECHNIQUE:  Low dose axial images, sagittal and coronal reformations were obtained from the thoracic inlet to the lung bases. Contrast was not administered.     COMPARISON:  06/17/2021     FINDINGS:  Heart and mediastinal vessels: Heart normal in size.  There is small amount of atherosclerosis.     Aminta/Mediastinum: No pathologic jensen enlargement.     Lungs/Pleura: Calcified granuloma left lung base.  Lungs otherwise clear.     Upper Abdomen: Postoperative changes liver transplant are noted with pneumobilia visualized in the left hepatic lobe segments.  Probable postinflammatory calcifications seen within the spleen.     Bones: Unremarkable.     Impression:     Status post liver transplant for cholangiocarcinoma.  No convincing evidence of metastatic disease within the chest     Evidence of previous granulomatous disease and atherosclerosis        Electronically signed by: Jhoan Dave Jr  Date:                                            08/18/2022  Time:                                           09:02    Assessment:   lcSSc EUGENE+ RUBY- RNA-Pol3 - /To- mRSS  2(mild) previously 10 improved  Mild Restrictive lung disease stable-improved except for DLco, CT chest shows no ILD  Raynaud's stable   PAH  nl ePASP on TTE 8/8/22  Hyperlipidemia ASCVD 5.3%, borderline risk   Allergic contact dermatitis, Hand eczema, atopic dernatitis. Saw Dr. Green in Dermatology 4/13/22  Dysphagia, pills and solids only saw Denita Espinoza in Gastro 2/16/22 nl esophagram 2/16/22.Swallowing improved. Can take mycophenolate tabs no longer liquid  Right lateral epicondylitis nearly resolved  S/p liver transplant for cholangiocarcinoma  Plan:    Tennis elbow forearm brace  Ice massage  Complete PT for lumbar radiculopathy nearly resolved  F/u Dr. Green for contact dermatitis,  atopic dermatitis, hand eczema as scheduled  Mycophenolate 1500mg twice daily  Tacrolimus 2mg in am and 1mg in pm  Sildenafil 20mg twice daily  Standing labs q 3 months. Centromere antibody in 3 months  Cont working on Mediterranean diet minus fish  Join exercise class as she plans  RTC 6 months

## 2022-11-01 NOTE — PROGRESS NOTES
Subjective:       Patient ID:  Nadeen Mcgovern is a 62 y.o. female who presents for   Chief Complaint   Patient presents with    Eczema     Both hands     Eczema - Initial  Symptom course: unchanged  Affected locations: left hand and right hand  Duration: 3 days  Signs / symptoms: dryness, itching, burning and cracking  Signs / symptoms: dryness, itching and burning  Timing: constant  Aggravated by: picking  Relieving factors/Treatments tried: Rx topical steroids and antihistamines  Improvement on treatment: mild    Hx of systemic scleroderma on immunosuppression; follows closely with rheumatology for this.    Review of Systems   Constitutional:  Negative for fever, chills, weight loss, weight gain, fatigue, night sweats and malaise.   Skin:  Negative for daily sunscreen use, activity-related sunscreen use and recent sunburn.   Hematologic/Lymphatic: Bruises/bleeds easily.      Objective:    Physical Exam   Constitutional: She appears well-developed and well-nourished. No distress.   Neurological: She is alert and oriented to person, place, and time. She is not disoriented.   Psychiatric: She has a normal mood and affect.   Skin:   Areas Examined (abnormalities noted in diagram):   RUE Inspected  LUE Inspection Performed            Diagram Legend     Erythematous scaling macule/papule c/w actinic keratosis       Vascular papule c/w angioma      Pigmented verrucoid papule/plaque c/w seborrheic keratosis      Yellow umbilicated papule c/w sebaceous hyperplasia      Irregularly shaped tan macule c/w lentigo     1-2 mm smooth white papules consistent with Milia      Movable subcutaneous cyst with punctum c/w epidermal inclusion cyst      Subcutaneous movable cyst c/w pilar cyst      Firm pink to brown papule c/w dermatofibroma      Pedunculated fleshy papule(s) c/w skin tag(s)      Evenly pigmented macule c/w junctional nevus     Mildly variegated pigmented, slightly irregular-bordered macule c/w mildly atypical nevus     "  Flesh colored to evenly pigmented papule c/w intradermal nevus       Pink pearly papule/plaque c/w basal cell carcinoma      Erythematous hyperkeratotic cursted plaque c/w SCC      Surgical scar with no sign of skin cancer recurrence      Open and closed comedones      Inflammatory papules and pustules      Verrucoid papule consistent consistent with wart     Erythematous eczematous patches and plaques     Dystrophic onycholytic nail with subungual debris c/w onychomycosis     Umbilicated papule    Erythematous-base heme-crusted tan verrucoid plaque consistent with inflamed seborrheic keratosis     Erythematous Silvery Scaling Plaque c/w Psoriasis     See annotation      Assessment / Plan:        Eczema, unspecified type  Allergic contact dermatitis, unspecified trigger  -     betamethasone dipropionate (DIPROLENE) 0.05 % ointment; Apply topically 2 (two) times daily. To affected areas on hands  Dispense: 45 g; Refill: 3       Refills sent - encouraged to use BID x 2 wks under cotton gloves  Scleroderma may also manifest with eczematous rashes of hands "mechanics hands" in 15% of cases      No follow-ups on file.  "

## 2022-11-01 NOTE — PATIENT INSTRUCTIONS
Salon Pas 4% lidocaine patch or spray for back as needed  Continue exercise program  Mediterranean diet chicken/turkey OK, brown rice

## 2022-11-01 NOTE — PROGRESS NOTES
Rapid3 Question Responses and Scores 10/30/2022   MDHAQ Score 0.6   Psychologic Score 2.2   Pain Score 3.5   When you awakened in the morning OVER THE LAST WEEK, did you feel stiff? No   If Yes, please indicate the number of hours until you are as limber as you will be for the day -   Fatigue Score 1.5   Global Health Score 2.5   RAPID3 Score 2.67     Answers submitted by the patient for this visit:  Rheumatology Questionnaire (Submitted on 10/30/2022)  fever: No  eye redness: Yes  mouth sores: No  headaches: No  shortness of breath: No  chest pain: No  trouble swallowing: No  diarrhea: No  constipation: Yes  unexpected weight change: No  genital sore: Yes  dysuria: No  During the last 3 days, have you had a skin rash?: Yes  Bruises or bleeds easily: Yes  cough: No

## 2022-11-07 ENCOUNTER — LAB VISIT (OUTPATIENT)
Dept: LAB | Facility: HOSPITAL | Age: 62
End: 2022-11-07
Attending: STUDENT IN AN ORGANIZED HEALTH CARE EDUCATION/TRAINING PROGRAM
Payer: MEDICARE

## 2022-11-07 ENCOUNTER — PATIENT OUTREACH (OUTPATIENT)
Dept: ADMINISTRATIVE | Facility: HOSPITAL | Age: 62
End: 2022-11-07
Payer: MEDICARE

## 2022-11-07 DIAGNOSIS — Z94.4 LIVER REPLACED BY TRANSPLANT: ICD-10-CM

## 2022-11-07 DIAGNOSIS — Z94.4 LIVER TRANSPLANTED: ICD-10-CM

## 2022-11-07 LAB
ALBUMIN SERPL BCP-MCNC: 4.1 G/DL (ref 3.5–5.2)
ALP SERPL-CCNC: 61 U/L (ref 55–135)
ALT SERPL W/O P-5'-P-CCNC: 12 U/L (ref 10–44)
ANION GAP SERPL CALC-SCNC: 10 MMOL/L (ref 8–16)
AST SERPL-CCNC: 14 U/L (ref 10–40)
BASOPHILS # BLD AUTO: 0.04 K/UL (ref 0–0.2)
BASOPHILS NFR BLD: 0.8 % (ref 0–1.9)
BILIRUB SERPL-MCNC: 0.6 MG/DL (ref 0.1–1)
BUN SERPL-MCNC: 10 MG/DL (ref 8–23)
CALCIUM SERPL-MCNC: 9.3 MG/DL (ref 8.7–10.5)
CHLORIDE SERPL-SCNC: 106 MMOL/L (ref 95–110)
CO2 SERPL-SCNC: 22 MMOL/L (ref 23–29)
CREAT SERPL-MCNC: 0.7 MG/DL (ref 0.5–1.4)
DIFFERENTIAL METHOD: ABNORMAL
EOSINOPHIL # BLD AUTO: 0.1 K/UL (ref 0–0.5)
EOSINOPHIL NFR BLD: 2.7 % (ref 0–8)
ERYTHROCYTE [DISTWIDTH] IN BLOOD BY AUTOMATED COUNT: 12.8 % (ref 11.5–14.5)
EST. GFR  (NO RACE VARIABLE): >60 ML/MIN/1.73 M^2
GLUCOSE SERPL-MCNC: 85 MG/DL (ref 70–110)
HCT VFR BLD AUTO: 41.8 % (ref 37–48.5)
HGB BLD-MCNC: 13.7 G/DL (ref 12–16)
IMM GRANULOCYTES # BLD AUTO: 0.01 K/UL (ref 0–0.04)
IMM GRANULOCYTES NFR BLD AUTO: 0.2 % (ref 0–0.5)
LYMPHOCYTES # BLD AUTO: 1.6 K/UL (ref 1–4.8)
LYMPHOCYTES NFR BLD: 31.2 % (ref 18–48)
MCH RBC QN AUTO: 31.4 PG (ref 27–31)
MCHC RBC AUTO-ENTMCNC: 32.8 G/DL (ref 32–36)
MCV RBC AUTO: 96 FL (ref 82–98)
MONOCYTES # BLD AUTO: 0.4 K/UL (ref 0.3–1)
MONOCYTES NFR BLD: 7.6 % (ref 4–15)
NEUTROPHILS # BLD AUTO: 3 K/UL (ref 1.8–7.7)
NEUTROPHILS NFR BLD: 57.5 % (ref 38–73)
NRBC BLD-RTO: 0 /100 WBC
PLATELET # BLD AUTO: 205 K/UL (ref 150–450)
PMV BLD AUTO: 10.7 FL (ref 9.2–12.9)
POTASSIUM SERPL-SCNC: 3.9 MMOL/L (ref 3.5–5.1)
PROT SERPL-MCNC: 7.2 G/DL (ref 6–8.4)
RBC # BLD AUTO: 4.37 M/UL (ref 4–5.4)
SODIUM SERPL-SCNC: 138 MMOL/L (ref 136–145)
TACROLIMUS BLD-MCNC: 3.1 NG/ML (ref 5–15)
WBC # BLD AUTO: 5.25 K/UL (ref 3.9–12.7)

## 2022-11-07 PROCEDURE — 80053 COMPREHEN METABOLIC PANEL: CPT | Performed by: STUDENT IN AN ORGANIZED HEALTH CARE EDUCATION/TRAINING PROGRAM

## 2022-11-07 PROCEDURE — 80197 ASSAY OF TACROLIMUS: CPT | Performed by: STUDENT IN AN ORGANIZED HEALTH CARE EDUCATION/TRAINING PROGRAM

## 2022-11-07 PROCEDURE — 36415 COLL VENOUS BLD VENIPUNCTURE: CPT | Performed by: STUDENT IN AN ORGANIZED HEALTH CARE EDUCATION/TRAINING PROGRAM

## 2022-11-07 PROCEDURE — 85025 COMPLETE CBC W/AUTO DIFF WBC: CPT | Performed by: STUDENT IN AN ORGANIZED HEALTH CARE EDUCATION/TRAINING PROGRAM

## 2022-11-08 ENCOUNTER — TELEPHONE (OUTPATIENT)
Dept: SPINE | Facility: CLINIC | Age: 62
End: 2022-11-08
Payer: MEDICARE

## 2022-11-08 NOTE — TELEPHONE ENCOUNTER
This message is for patient in regards to his or hers appointment 11/9/22 at 1:40p with Shereen Coates Np.    On the 4th floor [address] in the back and spine center. We do ask that patients arrive 15 minutes before appointment time.  Patient may contact [phone] if there is any questions or concerns. Thank you for entrusting in [facility] with your care.

## 2022-11-09 ENCOUNTER — OFFICE VISIT (OUTPATIENT)
Dept: SPINE | Facility: CLINIC | Age: 62
End: 2022-11-09
Payer: MEDICARE

## 2022-11-09 VITALS
SYSTOLIC BLOOD PRESSURE: 133 MMHG | TEMPERATURE: 99 F | WEIGHT: 143.31 LBS | BODY MASS INDEX: 26.37 KG/M2 | HEART RATE: 62 BPM | DIASTOLIC BLOOD PRESSURE: 67 MMHG | HEIGHT: 62 IN

## 2022-11-09 DIAGNOSIS — M54.2 NECK PAIN: Primary | ICD-10-CM

## 2022-11-09 DIAGNOSIS — M47.812 SPONDYLOSIS OF CERVICAL REGION WITHOUT MYELOPATHY OR RADICULOPATHY: ICD-10-CM

## 2022-11-09 DIAGNOSIS — M54.6 ACUTE LEFT-SIDED THORACIC BACK PAIN: ICD-10-CM

## 2022-11-09 DIAGNOSIS — M79.18 MYOFASCIAL PAIN: ICD-10-CM

## 2022-11-09 PROCEDURE — 99999 PR PBB SHADOW E&M-EST. PATIENT-LVL III: ICD-10-PCS | Mod: PBBFAC,,, | Performed by: NURSE PRACTITIONER

## 2022-11-09 PROCEDURE — 3075F PR MOST RECENT SYSTOLIC BLOOD PRESS GE 130-139MM HG: ICD-10-PCS | Mod: CPTII,S$GLB,, | Performed by: NURSE PRACTITIONER

## 2022-11-09 PROCEDURE — 1160F RVW MEDS BY RX/DR IN RCRD: CPT | Mod: CPTII,S$GLB,, | Performed by: NURSE PRACTITIONER

## 2022-11-09 PROCEDURE — 1159F MED LIST DOCD IN RCRD: CPT | Mod: CPTII,S$GLB,, | Performed by: NURSE PRACTITIONER

## 2022-11-09 PROCEDURE — 3078F PR MOST RECENT DIASTOLIC BLOOD PRESSURE < 80 MM HG: ICD-10-PCS | Mod: CPTII,S$GLB,, | Performed by: NURSE PRACTITIONER

## 2022-11-09 PROCEDURE — 3075F SYST BP GE 130 - 139MM HG: CPT | Mod: CPTII,S$GLB,, | Performed by: NURSE PRACTITIONER

## 2022-11-09 PROCEDURE — 99999 PR PBB SHADOW E&M-EST. PATIENT-LVL III: CPT | Mod: PBBFAC,,, | Performed by: NURSE PRACTITIONER

## 2022-11-09 PROCEDURE — 3008F PR BODY MASS INDEX (BMI) DOCUMENTED: ICD-10-PCS | Mod: CPTII,S$GLB,, | Performed by: NURSE PRACTITIONER

## 2022-11-09 PROCEDURE — 1160F PR REVIEW ALL MEDS BY PRESCRIBER/CLIN PHARMACIST DOCUMENTED: ICD-10-PCS | Mod: CPTII,S$GLB,, | Performed by: NURSE PRACTITIONER

## 2022-11-09 PROCEDURE — 1159F PR MEDICATION LIST DOCUMENTED IN MEDICAL RECORD: ICD-10-PCS | Mod: CPTII,S$GLB,, | Performed by: NURSE PRACTITIONER

## 2022-11-09 PROCEDURE — 3078F DIAST BP <80 MM HG: CPT | Mod: CPTII,S$GLB,, | Performed by: NURSE PRACTITIONER

## 2022-11-09 PROCEDURE — 99214 OFFICE O/P EST MOD 30 MIN: CPT | Mod: S$GLB,,, | Performed by: NURSE PRACTITIONER

## 2022-11-09 PROCEDURE — 99214 PR OFFICE/OUTPT VISIT, EST, LEVL IV, 30-39 MIN: ICD-10-PCS | Mod: S$GLB,,, | Performed by: NURSE PRACTITIONER

## 2022-11-09 PROCEDURE — 3008F BODY MASS INDEX DOCD: CPT | Mod: CPTII,S$GLB,, | Performed by: NURSE PRACTITIONER

## 2022-11-09 NOTE — PROGRESS NOTES
Subjective:      Patient ID: Nadeen Mcgovern is a 62 y.o. female.    Chief Complaint: No chief complaint on file.    Interval History 11/9/2022:  Ms. Mcgovern presents today for followup of neck pain. She reports significant improvement in her neck pain with PT. She does not have any pain today. She has been doing PT with 2 sessions remaining and compliant with HEP.     HPI Ms. Mcgovern is a 61 year old female here for evaluation of neck and mid back pain, referred by Medina Merino PA-C for consultation.     C/o neck and left mid back pain that started one week ago  No prior Hx neck and back pain  Notes limited ROM, states the pain radiates to the front torso  Denies numbness/tingling or arm pain  No PT or injections  Went to ED, received robaxin which helps.     Cervical xray 2022    FINDINGS:  Vertebral bodies are intact.  There is narrowing of the C 5-C6 and C6-C7 disc spaces.  No instability in flexion and extension.  Some bony impingement on the neural foramina seen at these levels.     Impression:     See above    Thoracic xray 2022    FINDINGS:  Thoracolumbar scoliosis can be seen with degenerative changes present.  No bony destruction is noted.     Impression:     See above    Thoracic xray 2020    FINDINGS:  Mild scoliosis convex to the right in the mid thoracic region and to the left in the lower thoracic/upper lumbar area is observed.  Vertebral body heights are normally maintained, without compression deformity at any level.  Minor marginal vertebral endplate spurring is seen at several thoracic levels.  Vertebral endplates are well defined.  No osseous destructive process or paraspinal soft tissue mass.  Upper abdominal surgical clips to both sides of midline are incidentally noted.     Impression:     Mild thoracic scoliosis and minimal multilevel marginal vertebral endplate spurring.  No evidence of compression fracture.    Past Medical History:   Diagnosis Date    Acute cholecystitis     Cholecystitis     Anemia     Arthritis     septic arthritis of right shoulder    Bacteremia due to Streptococcus pneumoniae     Cancer     Cataract     Cholangiocarcinoma     Fever blister 20    Hypertension     Jaundice     obstructive    Liver transplant status     Organ transplant     Prediabetes     Raynaud disease     Scleroderma        Past Surgical History:   Procedure Laterality Date    arthoscopic Right     drained infection     SECTION      ESOPHAGOGASTRODUODENOSCOPY N/A 2020    Procedure: EGD (ESOPHAGOGASTRODUODENOSCOPY);  Surgeon: Drew Mackay MD;  Location: [facility] ([address] [place] [address]);  Service: Endoscopy;  Laterality: N/A;  [facility]    INCISION AND DRAINAGE OF WOUND      s/p I&D of R thumb    LIVER TRANSPLANT      SHOULDER ARTHROSCOPY         Family History   Problem Relation Age of Onset    Cancer Father         Lung    Arthritis Father     Stroke Mother     Diabetes Mother     Hypertension Mother     Heart attack Mother     Cancer Paternal Grandmother         pancreatic cancer    Cancer Brother         Lung    Alcohol abuse Brother     Arthritis Sister     Psoriasis Sister     No Known Problems Daughter     No Known Problems Son     No Known Problems Son     No Known Problems Daughter     Colon cancer Neg Hx     Esophageal cancer Neg Hx        Social History     Socioeconomic History    Marital status: Single   Occupational History    Occupation: Food Tech     Comment: [employer]   Tobacco Use    Smoking status: Never    Smokeless tobacco: Never   Substance and Sexual Activity    Alcohol use: Not Currently     Alcohol/week: 2.0 standard drinks     Types: 2 Cans of beer per week     Comment: To be social with family and friends.    Drug use: Yes     Frequency: 3.0 times per week     Types: Marijuana     Comment: Help with pain    Sexual activity: Yes     Partners: Male     Birth control/protection: Condom   Other Topics Concern    Are you  pregnant or think you may be? No    Breast-feeding No     Social Determinants of Health     Financial Resource Strain: Medium Risk    Difficulty of Paying Living Expenses: Somewhat hard   Food Insecurity: Food Insecurity Present    Worried About Running Out of Food in the Last Year: Often true    Ran Out of Food in the Last Year: Sometimes true   Transportation Needs: No Transportation Needs    Lack of Transportation (Medical): No    Lack of Transportation (Non-Medical): No   Physical Activity: Sufficiently Active    Days of Exercise per Week: 6 days    Minutes of Exercise per Session: 40 min   Stress: Stress Concern Present    Feeling of Stress : To some extent   Social Connections: Unknown    Frequency of Communication with Friends and Family: More than three times a week    Frequency of Social Gatherings with Friends and Family: More than three times a week    Active Member of Clubs or Organizations: Yes    Attends Club or Organization Meetings: 1 to 4 times per year    Marital Status:    Housing Stability: Low Risk     Unable to Pay for Housing in the Last Year: No    Number of Places Lived in the Last Year: 1    Unstable Housing in the Last Year: No       Current Outpatient Medications   Medication Sig Dispense Refill    aspirin 81 MG Chew Take 81 mg by mouth once daily.      betamethasone dipropionate (DIPROLENE) 0.05 % ointment Apply topically 2 (two) times daily. To affected areas on hands 45 g 3    diphenhydrAMINE (BENADRYL) 25 mg capsule Take 25 mg by mouth every 6 (six) hours as needed for Itching.      estradioL (ESTRACE) 0.01 % (0.1 mg/gram) vaginal cream Place 1 g vaginally once daily. 42.5 g 1    hydrOXYzine pamoate (VISTARIL) 50 MG Cap Take 1 capsule (50 mg total) by mouth every 8 (eight) hours as needed. 30 capsule 3    multivitamin (THERAGRAN) tablet Take 1 tablet by mouth once daily.      mycophenolate (CELLCEPT) 500 mg Tab Take 3 tablets (1,500 mg total) by mouth 2 (two) times daily. 540  tablet 0    sildenafil (REVATIO) 20 mg Tab TAKE 1 TABLET (20 MG) BY MOUTH TWO TIMES DAILY 180 tablet 3    tacrolimus (PROGRAF) 1 MG Cap Take 2 capsules (2 mg total) by mouth every morning AND 1 capsule (1 mg total) every evening. 90 capsule 11     No current facility-administered medications for this visit.       Review of patient's allergies indicates:  No Known Allergies      Review of Systems   Constitutional: Negative for fever.   Cardiovascular:  Negative for chest pain.   Respiratory:  Positive for shortness of breath.    Musculoskeletal:  Negative for back pain and neck pain.   Gastrointestinal:  Negative for abdominal pain, bowel incontinence, nausea and vomiting.   Genitourinary:  Negative for bladder incontinence.   Neurological:  Negative for numbness, paresthesias and sensory change.       Objective:        General: Nadeen is well-developed, well-nourished, appears stated age, in no acute distress, alert and oriented to time, place and person.     General    Vitals reviewed.  Constitutional: She is oriented to person, place, and time. She appears well-developed and well-nourished.   HENT:   Head: Atraumatic.   Nose: Nose normal.   Eyes: Conjunctivae are normal.   Cardiovascular:  Normal rate.            Pulmonary/Chest: Effort normal.   Neurological: She is alert and oriented to person, place, and time.   Psychiatric: She has a normal mood and affect. Her behavior is normal. Judgment and thought content normal.     General Musculoskeletal Exam   Gait: normal     Back (L-Spine & T-Spine) / Neck (C-Spine) Exam     Neck (C-Spine) Range of Motion   Flexion:      Normal  Extension:  Normal  Right Lateral Bend: normal  Left Lateral Bend: normal  Right Rotation: normal  Left Rotation: normal    Spinal Sensation   Right Side Sensation  C-Spine Level: normal   T-Spine Level: normal  Left Side Sensation  C-Spine Level: normal  T-Spine Level: normal    Other   She has no scoliosis .  Spinal Kyphosis:   Absent      Muscle Strength   Right Upper Extremity   Biceps: 5/5   Deltoid:  5/5  Triceps:  5/5  : 5/5   Finger Extensors:  5/5  Left Upper Extremity  Biceps: 5/5   Deltoid:  5/5  Triceps:  5/5  :  5/5   Finger Extensors:  5/5  Right Lower Extremity   Hip Flexion: 5/5   Quadriceps:  5/5   Anterior tibial:  5/5   EHL:  5/5  Left Lower Extremity   Hip Flexion: 5/5   Quadriceps:  5/5   Anterior tibial:  5/5   EHL:  5/5    Reflexes     Left Side  Biceps:  2+  Brachioradialis:  2+  Left Bolanos's Sign:  Absent    Right Side   Biceps:  2+  Brachioradialis:  2+  Right Bolanos's Sign:  absent    Vascular Exam     Right Pulses        Carotid:                  2+    Left Pulses        Carotid:                  2+            Assessment:       1. Neck pain    2. Acute left-sided thoracic back pain    3. Myofascial pain    4. Spondylosis of cervical region without myelopathy or radiculopathy             Plan:          Prior records were reviewed today.  She reports significant improvement in her neck pain with PT. She does not have any pain today. She has been doing PT with 2 sessions remaining and compliant with HEP.   Continue PT and HEP  Continue robaxin 750mg as needed for muscle pain. No refill needed today  We discussed posture sitting and the importance of trying to sit better.    We discussed the benefits of therapy and exercise and continuing to move.  Alternate ice and heat as needed for relief.   Consider thoracic TPI if pain continues  RTC for followup in as needed    Follow-up: Follow up if symptoms worsen or fail to improve. If there are any questions prior to this, the patient was instructed to contact the office.

## 2022-11-10 ENCOUNTER — TELEPHONE (OUTPATIENT)
Dept: TRANSPLANT | Facility: CLINIC | Age: 62
End: 2022-11-10
Payer: MEDICARE

## 2022-11-10 DIAGNOSIS — Z85.09 HISTORY OF CHOLANGIOCARCINOMA: ICD-10-CM

## 2022-11-10 DIAGNOSIS — C22.0 COMBINED HEPATOCELLULAR AND CHOLANGIOCARCINOMA: ICD-10-CM

## 2022-11-10 DIAGNOSIS — Z94.4 LIVER REPLACED BY TRANSPLANT: Primary | ICD-10-CM

## 2022-11-10 NOTE — TELEPHONE ENCOUNTER
Continue routine labs no changes needed.  Letter sent for next lab appointment 2/27/23      ----- Message from Vitaly Martinez MD sent at 11/10/2022 11:30 AM CST -----  Liver transplant labs reviewed and are stable. No changes in her immunosuppression. Please continue to monitor labs per transplant protocol.

## 2022-11-16 ENCOUNTER — PATIENT MESSAGE (OUTPATIENT)
Dept: PHARMACY | Facility: CLINIC | Age: 62
End: 2022-11-16
Payer: MEDICARE

## 2022-11-21 ENCOUNTER — OFFICE VISIT (OUTPATIENT)
Dept: INTERNAL MEDICINE | Facility: CLINIC | Age: 62
End: 2022-11-21
Payer: MEDICARE

## 2022-11-21 ENCOUNTER — SPECIALTY PHARMACY (OUTPATIENT)
Dept: PHARMACY | Facility: CLINIC | Age: 62
End: 2022-11-21
Payer: MEDICARE

## 2022-11-21 VITALS
OXYGEN SATURATION: 99 % | HEART RATE: 56 BPM | SYSTOLIC BLOOD PRESSURE: 124 MMHG | BODY MASS INDEX: 27.14 KG/M2 | HEIGHT: 62 IN | DIASTOLIC BLOOD PRESSURE: 70 MMHG | WEIGHT: 147.5 LBS

## 2022-11-21 DIAGNOSIS — I27.20 PULMONARY HYPERTENSION: ICD-10-CM

## 2022-11-21 DIAGNOSIS — Z94.4 S/P LIVER TRANSPLANT: ICD-10-CM

## 2022-11-21 DIAGNOSIS — Z09 FOLLOW-UP EXAM: Primary | ICD-10-CM

## 2022-11-21 DIAGNOSIS — M34.9 SCLERODERMA: ICD-10-CM

## 2022-11-21 DIAGNOSIS — I73.00 RAYNAUD'S PHENOMENON WITHOUT GANGRENE: Primary | ICD-10-CM

## 2022-11-21 DIAGNOSIS — K22.4 ESOPHAGEAL DYSMOTILITY: ICD-10-CM

## 2022-11-21 DIAGNOSIS — D84.9 IMMUNOSUPPRESSION: ICD-10-CM

## 2022-11-21 PROCEDURE — 99999 PR PBB SHADOW E&M-EST. PATIENT-LVL III: ICD-10-PCS | Mod: PBBFAC,,, | Performed by: INTERNAL MEDICINE

## 2022-11-21 PROCEDURE — 1159F MED LIST DOCD IN RCRD: CPT | Mod: CPTII,S$GLB,, | Performed by: INTERNAL MEDICINE

## 2022-11-21 PROCEDURE — 1159F PR MEDICATION LIST DOCUMENTED IN MEDICAL RECORD: ICD-10-PCS | Mod: CPTII,S$GLB,, | Performed by: INTERNAL MEDICINE

## 2022-11-21 PROCEDURE — 1160F PR REVIEW ALL MEDS BY PRESCRIBER/CLIN PHARMACIST DOCUMENTED: ICD-10-PCS | Mod: CPTII,S$GLB,, | Performed by: INTERNAL MEDICINE

## 2022-11-21 PROCEDURE — 3008F PR BODY MASS INDEX (BMI) DOCUMENTED: ICD-10-PCS | Mod: CPTII,S$GLB,, | Performed by: INTERNAL MEDICINE

## 2022-11-21 PROCEDURE — 1160F RVW MEDS BY RX/DR IN RCRD: CPT | Mod: CPTII,S$GLB,, | Performed by: INTERNAL MEDICINE

## 2022-11-21 PROCEDURE — 3078F PR MOST RECENT DIASTOLIC BLOOD PRESSURE < 80 MM HG: ICD-10-PCS | Mod: CPTII,S$GLB,, | Performed by: INTERNAL MEDICINE

## 2022-11-21 PROCEDURE — 3074F PR MOST RECENT SYSTOLIC BLOOD PRESSURE < 130 MM HG: ICD-10-PCS | Mod: CPTII,S$GLB,, | Performed by: INTERNAL MEDICINE

## 2022-11-21 PROCEDURE — 3078F DIAST BP <80 MM HG: CPT | Mod: CPTII,S$GLB,, | Performed by: INTERNAL MEDICINE

## 2022-11-21 PROCEDURE — 99213 OFFICE O/P EST LOW 20 MIN: CPT | Mod: S$GLB,,, | Performed by: INTERNAL MEDICINE

## 2022-11-21 PROCEDURE — 99999 PR PBB SHADOW E&M-EST. PATIENT-LVL III: CPT | Mod: PBBFAC,,, | Performed by: INTERNAL MEDICINE

## 2022-11-21 PROCEDURE — 3074F SYST BP LT 130 MM HG: CPT | Mod: CPTII,S$GLB,, | Performed by: INTERNAL MEDICINE

## 2022-11-21 PROCEDURE — 99213 PR OFFICE/OUTPT VISIT, EST, LEVL III, 20-29 MIN: ICD-10-PCS | Mod: S$GLB,,, | Performed by: INTERNAL MEDICINE

## 2022-11-21 PROCEDURE — 3008F BODY MASS INDEX DOCD: CPT | Mod: CPTII,S$GLB,, | Performed by: INTERNAL MEDICINE

## 2022-11-21 NOTE — PROGRESS NOTES
Subjective:       Patient ID: Nadeen Mcgovern is a 62 y.o. female.    Chief Complaint: Follow-up      HPI  Nadeen Mcgovern is a 62 y.o. year old female with cholangiiocarcinoma, s/p liver transplant, scleroderma, pHTN, on chronic immunosuppression presents for follow up. Since last visit has seen back and spine and completed PT. Doing very well, no new complaints.    Review of Systems   Constitutional:  Negative for activity change, appetite change, fatigue, fever and unexpected weight change.   HENT:  Negative for congestion, hearing loss, postnasal drip, sneezing, sore throat, trouble swallowing and voice change.    Eyes:  Negative for pain and discharge.   Respiratory:  Negative for cough, choking, chest tightness, shortness of breath and wheezing.    Cardiovascular:  Negative for chest pain, palpitations and leg swelling.   Gastrointestinal:  Negative for abdominal distention, abdominal pain, blood in stool, constipation, diarrhea, nausea and vomiting.   Endocrine: Negative for polydipsia and polyuria.   Genitourinary:  Negative for difficulty urinating and flank pain.   Musculoskeletal:  Negative for arthralgias, back pain, joint swelling, myalgias and neck pain.   Skin:  Negative for rash.   Neurological:  Negative for dizziness, tremors, seizures, weakness, numbness and headaches.   Psychiatric/Behavioral:  Negative for agitation. The patient is not nervous/anxious.        Past Medical History:   Diagnosis Date    Acute cholecystitis     Cholecystitis    Anemia 12/14    Arthritis 2015    septic arthritis of right shoulder    Bacteremia due to Streptococcus pneumoniae     Cancer     Cataract     Cholangiocarcinoma     Fever blister 2/7/20    Hypertension     Jaundice     obstructive    Liver transplant status     Organ transplant     Prediabetes     Raynaud disease     Scleroderma         Prior to Admission medications    Medication Sig Start Date End Date Taking? Authorizing Provider   aspirin 81 MG Chew Take 81 mg  "by mouth once daily.   Yes Historical Provider   betamethasone dipropionate (DIPROLENE) 0.05 % ointment Apply topically 2 (two) times daily. To affected areas on hands 4/13/22  Yes Deborah Green MD   diphenhydrAMINE (BENADRYL) 25 mg capsule Take 25 mg by mouth every 6 (six) hours as needed for Itching.   Yes Historical Provider   estradioL (ESTRACE) 0.01 % (0.1 mg/gram) vaginal cream Place 1 g vaginally once daily. 8/20/21 8/20/22 Yes Karlie Matute,    hydrOXYzine pamoate (VISTARIL) 50 MG Cap Take 1 capsule (50 mg total) by mouth every 8 (eight) hours as needed. 7/20/20  Yes Nadeen Figueroa MD   multivitamin (THERAGRAN) tablet Take 1 tablet by mouth once daily. 10/12/15  Yes Marysol Zavaleta MD   mycophenolate mofetil (CELLCEPT) 200 mg/mL SusR Take 7.5 mL (1,500 mg total) by mouth 2 (two) times a day. 2/16/22  Yes Haresh Butler MD   ondansetron (ZOFRAN) 4 MG tablet Take 1 tablet (4 mg total) by mouth every 6 (six) hours as needed for Nausea. 1/17/20  Yes Nadeen Figueroa MD   sars-cov-2, covid-19, (MODERNA COVID-19) 50 mcg/0.25 ml injection (BOOSTER) Inject into the muscle. 5/5/22  Yes Mima Botello, JeanetteD   sildenafil (REVATIO) 20 mg Tab TAKE 1 TABLET (20 MG) BY MOUTH THREE TIMES DAILY 1/24/22  Yes Haresh Butler MD   tacrolimus (PROGRAF) 1 MG Cap Take 2 capsules (2 mg total) by mouth every morning AND 1 capsule (1 mg total) every evening. 4/25/22  Yes Vitaly Martinez MD        Past medical history, surgical history, and family medical history reviewed and updated as appropriate.    Medications and allergies reviewed.     Objective:          Vitals:    11/21/22 0915   BP: 124/70   BP Location: Right arm   Patient Position: Sitting   BP Method: Medium (Manual)   Pulse: (!) 56   SpO2: 99%   Weight: 66.9 kg (147 lb 7.8 oz)   Height: 5' 2" (1.575 m)       Body mass index is 26.98 kg/m².  Physical Exam  Constitutional:       Appearance: She is well-developed.   HENT:      Head: Normocephalic and atraumatic.   Eyes:      " Extraocular Movements: Extraocular movements intact.   Cardiovascular:      Rate and Rhythm: Normal rate and regular rhythm.      Heart sounds: Normal heart sounds.   Pulmonary:      Effort: Pulmonary effort is normal. No respiratory distress.      Breath sounds: Normal breath sounds. No wheezing.   Abdominal:      General: Bowel sounds are normal. There is no distension.      Palpations: Abdomen is soft.      Tenderness: There is no abdominal tenderness.   Musculoskeletal:         General: No tenderness. Normal range of motion.      Cervical back: Normal range of motion.   Skin:     General: Skin is warm and dry.   Neurological:      Mental Status: She is alert and oriented to person, place, and time.      Cranial Nerves: No cranial nerve deficit.      Deep Tendon Reflexes: Reflexes are normal and symmetric.       Lab Results   Component Value Date    WBC 5.25 11/07/2022    HGB 13.7 11/07/2022    HCT 41.8 11/07/2022     11/07/2022    CHOL 168 07/21/2022    TRIG 117 07/21/2022    HDL 41 07/21/2022    ALT 12 11/07/2022    AST 14 11/07/2022     11/07/2022    K 3.9 11/07/2022     11/07/2022    CREATININE 0.7 11/07/2022    BUN 10 11/07/2022    CO2 22 (L) 11/07/2022    TSH 1.224 08/17/2016    INR 1.0 10/27/2016    HGBA1C 5.4 02/15/2017       Assessment:       1. Follow-up exam    2. S/P liver transplant    3. Immunosuppression    4. Esophageal dysmotility    5. Scleroderma    6. Pulmonary hypertension            Plan:     Nadeen was seen today for follow-up.    Diagnoses and all orders for this visit:    Follow-up exam    S/P liver transplant    Immunosuppression    Esophageal dysmotility    Scleroderma    Pulmonary hypertension    Doing well since last visit  No changes to management.  Has labwork scheduled with transplant team.  Scleroderma, pHTN - on cellcept, revatio, stable  S/p liver transplant - on prograf, cellcept  Sicca syndrome - persistent, no ulcers currently  Restrictive lung disease - at  baseline  Shaving injury - discussed razor bumps / hygiene.   THC use - persists, helps with sleeping    Health maintenance reviewed with patient.    Follow up in about 6 months (around 5/21/2023).    Alvin Dennis MD  Internal Medicine / Primary Care  [facility]  11/21/2022

## 2022-11-21 NOTE — TELEPHONE ENCOUNTER
Spoke w pt regarding Prograf, Cellcept, and Revatio refill. Clinical Review Needed for Cellcept and Revatio. Sent to appropriate teams.

## 2022-11-22 NOTE — TELEPHONE ENCOUNTER
Specialty Pharmacy - Refill Coordination    Specialty Medication Orders Linked to Encounter      Flowsheet Row Most Recent Value   Medication #1 tacrolimus (PROGRAF) 1 MG Cap (Order#[id number], Rx#[id number])   Medication #2 mycophenolate (CELLCEPT) 500 mg Tab (Order#[id number], Rx#[id number])   Medication #3 sildenafil (REVATIO) 20 mg Tab (Order#[id number], Rx#[id number])            Refill Questions - Documented Responses      Flowsheet Row Most Recent Value   Patient Availability and HIPAA Verification    Does patient want to proceed with activity? Yes   HIPAA/medical authority confirmed? Yes   Relationship to patient of person spoken to? Self   Refill Screening Questions    Changes to allergies? No   Changes to medications? No   New conditions since last clinic visit? No   Unplanned office visit, urgent care, ED, or hospital admission in the last 4 weeks? No   How does patient/caregiver feel medication is working? Good   Financial problems or insurance changes? No   How many doses of your specialty medications were missed in the last 4 weeks? 0   Would patient like to speak to a pharmacist? No   When does the patient need to receive the medication? 11/25/22   Refill Delivery Questions    How will the patient receive the medication? [facility]   When does the patient need to receive the medication? 11/25/22   Shipping Address Home   Address in [facility] confirmed and updated if neccessary? Yes   Expected Copay ($) 0   Is the patient able to afford the medication copay? Yes   Payment Method zero copay   Days supply of Refill 30   Supplies needed? No supplies needed   Refill activity completed? Yes   Refill activity plan Refill scheduled   Shipment/Pickup Date: 11/23/22            Current Outpatient Medications   Medication Sig    aspirin 81 MG Chew Take 81 mg by mouth once daily.    betamethasone dipropionate (DIPROLENE) 0.05 % ointment Apply topically 2 (two) times daily. To affected areas on hands     diphenhydrAMINE (BENADRYL) 25 mg capsule Take 25 mg by mouth every 6 (six) hours as needed for Itching.    estradioL (ESTRACE) 0.01 % (0.1 mg/gram) vaginal cream Place 1 g vaginally once daily.    hydrOXYzine pamoate (VISTARIL) 50 MG Cap Take 1 capsule (50 mg total) by mouth every 8 (eight) hours as needed.    multivitamin (THERAGRAN) tablet Take 1 tablet by mouth once daily.    mycophenolate (CELLCEPT) 500 mg Tab Take 3 tablets (1,500 mg total) by mouth 2 (two) times daily.    sildenafil (REVATIO) 20 mg Tab TAKE 1 TABLET (20 MG) BY MOUTH TWO TIMES DAILY    tacrolimus (PROGRAF) 1 MG Cap Take 2 capsules (2 mg total) by mouth every morning AND 1 capsule (1 mg total) every evening.   Last reviewed on 11/21/2022  9:45 AM by Alvin Dennis MD    Review of patient's allergies indicates:  No Known Allergies Last reviewed on  11/21/2022 9:13 AM by Sarika Quintero      Tasks added this encounter   No tasks added.   Tasks due within next 3 months   12/19/2022 - Refill Call (Auto Added)  11/16/2022 - Refill Call (Auto Added)  11/16/2022 - Refill Call (Auto Added)     [facility]  [address]  [facility] [address]  Phone: [phone]  Fax: [phone]

## 2022-11-25 ENCOUNTER — HOSPITAL ENCOUNTER (OUTPATIENT)
Dept: RADIOLOGY | Facility: OTHER | Age: 62
Discharge: HOME | End: 2022-11-25
Attending: STUDENT IN AN ORGANIZED HEALTH CARE EDUCATION/TRAINING PROGRAM
Payer: MEDICARE

## 2022-11-25 DIAGNOSIS — Z85.09 HISTORY OF CHOLANGIOCARCINOMA: ICD-10-CM

## 2022-11-25 PROCEDURE — A9585 GADOBUTROL INJECTION: HCPCS | Performed by: STUDENT IN AN ORGANIZED HEALTH CARE EDUCATION/TRAINING PROGRAM

## 2022-11-25 PROCEDURE — 74183 MRI ABD W/O CNTR FLWD CNTR: CPT | Mod: TC

## 2022-11-25 PROCEDURE — 74183 MRI ABD W/O CNTR FLWD CNTR: CPT | Mod: 26,,, | Performed by: RADIOLOGY

## 2022-11-25 PROCEDURE — 74183 MRI ABDOMEN W WO CONTRAST: ICD-10-PCS | Mod: 26,,, | Performed by: RADIOLOGY

## 2022-11-25 PROCEDURE — 25500020 PHARM REV CODE 255: Performed by: STUDENT IN AN ORGANIZED HEALTH CARE EDUCATION/TRAINING PROGRAM

## 2022-11-25 RX ORDER — GADOBUTROL 604.72 MG/ML
6.5 INJECTION INTRAVENOUS
Status: COMPLETED | OUTPATIENT
Start: 2022-11-25 | End: 2022-11-25

## 2022-11-25 RX ADMIN — GADOBUTROL 6.5 ML: 604.72 INJECTION INTRAVENOUS at 12:11

## 2022-12-05 ENCOUNTER — PES CALL (OUTPATIENT)
Dept: ADMINISTRATIVE | Facility: CLINIC | Age: 62
End: 2022-12-05
Payer: MEDICARE

## 2022-12-07 ENCOUNTER — PES CALL (OUTPATIENT)
Dept: ADMINISTRATIVE | Facility: CLINIC | Age: 62
End: 2022-12-07
Payer: MEDICARE

## 2022-12-08 ENCOUNTER — OFFICE VISIT (OUTPATIENT)
Dept: TRANSPLANT | Facility: CLINIC | Age: 62
End: 2022-12-08
Payer: MEDICARE

## 2022-12-08 VITALS
WEIGHT: 143.75 LBS | BODY MASS INDEX: 26.45 KG/M2 | HEART RATE: 60 BPM | TEMPERATURE: 97 F | SYSTOLIC BLOOD PRESSURE: 121 MMHG | RESPIRATION RATE: 18 BRPM | HEIGHT: 62 IN | OXYGEN SATURATION: 98 % | DIASTOLIC BLOOD PRESSURE: 56 MMHG

## 2022-12-08 DIAGNOSIS — Z79.60 LONG-TERM USE OF IMMUNOSUPPRESSANT MEDICATION: ICD-10-CM

## 2022-12-08 DIAGNOSIS — Z85.09 HISTORY OF CHOLANGIOCARCINOMA: ICD-10-CM

## 2022-12-08 DIAGNOSIS — Z94.4 LIVER TRANSPLANTED: Primary | ICD-10-CM

## 2022-12-08 PROCEDURE — 3078F PR MOST RECENT DIASTOLIC BLOOD PRESSURE < 80 MM HG: ICD-10-PCS | Mod: CPTII,S$GLB,, | Performed by: STUDENT IN AN ORGANIZED HEALTH CARE EDUCATION/TRAINING PROGRAM

## 2022-12-08 PROCEDURE — 1159F PR MEDICATION LIST DOCUMENTED IN MEDICAL RECORD: ICD-10-PCS | Mod: CPTII,S$GLB,, | Performed by: STUDENT IN AN ORGANIZED HEALTH CARE EDUCATION/TRAINING PROGRAM

## 2022-12-08 PROCEDURE — 3074F SYST BP LT 130 MM HG: CPT | Mod: CPTII,S$GLB,, | Performed by: STUDENT IN AN ORGANIZED HEALTH CARE EDUCATION/TRAINING PROGRAM

## 2022-12-08 PROCEDURE — 99999 PR PBB SHADOW E&M-EST. PATIENT-LVL III: CPT | Mod: PBBFAC,,, | Performed by: STUDENT IN AN ORGANIZED HEALTH CARE EDUCATION/TRAINING PROGRAM

## 2022-12-08 PROCEDURE — 1159F MED LIST DOCD IN RCRD: CPT | Mod: CPTII,S$GLB,, | Performed by: STUDENT IN AN ORGANIZED HEALTH CARE EDUCATION/TRAINING PROGRAM

## 2022-12-08 PROCEDURE — 3008F PR BODY MASS INDEX (BMI) DOCUMENTED: ICD-10-PCS | Mod: CPTII,S$GLB,, | Performed by: STUDENT IN AN ORGANIZED HEALTH CARE EDUCATION/TRAINING PROGRAM

## 2022-12-08 PROCEDURE — 99999 PR PBB SHADOW E&M-EST. PATIENT-LVL III: ICD-10-PCS | Mod: PBBFAC,,, | Performed by: STUDENT IN AN ORGANIZED HEALTH CARE EDUCATION/TRAINING PROGRAM

## 2022-12-08 PROCEDURE — 1160F PR REVIEW ALL MEDS BY PRESCRIBER/CLIN PHARMACIST DOCUMENTED: ICD-10-PCS | Mod: CPTII,S$GLB,, | Performed by: STUDENT IN AN ORGANIZED HEALTH CARE EDUCATION/TRAINING PROGRAM

## 2022-12-08 PROCEDURE — 99214 PR OFFICE/OUTPT VISIT, EST, LEVL IV, 30-39 MIN: ICD-10-PCS | Mod: S$GLB,,, | Performed by: STUDENT IN AN ORGANIZED HEALTH CARE EDUCATION/TRAINING PROGRAM

## 2022-12-08 PROCEDURE — 1160F RVW MEDS BY RX/DR IN RCRD: CPT | Mod: CPTII,S$GLB,, | Performed by: STUDENT IN AN ORGANIZED HEALTH CARE EDUCATION/TRAINING PROGRAM

## 2022-12-08 PROCEDURE — 3074F PR MOST RECENT SYSTOLIC BLOOD PRESSURE < 130 MM HG: ICD-10-PCS | Mod: CPTII,S$GLB,, | Performed by: STUDENT IN AN ORGANIZED HEALTH CARE EDUCATION/TRAINING PROGRAM

## 2022-12-08 PROCEDURE — 3008F BODY MASS INDEX DOCD: CPT | Mod: CPTII,S$GLB,, | Performed by: STUDENT IN AN ORGANIZED HEALTH CARE EDUCATION/TRAINING PROGRAM

## 2022-12-08 PROCEDURE — 3078F DIAST BP <80 MM HG: CPT | Mod: CPTII,S$GLB,, | Performed by: STUDENT IN AN ORGANIZED HEALTH CARE EDUCATION/TRAINING PROGRAM

## 2022-12-08 PROCEDURE — 99214 OFFICE O/P EST MOD 30 MIN: CPT | Mod: S$GLB,,, | Performed by: STUDENT IN AN ORGANIZED HEALTH CARE EDUCATION/TRAINING PROGRAM

## 2022-12-08 NOTE — LETTER
December 8, 2022        Wendy Hernandez  [address] [facility] [address]  Phone: [phone]  Fax: [phone]             [facility] [address]st [address]  [address]  [facility] [address]  Phone: [phone]   Patient: Nadeen Mcgovern   MR Number: [id number]   YOB: 1960   Date of Visit: 12/8/2022       Dear Dr. Wendy Hernandez    Thank you for referring Nadeen Mcgovern to me for evaluation. Attached you will find relevant portions of my assessment and plan of care.    If you have questions, please do not hesitate to call me. I look forward to following Nadeen Mcgovern along with you.    Sincerely,    Vitaly Martinez MD    Enclosure    If you would like to receive this communication electronically, please contact [email or web address] or [phone] to request [facility] Link access.    [facility] Link is a tool which provides read-only access to select patient information with whom you have a relationship. Its easy to use and provides real time access to review your patients record including encounter summaries, notes, results, and demographic information.    If you feel you have received this communication in error or would no longer like to receive these types of communications, please e-mail [email or web address]

## 2022-12-08 NOTE — PROGRESS NOTES
Transplant Hepatology  Liver Transplant Recipient Follow Up    PCP: Alvin Dennis MD    Transplant History  Transplant Date: 10/8/2015  UNOS Native Liver Dx: Bile Duct Cancer (Cholangioma, Biliary Tract Carcinoma)     Nadeen is here for follow up of her liver transplant.      ORGAN: LIVER  Whole or Partial: whole liver  Donor Type:  - brain death  CDC High Risk: yes  Donor CMV Status: Positive  Donor HCV Status: Negative  Donor HBcAb: Negative  Biliary Anastomosis: tobias-en-y  Arterial Anatomy: standard  IVC reconstruction: end to end ivc  Portal vein status: patent    Chief complaint: follow up, history of OLT    HPI:  Nadeen Mcgovern is a 62 y.o. female with history of OLT in 10/2015 for  cholangiocarcinoma  who presents for scheduled follow up. She is without complaints today. Went to ED in September for back pain. No other recent hospitalizations or ED visits. She reports compliance with Prograf and CellCept. She denies recent fever, chills, nausea, vomiting, diarrhea, headache, tremors.    Past Medical History:   Diagnosis Date    Acute cholecystitis     Cholecystitis    Anemia     Arthritis     septic arthritis of right shoulder    Bacteremia due to Streptococcus pneumoniae     Cancer     Cataract     Cholangiocarcinoma     Fever blister 20    Hypertension     Jaundice     obstructive    Liver transplant status     Organ transplant     Prediabetes     Raynaud disease     Scleroderma        Past Surgical History:   Procedure Laterality Date    arthoscopic Right     drained infection     SECTION      ESOPHAGOGASTRODUODENOSCOPY N/A 2020    Procedure: EGD (ESOPHAGOGASTRODUODENOSCOPY);  Surgeon: Drew Mackay MD;  Location: [facility] ([address] [place] [address]);  Service: Endoscopy;  Laterality: N/A;  [place] [facility]    INCISION AND DRAINAGE OF WOUND      s/p I&D of R thumb    LIVER TRANSPLANT      SHOULDER ARTHROSCOPY         Family History   Problem Relation Age of Onset    Cancer  Father         Lung    Arthritis Father     Stroke Mother     Diabetes Mother     Hypertension Mother     Heart attack Mother     Cancer Paternal Grandmother         pancreatic cancer    Cancer Brother         Lung    Alcohol abuse Brother     Arthritis Sister     Psoriasis Sister     No Known Problems Daughter     No Known Problems Son     No Known Problems Son     No Known Problems Daughter     Colon cancer Neg Hx     Esophageal cancer Neg Hx        Social History     Socioeconomic History    Marital status: Single   Occupational History    Occupation: Food Tech     Comment: [employer]   Tobacco Use    Smoking status: Never    Smokeless tobacco: Never   Substance and Sexual Activity    Alcohol use: Not Currently     Alcohol/week: 2.0 standard drinks     Types: 2 Cans of beer per week     Comment: To be social with family and friends.    Drug use: Yes     Frequency: 3.0 times per week     Types: Marijuana     Comment: Help with pain    Sexual activity: Yes     Partners: Male     Birth control/protection: Condom   Other Topics Concern    Are you pregnant or think you may be? No    Breast-feeding No     Social Determinants of Health     Financial Resource Strain: Medium Risk    Difficulty of Paying Living Expenses: Somewhat hard   Food Insecurity: Food Insecurity Present    Worried About Running Out of Food in the Last Year: Often true    Ran Out of Food in the Last Year: Sometimes true   Transportation Needs: No Transportation Needs    Lack of Transportation (Medical): No    Lack of Transportation (Non-Medical): No   Physical Activity: Sufficiently Active    Days of Exercise per Week: 6 days    Minutes of Exercise per Session: 40 min   Stress: Stress Concern Present    Feeling of Stress : To some extent   Social Connections: Unknown    Frequency of Communication with Friends and Family: More than three times a week    Frequency of Social Gatherings with Friends and Family: More than three  "times a week    Active Member of Clubs or Organizations: Yes    Attends Club or Organization Meetings: 1 to 4 times per year    Marital Status:    Housing Stability: Low Risk     Unable to Pay for Housing in the Last Year: No    Number of Places Lived in the Last Year: 1    Unstable Housing in the Last Year: No       Current Outpatient Medications   Medication Sig Dispense Refill    aspirin 81 MG Chew Take 81 mg by mouth once daily.      betamethasone dipropionate (DIPROLENE) 0.05 % ointment Apply topically 2 (two) times daily. To affected areas on hands 45 g 3    diphenhydrAMINE (BENADRYL) 25 mg capsule Take 25 mg by mouth every 6 (six) hours as needed for Itching.      hydrOXYzine pamoate (VISTARIL) 50 MG Cap Take 1 capsule (50 mg total) by mouth every 8 (eight) hours as needed. 30 capsule 3    multivitamin (THERAGRAN) tablet Take 1 tablet by mouth once daily.      mycophenolate (CELLCEPT) 500 mg Tab Take 3 tablets (1,500 mg total) by mouth 2 (two) times daily. 540 tablet 0    sildenafil (REVATIO) 20 mg Tab TAKE 1 TABLET (20 MG) BY MOUTH TWO TIMES DAILY 180 tablet 3    tacrolimus (PROGRAF) 1 MG Cap Take 2 capsules (2 mg total) by mouth every morning AND 1 capsule (1 mg total) every evening. 90 capsule 11    estradioL (ESTRACE) 0.01 % (0.1 mg/gram) vaginal cream Place 1 g vaginally once daily. 42.5 g 1     No current facility-administered medications for this visit.       Review of patient's allergies indicates:  No Known Allergies    Review of Systems   Constitutional:  Negative for fever and weight loss.   Gastrointestinal:  Negative for abdominal pain, blood in stool, constipation, diarrhea, heartburn, melena, nausea and vomiting.   Neurological:  Negative for tremors and headaches.     Vitals:    12/08/22 0928   BP: (!) 121/56   Pulse: 60   Resp: 18   Temp: 96.7 °F (35.9 °C)   TempSrc: Oral   SpO2: 98%   Weight: 65.2 kg (143 lb 11.8 oz)   Height: 5' 2" (1.575 m)       Physical Exam  Vitals reviewed. "   Constitutional:       General: She is not in acute distress.  Eyes:      General: No scleral icterus.  Cardiovascular:      Rate and Rhythm: Normal rate and regular rhythm.   Pulmonary:      Effort: Pulmonary effort is normal. No respiratory distress.   Abdominal:      General: Bowel sounds are normal. There is no distension.      Palpations: Abdomen is soft.      Tenderness: There is no abdominal tenderness. There is no guarding or rebound.   Musculoskeletal:      Right lower leg: No edema.      Left lower leg: No edema.   Skin:     Coloration: Skin is not jaundiced.       LABS:  Lab Results   Component Value Date    WBC 5.25 11/07/2022    HGB 13.7 11/07/2022    HCT 41.8 11/07/2022    MCV 96 11/07/2022     11/07/2022       Lab Results   Component Value Date     11/07/2022    K 3.9 11/07/2022     11/07/2022    CO2 22 (L) 11/07/2022    BUN 10 11/07/2022    CREATININE 0.7 11/07/2022    CALCIUM 9.3 11/07/2022    ANIONGAP 10 11/07/2022    ESTGFRAFRICA >60.0 07/21/2022    ESTGFRAFRICA >60.0 07/21/2022    EGFRNONAA >60.0 07/21/2022    EGFRNONAA >60.0 07/21/2022       Lab Results   Component Value Date    ALT 12 11/07/2022    AST 14 11/07/2022     (H) 10/13/2015    ALKPHOS 61 11/07/2022    BILITOT 0.6 11/07/2022       Lab Results   Component Value Date    TACROLIMUS 3.1 (L) 11/07/2022       Assessment:  62 y.o. 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    1. Liver transplanted    2. Long-term use of immunosuppressant medication    3. History of cholangiocarcinoma        Recommendations:  1. Allograft Function  - Excellent graft function with normal LFTs     2. Immunosuppression   - Continue Prograf 2mg in AM and 1mg in PM  - Continue CellCept per rheumatology for scleroderma    3. History of cholangiocarcinoma  - Cross-sectional imaging in 06/2021 without evidence of recurrence. Continue imaging surveillance per protocol.    4. Kidney  function   - Creatinine 0.7  - Avoid NSAIDs as able and maintain adequate hydration     5.  Health Maintenance/Screening:  - Recommend age appropriate cancer screening.  - Skin cancer: Recommend use of sunscreen SPF 30 or higher, hat and sunglasses while outside, dermatologist visit annually or sooner if any concerning lesions.  - Osteoporosis: Recommend bone density testing every 3 years if previously normal or annually if previously abnormal. DEXA normal 04/2019.    Return to clinic in 12 months.    UNOS Patient Status  Functional Status: 100% - Normal, no complaints, no evidence of disease  Physical Capacity: No Limitations    I spent a total of 30 minutes on the day of the visit. This includes face to face time and non-face to face time preparing to see the patient (eg, review of tests), obtaining and/or reviewing separately obtained history, documenting clinical information in the electronic or other health record, independently interpreting results, and communicating results to the patient/family/caregiver, or care coordination.    Vitaly Martinez MD  Staff Physician  Hepatology and Liver Transplant  [facility]  [facility]

## 2022-12-19 ENCOUNTER — SPECIALTY PHARMACY (OUTPATIENT)
Dept: PHARMACY | Facility: CLINIC | Age: 62
End: 2022-12-19
Payer: MEDICARE

## 2022-12-19 DIAGNOSIS — I73.00 RAYNAUD'S PHENOMENON WITHOUT GANGRENE: Primary | ICD-10-CM

## 2022-12-19 NOTE — TELEPHONE ENCOUNTER
12/19 WW[facility]  Refill attempt for sildenafil, RTS to schedule. Last filled on 11/23 for 90 day supply. Next fill date 01/28.

## 2022-12-19 NOTE — TELEPHONE ENCOUNTER
Specialty Pharmacy - Refill Coordination    Specialty Medication Orders Linked to Encounter      Flowsheet Row Most Recent Value   Medication #1 tacrolimus (PROGRAF) 1 MG Cap (Order#[id number], Rx#[id number])   Medication #2 mycophenolate (CELLCEPT) 500 mg Tab (Order#[id number], Rx#[id number])            Refill Questions - Documented Responses      Flowsheet Row Most Recent Value   Patient Availability and HIPAA Verification    Does patient want to proceed with activity? Yes   HIPAA/medical authority confirmed? Yes   Relationship to patient of person spoken to? Self   Refill Screening Questions    Changes to allergies? No   Changes to medications? No   New conditions since last clinic visit? No   Unplanned office visit, urgent care, ED, or hospital admission in the last 4 weeks? No   How does patient/caregiver feel medication is working? Good   Financial problems or insurance changes? No   How many doses of your specialty medications were missed in the last 4 weeks? 0   Would patient like to speak to a pharmacist? No   When does the patient need to receive the medication? 12/23/22   Refill Delivery Questions    How will the patient receive the medication? MEDRx   When does the patient need to receive the medication? 12/23/22   Shipping Address Home   Address in [facility] confirmed and updated if neccessary? Yes   Expected Copay ($) 0   Is the patient able to afford the medication copay? Yes   Payment Method zero copay   Days supply of Refill 30   Refill activity completed? Yes   Refill activity plan Refill scheduled   Shipment/Pickup Date: 12/20/22            Current Outpatient Medications   Medication Sig    aspirin 81 MG Chew Take 81 mg by mouth once daily.    betamethasone dipropionate (DIPROLENE) 0.05 % ointment Apply topically 2 (two) times daily. To affected areas on hands    diphenhydrAMINE (BENADRYL) 25 mg capsule Take 25 mg by mouth every 6 (six) hours as needed for Itching.    estradioL (ESTRACE)  0.01 % (0.1 mg/gram) vaginal cream Place 1 g vaginally once daily.    hydrOXYzine pamoate (VISTARIL) 50 MG Cap Take 1 capsule (50 mg total) by mouth every 8 (eight) hours as needed.    multivitamin (THERAGRAN) tablet Take 1 tablet by mouth once daily.    mycophenolate (CELLCEPT) 500 mg Tab Take 3 tablets (1,500 mg total) by mouth 2 (two) times daily.    sildenafil (REVATIO) 20 mg Tab TAKE 1 TABLET (20 MG) BY MOUTH TWO TIMES DAILY    tacrolimus (PROGRAF) 1 MG Cap Take 2 capsules (2 mg total) by mouth every morning AND 1 capsule (1 mg total) every evening.   Last reviewed on 12/8/2022  9:44 AM by Vitaly Martinez MD    Review of patient's allergies indicates:  No Known Allergies Last reviewed on  12/8/2022 9:44 AM by Vitaly Martinez      Tasks added this encounter   No tasks added.   Tasks due within next 3 months   12/19/2022 - Refill Call (Auto Added)  12/19/2022 - Refill Call (Auto Added)     [facility] Estevez  [facility]  [address] [facility] [address] [facility] [address]  Phone: [phone]  Fax: [phone]      Specialty Pharmacy - Refill Coordination    Specialty Medication Orders Linked to Encounter      Flowsheet Row Most Recent Value   Medication #1 tacrolimus (PROGRAF) 1 MG Cap (Order#[id number], Rx#[id number])   Medication #2 mycophenolate (CELLCEPT) 500 mg Tab (Order#[id number], Rx#[id number])            Refill Questions - Documented Responses      Flowsheet Row Most Recent Value   Patient Availability and HIPAA Verification    Does patient want to proceed with activity? Yes   HIPAA/medical authority confirmed? Yes   Relationship to patient of person spoken to? Self   Refill Screening Questions    Changes to allergies? No   Changes to medications? No   New conditions since last clinic visit? No   Unplanned office visit, urgent care, ED, or hospital admission in the last 4 weeks? No   How does patient/caregiver feel medication is working? Good   Financial problems or insurance  changes? No   How many doses of your specialty medications were missed in the last 4 weeks? 0   Would patient like to speak to a pharmacist? No   When does the patient need to receive the medication? 12/23/22   Refill Delivery Questions    How will the patient receive the medication? MEDRx   When does the patient need to receive the medication? 12/23/22   Shipping Address Home   Address in [facility] confirmed and updated if neccessary? Yes   Expected Copay ($) 0   Is the patient able to afford the medication copay? Yes   Payment Method zero copay   Days supply of Refill 30   Refill activity completed? Yes   Refill activity plan Refill scheduled   Shipment/Pickup Date: 12/20/22            Current Outpatient Medications   Medication Sig    aspirin 81 MG Chew Take 81 mg by mouth once daily.    betamethasone dipropionate (DIPROLENE) 0.05 % ointment Apply topically 2 (two) times daily. To affected areas on hands    diphenhydrAMINE (BENADRYL) 25 mg capsule Take 25 mg by mouth every 6 (six) hours as needed for Itching.    estradioL (ESTRACE) 0.01 % (0.1 mg/gram) vaginal cream Place 1 g vaginally once daily.    hydrOXYzine pamoate (VISTARIL) 50 MG Cap Take 1 capsule (50 mg total) by mouth every 8 (eight) hours as needed.    multivitamin (THERAGRAN) tablet Take 1 tablet by mouth once daily.    mycophenolate (CELLCEPT) 500 mg Tab Take 3 tablets (1,500 mg total) by mouth 2 (two) times daily.    sildenafil (REVATIO) 20 mg Tab TAKE 1 TABLET (20 MG) BY MOUTH TWO TIMES DAILY    tacrolimus (PROGRAF) 1 MG Cap Take 2 capsules (2 mg total) by mouth every morning AND 1 capsule (1 mg total) every evening.   Last reviewed on 12/8/2022  9:44 AM by Vitaly Martinez MD    Review of patient's allergies indicates:  No Known Allergies Last reviewed on  12/8/2022 9:44 AM by Vitaly Martinez      Tasks added this encounter   No tasks added.   Tasks due within next 3 months   12/19/2022 - Refill Call (Auto Added)  12/19/2022 - Refill Call  (Auto Added)     [facility]  [address]  [facility] [address]  Phone: [phone]  Fax: [phone]

## 2023-01-04 ENCOUNTER — OFFICE VISIT (OUTPATIENT)
Dept: OPTOMETRY | Facility: CLINIC | Age: 63
End: 2023-01-04
Payer: MEDICARE

## 2023-01-04 DIAGNOSIS — H25.13 NUCLEAR SCLEROSIS, BILATERAL: Primary | ICD-10-CM

## 2023-01-04 DIAGNOSIS — H52.4 PRESBYOPIA: ICD-10-CM

## 2023-01-04 DIAGNOSIS — Z13.5 GLAUCOMA SCREENING: ICD-10-CM

## 2023-01-04 PROCEDURE — 1160F PR REVIEW ALL MEDS BY PRESCRIBER/CLIN PHARMACIST DOCUMENTED: ICD-10-PCS | Mod: CPTII,S$GLB,, | Performed by: OPTOMETRIST

## 2023-01-04 PROCEDURE — 99999 PR PBB SHADOW E&M-EST. PATIENT-LVL III: ICD-10-PCS | Mod: PBBFAC,,, | Performed by: OPTOMETRIST

## 2023-01-04 PROCEDURE — 92015 PR REFRACTION: ICD-10-PCS | Mod: S$GLB,,, | Performed by: OPTOMETRIST

## 2023-01-04 PROCEDURE — 92015 DETERMINE REFRACTIVE STATE: CPT | Mod: S$GLB,,, | Performed by: OPTOMETRIST

## 2023-01-04 PROCEDURE — 99999 PR PBB SHADOW E&M-EST. PATIENT-LVL III: CPT | Mod: PBBFAC,,, | Performed by: OPTOMETRIST

## 2023-01-04 PROCEDURE — 1159F MED LIST DOCD IN RCRD: CPT | Mod: CPTII,S$GLB,, | Performed by: OPTOMETRIST

## 2023-01-04 PROCEDURE — 92014 COMPRE OPH EXAM EST PT 1/>: CPT | Mod: S$GLB,,, | Performed by: OPTOMETRIST

## 2023-01-04 PROCEDURE — 1160F RVW MEDS BY RX/DR IN RCRD: CPT | Mod: CPTII,S$GLB,, | Performed by: OPTOMETRIST

## 2023-01-04 PROCEDURE — 92014 PR EYE EXAM, EST PATIENT,COMPREHESV: ICD-10-PCS | Mod: S$GLB,,, | Performed by: OPTOMETRIST

## 2023-01-04 PROCEDURE — 1159F PR MEDICATION LIST DOCUMENTED IN MEDICAL RECORD: ICD-10-PCS | Mod: CPTII,S$GLB,, | Performed by: OPTOMETRIST

## 2023-01-04 NOTE — PROGRESS NOTES
HPI     Concerns About Ocular Health            Comments: RAMOS: 10/22/2021          Comments    Presents today for routine eye exam. Pt reports occ blurry vision. Pt use   prescription readers for near. Pt denies eye pain, floaters and flashes.   Use Visine gtts          Last edited by Beba Samson MA on 1/4/2023  9:42 AM.        ROS    Negative for: Constitutional, Gastrointestinal, Neurological, Skin,   Genitourinary, Musculoskeletal, HENT, Endocrine, Cardiovascular, Eyes,   Respiratory, Psychiatric, Allergic/Imm, Heme/Lymph  Last edited by Jovani Hankins, JACEK on 1/4/2023  9:47 AM.        Assessment /Plan     For exam results, see Encounter Report.    Nuclear sclerosis, bilateral    Glaucoma screening    Presbyopia      1. Small cats OU--pt wishes new Rx.  Has prescription readers, but now needs distance correction      PLAN:    rtc 1 yr

## 2023-01-17 ENCOUNTER — SPECIALTY PHARMACY (OUTPATIENT)
Dept: PHARMACY | Facility: CLINIC | Age: 63
End: 2023-01-17
Payer: MEDICARE

## 2023-01-17 DIAGNOSIS — I73.00 RAYNAUD'S PHENOMENON WITHOUT GANGRENE: Primary | ICD-10-CM

## 2023-01-17 NOTE — TELEPHONE ENCOUNTER
Specialty Pharmacy - Refill Coordination    Specialty Medication Orders Linked to Encounter      Flowsheet Row Most Recent Value   Medication #1 tacrolimus (PROGRAF) 1 MG Cap (Order#[id number], Rx#[id number])   Medication #2 mycophenolate (CELLCEPT) 500 mg Tab (Order#[id number], Rx#[id number])            Refill Questions - Documented Responses      Flowsheet Row Most Recent Value   Patient Availability and HIPAA Verification    Does patient want to proceed with activity? Yes   HIPAA/medical authority confirmed? Yes   Relationship to patient of person spoken to? Self   Refill Screening Questions    Changes to allergies? No   Changes to medications? No   New conditions since last clinic visit? No   Unplanned office visit, urgent care, ED, or hospital admission in the last 4 weeks? No   How does patient/caregiver feel medication is working? Good   Financial problems or insurance changes? No   How many doses of your specialty medications were missed in the last 4 weeks? 0   Would patient like to speak to a pharmacist? No   When does the patient need to receive the medication? 01/22/23   Refill Delivery Questions    How will the patient receive the medication? MEDRx   When does the patient need to receive the medication? 01/22/23   Shipping Address Home   Address in [facility] confirmed and updated if neccessary? Yes   Expected Copay ($) 0   Is the patient able to afford the medication copay? Yes   Payment Method zero copay   Days supply of Refill 30   Supplies needed? No supplies needed   Refill activity completed? Yes   Refill activity plan Refill scheduled   Shipment/Pickup Date: 01/18/23            Current Outpatient Medications   Medication Sig    aspirin 81 MG Chew Take 81 mg by mouth once daily.    betamethasone dipropionate (DIPROLENE) 0.05 % ointment Apply topically 2 (two) times daily. To affected areas on hands    diphenhydrAMINE (BENADRYL) 25 mg capsule Take 25 mg by mouth every 6 (six) hours as needed  for Itching.    estradioL (ESTRACE) 0.01 % (0.1 mg/gram) vaginal cream Place 1 g vaginally once daily.    hydrOXYzine pamoate (VISTARIL) 50 MG Cap Take 1 capsule (50 mg total) by mouth every 8 (eight) hours as needed.    multivitamin (THERAGRAN) tablet Take 1 tablet by mouth once daily.    mycophenolate (CELLCEPT) 500 mg Tab Take 3 tablets (1,500 mg total) by mouth 2 (two) times daily.    sildenafil (REVATIO) 20 mg Tab TAKE 1 TABLET (20 MG) BY MOUTH TWO TIMES DAILY    tacrolimus (PROGRAF) 1 MG Cap Take 2 capsules (2 mg total) by mouth every morning AND 1 capsule (1 mg total) every evening.   Last reviewed on 1/4/2023  9:47 AM by Jovani Hankins, JACEK    Review of patient's allergies indicates:  No Known Allergies Last reviewed on  1/4/2023 9:47 AM by Jovani Hankins      Tasks added this encounter   2/14/2023 - Refill Call (Auto Added)  2/14/2023 - Refill Call (Auto Added)   Tasks due within next 3 months   2/14/2023 - Refill Call (Auto Added)     [facility] - [facility]  [address] [address]  Phone: [phone]  Fax: [phone]

## 2023-02-01 ENCOUNTER — LAB VISIT (OUTPATIENT)
Dept: LAB | Facility: OTHER | Age: 63
End: 2023-02-01
Attending: INTERNAL MEDICINE
Payer: MEDICARE

## 2023-02-01 DIAGNOSIS — M34.9 SCLERODERMA: ICD-10-CM

## 2023-02-01 DIAGNOSIS — R73.9 HYPERGLYCEMIA: ICD-10-CM

## 2023-02-01 DIAGNOSIS — Z79.899 ON MYCOPHENOLATE MOFETIL THERAPY: ICD-10-CM

## 2023-02-01 LAB
ALBUMIN SERPL BCP-MCNC: 4.2 G/DL (ref 3.5–5.2)
ALP SERPL-CCNC: 61 U/L (ref 55–135)
ALT SERPL W/O P-5'-P-CCNC: 12 U/L (ref 10–44)
ANION GAP SERPL CALC-SCNC: 8 MMOL/L (ref 8–16)
AST SERPL-CCNC: 13 U/L (ref 10–40)
BASOPHILS # BLD AUTO: 0.04 K/UL (ref 0–0.2)
BASOPHILS NFR BLD: 0.6 % (ref 0–1.9)
BILIRUB SERPL-MCNC: 0.6 MG/DL (ref 0.1–1)
BUN SERPL-MCNC: 13 MG/DL (ref 8–23)
CALCIUM SERPL-MCNC: 9.7 MG/DL (ref 8.7–10.5)
CHLORIDE SERPL-SCNC: 108 MMOL/L (ref 95–110)
CO2 SERPL-SCNC: 26 MMOL/L (ref 23–29)
CREAT SERPL-MCNC: 0.8 MG/DL (ref 0.5–1.4)
CRP SERPL-MCNC: 1.6 MG/L (ref 0–8.2)
DIFFERENTIAL METHOD: ABNORMAL
EOSINOPHIL # BLD AUTO: 0.2 K/UL (ref 0–0.5)
EOSINOPHIL NFR BLD: 2.7 % (ref 0–8)
ERYTHROCYTE [DISTWIDTH] IN BLOOD BY AUTOMATED COUNT: 13 % (ref 11.5–14.5)
ERYTHROCYTE [SEDIMENTATION RATE] IN BLOOD: 12 MM/HR (ref 0–20)
EST. GFR  (NO RACE VARIABLE): >60 ML/MIN/1.73 M^2
ESTIMATED AVG GLUCOSE: 108 MG/DL (ref 68–131)
GLUCOSE SERPL-MCNC: 95 MG/DL (ref 70–110)
HBA1C MFR BLD: 5.4 % (ref 4–5.6)
HCT VFR BLD AUTO: 41.7 % (ref 37–48.5)
HGB BLD-MCNC: 13.8 G/DL (ref 12–16)
IMM GRANULOCYTES # BLD AUTO: 0.03 K/UL (ref 0–0.04)
IMM GRANULOCYTES NFR BLD AUTO: 0.4 % (ref 0–0.5)
LYMPHOCYTES # BLD AUTO: 1.7 K/UL (ref 1–4.8)
LYMPHOCYTES NFR BLD: 25.9 % (ref 18–48)
MCH RBC QN AUTO: 31.3 PG (ref 27–31)
MCHC RBC AUTO-ENTMCNC: 33.1 G/DL (ref 32–36)
MCV RBC AUTO: 95 FL (ref 82–98)
MONOCYTES # BLD AUTO: 0.5 K/UL (ref 0.3–1)
MONOCYTES NFR BLD: 7 % (ref 4–15)
NEUTROPHILS # BLD AUTO: 4.3 K/UL (ref 1.8–7.7)
NEUTROPHILS NFR BLD: 63.4 % (ref 38–73)
NRBC BLD-RTO: 0 /100 WBC
PLATELET # BLD AUTO: 193 K/UL (ref 150–450)
PMV BLD AUTO: 10.7 FL (ref 9.2–12.9)
POTASSIUM SERPL-SCNC: 3.9 MMOL/L (ref 3.5–5.1)
PROT SERPL-MCNC: 7.5 G/DL (ref 6–8.4)
RBC # BLD AUTO: 4.41 M/UL (ref 4–5.4)
SODIUM SERPL-SCNC: 142 MMOL/L (ref 136–145)
WBC # BLD AUTO: 6.73 K/UL (ref 3.9–12.7)

## 2023-02-01 PROCEDURE — 83036 HEMOGLOBIN GLYCOSYLATED A1C: CPT | Performed by: INTERNAL MEDICINE

## 2023-02-01 PROCEDURE — 86256 FLUORESCENT ANTIBODY TITER: CPT | Performed by: INTERNAL MEDICINE

## 2023-02-01 PROCEDURE — 85025 COMPLETE CBC W/AUTO DIFF WBC: CPT | Performed by: INTERNAL MEDICINE

## 2023-02-01 PROCEDURE — 86140 C-REACTIVE PROTEIN: CPT | Performed by: INTERNAL MEDICINE

## 2023-02-01 PROCEDURE — 36415 COLL VENOUS BLD VENIPUNCTURE: CPT | Performed by: INTERNAL MEDICINE

## 2023-02-01 PROCEDURE — 85651 RBC SED RATE NONAUTOMATED: CPT | Performed by: INTERNAL MEDICINE

## 2023-02-01 PROCEDURE — 80053 COMPREHEN METABOLIC PANEL: CPT | Performed by: INTERNAL MEDICINE

## 2023-02-06 LAB
ANTI-CENTROMERE ANTIBODY: NEGATIVE
CENTROMERE AB TITR SER IF: NORMAL TITER

## 2023-02-14 ENCOUNTER — SPECIALTY PHARMACY (OUTPATIENT)
Dept: PHARMACY | Facility: CLINIC | Age: 63
End: 2023-02-14
Payer: MEDICARE

## 2023-02-14 DIAGNOSIS — I73.00 RAYNAUD'S PHENOMENON WITHOUT GANGRENE: Primary | ICD-10-CM

## 2023-02-14 NOTE — TELEPHONE ENCOUNTER
Outgoing call to coordinate Cellcept & Prograf refills. Patient does not have any refills remaining on the Cellcept Rx. Refill request sent to [facility]. Patient would like to wait and fill both medication at the same time. She was unable to estimate her DS as she was away from her medications.

## 2023-02-15 RX ORDER — MYCOPHENOLATE MOFETIL 500 MG/1
1500 TABLET ORAL 2 TIMES DAILY
Qty: 540 TABLET | Refills: 0 | Status: SHIPPED | OUTPATIENT
Start: 2023-02-15 | End: 2023-05-11 | Stop reason: SDUPTHER

## 2023-02-17 NOTE — TELEPHONE ENCOUNTER
Specialty Pharmacy - Refill Coordination    Specialty Medication Orders Linked to Encounter      Flowsheet Row Most Recent Value   Medication #1 tacrolimus (PROGRAF) 1 MG Cap (Order#[id number], Rx#[id number])   Medication #2 sildenafil (REVATIO) 20 mg Tab (Order#[id number], Rx#[id number])   Medication #3 mycophenolate (CELLCEPT) 500 mg Tab (Order#[id number], Rx#[id number])            Refill Questions - Documented Responses      Flowsheet Row Most Recent Value   Patient Availability and HIPAA Verification    Does patient want to proceed with activity? Yes   HIPAA/medical authority confirmed? Yes   Relationship to patient of person spoken to? Self   Refill Screening Questions    Changes to allergies? No   Changes to medications? No   New conditions since last clinic visit? No   Unplanned office visit, urgent care, ED, or hospital admission in the last 4 weeks? No   How does patient/caregiver feel medication is working? Good   Financial problems or insurance changes? No   How many doses of your specialty medications were missed in the last 4 weeks? 1   Would patient like to speak to a pharmacist? No   When does the patient need to receive the medication? 02/21/23   Refill Delivery Questions    How will the patient receive the medication? MEDRx   When does the patient need to receive the medication? 02/21/23   Shipping Address Home   Address in [facility] confirmed and updated if neccessary? Yes   Expected Copay ($) 0   Is the patient able to afford the medication copay? Yes   Payment Method zero copay   Days supply of Refill 90   Supplies needed? No supplies needed   Refill activity completed? Yes   Refill activity plan Refill scheduled   Shipment/Pickup Date: 02/17/23            Current Outpatient Medications   Medication Sig    aspirin 81 MG Chew Take 81 mg by mouth once daily.    betamethasone dipropionate (DIPROLENE) 0.05 % ointment Apply topically 2 (two) times daily. To affected areas on hands     diphenhydrAMINE (BENADRYL) 25 mg capsule Take 25 mg by mouth every 6 (six) hours as needed for Itching.    estradioL (ESTRACE) 0.01 % (0.1 mg/gram) vaginal cream Place 1 g vaginally once daily.    hydrOXYzine pamoate (VISTARIL) 50 MG Cap Take 1 capsule (50 mg total) by mouth every 8 (eight) hours as needed.    multivitamin (THERAGRAN) tablet Take 1 tablet by mouth once daily.    mycophenolate (CELLCEPT) 500 mg Tab Take 3 tablets (1,500 mg total) by mouth 2 (two) times daily.    sildenafil (REVATIO) 20 mg Tab TAKE 1 TABLET (20 MG) BY MOUTH TWO TIMES DAILY    tacrolimus (PROGRAF) 1 MG Cap Take 2 capsules (2 mg total) by mouth every morning AND 1 capsule (1 mg total) every evening.   Last reviewed on 1/4/2023  9:47 AM by Jovani Hankins, JACEK    Review of patient's allergies indicates:  No Known Allergies Last reviewed on  1/4/2023 9:47 AM by Jovani Hankins      Tasks added this encounter   No tasks added.   Tasks due within next 3 months   5/11/2023 - Refill Call (Auto Added)  5/11/2023 - Refill Call (Auto Added)  5/11/2023 - Refill Call (Auto Added)     [facility]  [address]  [facility] [address]  Phone: [phone]  Fax: [phone]

## 2023-02-27 ENCOUNTER — LAB VISIT (OUTPATIENT)
Dept: LAB | Facility: HOSPITAL | Age: 63
End: 2023-02-27
Attending: STUDENT IN AN ORGANIZED HEALTH CARE EDUCATION/TRAINING PROGRAM
Payer: MEDICARE

## 2023-02-27 DIAGNOSIS — C22.0 COMBINED HEPATOCELLULAR AND CHOLANGIOCARCINOMA: ICD-10-CM

## 2023-02-27 DIAGNOSIS — Z94.4 LIVER REPLACED BY TRANSPLANT: ICD-10-CM

## 2023-02-27 DIAGNOSIS — Z85.09 HISTORY OF CHOLANGIOCARCINOMA: ICD-10-CM

## 2023-02-27 LAB
AFP SERPL-MCNC: <2 NG/ML (ref 0–8.4)
ALBUMIN SERPL BCP-MCNC: 4.1 G/DL (ref 3.5–5.2)
ALP SERPL-CCNC: 64 U/L (ref 55–135)
ALT SERPL W/O P-5'-P-CCNC: 16 U/L (ref 10–44)
ANION GAP SERPL CALC-SCNC: 9 MMOL/L (ref 8–16)
AST SERPL-CCNC: 15 U/L (ref 10–40)
BASOPHILS # BLD AUTO: 0.03 K/UL (ref 0–0.2)
BASOPHILS NFR BLD: 0.6 % (ref 0–1.9)
BILIRUB SERPL-MCNC: 0.9 MG/DL (ref 0.1–1)
BUN SERPL-MCNC: 14 MG/DL (ref 8–23)
CALCIUM SERPL-MCNC: 9.5 MG/DL (ref 8.7–10.5)
CANCER AG19-9 SERPL-ACNC: 4.7 U/ML (ref 0–40)
CHLORIDE SERPL-SCNC: 109 MMOL/L (ref 95–110)
CO2 SERPL-SCNC: 24 MMOL/L (ref 23–29)
CREAT SERPL-MCNC: 0.7 MG/DL (ref 0.5–1.4)
DIFFERENTIAL METHOD: NORMAL
EOSINOPHIL # BLD AUTO: 0.2 K/UL (ref 0–0.5)
EOSINOPHIL NFR BLD: 3 % (ref 0–8)
ERYTHROCYTE [DISTWIDTH] IN BLOOD BY AUTOMATED COUNT: 13 % (ref 11.5–14.5)
EST. GFR  (NO RACE VARIABLE): >60 ML/MIN/1.73 M^2
GLUCOSE SERPL-MCNC: 89 MG/DL (ref 70–110)
HCT VFR BLD AUTO: 41.7 % (ref 37–48.5)
HGB BLD-MCNC: 13.4 G/DL (ref 12–16)
IMM GRANULOCYTES # BLD AUTO: 0.02 K/UL (ref 0–0.04)
IMM GRANULOCYTES NFR BLD AUTO: 0.4 % (ref 0–0.5)
LYMPHOCYTES # BLD AUTO: 1.4 K/UL (ref 1–4.8)
LYMPHOCYTES NFR BLD: 25.7 % (ref 18–48)
MCH RBC QN AUTO: 30.7 PG (ref 27–31)
MCHC RBC AUTO-ENTMCNC: 32.1 G/DL (ref 32–36)
MCV RBC AUTO: 96 FL (ref 82–98)
MONOCYTES # BLD AUTO: 0.4 K/UL (ref 0.3–1)
MONOCYTES NFR BLD: 8.3 % (ref 4–15)
NEUTROPHILS # BLD AUTO: 3.3 K/UL (ref 1.8–7.7)
NEUTROPHILS NFR BLD: 62 % (ref 38–73)
NRBC BLD-RTO: 0 /100 WBC
PLATELET # BLD AUTO: 193 K/UL (ref 150–450)
PMV BLD AUTO: 11.1 FL (ref 9.2–12.9)
POTASSIUM SERPL-SCNC: 3.7 MMOL/L (ref 3.5–5.1)
PROT SERPL-MCNC: 7.3 G/DL (ref 6–8.4)
RBC # BLD AUTO: 4.36 M/UL (ref 4–5.4)
SODIUM SERPL-SCNC: 142 MMOL/L (ref 136–145)
TACROLIMUS BLD-MCNC: 2.6 NG/ML (ref 5–15)
WBC # BLD AUTO: 5.33 K/UL (ref 3.9–12.7)

## 2023-02-27 PROCEDURE — 36415 COLL VENOUS BLD VENIPUNCTURE: CPT | Performed by: STUDENT IN AN ORGANIZED HEALTH CARE EDUCATION/TRAINING PROGRAM

## 2023-02-27 PROCEDURE — 82105 ALPHA-FETOPROTEIN SERUM: CPT | Performed by: STUDENT IN AN ORGANIZED HEALTH CARE EDUCATION/TRAINING PROGRAM

## 2023-02-27 PROCEDURE — 86301 IMMUNOASSAY TUMOR CA 19-9: CPT | Performed by: STUDENT IN AN ORGANIZED HEALTH CARE EDUCATION/TRAINING PROGRAM

## 2023-02-27 PROCEDURE — 85025 COMPLETE CBC W/AUTO DIFF WBC: CPT | Performed by: STUDENT IN AN ORGANIZED HEALTH CARE EDUCATION/TRAINING PROGRAM

## 2023-02-27 PROCEDURE — 80053 COMPREHEN METABOLIC PANEL: CPT | Performed by: STUDENT IN AN ORGANIZED HEALTH CARE EDUCATION/TRAINING PROGRAM

## 2023-02-27 PROCEDURE — 80197 ASSAY OF TACROLIMUS: CPT | Performed by: STUDENT IN AN ORGANIZED HEALTH CARE EDUCATION/TRAINING PROGRAM

## 2023-02-28 ENCOUNTER — TELEPHONE (OUTPATIENT)
Dept: TRANSPLANT | Facility: CLINIC | Age: 63
End: 2023-02-28
Payer: MEDICARE

## 2023-02-28 DIAGNOSIS — Z94.4 LIVER TRANSPLANTED: Primary | ICD-10-CM

## 2023-02-28 NOTE — TELEPHONE ENCOUNTER
----- Message from Vitaly Martinez MD sent at 2/27/2023 12:31 PM CST -----  Liver tests are stable. No changes in her immunosuppression. Please continue to monitor labs per transplant protocol.

## 2023-03-15 ENCOUNTER — HOSPITAL ENCOUNTER (EMERGENCY)
Facility: HOSPITAL | Age: 63
Discharge: HOME | End: 2023-03-15
Attending: EMERGENCY MEDICINE
Payer: MEDICARE

## 2023-03-15 VITALS
RESPIRATION RATE: 17 BRPM | BODY MASS INDEX: 26.68 KG/M2 | WEIGHT: 145 LBS | DIASTOLIC BLOOD PRESSURE: 65 MMHG | TEMPERATURE: 97 F | SYSTOLIC BLOOD PRESSURE: 138 MMHG | HEART RATE: 68 BPM | OXYGEN SATURATION: 100 % | HEIGHT: 62 IN

## 2023-03-15 DIAGNOSIS — N39.0 URINARY TRACT INFECTION WITHOUT HEMATURIA, SITE UNSPECIFIED: ICD-10-CM

## 2023-03-15 DIAGNOSIS — R11.2 NAUSEA AND VOMITING, UNSPECIFIED VOMITING TYPE: Primary | ICD-10-CM

## 2023-03-15 DIAGNOSIS — R19.7 DIARRHEA, UNSPECIFIED TYPE: ICD-10-CM

## 2023-03-15 LAB
ALBUMIN SERPL BCP-MCNC: 4.7 G/DL (ref 3.5–5.2)
ALP SERPL-CCNC: 70 U/L (ref 55–135)
ALT SERPL W/O P-5'-P-CCNC: 15 U/L (ref 10–44)
AMMONIA PLAS-SCNC: 29 UMOL/L (ref 10–50)
ANION GAP SERPL CALC-SCNC: 15 MMOL/L (ref 8–16)
AST SERPL-CCNC: 16 U/L (ref 10–40)
BACTERIA #/AREA URNS AUTO: ABNORMAL /HPF
BASOPHILS # BLD AUTO: 0.04 K/UL (ref 0–0.2)
BASOPHILS NFR BLD: 0.3 % (ref 0–1.9)
BILIRUB SERPL-MCNC: 0.8 MG/DL (ref 0.1–1)
BILIRUB UR QL STRIP: NEGATIVE
BUN SERPL-MCNC: 15 MG/DL (ref 8–23)
BUN SERPL-MCNC: 23 MG/DL (ref 6–30)
CALCIUM SERPL-MCNC: 10 MG/DL (ref 8.7–10.5)
CHLORIDE SERPL-SCNC: 106 MMOL/L (ref 95–110)
CHLORIDE SERPL-SCNC: 108 MMOL/L (ref 95–110)
CLARITY UR REFRACT.AUTO: ABNORMAL
CO2 SERPL-SCNC: 18 MMOL/L (ref 23–29)
COLOR UR AUTO: YELLOW
CREAT SERPL-MCNC: 0.7 MG/DL (ref 0.5–1.4)
CREAT SERPL-MCNC: 0.8 MG/DL (ref 0.5–1.4)
CTP QC/QA: YES
DIFFERENTIAL METHOD: ABNORMAL
EOSINOPHIL # BLD AUTO: 0 K/UL (ref 0–0.5)
EOSINOPHIL NFR BLD: 0.1 % (ref 0–8)
ERYTHROCYTE [DISTWIDTH] IN BLOOD BY AUTOMATED COUNT: 12.6 % (ref 11.5–14.5)
EST. GFR  (NO RACE VARIABLE): >60 ML/MIN/1.73 M^2
GLUCOSE SERPL-MCNC: 234 MG/DL (ref 70–110)
GLUCOSE SERPL-MCNC: 239 MG/DL (ref 70–110)
GLUCOSE UR QL STRIP: ABNORMAL
HCT VFR BLD AUTO: 47.4 % (ref 37–48.5)
HCT VFR BLD CALC: 50 %PCV (ref 36–54)
HGB BLD-MCNC: 15.3 G/DL (ref 12–16)
HGB UR QL STRIP: NEGATIVE
HYALINE CASTS UR QL AUTO: 0 /LPF
IMM GRANULOCYTES # BLD AUTO: 0.05 K/UL (ref 0–0.04)
IMM GRANULOCYTES NFR BLD AUTO: 0.3 % (ref 0–0.5)
INR PPP: 1 (ref 0.8–1.2)
KETONES UR QL STRIP: ABNORMAL
LACTATE SERPL-SCNC: 2.5 MMOL/L (ref 0.5–2.2)
LACTATE SERPL-SCNC: 2.9 MMOL/L (ref 0.5–2.2)
LEUKOCYTE ESTERASE UR QL STRIP: NEGATIVE
LIPASE SERPL-CCNC: 12 U/L (ref 4–60)
LYMPHOCYTES # BLD AUTO: 0.9 K/UL (ref 1–4.8)
LYMPHOCYTES NFR BLD: 6.3 % (ref 18–48)
MCH RBC QN AUTO: 30.7 PG (ref 27–31)
MCHC RBC AUTO-ENTMCNC: 32.3 G/DL (ref 32–36)
MCV RBC AUTO: 95 FL (ref 82–98)
MICROSCOPIC COMMENT: ABNORMAL
MONOCYTES # BLD AUTO: 0.4 K/UL (ref 0.3–1)
MONOCYTES NFR BLD: 3 % (ref 4–15)
NEUTROPHILS # BLD AUTO: 13.1 K/UL (ref 1.8–7.7)
NEUTROPHILS NFR BLD: 90 % (ref 38–73)
NITRITE UR QL STRIP: NEGATIVE
NRBC BLD-RTO: 0 /100 WBC
PH UR STRIP: 6 [PH] (ref 5–8)
PLATELET # BLD AUTO: 260 K/UL (ref 150–450)
PMV BLD AUTO: 10.5 FL (ref 9.2–12.9)
POC IONIZED CALCIUM: 1.07 MMOL/L (ref 1.06–1.42)
POC MOLECULAR INFLUENZA A AGN: NEGATIVE
POC MOLECULAR INFLUENZA B AGN: NEGATIVE
POC TCO2 (MEASURED): 24 MMOL/L (ref 23–29)
POTASSIUM BLD-SCNC: 5.5 MMOL/L (ref 3.5–5.1)
POTASSIUM SERPL-SCNC: 3.7 MMOL/L (ref 3.5–5.1)
PROT SERPL-MCNC: 8.5 G/DL (ref 6–8.4)
PROT UR QL STRIP: ABNORMAL
PROTHROMBIN TIME: 10.7 SEC (ref 9–12.5)
RBC # BLD AUTO: 4.98 M/UL (ref 4–5.4)
RBC #/AREA URNS AUTO: 4 /HPF (ref 0–4)
SAMPLE: ABNORMAL
SARS-COV-2 RDRP RESP QL NAA+PROBE: NEGATIVE
SODIUM BLD-SCNC: 141 MMOL/L (ref 136–145)
SODIUM SERPL-SCNC: 141 MMOL/L (ref 136–145)
SP GR UR STRIP: >1.03 (ref 1–1.03)
SQUAMOUS #/AREA URNS AUTO: 3 /HPF
URN SPEC COLLECT METH UR: ABNORMAL
WBC # BLD AUTO: 14.53 K/UL (ref 3.9–12.7)
WBC #/AREA URNS AUTO: 1 /HPF (ref 0–5)

## 2023-03-15 PROCEDURE — 83690 ASSAY OF LIPASE: CPT | Performed by: EMERGENCY MEDICINE

## 2023-03-15 PROCEDURE — 87040 BLOOD CULTURE FOR BACTERIA: CPT | Mod: 59 | Performed by: EMERGENCY MEDICINE

## 2023-03-15 PROCEDURE — 80047 BASIC METABLC PNL IONIZED CA: CPT

## 2023-03-15 PROCEDURE — 63600175 PHARM REV CODE 636 W HCPCS: Performed by: EMERGENCY MEDICINE

## 2023-03-15 PROCEDURE — 96375 TX/PRO/DX INJ NEW DRUG ADDON: CPT

## 2023-03-15 PROCEDURE — 87804 INFLUENZA ASSAY W/OPTIC: CPT

## 2023-03-15 PROCEDURE — 83605 ASSAY OF LACTIC ACID: CPT | Mod: 91 | Performed by: EMERGENCY MEDICINE

## 2023-03-15 PROCEDURE — 99285 EMERGENCY DEPT VISIT HI MDM: CPT | Mod: 25

## 2023-03-15 PROCEDURE — 96365 THER/PROPH/DIAG IV INF INIT: CPT

## 2023-03-15 PROCEDURE — 96376 TX/PRO/DX INJ SAME DRUG ADON: CPT

## 2023-03-15 PROCEDURE — 25500020 PHARM REV CODE 255: Performed by: EMERGENCY MEDICINE

## 2023-03-15 PROCEDURE — 82140 ASSAY OF AMMONIA: CPT | Performed by: EMERGENCY MEDICINE

## 2023-03-15 PROCEDURE — 96366 THER/PROPH/DIAG IV INF ADDON: CPT

## 2023-03-15 PROCEDURE — 96361 HYDRATE IV INFUSION ADD-ON: CPT

## 2023-03-15 PROCEDURE — 80053 COMPREHEN METABOLIC PANEL: CPT | Performed by: EMERGENCY MEDICINE

## 2023-03-15 PROCEDURE — 99285 PR EMERGENCY DEPT VISIT,LEVEL V: ICD-10-PCS | Mod: CS,,, | Performed by: EMERGENCY MEDICINE

## 2023-03-15 PROCEDURE — 85610 PROTHROMBIN TIME: CPT | Performed by: EMERGENCY MEDICINE

## 2023-03-15 PROCEDURE — 25000003 PHARM REV CODE 250: Performed by: EMERGENCY MEDICINE

## 2023-03-15 PROCEDURE — 99285 EMERGENCY DEPT VISIT HI MDM: CPT | Mod: CS,,, | Performed by: EMERGENCY MEDICINE

## 2023-03-15 PROCEDURE — 96367 TX/PROPH/DG ADDL SEQ IV INF: CPT

## 2023-03-15 PROCEDURE — 81001 URINALYSIS AUTO W/SCOPE: CPT | Performed by: EMERGENCY MEDICINE

## 2023-03-15 PROCEDURE — U0002 COVID-19 LAB TEST NON-CDC: HCPCS | Performed by: EMERGENCY MEDICINE

## 2023-03-15 PROCEDURE — 85025 COMPLETE CBC W/AUTO DIFF WBC: CPT | Performed by: EMERGENCY MEDICINE

## 2023-03-15 RX ORDER — MORPHINE SULFATE 4 MG/ML
4 INJECTION, SOLUTION INTRAMUSCULAR; INTRAVENOUS
Status: COMPLETED | OUTPATIENT
Start: 2023-03-15 | End: 2023-03-15

## 2023-03-15 RX ORDER — ONDANSETRON 2 MG/ML
4 INJECTION INTRAMUSCULAR; INTRAVENOUS
Status: COMPLETED | OUTPATIENT
Start: 2023-03-15 | End: 2023-03-15

## 2023-03-15 RX ORDER — ONDANSETRON 4 MG/1
4 TABLET, FILM COATED ORAL EVERY 8 HOURS PRN
Qty: 12 TABLET | Refills: 0 | Status: SHIPPED | OUTPATIENT
Start: 2023-03-15 | End: 2023-03-15 | Stop reason: SDUPTHER

## 2023-03-15 RX ORDER — ONDANSETRON 4 MG/1
4 TABLET, FILM COATED ORAL EVERY 8 HOURS PRN
Qty: 12 TABLET | Refills: 0 | Status: SHIPPED | OUTPATIENT
Start: 2023-03-15

## 2023-03-15 RX ORDER — NITROFURANTOIN 25; 75 MG/1; MG/1
100 CAPSULE ORAL 2 TIMES DAILY
Qty: 10 CAPSULE | Refills: 0 | Status: SHIPPED | OUTPATIENT
Start: 2023-03-15 | End: 2023-03-20

## 2023-03-15 RX ADMIN — ONDANSETRON 4 MG: 2 INJECTION INTRAMUSCULAR; INTRAVENOUS at 02:03

## 2023-03-15 RX ADMIN — SODIUM CHLORIDE, POTASSIUM CHLORIDE, SODIUM LACTATE AND CALCIUM CHLORIDE 1000 ML: 600; 310; 30; 20 INJECTION, SOLUTION INTRAVENOUS at 05:03

## 2023-03-15 RX ADMIN — VANCOMYCIN HYDROCHLORIDE 1250 MG: 1.25 INJECTION, POWDER, LYOPHILIZED, FOR SOLUTION INTRAVENOUS at 07:03

## 2023-03-15 RX ADMIN — MORPHINE SULFATE 4 MG: 4 INJECTION INTRAVENOUS at 05:03

## 2023-03-15 RX ADMIN — PIPERACILLIN SODIUM AND TAZOBACTAM SODIUM 4.5 G: 4; .5 INJECTION, POWDER, FOR SOLUTION INTRAVENOUS at 07:03

## 2023-03-15 RX ADMIN — IOHEXOL 75 ML: 350 INJECTION, SOLUTION INTRAVENOUS at 05:03

## 2023-03-15 RX ADMIN — SODIUM CHLORIDE, POTASSIUM CHLORIDE, SODIUM LACTATE AND CALCIUM CHLORIDE 1000 ML: 600; 310; 30; 20 INJECTION, SOLUTION INTRAVENOUS at 02:03

## 2023-03-15 RX ADMIN — SODIUM CHLORIDE, SODIUM LACTATE, POTASSIUM CHLORIDE, AND CALCIUM CHLORIDE 500 ML: .6; .31; .03; .02 INJECTION, SOLUTION INTRAVENOUS at 05:03

## 2023-03-15 RX ADMIN — MORPHINE SULFATE 4 MG: 4 INJECTION INTRAVENOUS at 02:03

## 2023-03-15 NOTE — ED NOTES
I-STAT Chem-8+ Results:   Value Reference Range   Sodium 141 136-145 mmol/L   Potassium  5.5 3.5-5.1 mmol/L   Chloride 106  mmol/L   Ionized Calcium 1.07 1.06-1.42 mmol/L   CO2 (measured) 24 23-29 mmol/L   Glucose 239  mg/dL   BUN 23 6-30 mg/dL   Creatinine 0.7 0.5-1.4 mg/dL   Hematocrit 50 36-54%

## 2023-03-15 NOTE — ED TRIAGE NOTES
Pt present to ED via personal transport with c/o nausea, vomiting, and generalized weakness + 10/10 Abd pain since last night. Denies fevers, chills. AxOx4. No blood in emesis or stool..

## 2023-03-15 NOTE — ED PROVIDER NOTES
Encounter Date: 3/15/2023       History     Chief Complaint   Patient presents with    Nausea     +diarrhea; +liver tx      62-year-old female past medical history of Bile Duct Cancer (Cholangioma, Biliary Tract Carcinoma), OLT in 10/2015 for cholangiocarcinoma  presenting with 2 days of GI symptoms. Patient reports developing abdominal pain last night, and now with vomiting and diarrhea, both nonbloody. Denies fever or chills, muscle aches, cough.    Review of patient's allergies indicates:  No Known Allergies  Past Medical History:   Diagnosis Date    Acute cholecystitis     Cholecystitis    Anemia     Arthritis     septic arthritis of right shoulder    Bacteremia due to Streptococcus pneumoniae     Cancer     Cataract     Cholangiocarcinoma     Fever blister 20    Hypertension     Jaundice     obstructive    Liver transplant status     Organ transplant     Prediabetes     Raynaud disease     Scleroderma      Past Surgical History:   Procedure Laterality Date    arthoscopic Right     drained infection     SECTION      ESOPHAGOGASTRODUODENOSCOPY N/A 2020    Procedure: EGD (ESOPHAGOGASTRODUODENOSCOPY);  Surgeon: Drew Mackay MD;  Location: [facility] ([address] [place] [address]);  Service: Endoscopy;  Laterality: N/A;  [facility]    INCISION AND DRAINAGE OF WOUND      s/p I&D of R thumb    LIVER TRANSPLANT      SHOULDER ARTHROSCOPY       Family History   Problem Relation Age of Onset    Cancer Father         Lung    Arthritis Father     Stroke Mother     Diabetes Mother     Hypertension Mother     Heart attack Mother     Cancer Paternal Grandmother         pancreatic cancer    Cancer Brother         Lung    Alcohol abuse Brother     Arthritis Sister     Psoriasis Sister     No Known Problems Daughter     No Known Problems Son     No Known Problems Son     No Known Problems Daughter     Colon cancer Neg Hx     Esophageal cancer Neg Hx      Social History     Tobacco Use     Smoking status: Never    Smokeless tobacco: Never   Substance Use Topics    Alcohol use: Not Currently     Alcohol/week: 2.0 standard drinks     Types: 2 Cans of beer per week     Comment: To be social with family and friends.    Drug use: Yes     Frequency: 3.0 times per week     Types: Marijuana     Comment: Help with pain     Review of Systems    Physical Exam     Initial Vitals   BP Pulse Resp Temp SpO2   03/15/23 1143 03/15/23 1143 03/15/23 1143 03/15/23 1146 03/15/23 1143   (!) 182/74 (!) 52 18 97.4 °F (36.3 °C) 100 %      MAP       --                Physical Exam    Nursing note and vitals reviewed.  Constitutional: She appears well-developed and well-nourished. She is not diaphoretic. No distress.   HENT:   Head: Normocephalic and atraumatic.   Nose: Nose normal.   Eyes: Conjunctivae and EOM are normal. Pupils are equal, round, and reactive to light. Right eye exhibits no discharge. Left eye exhibits no discharge. No scleral icterus.   Neck: Neck supple.   Normal range of motion.  Cardiovascular:  Normal rate and regular rhythm.           Pulmonary/Chest: Breath sounds normal. No respiratory distress. She has no wheezes. She has no rhonchi. She has no rales. She exhibits no tenderness.   Abdominal: Abdomen is soft. Bowel sounds are normal. She exhibits no distension. There is abdominal tenderness (periumbilical). There is no rebound and no guarding.   Musculoskeletal:         General: No tenderness or edema. Normal range of motion.      Cervical back: Normal range of motion and neck supple.     Neurological: She is alert and oriented to person, place, and time. She has normal strength.   Skin: Skin is warm and dry. Capillary refill takes less than 2 seconds. No rash noted. No pallor.   Psychiatric: She has a normal mood and affect. Her behavior is normal. Judgment and thought content normal.       ED Course   Procedures  Labs Reviewed   CBC W/ AUTO DIFFERENTIAL - Abnormal; Notable for the following  components:       Result Value    WBC 14.53 (*)     Gran # (ANC) 13.1 (*)     Immature Grans (Abs) 0.05 (*)     Lymph # 0.9 (*)     Gran % 90.0 (*)     Lymph % 6.3 (*)     Mono % 3.0 (*)     All other components within normal limits   COMPREHENSIVE METABOLIC PANEL - Abnormal; Notable for the following components:    CO2 18 (*)     Glucose 234 (*)     Total Protein 8.5 (*)     All other components within normal limits   URINALYSIS, REFLEX TO URINE CULTURE - Abnormal; Notable for the following components:    Specific Gravity, UA >1.030 (*)     Protein, UA 2+ (*)     Glucose, UA 2+ (*)     Ketones, UA 3+ (*)     All other components within normal limits    Narrative:     Specimen Source->Urine   LACTIC ACID, PLASMA - Abnormal; Notable for the following components:    Lactate (Lactic Acid) 2.9 (*)     All other components within normal limits   URINALYSIS MICROSCOPIC - Abnormal; Notable for the following components:    Bacteria Many (*)     All other components within normal limits    Narrative:     Specimen Source->Urine   LACTIC ACID, PLASMA - Abnormal; Notable for the following components:    Lactate (Lactic Acid) 2.5 (*)     All other components within normal limits   ISTAT PROCEDURE - Abnormal; Notable for the following components:    POC Glucose 239 (*)     POC Potassium 5.5 (*)     All other components within normal limits   CULTURE, BLOOD   CULTURE, BLOOD   LIPASE   AMMONIA   PROTIME-INR   SARS-COV-2 RNA AMPLIFICATION, QUAL   HIV 1 / 2 ANTIBODY   HEPATITIS C ANTIBODY   POCT INFLUENZA A/B MOLECULAR          Imaging Results              CT Abdomen Pelvis With Contrast (Final result)  Result time 03/15/23 18:42:46      Final result by Jamarcus Boudreaux MD (03/15/23 18:42:46)                   Impression:      1. No acute intra-abdominopelvic abnormality.  2. Postsurgical change of orthotopic liver transplant.  3. Interval decrease in hypodense focus at the nick hepatis, as above.  4. Interval decrease in left  hepatic lobe pneumobilia.  5. Additional findings above.    Electronically signed by resident: Colin De La Cruz  Date:    03/15/2023  Time:    17:49    Electronically signed by: Jamarcus Boudreaux MD  Date:    03/15/2023  Time:    18:42               Narrative:    EXAMINATION:  CT ABDOMEN PELVIS WITH CONTRAST    CLINICAL HISTORY:  Abdominal abscess/infection suspected;    TECHNIQUE:  Low dose axial images, sagittal and coronal reformations were obtained from the lung bases to the pubic symphysis following the IV administration of 75    ML of Omnipaque 350 .  Oral contrast was not administered.    COMPARISON:  MRI abdomen 11/25/2022, 08/18/2022; CT 06/17/2021, 06/26/2020    FINDINGS:  LUNG BASES & MEDIASTINUM (limited):  No pericardial effusion.  No pleural effusion.  No consolidation.  There is mild bilateral dependent atelectasis.    HEPATOBILIARY: Postsurgical change of orthotopic liver transplant.  Homogeneous hepatic parenchyma.  Interval decrease in oval, low-density focus at the nick hepatis measuring 1.4 cm (axial series 2, image 51), previously 2.5 cm on CT 06/17/2021.  No new hepatic lesions.  Cholecystectomy with choledocho jejunostomy.  Interval decrease in left hepatic lobe pneumobilia.    SPLEEN: Stable 1.0 cm hypodensity in the splenic parenchyma, too small for characterization.  No splenomegaly.    PANCREAS:  No focal masses. No ductal dilatation.    ADRENALS:  There is a subcentimeter low attenuating nodule within the left adrenal gland..    KIDNEYS/URETERS: Stable bilateral subcentimeter hypodensities, too small to characterize.  No hydronephrosis, or nephrolithiasis.  The kidneys enhance symmetrically..  Ureters are unremarkable.    PELVIC ORGANS/BLADDER:  Bladder is moderately distended without focal wall thickening.  Uterus is unremarkable.  The adnexa is unremarkable.    PERITONEUM / RETROPERITONEUM:  No free air. No free fluid.    LYMPH NODES:  No lymphadenopathy.    VESSELS:  There is  atherosclerotic calcification of the aorta.  Retroaortic left renal vein.    GI TRACT: Distal esophagus and stomach are unremarkable.  Small bowel is normal in caliber without inflammation or obstruction.  Unremarkable appendix.  No colonic distension or wall thickening.    BONES AND SOFT TISSUES:  Soft tissues are unremarkable.  No acute fractures.  No aggressive osseous lesions.  Degenerative changes of the spine.                                       Medications   ondansetron injection 4 mg (4 mg Intravenous Given 3/15/23 1404)   morphine injection 4 mg (4 mg Intravenous Given 3/15/23 1403)   lactated ringers bolus 1,000 mL (0 mLs Intravenous Stopped 3/15/23 1500)   morphine injection 4 mg (4 mg Intravenous Given 3/15/23 1715)   lactated ringers bolus 1,000 mL (0 mLs Intravenous Stopped 3/15/23 1832)   lactated ringers bolus 500 mL (0 mLs Intravenous Stopped 3/15/23 2023)   iohexoL (OMNIPAQUE 350) injection 75 mL (75 mLs Intravenous Given 3/15/23 1747)   vancomycin 1,250 mg in dextrose 5 % (D5W) 250 mL IVPB (Vial-Mate) (0 mg Intravenous Stopped 3/15/23 2133)   piperacillin-tazobactam (ZOSYN) 4.5 g in dextrose 5 % in water (D5W) 5 % 100 mL IVPB (MB+) (0 g Intravenous Stopped 3/15/23 1947)     Medical Decision Making:   History:   Old Medical Records: I decided to obtain old medical records.  Old Records Summarized: records from clinic visits and records from previous admission(s).       <> Summary of Records: Patient seen by Dr. Martinez from liver transplant on December 2022 for follow-up of liver transplant         Initial Assessment:   Patient is nontoxic appearing, but does appear dehydrated.  Will give IV fluids, broad-spectrum antibiotics given immunocompromised status, obtain CT.  Differential Diagnosis:   Gastroenteritis, hepatitis, liver failure, intra-abdominal abscess, bowel obstruction  Clinical Tests:   Lab Tests: Reviewed and Ordered  Radiological Study: Ordered and Reviewed  Sepsis Perfusion  "Assessment: "I attest a sepsis perfusion exam was performed within 6 hours of sepsis, severe sepsis, or septic shock presentation, following fluid resuscitation."  ED Management:  Urinalysis shows many bacteria but patient denies any urinary symptoms and is A&O x4, and may be asymptomatic bacteriuria, however as patient is immunocompromised given broad-spectrum antibiotics with blood cultures drawn.  Macrobid prescribed to patient's pharmacy.      CT shows no acute abnormalities.  Lactate improving and I suspect secondary to dehydration with 3+ ketones. I have considered hospitalization but patient is tolerating p.o. and states she feels "so much better." Stable for d/c, I discussed outpatient follow up and return precautions with pt and answered all questions.      MDM Points:   Complexity Points:  1 acute illness with systemic symptoms - Moderate     Data Reviewed Points:  Review of prior external note(s) from each unique source, Review of the result(s) of each one or more unique test(s), Ordering of one or more unique test(s) , and Independent interpretation of test     Risk:  Moderate Risk             ED Course as of 03/29/23 1413   Wed Mar 15, 2023   1436 NITRITE UA: Negative [JR]   1436 Glucose, UA(!): 2+ [JR]   1436 Ketones, UA(!): 3+ [JR]   1436 Lactate, Harjeet(!): 2.9  Getting IVF   [JR]   1508 Bacteria, UA(!): Many [JR]   1609 POC Molecular Influenza A Ag: Negative [JR]   1609 POC Molecular Influenza B Ag: Negative [JR]   1720 BILIRUBIN TOTAL: 0.8 [JR]   1720 Alkaline Phosphatase: 70 [JR]   1720 AST: 16 [JR]   1720 ALT: 15  LFTs WNL [JR]   1720 WBC(!): 14.53  Will give abx [JR]   1832  reports patient's symptoms improved after IV fluids and Zofran, she is currently at CT. [JR]   1948 CT shows no acute abnormalities, patient states she feels much better after IV fluids and has not had recurrence of nausea or diarrhea or abdominal pain [JR]   2051 Lactate, Harjeet(!): 2.5  improving [JR]      ED Course User " Index  [JR] Angelina Saez MD                 Clinical Impression:   Final diagnoses:  [R11.2] Nausea and vomiting, unspecified vomiting type (Primary)  [R19.7] Diarrhea, unspecified type  [N39.0] Urinary tract infection without hematuria, site unspecified        ED Disposition Condition    Discharge Stable          ED Prescriptions       Medication Sig Dispense Start Date End Date Auth. Provider    ondansetron (ZOFRAN) 4 MG tablet  (Status: Discontinued) Take 1 tablet (4 mg total) by mouth every 8 (eight) hours as needed for Nausea. 12 tablet 3/15/2023 3/15/2023 Angelina Saez MD    ondansetron (ZOFRAN) 4 MG tablet Take 1 tablet (4 mg total) by mouth every 8 (eight) hours as needed for Nausea. 12 tablet 3/15/2023 -- Angelina Saez MD    nitrofurantoin, macrocrystal-monohydrate, (MACROBID) 100 MG capsule () Take 1 capsule (100 mg total) by mouth 2 (two) times daily. for 5 days 10 capsule 3/15/2023 3/20/2023 Angelina Saez MD          Follow-up Information       Follow up With Specialties Details Why Contact Info    [facility] - Emergency Dept Emergency Medicine Go to  As needed, If symptoms worsen [id number]  [facility] [id number] [phone]             Angelina Saez MD  [phone]

## 2023-03-17 ENCOUNTER — TELEPHONE (OUTPATIENT)
Dept: TRANSPLANT | Facility: CLINIC | Age: 63
End: 2023-03-17
Payer: MEDICARE

## 2023-03-17 NOTE — TELEPHONE ENCOUNTER
Returned call let patient know that she should follow up with her PCP.  She was seen in the ER this week for Nausea and vomiting. She states she is feeling a little better now.  I reviewed her liver labs with her that were draw while in the ER, they were at her baseline.  Let her know to call for any concerns.    ----- Message from Silvano Barrera MA sent at 3/17/2023  9:55 AM CDT -----  Regarding: FW: Schedule Appt    ----- Message -----  From: Rachell Garcia  Sent: 3/17/2023   9:53 AM CDT  To: [facility] Hepatology Scheduling  Subject: Schedule Appt                                    Pt calling to schedule an appointment for a follow up with liver doctor after being seen in the ER.   Anyday or time works best for me.  Please advise. Requesting a call back.        [phone] ([employer])

## 2023-03-20 LAB
BACTERIA BLD CULT: NORMAL
BACTERIA BLD CULT: NORMAL

## 2023-03-24 ENCOUNTER — LAB VISIT (OUTPATIENT)
Dept: LAB | Facility: HOSPITAL | Age: 63
End: 2023-03-24
Attending: INTERNAL MEDICINE
Payer: MEDICARE

## 2023-03-24 ENCOUNTER — OFFICE VISIT (OUTPATIENT)
Dept: INTERNAL MEDICINE | Facility: CLINIC | Age: 63
End: 2023-03-24
Payer: MEDICARE

## 2023-03-24 VITALS
WEIGHT: 140.19 LBS | BODY MASS INDEX: 25.8 KG/M2 | SYSTOLIC BLOOD PRESSURE: 118 MMHG | DIASTOLIC BLOOD PRESSURE: 72 MMHG | HEART RATE: 72 BPM | HEIGHT: 62 IN | OXYGEN SATURATION: 99 %

## 2023-03-24 DIAGNOSIS — J98.4 RESTRICTIVE LUNG DISEASE: ICD-10-CM

## 2023-03-24 DIAGNOSIS — I27.20 PULMONARY HYPERTENSION: ICD-10-CM

## 2023-03-24 DIAGNOSIS — Z79.899 ON MYCOPHENOLATE MOFETIL THERAPY: ICD-10-CM

## 2023-03-24 DIAGNOSIS — Z09 HOSPITAL DISCHARGE FOLLOW-UP: Primary | ICD-10-CM

## 2023-03-24 DIAGNOSIS — Z94.4 S/P LIVER TRANSPLANT: ICD-10-CM

## 2023-03-24 DIAGNOSIS — N30.00 ACUTE CYSTITIS WITHOUT HEMATURIA: ICD-10-CM

## 2023-03-24 DIAGNOSIS — F12.20 TETRAHYDROCANNABINOL (THC) DEPENDENCE: ICD-10-CM

## 2023-03-24 DIAGNOSIS — D84.9 IMMUNOSUPPRESSION: ICD-10-CM

## 2023-03-24 DIAGNOSIS — M34.9 SCLERODERMA: ICD-10-CM

## 2023-03-24 DIAGNOSIS — Z09 HOSPITAL DISCHARGE FOLLOW-UP: ICD-10-CM

## 2023-03-24 DIAGNOSIS — K52.9 GASTROENTERITIS: ICD-10-CM

## 2023-03-24 LAB
ALBUMIN SERPL BCP-MCNC: 4.2 G/DL (ref 3.5–5.2)
ALP SERPL-CCNC: 63 U/L (ref 55–135)
ALT SERPL W/O P-5'-P-CCNC: 17 U/L (ref 10–44)
ANION GAP SERPL CALC-SCNC: 10 MMOL/L (ref 8–16)
AST SERPL-CCNC: 16 U/L (ref 10–40)
BASOPHILS # BLD AUTO: 0.03 K/UL (ref 0–0.2)
BASOPHILS NFR BLD: 0.5 % (ref 0–1.9)
BILIRUB SERPL-MCNC: 0.6 MG/DL (ref 0.1–1)
BUN SERPL-MCNC: 10 MG/DL (ref 8–23)
CALCIUM SERPL-MCNC: 10.2 MG/DL (ref 8.7–10.5)
CHLORIDE SERPL-SCNC: 109 MMOL/L (ref 95–110)
CO2 SERPL-SCNC: 27 MMOL/L (ref 23–29)
CREAT SERPL-MCNC: 0.8 MG/DL (ref 0.5–1.4)
DIFFERENTIAL METHOD: ABNORMAL
EOSINOPHIL # BLD AUTO: 0.2 K/UL (ref 0–0.5)
EOSINOPHIL NFR BLD: 2.6 % (ref 0–8)
ERYTHROCYTE [DISTWIDTH] IN BLOOD BY AUTOMATED COUNT: 13 % (ref 11.5–14.5)
EST. GFR  (NO RACE VARIABLE): >60 ML/MIN/1.73 M^2
GLUCOSE SERPL-MCNC: 91 MG/DL (ref 70–110)
HCT VFR BLD AUTO: 44.9 % (ref 37–48.5)
HGB BLD-MCNC: 14.3 G/DL (ref 12–16)
IMM GRANULOCYTES # BLD AUTO: 0.01 K/UL (ref 0–0.04)
IMM GRANULOCYTES NFR BLD AUTO: 0.2 % (ref 0–0.5)
LYMPHOCYTES # BLD AUTO: 1.9 K/UL (ref 1–4.8)
LYMPHOCYTES NFR BLD: 32.8 % (ref 18–48)
MCH RBC QN AUTO: 31.1 PG (ref 27–31)
MCHC RBC AUTO-ENTMCNC: 31.8 G/DL (ref 32–36)
MCV RBC AUTO: 98 FL (ref 82–98)
MONOCYTES # BLD AUTO: 0.5 K/UL (ref 0.3–1)
MONOCYTES NFR BLD: 7.9 % (ref 4–15)
NEUTROPHILS # BLD AUTO: 3.3 K/UL (ref 1.8–7.7)
NEUTROPHILS NFR BLD: 56 % (ref 38–73)
NRBC BLD-RTO: 0 /100 WBC
PLATELET # BLD AUTO: 250 K/UL (ref 150–450)
PMV BLD AUTO: 10.9 FL (ref 9.2–12.9)
POTASSIUM SERPL-SCNC: 4.6 MMOL/L (ref 3.5–5.1)
PROT SERPL-MCNC: 7.6 G/DL (ref 6–8.4)
RBC # BLD AUTO: 4.6 M/UL (ref 4–5.4)
SODIUM SERPL-SCNC: 146 MMOL/L (ref 136–145)
WBC # BLD AUTO: 5.8 K/UL (ref 3.9–12.7)

## 2023-03-24 PROCEDURE — 99214 OFFICE O/P EST MOD 30 MIN: CPT | Mod: S$GLB,,, | Performed by: INTERNAL MEDICINE

## 2023-03-24 PROCEDURE — 3078F DIAST BP <80 MM HG: CPT | Mod: CPTII,S$GLB,, | Performed by: INTERNAL MEDICINE

## 2023-03-24 PROCEDURE — 36415 COLL VENOUS BLD VENIPUNCTURE: CPT | Performed by: INTERNAL MEDICINE

## 2023-03-24 PROCEDURE — 1159F PR MEDICATION LIST DOCUMENTED IN MEDICAL RECORD: ICD-10-PCS | Mod: CPTII,S$GLB,, | Performed by: INTERNAL MEDICINE

## 2023-03-24 PROCEDURE — 85025 COMPLETE CBC W/AUTO DIFF WBC: CPT | Performed by: INTERNAL MEDICINE

## 2023-03-24 PROCEDURE — 1159F MED LIST DOCD IN RCRD: CPT | Mod: CPTII,S$GLB,, | Performed by: INTERNAL MEDICINE

## 2023-03-24 PROCEDURE — 3074F SYST BP LT 130 MM HG: CPT | Mod: CPTII,S$GLB,, | Performed by: INTERNAL MEDICINE

## 2023-03-24 PROCEDURE — 99214 PR OFFICE/OUTPT VISIT, EST, LEVL IV, 30-39 MIN: ICD-10-PCS | Mod: S$GLB,,, | Performed by: INTERNAL MEDICINE

## 2023-03-24 PROCEDURE — 80053 COMPREHEN METABOLIC PANEL: CPT | Performed by: INTERNAL MEDICINE

## 2023-03-24 PROCEDURE — 3044F HG A1C LEVEL LT 7.0%: CPT | Mod: CPTII,S$GLB,, | Performed by: INTERNAL MEDICINE

## 2023-03-24 PROCEDURE — 3078F PR MOST RECENT DIASTOLIC BLOOD PRESSURE < 80 MM HG: ICD-10-PCS | Mod: CPTII,S$GLB,, | Performed by: INTERNAL MEDICINE

## 2023-03-24 PROCEDURE — 3008F BODY MASS INDEX DOCD: CPT | Mod: CPTII,S$GLB,, | Performed by: INTERNAL MEDICINE

## 2023-03-24 PROCEDURE — 3044F PR MOST RECENT HEMOGLOBIN A1C LEVEL <7.0%: ICD-10-PCS | Mod: CPTII,S$GLB,, | Performed by: INTERNAL MEDICINE

## 2023-03-24 PROCEDURE — 99999 PR PBB SHADOW E&M-EST. PATIENT-LVL IV: CPT | Mod: PBBFAC,,, | Performed by: INTERNAL MEDICINE

## 2023-03-24 PROCEDURE — 99999 PR PBB SHADOW E&M-EST. PATIENT-LVL IV: ICD-10-PCS | Mod: PBBFAC,,, | Performed by: INTERNAL MEDICINE

## 2023-03-24 PROCEDURE — 3008F PR BODY MASS INDEX (BMI) DOCUMENTED: ICD-10-PCS | Mod: CPTII,S$GLB,, | Performed by: INTERNAL MEDICINE

## 2023-03-24 PROCEDURE — 3074F PR MOST RECENT SYSTOLIC BLOOD PRESSURE < 130 MM HG: ICD-10-PCS | Mod: CPTII,S$GLB,, | Performed by: INTERNAL MEDICINE

## 2023-03-24 NOTE — PATIENT INSTRUCTIONS
Labwork today  Urine today  Return to clinic on 5/22/2023 as scheduled for annual exam or sooner if needed.

## 2023-03-24 NOTE — PROGRESS NOTES
Subjective:       Patient ID: Nadeen Mcgovern is a 62 y.o. female.    Chief Complaint: Hospital Follow Up      HPI  Nadeen Mcgovern is a 62 y.o. year old female with history of cholangiocarcinoma, s/p liver transplant, scleroderma, pHTN, on chronic immunosuppression presents for ER visit follow up after being seen for abdominal pain, nausea, vomiting. ER work up unrevealing, suspected acute gastroenteritis. Found to have UTI. Given empiric vanc/zosyn, fluid resuscitation. Patient felt better in ER. Scheduled for outpt follow up.     After ER discharge, symptoms lasted for another 2 days. Today, she feels much better, no issues identified.     Review of Systems   Constitutional:  Negative for chills and fever.   Respiratory:  Negative for shortness of breath.    Cardiovascular:  Negative for chest pain.   Gastrointestinal:  Negative for abdominal pain and nausea.   Genitourinary:  Negative for dysuria and hematuria.   Musculoskeletal:  Negative for arthralgias, back pain and neck pain.   Neurological:  Negative for dizziness, numbness and headaches.   Psychiatric/Behavioral:  Negative for dysphoric mood. The patient is not nervous/anxious.        Past Medical History:   Diagnosis Date    Acute cholecystitis     Cholecystitis    Anemia 12/14    Arthritis 2015    septic arthritis of right shoulder    Bacteremia due to Streptococcus pneumoniae     Cancer     Cataract     Cholangiocarcinoma     Fever blister 2/7/20    Hypertension     Jaundice     obstructive    Liver transplant status     Organ transplant     Prediabetes     Raynaud disease     Scleroderma         Prior to Admission medications    Medication Sig Start Date End Date Taking? Authorizing Provider   aspirin 81 MG Chew Take 81 mg by mouth once daily.   Yes Historical Provider   betamethasone dipropionate (DIPROLENE) 0.05 % ointment Apply topically 2 (two) times daily. To affected areas on hands 11/1/22  Yes Coby Schmidt MD   diphenhydrAMINE (BENADRYL) 25 mg  "capsule Take 25 mg by mouth every 6 (six) hours as needed for Itching.   Yes Historical Provider   hydrOXYzine pamoate (VISTARIL) 50 MG Cap Take 1 capsule (50 mg total) by mouth every 8 (eight) hours as needed. 7/20/20  Yes Nadeen Figueroa MD   multivitamin (THERAGRAN) tablet Take 1 tablet by mouth once daily. 10/12/15  Yes Marysol Zavaleta MD   mycophenolate (CELLCEPT) 500 mg Tab Take 3 tablets (1,500 mg total) by mouth 2 (two) times daily. 2/15/23  Yes Haresh Butler MD   ondansetron (ZOFRAN) 4 MG tablet Take 1 tablet (4 mg total) by mouth every 8 (eight) hours as needed for Nausea. 3/15/23  Yes Angelina Saez MD   sildenafil (REVATIO) 20 mg Tab TAKE 1 TABLET (20 MG) BY MOUTH TWO TIMES DAILY 11/1/22  Yes Haresh Butler MD   tacrolimus (PROGRAF) 1 MG Cap Take 2 capsules (2 mg total) by mouth every morning AND 1 capsule (1 mg total) every evening. 4/25/22  Yes Vitaly Martinez MD   estradioL (ESTRACE) 0.01 % (0.1 mg/gram) vaginal cream Place 1 g vaginally once daily. 8/20/21 8/20/22  Karlie Matute DO        Past medical history, surgical history, and family medical history reviewed and updated as appropriate.    Medications and allergies reviewed.     Objective:          Vitals:    03/24/23 1003   BP: 118/72   BP Location: Right arm   Patient Position: Sitting   Pulse: 72   SpO2: 99%   Weight: 63.6 kg (140 lb 3.4 oz)   Height: 5' 2" (1.575 m)     Body mass index is 25.65 kg/m².  Physical Exam  Constitutional:       Appearance: She is well-developed.   HENT:      Head: Normocephalic and atraumatic.   Eyes:      Extraocular Movements: Extraocular movements intact.   Cardiovascular:      Rate and Rhythm: Normal rate and regular rhythm.      Heart sounds: Normal heart sounds.   Pulmonary:      Effort: Pulmonary effort is normal. No respiratory distress.      Breath sounds: Normal breath sounds. No wheezing.   Abdominal:      General: Bowel sounds are normal. There is no distension.      Palpations: Abdomen is " soft.      Tenderness: There is no abdominal tenderness.   Musculoskeletal:         General: No tenderness. Normal range of motion.      Cervical back: Normal range of motion.   Skin:     General: Skin is warm and dry.   Neurological:      Mental Status: She is alert and oriented to person, place, and time.      Cranial Nerves: No cranial nerve deficit.      Deep Tendon Reflexes: Reflexes are normal and symmetric.       Lab Results   Component Value Date    WBC 14.53 (H) 03/15/2023    HGB 15.3 03/15/2023    HCT 47.4 03/15/2023     03/15/2023    CHOL 168 07/21/2022    TRIG 117 07/21/2022    HDL 41 07/21/2022    ALT 15 03/15/2023    AST 16 03/15/2023     03/15/2023    K 3.7 03/15/2023     03/15/2023    CREATININE 0.8 03/15/2023    BUN 15 03/15/2023    CO2 18 (L) 03/15/2023    TSH 1.224 08/17/2016    INR 1.0 03/15/2023    HGBA1C 5.4 02/01/2023       Assessment:       1. Hospital discharge follow-up    2. Gastroenteritis    3. Acute cystitis without hematuria    4. S/P liver transplant    5. Scleroderma    6. Tetrahydrocannabinol (THC) dependence    7. On mycophenolate mofetil therapy    8. Immunosuppression    9. Restrictive lung disease    10. Pulmonary hypertension          Plan:     Nadeen was seen today for hospital follow up.    Diagnoses and all orders for this visit:    Hospital discharge follow-up  -     CBC W/ AUTO DIFFERENTIAL; Future  -     COMPREHENSIVE METABOLIC PANEL; Future    Gastroenteritis    Acute cystitis without hematuria  -     Urinalysis; Future    S/P liver transplant  -     CBC W/ AUTO DIFFERENTIAL; Future  -     COMPREHENSIVE METABOLIC PANEL; Future    Scleroderma    Tetrahydrocannabinol (THC) dependence  Comments:  3 blunts a day; one in morning, one after lunch, one before bedtime    On mycophenolate mofetil therapy    Immunosuppression  Comments:  on tacrolimus    Restrictive lung disease    Pulmonary hypertension      Recovered since ER, back to baseline.    No longer  having nausea/vomiting/abdominal pain. No longer having dysuria.   Recheck CBC, CMP, urinalysis.     Health maintenance reviewed with patient.     Follow up in 8 weeks (on 5/22/2023).    Alvin Dennis MD  Internal Medicine / Primary Care  [facility]  3/24/2023

## 2023-03-27 ENCOUNTER — PATIENT MESSAGE (OUTPATIENT)
Dept: INTERNAL MEDICINE | Facility: CLINIC | Age: 63
End: 2023-03-27
Payer: MEDICARE

## 2023-03-27 DIAGNOSIS — N30.01 ACUTE CYSTITIS WITH HEMATURIA: Primary | ICD-10-CM

## 2023-03-27 DIAGNOSIS — D84.9 IMMUNOSUPPRESSION: ICD-10-CM

## 2023-03-27 RX ORDER — AMOXICILLIN AND CLAVULANATE POTASSIUM 875; 125 MG/1; MG/1
1 TABLET, FILM COATED ORAL EVERY 12 HOURS
Qty: 14 TABLET | Refills: 0 | Status: SHIPPED | OUTPATIENT
Start: 2023-03-27 | End: 2023-12-08 | Stop reason: ALTCHOICE

## 2023-05-11 ENCOUNTER — SPECIALTY PHARMACY (OUTPATIENT)
Dept: PHARMACY | Facility: CLINIC | Age: 63
End: 2023-05-11
Payer: MEDICARE

## 2023-05-11 DIAGNOSIS — Z94.4 LIVER REPLACED BY TRANSPLANT: ICD-10-CM

## 2023-05-11 RX ORDER — TACROLIMUS 1 MG/1
CAPSULE ORAL
Qty: 90 CAPSULE | Refills: 11 | Status: ACTIVE | OUTPATIENT
Start: 2023-05-11

## 2023-05-11 RX ORDER — MYCOPHENOLATE MOFETIL 500 MG/1
1500 TABLET ORAL 2 TIMES DAILY
Qty: 540 TABLET | Refills: 0 | Status: ACTIVE | OUTPATIENT
Start: 2023-05-11 | End: 2023-06-26 | Stop reason: SDUPTHER

## 2023-05-11 NOTE — TELEPHONE ENCOUNTER
Outgoing call to patient for mycophenolate, tacrolimus, and sildenafil refills -- patient unsure of on hand count. Two of the rxs out of refills/. Refill requests sent to providers and will follow up once received.

## 2023-05-15 NOTE — TELEPHONE ENCOUNTER
Specialty Pharmacy - Refill Coordination    Specialty Medication Orders Linked to Encounter      Flowsheet Row Most Recent Value   Medication #1 tacrolimus (PROGRAF) 1 MG Cap (Order#[id number], Rx#[id number])   Medication #2 sildenafil (REVATIO) 20 mg Tab (Order#[id number], Rx#[id number])   Medication #3 mycophenolate (CELLCEPT) 500 mg Tab (Order#[id number], Rx#[id number])            Refill Questions - Documented Responses      Flowsheet Row Most Recent Value   Refill Screening Questions    Changes to allergies? No   Changes to medications? No   New conditions since last clinic visit? No   Unplanned office visit, urgent care, ED, or hospital admission in the last 4 weeks? No   How does patient/caregiver feel medication is working? Good   Financial problems or insurance changes? No   How many doses of your specialty medications were missed in the last 4 weeks? 1   Why were doses missed? Away from home   Would patient like to speak to a pharmacist? No   When does the patient need to receive the medication? 05/19/23   Refill Delivery Questions    How will the patient receive the medication? [facility]   When does the patient need to receive the medication? 05/19/23   Shipping Address Home   Address in [facility] confirmed and updated if neccessary? Yes   Expected Copay ($) 0   Is the patient able to afford the medication copay? Yes   Payment Method zero copay   Days supply of Refill 90   Supplies needed? No supplies needed   Refill activity completed? Yes   Refill activity plan Refill scheduled   Shipment/Pickup Date: 05/16/23            Current Outpatient Medications   Medication Sig    amoxicillin-clavulanate 875-125mg (AUGMENTIN) 875-125 mg per tablet Take 1 tablet by mouth every 12 (twelve) hours.    aspirin 81 MG Chew Take 81 mg by mouth once daily.    betamethasone dipropionate (DIPROLENE) 0.05 % ointment Apply topically 2 (two) times daily. To affected areas on hands    diphenhydrAMINE (BENADRYL) 25 mg capsule  Take 25 mg by mouth every 6 (six) hours as needed for Itching.    estradioL (ESTRACE) 0.01 % (0.1 mg/gram) vaginal cream Place 1 g vaginally once daily.    hydrOXYzine pamoate (VISTARIL) 50 MG Cap Take 1 capsule (50 mg total) by mouth every 8 (eight) hours as needed.    multivitamin (THERAGRAN) tablet Take 1 tablet by mouth once daily.    mycophenolate (CELLCEPT) 500 mg Tab Take 3 tablets (1,500 mg total) by mouth 2 (two) times daily.    ondansetron (ZOFRAN) 4 MG tablet Take 1 tablet (4 mg total) by mouth every 8 (eight) hours as needed for Nausea.    sildenafil (REVATIO) 20 mg Tab TAKE 1 TABLET (20 MG) BY MOUTH TWO TIMES DAILY    tacrolimus (PROGRAF) 1 MG Cap Take 2 capsules (2 mg total) by mouth every morning AND 1 capsule (1 mg total) every evening.   Last reviewed on 3/24/2023 10:01 AM by Luisana Borja MA    Review of patient's allergies indicates:  No Known Allergies Last reviewed on  3/27/2023 2:12 PM by Alvin Dennis      Tasks added this encounter   No tasks added.   Tasks due within next 3 months   No tasks due.     [facility]  [address]  [facility] [address]  Phone: [phone]  Fax: [phone]

## 2023-06-15 ENCOUNTER — OFFICE VISIT (OUTPATIENT)
Dept: INTERNAL MEDICINE | Facility: CLINIC | Age: 63
End: 2023-06-15
Payer: MEDICARE

## 2023-06-15 VITALS
OXYGEN SATURATION: 99 % | BODY MASS INDEX: 26.17 KG/M2 | DIASTOLIC BLOOD PRESSURE: 62 MMHG | SYSTOLIC BLOOD PRESSURE: 114 MMHG | WEIGHT: 142.19 LBS | HEIGHT: 62 IN | HEART RATE: 73 BPM

## 2023-06-15 DIAGNOSIS — Z13.6 ENCOUNTER FOR LIPID SCREENING FOR CARDIOVASCULAR DISEASE: ICD-10-CM

## 2023-06-15 DIAGNOSIS — Z12.31 BREAST CANCER SCREENING BY MAMMOGRAM: ICD-10-CM

## 2023-06-15 DIAGNOSIS — I70.0 ATHEROSCLEROSIS OF AORTA: ICD-10-CM

## 2023-06-15 DIAGNOSIS — Z78.0 ASYMPTOMATIC POSTMENOPAUSAL STATE: ICD-10-CM

## 2023-06-15 DIAGNOSIS — M34.9 SCLERODERMA: ICD-10-CM

## 2023-06-15 DIAGNOSIS — B07.8 COMMON WART: Primary | ICD-10-CM

## 2023-06-15 DIAGNOSIS — Z79.899 ON MYCOPHENOLATE MOFETIL THERAPY: ICD-10-CM

## 2023-06-15 DIAGNOSIS — Z13.220 ENCOUNTER FOR LIPID SCREENING FOR CARDIOVASCULAR DISEASE: ICD-10-CM

## 2023-06-15 DIAGNOSIS — I27.20 PULMONARY HYPERTENSION: ICD-10-CM

## 2023-06-15 DIAGNOSIS — Z13.1 SCREENING FOR DIABETES MELLITUS: ICD-10-CM
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
## 2023-06-15 DIAGNOSIS — J98.4 RESTRICTIVE LUNG DISEASE: ICD-10-CM

## 2023-06-15 DIAGNOSIS — K22.4 ESOPHAGEAL DYSMOTILITY: ICD-10-CM

## 2023-06-15 DIAGNOSIS — D84.9 IMMUNOCOMPROMISED: ICD-10-CM

## 2023-06-15 DIAGNOSIS — L30.9 HAND ECZEMA: ICD-10-CM

## 2023-06-15 PROCEDURE — 1159F PR MEDICATION LIST DOCUMENTED IN MEDICAL RECORD: ICD-10-PCS | Mod: CPTII,S$GLB,, | Performed by: INTERNAL MEDICINE

## 2023-06-15 PROCEDURE — 3044F PR MOST RECENT HEMOGLOBIN A1C LEVEL <7.0%: ICD-10-PCS | Mod: CPTII,S$GLB,, | Performed by: INTERNAL MEDICINE

## 2023-06-15 PROCEDURE — 1159F MED LIST DOCD IN RCRD: CPT | Mod: CPTII,S$GLB,, | Performed by: INTERNAL MEDICINE

## 2023-06-15 PROCEDURE — 3008F BODY MASS INDEX DOCD: CPT | Mod: CPTII,S$GLB,, | Performed by: INTERNAL MEDICINE

## 2023-06-15 PROCEDURE — 3078F PR MOST RECENT DIASTOLIC BLOOD PRESSURE < 80 MM HG: ICD-10-PCS | Mod: CPTII,S$GLB,, | Performed by: INTERNAL MEDICINE

## 2023-06-15 PROCEDURE — 99214 OFFICE O/P EST MOD 30 MIN: CPT | Mod: S$GLB,,, | Performed by: INTERNAL MEDICINE

## 2023-06-15 PROCEDURE — 99999 PR PBB SHADOW E&M-EST. PATIENT-LVL IV: ICD-10-PCS | Mod: PBBFAC,,, | Performed by: INTERNAL MEDICINE

## 2023-06-15 PROCEDURE — 3074F SYST BP LT 130 MM HG: CPT | Mod: CPTII,S$GLB,, | Performed by: INTERNAL MEDICINE

## 2023-06-15 PROCEDURE — 3078F DIAST BP <80 MM HG: CPT | Mod: CPTII,S$GLB,, | Performed by: INTERNAL MEDICINE

## 2023-06-15 PROCEDURE — 99214 PR OFFICE/OUTPT VISIT, EST, LEVL IV, 30-39 MIN: ICD-10-PCS | Mod: S$GLB,,, | Performed by: INTERNAL MEDICINE

## 2023-06-15 PROCEDURE — 3074F PR MOST RECENT SYSTOLIC BLOOD PRESSURE < 130 MM HG: ICD-10-PCS | Mod: CPTII,S$GLB,, | Performed by: INTERNAL MEDICINE

## 2023-06-15 PROCEDURE — 99999 PR PBB SHADOW E&M-EST. PATIENT-LVL IV: CPT | Mod: PBBFAC,,, | Performed by: INTERNAL MEDICINE

## 2023-06-15 PROCEDURE — 99214 OFFICE O/P EST MOD 30 MIN: CPT | Mod: PBBFAC | Performed by: INTERNAL MEDICINE

## 2023-06-15 PROCEDURE — 3044F HG A1C LEVEL LT 7.0%: CPT | Mod: CPTII,S$GLB,, | Performed by: INTERNAL MEDICINE

## 2023-06-15 PROCEDURE — 3008F PR BODY MASS INDEX (BMI) DOCUMENTED: ICD-10-PCS | Mod: CPTII,S$GLB,, | Performed by: INTERNAL MEDICINE

## 2023-06-15 NOTE — PATIENT INSTRUCTIONS
Schedule fasting labwork  Schedule mammogram after 7/11/2023  Schedule bone density (DXA) after 6/25/2023    Return to clinic in 6 months or sooner if needed.

## 2023-06-15 NOTE — PROGRESS NOTES
Subjective:       Patient ID: Nadeen Mcgovern is a 62 y.o. female.    Chief Complaint: Follow-up      HPI  Nadeen Mcgovern is a 62 y.o. year old female established patient presents for follow up. Since last visit, had fall onto R side. Abrasion on knee, R forearm pain, R knee swelling. Recovered well, no limitations at this time.     Review of Systems   Constitutional:  Negative for activity change, appetite change, fatigue, fever and unexpected weight change.   HENT:  Negative for congestion, hearing loss, postnasal drip, sneezing, sore throat, trouble swallowing and voice change.    Eyes:  Negative for pain and discharge.   Respiratory:  Negative for cough, choking, chest tightness, shortness of breath and wheezing.    Cardiovascular:  Negative for chest pain, palpitations and leg swelling.   Gastrointestinal:  Negative for abdominal distention, abdominal pain, blood in stool, constipation, diarrhea, nausea and vomiting.   Endocrine: Negative for polydipsia and polyuria.   Genitourinary:  Negative for difficulty urinating and flank pain.   Musculoskeletal:  Negative for arthralgias, back pain, joint swelling, myalgias and neck pain.   Skin:  Negative for rash.   Neurological:  Negative for dizziness, tremors, seizures, weakness, numbness and headaches.   Psychiatric/Behavioral:  Negative for agitation. The patient is not nervous/anxious.        Past Medical History:   Diagnosis Date    Acute cholecystitis     Cholecystitis    Anemia 12/14    Arthritis 2015    septic arthritis of right shoulder    Bacteremia due to Streptococcus pneumoniae     Cancer     Cataract     Cholangiocarcinoma     Fever blister 2/7/20    Hypertension     Jaundice     obstructive    Liver transplant status     Organ transplant     Prediabetes     Raynaud disease     Scleroderma         Prior to Admission medications    Medication Sig Start Date End Date Taking? Authorizing Provider   aspirin 81 MG Chew Take 81 mg by mouth once daily.   Yes  "Historical Provider   betamethasone dipropionate (DIPROLENE) 0.05 % ointment Apply topically 2 (two) times daily. To affected areas on hands 11/1/22  Yes Coby Schmidt MD   diphenhydrAMINE (BENADRYL) 25 mg capsule Take 25 mg by mouth every 6 (six) hours as needed for Itching.   Yes Historical Provider   hydrOXYzine pamoate (VISTARIL) 50 MG Cap Take 1 capsule (50 mg total) by mouth every 8 (eight) hours as needed. 7/20/20  Yes Nadeen Figueroa MD   multivitamin (THERAGRAN) tablet Take 1 tablet by mouth once daily. 10/12/15  Yes Marysol Zavaleta MD   mycophenolate (CELLCEPT) 500 mg Tab Take 3 tablets (1,500 mg total) by mouth 2 (two) times daily. 5/11/23  Yes Fran Mueller MD   ondansetron (ZOFRAN) 4 MG tablet Take 1 tablet (4 mg total) by mouth every 8 (eight) hours as needed for Nausea. 3/15/23  Yes Angelina Saez MD   sildenafil (REVATIO) 20 mg Tab TAKE 1 TABLET (20 MG) BY MOUTH TWO TIMES DAILY 11/1/22  Yes Haresh Butler MD   tacrolimus (PROGRAF) 1 MG Cap Take 2 capsules (2 mg total) by mouth every morning AND 1 capsule (1 mg total) every evening. 5/11/23  Yes Vitaly Martinez MD   amoxicillin-clavulanate 875-125mg (AUGMENTIN) 875-125 mg per tablet Take 1 tablet by mouth every 12 (twelve) hours.  Patient not taking: Reported on 6/15/2023 3/27/23   Alvin Dennis MD   estradioL (ESTRACE) 0.01 % (0.1 mg/gram) vaginal cream Place 1 g vaginally once daily. 8/20/21 8/20/22  Karlie Matute DO        Past medical history, surgical history, and family medical history reviewed and updated as appropriate.    Medications and allergies reviewed.     Objective:          Vitals:    06/15/23 1115   BP: 114/62   BP Location: Right arm   Patient Position: Sitting   Pulse: 73   SpO2: 99%   Weight: 64.5 kg (142 lb 3.2 oz)   Height: 5' 2" (1.575 m)     Body mass index is 26.01 kg/m².  Physical Exam  Constitutional:       Appearance: She is well-developed.   HENT:      Head: Normocephalic and atraumatic.   Eyes:      Extraocular " Movements: Extraocular movements intact.   Cardiovascular:      Rate and Rhythm: Normal rate and regular rhythm.      Heart sounds: Normal heart sounds.   Pulmonary:      Effort: Pulmonary effort is normal. No respiratory distress.      Breath sounds: Normal breath sounds. No wheezing.   Abdominal:      General: Bowel sounds are normal. There is no distension.      Palpations: Abdomen is soft.      Tenderness: There is no abdominal tenderness.   Musculoskeletal:         General: No tenderness. Normal range of motion.      Cervical back: Normal range of motion.   Skin:     General: Skin is warm and dry.   Neurological:      Mental Status: She is alert and oriented to person, place, and time.      Cranial Nerves: No cranial nerve deficit.      Deep Tendon Reflexes: Reflexes are normal and symmetric.       Lab Results   Component Value Date    WBC 5.80 03/24/2023    HGB 14.3 03/24/2023    HCT 44.9 03/24/2023     03/24/2023    CHOL 168 07/21/2022    TRIG 117 07/21/2022    HDL 41 07/21/2022    ALT 17 03/24/2023    AST 16 03/24/2023     (H) 03/24/2023    K 4.6 03/24/2023     03/24/2023    CREATININE 0.8 03/24/2023    BUN 10 03/24/2023    CO2 27 03/24/2023    TSH 1.224 08/17/2016    INR 1.0 03/15/2023    HGBA1C 5.4 02/01/2023       Assessment:       1. Common wart    2. Fall from standing, initial encounter    3. Breast cancer screening by mammogram    4. Asymptomatic postmenopausal state    5. Encounter for lipid screening for cardiovascular disease    6. Screening for diabetes mellitus    7. Scleroderma    8. Pulmonary hypertension    9. Restrictive lung disease    10. On mycophenolate mofetil therapy    11. Esophageal dysmotility    12. Atherosclerosis of aorta    13. Immunocompromised    14. Hand eczema          Plan:     Nadeen was seen today for follow-up.    Diagnoses and all orders for this visit:    Common wart  Comments:  on 2nd finger of left hand, hyperkeratotic 6 mm in diameter, interferes  with ADLs, has appt with dermatology.     Fall from standing, initial encounter  Comments:  mechanical slip and fall, lost footing. no head trauma, braced with R elbow and R knee. Knee was more tender initially, now improving. Trace effusion medially    Breast cancer screening by mammogram  -     Mammo Digital Screening Bilat; Future    Asymptomatic postmenopausal state  -     DXA Bone Density Axial Skeleton 1 or more sites; Future    Encounter for lipid screening for cardiovascular disease  -     Lipid Panel; Future    Screening for diabetes mellitus  -     HEMOGLOBIN A1C; Future    Scleroderma  -     TSH; Future    Pulmonary hypertension    Restrictive lung disease    On mycophenolate mofetil therapy    Esophageal dysmotility    Atherosclerosis of aorta  -     Lipid Panel; Future    Immunocompromised    Hand eczema        Health maintenance reviewed with patient.     Follow up in about 6 months (around 12/15/2023).    Alvin Dennis MD  Internal Medicine / Primary Care  [facility]  6/15/2023

## 2023-06-19 ENCOUNTER — LAB VISIT (OUTPATIENT)
Dept: LAB | Facility: HOSPITAL | Age: 63
End: 2023-06-19
Attending: STUDENT IN AN ORGANIZED HEALTH CARE EDUCATION/TRAINING PROGRAM
Payer: MEDICARE

## 2023-06-19 DIAGNOSIS — Z79.899 ON MYCOPHENOLATE MOFETIL THERAPY: ICD-10-CM

## 2023-06-19 DIAGNOSIS — Z13.6 ENCOUNTER FOR LIPID SCREENING FOR CARDIOVASCULAR DISEASE: ICD-10-CM

## 2023-06-19 DIAGNOSIS — I70.0 ATHEROSCLEROSIS OF AORTA: ICD-10-CM

## 2023-06-19 DIAGNOSIS — M34.9 SCLERODERMA: ICD-10-CM

## 2023-06-19 DIAGNOSIS — Z13.1 SCREENING FOR DIABETES MELLITUS: ICD-10-CM

## 2023-06-19 DIAGNOSIS — Z94.4 LIVER TRANSPLANTED: ICD-10-CM

## 2023-06-19 DIAGNOSIS — Z13.220 ENCOUNTER FOR LIPID SCREENING FOR CARDIOVASCULAR DISEASE: ICD-10-CM

## 2023-06-19 LAB
ALBUMIN SERPL BCP-MCNC: 3.9 G/DL (ref 3.5–5.2)
ALP SERPL-CCNC: 60 U/L (ref 55–135)
ALT SERPL W/O P-5'-P-CCNC: 13 U/L (ref 10–44)
ANION GAP SERPL CALC-SCNC: 10 MMOL/L (ref 8–16)
AST SERPL-CCNC: 16 U/L (ref 10–40)
BASOPHILS # BLD AUTO: 0.03 K/UL (ref 0–0.2)
BASOPHILS # BLD AUTO: 0.03 K/UL (ref 0–0.2)
BASOPHILS NFR BLD: 0.5 % (ref 0–1.9)
BASOPHILS NFR BLD: 0.5 % (ref 0–1.9)
BILIRUB SERPL-MCNC: 0.8 MG/DL (ref 0.1–1)
BUN SERPL-MCNC: 11 MG/DL (ref 8–23)
CALCIUM SERPL-MCNC: 9.2 MG/DL (ref 8.7–10.5)
CHLORIDE SERPL-SCNC: 110 MMOL/L (ref 95–110)
CHOLEST SERPL-MCNC: 177 MG/DL (ref 120–199)
CHOLEST/HDLC SERPL: 4.3 {RATIO} (ref 2–5)
CO2 SERPL-SCNC: 23 MMOL/L (ref 23–29)
CREAT SERPL-MCNC: 0.7 MG/DL (ref 0.5–1.4)
DIFFERENTIAL METHOD: ABNORMAL
DIFFERENTIAL METHOD: ABNORMAL
EOSINOPHIL # BLD AUTO: 0.1 K/UL (ref 0–0.5)
EOSINOPHIL # BLD AUTO: 0.1 K/UL (ref 0–0.5)
EOSINOPHIL NFR BLD: 2.1 % (ref 0–8)
EOSINOPHIL NFR BLD: 2.1 % (ref 0–8)
ERYTHROCYTE [DISTWIDTH] IN BLOOD BY AUTOMATED COUNT: 12.7 % (ref 11.5–14.5)
ERYTHROCYTE [DISTWIDTH] IN BLOOD BY AUTOMATED COUNT: 12.7 % (ref 11.5–14.5)
ERYTHROCYTE [SEDIMENTATION RATE] IN BLOOD BY PHOTOMETRIC METHOD: 29 MM/HR (ref 0–36)
EST. GFR  (NO RACE VARIABLE): >60 ML/MIN/1.73 M^2
ESTIMATED AVG GLUCOSE: 100 MG/DL (ref 68–131)
GLUCOSE SERPL-MCNC: 91 MG/DL (ref 70–110)
HBA1C MFR BLD: 5.1 % (ref 4–5.6)
HCT VFR BLD AUTO: 42.6 % (ref 37–48.5)
HCT VFR BLD AUTO: 42.6 % (ref 37–48.5)
HDLC SERPL-MCNC: 41 MG/DL (ref 40–75)
HDLC SERPL: 23.2 % (ref 20–50)
HGB BLD-MCNC: 14 G/DL (ref 12–16)
HGB BLD-MCNC: 14 G/DL (ref 12–16)
IMM GRANULOCYTES # BLD AUTO: 0.01 K/UL (ref 0–0.04)
IMM GRANULOCYTES # BLD AUTO: 0.01 K/UL (ref 0–0.04)
IMM GRANULOCYTES NFR BLD AUTO: 0.2 % (ref 0–0.5)
IMM GRANULOCYTES NFR BLD AUTO: 0.2 % (ref 0–0.5)
LDLC SERPL CALC-MCNC: 117.8 MG/DL (ref 63–159)
LYMPHOCYTES # BLD AUTO: 1.4 K/UL (ref 1–4.8)
LYMPHOCYTES # BLD AUTO: 1.4 K/UL (ref 1–4.8)
LYMPHOCYTES NFR BLD: 24.4 % (ref 18–48)
LYMPHOCYTES NFR BLD: 24.4 % (ref 18–48)
MCH RBC QN AUTO: 31.2 PG (ref 27–31)
MCH RBC QN AUTO: 31.2 PG (ref 27–31)
MCHC RBC AUTO-ENTMCNC: 32.9 G/DL (ref 32–36)
MCHC RBC AUTO-ENTMCNC: 32.9 G/DL (ref 32–36)
MCV RBC AUTO: 95 FL (ref 82–98)
MCV RBC AUTO: 95 FL (ref 82–98)
MONOCYTES # BLD AUTO: 0.4 K/UL (ref 0.3–1)
MONOCYTES # BLD AUTO: 0.4 K/UL (ref 0.3–1)
MONOCYTES NFR BLD: 6.4 % (ref 4–15)
MONOCYTES NFR BLD: 6.4 % (ref 4–15)
NEUTROPHILS # BLD AUTO: 3.9 K/UL (ref 1.8–7.7)
NEUTROPHILS # BLD AUTO: 3.9 K/UL (ref 1.8–7.7)
NEUTROPHILS NFR BLD: 66.4 % (ref 38–73)
NEUTROPHILS NFR BLD: 66.4 % (ref 38–73)
NONHDLC SERPL-MCNC: 136 MG/DL
NRBC BLD-RTO: 0 /100 WBC
NRBC BLD-RTO: 0 /100 WBC
PLATELET # BLD AUTO: 200 K/UL (ref 150–450)
PLATELET # BLD AUTO: 200 K/UL (ref 150–450)
PMV BLD AUTO: 11.7 FL (ref 9.2–12.9)
PMV BLD AUTO: 11.7 FL (ref 9.2–12.9)
POTASSIUM SERPL-SCNC: 3.9 MMOL/L (ref 3.5–5.1)
PROT SERPL-MCNC: 7 G/DL (ref 6–8.4)
RBC # BLD AUTO: 4.49 M/UL (ref 4–5.4)
RBC # BLD AUTO: 4.49 M/UL (ref 4–5.4)
SODIUM SERPL-SCNC: 143 MMOL/L (ref 136–145)
TACROLIMUS BLD-MCNC: 3.3 NG/ML (ref 5–15)
TRIGL SERPL-MCNC: 91 MG/DL (ref 30–150)
TSH SERPL DL<=0.005 MIU/L-ACNC: 1.24 UIU/ML (ref 0.4–4)
WBC # BLD AUTO: 5.81 K/UL (ref 3.9–12.7)
WBC # BLD AUTO: 5.81 K/UL (ref 3.9–12.7)

## 2023-06-19 PROCEDURE — 80061 LIPID PANEL: CPT | Performed by: INTERNAL MEDICINE

## 2023-06-19 PROCEDURE — 36415 COLL VENOUS BLD VENIPUNCTURE: CPT | Performed by: STUDENT IN AN ORGANIZED HEALTH CARE EDUCATION/TRAINING PROGRAM

## 2023-06-19 PROCEDURE — 84443 ASSAY THYROID STIM HORMONE: CPT | Performed by: INTERNAL MEDICINE

## 2023-06-19 PROCEDURE — 85652 RBC SED RATE AUTOMATED: CPT | Performed by: INTERNAL MEDICINE

## 2023-06-19 PROCEDURE — 80197 ASSAY OF TACROLIMUS: CPT | Performed by: STUDENT IN AN ORGANIZED HEALTH CARE EDUCATION/TRAINING PROGRAM

## 2023-06-19 PROCEDURE — 83036 HEMOGLOBIN GLYCOSYLATED A1C: CPT | Performed by: INTERNAL MEDICINE

## 2023-06-19 PROCEDURE — 80053 COMPREHEN METABOLIC PANEL: CPT | Performed by: STUDENT IN AN ORGANIZED HEALTH CARE EDUCATION/TRAINING PROGRAM

## 2023-06-19 PROCEDURE — 85025 COMPLETE CBC W/AUTO DIFF WBC: CPT | Performed by: INTERNAL MEDICINE

## 2023-06-22 ENCOUNTER — TELEPHONE (OUTPATIENT)
Dept: TRANSPLANT | Facility: CLINIC | Age: 63
End: 2023-06-22
Payer: MEDICARE

## 2023-06-22 NOTE — TELEPHONE ENCOUNTER
Letter sent, labs stable and no medication changes are needed. Repeat labs due 10/16/23 per protocol.  ----- Message from Vitaly Martinez MD sent at 6/21/2023 12:18 PM CDT -----  Liver tests are stable. No changes in her immunosuppression. Please continue to monitor labs per transplant protocol.

## 2023-06-22 NOTE — LETTER
June 22, 2023    Nadeen Mcgovern  [address] [facility] [address]          Dear Nadeen Mcgovern:  MRN: [id number]    This is a follow up to your recent labs, your lab results were stable.  There are no medicine changes.  Please have your labs drawn again on 10/16/23.      If you cannot have your labs drawn on the scheduled date, it is your responsibility to call the transplant department to reschedule.  Please call [phone] and ask to speak to Rossy Pineda, Medical Assistant for all scheduling requests.     Sincerely,        Your Liver Transplant Coordinator    [facility]  [address] [place] [phone]

## 2023-06-26 ENCOUNTER — LAB VISIT (OUTPATIENT)
Dept: LAB | Facility: HOSPITAL | Age: 63
End: 2023-06-26
Attending: INTERNAL MEDICINE
Payer: MEDICARE

## 2023-06-26 ENCOUNTER — OFFICE VISIT (OUTPATIENT)
Dept: RHEUMATOLOGY | Facility: CLINIC | Age: 63
End: 2023-06-26
Payer: MEDICARE

## 2023-06-26 VITALS
DIASTOLIC BLOOD PRESSURE: 76 MMHG | BODY MASS INDEX: 26.59 KG/M2 | HEART RATE: 59 BPM | WEIGHT: 144.5 LBS | SYSTOLIC BLOOD PRESSURE: 138 MMHG | HEIGHT: 62 IN

## 2023-06-26 DIAGNOSIS — M34.9 SCLERODERMA: ICD-10-CM

## 2023-06-26 DIAGNOSIS — M34.9 SCLERODERMA: Primary | ICD-10-CM

## 2023-06-26 LAB
BILIRUB UR QL STRIP: NEGATIVE
CLARITY UR REFRACT.AUTO: CLEAR
COLOR UR AUTO: YELLOW
CREAT UR-MCNC: 110 MG/DL (ref 15–325)
GLUCOSE UR QL STRIP: NEGATIVE
HGB UR QL STRIP: ABNORMAL
KETONES UR QL STRIP: NEGATIVE
LEUKOCYTE ESTERASE UR QL STRIP: NEGATIVE
MICROSCOPIC COMMENT: NORMAL
NITRITE UR QL STRIP: NEGATIVE
PH UR STRIP: 6 [PH] (ref 5–8)
PROT UR QL STRIP: NEGATIVE
PROT UR-MCNC: <7 MG/DL (ref 0–15)
PROT/CREAT UR: NORMAL MG/G{CREAT} (ref 0–0.2)
RBC #/AREA URNS AUTO: 2 /HPF (ref 0–4)
SP GR UR STRIP: 1.02 (ref 1–1.03)
SQUAMOUS #/AREA URNS AUTO: 0 /HPF
URN SPEC COLLECT METH UR: ABNORMAL
WBC #/AREA URNS AUTO: 1 /HPF (ref 0–5)

## 2023-06-26 PROCEDURE — 3044F PR MOST RECENT HEMOGLOBIN A1C LEVEL <7.0%: ICD-10-PCS | Mod: CPTII,S$GLB,, | Performed by: INTERNAL MEDICINE

## 2023-06-26 PROCEDURE — 99214 PR OFFICE/OUTPT VISIT, EST, LEVL IV, 30-39 MIN: ICD-10-PCS | Mod: S$GLB,,, | Performed by: INTERNAL MEDICINE

## 2023-06-26 PROCEDURE — 99213 OFFICE O/P EST LOW 20 MIN: CPT | Mod: PBBFAC | Performed by: INTERNAL MEDICINE

## 2023-06-26 PROCEDURE — 3075F SYST BP GE 130 - 139MM HG: CPT | Mod: CPTII,S$GLB,, | Performed by: INTERNAL MEDICINE

## 2023-06-26 PROCEDURE — 3044F HG A1C LEVEL LT 7.0%: CPT | Mod: CPTII,S$GLB,, | Performed by: INTERNAL MEDICINE

## 2023-06-26 PROCEDURE — 81001 URINALYSIS AUTO W/SCOPE: CPT | Performed by: INTERNAL MEDICINE

## 2023-06-26 PROCEDURE — 3078F PR MOST RECENT DIASTOLIC BLOOD PRESSURE < 80 MM HG: ICD-10-PCS | Mod: CPTII,S$GLB,, | Performed by: INTERNAL MEDICINE

## 2023-06-26 PROCEDURE — 99499 UNLISTED E&M SERVICE: CPT | Mod: S$GLB,,, | Performed by: INTERNAL MEDICINE

## 2023-06-26 PROCEDURE — 3008F PR BODY MASS INDEX (BMI) DOCUMENTED: ICD-10-PCS | Mod: CPTII,S$GLB,, | Performed by: INTERNAL MEDICINE

## 2023-06-26 PROCEDURE — 1159F MED LIST DOCD IN RCRD: CPT | Mod: CPTII,S$GLB,, | Performed by: INTERNAL MEDICINE

## 2023-06-26 PROCEDURE — 99214 OFFICE O/P EST MOD 30 MIN: CPT | Mod: S$GLB,,, | Performed by: INTERNAL MEDICINE

## 2023-06-26 PROCEDURE — 3008F BODY MASS INDEX DOCD: CPT | Mod: CPTII,S$GLB,, | Performed by: INTERNAL MEDICINE

## 2023-06-26 PROCEDURE — 3075F PR MOST RECENT SYSTOLIC BLOOD PRESS GE 130-139MM HG: ICD-10-PCS | Mod: CPTII,S$GLB,, | Performed by: INTERNAL MEDICINE

## 2023-06-26 PROCEDURE — 82570 ASSAY OF URINE CREATININE: CPT | Performed by: INTERNAL MEDICINE

## 2023-06-26 PROCEDURE — 99999 PR PBB SHADOW E&M-EST. PATIENT-LVL III: ICD-10-PCS | Mod: PBBFAC,,, | Performed by: INTERNAL MEDICINE

## 2023-06-26 PROCEDURE — 3078F DIAST BP <80 MM HG: CPT | Mod: CPTII,S$GLB,, | Performed by: INTERNAL MEDICINE

## 2023-06-26 PROCEDURE — 99999 PR PBB SHADOW E&M-EST. PATIENT-LVL III: CPT | Mod: PBBFAC,,, | Performed by: INTERNAL MEDICINE

## 2023-06-26 PROCEDURE — 1159F PR MEDICATION LIST DOCUMENTED IN MEDICAL RECORD: ICD-10-PCS | Mod: CPTII,S$GLB,, | Performed by: INTERNAL MEDICINE

## 2023-06-26 PROCEDURE — 99499 RISK ADDL DX/OHS AUDIT: ICD-10-PCS | Mod: S$GLB,,, | Performed by: INTERNAL MEDICINE

## 2023-06-26 RX ORDER — MYCOPHENOLATE MOFETIL 500 MG/1
1500 TABLET ORAL 2 TIMES DAILY
Qty: 540 TABLET | Refills: 0 | Status: ACTIVE | OUTPATIENT
Start: 2023-06-26 | End: 2023-11-06 | Stop reason: SDUPTHER

## 2023-06-26 RX ORDER — SILDENAFIL CITRATE 20 MG/1
TABLET ORAL
Qty: 180 TABLET | Refills: 3 | Status: ACTIVE | OUTPATIENT
Start: 2023-06-26 | End: 2023-12-19 | Stop reason: SDUPTHER

## 2023-06-26 NOTE — PROGRESS NOTES
"Subjective:      Patient ID: Nadeen Mcgovern is a 62 y.o. female.    Chief Complaint: lcSSc; Raynaud's; mild RLD; h/o PAH    HPI had fall after tripping crossing railroad tracks, hit right knee which  was bruised, painful and swollen but improving. No PT but riding stationary and rom ex. Seeing Dr. Schmidt in Derm for hand eczema tomorrow. Has bruise from phlebotomy right volar forearm  no change in skin texture otherwise. No Raynaud's.  No cough, SOB, chest pain palpitations.  Only occ dysphagia if eats too fast.   Review of Systems   Constitutional:  Negative for appetite change, fatigue, fever and unexpected weight change.   HENT:  Negative for mouth sores and trouble swallowing.    Eyes:  Negative for redness and visual disturbance.   Respiratory:  Negative for cough, shortness of breath and wheezing.    Cardiovascular:  Negative for chest pain and palpitations.   Gastrointestinal:  Positive for constipation. Negative for abdominal pain, anal bleeding, blood in stool, diarrhea, nausea and vomiting.   Genitourinary:  Negative for dysuria, frequency, genital sores and urgency.   Musculoskeletal:  Negative for arthralgias, back pain, gait problem, joint swelling, myalgias, neck pain and neck stiffness.   Skin:  Positive for rash.   Neurological:  Negative for weakness, numbness and headaches.   Hematological:  Negative for adenopathy. Bruises/bleeds easily.   Psychiatric/Behavioral:  Negative for sleep disturbance. The patient is not nervous/anxious.       Objective:   /76   Pulse (!) 59   Ht 5' 1.5" (1.562 m)   Wt 65.5 kg (144 lb 8.2 oz)   LMP  (LMP Unknown)   BMI 26.86 kg/m²   Physical Exam   Constitutional: She is oriented to person, place, and time.   HENT:   Head: Normocephalic and atraumatic.   Mouth/Throat: Oropharynx is clear and moist.   Eyes: Conjunctivae are normal.   Neck: No thyromegaly present.   Cardiovascular: Normal rate, regular rhythm and normal heart sounds. Exam reveals no gallop and no " friction rub.   No murmur heard.  Pulmonary/Chest: Breath sounds normal. She has no wheezes. She has no rales. She exhibits no tenderness.   Abdominal: Soft. She exhibits no distension and no mass. There is no abdominal tenderness.   Musculoskeletal:      Right shoulder: Normal.      Left shoulder: Normal.      Right elbow: Normal.      Left elbow: Normal.      Right wrist: Normal.      Left wrist: Normal.      Cervical back: Normal range of motion and neck supple.      Right knee: Normal. No effusion.      Left knee: Normal. No effusion.   Lymphadenopathy:     She has no cervical adenopathy.   Neurological: She is alert and oriented to person, place, and time. She displays normal reflexes. Gait normal.   Nl motor strength UE and LE bilateral   Skin:   mRSS 2   Psychiatric: Her behavior is normal. Mood, judgment and thought content normal.       Right Side Rheumatological Exam     Examination finds the shoulder, elbow, wrist, knee, 1st PIP, 1st MCP, 2nd PIP, 2nd MCP, 3rd PIP, 3rd MCP, 4th PIP, 4th MCP, 5th PIP and 5th MCP normal.    Knee Exam     Range of Motion   Extension:  normal   Flexion:  abnormal   Patellofemoral Crepitus: positive  Effusion: negative  Warmth: negative    Left Side Rheumatological Exam     Examination finds the shoulder, elbow, wrist, knee, 1st PIP, 1st MCP, 2nd PIP, 2nd MCP, 3rd PIP, 3rd MCP, 4th PIP, 4th MCP, 5th PIP and 5th MCP normal.    Knee Exam     Range of Motion   Extension:  normal   Flexion:  normal     Patellofemoral Crepitus: positive  Effusion: negative  Warmth: negative         Latest Reference Range & Units 06/19/23 08:00   WBC 3.90 - 12.70 K/uL  3.90 - 12.70 K/uL 5.81  5.81   RBC 4.00 - 5.40 M/uL  4.00 - 5.40 M/uL 4.49  4.49   Hemoglobin 12.0 - 16.0 g/dL  12.0 - 16.0 g/dL 14.0  14.0   Hematocrit 37.0 - 48.5 %  37.0 - 48.5 % 42.6  42.6   MCV 82 - 98 fL  82 - 98 fL 95  95   MCH 27.0 - 31.0 pg  27.0 - 31.0 pg 31.2 (H)  31.2 (H)   MCHC 32.0 - 36.0 g/dL  32.0 - 36.0 g/dL  32.9  32.9   RDW 11.5 - 14.5 %  11.5 - 14.5 % 12.7  12.7   Platelets 150 - 450 K/uL  150 - 450 K/uL 200  200   MPV 9.2 - 12.9 fL  9.2 - 12.9 fL 11.7  11.7   Gran % 38.0 - 73.0 %  38.0 - 73.0 % 66.4  66.4   Lymph % 18.0 - 48.0 %  18.0 - 48.0 % 24.4  24.4   Mono % 4.0 - 15.0 %  4.0 - 15.0 % 6.4  6.4   Eosinophil % 0.0 - 8.0 %  0.0 - 8.0 % 2.1  2.1   Basophil % 0.0 - 1.9 %  0.0 - 1.9 % 0.5  0.5   Immature Granulocytes 0.0 - 0.5 %  0.0 - 0.5 % 0.2  0.2   Gran # (ANC) 1.8 - 7.7 K/uL  1.8 - 7.7 K/uL 3.9  3.9   Lymph # 1.0 - 4.8 K/uL  1.0 - 4.8 K/uL 1.4  1.4   Mono # 0.3 - 1.0 K/uL  0.3 - 1.0 K/uL 0.4  0.4   Eos # 0.0 - 0.5 K/uL  0.0 - 0.5 K/uL 0.1  0.1   Baso # 0.00 - 0.20 K/uL  0.00 - 0.20 K/uL 0.03  0.03   Immature Grans (Abs) 0.00 - 0.04 K/uL  0.00 - 0.04 K/uL 0.01  0.01   nRBC 0 /100 WBC  0 /100 WBC 0  0   Differential Method  Automated  Automated   Sed Rate 0 - 36 mm/Hr 29   Sodium 136 - 145 mmol/L 143   Potassium 3.5 - 5.1 mmol/L 3.9   Chloride 95 - 110 mmol/L 110   CO2 23 - 29 mmol/L 23   Anion Gap 8 - 16 mmol/L 10   BUN 8 - 23 mg/dL 11   Creatinine 0.5 - 1.4 mg/dL 0.7   eGFR >60 mL/min/1.73 m^2 >60.0   Glucose 70 - 110 mg/dL 91   Calcium 8.7 - 10.5 mg/dL 9.2   Alkaline Phosphatase 55 - 135 U/L 60   PROTEIN TOTAL 6.0 - 8.4 g/dL 7.0   Albumin 3.5 - 5.2 g/dL 3.9   BILIRUBIN TOTAL 0.1 - 1.0 mg/dL 0.8   AST 10 - 40 U/L 16   ALT 10 - 44 U/L 13   Cholesterol 120 - 199 mg/dL 177   HDL 40 - 75 mg/dL 41   HDL/Cholesterol Ratio 20.0 - 50.0 % 23.2   LDL Cholesterol External 63.0 - 159.0 mg/dL 117.8   Non-HDL Cholesterol mg/dL 136   Total Cholesterol/HDL Ratio 2.0 - 5.0  4.3   Triglycerides 30 - 150 mg/dL 91   Hemoglobin A1C External 4.0 - 5.6 % 5.1   Estimated Avg Glucose 68 - 131 mg/dL 100   TSH 0.400 - 4.000 uIU/mL 1.239   Tacrolimus Lvl 5.0 - 15.0 ng/mL 3.3 (L)   (H): Data is abnormally high  (L): Data is abnormally low    The 10-year ASCVD risk score (Jessica CARR, et al., 2019) is: 7.6%    Values used to calculate the  score:      Age: 62 years      Sex: Female      Is Non- : Yes      Diabetic: No      Tobacco smoker: No      Systolic Blood Pressure: 138 mmHg      Is BP treated: No      HDL Cholesterol: 41 mg/dL      Total Cholesterol: 177 mg/dL            PFTs          FVC     SVC    RV   DLco         TLC   7/21/22    131.5                 64.6       58.8     96.4  6/25/21     140.4                 53.8       73.9      98.2  7/10/20     132                    26(?)        79      83.6  3/26/19     117    119          121         101  3/20/18     120    121           62            90  9/25/17     122     122          30            89  4/7/17       123     124          49           91  8/18/16     107     107         110          88      TTE 8/8/22:   The estimated ejection fraction is 60%.  The left ventricle is normal in size with normal systolic function.  Normal left ventricular diastolic function.  Normal right ventricular size with normal right ventricular systolic function.  The estimated PA systolic pressure is 27 mmHg.  Normal central venous pressure (3 mmHg).  EXAMINATION:  CT CHEST WITHOUT CONTRAST     CLINICAL HISTORY:  Cholangio surveillance; Personal history of malignant neoplasm of other digestive organs     TECHNIQUE:  Low dose axial images, sagittal and coronal reformations were obtained from the thoracic inlet to the lung bases. Contrast was not administered.     COMPARISON:  06/17/2021     FINDINGS:  Heart and mediastinal vessels: Heart normal in size.  There is small amount of atherosclerosis.     Aminta/Mediastinum: No pathologic jensen enlargement.     Lungs/Pleura: Calcified granuloma left lung base.  Lungs otherwise clear.     Upper Abdomen: Postoperative changes liver transplant are noted with pneumobilia visualized in the left hepatic lobe segments.  Probable postinflammatory calcifications seen within the spleen.     Bones: Unremarkable.     Impression:     Status post liver  transplant for cholangiocarcinoma.  No convincing evidence of metastatic disease within the chest     Evidence of previous granulomatous disease and atherosclerosis        Electronically signed by: Jhoan Dave Jr  Date:                                            08/18/2022  Time:                                           09:02                Assessment:   lcSSc EUGENE+ RUBY- RNA-Pol3 - Th/To- mRSS 2(mild) stable  Mild Restrictive lung disease stable-improved except for DLco, CT chest shows no ILD  Raynaud's stable/improved  PAH  nl ePASP on TTE 8/8/22  Hyperlipidemia ASCVD 7.6%, intermediate risk   Allergic contact dermatitis, Hand eczema, atopic dernatitis. Saw Dr. Green in Dermatology 4/13/22  Dysphagia, pills and solids only saw Denita Espinoza in Gastro 2/16/22 nl esophagram 2/16/22.Swallowing improved. Can take mycophenolate tabs no longer liquid  Right lateral epicondylitis nearly resolved  S/p liver transplant for cholangiocarcinoma  Low tacrolimus trough she thinks she missed dose around that time  Post traumatic right knee pain with baseline OA improved  Plan:   Check with Dr. Martinez about the low tacrolimus trough level  PFTs next month  TTE August  Quad strengthening exercises as demonstrated and as per handout  Cont stationary bike and pool exercise  F/u Dr. Schmidt tomorrow  contact dermatitis,  atopic dermatitis, hand eczema as scheduled  Mycophenolate 1500mg twice daily  Tacrolimus 2mg in am and 1mg in pm  Sildenafil 20mg twice daily  Standing labs q 3 months.   Cont working on Mediterranean diet can tolerate canned salmon  RTC 6 months

## 2023-06-26 NOTE — TELEPHONE ENCOUNTER
----- Message from Salomon Templeton sent at 8/22/2018  1:32 PM CDT -----  Contact: patient   Patient calling  for refill on PROGRAF 1 mg Cap medication.        Pharmacy [facility] [phone]        Please call [phone]   102.2

## 2023-06-26 NOTE — PROGRESS NOTES
Rapid3 Question Responses and Scores 6/19/2023   MDHAQ Score 0.6   Psychologic Score 3.3   Pain Score 3.5   When you awakened in the morning OVER THE LAST WEEK, did you feel stiff? Yes   If Yes, please indicate the number of hours until you are as limber as you will be for the day 1   Fatigue Score 1.5   Global Health Score 2.5   RAPID3 Score 2.67     Answers submitted by the patient for this visit:  Rheumatology Questionnaire (Submitted on 6/19/2023)  fever: No  eye redness: No  mouth sores: No  headaches: No  shortness of breath: No  chest pain: No  trouble swallowing: No  diarrhea: No  constipation: Yes  unexpected weight change: No  genital sore: No  dysuria: No  During the last 3 days, have you had a skin rash?: Yes  Bruises or bleeds easily: Yes  cough: No

## 2023-06-27 ENCOUNTER — OFFICE VISIT (OUTPATIENT)
Dept: DERMATOLOGY | Facility: CLINIC | Age: 63
End: 2023-06-27
Payer: MEDICARE

## 2023-06-27 DIAGNOSIS — L23.9 ALLERGIC CONTACT DERMATITIS, UNSPECIFIED TRIGGER: ICD-10-CM

## 2023-06-27 DIAGNOSIS — L30.9 ECZEMA, UNSPECIFIED TYPE: Primary | ICD-10-CM

## 2023-06-27 DIAGNOSIS — B07.9 VERRUCA: ICD-10-CM

## 2023-06-27 PROCEDURE — 99213 PR OFFICE/OUTPT VISIT, EST, LEVL III, 20-29 MIN: ICD-10-PCS | Mod: S$GLB,,, | Performed by: DERMATOLOGY

## 2023-06-27 PROCEDURE — 3044F PR MOST RECENT HEMOGLOBIN A1C LEVEL <7.0%: ICD-10-PCS | Mod: CPTII,S$GLB,, | Performed by: DERMATOLOGY

## 2023-06-27 PROCEDURE — 1159F MED LIST DOCD IN RCRD: CPT | Mod: CPTII,S$GLB,, | Performed by: DERMATOLOGY

## 2023-06-27 PROCEDURE — 1160F RVW MEDS BY RX/DR IN RCRD: CPT | Mod: CPTII,S$GLB,, | Performed by: DERMATOLOGY

## 2023-06-27 PROCEDURE — 99999 PR PBB SHADOW E&M-EST. PATIENT-LVL III: ICD-10-PCS | Mod: PBBFAC,,, | Performed by: DERMATOLOGY

## 2023-06-27 PROCEDURE — 1160F PR REVIEW ALL MEDS BY PRESCRIBER/CLIN PHARMACIST DOCUMENTED: ICD-10-PCS | Mod: CPTII,S$GLB,, | Performed by: DERMATOLOGY

## 2023-06-27 PROCEDURE — 99213 OFFICE O/P EST LOW 20 MIN: CPT | Mod: S$GLB,,, | Performed by: DERMATOLOGY

## 2023-06-27 PROCEDURE — 3044F HG A1C LEVEL LT 7.0%: CPT | Mod: CPTII,S$GLB,, | Performed by: DERMATOLOGY

## 2023-06-27 PROCEDURE — 99999 PR PBB SHADOW E&M-EST. PATIENT-LVL III: CPT | Mod: PBBFAC,,, | Performed by: DERMATOLOGY

## 2023-06-27 PROCEDURE — 1159F PR MEDICATION LIST DOCUMENTED IN MEDICAL RECORD: ICD-10-PCS | Mod: CPTII,S$GLB,, | Performed by: DERMATOLOGY

## 2023-06-27 RX ORDER — BETAMETHASONE DIPROPIONATE 0.5 MG/G
OINTMENT TOPICAL 2 TIMES DAILY
Qty: 45 G | Refills: 3 | Status: SHIPPED | OUTPATIENT
Start: 2023-06-27 | End: 2023-08-08 | Stop reason: SDUPTHER

## 2023-06-27 NOTE — PROGRESS NOTES
Subjective:      Patient ID:  Nadeen Mcgovern is a 62 y.o. female who presents for   Chief Complaint   Patient presents with    Mass     L- 2nd digit      Mass - Initial  Affected locations: L- 2 nd digit.  Duration: 1 month  Signs / symptoms: tender and itching  Severity: mild  Timing: constant  Aggravated by: friction  Relieving factors/Treatments tried: nothing  Hx of liver transplant and systemic scleroderma, on Prograf and Cellcept. Also on sildenafil.  Hx of hand dermatitis, at last visit given betamethasone ointment to use.    Has new bump on right 2nd finger, unsure what it is. Tender with pressure.  Review of Systems   Constitutional:  Negative for fever.   Respiratory:  Negative for cough.    Hematologic/Lymphatic: Bruises/bleeds easily.     Objective:   Physical Exam   Constitutional: She appears well-developed and well-nourished. No distress.   Neurological: She is alert and oriented to person, place, and time. She is not disoriented.   Psychiatric: She has a normal mood and affect.   Skin:   Areas Examined (abnormalities noted in diagram):   LUE Inspection Performed          Diagram Legend     Erythematous scaling macule/papule c/w actinic keratosis       Vascular papule c/w angioma      Pigmented verrucoid papule/plaque c/w seborrheic keratosis      Yellow umbilicated papule c/w sebaceous hyperplasia      Irregularly shaped tan macule c/w lentigo     1-2 mm smooth white papules consistent with Milia      Movable subcutaneous cyst with punctum c/w epidermal inclusion cyst      Subcutaneous movable cyst c/w pilar cyst      Firm pink to brown papule c/w dermatofibroma      Pedunculated fleshy papule(s) c/w skin tag(s)      Evenly pigmented macule c/w junctional nevus     Mildly variegated pigmented, slightly irregular-bordered macule c/w mildly atypical nevus      Flesh colored to evenly pigmented papule c/w intradermal nevus       Pink pearly papule/plaque c/w basal cell carcinoma      Erythematous  hyperkeratotic cursted plaque c/w SCC      Surgical scar with no sign of skin cancer recurrence      Open and closed comedones      Inflammatory papules and pustules      Verrucoid papule consistent consistent with wart     Erythematous eczematous patches and plaques     Dystrophic onycholytic nail with subungual debris c/w onychomycosis     Umbilicated papule    Erythematous-base heme-crusted tan verrucoid plaque consistent with inflamed seborrheic keratosis     Erythematous Silvery Scaling Plaque c/w Psoriasis     See annotation      Assessment / Plan:        Eczema, unspecified type  Allergic contact dermatitis, unspecified trigger  -     betamethasone dipropionate (DIPROLENE) 0.05 % ointment; Apply topically 2 (two) times daily. To affected areas on hands  Dispense: 45 g; Refill: 3  Eucerin samples given for hands    Verruca  Opted to avoid cryo given scleroderma given risk of injury from freezing;  Will tx with Mediplast OTC pads daily             No follow-ups on file.

## 2023-06-28 ENCOUNTER — HOSPITAL ENCOUNTER (OUTPATIENT)
Dept: RADIOLOGY | Facility: OTHER | Age: 63
Discharge: HOME | End: 2023-06-28
Attending: INTERNAL MEDICINE
Payer: MEDICARE

## 2023-06-28 DIAGNOSIS — Z12.31 BREAST CANCER SCREENING BY MAMMOGRAM: ICD-10-CM

## 2023-06-28 PROCEDURE — 77063 MAMMO DIGITAL SCREENING BILAT WITH TOMO: ICD-10-PCS | Mod: 26,,, | Performed by: RADIOLOGY

## 2023-06-28 PROCEDURE — 77067 MAMMO DIGITAL SCREENING BILAT WITH TOMO: ICD-10-PCS | Mod: 26,,, | Performed by: RADIOLOGY

## 2023-06-28 PROCEDURE — 77067 SCR MAMMO BI INCL CAD: CPT | Mod: TC

## 2023-06-28 PROCEDURE — 77063 BREAST TOMOSYNTHESIS BI: CPT | Mod: 26,,, | Performed by: RADIOLOGY

## 2023-06-28 PROCEDURE — 77067 SCR MAMMO BI INCL CAD: CPT | Mod: 26,,, | Performed by: RADIOLOGY

## 2023-07-05 ENCOUNTER — HOSPITAL ENCOUNTER (OUTPATIENT)
Dept: RADIOLOGY | Facility: OTHER | Age: 63
Discharge: HOME | End: 2023-07-05
Attending: INTERNAL MEDICINE
Payer: MEDICARE

## 2023-07-05 DIAGNOSIS — Z78.0 ASYMPTOMATIC POSTMENOPAUSAL STATE: ICD-10-CM

## 2023-07-05 PROCEDURE — 77080 DXA BONE DENSITY AXIAL: CPT | Mod: 26,,, | Performed by: RADIOLOGY

## 2023-07-05 PROCEDURE — 77080 DXA BONE DENSITY AXIAL SKELETON 1 OR MORE SITES: ICD-10-PCS | Mod: 26,,, | Performed by: RADIOLOGY

## 2023-07-05 PROCEDURE — 77080 DXA BONE DENSITY AXIAL: CPT | Mod: TC

## 2023-07-28 PROBLEM — M25.532 LEFT WRIST PAIN: Status: ACTIVE | Noted: 2023-07-28

## 2023-08-01 ENCOUNTER — OFFICE VISIT (OUTPATIENT)
Dept: ORTHOPEDICS | Facility: CLINIC | Age: 63
End: 2023-08-01
Payer: MEDICARE

## 2023-08-01 VITALS
DIASTOLIC BLOOD PRESSURE: 69 MMHG | HEIGHT: 60 IN | BODY MASS INDEX: 27.66 KG/M2 | SYSTOLIC BLOOD PRESSURE: 129 MMHG | HEART RATE: 60 BPM | WEIGHT: 140.88 LBS

## 2023-08-01 DIAGNOSIS — M25.532 LEFT WRIST PAIN: ICD-10-CM

## 2023-08-01 PROCEDURE — 99203 OFFICE O/P NEW LOW 30 MIN: CPT | Mod: S$GLB,,,

## 2023-08-01 PROCEDURE — 3074F PR MOST RECENT SYSTOLIC BLOOD PRESSURE < 130 MM HG: ICD-10-PCS | Mod: CPTII,S$GLB,,

## 2023-08-01 PROCEDURE — 3078F DIAST BP <80 MM HG: CPT | Mod: CPTII,S$GLB,,

## 2023-08-01 PROCEDURE — 1159F PR MEDICATION LIST DOCUMENTED IN MEDICAL RECORD: ICD-10-PCS | Mod: CPTII,S$GLB,,

## 2023-08-01 PROCEDURE — 1159F MED LIST DOCD IN RCRD: CPT | Mod: CPTII,S$GLB,,

## 2023-08-01 PROCEDURE — 99999 PR PBB SHADOW E&M-EST. PATIENT-LVL IV: CPT | Mod: PBBFAC,,,

## 2023-08-01 PROCEDURE — 1160F RVW MEDS BY RX/DR IN RCRD: CPT | Mod: CPTII,S$GLB,,

## 2023-08-01 PROCEDURE — 3078F PR MOST RECENT DIASTOLIC BLOOD PRESSURE < 80 MM HG: ICD-10-PCS | Mod: CPTII,S$GLB,,

## 2023-08-01 PROCEDURE — 3008F PR BODY MASS INDEX (BMI) DOCUMENTED: ICD-10-PCS | Mod: CPTII,S$GLB,,

## 2023-08-01 PROCEDURE — 3008F BODY MASS INDEX DOCD: CPT | Mod: CPTII,S$GLB,,

## 2023-08-01 PROCEDURE — 99203 PR OFFICE/OUTPT VISIT, NEW, LEVL III, 30-44 MIN: ICD-10-PCS | Mod: S$GLB,,,

## 2023-08-01 PROCEDURE — 3074F SYST BP LT 130 MM HG: CPT | Mod: CPTII,S$GLB,,

## 2023-08-01 PROCEDURE — 99999 PR PBB SHADOW E&M-EST. PATIENT-LVL IV: ICD-10-PCS | Mod: PBBFAC,,,

## 2023-08-01 PROCEDURE — 3044F HG A1C LEVEL LT 7.0%: CPT | Mod: CPTII,S$GLB,,

## 2023-08-01 PROCEDURE — 3044F PR MOST RECENT HEMOGLOBIN A1C LEVEL <7.0%: ICD-10-PCS | Mod: CPTII,S$GLB,,

## 2023-08-01 PROCEDURE — 1160F PR REVIEW ALL MEDS BY PRESCRIBER/CLIN PHARMACIST DOCUMENTED: ICD-10-PCS | Mod: CPTII,S$GLB,,

## 2023-08-01 RX ORDER — METHYLPREDNISOLONE 4 MG/1
TABLET ORAL
Qty: 21 EACH | Refills: 0 | Status: SHIPPED | OUTPATIENT
Start: 2023-08-01 | End: 2023-08-22

## 2023-08-01 NOTE — PROGRESS NOTES
SUBJECTIVE:      Chief Complaint: left wrist pain    History of Present Illness:  Patient is a 62 y.o. right hand dominant female who presents today with complaints of left wrist pain.  Patient reports about 6 days ago she was taking down her hair and feels she may have overworked her wrist.  She went to the ER 2 days later and initial imaging was negative for acute fracture or dislocation.  She was given Percocet and Robaxin which helped with her pain.  She was also given a wrist brace which caused an allergic reaction with pruritus.  Reports Benadryl does help.  She does note occasional tingling.  Denies any falls or injury.     The patient is a/an retired.    Onset of symptoms/DOI was 6 days prior.    Symptoms are aggravated by movement.    Symptoms are alleviated by rest and medication.    Symptoms consist of pain and swelling.    The patient rates their pain as a 8/10.    Attempted treatment(s) and/or interventions include activity modifications, rest, narcotic medications and immobilization.     The patient denies any fevers, chills, N/V, D/C and presents for evaluation.       Past Medical History:   Diagnosis Date    Acute cholecystitis     Cholecystitis    Anemia     Arthritis     septic arthritis of right shoulder    Bacteremia due to Streptococcus pneumoniae     Cancer     Cataract     Cholangiocarcinoma     Fever blister 20    Hypertension     Jaundice     obstructive    Liver transplant status     Organ transplant     Prediabetes     Raynaud disease     Scleroderma      Past Surgical History:   Procedure Laterality Date    arthoscopic Right     drained infection     SECTION      ESOPHAGOGASTRODUODENOSCOPY N/A 2020    Procedure: EGD (ESOPHAGOGASTRODUODENOSCOPY);  Surgeon: Drew Mackay MD;  Location: [facility] ([address] [place] [address]);  Service: Endoscopy;  Laterality: N/A;  [facility] [facility]    INCISION AND DRAINAGE OF WOUND      s/p I&D of R thumb    LIVER TRANSPLANT       SHOULDER ARTHROSCOPY       Review of patient's allergies indicates:  No Known Allergies  Social History     Social History Narrative    Not on file     Family History   Problem Relation Age of Onset    Cancer Father         Lung    Arthritis Father     Stroke Mother     Diabetes Mother     Hypertension Mother     Heart attack Mother     Cancer Paternal Grandmother         pancreatic cancer    Cancer Brother         Lung    Alcohol abuse Brother     Arthritis Sister     Psoriasis Sister     No Known Problems Daughter     No Known Problems Son     No Known Problems Son     No Known Problems Daughter     Colon cancer Neg Hx     Esophageal cancer Neg Hx          Current Outpatient Medications:     amoxicillin-clavulanate 875-125mg (AUGMENTIN) 875-125 mg per tablet, Take 1 tablet by mouth every 12 (twelve) hours., Disp: 14 tablet, Rfl: 0    aspirin 81 MG Chew, Take 81 mg by mouth once daily., Disp: , Rfl:     betamethasone dipropionate (DIPROLENE) 0.05 % ointment, Apply topically 2 (two) times daily. To affected areas on hands, Disp: 45 g, Rfl: 3    diphenhydrAMINE (BENADRYL) 25 mg capsule, Take 25 mg by mouth every 6 (six) hours as needed for Itching., Disp: , Rfl:     methocarbamoL (ROBAXIN) 750 MG Tab, Take 1 tablet (750 mg total) by mouth 4 (four) times daily. for 10 days, Disp: 40 tablet, Rfl: 0    multivitamin (THERAGRAN) tablet, Take 1 tablet by mouth once daily., Disp: , Rfl:     mycophenolate (CELLCEPT) 500 mg Tab, Take 3 tablets (1,500 mg total) by mouth 2 (two) times daily., Disp: 540 tablet, Rfl: 0    oxyCODONE-acetaminophen (PERCOCET) 7.5-325 mg per tablet, Take 1 tablet by mouth every 6 (six) hours as needed for Pain., Disp: 16 tablet, Rfl: 0    sildenafil (REVATIO) 20 mg Tab, TAKE 1 TABLET (20 MG) BY MOUTH TWO TIMES DAILY, Disp: 180 tablet, Rfl: 3    tacrolimus (PROGRAF) 1 MG Cap, Take 2 capsules (2 mg total) by mouth every morning AND 1 capsule (1 mg total) every evening., Disp: 90 capsule, Rfl: 11     estradioL (ESTRACE) 0.01 % (0.1 mg/gram) vaginal cream, Place 1 g vaginally once daily., Disp: 42.5 g, Rfl: 1    hydrOXYzine pamoate (VISTARIL) 50 MG Cap, Take 1 capsule (50 mg total) by mouth every 8 (eight) hours as needed. (Patient not taking: Reported on 6/26/2023), Disp: 30 capsule, Rfl: 3    methylPREDNISolone (MEDROL DOSEPACK) 4 mg tablet, use as directed, Disp: 21 each, Rfl: 0    ondansetron (ZOFRAN) 4 MG tablet, Take 1 tablet (4 mg total) by mouth every 8 (eight) hours as needed for Nausea. (Patient not taking: Reported on 6/26/2023), Disp: 12 tablet, Rfl: 0      Review of Systems:  Constitutional: no fever or chills  Eyes: no visual changes  ENT: no nasal congestion or sore throat  Respiratory: no cough or shortness of breath  Cardiovascular: no chest pain  Gastrointestinal: no nausea or vomiting, tolerating diet  Musculoskeletal: pain and soreness    OBJECTIVE:      Vital Signs (Most Recent):  Vitals:    08/01/23 0818   BP: 129/69   Pulse: 60   Weight: 63.9 kg (140 lb 14 oz)   Height: 5' (1.524 m)     Body mass index is 27.51 kg/m².      Physical Exam:  Constitutional: The patient appears well-developed and well-nourished. No distress.   Skin: No lesions appreciated  Head: Normocephalic and atraumatic.   Nose: Nose normal.   Ears: No deformities seen  Eyes: Conjunctivae and EOM are normal.   Neck: No tracheal deviation present.   Cardiovascular: Normal rate and intact distal pulses.    Pulmonary/Chest: Effort normal. No respiratory distress.   Abdominal: There is no guarding.   Neurological: The patient is alert.   Psychiatric: The patient has a normal mood and affect.     Left Hand/Wrist Examination:    Observation/Inspection:  Swelling  +   Deformity  none  Discoloration  none     Scars   none    Atrophy  None  Erythematous papules located to left wrist    HAND/WRIST EXAMINATION:  Finkelstein's Test   +  WHAT Test    +  Snuff box tenderness   Neg  Soriano's Test    Neg  Hook of Hamate  Tenderness  Neg  CMC grind    Neg  Circumduction test   Neg  TTP at distal radius     Neurovascular Exam:  Digits WWP, brisk CR < 3s throughout  NVI motor/LTS to M/R/U nerves, radial pulse 2+  Tinel's Test - Carpal Tunnel  Neg  Tinel's Test - Cubital Tunnel  Neg  Phalen's Test    Neg  Median Nerve Compression Test Neg    ROM hand full, pain with thumb abduction    ROM wrist unable to flex or extend without pain.     ROM elbow full, painless    Abdomen not guarded  Respirations nonlabored  Perfusion intact    Diagnostic Results:     Imaging - I independently viewed the patient's imaging as well as the radiology report.  Xrays of the patient's left wrist  demonstrate no evidence of any obvious acute fractures or dislocations or significant degenerative changes. Minimal to mild DJD.       ASSESSMENT/PLAN:      1. Left wrist pain  Ambulatory referral/consult to Orthopedics    X-Ray Wrist Complete Left    methylPREDNISolone (MEDROL DOSEPACK) 4 mg tablet    ORTHOPEDIC BRACING FOR HOME USE - UPPER EXTREMITY            I made the decision to obtain old records of the patient including previous notes and imaging. If new imaging was ordered today of the extremity or extremities evaluated. I independently reviewed and interpreted the xrays as well as prior imaging. Reviewed imaging in detail with patient.     We discussed at length different treatment options including conservative vs surgical management. These include anti-inflammatories, acetaminophen, rest, ice, heat, formal physical therapy including strengthening and stretching exercises, home exercise programs, injections, and finally surgical intervention.      At this time we will go ahead and repeat x-rays of the left wrist.  Order placed, patient was instructed to obtain these prior to leaving the facility.  If abnormal, we will place wrist splint.    We will also trial a Medrol Dosepak in the interim.  Rx sent, take as instructed.  Low suspicion for scaphoid  fracture.  Could be consistent with de Quervain's.  We will also get her into a thumb spica brace, she was instructed to wear an Ace wrap under this to prevent further allergic reaction. May remove as tolerated to work on wrist ROM.  Follow up 2-3 weeks if no improvement may consider MRI.    Follow up: 2-3 weeks  Xrays needed: none     All of the patient's questions were answered and the patient will contact us if they have any questions or concerns in the interim.

## 2023-08-07 ENCOUNTER — SPECIALTY PHARMACY (OUTPATIENT)
Dept: PHARMACY | Facility: CLINIC | Age: 63
End: 2023-08-07
Payer: MEDICARE

## 2023-08-07 NOTE — TELEPHONE ENCOUNTER
Outgoing call. Spoke to pt she states she has more than 10 days on hand but is not sure the exact amount. She missed two doses earlier in her 90 day fill so she has a few extra days. Asked if we could return call later, pt was not at home to verify how many on hand. Return call 8/9

## 2023-08-08 ENCOUNTER — OFFICE VISIT (OUTPATIENT)
Dept: DERMATOLOGY | Facility: CLINIC | Age: 63
End: 2023-08-08
Payer: MEDICARE

## 2023-08-08 DIAGNOSIS — L23.9 ALLERGIC CONTACT DERMATITIS, UNSPECIFIED TRIGGER: ICD-10-CM

## 2023-08-08 DIAGNOSIS — Z12.83 SCREENING EXAM FOR SKIN CANCER: ICD-10-CM

## 2023-08-08 DIAGNOSIS — L81.1 MELASMA: ICD-10-CM

## 2023-08-08 DIAGNOSIS — L30.9 ECZEMA, UNSPECIFIED TYPE: Primary | ICD-10-CM

## 2023-08-08 DIAGNOSIS — L82.1 SEBORRHEIC KERATOSES: ICD-10-CM

## 2023-08-08 PROCEDURE — 1160F PR REVIEW ALL MEDS BY PRESCRIBER/CLIN PHARMACIST DOCUMENTED: ICD-10-PCS | Mod: CPTII,S$GLB,, | Performed by: DERMATOLOGY

## 2023-08-08 PROCEDURE — 99214 PR OFFICE/OUTPT VISIT, EST, LEVL IV, 30-39 MIN: ICD-10-PCS | Mod: S$GLB,,, | Performed by: DERMATOLOGY

## 2023-08-08 PROCEDURE — 99999 PR PBB SHADOW E&M-EST. PATIENT-LVL III: CPT | Mod: PBBFAC,,, | Performed by: DERMATOLOGY

## 2023-08-08 PROCEDURE — 1159F PR MEDICATION LIST DOCUMENTED IN MEDICAL RECORD: ICD-10-PCS | Mod: CPTII,S$GLB,, | Performed by: DERMATOLOGY

## 2023-08-08 PROCEDURE — 1160F RVW MEDS BY RX/DR IN RCRD: CPT | Mod: CPTII,S$GLB,, | Performed by: DERMATOLOGY

## 2023-08-08 PROCEDURE — 1159F MED LIST DOCD IN RCRD: CPT | Mod: CPTII,S$GLB,, | Performed by: DERMATOLOGY

## 2023-08-08 PROCEDURE — 99214 OFFICE O/P EST MOD 30 MIN: CPT | Mod: S$GLB,,, | Performed by: DERMATOLOGY

## 2023-08-08 PROCEDURE — 3044F PR MOST RECENT HEMOGLOBIN A1C LEVEL <7.0%: ICD-10-PCS | Mod: CPTII,S$GLB,, | Performed by: DERMATOLOGY

## 2023-08-08 PROCEDURE — 3044F HG A1C LEVEL LT 7.0%: CPT | Mod: CPTII,S$GLB,, | Performed by: DERMATOLOGY

## 2023-08-08 PROCEDURE — 99999 PR PBB SHADOW E&M-EST. PATIENT-LVL III: ICD-10-PCS | Mod: PBBFAC,,, | Performed by: DERMATOLOGY

## 2023-08-08 RX ORDER — BETAMETHASONE DIPROPIONATE 0.5 MG/G
OINTMENT TOPICAL 2 TIMES DAILY
Qty: 45 G | Refills: 3 | Status: SHIPPED | OUTPATIENT
Start: 2023-08-08

## 2023-08-08 NOTE — PROGRESS NOTES
Subjective:      Patient ID:  Nadeen Mcgovern is a 62 y.o. female who presents for f/u hand eczema.    Hx liver transplant and systemic scleroderma, on Prograf and Cellcept. Also on sildenail.   Hx of hand dermatitis, continuing to use diprolene 0.05% ointment, which helps.    Eczema - Follow-up  Symptom course: improving  Affected locations: left hand, right hand, right wrist and left wrist  SIGNS AND SYMPTOMS F/U: not flared today.      Review of Systems   Constitutional:  Negative for fever.   Respiratory:  Negative for cough.    Skin:  Positive for dry skin. Negative for itching (not today).   Hematologic/Lymphatic: Bruises/bleeds easily.       Objective:   Physical Exam   Constitutional: She appears well-developed and well-nourished. No distress.   Neurological: She is alert and oriented to person, place, and time. She is not disoriented.   Psychiatric: She has a normal mood and affect.   Skin:   Areas Examined (abnormalities noted in diagram):   Head / Face Inspection Performed  Neck Inspection Performed  Chest / Axilla Inspection Performed  Abdomen Inspection Performed  Genitals / Buttocks / Groin Inspection Performed  Back Inspection Performed  RUE Inspected  LUE Inspection Performed  RLE Inspected  LLE Inspection Performed  Nails and Digits Inspection Performed            Diagram Legend     Erythematous scaling macule/papule c/w actinic keratosis       Vascular papule c/w angioma      Pigmented verrucoid papule/plaque c/w seborrheic keratosis      Yellow umbilicated papule c/w sebaceous hyperplasia      Irregularly shaped tan macule c/w lentigo     1-2 mm smooth white papules consistent with Milia      Movable subcutaneous cyst with punctum c/w epidermal inclusion cyst      Subcutaneous movable cyst c/w pilar cyst      Firm pink to brown papule c/w dermatofibroma      Pedunculated fleshy papule(s) c/w skin tag(s)      Evenly pigmented macule c/w junctional nevus     Mildly variegated pigmented, slightly  irregular-bordered macule c/w mildly atypical nevus      Flesh colored to evenly pigmented papule c/w intradermal nevus       Pink pearly papule/plaque c/w basal cell carcinoma      Erythematous hyperkeratotic cursted plaque c/w SCC      Surgical scar with no sign of skin cancer recurrence      Open and closed comedones      Inflammatory papules and pustules      Verrucoid papule consistent consistent with wart     Erythematous eczematous patches and plaques     Dystrophic onycholytic nail with subungual debris c/w onychomycosis     Umbilicated papule    Erythematous-base heme-crusted tan verrucoid plaque consistent with inflamed seborrheic keratosis     Erythematous Silvery Scaling Plaque c/w Psoriasis     See annotation      Assessment / Plan:        Eczema, unspecified type  Allergic contact dermatitis, unspecified trigger  -     betamethasone dipropionate (DIPROLENE) 0.05 % ointment; Apply topically 2 (two) times daily. To affected areas on hands  Dispense: 45 g; Refill: 3  Aquaphor, cotton gloves    Screening exam for skin cancer  Total body skin examination performed today including at least 12 points as noted in physical examination. No lesions suspicious for malignancy noted.    Recommend daily sun protection/avoidance, use of at least SPF 30, broad spectrum sunscreen (OTC drug), skin self examinations, and routine physician surveillance to optimize early detection    Patients that have undergone transplants have a significantly higher rate of skin cancer development over time due to immunosuppression.  Total body skin cancer screenings should be performed at least once a year.    We reviewed the importance of proper sun protection, including wearing a hat, sunblock of at least SPF 30, and sun protective clothing.  It is especially important to reapply sunblock for any prolonged sun exposure.    Seborrheic keratoses  These are benign inherited growths without a malignant potential. Reassurance given to  patient. No treatment is necessary.     Melasma  Sun protection  Olay regenerist           Follow up in about 1 year (around 8/8/2024) for for TBSE.     show

## 2023-08-09 NOTE — TELEPHONE ENCOUNTER
Outgoing call regarding cellcept, revatio, and prograf refills; pt reported 9 days of each med on hand; reported ER/UC visit; transferred to [facility] Khadra NIEVES

## 2023-08-09 NOTE — TELEPHONE ENCOUNTER
Specialty Pharmacy - Refill Coordination    Specialty Medication Orders Linked to Encounter      Flowsheet Row Most Recent Value   Medication #1 tacrolimus (PROGRAF) 1 MG Cap (Order#[id number], Rx#[id number])   Medication #2 mycophenolate (CELLCEPT) 500 mg Tab (Order#[id number], Rx#[id number])   Medication #3 sildenafil (REVATIO) 20 mg Tab (Order#[id number], Rx#[id number])            Refill Questions - Documented Responses      Flowsheet Row Most Recent Value   Patient Availability and HIPAA Verification    Does patient want to proceed with activity? Yes   HIPAA/medical authority confirmed? Yes   Relationship to patient of person spoken to? Self   Refill Screening Questions    Changes to allergies? No   Changes to medications? Yes  [Robaxin and Percocet.]   New conditions since last clinic visit? No   Unplanned office visit, urgent care, ED, or hospital admission in the last 4 weeks? Yes  [ER visit for wrist pain.]   How does patient/caregiver feel medication is working? Good   Financial problems or insurance changes? No   How many doses of your specialty medications were missed in the last 4 weeks? 2   Why were doses missed? Away from home   Would patient like to speak to a pharmacist? No   When does the patient need to receive the medication? 08/18/23   Refill Delivery Questions    How will the patient receive the medication? [facility]   When does the patient need to receive the medication? 08/18/23   Shipping Address Home   Address in [facility] confirmed and updated if neccessary? Yes   Expected Copay ($) 0   Is the patient able to afford the medication copay? Yes   Payment Method zero copay   Days supply of Refill 90   Supplies needed? No supplies needed   Refill activity completed? Yes   Refill activity plan Refill scheduled   Shipment/Pickup Date: 08/15/23            Current Outpatient Medications   Medication Sig    amoxicillin-clavulanate 875-125mg (AUGMENTIN) 875-125 mg per tablet Take 1 tablet by mouth  every 12 (twelve) hours. (Patient not taking: Reported on 8/8/2023)    aspirin 81 MG Chew Take 81 mg by mouth once daily.    betamethasone dipropionate (DIPROLENE) 0.05 % ointment Apply topically 2 (two) times daily. To affected areas on hands    diphenhydrAMINE (BENADRYL) 25 mg capsule Take 25 mg by mouth every 6 (six) hours as needed for Itching.    estradioL (ESTRACE) 0.01 % (0.1 mg/gram) vaginal cream Place 1 g vaginally once daily.    hydrOXYzine pamoate (VISTARIL) 50 MG Cap Take 1 capsule (50 mg total) by mouth every 8 (eight) hours as needed. (Patient not taking: Reported on 6/26/2023)    methylPREDNISolone (MEDROL DOSEPACK) 4 mg tablet use as directed    multivitamin (THERAGRAN) tablet Take 1 tablet by mouth once daily.    mycophenolate (CELLCEPT) 500 mg Tab Take 3 tablets (1,500 mg total) by mouth 2 (two) times daily.    ondansetron (ZOFRAN) 4 MG tablet Take 1 tablet (4 mg total) by mouth every 8 (eight) hours as needed for Nausea.    oxyCODONE-acetaminophen (PERCOCET) 7.5-325 mg per tablet Take 1 tablet by mouth every 6 (six) hours as needed for Pain. (Patient not taking: Reported on 8/8/2023)    sildenafil (REVATIO) 20 mg Tab TAKE 1 TABLET (20 MG) BY MOUTH TWO TIMES DAILY    tacrolimus (PROGRAF) 1 MG Cap Take 2 capsules (2 mg total) by mouth every morning AND 1 capsule (1 mg total) every evening.   Last reviewed on 8/8/2023  9:24 AM by Coby Schmidt MD    Review of patient's allergies indicates:  No Known Allergies Last reviewed on  8/8/2023 9:24 AM by Coby Schmidt      Tasks added this encounter   No tasks added.   Tasks due within next 3 months   8/9/2023 - Refill Coordination Outreach (1 time occurrence)  8/9/2023 - Refill Coordination Outreach (1 time occurrence)     Khadra Reyes, PharmD  [facility]  [address] [place] [id number]  Phone: [phone]  Fax: [phone]

## 2023-09-15 ENCOUNTER — HOSPITAL ENCOUNTER (OUTPATIENT)
Dept: PULMONOLOGY | Facility: CLINIC | Age: 63
Discharge: HOME | End: 2023-09-15
Payer: MEDICARE

## 2023-09-15 ENCOUNTER — HOSPITAL ENCOUNTER (OUTPATIENT)
Dept: CARDIOLOGY | Facility: HOSPITAL | Age: 63
Discharge: HOME | End: 2023-09-15
Attending: INTERNAL MEDICINE
Payer: MEDICARE

## 2023-09-15 VITALS
BODY MASS INDEX: 25.76 KG/M2 | BODY MASS INDEX: 27.48 KG/M2 | DIASTOLIC BLOOD PRESSURE: 70 MMHG | SYSTOLIC BLOOD PRESSURE: 150 MMHG | WEIGHT: 140 LBS | WEIGHT: 140 LBS | HEIGHT: 60 IN | HEART RATE: 55 BPM | HEIGHT: 62 IN

## 2023-09-15 DIAGNOSIS — M34.9 SCLERODERMA: ICD-10-CM

## 2023-09-15 LAB
ASCENDING AORTA: 3.3 CM
AV INDEX (PROSTH): 0.79
AV MEAN GRADIENT: 3 MMHG
AV PEAK GRADIENT: 7 MMHG
AV VALVE AREA BY VELOCITY RATIO: 3.03 CM²
AV VALVE AREA: 3.22 CM²
AV VELOCITY RATIO: 0.74
BSA FOR ECHO PROCEDURE: 1.64 M2
CV ECHO LV RWT: 0.37 CM
DOP CALC AO PEAK VEL: 1.28 M/S
DOP CALC AO VTI: 28.79 CM
DOP CALC LVOT AREA: 4.1 CM2
DOP CALC LVOT DIAMETER: 2.28 CM
DOP CALC LVOT PEAK VEL: 0.95 M/S
DOP CALC LVOT STROKE VOLUME: 92.71 CM3
DOP CALCLVOT PEAK VEL VTI: 22.72 CM
E WAVE DECELERATION TIME: 184.23 MSEC
E/A RATIO: 1.01
E/E' RATIO: 7 M/S
ECHO LV POSTERIOR WALL: 0.79 CM (ref 0.6–1.1)
EJECTION FRACTION: 58 %
FRACTIONAL SHORTENING: 34 % (ref 28–44)
INTERVENTRICULAR SEPTUM: 0.87 CM (ref 0.6–1.1)
IVRT: 102.76 MSEC
LA MAJOR: 4.13 CM
LA MINOR: 4.58 CM
LA WIDTH: 3.6 CM
LEFT ATRIUM SIZE: 3.06 CM
LEFT ATRIUM VOLUME INDEX MOD: 28 ML/M2
LEFT ATRIUM VOLUME INDEX: 25.4 ML/M2
LEFT ATRIUM VOLUME MOD: 44.84 CM3
LEFT ATRIUM VOLUME: 40.67 CM3
LEFT INTERNAL DIMENSION IN SYSTOLE: 2.77 CM (ref 2.1–4)
LEFT VENTRICLE DIASTOLIC VOLUME INDEX: 49.63 ML/M2
LEFT VENTRICLE DIASTOLIC VOLUME: 79.4 ML
LEFT VENTRICLE MASS INDEX: 67 G/M2
LEFT VENTRICLE SYSTOLIC VOLUME INDEX: 18 ML/M2
LEFT VENTRICLE SYSTOLIC VOLUME: 28.75 ML
LEFT VENTRICULAR INTERNAL DIMENSION IN DIASTOLE: 4.22 CM (ref 3.5–6)
LEFT VENTRICULAR MASS: 107.22 G
LV LATERAL E/E' RATIO: 7 M/S
LV SEPTAL E/E' RATIO: 7 M/S
MV A" WAVE DURATION": 19.03 MSEC
MV PEAK A VEL: 0.69 M/S
MV PEAK E VEL: 0.7 M/S
MV STENOSIS PRESSURE HALF TIME: 53.43 MS
MV VALVE AREA P 1/2 METHOD: 4.12 CM2
PISA TR MAX VEL: 2.42 M/S
PULM VEIN S/D RATIO: 1.38
PV PEAK D VEL: 0.39 M/S
PV PEAK S VEL: 0.54 M/S
RA MAJOR: 4.46 CM
RA PRESSURE ESTIMATED: 3 MMHG
RA WIDTH: 3.65 CM
RIGHT VENTRICULAR END-DIASTOLIC DIMENSION: 3.58 CM
RV TB RVSP: 5 MMHG
SINUS: 3.33 CM
STJ: 2.86 CM
TDI LATERAL: 0.1 M/S
TDI SEPTAL: 0.1 M/S
TDI: 0.1 M/S
TR MAX PG: 23 MMHG
TRICUSPID ANNULAR PLANE SYSTOLIC EXCURSION: 2.6 CM
TV REST PULMONARY ARTERY PRESSURE: 26 MMHG
Z-SCORE OF LEFT VENTRICULAR DIMENSION IN END DIASTOLE: -0.76
Z-SCORE OF LEFT VENTRICULAR DIMENSION IN END SYSTOLE: -0.15

## 2023-09-15 PROCEDURE — 94618 PULMONARY STRESS TESTING: ICD-10-PCS | Mod: S$GLB,,, | Performed by: INTERNAL MEDICINE

## 2023-09-15 PROCEDURE — 93306 TTE W/DOPPLER COMPLETE: CPT | Mod: 26,,, | Performed by: INTERNAL MEDICINE

## 2023-09-15 PROCEDURE — 94727 PR PULM FUNCTION TEST BY GAS: ICD-10-PCS | Mod: S$GLB,,, | Performed by: INTERNAL MEDICINE

## 2023-09-15 PROCEDURE — 93306 ECHO (CUPID ONLY): ICD-10-PCS | Mod: 26,,, | Performed by: INTERNAL MEDICINE

## 2023-09-15 PROCEDURE — 94618 PULMONARY STRESS TESTING: CPT | Mod: S$GLB,,, | Performed by: INTERNAL MEDICINE

## 2023-09-15 PROCEDURE — 94729 PR C02/MEMBANE DIFFUSE CAPACITY: ICD-10-PCS | Mod: S$GLB,,, | Performed by: INTERNAL MEDICINE

## 2023-09-15 PROCEDURE — 94727 GAS DIL/WSHOT DETER LNG VOL: CPT | Mod: S$GLB,,, | Performed by: INTERNAL MEDICINE

## 2023-09-15 PROCEDURE — 94729 DIFFUSING CAPACITY: CPT | Mod: S$GLB,,, | Performed by: INTERNAL MEDICINE

## 2023-09-15 PROCEDURE — 94010 BREATHING CAPACITY TEST: CPT | Mod: 59,S$GLB,, | Performed by: INTERNAL MEDICINE

## 2023-09-15 PROCEDURE — 93306 TTE W/DOPPLER COMPLETE: CPT

## 2023-09-15 PROCEDURE — 94010 BREATHING CAPACITY TEST: ICD-10-PCS | Mod: 59,S$GLB,, | Performed by: INTERNAL MEDICINE

## 2023-09-15 NOTE — PROCEDURES
Nadeen Mcgovern is a 62 y.o.  female patient, who presents for a 6 minute walk test ordered by MD Carrie.  The diagnosis is Scleroderma/CREST.  The patient's BMI is 25.6 kg/m2.  Predicted distance (lower limit of normal) is 356.8 meters.      Test Results:    The test was completed without stopping. The total time walked was 360 seconds. During walking, the patient reported:  Dyspnea. The patient used no assistive devices during testing.     09/15/2023---------Distance: 405.99 meters (1332 feet)     O2 Sat % Supplemental Oxygen Heart Rate Blood Pressure Adamaris Scale   Pre-exercise  (Resting) 98 % Room Air 56 bpm 125/58 mmHg 0   During Exercise 98 % Room Air 71 bpm 143/65 mmHg 4   Post-exercise  (Recovery) 98 % Room Air  62 bpm       Recovery Time: 43 seconds    Performing nurse/tech: Car MOSER      PREVIOUS STUDY:   07/21/2022---------Distance: 426.72 meters (1400 feet)       O2 Sat % Supplemental Oxygen Heart Rate Blood Pressure Adamaris Scale   Pre-exercise  (Resting) 98 % Room Air 56 bpm 129/72 mmHg 0   During Exercise 98 % Room Air 72 bpm 147/68 mmHg 3   Post-exercise  (Recovery) 99 % Room Air  58 bpm 124/60 mmHg         CLINICAL INTERPRETATION:  Six minute walk distance is 405.99 meters (1332 feet) with somewhat heavy dyspnea.  During exercise, there was no desaturation while breathing room air.  Both blood pressure and heart rate remained stable with walking.  Bradycardia was present prior to exercise.  The patient did not report non-pulmonary symptoms during exercise.  Since the previous study in July 2022, exercise capacity is unchanged.  Based upon age and body mass index, exercise capacity is normal.

## 2023-09-16 NOTE — PROGRESS NOTES
The breathing tests show normal lung volumes and moderately reduce DLco, all stable to improved. RJQ

## 2023-10-06 ENCOUNTER — PATIENT MESSAGE (OUTPATIENT)
Dept: INTERNAL MEDICINE | Facility: CLINIC | Age: 63
End: 2023-10-06
Payer: MEDICARE

## 2023-10-16 ENCOUNTER — TELEPHONE (OUTPATIENT)
Dept: TRANSPLANT | Facility: CLINIC | Age: 63
End: 2023-10-16
Payer: MEDICARE

## 2023-10-16 ENCOUNTER — LAB VISIT (OUTPATIENT)
Dept: LAB | Facility: HOSPITAL | Age: 63
End: 2023-10-16
Attending: INTERNAL MEDICINE
Payer: MEDICARE

## 2023-10-16 DIAGNOSIS — C22.0 COMBINED HEPATOCELLULAR AND CHOLANGIOCARCINOMA: Primary | ICD-10-CM

## 2023-10-16 DIAGNOSIS — C22.0 HCC (HEPATOCELLULAR CARCINOMA): ICD-10-CM

## 2023-10-16 DIAGNOSIS — Z85.09 HISTORY OF CHOLANGIOCARCINOMA: ICD-10-CM

## 2023-10-16 DIAGNOSIS — Z94.4 LIVER TRANSPLANTED: ICD-10-CM

## 2023-10-16 DIAGNOSIS — R97.0 ELEVATED CARCINOEMBRYONIC ANTIGEN (CEA): ICD-10-CM

## 2023-10-16 LAB
ALBUMIN SERPL BCP-MCNC: 4.1 G/DL (ref 3.5–5.2)
ALP SERPL-CCNC: 68 U/L (ref 55–135)
ALT SERPL W/O P-5'-P-CCNC: 15 U/L (ref 10–44)
ANION GAP SERPL CALC-SCNC: 11 MMOL/L (ref 8–16)
AST SERPL-CCNC: 16 U/L (ref 10–40)
BASOPHILS # BLD AUTO: 0.04 K/UL (ref 0–0.2)
BASOPHILS NFR BLD: 0.7 % (ref 0–1.9)
BILIRUB SERPL-MCNC: 0.6 MG/DL (ref 0.1–1)
BUN SERPL-MCNC: 12 MG/DL (ref 8–23)
CALCIUM SERPL-MCNC: 9.8 MG/DL (ref 8.7–10.5)
CHLORIDE SERPL-SCNC: 108 MMOL/L (ref 95–110)
CO2 SERPL-SCNC: 24 MMOL/L (ref 23–29)
CREAT SERPL-MCNC: 0.7 MG/DL (ref 0.5–1.4)
DIFFERENTIAL METHOD: NORMAL
EOSINOPHIL # BLD AUTO: 0.2 K/UL (ref 0–0.5)
EOSINOPHIL NFR BLD: 3.5 % (ref 0–8)
ERYTHROCYTE [DISTWIDTH] IN BLOOD BY AUTOMATED COUNT: 12.8 % (ref 11.5–14.5)
EST. GFR  (NO RACE VARIABLE): >60 ML/MIN/1.73 M^2
GLUCOSE SERPL-MCNC: 97 MG/DL (ref 70–110)
HCT VFR BLD AUTO: 42 % (ref 37–48.5)
HGB BLD-MCNC: 13.6 G/DL (ref 12–16)
IMM GRANULOCYTES # BLD AUTO: 0.01 K/UL (ref 0–0.04)
IMM GRANULOCYTES NFR BLD AUTO: 0.2 % (ref 0–0.5)
LYMPHOCYTES # BLD AUTO: 1.5 K/UL (ref 1–4.8)
LYMPHOCYTES NFR BLD: 25.3 % (ref 18–48)
MCH RBC QN AUTO: 30.7 PG (ref 27–31)
MCHC RBC AUTO-ENTMCNC: 32.4 G/DL (ref 32–36)
MCV RBC AUTO: 95 FL (ref 82–98)
MONOCYTES # BLD AUTO: 0.4 K/UL (ref 0.3–1)
MONOCYTES NFR BLD: 7.2 % (ref 4–15)
NEUTROPHILS # BLD AUTO: 3.8 K/UL (ref 1.8–7.7)
NEUTROPHILS NFR BLD: 63.1 % (ref 38–73)
NRBC BLD-RTO: 0 /100 WBC
PLATELET # BLD AUTO: 223 K/UL (ref 150–450)
PMV BLD AUTO: 10.6 FL (ref 9.2–12.9)
POTASSIUM SERPL-SCNC: 4.2 MMOL/L (ref 3.5–5.1)
PROT SERPL-MCNC: 7.4 G/DL (ref 6–8.4)
RBC # BLD AUTO: 4.43 M/UL (ref 4–5.4)
SODIUM SERPL-SCNC: 143 MMOL/L (ref 136–145)
TACROLIMUS BLD-MCNC: 3.2 NG/ML (ref 5–15)
WBC # BLD AUTO: 5.96 K/UL (ref 3.9–12.7)

## 2023-10-16 PROCEDURE — 85025 COMPLETE CBC W/AUTO DIFF WBC: CPT | Performed by: STUDENT IN AN ORGANIZED HEALTH CARE EDUCATION/TRAINING PROGRAM

## 2023-10-16 PROCEDURE — 36415 COLL VENOUS BLD VENIPUNCTURE: CPT | Performed by: STUDENT IN AN ORGANIZED HEALTH CARE EDUCATION/TRAINING PROGRAM

## 2023-10-16 PROCEDURE — 80053 COMPREHEN METABOLIC PANEL: CPT | Performed by: STUDENT IN AN ORGANIZED HEALTH CARE EDUCATION/TRAINING PROGRAM

## 2023-10-16 PROCEDURE — 80197 ASSAY OF TACROLIMUS: CPT | Performed by: STUDENT IN AN ORGANIZED HEALTH CARE EDUCATION/TRAINING PROGRAM

## 2023-10-16 NOTE — LETTER
October 16, 2023    Nadeen Mcgovern  [address] [place] [facility] [address]          Dear Nadeen Mcgovern:  MRN: [id number]    This is a follow up to your recent labs, your lab results were stable.  There are no medicine changes.  Please have your labs drawn again on 4/15/24.      If you cannot have your labs drawn on the scheduled date, it is your responsibility to call the transplant department to reschedule.  Please call [phone] and ask to speak to Rossy Pineda, Medical Assistant for all scheduling requests.     Sincerely,    Chetna Pickard, RN      Your Liver Transplant Coordinator    [facility] [place]  [facility] [place] [address]  [phone]

## 2023-10-16 NOTE — TELEPHONE ENCOUNTER
Continue routine labs no changes needed.  Letter sent for next lab appointment 4/15/24      ----- Message from Vitaly Martinez MD sent at 10/16/2023  2:34 PM CDT -----  Liver tests are stable. No changes in her immunosuppression. Please continue to monitor labs per transplant protocol.

## 2023-11-06 RX ORDER — MYCOPHENOLATE MOFETIL 500 MG/1
1500 TABLET ORAL 2 TIMES DAILY
Qty: 540 TABLET | Refills: 0 | Status: ACTIVE | OUTPATIENT
Start: 2023-11-06 | End: 2023-12-19 | Stop reason: SDUPTHER

## 2023-11-21 ENCOUNTER — HOSPITAL ENCOUNTER (OUTPATIENT)
Dept: RADIOLOGY | Facility: HOSPITAL | Age: 63
Discharge: HOME | End: 2023-11-21
Attending: STUDENT IN AN ORGANIZED HEALTH CARE EDUCATION/TRAINING PROGRAM
Payer: MEDICARE

## 2023-11-21 ENCOUNTER — OFFICE VISIT (OUTPATIENT)
Dept: TRANSPLANT | Facility: CLINIC | Age: 63
End: 2023-11-21
Payer: MEDICARE

## 2023-11-21 VITALS
WEIGHT: 135 LBS | HEIGHT: 62 IN | DIASTOLIC BLOOD PRESSURE: 58 MMHG | SYSTOLIC BLOOD PRESSURE: 124 MMHG | BODY MASS INDEX: 24.84 KG/M2 | HEART RATE: 61 BPM | OXYGEN SATURATION: 98 % | RESPIRATION RATE: 18 BRPM

## 2023-11-21 DIAGNOSIS — Z94.4 LIVER TRANSPLANTED: Primary | ICD-10-CM

## 2023-11-21 DIAGNOSIS — C22.0 COMBINED HEPATOCELLULAR AND CHOLANGIOCARCINOMA: ICD-10-CM

## 2023-11-21 DIAGNOSIS — Z79.60 LONG-TERM USE OF IMMUNOSUPPRESSANT MEDICATION: ICD-10-CM

## 2023-11-21 LAB
CREAT SERPL-MCNC: 0.8 MG/DL (ref 0.5–1.4)
SAMPLE: NORMAL

## 2023-11-21 PROCEDURE — 3078F PR MOST RECENT DIASTOLIC BLOOD PRESSURE < 80 MM HG: ICD-10-PCS | Mod: CPTII,S$GLB,, | Performed by: STUDENT IN AN ORGANIZED HEALTH CARE EDUCATION/TRAINING PROGRAM

## 2023-11-21 PROCEDURE — 3044F PR MOST RECENT HEMOGLOBIN A1C LEVEL <7.0%: ICD-10-PCS | Mod: CPTII,S$GLB,, | Performed by: STUDENT IN AN ORGANIZED HEALTH CARE EDUCATION/TRAINING PROGRAM

## 2023-11-21 PROCEDURE — 3008F BODY MASS INDEX DOCD: CPT | Mod: CPTII,S$GLB,, | Performed by: STUDENT IN AN ORGANIZED HEALTH CARE EDUCATION/TRAINING PROGRAM

## 2023-11-21 PROCEDURE — 71250 CT CHEST WITHOUT CONTRAST: ICD-10-PCS | Mod: 26,,, | Performed by: RADIOLOGY

## 2023-11-21 PROCEDURE — 1159F MED LIST DOCD IN RCRD: CPT | Mod: CPTII,S$GLB,, | Performed by: STUDENT IN AN ORGANIZED HEALTH CARE EDUCATION/TRAINING PROGRAM

## 2023-11-21 PROCEDURE — 3044F HG A1C LEVEL LT 7.0%: CPT | Mod: CPTII,S$GLB,, | Performed by: STUDENT IN AN ORGANIZED HEALTH CARE EDUCATION/TRAINING PROGRAM

## 2023-11-21 PROCEDURE — 74160 CT ABDOMEN W/CONTRAST: CPT | Mod: TC

## 2023-11-21 PROCEDURE — 25500020 PHARM REV CODE 255: Performed by: STUDENT IN AN ORGANIZED HEALTH CARE EDUCATION/TRAINING PROGRAM

## 2023-11-21 PROCEDURE — 71250 CT THORAX DX C-: CPT | Mod: 26,,, | Performed by: RADIOLOGY

## 2023-11-21 PROCEDURE — 3074F PR MOST RECENT SYSTOLIC BLOOD PRESSURE < 130 MM HG: ICD-10-PCS | Mod: CPTII,S$GLB,, | Performed by: STUDENT IN AN ORGANIZED HEALTH CARE EDUCATION/TRAINING PROGRAM

## 2023-11-21 PROCEDURE — 71250 CT THORAX DX C-: CPT | Mod: TC

## 2023-11-21 PROCEDURE — 99999 PR PBB SHADOW E&M-EST. PATIENT-LVL III: CPT | Mod: PBBFAC,,, | Performed by: STUDENT IN AN ORGANIZED HEALTH CARE EDUCATION/TRAINING PROGRAM

## 2023-11-21 PROCEDURE — 1160F PR REVIEW ALL MEDS BY PRESCRIBER/CLIN PHARMACIST DOCUMENTED: ICD-10-PCS | Mod: CPTII,S$GLB,, | Performed by: STUDENT IN AN ORGANIZED HEALTH CARE EDUCATION/TRAINING PROGRAM

## 2023-11-21 PROCEDURE — 99999 PR PBB SHADOW E&M-EST. PATIENT-LVL III: ICD-10-PCS | Mod: PBBFAC,,, | Performed by: STUDENT IN AN ORGANIZED HEALTH CARE EDUCATION/TRAINING PROGRAM

## 2023-11-21 PROCEDURE — 1160F RVW MEDS BY RX/DR IN RCRD: CPT | Mod: CPTII,S$GLB,, | Performed by: STUDENT IN AN ORGANIZED HEALTH CARE EDUCATION/TRAINING PROGRAM

## 2023-11-21 PROCEDURE — 74160 CT ABDOMEN W/CONTRAST: CPT | Mod: 26,,, | Performed by: RADIOLOGY

## 2023-11-21 PROCEDURE — 3078F DIAST BP <80 MM HG: CPT | Mod: CPTII,S$GLB,, | Performed by: STUDENT IN AN ORGANIZED HEALTH CARE EDUCATION/TRAINING PROGRAM

## 2023-11-21 PROCEDURE — 74160 CT ABDOMEN WITH IV CONTRAST: ICD-10-PCS | Mod: 26,,, | Performed by: RADIOLOGY

## 2023-11-21 PROCEDURE — 99214 OFFICE O/P EST MOD 30 MIN: CPT | Mod: S$GLB,,, | Performed by: STUDENT IN AN ORGANIZED HEALTH CARE EDUCATION/TRAINING PROGRAM

## 2023-11-21 PROCEDURE — 3074F SYST BP LT 130 MM HG: CPT | Mod: CPTII,S$GLB,, | Performed by: STUDENT IN AN ORGANIZED HEALTH CARE EDUCATION/TRAINING PROGRAM

## 2023-11-21 PROCEDURE — 99214 PR OFFICE/OUTPT VISIT, EST, LEVL IV, 30-39 MIN: ICD-10-PCS | Mod: S$GLB,,, | Performed by: STUDENT IN AN ORGANIZED HEALTH CARE EDUCATION/TRAINING PROGRAM

## 2023-11-21 PROCEDURE — 1159F PR MEDICATION LIST DOCUMENTED IN MEDICAL RECORD: ICD-10-PCS | Mod: CPTII,S$GLB,, | Performed by: STUDENT IN AN ORGANIZED HEALTH CARE EDUCATION/TRAINING PROGRAM

## 2023-11-21 PROCEDURE — 3008F PR BODY MASS INDEX (BMI) DOCUMENTED: ICD-10-PCS | Mod: CPTII,S$GLB,, | Performed by: STUDENT IN AN ORGANIZED HEALTH CARE EDUCATION/TRAINING PROGRAM

## 2023-11-21 RX ADMIN — IOHEXOL 100 ML: 350 INJECTION, SOLUTION INTRAVENOUS at 11:11

## 2023-11-21 NOTE — LETTER
November 21, 2023        Wendy Hernandez  [address] [facility] [address]  Phone: [phone]  Fax: [phone]             [facility]  [address]  [facility] [address]  Phone: [phone]   Patient: Nadeen Mcgovern   MR Number: [id number]   YOB: 1960   Date of Visit: 11/21/2023       Dear Dr. Wendy Hernandez    Thank you for referring Nadeen Mcgovern to me for evaluation. Attached you will find relevant portions of my assessment and plan of care.    If you have questions, please do not hesitate to call me. I look forward to following Nadeen Mcgovern along with you.    Sincerely,    Vitaly Martinez MD    Enclosure    If you would like to receive this communication electronically, please contact [email or web address] or [phone] to request Fwd: Power Link access.    Fwd: Power Link is a tool which provides read-only access to select patient information with whom you have a relationship. Its easy to use and provides real time access to review your patients record including encounter summaries, notes, results, and demographic information.    If you feel you have received this communication in error or would no longer like to receive these types of communications, please e-mail [email or web address]

## 2023-11-21 NOTE — PROGRESS NOTES
Transplant Hepatology  Liver Transplant Recipient Follow Up    PCP: Alvin Dennis MD    Transplant History  Transplant Date: 10/8/2015  UNOS Native Liver Dx: Bile Duct Cancer (Cholangioma, Biliary Tract Carcinoma)     Nadeen is here for follow up of her liver transplant.      ORGAN: LIVER  Whole or Partial: whole liver  Donor Type:  - brain death  CDC High Risk: yes  Donor CMV Status: Positive  Donor HCV Status: Negative  Donor HBcAb: Negative  Biliary Anastomosis: tobias-en-y  Arterial Anatomy: standard  IVC reconstruction: end to end ivc  Portal vein status: patent    Chief complaint: follow up, history of OLT    HPI:  Nadeen Mcgovern is a 63 y.o. female with history of OLT in 10/2015 for  cholangiocarcinoma  who presents for follow up.    She is without complaints today. Went to ED in July for left hand/wrist pain. No other recent hospitalizations or ED visits. She reports compliance with Prograf and CellCept. She denies recent fever, chills, nausea, vomiting, diarrhea, headache, tremors.    Past Medical History:   Diagnosis Date    Acute cholecystitis     Cholecystitis    Anemia     Arthritis     septic arthritis of right shoulder    Bacteremia due to Streptococcus pneumoniae     Cancer     Cataract     Cholangiocarcinoma     Fever blister 20    Hypertension     Jaundice     obstructive    Liver transplant status     Organ transplant     Prediabetes     Raynaud disease     Scleroderma        Past Surgical History:   Procedure Laterality Date    arthoscopic Right     drained infection     SECTION      ESOPHAGOGASTRODUODENOSCOPY N/A 2020    Procedure: EGD (ESOPHAGOGASTRODUODENOSCOPY);  Surgeon: Drew Mackay MD;  Location: [address]);  Service: Endoscopy;  Laterality: N/A;  [facility]    INCISION AND DRAINAGE OF WOUND      s/p I&D of R thumb    LIVER TRANSPLANT      SHOULDER ARTHROSCOPY         Family History   Problem Relation Age of Onset    Cancer  Father         Lung    Arthritis Father     Stroke Mother     Diabetes Mother     Hypertension Mother     Heart attack Mother     Cancer Paternal Grandmother         pancreatic cancer    Cancer Brother         Lung    Alcohol abuse Brother     Arthritis Sister     Psoriasis Sister     No Known Problems Daughter     No Known Problems Son     No Known Problems Son     No Known Problems Daughter     Colon cancer Neg Hx     Esophageal cancer Neg Hx        Social History     Socioeconomic History    Marital status: Single   Occupational History    Occupation: Food Tech     Comment: [employer]   Tobacco Use    Smoking status: Never    Smokeless tobacco: Never   Substance and Sexual Activity    Alcohol use: Not Currently     Alcohol/week: 2.0 standard drinks of alcohol     Types: 2 Cans of beer per week     Comment: To be social with family and friends.    Drug use: Yes     Frequency: 3.0 times per week     Types: Marijuana     Comment: Help with pain    Sexual activity: Yes     Partners: Male     Birth control/protection: Condom   Other Topics Concern    Are you pregnant or think you may be? No    Breast-feeding No     Social Determinants of Health     Financial Resource Strain: Low Risk  (8/8/2023)    Overall Financial Resource Strain (CARDIA)     Difficulty of Paying Living Expenses: Not hard at all   Recent Concern: Financial Resource Strain - Medium Risk (6/26/2023)    Overall Financial Resource Strain (CARDIA)     Difficulty of Paying Living Expenses: Somewhat hard   Food Insecurity: Food Insecurity Present (8/8/2023)    Hunger Vital Sign     Worried About Running Out of Food in the Last Year: Never true     Ran Out of Food in the Last Year: Sometimes true   Transportation Needs: No Transportation Needs (8/8/2023)    PRAPARE - Transportation     Lack of Transportation (Medical): No     Lack of Transportation (Non-Medical): No   Physical Activity: Insufficiently Active (8/8/2023)    Exercise  Vital Sign     Days of Exercise per Week: 4 days     Minutes of Exercise per Session: 20 min   Stress: Stress Concern Present (8/8/2023)    [employer] - Occupational Stress Questionnaire     Feeling of Stress : To some extent   Social Connections: Unknown (8/8/2023)    Social Connection and Isolation Panel [NHANES]     Frequency of Communication with Friends and Family: More than three times a week     Frequency of Social Gatherings with Friends and Family: More than three times a week     Active Member of Clubs or Organizations: Yes     Attends Club or Organization Meetings: 1 to 4 times per year     Marital Status:    Housing Stability: High Risk (8/8/2023)    Housing Stability Vital Sign     Unable to Pay for Housing in the Last Year: Yes     Number of Places Lived in the Last Year: 1     Unstable Housing in the Last Year: No       Current Outpatient Medications   Medication Sig Dispense Refill    amoxicillin-clavulanate 875-125mg (AUGMENTIN) 875-125 mg per tablet Take 1 tablet by mouth every 12 (twelve) hours. (Patient not taking: Reported on 8/8/2023) 14 tablet 0    aspirin 81 MG Chew Take 81 mg by mouth once daily.      betamethasone dipropionate (DIPROLENE) 0.05 % ointment Apply topically 2 (two) times daily. To affected areas on hands 45 g 3    diphenhydrAMINE (BENADRYL) 25 mg capsule Take 25 mg by mouth every 6 (six) hours as needed for Itching.      estradioL (ESTRACE) 0.01 % (0.1 mg/gram) vaginal cream Place 1 g vaginally once daily. 42.5 g 1    hydrOXYzine pamoate (VISTARIL) 50 MG Cap Take 1 capsule (50 mg total) by mouth every 8 (eight) hours as needed. (Patient not taking: Reported on 6/26/2023) 30 capsule 3    multivitamin (THERAGRAN) tablet Take 1 tablet by mouth once daily.      mycophenolate (CELLCEPT) 500 mg Tab Take 3 tablets (1,500 mg total) by mouth 2 (two) times daily. 540 tablet 0    ondansetron (ZOFRAN) 4 MG tablet Take 1 tablet (4 mg total) by mouth  "every 8 (eight) hours as needed for Nausea. 12 tablet 0    oxyCODONE-acetaminophen (PERCOCET) 7.5-325 mg per tablet Take 1 tablet by mouth every 6 (six) hours as needed for Pain. (Patient not taking: Reported on 8/8/2023) 16 tablet 0    sildenafil (REVATIO) 20 mg Tab TAKE 1 TABLET (20 MG) BY MOUTH TWO TIMES DAILY 180 tablet 3    tacrolimus (PROGRAF) 1 MG Cap Take 2 capsules (2 mg total) by mouth every morning AND 1 capsule (1 mg total) every evening. 90 capsule 11     No current facility-administered medications for this visit.       Review of patient's allergies indicates:  No Known Allergies    Review of Systems   Constitutional:  Negative for fever and weight loss.   Gastrointestinal:  Negative for abdominal pain, blood in stool, constipation, diarrhea, heartburn, melena, nausea and vomiting.   Neurological:  Negative for tremors and headaches.       Vitals:    11/21/23 0937   BP: (!) 124/58   Pulse: 61   Resp: 18   SpO2: 98%   Weight: 61.2 kg (135 lb)   Height: 5' 2" (1.575 m)       Physical Exam  Vitals reviewed.   Constitutional:       General: She is not in acute distress.  Eyes:      General: No scleral icterus.  Cardiovascular:      Rate and Rhythm: Normal rate and regular rhythm.   Pulmonary:      Effort: Pulmonary effort is normal. No respiratory distress.   Abdominal:      General: Bowel sounds are normal. There is no distension.      Palpations: Abdomen is soft.      Tenderness: There is no abdominal tenderness. There is no guarding or rebound.   Musculoskeletal:      Right lower leg: No edema.      Left lower leg: No edema.   Skin:     Coloration: Skin is not jaundiced.         LABS:  Lab Results   Component Value Date    WBC 5.96 10/16/2023    HGB 13.6 10/16/2023    HCT 42.0 10/16/2023    MCV 95 10/16/2023     10/16/2023       Lab Results   Component Value Date     10/16/2023    K 4.2 10/16/2023     10/16/2023    CO2 24 10/16/2023    BUN 12 10/16/2023    CREATININE 0.7 10/16/2023    " CALCIUM 9.8 10/16/2023    ANIONGAP 11 10/16/2023    ESTGFRAFRICA >60.0 07/21/2022    ESTGFRAFRICA >60.0 07/21/2022    EGFRNONAA >60.0 07/21/2022    EGFRNONAA >60.0 07/21/2022       Lab Results   Component Value Date    ALT 15 10/16/2023    AST 16 10/16/2023     (H) 10/13/2015    ALKPHOS 68 10/16/2023    BILITOT 0.6 10/16/2023       Lab Results   Component Value Date    TACROLIMUS 3.2 (L) 10/16/2023       Assessment:  63 y.o. 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    1. Liver transplanted    2. Long-term use of immunosuppressant medication        Recommendations:  1. Allograft Function  - Excellent graft function with normal LFTs     2. Immunosuppression   - Continue Prograf 2mg in AM and 1mg in PM  - Continue CellCept per rheumatology for scleroderma    3. History of cholangiocarcinoma  - Cross-sectional imaging today pending. Continue imaging surveillance per protocol.    4. Kidney function   - Creatinine 0.7  - Avoid NSAIDs as able and maintain adequate hydration     5.  Health Maintenance/Screening:  - Recommend age appropriate cancer screening.  - Skin cancer: Recommend use of sunscreen SPF 30 or higher, hat and sunglasses while outside, dermatologist visit annually or sooner if any concerning lesions.  - Osteoporosis: Recommend bone density testing every 3 years if previously normal or annually if previously abnormal. DEXA normal 04/2019.    Return to clinic in 12 months.    UNOS Patient Status  Functional Status: 100% - Normal, no complaints, no evidence of disease  Physical Capacity: No Limitations    I spent a total of 30 minutes on the day of the visit. This includes face to face time and non-face to face time preparing to see the patient (eg, review of tests), obtaining and/or reviewing separately obtained history, documenting clinical information in the electronic or other health record, independently interpreting results, and  communicating results to the patient/family/caregiver, or care coordination.    Vitaly Martinez MD  Staff Physician  Hepatology and Liver Transplant  [facility]  [facility]

## 2023-11-29 ENCOUNTER — TELEPHONE (OUTPATIENT)
Dept: TRANSPLANT | Facility: CLINIC | Age: 63
End: 2023-11-29
Payer: MEDICARE

## 2023-11-29 NOTE — TELEPHONE ENCOUNTER
----- Message from Vitaly Martinez MD sent at 11/22/2023 11:09 AM CST -----  Reviewed. No evidence of recurrent cancer.

## 2023-11-29 NOTE — LETTER
November 29, 2023    Nadeen Mcgovern  [address] [facility] [address]          Dear Nadeen Mcgovern,  MRN: [id number]    Dr Martinez reviewed your scans, they are stable and do not show any evidence of recurrent disease.  We will continue with yearly imaging.      If you have any questions or concerns, please don't hesitate to call.    Sincerely,      Chetna Pickard RN      [facility]  [facility] [place] [address]  [phone]

## 2023-11-30 ENCOUNTER — OFFICE VISIT (OUTPATIENT)
Dept: URGENT CARE | Facility: CLINIC | Age: 63
End: 2023-11-30
Payer: MEDICARE

## 2023-11-30 VITALS
RESPIRATION RATE: 17 BRPM | WEIGHT: 146.63 LBS | HEIGHT: 62 IN | SYSTOLIC BLOOD PRESSURE: 112 MMHG | HEART RATE: 62 BPM | TEMPERATURE: 98 F | DIASTOLIC BLOOD PRESSURE: 53 MMHG | OXYGEN SATURATION: 98 % | BODY MASS INDEX: 26.98 KG/M2

## 2023-11-30 DIAGNOSIS — R06.2 WHEEZING: ICD-10-CM

## 2023-11-30 DIAGNOSIS — R05.9 COUGH, UNSPECIFIED TYPE: ICD-10-CM

## 2023-11-30 DIAGNOSIS — J06.9 URI WITH COUGH AND CONGESTION: Primary | ICD-10-CM

## 2023-11-30 DIAGNOSIS — R09.89 CHEST CONGESTION: ICD-10-CM

## 2023-11-30 LAB
CTP QC/QA: YES
CTP QC/QA: YES
POC MOLECULAR INFLUENZA A AGN: NEGATIVE
POC MOLECULAR INFLUENZA B AGN: NEGATIVE
SARS-COV-2 AG RESP QL IA.RAPID: NEGATIVE

## 2023-11-30 PROCEDURE — 87811 SARS-COV-2 COVID19 W/OPTIC: CPT | Mod: QW,S$GLB,,

## 2023-11-30 PROCEDURE — 99203 OFFICE O/P NEW LOW 30 MIN: CPT | Mod: 25,S$GLB,,

## 2023-11-30 PROCEDURE — 99203 PR OFFICE/OUTPT VISIT, NEW, LEVL III, 30-44 MIN: ICD-10-PCS | Mod: 25,S$GLB,,

## 2023-11-30 PROCEDURE — 94640 AIRWAY INHALATION TREATMENT: CPT | Mod: S$GLB,,,

## 2023-11-30 PROCEDURE — 94640 PR INHAL RX, AIRWAY OBST/DX SPUTUM INDUCT: ICD-10-PCS | Mod: S$GLB,,,

## 2023-11-30 PROCEDURE — 87502 POCT INFLUENZA A/B MOLECULAR: ICD-10-PCS | Mod: QW,S$GLB,,

## 2023-11-30 PROCEDURE — 87502 INFLUENZA DNA AMP PROBE: CPT | Mod: QW,S$GLB,,

## 2023-11-30 PROCEDURE — 87811 SARS CORONAVIRUS 2 ANTIGEN POCT, MANUAL READ: ICD-10-PCS | Mod: QW,S$GLB,,

## 2023-11-30 RX ORDER — BENZONATATE 100 MG/1
100 CAPSULE ORAL 3 TIMES DAILY PRN
Qty: 30 CAPSULE | Refills: 0 | Status: SHIPPED | OUTPATIENT
Start: 2023-11-30 | End: 2023-12-08

## 2023-11-30 RX ORDER — ALBUTEROL SULFATE 90 UG/1
2 AEROSOL, METERED RESPIRATORY (INHALATION) EVERY 6 HOURS PRN
Qty: 18 G | Refills: 0 | Status: SHIPPED | OUTPATIENT
Start: 2023-11-30 | End: 2023-12-08

## 2023-11-30 RX ORDER — ALBUTEROL SULFATE 0.83 MG/ML
2.5 SOLUTION RESPIRATORY (INHALATION)
Status: COMPLETED | OUTPATIENT
Start: 2023-11-30 | End: 2023-11-30

## 2023-11-30 RX ADMIN — ALBUTEROL SULFATE 2.5 MG: 0.83 SOLUTION RESPIRATORY (INHALATION) at 11:11

## 2023-11-30 NOTE — PROGRESS NOTES
"Subjective:      Patient ID: Nadeen Mcgovern is a 63 y.o. female.    Vitals:  height is 5' 2" (1.575 m) and weight is 66.5 kg (146 lb 9.7 oz). Her oral temperature is 98.4 °F (36.9 °C). Her blood pressure is 112/53 (abnormal) and her pulse is 62. Her respiration is 17 and oxygen saturation is 98%.     Chief Complaint: Cough    Past Medical History:  No date: Acute cholecystitis      Comment:  Cholecystitis  12/14: Anemia  2015: Arthritis      Comment:  septic arthritis of right shoulder  No date: Bacteremia due to Streptococcus pneumoniae  No date: Cancer  No date: Cataract  No date: Cholangiocarcinoma  2/7/20: Fever blister  No date: Hypertension  No date: Jaundice      Comment:  obstructive  No date: Liver transplant status  No date: Organ transplant  No date: Prediabetes  No date: Raynaud disease  No date: Scleroderma      Pt states symptoms started with a cough four days ago. Pt states she is also having nasal congestion, chest congestion, wheezing, and SOB. Denies fevers or chest pain. Next PCP visit in 1 week.     Cough  This is a new problem. Episode onset: 4 days ago. The problem has been gradually worsening. The cough is Productive of sputum. Associated symptoms include nasal congestion, postnasal drip, rhinorrhea, shortness of breath and wheezing. Pertinent negatives include no chest pain, chills, ear congestion, ear pain, fever, headaches, heartburn, hemoptysis, myalgias, rash, sore throat, sweats or weight loss. The symptoms are aggravated by lying down. Risk factors for lung disease include smoking/tobacco exposure and animal exposure. Treatments tried: Thera flu. The treatment provided no relief. There is no history of asthma, bronchiectasis, bronchitis, COPD, emphysema, environmental allergies or pneumonia.       Constitution: Negative for chills, fatigue and fever.   HENT:  Positive for congestion, postnasal drip and sinus pressure. Negative for ear pain and sore throat.    Cardiovascular:  Negative for " chest pain.   Respiratory:  Positive for cough, shortness of breath and wheezing. Negative for bloody sputum.    Gastrointestinal:  Negative for abdominal pain, nausea, vomiting, diarrhea and heartburn.   Musculoskeletal:  Negative for muscle ache.   Skin:  Negative for rash.   Allergic/Immunologic: Negative for environmental allergies.   Neurological:  Negative for headaches.      Objective:     Physical Exam   Constitutional: She is oriented to person, place, and time. She appears well-developed. She is cooperative.  Non-toxic appearance. She does not appear ill. No distress.   HENT:   Head: Normocephalic and atraumatic.   Ears:   Right Ear: Hearing, tympanic membrane, external ear and ear canal normal.   Left Ear: Hearing, tympanic membrane, external ear and ear canal normal.   Nose: Rhinorrhea and congestion present. No mucosal edema or nasal deformity. No epistaxis. Right sinus exhibits no maxillary sinus tenderness and no frontal sinus tenderness. Left sinus exhibits no maxillary sinus tenderness and no frontal sinus tenderness.   Mouth/Throat: Uvula is midline, oropharynx is clear and moist and mucous membranes are normal. No trismus in the jaw. Normal dentition. No uvula swelling. No oropharyngeal exudate, posterior oropharyngeal edema or posterior oropharyngeal erythema.   Eyes: Conjunctivae and lids are normal. Pupils are equal, round, and reactive to light. No scleral icterus.   Neck: Trachea normal and phonation normal. Neck supple. No edema present. No erythema present. No neck rigidity present.   Cardiovascular: Normal rate, regular rhythm, normal heart sounds and normal pulses.   Pulmonary/Chest: Effort normal and breath sounds normal. No stridor. No respiratory distress. She has no decreased breath sounds. She has no wheezes. She has no rhonchi. She has no rales. She exhibits no tenderness.   Abdominal: Normal appearance.   Musculoskeletal: Normal range of motion.         General: No deformity.  Normal range of motion.   Neurological: She is alert and oriented to person, place, and time. She exhibits normal muscle tone. Coordination normal.   Skin: Skin is warm, dry, intact, not diaphoretic and not pale.   Psychiatric: Her speech is normal and behavior is normal. Judgment and thought content normal.   Nursing note and vitals reviewed.      Assessment:     1. URI with cough and congestion    2. Cough, unspecified type    3. Chest congestion    4. Wheezing        Plan:     Results for orders placed or performed in visit on 11/30/23   POCT Influenza A/B MOLECULAR   Result Value Ref Range    POC Molecular Influenza A Ag Negative Negative, Not Reported    POC Molecular Influenza B Ag Negative Negative, Not Reported     Acceptable Yes    SARS Coronavirus 2 Antigen, POCT Manual Read   Result Value Ref Range    SARS Coronavirus 2 Antigen Negative Negative     Acceptable Yes      *Note: Due to a large number of results and/or encounters for the requested time period, some results have not been displayed. A complete set of results can be found in Results Review.       URI with cough and congestion    Cough, unspecified type  -     POCT Influenza A/B MOLECULAR  -     SARS Coronavirus 2 Antigen, POCT Manual Read  -     benzonatate (TESSALON) 100 MG capsule; Take 1 capsule (100 mg total) by mouth 3 (three) times daily as needed for Cough.  Dispense: 30 capsule; Refill: 0    Chest congestion  -     albuterol nebulizer solution 2.5 mg    Wheezing  -     albuterol (PROVENTIL HFA) 90 mcg/actuation inhaler; Inhale 2 puffs into the lungs every 6 (six) hours as needed for Wheezing or Shortness of Breath. Rescue  Dispense: 18 g; Refill: 0            Discussed results/diagnosis/plan with patient in clinic. Strict precautions given to patient to monitor for worsening signs and symptoms. Advised to follow up with PCP or specialist.  Explained side effects of medications prescribed with patient and  informed him/her to discontinue use if he/she has any side effects and to inform UC or PCP if this occurs. All questions answered. Strict ED verses clinic return precautions stressed and given in depth. Advised if symptoms worsens of fail to improve he/she should go to the Emergency Room. Discharge and follow-up instructions given verbally/printed with the patient who expressed understanding and willingness to comply with my recommendations. Patient voiced understanding and in agreement with current treatment plan. Patient exits the exam room in no acute distress. Conversant and engaged during discharge discussion, verbalized understanding.

## 2023-11-30 NOTE — PATIENT INSTRUCTIONS
Negative for flu and COVID today. Symptoms are likely viral at this time.     Try tessalon perles as directed during the day for your cough. It will help numb the back of your throat so you do not have the urge to cough.      Use albuterol inhaler for chest tightness/shortness of breath/wheezing/coughing fits as directed. I included information in your discharge paperwork about this medication for you to review. Check glucose at home and eat a well balanced diet to avoid elevated sugar levels.     Try a decongestant and corticosteroid nasal spray like flonase for the next few days for sinus relief. Initial: 2 sprays in each nostril once daily for 1 week. Reduce to 1 spray in each nostril once per day. Stop taking if you develop a nose bleed. Nasal saline spray can be used together with flonase to help moisten nostrils. An antihistamine like zyrtec, claritin, allegra can also be helpful for sinus relief and will help dry nasal passages.     Regular (Guaifenesin) Mucinex 1200 mg twice per day for 10 days can help thin secretions for better clearance. Drink plenty of fluids with this.     Honey is a natural cough suppressant.     If you do have Hypertension or palpitations, it is safe to take Coricidin HBP (multi-symptom flu) for relief of sinus symptoms.  Try DASH diet to help lower BP and buy a blood pressure cuff for home monitoring. Check blood pressure at least 2 times per day and create a log. Avoid eating foods that are high in salt. Eat more foods with potassium, magnesium and calcium which will help dilate your vessels and decrease your BP.     Warm tea/warm liquids will help soothe the back of your throat. Warm water salt gurgles can also be helpful. A dry throat will cause pain. Make sure to stay hydrated. Water and pedilyte are the best to drink. Neti pot irrigation, humidifier in your room, avoiding fans, warm compresses to face, eating/drinking hot soups, hot shower before bedtime can help.     The  recommended daily fluid intake for women is 2.7 liters (five 16 oz bottles).      Alternate Tylenol and ibuprofen every 4 hours as needed for fever and body aches.  Please take NSAIDs with a full glass of water and food to avoid GI upset.      Please only use over the counter cough and cold medications for 3-5 days at a time to avoid rebound symptoms.     Getting plenty of rest can aid in a faster recovery of illnesses.     Please follow-up with your primary care provider or return to the clinic if not better/worsening symptoms in 1 week.     Report to the ER if you have chest pain, shortness of breath, palpitations.

## 2023-12-08 ENCOUNTER — OFFICE VISIT (OUTPATIENT)
Dept: INTERNAL MEDICINE | Facility: CLINIC | Age: 63
End: 2023-12-08
Payer: MEDICARE

## 2023-12-08 VITALS
DIASTOLIC BLOOD PRESSURE: 68 MMHG | HEART RATE: 70 BPM | BODY MASS INDEX: 26.45 KG/M2 | HEIGHT: 62 IN | OXYGEN SATURATION: 99 % | SYSTOLIC BLOOD PRESSURE: 128 MMHG | WEIGHT: 143.75 LBS

## 2023-12-08 DIAGNOSIS — K22.4 ESOPHAGEAL DYSMOTILITY: ICD-10-CM

## 2023-12-08 DIAGNOSIS — I73.00 RAYNAUD'S PHENOMENON WITHOUT GANGRENE: ICD-10-CM

## 2023-12-08 DIAGNOSIS — D84.9 IMMUNOSUPPRESSION: ICD-10-CM

## 2023-12-08 DIAGNOSIS — Z85.09 HISTORY OF BILE DUCT CANCER: ICD-10-CM

## 2023-12-08 DIAGNOSIS — Z09 FOLLOW-UP EXAM: Primary | ICD-10-CM

## 2023-12-08 DIAGNOSIS — I70.0 ATHEROSCLEROSIS OF AORTA: ICD-10-CM

## 2023-12-08 DIAGNOSIS — L29.9 ITCHING: ICD-10-CM

## 2023-12-08 DIAGNOSIS — Z94.4 S/P LIVER TRANSPLANT: ICD-10-CM

## 2023-12-08 DIAGNOSIS — M35.00 SICCA SYNDROME: ICD-10-CM

## 2023-12-08 DIAGNOSIS — M34.9 SCLERODERMA: ICD-10-CM

## 2023-12-08 DIAGNOSIS — R73.09 BLOOD GLUCOSE ABNORMAL: ICD-10-CM

## 2023-12-08 DIAGNOSIS — Z00.00 LABORATORY EXAMINATION ORDERED AS PART OF A ROUTINE GENERAL MEDICAL EXAMINATION: ICD-10-CM

## 2023-12-08 PROCEDURE — 3074F SYST BP LT 130 MM HG: CPT | Mod: CPTII,S$GLB,, | Performed by: INTERNAL MEDICINE

## 2023-12-08 PROCEDURE — 99999 PR PBB SHADOW E&M-EST. PATIENT-LVL III: CPT | Mod: PBBFAC,,, | Performed by: INTERNAL MEDICINE

## 2023-12-08 PROCEDURE — 3044F PR MOST RECENT HEMOGLOBIN A1C LEVEL <7.0%: ICD-10-PCS | Mod: CPTII,S$GLB,, | Performed by: INTERNAL MEDICINE

## 2023-12-08 PROCEDURE — 3078F DIAST BP <80 MM HG: CPT | Mod: CPTII,S$GLB,, | Performed by: INTERNAL MEDICINE

## 2023-12-08 PROCEDURE — 1160F RVW MEDS BY RX/DR IN RCRD: CPT | Mod: CPTII,S$GLB,, | Performed by: INTERNAL MEDICINE

## 2023-12-08 PROCEDURE — 3078F PR MOST RECENT DIASTOLIC BLOOD PRESSURE < 80 MM HG: ICD-10-PCS | Mod: CPTII,S$GLB,, | Performed by: INTERNAL MEDICINE

## 2023-12-08 PROCEDURE — 3074F PR MOST RECENT SYSTOLIC BLOOD PRESSURE < 130 MM HG: ICD-10-PCS | Mod: CPTII,S$GLB,, | Performed by: INTERNAL MEDICINE

## 2023-12-08 PROCEDURE — 99214 PR OFFICE/OUTPT VISIT, EST, LEVL IV, 30-39 MIN: ICD-10-PCS | Mod: S$GLB,,, | Performed by: INTERNAL MEDICINE

## 2023-12-08 PROCEDURE — 1159F MED LIST DOCD IN RCRD: CPT | Mod: CPTII,S$GLB,, | Performed by: INTERNAL MEDICINE

## 2023-12-08 PROCEDURE — 1160F PR REVIEW ALL MEDS BY PRESCRIBER/CLIN PHARMACIST DOCUMENTED: ICD-10-PCS | Mod: CPTII,S$GLB,, | Performed by: INTERNAL MEDICINE

## 2023-12-08 PROCEDURE — 99999 PR PBB SHADOW E&M-EST. PATIENT-LVL III: ICD-10-PCS | Mod: PBBFAC,,, | Performed by: INTERNAL MEDICINE

## 2023-12-08 PROCEDURE — 3008F BODY MASS INDEX DOCD: CPT | Mod: CPTII,S$GLB,, | Performed by: INTERNAL MEDICINE

## 2023-12-08 PROCEDURE — 99214 OFFICE O/P EST MOD 30 MIN: CPT | Mod: S$GLB,,, | Performed by: INTERNAL MEDICINE

## 2023-12-08 PROCEDURE — 3008F PR BODY MASS INDEX (BMI) DOCUMENTED: ICD-10-PCS | Mod: CPTII,S$GLB,, | Performed by: INTERNAL MEDICINE

## 2023-12-08 PROCEDURE — 1159F PR MEDICATION LIST DOCUMENTED IN MEDICAL RECORD: ICD-10-PCS | Mod: CPTII,S$GLB,, | Performed by: INTERNAL MEDICINE

## 2023-12-08 PROCEDURE — 3044F HG A1C LEVEL LT 7.0%: CPT | Mod: CPTII,S$GLB,, | Performed by: INTERNAL MEDICINE

## 2023-12-08 RX ORDER — DIPHENHYDRAMINE HCL 25 MG
25 CAPSULE ORAL
Status: COMPLETED | OUTPATIENT
Start: 2023-12-08 | End: 2023-12-08

## 2023-12-08 RX ORDER — ROSUVASTATIN CALCIUM 10 MG/1
10 TABLET, COATED ORAL DAILY
Qty: 90 TABLET | Refills: 3 | Status: SHIPPED | OUTPATIENT
Start: 2023-12-08 | End: 2024-12-07

## 2023-12-08 RX ADMIN — Medication 25 MG: at 09:12

## 2023-12-08 NOTE — PATIENT INSTRUCTIONS
Schedule optometry appointment    Start rosuvastatin 10 mg daily to lower cholesterol and risk of heart disease.     Will recheck your labwork in 6 months    Return to clinic in 6 months

## 2023-12-08 NOTE — PROGRESS NOTES
Subjective:       Patient ID: Nadeen Mcgovern is a 63 y.o. female.    Chief Complaint: Follow-up      HPI  Nadeen Mcgovern is a 63 y.o. year old female with history of cholangiocarcinoma s/p liver transplant on immunosuppression, scleroderma presents for follow up. Doing well.     Bumped head in shower prior to appointment because she watned to smell good for appointment. Couldn't find the witch's tim, so went to the kitchen and put a sack of hotdog buns on her forehead.     Review of Systems   Constitutional:  Negative for activity change, appetite change, fatigue, fever and unexpected weight change.   HENT:  Negative for congestion, hearing loss, postnasal drip, sneezing, sore throat, trouble swallowing and voice change.    Eyes:  Negative for pain and discharge.   Respiratory:  Negative for cough, choking, chest tightness, shortness of breath and wheezing.    Cardiovascular:  Negative for chest pain, palpitations and leg swelling.   Gastrointestinal:  Negative for abdominal distention, abdominal pain, blood in stool, constipation, diarrhea, nausea and vomiting.   Endocrine: Negative for polydipsia and polyuria.   Genitourinary:  Negative for difficulty urinating and flank pain.   Musculoskeletal:  Negative for arthralgias, back pain, joint swelling, myalgias and neck pain.   Skin:  Negative for rash.   Neurological:  Negative for dizziness, tremors, seizures, weakness, numbness and headaches.   Psychiatric/Behavioral:  Negative for agitation. The patient is not nervous/anxious.          Past Medical History:   Diagnosis Date    Acute cholecystitis     Cholecystitis    Anemia 12/14    Arthritis 2015    septic arthritis of right shoulder    Bacteremia due to Streptococcus pneumoniae     Cancer     Cataract     Cholangiocarcinoma     Fever blister 2/7/20    Hypertension     Jaundice     obstructive    Liver transplant status     Organ transplant     Prediabetes     Raynaud disease     Scleroderma         Prior to  Admission medications    Medication Sig Start Date End Date Taking? Authorizing Provider   aspirin 81 MG Chew Take 81 mg by mouth once daily.   Yes Provider, Historical   betamethasone dipropionate (DIPROLENE) 0.05 % ointment Apply topically 2 (two) times daily. To affected areas on hands 8/8/23  Yes Coby Schmidt MD   diphenhydrAMINE (BENADRYL) 25 mg capsule Take 25 mg by mouth every 6 (six) hours as needed for Itching.   Yes Provider, Historical   multivitamin (THERAGRAN) tablet Take 1 tablet by mouth once daily. 10/12/15  Yes Marysol Zavaleta MD   mycophenolate (CELLCEPT) 500 mg Tab Take 3 tablets (1,500 mg total) by mouth 2 (two) times daily. 11/6/23  Yes Haresh Butler MD   ondansetron (ZOFRAN) 4 MG tablet Take 1 tablet (4 mg total) by mouth every 8 (eight) hours as needed for Nausea. 3/15/23  Yes Angelina Saez MD   sildenafil (REVATIO) 20 mg Tab TAKE 1 TABLET (20 MG) BY MOUTH TWO TIMES DAILY 6/26/23  Yes Haresh Butler MD   tacrolimus (PROGRAF) 1 MG Cap Take 2 capsules (2 mg total) by mouth every morning AND 1 capsule (1 mg total) every evening. 5/11/23  Yes Vitaly Martinez MD   albuterol (PROVENTIL HFA) 90 mcg/actuation inhaler Inhale 2 puffs into the lungs every 6 (six) hours as needed for Wheezing or Shortness of Breath. Rescue 11/30/23 12/8/23 Yes Kelsi Pineda NP   benzonatate (TESSALON) 100 MG capsule Take 1 capsule (100 mg total) by mouth 3 (three) times daily as needed for Cough. 11/30/23 12/8/23 Yes Kelsi Pineda NP   hydrOXYzine pamoate (VISTARIL) 50 MG Cap Take 1 capsule (50 mg total) by mouth every 8 (eight) hours as needed. 7/20/20 12/8/23 Yes Nadeen Figueroa MD   estradioL (ESTRACE) 0.01 % (0.1 mg/gram) vaginal cream Place 1 g vaginally once daily. 8/20/21 8/20/22  Karlie Matute,    amoxicillin-clavulanate 875-125mg (AUGMENTIN) 875-125 mg per tablet Take 1 tablet by mouth every 12 (twelve) hours.  Patient not taking: Reported on 8/8/2023 3/27/23 12/8/23  Alvin Dennis,  "MD   oxyCODONE-acetaminophen (PERCOCET) 7.5-325 mg per tablet Take 1 tablet by mouth every 6 (six) hours as needed for Pain.  Patient not taking: Reported on 8/8/2023 7/28/23 12/8/23  Corrine Tamayo NP        Past medical history, surgical history, and family medical history reviewed and updated as appropriate.    Medications and allergies reviewed.     Objective:          Vitals:    12/08/23 0903   BP: 128/68   BP Location: Right arm   Patient Position: Sitting   Pulse: 70   SpO2: 99%   Weight: 65.2 kg (143 lb 11.8 oz)   Height: 5' 2" (1.575 m)     Body mass index is 26.29 kg/m².  Physical Exam  Constitutional:       Appearance: She is well-developed.   HENT:      Head: Normocephalic and atraumatic.   Eyes:      Extraocular Movements: Extraocular movements intact.   Cardiovascular:      Rate and Rhythm: Normal rate and regular rhythm.      Heart sounds: Normal heart sounds.   Pulmonary:      Effort: Pulmonary effort is normal. No respiratory distress.      Breath sounds: Normal breath sounds. No wheezing.   Abdominal:      General: Bowel sounds are normal. There is no distension.      Palpations: Abdomen is soft.      Tenderness: There is no abdominal tenderness.   Musculoskeletal:         General: No tenderness. Normal range of motion.      Cervical back: Normal range of motion.   Skin:     General: Skin is warm and dry.      Comments: Small hematoma on forehead with no bleeding.   Neurological:      Mental Status: She is alert and oriented to person, place, and time.      Cranial Nerves: No cranial nerve deficit.      Deep Tendon Reflexes: Reflexes are normal and symmetric.         Lab Results   Component Value Date    WBC 5.96 10/16/2023    HGB 13.6 10/16/2023    HCT 42.0 10/16/2023     10/16/2023    CHOL 177 06/19/2023    TRIG 91 06/19/2023    HDL 41 06/19/2023    ALT 15 10/16/2023    AST 16 10/16/2023     10/16/2023    K 4.2 10/16/2023     10/16/2023    CREATININE 0.7 10/16/2023    BUN 12 " 10/16/2023    CO2 24 10/16/2023    TSH 1.239 06/19/2023    INR 1.0 03/15/2023    HGBA1C 5.1 06/19/2023       Assessment:       1. Follow-up exam    2. Raynaud's phenomenon without gangrene    3. History of bile duct cancer    4. S/P liver transplant    5. Atherosclerosis of aorta    6. Immunosuppression    7. Itching    8. Sicca syndrome    9. Scleroderma    10. Esophageal dysmotility    11. Blood glucose abnormal    12. Laboratory examination ordered as part of a routine general medical examination          Plan:     Nadeen was seen today for follow-up.    Diagnoses and all orders for this visit:    Follow-up exam    Raynaud's phenomenon without gangrene    History of bile duct cancer    S/P liver transplant    Atherosclerosis of aorta  Comments:  starting rosuvastatin 10  Orders:  -     rosuvastatin (CRESTOR) 10 MG tablet; Take 1 tablet (10 mg total) by mouth once daily.  -     CBC Auto Differential; Future  -     Comprehensive Metabolic Panel; Future  -     Lipid Panel; Future    Immunosuppression  Comments:  on mycophenolate, tacrolimus    Itching  -     diphenhydrAMINE capsule 25 mg    Sicca syndrome    Scleroderma  -     CBC Auto Differential; Future  -     TSH; Future    Esophageal dysmotility    Blood glucose abnormal  -     Hemoglobin A1C; Future    Laboratory examination ordered as part of a routine general medical examination  -     CBC Auto Differential; Future  -     Comprehensive Metabolic Panel; Future  -     TSH; Future  -     Hemoglobin A1C; Future  -     Lipid Panel; Future      Benign physical examination, no issues identified. Will obtain routine labwork and age appropriate health screenings.     Health maintenance reviewed with patient.     Follow up in about 6 months (around 6/8/2024).    Alvin Dennis MD  Internal Medicine / Primary Care  [facility]  12/8/2023

## 2023-12-13 ENCOUNTER — IMMUNIZATION (OUTPATIENT)
Dept: INTERNAL MEDICINE | Facility: CLINIC | Age: 63
End: 2023-12-13
Payer: MEDICARE

## 2023-12-13 DIAGNOSIS — Z23 NEED FOR VACCINATION: Primary | ICD-10-CM

## 2023-12-13 PROCEDURE — 91322 SARSCOV2 VAC 50 MCG/0.5ML IM: CPT | Mod: S$GLB,,, | Performed by: INTERNAL MEDICINE

## 2023-12-13 PROCEDURE — 91322 COVID-19 VAC, MRNA 2023 (MODERNA)(PF) 50 MCG/0.5 ML IM SUSR (12+): ICD-10-PCS | Mod: S$GLB,,, | Performed by: INTERNAL MEDICINE

## 2023-12-13 PROCEDURE — 90480 ADMN SARSCOV2 VAC 1/ONLY CMP: CPT | Mod: S$GLB,,, | Performed by: INTERNAL MEDICINE

## 2023-12-13 PROCEDURE — 90480 COVID-19 VAC, MRNA 2023 (MODERNA)(PF) 50 MCG/0.5 ML IM SUSR (12+): ICD-10-PCS | Mod: S$GLB,,, | Performed by: INTERNAL MEDICINE

## 2023-12-15 ENCOUNTER — LAB VISIT (OUTPATIENT)
Dept: LAB | Facility: HOSPITAL | Age: 63
End: 2023-12-15
Attending: INTERNAL MEDICINE
Payer: MEDICARE

## 2023-12-15 DIAGNOSIS — M34.9 SCLERODERMA: ICD-10-CM

## 2023-12-15 LAB
ALBUMIN SERPL BCP-MCNC: 4 G/DL (ref 3.5–5.2)
ALP SERPL-CCNC: 62 U/L (ref 55–135)
ALT SERPL W/O P-5'-P-CCNC: 13 U/L (ref 10–44)
ANION GAP SERPL CALC-SCNC: 8 MMOL/L (ref 8–16)
AST SERPL-CCNC: 16 U/L (ref 10–40)
BASOPHILS # BLD AUTO: 0.03 K/UL (ref 0–0.2)
BASOPHILS NFR BLD: 0.4 % (ref 0–1.9)
BILIRUB SERPL-MCNC: 1 MG/DL (ref 0.1–1)
BUN SERPL-MCNC: 14 MG/DL (ref 8–23)
CALCIUM SERPL-MCNC: 9.2 MG/DL (ref 8.7–10.5)
CHLORIDE SERPL-SCNC: 109 MMOL/L (ref 95–110)
CO2 SERPL-SCNC: 24 MMOL/L (ref 23–29)
CREAT SERPL-MCNC: 0.7 MG/DL (ref 0.5–1.4)
CRP SERPL-MCNC: 6.4 MG/L (ref 0–8.2)
DIFFERENTIAL METHOD: NORMAL
EOSINOPHIL # BLD AUTO: 0.2 K/UL (ref 0–0.5)
EOSINOPHIL NFR BLD: 2.3 % (ref 0–8)
ERYTHROCYTE [DISTWIDTH] IN BLOOD BY AUTOMATED COUNT: 13 % (ref 11.5–14.5)
ERYTHROCYTE [SEDIMENTATION RATE] IN BLOOD BY PHOTOMETRIC METHOD: 25 MM/HR (ref 0–36)
EST. GFR  (NO RACE VARIABLE): >60 ML/MIN/1.73 M^2
GLUCOSE SERPL-MCNC: 94 MG/DL (ref 70–110)
HCT VFR BLD AUTO: 38.7 % (ref 37–48.5)
HGB BLD-MCNC: 13.2 G/DL (ref 12–16)
IMM GRANULOCYTES # BLD AUTO: 0.01 K/UL (ref 0–0.04)
IMM GRANULOCYTES NFR BLD AUTO: 0.1 % (ref 0–0.5)
LYMPHOCYTES # BLD AUTO: 1.6 K/UL (ref 1–4.8)
LYMPHOCYTES NFR BLD: 19.9 % (ref 18–48)
MCH RBC QN AUTO: 30.7 PG (ref 27–31)
MCHC RBC AUTO-ENTMCNC: 34.1 G/DL (ref 32–36)
MCV RBC AUTO: 90 FL (ref 82–98)
MONOCYTES # BLD AUTO: 0.7 K/UL (ref 0.3–1)
MONOCYTES NFR BLD: 8.5 % (ref 4–15)
NEUTROPHILS # BLD AUTO: 5.4 K/UL (ref 1.8–7.7)
NEUTROPHILS NFR BLD: 68.8 % (ref 38–73)
NRBC BLD-RTO: 0 /100 WBC
PLATELET # BLD AUTO: 205 K/UL (ref 150–450)
PMV BLD AUTO: 10.9 FL (ref 9.2–12.9)
POTASSIUM SERPL-SCNC: 4 MMOL/L (ref 3.5–5.1)
PROT SERPL-MCNC: 7.2 G/DL (ref 6–8.4)
RBC # BLD AUTO: 4.3 M/UL (ref 4–5.4)
SODIUM SERPL-SCNC: 141 MMOL/L (ref 136–145)
WBC # BLD AUTO: 7.9 K/UL (ref 3.9–12.7)

## 2023-12-15 PROCEDURE — 80053 COMPREHEN METABOLIC PANEL: CPT | Performed by: INTERNAL MEDICINE

## 2023-12-15 PROCEDURE — 85652 RBC SED RATE AUTOMATED: CPT | Performed by: INTERNAL MEDICINE

## 2023-12-15 PROCEDURE — 36415 COLL VENOUS BLD VENIPUNCTURE: CPT | Performed by: INTERNAL MEDICINE

## 2023-12-15 PROCEDURE — 86140 C-REACTIVE PROTEIN: CPT | Performed by: INTERNAL MEDICINE

## 2023-12-15 PROCEDURE — 85025 COMPLETE CBC W/AUTO DIFF WBC: CPT | Performed by: INTERNAL MEDICINE

## 2023-12-19 ENCOUNTER — OFFICE VISIT (OUTPATIENT)
Dept: RHEUMATOLOGY | Facility: CLINIC | Age: 63
End: 2023-12-19
Payer: MEDICARE

## 2023-12-19 VITALS
BODY MASS INDEX: 26.65 KG/M2 | WEIGHT: 144.81 LBS | HEIGHT: 62 IN | HEART RATE: 67 BPM | SYSTOLIC BLOOD PRESSURE: 125 MMHG | DIASTOLIC BLOOD PRESSURE: 69 MMHG

## 2023-12-19 DIAGNOSIS — M34.9 SCLERODERMA: ICD-10-CM

## 2023-12-19 PROCEDURE — 3074F SYST BP LT 130 MM HG: CPT | Mod: CPTII,S$GLB,, | Performed by: INTERNAL MEDICINE

## 2023-12-19 PROCEDURE — 3044F HG A1C LEVEL LT 7.0%: CPT | Mod: CPTII,S$GLB,, | Performed by: INTERNAL MEDICINE

## 2023-12-19 PROCEDURE — 99214 PR OFFICE/OUTPT VISIT, EST, LEVL IV, 30-39 MIN: ICD-10-PCS | Mod: S$GLB,,, | Performed by: INTERNAL MEDICINE

## 2023-12-19 PROCEDURE — 99214 OFFICE O/P EST MOD 30 MIN: CPT | Mod: S$GLB,,, | Performed by: INTERNAL MEDICINE

## 2023-12-19 PROCEDURE — 99999 PR PBB SHADOW E&M-EST. PATIENT-LVL III: ICD-10-PCS | Mod: PBBFAC,,, | Performed by: INTERNAL MEDICINE

## 2023-12-19 PROCEDURE — 99999 PR PBB SHADOW E&M-EST. PATIENT-LVL III: CPT | Mod: PBBFAC,,, | Performed by: INTERNAL MEDICINE

## 2023-12-19 PROCEDURE — 3078F PR MOST RECENT DIASTOLIC BLOOD PRESSURE < 80 MM HG: ICD-10-PCS | Mod: CPTII,S$GLB,, | Performed by: INTERNAL MEDICINE

## 2023-12-19 PROCEDURE — 3008F PR BODY MASS INDEX (BMI) DOCUMENTED: ICD-10-PCS | Mod: CPTII,S$GLB,, | Performed by: INTERNAL MEDICINE

## 2023-12-19 PROCEDURE — 3008F BODY MASS INDEX DOCD: CPT | Mod: CPTII,S$GLB,, | Performed by: INTERNAL MEDICINE

## 2023-12-19 PROCEDURE — 3074F PR MOST RECENT SYSTOLIC BLOOD PRESSURE < 130 MM HG: ICD-10-PCS | Mod: CPTII,S$GLB,, | Performed by: INTERNAL MEDICINE

## 2023-12-19 PROCEDURE — 3044F PR MOST RECENT HEMOGLOBIN A1C LEVEL <7.0%: ICD-10-PCS | Mod: CPTII,S$GLB,, | Performed by: INTERNAL MEDICINE

## 2023-12-19 PROCEDURE — 3078F DIAST BP <80 MM HG: CPT | Mod: CPTII,S$GLB,, | Performed by: INTERNAL MEDICINE

## 2023-12-19 RX ORDER — SILDENAFIL CITRATE 20 MG/1
TABLET ORAL
Qty: 180 TABLET | Refills: 3 | Status: SHIPPED | OUTPATIENT
Start: 2023-12-19 | End: 2023-12-19 | Stop reason: SDUPTHER

## 2023-12-19 RX ORDER — MYCOPHENOLATE MOFETIL 500 MG/1
1500 TABLET ORAL 2 TIMES DAILY
Qty: 540 TABLET | Refills: 0 | Status: ACTIVE | OUTPATIENT
Start: 2023-12-19

## 2023-12-19 RX ORDER — SILDENAFIL CITRATE 20 MG/1
TABLET ORAL
Qty: 180 TABLET | Refills: 3 | Status: ACTIVE | OUTPATIENT
Start: 2023-12-19

## 2023-12-19 NOTE — PROGRESS NOTES
Patient ID:  Nadeen Mcgovern    YOB: 1960     MRN:  [id number]     Subjective:     Chief Complaint:  Disease Management     History of Present Illness:  Patient is a 63 y.o. female with Ssc, Raynaud's, mild RLD who presents today for follow up. LCV was 6/2023. At that time the patient was doing well with no major complaints. She had recently fallen on her right knee and endorsed pain over this location.    Today: Today, the patient states that she is doing well overall. She denies any chest pain, shortness of breath, or skin changes. Her right knee has since healed from her fall. In the interim, she had PFT's which were mildly improved from her last study and a TTE which was normal with mild aortic regurgitation.  She is eating a diet similar to the mediterranean diet and will start exercising indoors now that it is getting cold outside.     Denies hair loss, dry eyes, vision changes, dry mouth, oral/nasal ulcers, trouble swallowing, new rashes, joint swelling, morning stiffness, or GI disturbances.      Review of Systems   Review of Systems   Constitutional:  Negative for fever and unexpected weight change.   HENT:  Positive for trouble swallowing. Negative for mouth sores.    Eyes:  Positive for redness.   Respiratory:  Negative for cough and shortness of breath.    Cardiovascular:  Negative for chest pain.   Gastrointestinal:  Positive for constipation. Negative for diarrhea.   Genitourinary:  Negative for dysuria and genital sores.   Skin:  Positive for rash.   Neurological:  Negative for headaches.   Hematological:  Bruises/bleeds easily.        Current Medications:    Current Outpatient Medications:     aspirin 81 MG Chew, Take 81 mg by mouth once daily., Disp: , Rfl:     betamethasone dipropionate (DIPROLENE) 0.05 % ointment, Apply topically 2 (two) times daily. To affected areas on hands, Disp: 45 g, Rfl: 3    diphenhydrAMINE (BENADRYL) 25 mg capsule, Take 25 mg by mouth every 6 (six) hours as  needed for Itching., Disp: , Rfl:     estradioL (ESTRACE) 0.01 % (0.1 mg/gram) vaginal cream, Place 1 g vaginally once daily., Disp: 42.5 g, Rfl: 1    multivitamin (THERAGRAN) tablet, Take 1 tablet by mouth once daily., Disp: , Rfl:     mycophenolate (CELLCEPT) 500 mg Tab, Take 3 tablets (1,500 mg total) by mouth 2 (two) times daily., Disp: 540 tablet, Rfl: 0    ondansetron (ZOFRAN) 4 MG tablet, Take 1 tablet (4 mg total) by mouth every 8 (eight) hours as needed for Nausea., Disp: 12 tablet, Rfl: 0    rosuvastatin (CRESTOR) 10 MG tablet, Take 1 tablet (10 mg total) by mouth once daily., Disp: 90 tablet, Rfl: 3    sildenafil (REVATIO) 20 mg Tab, TAKE 1 TABLET (20 MG) BY MOUTH TWO TIMES DAILY, Disp: 180 tablet, Rfl: 3    tacrolimus (PROGRAF) 1 MG Cap, Take 2 capsules (2 mg total) by mouth every morning AND 1 capsule (1 mg total) every evening., Disp: 90 capsule, Rfl: 11     Objective:     Vitals:    12/19/23 0909   BP: 125/69   Pulse: 67      Body mass index is 26.49 kg/m².     Physical Exam   Constitutional: She is oriented to person, place, and time. normal appearance. No distress.   HENT:   Head: Normocephalic and atraumatic.   Right Ear: External ear normal.   Left Ear: External ear normal.   Eyes: Right eye exhibits no discharge. Left eye exhibits no discharge. No scleral icterus.   Cardiovascular: Normal rate, regular rhythm and normal heart sounds. Exam reveals no gallop and no friction rub.   No murmur heard.  Pulmonary/Chest: Effort normal and breath sounds normal. No stridor. No respiratory distress. She has no wheezes. She has no rales.   Abdominal: Soft. She exhibits no distension. There is no abdominal tenderness.   Musculoskeletal:         General: No swelling or tenderness. Normal range of motion.      Cervical back: Normal range of motion. No tenderness.   Neurological: She is alert and oriented to person, place, and time. She displays no weakness and normal reflexes.   Reflex Scores:       Patellar  reflexes are 2+ on the right side and 2+ on the left side.  Skin: Skin is warm and dry. No rash noted.   Skin score: 2 (bilateral fingers: 1)   Psychiatric: Mood normal.       Right Side Rheumatological Exam     Muscle Strength (0-5 scale):  Deltoid:  5  Biceps: 5/5   Triceps:  5  Iliopsoas: 5  Quadriceps:  5     Left Side Rheumatological Exam     Muscle Strength (0-5 scale):  Deltoid:  5  Biceps: 5/5   Triceps:  5  Iliopsoas: 5  Quadriceps:  5              12/19/2023   Tender (IBRAHIM-28) 0 / 28    Swollen (IBRAHIM-28) 0 / 28    Provider Global --   Patient Global 35 mm   ESR 25 mm/hr   CRP 6.4 mg/L   IBRAHIM-28 (ESR) 2.74 (Low disease activity)   IBRAHIM-28 (CRP) 2.17 (Remission)   CDAI Score --              Assessment:     lcSSc EUGENE+ RUBY- RNA-Pol3 - Th/To- mRSS 2(mild) stable  Mild Restrictive lung disease stable-improved except for DLco, CT chest shows no ILD  Raynaud's stable/improved  PAH  nl ePASP on TTE 8/8/22  Hyperlipidemia ASCVD 7.6%, intermediate risk   Allergic contact dermatitis, Hand eczema, atopic dernatitis. Saw Dr. Green in Dermatology 4/13/22  Dysphagia, pills and solids only saw Denita Espinoza in Gastro 2/16/22 nl esophagram 2/16/22.Swallowing improved. Can take mycophenolate tabs no longer liquid  Right lateral epicondylitis nearly resolved  S/p liver transplant for cholangiocarcinoma  Low tacrolimus trough she thinks she missed dose around that time  Post traumatic right knee pain with baseline OA improved    Plan:      Cont stationary bike and pool exercise, indoor exercises on youtube during cold weather  F/u Dr. Schmidt  contact dermatitis,  atopic dermatitis, hand eczema as scheduled  Mycophenolate 1500mg twice daily  Tacrolimus 2mg in am and 1mg in pm  Sildenafil 20mg twice daily  Standing labs q 3 months.   Cont working on Mediterranean diet can tolerate canned salmon  RTC 6 months     Checo Mcgowan M.D.  PGY-1  [employer] [facility]

## 2023-12-19 NOTE — PROGRESS NOTES
12/17/2023     9:37 AM   Rapid3 Question Responses and Scores   MDHAQ Score 0.9   Psychologic Score 3.3   Pain Score 4   When you awakened in the morning OVER THE LAST WEEK, did you feel stiff? No   Fatigue Score 6.5   Global Health Score 3.5   RAPID3 Score 3.5     Answers submitted by the patient for this visit:  Rheumatology Questionnaire (Submitted on 12/17/2023)  fever: No  eye redness: Yes  mouth sores: No  headaches: No  shortness of breath: No  chest pain: No  trouble swallowing: Yes  diarrhea: No  constipation: Yes  unexpected weight change: No  genital sore: No  dysuria: No  During the last 3 days, have you had a skin rash?: Yes  Bruises or bleeds easily: Yes  cough: No

## 2023-12-19 NOTE — PROGRESS NOTES
I have personally taken the history and examined the patient and agree with the resident,s note as stated above         Latest Reference Range & Units 12/15/23 11:03   WBC 3.90 - 12.70 K/uL 7.90   RBC 4.00 - 5.40 M/uL 4.30   Hemoglobin 12.0 - 16.0 g/dL 13.2   Hematocrit 37.0 - 48.5 % 38.7   MCV 82 - 98 fL 90   MCH 27.0 - 31.0 pg 30.7   MCHC 32.0 - 36.0 g/dL 34.1   RDW 11.5 - 14.5 % 13.0   Platelet Count 150 - 450 K/uL 205   MPV 9.2 - 12.9 fL 10.9   Gran % 38.0 - 73.0 % 68.8   Lymph % 18.0 - 48.0 % 19.9   Mono % 4.0 - 15.0 % 8.5   Eosinophil % 0.0 - 8.0 % 2.3   Basophil % 0.0 - 1.9 % 0.4   Immature Granulocytes 0.0 - 0.5 % 0.1   Gran # (ANC) 1.8 - 7.7 K/uL 5.4   Lymph # 1.0 - 4.8 K/uL 1.6   Mono # 0.3 - 1.0 K/uL 0.7   Eos # 0.0 - 0.5 K/uL 0.2   Baso # 0.00 - 0.20 K/uL 0.03   Immature Grans (Abs) 0.00 - 0.04 K/uL 0.01   nRBC 0 /100 WBC 0   Differential Method  Automated   Sed Rate 0 - 36 mm/Hr 25   Sodium 136 - 145 mmol/L 141   Potassium 3.5 - 5.1 mmol/L 4.0   Chloride 95 - 110 mmol/L 109   CO2 23 - 29 mmol/L 24   Anion Gap 8 - 16 mmol/L 8   BUN 8 - 23 mg/dL 14   Creatinine 0.5 - 1.4 mg/dL 0.7   eGFR >60 mL/min/1.73 m^2 >60.0   Glucose 70 - 110 mg/dL 94   Calcium 8.7 - 10.5 mg/dL 9.2   ALP 55 - 135 U/L 62   PROTEIN TOTAL 6.0 - 8.4 g/dL 7.2   Albumin 3.5 - 5.2 g/dL 4.0   BILIRUBIN TOTAL 0.1 - 1.0 mg/dL 1.0   AST 10 - 40 U/L 16   ALT 10 - 44 U/L 13   CRP 0.0 - 8.2 mg/L 6.4           PFTs          FVC     SVC    RV   DLco         TLC  9/15/23     135.5                 79.6        61.3     103.9   7/21/22    131.5                 64.6       58.8     96.4  6/25/21     140.4                 53.8       73.9      98.2  7/10/20     132                    26(?)        79      83.6  3/26/19     117    119          121         101  3/20/18     120    121           62            90  9/25/17     122     122          30            89  4/7/17       123     124          49           91  8/18/16     107     107         110           88      The 10-year ASCVD risk score (Jessica CARR, et al., 2019) is: 6.3%    Values used to calculate the score:      Age: 63 years      Sex: Female      Is Non- : Yes      Diabetic: No      Tobacco smoker: No      Systolic Blood Pressure: 125 mmHg      Is BP treated: No      HDL Cholesterol: 41 mg/dL      Total Cholesterol: 177 mg/dL        TTE 9/15/23:      Left Ventricle: The left ventricle is normal in size. Normal wall thickness. Normal wall motion. There is normal systolic function with a visually estimated ejection fraction of 55 - 60%. Ejection fraction by visual approximation is 58%. There is normal diastolic function.    Right Ventricle: Normal right ventricular cavity size. Wall thickness is normal. Right ventricle wall motion  is normal. Systolic function is normal.    Aortic Valve: The aortic valve is a trileaflet valve. There is mild aortic valve sclerosis. There is mild aortic regurgitation.    Pulmonary Artery: No pulmonary hypertension. The estimated pulmonary artery systolic pressure is 26 mmHg.    IVC/SVC: Normal venous pressure at 3 mmHg.    EXAMINATION:  CT CHEST WITHOUT CONTRAST     CLINICAL HISTORY:  Cholangio surveillance; Personal history of malignant neoplasm of other digestive organs     TECHNIQUE:  Low dose axial images, sagittal and coronal reformations were obtained from the thoracic inlet to the lung bases. Contrast was not administered.     COMPARISON:  06/17/2021     FINDINGS:  Heart and mediastinal vessels: Heart normal in size.  There is small amount of atherosclerosis.     Aminta/Mediastinum: No pathologic jensen enlargement.     Lungs/Pleura: Calcified granuloma left lung base.  Lungs otherwise clear.     Upper Abdomen: Postoperative changes liver transplant are noted with pneumobilia visualized in the left hepatic lobe segments.  Probable postinflammatory calcifications seen within the spleen.     Bones: Unremarkable.     Impression:     Status post liver  transplant for cholangiocarcinoma.  No convincing evidence of metastatic disease within the chest     Evidence of previous granulomatous disease and atherosclerosis        Electronically signed by: Jhoan Dave   Date:                                            08/18/2022  Time:                                           09:02        lcSSc EUGENE+ RUBY- RNA-Pol3 - Th/To- mRSS 2(mild) stable  Mild Restrictive lung disease stable-improved except for DLco, CT chest shows no ILD  Raynaud's stable/improved  PAH  nl ePASP on TTE 9/15/23  Hyperlipidemia ASCVD 6.3%, borderline risk  Allergic contact dermatitis, Hand eczema, atopic dernatitis  . Dysphagia, pills and solids only saw Denita Espinoza in Gastro 2/16/22 nl esophagram 2/16/22.Swallowing improved  . S/p liver transplant for cholangiocarcinoma              *RSV vaccine  Agree wit rosuvastatin 10mg nightly  Quad strengthening exercises as demonstrated and as per handout  Cont stationary bike and pool exercise  [employer] Pilates, Rajeev Chi, yoga  F/u Dr. Schmidt  contact dermatitis,  atopic dermatitis, hand eczema improved.  Mycophenolate 1500mg twice daily refilled   Tacrolimus 2mg in am and 1mg in pm Transplant  Sildenafil 20mg twice daily refilled  Standing labs q 3 months.   Cont working on Mediterranean diet can tolerate canned salmon  RTC 6 months

## 2024-01-03 ENCOUNTER — TELEPHONE (OUTPATIENT)
Dept: INTERNAL MEDICINE | Facility: CLINIC | Age: 64
End: 2024-01-03
Payer: MEDICARE

## 2024-01-03 NOTE — TELEPHONE ENCOUNTER
"Called pt; pt did not answer    Left message informing pt   "The vaccine you were inquiring about does not require an appointment. You can walk in and have it done"      "

## 2024-01-03 NOTE — TELEPHONE ENCOUNTER
----- Message from Tona Denton sent at 1/3/2024 10:10 AM CST -----  Regarding: Schedule Appt  Contact: Patient  Patient is requesting to a call back to schedule an appt to come in for RSV vaccine   Patient stated it was discussed during last office visit   Please Assist     Patient can be reached at [phone]

## 2024-01-05 NOTE — TELEPHONE ENCOUNTER
Dr. Benítez reviewed your labs.  Continue routine labs no changes needed.  Letter sent for next lab appointment 01/02/18     c/w sodium bicarb

## 2024-03-21 ENCOUNTER — NURSE TRIAGE (OUTPATIENT)
Dept: ADMINISTRATIVE | Facility: CLINIC | Age: 64
End: 2024-03-21
Payer: MEDICARE

## 2024-03-21 ENCOUNTER — TELEPHONE (OUTPATIENT)
Dept: TRANSPLANT | Facility: CLINIC | Age: 64
End: 2024-03-21
Payer: MEDICARE

## 2024-03-21 NOTE — TELEPHONE ENCOUNTER
Called patient in regards to her calling the on-call nurse.  She reports not feeling well, with a slight fever.  She thought they would get her a virtual visit.  I advised she go to urgent care so someone could listen to her chest, nose and ears for a better assessment.  She agree with me.

## 2024-03-21 NOTE — TELEPHONE ENCOUNTER
Patient c/o a headache 6/10 and sore throat 7/10. Also has a poor appetite and nausea. Advised per protocol to be seen in the office today. Patient plans to conduct a virtual visit or go to the nearest .  Advised the patient to call back with any further questions or if symptoms worsen.      Reason for Disposition   Pus on tonsils (back of throat) and swollen neck lymph nodes ('glands')   [1] MODERATE headache (e.g., interferes with normal activities) AND [2] present > 24 hours AND [3] unexplained  (Exceptions: analgesics not tried, typical migraine, or headache part of viral illness)    Additional Information   Negative: SEVERE difficulty breathing (e.g., struggling for each breath, speaks in single words)   Negative: Sounds like a life-threatening emergency to the triager   Negative: Drooling or spitting out saliva (because can't swallow)   Negative: Unable to open mouth completely   Negative: Drinking very little and has signs of dehydration (e.g., no urine > 12 hours, very dry mouth, very lightheaded)   Negative: Patient sounds very sick or weak to the triager   Negative: Difficulty breathing (per caller) but not severe   Negative: Fever > 103 F (39.4 C)   Negative: Refuses to drink anything for > 12 hours   Negative: SEVERE sore throat pain   Negative: Difficult to awaken or acting confused (e.g., disoriented, slurred speech)   Negative: [1] Weakness of the face, arm or leg on one side of the body AND [2] new-onset   Negative: [1] Numbness of the face, arm or leg on one side of the body AND [2] new-onset   Negative: [1] Loss of speech or garbled speech AND [2] new-onset   Negative: Passed out (i.e., lost consciousness, collapsed and was not responding)   Negative: Sounds like a life-threatening emergency to the triager   Negative: Unable to walk, or can only walk with assistance (e.g., requires support)   Negative: Stiff neck (can't touch chin to chest)   Negative: Severe pain in one eye   Negative: [1] Other  "family members (or people in same household) with headaches AND [2] possibility of carbon monoxide exposure   Negative: [1] SEVERE headache (e.g., excruciating) AND [2] "worst headache" of life   Negative: [1] SEVERE headache AND [2] sudden-onset (i.e., reaching maximum intensity within seconds to 1 hour)   Negative: [1] SEVERE headache AND [2] fever   Negative: Loss of vision or double vision  (Exception: Same as prior migraines.)   Negative: [1] Fever > 100.0 F (37.8 C) AND [2] diabetes mellitus or weak immune system (e.g., HIV positive, cancer chemo, splenectomy, organ transplant, chronic steroids)   Negative: Patient sounds very sick or weak to the triager   Negative: [1] SEVERE headache (e.g., excruciating) AND [2] not improved after 2 hours of pain medicine   Negative: [1] Vomiting AND [2] 2 or more times  (Exception: Similar to previous migraines.)   Negative: Fever > 104 F (40 C)    Protocols used: Sore Throat-A-OH, Headache-A-AH    "

## 2024-03-22 ENCOUNTER — TELEPHONE (OUTPATIENT)
Dept: INTERNAL MEDICINE | Facility: CLINIC | Age: 64
End: 2024-03-22
Payer: MEDICARE

## 2024-03-22 NOTE — TELEPHONE ENCOUNTER
----- Message from Kasie Engle sent at 3/21/2024  4:20 PM CDT -----  Type:  Sooner Appointment Request    Caller is requesting a sooner appointment.  Caller declined first available appointment listed below.  Caller will not accept being placed on the waitlist and is requesting a message be sent to doctor.  Name of Caller:pt  When is the first available appointment?n/a  Symptoms:Sore throat and headache  Would the patient rather a call back or a response via [facility]? call  Best Call Back Number: [phone]  Additional Information:  Preferred Date Range: 3/23/2024 - 3/25/2024

## 2024-04-01 NOTE — PROGRESS NOTES
"     OT DAILY NOTE     Patient: Nadeen Mcgovern  Date of Surgery:   Referring Physician: Haresh Butler MD  Diagnosis:   1. Trigger thumb of left hand     2. Tendinitis of left hand     3. Tear of left rotator cuff, unspecified tear extent       MRN: [id number]    Start Time: 8:00 am  End Time: 9:00 am  Total Time: 60    Subjective: Pt reported 3/10 pain  In L RF hand at rest, 0/10 L upper trap at rest. Pt stated "I can turn my head now!"  Visit #5 of 12    Treatment Min.   TE: FDP blocking exercises, FDP tendon gliding (no full fist) 3x15 each. 8 min reciprocal pulleys to increase shoulder AROM 15   US: Patient receives ultrasound  for pain control and decreased inflammation @ 50% duty cycle, 3.3 Mhz, applied to Lhand MCP, intensity = .8 w/cm2 for 8 minutes. 8   Paraffin w/ MHP to L hand for 15 min, pre-tx to decrease pain & increase tissue extensibility.  MH concurrently to L shoulder (NC) 15   MT: Manual lymphatic drainage to hand and RFand retrograde massage around FDP tendon at MCP pulley of RF. Kinesiotape to inhibit B upper trap and functional correction to limit RF MP flexion 15     Charges this day: 4 TA    Assessment: Patient reported resolution of pain after tx   Pt. has good potential for further improvement w/ continued OT services. Recommend to continue OT per poc & progress, as tolerated     Plan:  Recommend to continue OT 2x/week for 6 weeks & progress as tolerated towards established goals during the certification period of 4/20/17 to 5/31/17                "
cross cradle

## 2024-04-02 ENCOUNTER — TELEPHONE (OUTPATIENT)
Dept: INTERNAL MEDICINE | Facility: CLINIC | Age: 64
End: 2024-04-02
Payer: MEDICARE

## 2024-04-02 DIAGNOSIS — Z12.31 BREAST CANCER SCREENING BY MAMMOGRAM: Primary | ICD-10-CM

## 2024-04-02 NOTE — TELEPHONE ENCOUNTER
----- Message from Nitish Hernandez sent at 4/2/2024  2:53 PM CDT -----  Type:  Mammogram    Caller is requesting to schedule their annual mammogram appointment.  Order is not listed in EPIC.  Please enter order and contact patient to schedule.  Name of Caller:pt   Where would they like the mammogram performed?  Would the patient rather a call back or a response via [facility]? Call   Best Call Back Number:[phone]  Additional Information: none

## 2024-04-12 ENCOUNTER — OFFICE VISIT (OUTPATIENT)
Dept: OPTOMETRY | Facility: CLINIC | Age: 64
End: 2024-04-12
Payer: COMMERCIAL

## 2024-04-12 DIAGNOSIS — H25.13 NUCLEAR SCLEROSIS, BILATERAL: ICD-10-CM

## 2024-04-12 DIAGNOSIS — H52.4 PRESBYOPIA: Primary | ICD-10-CM

## 2024-04-12 DIAGNOSIS — Z13.5 GLAUCOMA SCREENING: ICD-10-CM

## 2024-04-12 PROCEDURE — 99999 PR PBB SHADOW E&M-EST. PATIENT-LVL III: CPT | Mod: PBBFAC,,, | Performed by: OPTOMETRIST

## 2024-04-12 PROCEDURE — 92015 DETERMINE REFRACTIVE STATE: CPT | Mod: S$GLB,,, | Performed by: OPTOMETRIST

## 2024-04-12 PROCEDURE — 92014 COMPRE OPH EXAM EST PT 1/>: CPT | Mod: S$GLB,,, | Performed by: OPTOMETRIST

## 2024-04-12 RX ORDER — SOD SULF/POT CHLORIDE/MAG SULF 1.479 G
TABLET ORAL
COMMUNITY
Start: 2024-04-08

## 2024-04-12 NOTE — PROGRESS NOTES
HPI    RAMOS: 1/4/2023, Dr. Hankins    Pt presents today routine eye exam. Pt reports no vision complaints. Pt   denies eye pain, floaters and flashes. Use Visine gtts.   Last edited by Beba Samson MA on 4/12/2024 10:09 AM.            Assessment /Plan     For exam results, see Encounter Report.    Presbyopia    Nuclear sclerosis, bilateral    Glaucoma screening        1. Small cats OU--pt happy w Rx.        PLAN:    rtc 1 yr

## 2024-04-15 ENCOUNTER — LAB VISIT (OUTPATIENT)
Dept: LAB | Facility: HOSPITAL | Age: 64
End: 2024-04-15
Attending: STUDENT IN AN ORGANIZED HEALTH CARE EDUCATION/TRAINING PROGRAM
Payer: MEDICARE

## 2024-04-15 DIAGNOSIS — Z85.09 HISTORY OF CHOLANGIOCARCINOMA: ICD-10-CM

## 2024-04-15 DIAGNOSIS — C22.0 HCC (HEPATOCELLULAR CARCINOMA): ICD-10-CM

## 2024-04-15 DIAGNOSIS — Z94.4 LIVER TRANSPLANTED: ICD-10-CM

## 2024-04-15 DIAGNOSIS — M34.9 SCLERODERMA: ICD-10-CM

## 2024-04-15 LAB
AFP SERPL-MCNC: <2 NG/ML (ref 0–8.4)
ALBUMIN SERPL BCP-MCNC: 4.2 G/DL (ref 3.5–5.2)
ALP SERPL-CCNC: 74 U/L (ref 55–135)
ALT SERPL W/O P-5'-P-CCNC: 14 U/L (ref 10–44)
ANION GAP SERPL CALC-SCNC: 11 MMOL/L (ref 8–16)
AST SERPL-CCNC: 14 U/L (ref 10–40)
BASOPHILS # BLD AUTO: 0.05 K/UL (ref 0–0.2)
BASOPHILS NFR BLD: 0.9 % (ref 0–1.9)
BILIRUB SERPL-MCNC: 0.9 MG/DL (ref 0.1–1)
BUN SERPL-MCNC: 9 MG/DL (ref 8–23)
CALCIUM SERPL-MCNC: 10.1 MG/DL (ref 8.7–10.5)
CANCER AG19-9 SERPL-ACNC: 3.8 U/ML (ref 0–40)
CHLORIDE SERPL-SCNC: 104 MMOL/L (ref 95–110)
CO2 SERPL-SCNC: 25 MMOL/L (ref 23–29)
CREAT SERPL-MCNC: 0.7 MG/DL (ref 0.5–1.4)
CRP SERPL-MCNC: 4 MG/L (ref 0–8.2)
DIFFERENTIAL METHOD BLD: ABNORMAL
EOSINOPHIL # BLD AUTO: 0.2 K/UL (ref 0–0.5)
EOSINOPHIL NFR BLD: 3.4 % (ref 0–8)
ERYTHROCYTE [DISTWIDTH] IN BLOOD BY AUTOMATED COUNT: 13 % (ref 11.5–14.5)
ERYTHROCYTE [SEDIMENTATION RATE] IN BLOOD BY PHOTOMETRIC METHOD: 69 MM/HR (ref 0–36)
EST. GFR  (NO RACE VARIABLE): >60 ML/MIN/1.73 M^2
GLUCOSE SERPL-MCNC: 101 MG/DL (ref 70–110)
HCT VFR BLD AUTO: 45.6 % (ref 37–48.5)
HGB BLD-MCNC: 14.3 G/DL (ref 12–16)
IMM GRANULOCYTES # BLD AUTO: 0.02 K/UL (ref 0–0.04)
IMM GRANULOCYTES NFR BLD AUTO: 0.3 % (ref 0–0.5)
LYMPHOCYTES # BLD AUTO: 2.1 K/UL (ref 1–4.8)
LYMPHOCYTES NFR BLD: 35.5 % (ref 18–48)
MCH RBC QN AUTO: 30.3 PG (ref 27–31)
MCHC RBC AUTO-ENTMCNC: 31.4 G/DL (ref 32–36)
MCV RBC AUTO: 97 FL (ref 82–98)
MONOCYTES # BLD AUTO: 0.5 K/UL (ref 0.3–1)
MONOCYTES NFR BLD: 8.1 % (ref 4–15)
NEUTROPHILS # BLD AUTO: 3 K/UL (ref 1.8–7.7)
NEUTROPHILS NFR BLD: 51.8 % (ref 38–73)
NRBC BLD-RTO: 0 /100 WBC
PLATELET # BLD AUTO: 210 K/UL (ref 150–450)
PMV BLD AUTO: 11 FL (ref 9.2–12.9)
POTASSIUM SERPL-SCNC: 4 MMOL/L (ref 3.5–5.1)
PROT SERPL-MCNC: 8.1 G/DL (ref 6–8.4)
RBC # BLD AUTO: 4.72 M/UL (ref 4–5.4)
SODIUM SERPL-SCNC: 140 MMOL/L (ref 136–145)
TACROLIMUS BLD-MCNC: 3.7 NG/ML (ref 5–15)
WBC # BLD AUTO: 5.81 K/UL (ref 3.9–12.7)

## 2024-04-15 PROCEDURE — 86140 C-REACTIVE PROTEIN: CPT | Performed by: INTERNAL MEDICINE

## 2024-04-15 PROCEDURE — 85025 COMPLETE CBC W/AUTO DIFF WBC: CPT | Performed by: STUDENT IN AN ORGANIZED HEALTH CARE EDUCATION/TRAINING PROGRAM

## 2024-04-15 PROCEDURE — 36415 COLL VENOUS BLD VENIPUNCTURE: CPT | Performed by: STUDENT IN AN ORGANIZED HEALTH CARE EDUCATION/TRAINING PROGRAM

## 2024-04-15 PROCEDURE — 86301 IMMUNOASSAY TUMOR CA 19-9: CPT | Performed by: STUDENT IN AN ORGANIZED HEALTH CARE EDUCATION/TRAINING PROGRAM

## 2024-04-15 PROCEDURE — 80053 COMPREHEN METABOLIC PANEL: CPT | Performed by: STUDENT IN AN ORGANIZED HEALTH CARE EDUCATION/TRAINING PROGRAM

## 2024-04-15 PROCEDURE — 85652 RBC SED RATE AUTOMATED: CPT | Performed by: INTERNAL MEDICINE

## 2024-04-15 PROCEDURE — 82105 ALPHA-FETOPROTEIN SERUM: CPT | Performed by: STUDENT IN AN ORGANIZED HEALTH CARE EDUCATION/TRAINING PROGRAM

## 2024-04-15 PROCEDURE — 80197 ASSAY OF TACROLIMUS: CPT | Performed by: STUDENT IN AN ORGANIZED HEALTH CARE EDUCATION/TRAINING PROGRAM

## 2024-04-18 ENCOUNTER — TELEPHONE (OUTPATIENT)
Dept: TRANSPLANT | Facility: CLINIC | Age: 64
End: 2024-04-18
Payer: MEDICARE

## 2024-04-18 DIAGNOSIS — Z85.09 HISTORY OF CHOLANGIOCARCINOMA: ICD-10-CM

## 2024-04-18 DIAGNOSIS — C22.0 COMBINED HEPATOCELLULAR AND CHOLANGIOCARCINOMA: ICD-10-CM

## 2024-04-18 DIAGNOSIS — Z94.4 LIVER TRANSPLANTED: Primary | ICD-10-CM

## 2024-04-18 DIAGNOSIS — R97.8 OTHER ABNORMAL TUMOR MARKERS: ICD-10-CM

## 2024-04-18 NOTE — LETTER
April 18, 2024    Nadeen Mcgovern  [address] [facility] [address]          Dear Nadeen Mcgovern:  MRN: [id number]    This is a follow up to your recent labs, your lab results were stable.  There are no medicine changes.  Please have your labs drawn again on 10/14/24.      If you cannot have your labs drawn on the scheduled date, it is your responsibility to call the transplant department to reschedule.  Please call [phone] and ask to speak to Rossy Pineda, Medical Assistant for all scheduling requests.     Sincerely,    Chetna Pickard, RN      Your Liver Transplant Coordinator    [facility]  [facility] [place] [address]  [phone]

## 2024-04-23 ENCOUNTER — OFFICE VISIT (OUTPATIENT)
Dept: RHEUMATOLOGY | Facility: CLINIC | Age: 64
End: 2024-04-23
Payer: MEDICARE

## 2024-04-23 VITALS
SYSTOLIC BLOOD PRESSURE: 118 MMHG | WEIGHT: 142.88 LBS | HEART RATE: 76 BPM | DIASTOLIC BLOOD PRESSURE: 70 MMHG | BODY MASS INDEX: 26.13 KG/M2

## 2024-04-23 DIAGNOSIS — R70.0 ELEVATED SED RATE: Primary | ICD-10-CM

## 2024-04-23 DIAGNOSIS — M21.619 BUNION: ICD-10-CM

## 2024-04-23 DIAGNOSIS — D84.9 IMMUNOSUPPRESSION: ICD-10-CM

## 2024-04-23 DIAGNOSIS — M34.9 SCLERODERMA: ICD-10-CM

## 2024-04-23 PROCEDURE — 1159F MED LIST DOCD IN RCRD: CPT | Mod: CPTII,S$GLB,, | Performed by: INTERNAL MEDICINE

## 2024-04-23 PROCEDURE — 3078F DIAST BP <80 MM HG: CPT | Mod: CPTII,S$GLB,, | Performed by: INTERNAL MEDICINE

## 2024-04-23 PROCEDURE — 99999 PR PBB SHADOW E&M-EST. PATIENT-LVL III: CPT | Mod: PBBFAC,,, | Performed by: INTERNAL MEDICINE

## 2024-04-23 PROCEDURE — 3008F BODY MASS INDEX DOCD: CPT | Mod: CPTII,S$GLB,, | Performed by: INTERNAL MEDICINE

## 2024-04-23 PROCEDURE — 99214 OFFICE O/P EST MOD 30 MIN: CPT | Mod: S$GLB,,, | Performed by: INTERNAL MEDICINE

## 2024-04-23 PROCEDURE — 3074F SYST BP LT 130 MM HG: CPT | Mod: CPTII,S$GLB,, | Performed by: INTERNAL MEDICINE

## 2024-04-23 ASSESSMENT — ROUTINE ASSESSMENT OF PATIENT INDEX DATA (RAPID3)
TOTAL RAPID3 SCORE: 4.94
MDHAQ FUNCTION SCORE: 0.4
AM STIFFNESS SCORE: 0, NO
FATIGUE SCORE: 3.5
PATIENT GLOBAL ASSESSMENT SCORE: 8.5
WHEN YOU AWAKENED IN THE MORNING OVER THE LAST WEEK, PLEASE INDICATE THE AMOUNT OF TIME IT TAKES UNTIL YOU ARE AS LIMBER AS YOU WILL BE FOR THE DAY: 3.5
PAIN SCORE: 5
PSYCHOLOGICAL DISTRESS SCORE: 3.3

## 2024-04-23 NOTE — PROGRESS NOTES
Patient ID:  Nadeen Mcgovern    YOB: 1960     MRN:  [id number]     Subjective:     Chief Complaint:  Disease Management     History of Present Illness:  Patient is a 63 y.o. female with Ssc, Raynaud's, mild RLD who presents today for follow up. LCV was 12/2023. At that time the patient was doing well with no major complaints.     Today: Today, the patient states she is doing well. She denies any new skin lesions or tightness. She did have a recent URI that may have elevated her sed rate on most recent labs. She had some SOB with this infection, but that has resolved. She is starting to have some chest congestion, but she is treating with Mucinex.    She endorses dry mouth and uses lozenges.       Diet: She tries the mediterranean diet, but she is does not like fish. She does stay away from red meat. She eats veggies as well. She does indulge with sweet.  Exercise: She has a stationary bike which she has not used lately, but wants start again. She walks in her neighborhood 3 days per week. She would like to start uses some weights as well.    Denies new hair loss, dry eyes, vision changes, oral/nasal ulcers, trouble swallowing, new rashes, joint swelling, morning stiffness, or GI disturbances.      Review of Systems   Review of Systems   Constitutional:  Negative for fever and unexpected weight change.   HENT:  Positive for trouble swallowing (She has to chew sufficiently). Negative for mouth sores.    Eyes:  Positive for redness.   Respiratory:  Negative for cough and shortness of breath.    Cardiovascular:  Negative for chest pain.   Gastrointestinal:  Positive for constipation (high fiber diet and yogurt). Negative for diarrhea.   Genitourinary:  Negative for dysuria and genital sores.   Skin:  Negative for rash.   Neurological:  Negative for syncope and headaches.   Hematological:  Does not bruise/bleed easily.        Current Medications:    Current Outpatient Medications:     aspirin 81 MG Chew, Take  81 mg by mouth once daily., Disp: , Rfl:     betamethasone dipropionate (DIPROLENE) 0.05 % ointment, Apply topically 2 (two) times daily. To affected areas on hands, Disp: 45 g, Rfl: 3    diphenhydrAMINE (BENADRYL) 25 mg capsule, Take 25 mg by mouth every 6 (six) hours as needed for Itching., Disp: , Rfl:     multivitamin (THERAGRAN) tablet, Take 1 tablet by mouth once daily., Disp: , Rfl:     mycophenolate (CELLCEPT) 500 mg Tab, Take 3 tablets (1,500 mg total) by mouth 2 (two) times daily., Disp: 540 tablet, Rfl: 0    rosuvastatin (CRESTOR) 10 MG tablet, Take 1 tablet (10 mg total) by mouth once daily., Disp: 90 tablet, Rfl: 3    sildenafil (REVATIO) 20 mg Tab, TAKE 1 TABLET (20 MG) BY MOUTH TWO TIMES DAILY, Disp: 180 tablet, Rfl: 3    tacrolimus (PROGRAF) 1 MG Cap, Take 2 capsules (2 mg total) by mouth every morning AND 1 capsule (1 mg total) every evening., Disp: 90 capsule, Rfl: 11    estradioL (ESTRACE) 0.01 % (0.1 mg/gram) vaginal cream, Place 1 g vaginally once daily., Disp: 42.5 g, Rfl: 1    ondansetron (ZOFRAN) 4 MG tablet, Take 1 tablet (4 mg total) by mouth every 8 (eight) hours as needed for Nausea. (Patient not taking: Reported on 2024), Disp: 12 tablet, Rfl: 0    SUTAB 1.479-0.188- 0.225 gram tablet, SMARTSI Tablet(s) By Mouth As Directed, Disp: , Rfl:      Objective:     Vitals:    24 0751   BP: 118/70   Pulse: 76      Body mass index is 26.13 kg/m².     Physical Exam   Constitutional: She is oriented to person, place, and time. normal appearance. No distress.   HENT:   Head: Normocephalic and atraumatic.   Right Ear: External ear normal.   Left Ear: External ear normal.   Eyes: Right eye exhibits no discharge. Left eye exhibits no discharge. No scleral icterus.   Cardiovascular: Normal rate, regular rhythm and normal heart sounds.   Pulmonary/Chest: Effort normal and breath sounds normal. No stridor. No respiratory distress. She has no wheezes. She has no rales.   Abdominal: Soft. She  exhibits no distension. There is no abdominal tenderness.   Musculoskeletal:         General: No swelling or tenderness. Normal range of motion.      Right shoulder: Normal.      Left shoulder: Normal.      Right elbow: Normal.      Left elbow: Normal.      Right wrist: Normal.      Left wrist: Normal.      Cervical back: Normal range of motion. No tenderness.      Right knee: Normal.      Left knee: Normal.   Neurological: She is alert and oriented to person, place, and time. She displays no weakness.   Skin: Skin is warm and dry. No rash noted.   Skin score: 2 (bilateral fingers: 1)  Bilateral Bunions   Psychiatric: Mood normal.       Right Side Rheumatological Exam     Examination finds the shoulder, elbow, wrist, knee, 1st PIP, 1st MCP, 2nd PIP, 2nd MCP, 3rd PIP, 3rd MCP, 4th PIP, 4th MCP, 5th PIP and 5th MCP normal.    Muscle Strength (0-5 scale):  Deltoid:  5  Biceps: 5/5   Triceps:  5  : 5/5   Iliopsoas: 5  Quadriceps:  5   Distal Lower Extremity: 5    Left Side Rheumatological Exam     Examination finds the shoulder, elbow, wrist, knee, 1st PIP, 1st MCP, 2nd PIP, 2nd MCP, 3rd PIP, 3rd MCP, 4th PIP, 4th MCP, 5th PIP and 5th MCP normal.    Muscle Strength (0-5 scale):  Deltoid:  5  Biceps: 5/5   Triceps:  5  :  5/5   Iliopsoas: 5  Quadriceps:  5   Distal Lower Extremity: 5         Latest Reference Range & Units 04/15/24 08:04   eGFR >60 mL/min/1.73 m^2 >60.0   AST 10 - 40 U/L 14   ALT 10 - 44 U/L 14   CRP 0.0 - 8.2 mg/L 4.0   AFP 0.0 - 8.4 ng/mL <2.0   CA 19-9 0.0 - 40.0 U/mL 3.8   Tacrolimus Lvl 5.0 - 15.0 ng/mL 3.7 (L)   (L): Data is abnormally low      Latest Reference Range & Units 04/15/24 08:04   Sed Rate 0 - 36 mm/Hr 69 (H)   (H): Data is abnormally high    PFTs          FVC     SVC    RV   DLco    TLC     9/15/23      135.5            79.6  61.3       103.9  7/21/22      131.5            64.6 58.8         96.4  6/25/21     140.5             53    73.9         98.2  7/10/20     132.4             26.3  79            83.6  3/26/19     117    119     121  101  3/20/18     120    121     62    90  9/25/17     122     122    30    89  4/7/17       123     124    49    91  8/18/16     107     107    110   88    CT Chest and abdomen 10/16/23: no evidence of recurrent liver cancer      12/19/2023 4/23/2024   Tender (IBRAHIM-28) 0 / 28  0 / 28    Swollen (IBRAHIM-28) 0 / 28  0 / 28    Provider Global -- 0 mm   Patient Global 35 mm 85 mm   ESR 25 mm/hr 69 mm/hr   CRP 6.4 mg/L 4 mg/L   IBRAHIM-28 (ESR) 2.74 (Low disease activity) 4.15 (Moderate disease activity)   IBRAHIM-28 (CRP) 2.17 (Remission) 2.73 (Low disease activity)   CDAI Score -- 8.5               Assessment:     lcSSc EUGENE+ RUBY- RNA-Pol3 - Th/To- mRSS 2(mild) stable  Mild Restrictive lung disease stable-improved except for DLco, CT chest shows no ILD  Raynaud's stable/improved  PAH  nl ePASP on TTE 8/8/22  Hyperlipidemia ASCVD 5.5%, low risk   Allergic contact dermatitis, Hand eczema, atopic dernatitis. Saw Dr. Green in Dermatology 4/13/22  Dysphagia, pills and solids only saw Denita Espinoza in Gastro 2/16/22 nl esophagram 2/16/22.Swallowing improved. Can take mycophenolate tabs no longer liquid  Right lateral epicondylitis nearly resolved  S/p liver transplant for cholangiocarcinoma  Low tacrolimus trough she thinks she missed dose around that time  Post traumatic right knee pain with baseline OA resolved    Plan:      Recheck ESR ,CRP SPEP, SIFE FLC Igs 5/15/24. If all normal, decrease mycophenolate to 1000mg twice daily if OK with Dr. Martinez in Liver Transplant   F/u Dr. Schmidt contact dermatitis, atopic dermatitis, hand eczema  Referral to podiatry for bunion  PFTs yearly, due in September  Mycophenolate 1500mg twice daily  Tacrolimus 2mg in am and 1mg in pm  Sildenafil 20mg twice daily  Standing labs q 3 months.   Cont working on Mediterranean diet can tolerate canned salmon  Cont walking, restart stationary bike and pool exercise  RTC 6 months    Adair Deluca,  M.D.  PGY-1  U PM&R

## 2024-04-23 NOTE — PROGRESS NOTES
I have personally taken the history and examined the patient and agree with the resident,s note as stated above         Latest Reference Range & Units 04/15/24 08:04   WBC 3.90 - 12.70 K/uL 5.81   RBC 4.00 - 5.40 M/uL 4.72   Hemoglobin 12.0 - 16.0 g/dL 14.3   Hematocrit 37.0 - 48.5 % 45.6   MCV 82 - 98 fL 97   MCH 27.0 - 31.0 pg 30.3   MCHC 32.0 - 36.0 g/dL 31.4 (L)   RDW 11.5 - 14.5 % 13.0   Platelet Count 150 - 450 K/uL 210   MPV 9.2 - 12.9 fL 11.0   Gran % 38.0 - 73.0 % 51.8   Lymph % 18.0 - 48.0 % 35.5   Mono % 4.0 - 15.0 % 8.1   Eos % 0.0 - 8.0 % 3.4   Basophil % 0.0 - 1.9 % 0.9   Immature Granulocytes 0.0 - 0.5 % 0.3   Gran # (ANC) 1.8 - 7.7 K/uL 3.0   Lymph # 1.0 - 4.8 K/uL 2.1   Mono # 0.3 - 1.0 K/uL 0.5   Eos # 0.0 - 0.5 K/uL 0.2   Baso # 0.00 - 0.20 K/uL 0.05   Immature Grans (Abs) 0.00 - 0.04 K/uL 0.02   nRBC 0 /100 WBC 0   Differential Method  Automated   Sed Rate 0 - 36 mm/Hr 69 (H)   Sodium 136 - 145 mmol/L 140   Potassium 3.5 - 5.1 mmol/L 4.0   Chloride 95 - 110 mmol/L 104   CO2 23 - 29 mmol/L 25   Anion Gap 8 - 16 mmol/L 11   BUN 8 - 23 mg/dL 9   Creatinine 0.5 - 1.4 mg/dL 0.7   eGFR >60 mL/min/1.73 m^2 >60.0   Glucose 70 - 110 mg/dL 101   Calcium 8.7 - 10.5 mg/dL 10.1   ALP 55 - 135 U/L 74   PROTEIN TOTAL 6.0 - 8.4 g/dL 8.1   Albumin 3.5 - 5.2 g/dL 4.2   BILIRUBIN TOTAL 0.1 - 1.0 mg/dL 0.9   AST 10 - 40 U/L 14   ALT 10 - 44 U/L 14   CRP 0.0 - 8.2 mg/L 4.0   AFP 0.0 - 8.4 ng/mL <2.0   CA 19-9 0.0 - 40.0 U/mL 3.8   Tacrolimus Lvl 5.0 - 15.0 ng/mL 3.7 (L)   (L): Data is abnormally low  (H): Data is abnormally high        PFTs          FVC     SVC    RV   DLco         TLC  9/17/23     135.5                 79.6        61.3     103.9   7/21/22    131.5                 64.6       58.8     96.4  6/25/21     140.4                 53.8       73.9      98.2  7/10/20     132                    26(?)        79      83.6  3/26/19     117    119          121         101  3/20/18     120    121           62             90  9/25/17     122     122          30            89  4/7/17       123     124          49           91  8/18/16     107     107         110          88       EXAMINATION:  CT CHEST WITHOUT CONTRAST     CLINICAL HISTORY:  Cholangio and HCC Surviellance;Liver cell carcinoma     TECHNIQUE:  Volumetric data acquisition through the chest (lung apices to adrenals) without intravenous contrast was acquired. Sagittal and coronal multiplanar reconstructions (MPR) were performed and pushed to PACS for evaluation. Lack of IV contrast material limits the assessment of the mediastinal structures and abdominal solid organs.     COMPARISON:  August 18, 2022.     FINDINGS:  Chest wall and lower neck: No axillary or supraclavicular lymphadenopathy.     Lungs and pleura: Biapical pleuroparenchymal scar.  Pleural based nodular lesion is identified in the right lower lobe measuring 5 mm, unchanged since prior.  Nodular thickening of the pleural surface, unchanged since prior.  Subcentimeter left lower lobe calcified granuloma, unchanged.     Mediastinum and tierney: No mediastinal or hilar adenopathy.     Heart and pericardium: Heart size is normal.No pericardial effusion.     Vessels: Mild atheromatous disease is seen in the aorta.   The coronary arteries show mild atheromatous disease.     Upper abdomen: Evidence of prior liver transplant.  Pneumobilia.  Surgical clips in the upper abdomen.  Focal calcification is noted in the spleen, unchanged since prior likely representing granulomas.     Bones: Biomechanical changes are noted in the thoracic spine with prominent osteophyte formations.  Thoracolumbar scoliosis.  DJD is also noted in the shoulders.     Impression:     1. Stable findings since previous examination.  For details see above.        Electronically signed by:Jay Galloway  Date:                                            11/22/2023  Time:                                           17:43         The 10-year ASCVD  risk score (Jessica CARR, et al., 2019) is: 6.3%    Values used to calculate the score:      Age: 63 years      Sex: Female      Is Non- : Yes      Diabetic: No      Tobacco smoker: No      Systolic Blood Pressure: 125 mmHg      Is BP treated: No      HDL Cholesterol: 41 mg/dL      Total Cholesterol: 177 mg/dL          TTE 9/15/23:       Left Ventricle: The left ventricle is normal in size. Normal wall thickness. Normal wall motion. There is normal systolic function with a visually estimated ejection fraction of 55 - 60%. Ejection fraction by visual approximation is 58%. There is normal diastolic function.    Right Ventricle: Normal right ventricular cavity size. Wall thickness is normal. Right ventricle wall motion  is normal. Systolic function is normal.    Aortic Valve: The aortic valve is a trileaflet valve. There is mild aortic valve sclerosis. There is mild aortic regurgitation.    Pulmonary Artery: No pulmonary hypertension. The estimated pulmonary artery systolic pressure is 26 mmHg.    IVC/SVC: Normal venous pressure at 3 mmHg.           lcSSc EUGENE+1:320  RUBY- RNA-Pol3 - Th/To- centromere- mRSS 2(mild) stable nailfold capillaries normal except minimal dropout left little finger only  Mild Restrictive lung disease stable-improved except for DLco, recent CT chest still shows no ILD  Raynaud's stable/improved  PAH  nl ePASP(26) on TTE 9/15/23  Hyperlipidemia ASCVD 6.3%, borderline risk  Allergic contact dermatitis, Hand eczema, atopic dernatitis  . Dysphagia, pills and solids only saw Denita Espinoza in Gastro 2/16/22 nl esophagram 2/16/22.Swallowing improved  . S/p liver transplant for cholangiocarcinoma no evidence of recurrence   DXA 7/5/23 normal    very elevated ESR 69 CRP 4(normal)              Recheck ESR ,CRP SPEP, SIFE  FLC Igs 5/15/24. If all normal, decrease mycophenolate to 1000mg twice daily if OK with Dr. Martinez in Liver Transplant  Quad strengthening exercises as demonstrated  and as per handout  Cont stationary bike and pool exercise  [employer] Tube Pilates, Rajeev Chi, yoga  F/u Dr. Schmidt  contact dermatitis,  atopic dermatitis, hand eczema improved.  Mycophenolate 1500mg twice daily refilled   Tacrolimus 2mg in am and 1mg in pm Transplant  Sildenafil 20mg twice daily refilled  rosuvastatin 10mg nightly  Standing labs q 3 months.   Cont working on Mediterranean diet can tolerate canned salmon  RTC 6 months

## 2024-04-23 NOTE — PROGRESS NOTES
4/16/2024    10:43 AM   Rapid3 Question Responses and Scores   MDHAQ Score 0.4   Psychologic Score 3.3   Pain Score 5   When you awakened in the morning OVER THE LAST WEEK, did you feel stiff? No   If Yes, please indicate the number of hours until you are as limber as you will be for the day 3.5   Fatigue Score 3.5   Global Health Score 8.5   RAPID3 Score 4.94       Answers submitted by the patient for this visit:  Rheumatology Questionnaire (Submitted on 4/16/2024)  fever: No  eye redness: No  mouth sores: No  headaches: Yes  shortness of breath: No  chest pain: Yes  trouble swallowing: No  diarrhea: No  constipation: Yes  unexpected weight change: No  genital sore: No  dysuria: No  During the last 3 days, have you had a skin rash?: No  Bruises or bleeds easily: Yes  cough: Yes

## 2024-04-23 NOTE — PATIENT INSTRUCTIONS
Cont stationary bike and pool exercise, indoor exercises on youtube during cold weather  Repeat ESR in 1 mo as pt had URI during last labs  F/u Dr. Schmidt contact dermatitis,  atopic dermatitis, hand eczema as scheduled  PFTs yearly, due in September  Mycophenolate 1500mg twice daily  Tacrolimus 2mg in am and 1mg in pm  Sildenafil 20mg twice daily  Standing labs q 3 months.   Cont working on Mediterranean diet can tolerate canned salmon  RTC 6 months

## 2024-04-24 DIAGNOSIS — Z94.4 LIVER REPLACED BY TRANSPLANT: ICD-10-CM

## 2024-04-24 RX ORDER — TACROLIMUS 1 MG/1
CAPSULE ORAL
Qty: 90 CAPSULE | Refills: 11 | Status: ACTIVE | OUTPATIENT
Start: 2024-04-24 | End: 2024-05-07 | Stop reason: SDUPTHER

## 2024-04-24 RX ORDER — MYCOPHENOLATE MOFETIL 500 MG/1
1500 TABLET ORAL 2 TIMES DAILY
Qty: 540 TABLET | Refills: 0 | Status: ACTIVE | OUTPATIENT
Start: 2024-04-24 | End: 2024-05-07 | Stop reason: SDUPTHER

## 2024-04-25 ENCOUNTER — OFFICE VISIT (OUTPATIENT)
Dept: PODIATRY | Facility: CLINIC | Age: 64
End: 2024-04-25
Payer: MEDICARE

## 2024-04-25 VITALS
WEIGHT: 143.94 LBS | HEIGHT: 62 IN | DIASTOLIC BLOOD PRESSURE: 62 MMHG | SYSTOLIC BLOOD PRESSURE: 116 MMHG | HEART RATE: 58 BPM | BODY MASS INDEX: 26.49 KG/M2

## 2024-04-25 DIAGNOSIS — M21.619 BUNION: ICD-10-CM

## 2024-04-25 PROCEDURE — 3074F SYST BP LT 130 MM HG: CPT | Mod: CPTII,S$GLB,, | Performed by: PODIATRIST

## 2024-04-25 PROCEDURE — 3078F DIAST BP <80 MM HG: CPT | Mod: CPTII,S$GLB,, | Performed by: PODIATRIST

## 2024-04-25 PROCEDURE — 99999 PR PBB SHADOW E&M-EST. PATIENT-LVL III: CPT | Mod: PBBFAC,,, | Performed by: PODIATRIST

## 2024-04-25 PROCEDURE — 99203 OFFICE O/P NEW LOW 30 MIN: CPT | Mod: S$GLB,,, | Performed by: PODIATRIST

## 2024-04-25 PROCEDURE — 3044F HG A1C LEVEL LT 7.0%: CPT | Mod: CPTII,S$GLB,, | Performed by: PODIATRIST

## 2024-04-25 PROCEDURE — 3008F BODY MASS INDEX DOCD: CPT | Mod: CPTII,S$GLB,, | Performed by: PODIATRIST

## 2024-04-25 NOTE — PROGRESS NOTES
Subjective:      Patient ID: Nadeen Mcgovern is a 63 y.o. female.    Chief Complaint: Bunions (Left bunion pain)    Nadeen is a 63 y.o. female who presents to the podiatry clinic  with complaint of  left foot pain. Onset of the symptoms was several months ago. Precipitating event: none known. Current symptoms include: ability to bear weight, but with some pain. Aggravating factors: any weight bearing. Symptoms have progressed to a point and plateaued. Patient has had no prior foot problems. Evaluation to date: none. Treatment to date: none. Patients rates pain 5/10 on pain scale.    Review of Systems   Constitutional: Negative for chills, fever and malaise/fatigue.   HENT:  Negative for hearing loss.    Cardiovascular:  Negative for claudication.   Respiratory:  Negative for shortness of breath.    Skin:  Negative for flushing and rash.   Musculoskeletal:  Negative for joint pain and myalgias.   Neurological:  Negative for loss of balance, numbness, paresthesias and sensory change.   Psychiatric/Behavioral:  Negative for altered mental status.            Objective:      Physical Exam  Vitals reviewed.   Cardiovascular:      Pulses:           Dorsalis pedis pulses are 2+ on the right side and 2+ on the left side.        Posterior tibial pulses are 2+ on the right side and 2+ on the left side.      Comments: No edema noted b/L  Musculoskeletal:      Comments:        Feet:      Right foot:      Protective Sensation: 5 sites tested.  5 sites sensed.      Left foot:      Protective Sensation: 5 sites tested.  5 sites sensed.   Skin:     General: Skin is warm.      Capillary Refill: Capillary refill takes 2 to 3 seconds.      Comments: Normal skin tugor noted.   No open lesion noted b/L  Skin temp is warm to warm from proximal to distal b/L.  Webspaces clean, dry, and intact     Neurological:      Mental Status: She is alert.      Comments: Gross sensation intact b/L         Hallux valgus deformity noted to left with dorsal  medial eminence. mild pain with ROM of 1st MPJ    Hypermobility of first ray none            Assessment:       Encounter Diagnosis   Name Primary?    Bunion          Plan:       Nadeen was seen today for bunions.    Diagnoses and all orders for this visit:    Bunion  -     Ambulatory referral/consult to Podiatry      I counseled the patient on her conditions, their implications and medical management.    Pt was advised that she has a bunion deformity. Conservative and surgical options discussed with pt to correct and treat bunion deformities. Pt opted for conservative.   Shoe modification advised for the pt. Pt was advised to obtain shoes will accommodate foot deformities.   Pt instructed to purchase OTC Voltaren gel 1%, use on affected area. Pt advised discontinue usage if any adverse effects should occur.     X-rays taken none    Pt advised if surgery is preferred, clearance from PCP is warranted   All questions answered  Call or return to clinic prn if these symptoms worsen or fail to improve as anticipated.      .

## 2024-05-07 DIAGNOSIS — Z94.4 LIVER REPLACED BY TRANSPLANT: ICD-10-CM

## 2024-05-07 DIAGNOSIS — M34.9 SCLERODERMA: ICD-10-CM

## 2024-05-07 RX ORDER — SILDENAFIL CITRATE 20 MG/1
TABLET ORAL
Qty: 180 TABLET | Refills: 3 | Status: CANCELLED | OUTPATIENT
Start: 2024-05-07

## 2024-05-07 RX ORDER — TACROLIMUS 1 MG/1
CAPSULE ORAL
Qty: 90 CAPSULE | Refills: 11 | Status: ACTIVE | OUTPATIENT
Start: 2024-05-07

## 2024-05-09 RX ORDER — MYCOPHENOLATE MOFETIL 500 MG/1
1500 TABLET ORAL 2 TIMES DAILY
Qty: 540 TABLET | Refills: 0 | Status: ACTIVE | OUTPATIENT
Start: 2024-05-09

## 2024-05-20 DIAGNOSIS — M34.9 SCLERODERMA: ICD-10-CM

## 2024-05-21 RX ORDER — SILDENAFIL CITRATE 20 MG/1
TABLET ORAL
Qty: 180 TABLET | Refills: 3 | Status: ACTIVE | OUTPATIENT
Start: 2024-05-21

## 2024-05-27 ENCOUNTER — LAB VISIT (OUTPATIENT)
Dept: LAB | Facility: HOSPITAL | Age: 64
End: 2024-05-27
Attending: INTERNAL MEDICINE
Payer: MEDICARE

## 2024-05-27 DIAGNOSIS — R70.0 ELEVATED SED RATE: ICD-10-CM

## 2024-05-27 LAB
CRP SERPL-MCNC: 1.7 MG/L (ref 0–8.2)
ERYTHROCYTE [SEDIMENTATION RATE] IN BLOOD BY PHOTOMETRIC METHOD: 39 MM/HR (ref 0–36)
IGA SERPL-MCNC: 237 MG/DL (ref 40–350)
IGG SERPL-MCNC: 1225 MG/DL (ref 650–1600)
IGM SERPL-MCNC: 97 MG/DL (ref 50–300)

## 2024-05-27 PROCEDURE — 86140 C-REACTIVE PROTEIN: CPT | Performed by: INTERNAL MEDICINE

## 2024-05-27 PROCEDURE — 84165 PROTEIN E-PHORESIS SERUM: CPT | Mod: 26,,, | Performed by: PATHOLOGY

## 2024-05-27 PROCEDURE — 84165 PROTEIN E-PHORESIS SERUM: CPT | Performed by: INTERNAL MEDICINE

## 2024-05-27 PROCEDURE — 86334 IMMUNOFIX E-PHORESIS SERUM: CPT | Mod: 26,,, | Performed by: PATHOLOGY

## 2024-05-27 PROCEDURE — 83521 IG LIGHT CHAINS FREE EACH: CPT | Performed by: INTERNAL MEDICINE

## 2024-05-27 PROCEDURE — 36415 COLL VENOUS BLD VENIPUNCTURE: CPT | Mod: PN | Performed by: INTERNAL MEDICINE

## 2024-05-27 PROCEDURE — 86334 IMMUNOFIX E-PHORESIS SERUM: CPT | Performed by: INTERNAL MEDICINE

## 2024-05-27 PROCEDURE — 82784 ASSAY IGA/IGD/IGG/IGM EACH: CPT | Mod: 59 | Performed by: INTERNAL MEDICINE

## 2024-05-27 PROCEDURE — 85652 RBC SED RATE AUTOMATED: CPT | Performed by: INTERNAL MEDICINE

## 2024-05-28 LAB
ALBUMIN SERPL ELPH-MCNC: 4.13 G/DL (ref 3.35–5.55)
ALPHA1 GLOB SERPL ELPH-MCNC: 0.32 G/DL (ref 0.17–0.41)
ALPHA2 GLOB SERPL ELPH-MCNC: 0.68 G/DL (ref 0.43–0.99)
B-GLOBULIN SERPL ELPH-MCNC: 0.79 G/DL (ref 0.5–1.1)
GAMMA GLOB SERPL ELPH-MCNC: 1.19 G/DL (ref 0.67–1.58)
INTERPRETATION SERPL IFE-IMP: NORMAL
KAPPA LC SER QL IA: 2.38 MG/DL (ref 0.33–1.94)
KAPPA LC/LAMBDA SER IA: 1.33 (ref 0.26–1.65)
LAMBDA LC SER QL IA: 1.79 MG/DL (ref 0.57–2.63)
PATHOLOGIST INTERPRETATION IFE: NORMAL
PATHOLOGIST INTERPRETATION SPE: NORMAL
PROT SERPL-MCNC: 7.1 G/DL (ref 6–8.4)

## 2024-05-30 ENCOUNTER — PATIENT MESSAGE (OUTPATIENT)
Dept: RHEUMATOLOGY | Facility: CLINIC | Age: 64
End: 2024-05-30
Payer: MEDICARE

## 2024-06-04 DIAGNOSIS — R76.8 ELEVATED SERUM IMMUNOGLOBULIN FREE LIGHT CHAINS: Primary | ICD-10-CM

## 2024-06-05 ENCOUNTER — LAB VISIT (OUTPATIENT)
Dept: PRIMARY CARE CLINIC | Facility: CLINIC | Age: 64
End: 2024-06-05
Payer: MEDICARE

## 2024-06-05 ENCOUNTER — TELEPHONE (OUTPATIENT)
Dept: RHEUMATOLOGY | Facility: CLINIC | Age: 64
End: 2024-06-05
Payer: MEDICARE

## 2024-06-05 DIAGNOSIS — I70.0 ATHEROSCLEROSIS OF AORTA: ICD-10-CM

## 2024-06-05 DIAGNOSIS — M34.9 SCLERODERMA: ICD-10-CM

## 2024-06-05 DIAGNOSIS — Z00.00 LABORATORY EXAMINATION ORDERED AS PART OF A ROUTINE GENERAL MEDICAL EXAMINATION: ICD-10-CM

## 2024-06-05 DIAGNOSIS — R76.8 ELEVATED SERUM IMMUNOGLOBULIN FREE LIGHT CHAINS: Primary | ICD-10-CM

## 2024-06-05 DIAGNOSIS — R73.09 BLOOD GLUCOSE ABNORMAL: ICD-10-CM

## 2024-06-05 LAB
ALBUMIN SERPL BCP-MCNC: 4.2 G/DL (ref 3.5–5.2)
ALP SERPL-CCNC: 68 U/L (ref 55–135)
ALT SERPL W/O P-5'-P-CCNC: 16 U/L (ref 10–44)
ANION GAP SERPL CALC-SCNC: 13 MMOL/L (ref 8–16)
AST SERPL-CCNC: 19 U/L (ref 10–40)
BASOPHILS # BLD AUTO: 0.02 K/UL (ref 0–0.2)
BASOPHILS NFR BLD: 0.3 % (ref 0–1.9)
BILIRUB SERPL-MCNC: 1 MG/DL (ref 0.1–1)
BUN SERPL-MCNC: 12 MG/DL (ref 8–23)
CALCIUM SERPL-MCNC: 10.2 MG/DL (ref 8.7–10.5)
CHLORIDE SERPL-SCNC: 104 MMOL/L (ref 95–110)
CHOLEST SERPL-MCNC: 204 MG/DL (ref 120–199)
CHOLEST/HDLC SERPL: 4.3 {RATIO} (ref 2–5)
CO2 SERPL-SCNC: 26 MMOL/L (ref 23–29)
CREAT SERPL-MCNC: 0.8 MG/DL (ref 0.5–1.4)
DIFFERENTIAL METHOD BLD: ABNORMAL
EOSINOPHIL # BLD AUTO: 0.2 K/UL (ref 0–0.5)
EOSINOPHIL NFR BLD: 2.2 % (ref 0–8)
ERYTHROCYTE [DISTWIDTH] IN BLOOD BY AUTOMATED COUNT: 13.2 % (ref 11.5–14.5)
EST. GFR  (NO RACE VARIABLE): >60 ML/MIN/1.73 M^2
ESTIMATED AVG GLUCOSE: 105 MG/DL (ref 68–131)
GLUCOSE SERPL-MCNC: 88 MG/DL (ref 70–110)
HBA1C MFR BLD: 5.3 % (ref 4–5.6)
HCT VFR BLD AUTO: 45.2 % (ref 37–48.5)
HDLC SERPL-MCNC: 47 MG/DL (ref 40–75)
HDLC SERPL: 23 % (ref 20–50)
HGB BLD-MCNC: 14.4 G/DL (ref 12–16)
IMM GRANULOCYTES # BLD AUTO: 0.02 K/UL (ref 0–0.04)
IMM GRANULOCYTES NFR BLD AUTO: 0.3 % (ref 0–0.5)
LDLC SERPL CALC-MCNC: 138.4 MG/DL (ref 63–159)
LYMPHOCYTES # BLD AUTO: 1.8 K/UL (ref 1–4.8)
LYMPHOCYTES NFR BLD: 27.1 % (ref 18–48)
MCH RBC QN AUTO: 30.9 PG (ref 27–31)
MCHC RBC AUTO-ENTMCNC: 31.9 G/DL (ref 32–36)
MCV RBC AUTO: 97 FL (ref 82–98)
MONOCYTES # BLD AUTO: 0.6 K/UL (ref 0.3–1)
MONOCYTES NFR BLD: 8.5 % (ref 4–15)
NEUTROPHILS # BLD AUTO: 4.2 K/UL (ref 1.8–7.7)
NEUTROPHILS NFR BLD: 61.6 % (ref 38–73)
NONHDLC SERPL-MCNC: 157 MG/DL
NRBC BLD-RTO: 0 /100 WBC
PLATELET # BLD AUTO: 229 K/UL (ref 150–450)
PMV BLD AUTO: 11.4 FL (ref 9.2–12.9)
POTASSIUM SERPL-SCNC: 4 MMOL/L (ref 3.5–5.1)
PROT SERPL-MCNC: 8 G/DL (ref 6–8.4)
RBC # BLD AUTO: 4.66 M/UL (ref 4–5.4)
SODIUM SERPL-SCNC: 143 MMOL/L (ref 136–145)
TRIGL SERPL-MCNC: 93 MG/DL (ref 30–150)
TSH SERPL DL<=0.005 MIU/L-ACNC: 1.93 UIU/ML (ref 0.4–4)
WBC # BLD AUTO: 6.79 K/UL (ref 3.9–12.7)

## 2024-06-05 PROCEDURE — 83036 HEMOGLOBIN GLYCOSYLATED A1C: CPT | Performed by: INTERNAL MEDICINE

## 2024-06-05 PROCEDURE — 80053 COMPREHEN METABOLIC PANEL: CPT | Performed by: INTERNAL MEDICINE

## 2024-06-05 PROCEDURE — 84443 ASSAY THYROID STIM HORMONE: CPT | Performed by: INTERNAL MEDICINE

## 2024-06-05 PROCEDURE — 85025 COMPLETE CBC W/AUTO DIFF WBC: CPT | Performed by: INTERNAL MEDICINE

## 2024-06-05 PROCEDURE — 80061 LIPID PANEL: CPT | Performed by: INTERNAL MEDICINE

## 2024-06-10 ENCOUNTER — PATIENT MESSAGE (OUTPATIENT)
Dept: INTERNAL MEDICINE | Facility: CLINIC | Age: 64
End: 2024-06-10
Payer: MEDICARE

## 2024-06-10 ENCOUNTER — TELEPHONE (OUTPATIENT)
Dept: RHEUMATOLOGY | Facility: CLINIC | Age: 64
End: 2024-06-10
Payer: MEDICARE

## 2024-06-10 DIAGNOSIS — R76.8 ELEVATED SERUM IMMUNOGLOBULIN FREE LIGHT CHAINS: Primary | ICD-10-CM

## 2024-06-10 NOTE — TELEPHONE ENCOUNTER
Please tell pt I am unable to schedule E-consult to Hematology for her, so please schedule regular Hematology consult for elevated free light chains. Thank you SAILAJA

## 2024-06-11 ENCOUNTER — OFFICE VISIT (OUTPATIENT)
Dept: INTERNAL MEDICINE | Facility: CLINIC | Age: 64
End: 2024-06-11
Payer: MEDICARE

## 2024-06-11 ENCOUNTER — PATIENT OUTREACH (OUTPATIENT)
Dept: ADMINISTRATIVE | Facility: HOSPITAL | Age: 64
End: 2024-06-11
Payer: MEDICARE

## 2024-06-11 ENCOUNTER — PATIENT MESSAGE (OUTPATIENT)
Dept: RHEUMATOLOGY | Facility: CLINIC | Age: 64
End: 2024-06-11
Payer: MEDICARE

## 2024-06-11 ENCOUNTER — TELEPHONE (OUTPATIENT)
Dept: OBSTETRICS AND GYNECOLOGY | Facility: CLINIC | Age: 64
End: 2024-06-11
Payer: MEDICARE

## 2024-06-11 VITALS
OXYGEN SATURATION: 98 % | SYSTOLIC BLOOD PRESSURE: 122 MMHG | BODY MASS INDEX: 25.65 KG/M2 | DIASTOLIC BLOOD PRESSURE: 60 MMHG | WEIGHT: 140.19 LBS | HEART RATE: 58 BPM

## 2024-06-11 DIAGNOSIS — Z85.09 HISTORY OF BILE DUCT CANCER: ICD-10-CM

## 2024-06-11 DIAGNOSIS — N89.8 VAGINAL DRYNESS: ICD-10-CM

## 2024-06-11 DIAGNOSIS — Z00.00 ENCOUNTER FOR ANNUAL PHYSICAL EXAM: Primary | ICD-10-CM

## 2024-06-11 DIAGNOSIS — E78.2 MIXED HYPERLIPIDEMIA: ICD-10-CM

## 2024-06-11 DIAGNOSIS — M34.9 SCLERODERMA: ICD-10-CM

## 2024-06-11 DIAGNOSIS — Z01.419 WELL WOMAN EXAM: ICD-10-CM

## 2024-06-11 DIAGNOSIS — Z94.4 S/P LIVER TRANSPLANT: ICD-10-CM

## 2024-06-11 PROCEDURE — 99999 PR PBB SHADOW E&M-EST. PATIENT-LVL III: CPT | Mod: PBBFAC,,, | Performed by: INTERNAL MEDICINE

## 2024-06-11 PROCEDURE — 1160F RVW MEDS BY RX/DR IN RCRD: CPT | Mod: CPTII,S$GLB,, | Performed by: INTERNAL MEDICINE

## 2024-06-11 PROCEDURE — 3074F SYST BP LT 130 MM HG: CPT | Mod: CPTII,S$GLB,, | Performed by: INTERNAL MEDICINE

## 2024-06-11 PROCEDURE — 3044F HG A1C LEVEL LT 7.0%: CPT | Mod: CPTII,S$GLB,, | Performed by: INTERNAL MEDICINE

## 2024-06-11 PROCEDURE — 1159F MED LIST DOCD IN RCRD: CPT | Mod: CPTII,S$GLB,, | Performed by: INTERNAL MEDICINE

## 2024-06-11 PROCEDURE — 99396 PREV VISIT EST AGE 40-64: CPT | Mod: S$GLB,,, | Performed by: INTERNAL MEDICINE

## 2024-06-11 PROCEDURE — 3078F DIAST BP <80 MM HG: CPT | Mod: CPTII,S$GLB,, | Performed by: INTERNAL MEDICINE

## 2024-06-11 PROCEDURE — 3008F BODY MASS INDEX DOCD: CPT | Mod: CPTII,S$GLB,, | Performed by: INTERNAL MEDICINE

## 2024-06-11 RX ORDER — ESTRADIOL 0.1 MG/G
1 CREAM VAGINAL DAILY
Qty: 42.5 G | Refills: 1 | Status: CANCELLED | OUTPATIENT
Start: 2024-06-11 | End: 2025-06-11

## 2024-06-11 RX ORDER — TACROLIMUS 1 MG/1
CAPSULE ORAL
Qty: 90 CAPSULE | Refills: 11 | Status: CANCELLED | OUTPATIENT
Start: 2024-06-11

## 2024-06-11 RX ORDER — MYCOPHENOLATE MOFETIL 500 MG/1
1500 TABLET ORAL 2 TIMES DAILY
Qty: 540 TABLET | Refills: 0 | Status: CANCELLED | OUTPATIENT
Start: 2024-06-11

## 2024-06-11 NOTE — PATIENT INSTRUCTIONS
Fasting labwork in 6 months to recheck cholesterol level.   Schedule ob/gyn appointment.   Mammogram as scheduled.     Return to clinic in 6 months or sooner if needed.

## 2024-06-11 NOTE — PROGRESS NOTES
Subjective:       Patient ID: Nadeen Mcgovern is a 63 y.o. female.    Chief Complaint: Foot Injury (Bunion located on bottom of foot. )      HPI  Nadeen Mcgovern is a 63 y.o. year old female with scleroderma, hx of cholangiocarcinoma s/p liver transplant, HLD, on immunosuppression presents for annual exam. Saw podiatry who recommended conservative measures for bunion. At baseline health.     Review of Systems   Constitutional:  Negative for activity change, appetite change, fatigue, fever and unexpected weight change.   HENT:  Negative for congestion, hearing loss, postnasal drip, sneezing, sore throat, trouble swallowing and voice change.    Eyes:  Negative for pain and discharge.   Respiratory:  Negative for cough, choking, chest tightness, shortness of breath and wheezing.    Cardiovascular:  Negative for chest pain, palpitations and leg swelling.   Gastrointestinal:  Negative for abdominal distention, abdominal pain, blood in stool, constipation, diarrhea, nausea and vomiting.   Endocrine: Negative for polydipsia and polyuria.   Genitourinary:  Negative for difficulty urinating and flank pain.   Musculoskeletal:  Negative for arthralgias, back pain, joint swelling, myalgias and neck pain.   Skin:  Negative for rash.   Neurological:  Negative for dizziness, tremors, seizures, weakness, numbness and headaches.   Psychiatric/Behavioral:  Negative for agitation. The patient is not nervous/anxious.          Past Medical History:   Diagnosis Date    Acute cholecystitis     Cholecystitis    Anemia 12/14    Arthritis 2015    septic arthritis of right shoulder    Bacteremia due to Streptococcus pneumoniae     Cancer     Cataract     Cholangiocarcinoma     Fever blister 2/7/20    Hypertension     Jaundice     obstructive    Liver transplant status     Organ transplant     Prediabetes     Raynaud disease     Scleroderma         Prior to Admission medications    Medication Sig Start Date End Date Taking? Authorizing Provider    aspirin 81 MG Chew Take 81 mg by mouth once daily.   Yes Provider, Historical   betamethasone dipropionate (DIPROLENE) 0.05 % ointment Apply topically 2 (two) times daily. To affected areas on hands 23  Yes Coby Schmidt MD   diphenhydrAMINE (BENADRYL) 25 mg capsule Take 25 mg by mouth every 6 (six) hours as needed for Itching.   Yes Provider, Historical   estradioL (ESTRACE) 0.01 % (0.1 mg/gram) vaginal cream Place 1 g vaginally once daily. 21 Yes Karlie Matute DO   multivitamin (THERAGRAN) tablet Take 1 tablet by mouth once daily. 10/12/15  Yes Marysol Zavaleta MD   mycophenolate (CELLCEPT) 500 mg Tab Take 3 tablets (1,500 mg total) by mouth 2 (two) times daily. 24  Yes Campbell Chavez MD   ondansetron (ZOFRAN) 4 MG tablet Take 1 tablet (4 mg total) by mouth every 8 (eight) hours as needed for Nausea. 3/15/23  Yes Angelina Saez MD   rosuvastatin (CRESTOR) 10 MG tablet Take 1 tablet (10 mg total) by mouth once daily. 23 Yes Alvin Dennis MD   sildenafil (REVATIO) 20 mg Tab TAKE 1 TABLET (20 MG) BY MOUTH TWO TIMES DAILY 24  Yes Haresh Butler MD   tacrolimus (PROGRAF) 1 MG Cap Take 2 capsules (2 mg total) by mouth every morning AND 1 capsule (1 mg total) every evening. 24  Yes Vitaly Martinez MD   SUTAB 1.479-0.188- 0.225 gram tablet SMARTSI Tablet(s) By Mouth As Directed  Patient not taking: Reported on 2024   Provider, Historical        Past medical history, surgical history, and family medical history reviewed and updated as appropriate.    Medications and allergies reviewed.     Objective:          Vitals:    24 0814   BP: 122/60   BP Location: Right arm   Patient Position: Sitting   BP Method: Medium (Manual)   Pulse: (!) 58   SpO2: 98%   Weight: 63.6 kg (140 lb 3.4 oz)     Body mass index is 25.65 kg/m².  Physical Exam  Constitutional:       Appearance: She is well-developed.   HENT:      Head: Normocephalic and  atraumatic.   Eyes:      Extraocular Movements: Extraocular movements intact.   Cardiovascular:      Rate and Rhythm: Normal rate and regular rhythm.      Heart sounds: Normal heart sounds.   Pulmonary:      Effort: Pulmonary effort is normal. No respiratory distress.      Breath sounds: Normal breath sounds. No wheezing.   Abdominal:      General: Bowel sounds are normal. There is no distension.      Palpations: Abdomen is soft.      Tenderness: There is no abdominal tenderness.   Musculoskeletal:         General: No tenderness. Normal range of motion.      Cervical back: Normal range of motion.   Skin:     General: Skin is warm and dry.   Neurological:      Mental Status: She is alert and oriented to person, place, and time.      Cranial Nerves: No cranial nerve deficit.      Deep Tendon Reflexes: Reflexes are normal and symmetric.         Lab Results   Component Value Date    WBC 6.79 06/05/2024    HGB 14.4 06/05/2024    HCT 45.2 06/05/2024     06/05/2024    CHOL 204 (H) 06/05/2024    TRIG 93 06/05/2024    HDL 47 06/05/2024    ALT 16 06/05/2024    AST 19 06/05/2024     06/05/2024    K 4.0 06/05/2024     06/05/2024    CREATININE 0.8 06/05/2024    BUN 12 06/05/2024    CO2 26 06/05/2024    TSH 1.932 06/05/2024    INR 1.0 03/15/2023    HGBA1C 5.3 06/05/2024       Assessment:       1. Encounter for annual physical exam    2. History of bile duct cancer    3. S/P liver transplant    4. Mixed hyperlipidemia    5. Scleroderma    6. Vaginal dryness    7. Well woman exam          Plan:     Nadeen was seen today for foot injury.    Diagnoses and all orders for this visit:    Encounter for annual physical exam    History of bile duct cancer    S/P liver transplant    Mixed hyperlipidemia  Comments:  patient just found her rosuvastatin 10 and will resume taking. Repeat lipid panel and hepatic function in 6 months  Orders:  -     Lipid Panel; Future  -     COMPREHENSIVE METABOLIC PANEL;  Future    Scleroderma    Vaginal dryness  Comments:  patient referred to ob/gyn for discussion of restarting estrogen cream and well woman exam  Orders:  -     Ambulatory referral/consult to Obstetrics / Gynecology; Future    Well woman exam  -     Ambulatory referral/consult to Obstetrics / Gynecology; Future    Other orders  The following orders have not been finalized:  -     Cancel: estradioL (ESTRACE) 0.01 % (0.1 mg/gram) vaginal cream  -     Cancel: tacrolimus (PROGRAF) 1 MG Cap  -     Cancel: mycophenolate (CELLCEPT) 500 mg Tab        Health maintenance reviewed with patient.     Follow up in about 6 months (around 12/11/2024).    Alvin Dennis MD  Internal Medicine / Primary Care  [facility]  6/11/2024

## 2024-06-11 NOTE — Clinical Note
Please obtain colonscopy report and pathology from [facility] (Dr. Josue). Patient reports not needing repeat for 7 years. Done 1 month ago

## 2024-06-11 NOTE — TELEPHONE ENCOUNTER
----- Message from Court Rainey sent at 6/11/2024  8:54 AM CDT -----  Regarding: Vaginal dryness [N89.8] Well woman exam [Z01.419]  Please assist with scheduling appointment       Vaginal dryness [N89.8]  Well woman exam [Z01.419]

## 2024-07-01 ENCOUNTER — HOSPITAL ENCOUNTER (OUTPATIENT)
Dept: RADIOLOGY | Facility: OTHER | Age: 64
Discharge: HOME | End: 2024-07-01
Attending: INTERNAL MEDICINE
Payer: MEDICARE

## 2024-07-01 DIAGNOSIS — Z12.31 BREAST CANCER SCREENING BY MAMMOGRAM: ICD-10-CM

## 2024-07-01 PROCEDURE — 77067 SCR MAMMO BI INCL CAD: CPT | Mod: TC

## 2024-07-05 ENCOUNTER — PATIENT OUTREACH (OUTPATIENT)
Dept: ADMINISTRATIVE | Facility: HOSPITAL | Age: 64
End: 2024-07-05
Payer: MEDICARE

## 2024-07-11 ENCOUNTER — HOSPITAL ENCOUNTER (INPATIENT)
Facility: HOSPITAL | Age: 64
LOS: 1 days | Discharge: HOME | DRG: 178 | End: 2024-07-13
Attending: HOSPITALIST | Admitting: HOSPITALIST
Payer: MEDICARE

## 2024-07-11 DIAGNOSIS — U07.1 COVID-19 VIRUS DETECTED: ICD-10-CM

## 2024-07-11 DIAGNOSIS — R07.9 CHEST PAIN: ICD-10-CM

## 2024-07-11 DIAGNOSIS — K56.7 ILEUS: ICD-10-CM

## 2024-07-11 DIAGNOSIS — U07.1 COVID-19: Primary | ICD-10-CM

## 2024-07-11 PROBLEM — I27.20 PULMONARY HYPERTENSION: Chronic | Status: ACTIVE | Noted: 2021-06-25

## 2024-07-11 PROCEDURE — G0378 HOSPITAL OBSERVATION PER HR: HCPCS

## 2024-07-11 PROCEDURE — G0379 DIRECT REFER HOSPITAL OBSERV: HCPCS

## 2024-07-11 RX ORDER — MYCOPHENOLATE MOFETIL 250 MG/1
500 CAPSULE ORAL 2 TIMES DAILY
Status: DISCONTINUED | OUTPATIENT
Start: 2024-07-11 | End: 2024-07-13 | Stop reason: HOSPADM

## 2024-07-11 RX ORDER — TACROLIMUS 1 MG/1
2 CAPSULE ORAL EVERY MORNING
Status: DISCONTINUED | OUTPATIENT
Start: 2024-07-12 | End: 2024-07-13 | Stop reason: HOSPADM

## 2024-07-11 RX ORDER — SODIUM CHLORIDE, SODIUM LACTATE, POTASSIUM CHLORIDE, CALCIUM CHLORIDE 600; 310; 30; 20 MG/100ML; MG/100ML; MG/100ML; MG/100ML
INJECTION, SOLUTION INTRAVENOUS CONTINUOUS
Status: ACTIVE | OUTPATIENT
Start: 2024-07-12 | End: 2024-07-12

## 2024-07-11 RX ORDER — GLUCAGON 1 MG
1 KIT INJECTION
Status: DISCONTINUED | OUTPATIENT
Start: 2024-07-12 | End: 2024-07-13 | Stop reason: HOSPADM

## 2024-07-11 RX ORDER — TACROLIMUS 1 MG/1
1 CAPSULE ORAL EVERY EVENING
Status: DISCONTINUED | OUTPATIENT
Start: 2024-07-12 | End: 2024-07-13 | Stop reason: HOSPADM

## 2024-07-11 RX ORDER — ACETAMINOPHEN 325 MG/1
650 TABLET ORAL EVERY 4 HOURS PRN
Status: DISCONTINUED | OUTPATIENT
Start: 2024-07-12 | End: 2024-07-13 | Stop reason: HOSPADM

## 2024-07-11 RX ORDER — NALOXONE HCL 0.4 MG/ML
0.02 VIAL (ML) INJECTION
Status: DISCONTINUED | OUTPATIENT
Start: 2024-07-12 | End: 2024-07-13 | Stop reason: HOSPADM

## 2024-07-11 RX ORDER — SILDENAFIL CITRATE 20 MG/1
20 TABLET ORAL 2 TIMES DAILY
Status: DISCONTINUED | OUTPATIENT
Start: 2024-07-12 | End: 2024-07-13 | Stop reason: HOSPADM

## 2024-07-11 RX ORDER — POLYETHYLENE GLYCOL 3350 17 G/17G
17 POWDER, FOR SOLUTION ORAL DAILY PRN
Status: DISCONTINUED | OUTPATIENT
Start: 2024-07-12 | End: 2024-07-13 | Stop reason: HOSPADM

## 2024-07-11 RX ORDER — IBUPROFEN 200 MG
24 TABLET ORAL
Status: DISCONTINUED | OUTPATIENT
Start: 2024-07-12 | End: 2024-07-13 | Stop reason: HOSPADM

## 2024-07-11 RX ORDER — SODIUM CHLORIDE 0.9 % (FLUSH) 0.9 %
1-10 SYRINGE (ML) INJECTION EVERY 12 HOURS PRN
Status: DISCONTINUED | OUTPATIENT
Start: 2024-07-12 | End: 2024-07-13 | Stop reason: HOSPADM

## 2024-07-11 RX ORDER — ALUMINUM HYDROXIDE, MAGNESIUM HYDROXIDE, AND SIMETHICONE 1200; 120; 1200 MG/30ML; MG/30ML; MG/30ML
30 SUSPENSION ORAL 4 TIMES DAILY PRN
Status: DISCONTINUED | OUTPATIENT
Start: 2024-07-12 | End: 2024-07-13 | Stop reason: HOSPADM

## 2024-07-11 RX ORDER — ONDANSETRON 4 MG/1
8 TABLET, ORALLY DISINTEGRATING ORAL EVERY 8 HOURS PRN
Status: DISCONTINUED | OUTPATIENT
Start: 2024-07-12 | End: 2024-07-13 | Stop reason: HOSPADM

## 2024-07-11 RX ORDER — IBUPROFEN 200 MG
16 TABLET ORAL
Status: DISCONTINUED | OUTPATIENT
Start: 2024-07-12 | End: 2024-07-13 | Stop reason: HOSPADM

## 2024-07-11 RX ORDER — TALC
6 POWDER (GRAM) TOPICAL NIGHTLY PRN
Status: DISCONTINUED | OUTPATIENT
Start: 2024-07-12 | End: 2024-07-13 | Stop reason: HOSPADM

## 2024-07-11 RX ORDER — SIMETHICONE 80 MG
1 TABLET,CHEWABLE ORAL 4 TIMES DAILY PRN
Status: DISCONTINUED | OUTPATIENT
Start: 2024-07-12 | End: 2024-07-13 | Stop reason: HOSPADM

## 2024-07-11 RX ORDER — ACETAMINOPHEN 325 MG/1
650 TABLET ORAL EVERY 8 HOURS PRN
Status: DISCONTINUED | OUTPATIENT
Start: 2024-07-12 | End: 2024-07-13 | Stop reason: HOSPADM

## 2024-07-11 RX ORDER — ENOXAPARIN SODIUM 100 MG/ML
40 INJECTION SUBCUTANEOUS EVERY 24 HOURS
Status: DISCONTINUED | OUTPATIENT
Start: 2024-07-12 | End: 2024-07-13 | Stop reason: HOSPADM

## 2024-07-12 PROBLEM — K52.9 ENTERITIS: Status: ACTIVE | Noted: 2024-07-11

## 2024-07-12 PROBLEM — A08.4 VIRAL ENTERITIS: Status: ACTIVE | Noted: 2024-07-11

## 2024-07-12 LAB
ANION GAP SERPL CALC-SCNC: 7 MMOL/L (ref 8–16)
BUN SERPL-MCNC: 12 MG/DL (ref 8–23)
CALCIUM SERPL-MCNC: 8.6 MG/DL (ref 8.7–10.5)
CHLORIDE SERPL-SCNC: 108 MMOL/L (ref 95–110)
CO2 SERPL-SCNC: 26 MMOL/L (ref 23–29)
CREAT SERPL-MCNC: 0.8 MG/DL (ref 0.5–1.4)
EST. GFR  (NO RACE VARIABLE): >60 ML/MIN/1.73 M^2
GLUCOSE SERPL-MCNC: 86 MG/DL (ref 70–110)
POTASSIUM SERPL-SCNC: 3.8 MMOL/L (ref 3.5–5.1)
SODIUM SERPL-SCNC: 141 MMOL/L (ref 136–145)

## 2024-07-12 PROCEDURE — 96372 THER/PROPH/DIAG INJ SC/IM: CPT | Performed by: STUDENT IN AN ORGANIZED HEALTH CARE EDUCATION/TRAINING PROGRAM

## 2024-07-12 PROCEDURE — 20600001 HC STEP DOWN PRIVATE ROOM

## 2024-07-12 PROCEDURE — 36415 COLL VENOUS BLD VENIPUNCTURE: CPT | Performed by: STUDENT IN AN ORGANIZED HEALTH CARE EDUCATION/TRAINING PROGRAM

## 2024-07-12 PROCEDURE — 27000207 HC ISOLATION

## 2024-07-12 PROCEDURE — 63600175 PHARM REV CODE 636 W HCPCS: Performed by: STUDENT IN AN ORGANIZED HEALTH CARE EDUCATION/TRAINING PROGRAM

## 2024-07-12 PROCEDURE — 80048 BASIC METABOLIC PNL TOTAL CA: CPT | Performed by: STUDENT IN AN ORGANIZED HEALTH CARE EDUCATION/TRAINING PROGRAM

## 2024-07-12 PROCEDURE — 63600175 PHARM REV CODE 636 W HCPCS: Performed by: HOSPITALIST

## 2024-07-12 PROCEDURE — 25000003 PHARM REV CODE 250: Performed by: STUDENT IN AN ORGANIZED HEALTH CARE EDUCATION/TRAINING PROGRAM

## 2024-07-12 RX ORDER — PROCHLORPERAZINE EDISYLATE 5 MG/ML
2.5 INJECTION INTRAMUSCULAR; INTRAVENOUS EVERY 6 HOURS PRN
Status: DISCONTINUED | OUTPATIENT
Start: 2024-07-12 | End: 2024-07-13 | Stop reason: HOSPADM

## 2024-07-12 RX ORDER — SODIUM CHLORIDE, SODIUM LACTATE, POTASSIUM CHLORIDE, CALCIUM CHLORIDE 600; 310; 30; 20 MG/100ML; MG/100ML; MG/100ML; MG/100ML
INJECTION, SOLUTION INTRAVENOUS CONTINUOUS
Status: DISCONTINUED | OUTPATIENT
Start: 2024-07-12 | End: 2024-07-13

## 2024-07-12 RX ADMIN — SILDENAFIL 20 MG: 20 TABLET ORAL at 08:07

## 2024-07-12 RX ADMIN — TACROLIMUS 1 MG: 1 CAPSULE ORAL at 04:07

## 2024-07-12 RX ADMIN — SODIUM CHLORIDE, SODIUM LACTATE, POTASSIUM CHLORIDE, AND CALCIUM CHLORIDE: 600; 310; 30; 20 INJECTION, SOLUTION INTRAVENOUS at 04:07

## 2024-07-12 RX ADMIN — SODIUM CHLORIDE, POTASSIUM CHLORIDE, SODIUM LACTATE AND CALCIUM CHLORIDE: 600; 310; 30; 20 INJECTION, SOLUTION INTRAVENOUS at 08:07

## 2024-07-12 RX ADMIN — MYCOPHENOLATE MOFETIL 500 MG: 250 CAPSULE ORAL at 08:07

## 2024-07-12 RX ADMIN — ONDANSETRON 8 MG: 4 TABLET, ORALLY DISINTEGRATING ORAL at 12:07

## 2024-07-12 RX ADMIN — SODIUM CHLORIDE, SODIUM LACTATE, POTASSIUM CHLORIDE, AND CALCIUM CHLORIDE: 600; 310; 30; 20 INJECTION, SOLUTION INTRAVENOUS at 09:07

## 2024-07-12 RX ADMIN — SODIUM CHLORIDE, POTASSIUM CHLORIDE, SODIUM LACTATE AND CALCIUM CHLORIDE: 600; 310; 30; 20 INJECTION, SOLUTION INTRAVENOUS at 12:07

## 2024-07-12 RX ADMIN — TACROLIMUS 2 MG: 1 CAPSULE ORAL at 08:07

## 2024-07-12 RX ADMIN — Medication 6 MG: at 12:07

## 2024-07-12 RX ADMIN — ENOXAPARIN SODIUM 40 MG: 40 INJECTION SUBCUTANEOUS at 04:07

## 2024-07-12 RX ADMIN — MYCOPHENOLATE MOFETIL 500 MG: 250 CAPSULE ORAL at 12:07

## 2024-07-12 RX ADMIN — Medication 6 MG: at 09:07

## 2024-07-12 NOTE — ASSESSMENT & PLAN NOTE
Presented to OSH with n/v, and CT showed short segment of dilated proximal small bowel measuring up to 3.4 cm, possibly related to a localized ileus or a developing small bowel obstruction. Suspect mild ileus as she continues to have BMs. Transferred here for Surgery evaluation; appreciate recommendations. Continue NPO/ice chips for now with IVF and antiemetics.

## 2024-07-12 NOTE — PLAN OF CARE
Hospital Medicine Team L    Nadeen Mcgovern is a 63 year old Black woman with scleroderma, Raynaud's disease, restrictive lung disease, pulmonary hypertension, history of unresectable cholangiocarcinoma status post liver transplant on 10/8/2015 (immunosuppressed with tacrolimus and mycophenolate). She lives in [facility]. Her primary care physician is Dr. Alvin Dennis. Her rheumatologist is Dr. Haresh Butler.    She presented to [facility] Emergency Department on 7/11/2024 with left sided abdominal pain, nausea, vomiting, and diarrhea that started 2 days ago. She reported eating some gumbo that she thought was bad prior to symptoms. She was found to have COVID-19. Urinalysis showed 2+ protein, 2+ ketones, 36 RBC/hpf, 10 WBC/hpf, rare bacteria, occasional calcium oxalate crystals, and 28 squamous epithelia/lpf. WBC was normal. CT showed a short segment of dilated proximal small bowel measuring up to 3.4 cm. She was given ceftriaxone and IV fluids. She was transferred to [facility] for evaluation by Hepatology and possibly General Surgery if needed. She was admitted to Hospital Medicine Team L.     She was given lactated Ringer's solution infusion. Her tacrolimus and mycophenolate were continued. General Surgery suspected enteritis.     Plan:  Start trial of clear liquids.   Abdomen X-ray to follow up small bowel distension.

## 2024-07-12 NOTE — ASSESSMENT & PLAN NOTE
Tested positive for POC rapid COVID at OSH, although without respiratory or viral symptoms other than n/v/d. Remains with excellent sats on room air. Not a candidate for Dexamethasone due to absence of hypoxia. If she shows signs of worsening clinical status or viral symptoms, may consider Remdesivir.  Appropriate precautions ordered.

## 2024-07-12 NOTE — PLAN OF CARE
[facility] - Telemetry Stepdown  Initial Discharge Assessment       Primary Care Provider: Alvin Dennis MD    Admission Diagnosis: Ileus    Admission Date: 7/11/2024  Expected Discharge Date:     Transition of Care Barriers: None    Payor: [employer] [facility] / Plan: [employer] / Product Type: Medicare Advantage /     Extended Emergency Contact Information  Primary Emergency Contact: Minor Mcgovern  Address: [address] [place] [address]  Mobile Phone: [phone]  Relation: Daughter  Secondary Emergency Contact: Perla Mcgovern  Address: [address]   [place]  Home Phone: [phone]  Mobile Phone: [phone]  Relation: Daughter    Discharge Plan A: Home, Home with family  Discharge Plan B: Home, Home with family, Home Health      [id number] - [place], [place] - [address] AT [facility] [address]  [address]  [place] [address]  Phone: [phone] Fax: [phone]    [id number] - [place], [place] - [address] AT [facility] [address]  [address]  [place] [address]  Phone: [phone] Fax: [phone]      Initial Assessment (most recent)       Adult Discharge Assessment - 07/12/24 0918          Discharge Assessment    Assessment Type Discharge Planning Assessment     Confirmed/corrected address, phone number and insurance Yes     Confirmed Demographics Correct on Facesheet     Source of Information patient     Does patient/caregiver understand observation status Yes     Communicated EREN with patient/caregiver Yes     Reason For Admission Ileus     People in Home alone     Do you expect to return to your current living situation? Yes     Do you have help at home or someone to help you manage your care at home? Yes     Who are your caregiver(s) and their phone number(s)? Minor Mcgovern - daughter  [phone]     Prior to hospitilization cognitive  status: Alert/Oriented     Current cognitive status: Alert/Oriented     Walking or Climbing Stairs Difficulty no     Equipment Currently Used at Home none     Readmission within 30 days? No     Patient currently being followed by outpatient case management? No     Do you currently have service(s) that help you manage your care at home? No     Do you take prescription medications? Yes     Do you have prescription coverage? Yes     Do you have any problems affording any of your prescribed medications? No     Is the patient taking medications as prescribed? yes     Who is going to help you get home at discharge? Minor Mcgovern - daughter [phone]     How do you get to doctors appointments? car, drives self;family or friend will provide     Are you on dialysis? No     Do you take coumadin? No     Discharge Plan A Home;Home with family     Discharge Plan B Home;Home with family;Home Health     DME Needed Upon Discharge  other (see comments)   TBD    Discharge Plan discussed with: Patient     Transition of Care Barriers None        Physical Activity    On average, how many days per week do you engage in moderate to strenuous exercise (like a brisk walk)? 7 days     On average, how many minutes do you engage in exercise at this level? 20 min        Financial Resource Strain    How hard is it for you to pay for the very basics like food, housing, medical care, and heating? Not hard at all        Housing Stability    In the last 12 months, was there a time when you were not able to pay the mortgage or rent on time? No     At any time in the past 12 months, were you homeless or living in a shelter (including now)? No        Transportation Needs    Has the lack of transportation kept you from medical appointments, meetings, work or from getting things needed for daily living? No        Food Insecurity    Within the past 12 months, you worried that your food would run out before you got the money to buy more. Never true      Within the past 12 months, the food you bought just didn't last and you didn't have money to get more. Never true        Stress    Do you feel stress - tense, restless, nervous, or anxious, or unable to sleep at night because your mind is troubled all the time - these days? Not at all        Social Isolation    How often do you feel lonely or isolated from those around you?  Never        Alcohol Use    Q1: How often do you have a drink containing alcohol? Patient declined     Q2: How many drinks containing alcohol do you have on a typical day when you are drinking? Patient declined     Q3: How often do you have six or more drinks on one occasion? Patient declined        Utilities    In the past 12 months has the electric, gas, oil, or water company threatened to shut off services in your home? No        Health Literacy    How often do you need to have someone help you when you read instructions, pamphlets, or other written material from your doctor or pharmacy? Never                   Met with the patient and completed the initial assessment. Pt states she is very active and has no needs at this time. Family will provide transport home at D/C. Discharge Plan A and Plan B have been determined by review of patient's clinical status, future medical and therapeutic needs, and coverage/benefits for post-acute care in coordination with multidisciplinary team members.  Stalin Fowler, [facility]    [facility]  [phone]

## 2024-07-12 NOTE — H&P
"  [facility] - Telemetry [facility] Medicine  History & Physical    Patient Name: Nadeen Mcgovern  MRN: [id number]  Patient Class: OP- Observation  Admission Date: 7/11/2024  Attending Physician: Jhoan Merino MD   Primary Care Provider: Alvin Dennis MD         Patient information was obtained from patient and past medical records.     Subjective:     Principal Problem:Ileus    Chief Complaint: No chief complaint on file.       HPI: Nadeen Mcgovern is a 63 y.o. female with history of liver transplant due to unresectable cholangiocarcinoma, scleroderma, pulmonary HTN, and restrictive lung disease who is transferred from [place] ED for ileus vs. SBO.     She reports that on Sunday, she developed some nausea with vomiting and watery diarrhea which she felt was due to food poisoning.  She has had persistent nausea with vomiting and abdominal cramping since that time.  She has tried to maintain p.o. intake with liquids. Denies any fever, SOB, cough, or other significant symptoms.  Given the persistence of her nausea with vomiting, she presented to the outside ED. she has continued to have bowel movements, and her last bowel movement was this morning; described as diarrhea with some soft formed stool.  She continues to pass flatus.     Per Transfer Center:  63F history of OLT in 10/2015 for cholangiocarcinoma on immunosuppression, scleroderma and Raynaud's f/b Dr. Butler with rheumatology currently at [facility] ED for evaluation of intractable nausea with vomiting and abdominal pain. Patient presents today with concern of left sided abdominal pain, +N/V/D starting about 2 days ago. Patient denies SOB, chest pain, numbness, tingling, muscle pain. Patient states she ate some "gumbo that she thinks was bad"  and symptoms started thereafter. Patient hypertensive otherwise AFVSS. Labs notable for mild hypokalemia, +CV-19, and +UA c/f infection.  CT A/P shows short segment of dilated proximal small bowel measuring up to 3.4 " cm, possibly related to a localized ileus or a developing small bowel obstruction.  No mural thickening, pneumatosis or surrounding inflammatory change. She is given CTX and started on maintenance fluids. Patient is targeted to transfer to [facility] for possible eval by [facility] and transplant services.    Past Medical History:   Diagnosis Date    Acute cholecystitis     Cholecystitis    Anemia     Arthritis     septic arthritis of right shoulder    Bacteremia due to Streptococcus pneumoniae     Cancer     Cataract     Cholangiocarcinoma     Fever blister 20    Hypertension     Jaundice     obstructive    Liver transplant status     Organ transplant     Prediabetes     Raynaud disease     Scleroderma        Past Surgical History:   Procedure Laterality Date    arthoscopic Right     drained infection     SECTION      ESOPHAGOGASTRODUODENOSCOPY N/A 2020    Procedure: EGD (ESOPHAGOGASTRODUODENOSCOPY);  Surgeon: Drew Mackay MD;  Location: [facility] ([address] [place] [address]);  Service: Endoscopy;  Laterality: N/A;  [facility] [facility]    INCISION AND DRAINAGE OF WOUND      s/p I&D of R thumb    LIVER TRANSPLANT      SHOULDER ARTHROSCOPY         Review of patient's allergies indicates:   Allergen Reactions    Tomato Rash       Current Facility-Administered Medications on File Prior to Encounter   Medication    [COMPLETED] cefTRIAXone (Rocephin) 1 g in D5W 100 mL IVPB (MB+)    [COMPLETED] dicyclomine injection 20 mg    [COMPLETED] ondansetron injection 4 mg    [COMPLETED] ondansetron injection 4 mg    [COMPLETED] ondansetron injection 4 mg    [COMPLETED] sodium chloride 0.9% bolus 1,000 mL 1,000 mL    [DISCONTINUED] 0.9%  NaCl infusion     Current Outpatient Medications on File Prior to Encounter   Medication Sig    aspirin 81 MG Chew Take 81 mg by mouth once daily.    multivitamin (THERAGRAN) tablet Take 1 tablet by mouth once daily.    mycophenolate (CELLCEPT) 500 mg Tab Take 3 tablets (1,500 mg  total) by mouth 2 (two) times daily.    rosuvastatin (CRESTOR) 10 MG tablet Take 1 tablet (10 mg total) by mouth once daily.    sildenafil (REVATIO) 20 mg Tab TAKE 1 TABLET (20 MG) BY MOUTH TWO TIMES DAILY    SUTAB 1.479-0.188- 0.225 gram tablet     tacrolimus (PROGRAF) 1 MG Cap Take 2 capsules (2 mg total) by mouth every morning AND 1 capsule (1 mg total) every evening.    betamethasone dipropionate (DIPROLENE) 0.05 % ointment Apply topically 2 (two) times daily. To affected areas on hands    diphenhydrAMINE (BENADRYL) 25 mg capsule Take 25 mg by mouth every 6 (six) hours as needed for Itching.    estradioL (ESTRACE) 0.01 % (0.1 mg/gram) vaginal cream Place 1 g vaginally once daily.    ondansetron (ZOFRAN) 4 MG tablet Take 1 tablet (4 mg total) by mouth every 8 (eight) hours as needed for Nausea.     Family History       Problem Relation (Age of Onset)    Alcohol abuse Brother    Arthritis Father, Sister    Breast cancer Sister (73)    Cancer Father, Paternal Grandmother, Brother    Diabetes Mother    Heart attack Mother    Hypertension Mother    No Known Problems Daughter, Daughter, Son, Son    Psoriasis Sister    Stroke Mother          Tobacco Use    Smoking status: Never     Passive exposure: Current    Smokeless tobacco: Never   Substance and Sexual Activity    Alcohol use: Not Currently     Alcohol/week: 2.0 standard drinks of alcohol     Types: 2 Cans of beer per week     Comment: To be social with family and friends.    Drug use: Yes     Frequency: 3.0 times per week     Types: Marijuana     Comment: Help with pain    Sexual activity: Yes     Partners: Male     Birth control/protection: Condom     Review of Systems   All other systems reviewed and are negative.    Objective:     Vital Signs (Most Recent):  Temp: 97.9 °F (36.6 °C) (07/11/24 2315)  Pulse: (!) 58 (07/11/24 2315)  Resp: 16 (07/11/24 2315)  BP: (!) 129/50 (07/11/24 2315)  SpO2: 99 % (07/11/24 2315) Vital Signs (24h Range):  Temp:  [97.4 °F (36.3  °C)-98.1 °F (36.7 °C)] 97.9 °F (36.6 °C)  Pulse:  [47-73] 58  Resp:  [16-19] 16  SpO2:  [97 %-100 %] 99 %  BP: (119-193)/(50-81) 129/50     Weight: 61.2 kg (134 lb 14.7 oz)  Body mass index is 24.68 kg/m².     Physical Exam  Vitals and nursing note reviewed.   Constitutional:       General: She is not in acute distress.     Appearance: She is well-developed. She is not diaphoretic.   HENT:      Head: Normocephalic and atraumatic.      Mouth/Throat:      Mouth: Mucous membranes are dry.   Eyes:      General: No scleral icterus.     Conjunctiva/sclera: Conjunctivae normal.   Neck:      Vascular: No JVD.   Cardiovascular:      Rate and Rhythm: Regular rhythm. Bradycardia present.   Pulmonary:      Effort: Pulmonary effort is normal. No respiratory distress.   Abdominal:      General: There is no distension.      Tenderness: There is no abdominal tenderness.      Comments: Hypoactive bowel sounds    Musculoskeletal:      Right lower leg: No edema.      Left lower leg: No edema.   Skin:     Coloration: Skin is not jaundiced or pale.   Neurological:      Mental Status: She is alert and oriented to person, place, and time.      Motor: No abnormal muscle tone.   Psychiatric:         Mood and Affect: Mood normal.         Behavior: Behavior normal.                Significant Labs: All pertinent labs within the past 24 hours have been reviewed.  CBC:   Recent Labs   Lab 07/11/24  0728   WBC 9.12   HGB 15.2   HCT 45.4        CMP:   Recent Labs   Lab 07/11/24  0728      K 3.4*      CO2 21*   *   BUN 19   CREATININE 0.9   CALCIUM 10.0   PROT 8.0   ALBUMIN 4.5   BILITOT 1.5*   ALKPHOS 67   AST 18   ALT 17   ANIONGAP 15       Significant Imaging: I have reviewed all pertinent imaging results/findings within the past 24 hours.  Assessment/Plan:     * Ileus  Presented to OSH with n/v, and CT showed short segment of dilated proximal small bowel measuring up to 3.4 cm, possibly related to a localized ileus or  a developing small bowel obstruction. Suspect mild ileus as she continues to have BMs. Transferred here for Surgery evaluation; appreciate recommendations. Continue NPO/ice chips for now with IVF and antiemetics.    COVID-19  Tested positive for POC rapid COVID at OSH, although without respiratory or viral symptoms other than n/v/d. Remains with excellent sats on room air. Not a candidate for Dexamethasone due to absence of hypoxia. If she shows signs of worsening clinical status or viral symptoms, may consider Remdesivir.  Appropriate precautions ordered.     Pulmonary hypertension  Currently HD stable on room air with excellent sats. Continue home Sildenafil.       Restrictive lung disease  Currently stable on room air with excellent sats. Monitor.       Scleroderma  May contribute to increased risk of GI obstruction/ileus. Continue home Cellcept.       S/P liver transplant  S/p liver transplant 10/8/15 for unresectable cholangiocarcinoma and biliary tract carcinoma. Continue home Prograf and Cellcept. Appreciate transplant recommendations.         VTE Risk Mitigation (From admission, onward)           Ordered     enoxaparin injection 40 mg  Every 24 hours         07/11/24 2325     IP VTE HIGH RISK PATIENT  Once         07/11/24 2325     Place sequential compression device  Until discontinued         07/11/24 2325                       On 07/12/2024, patient should be placed in hospital observation services under my care.    Advance Care Planning   I spent less than 16 minutes discussing advance care planning.   Patient is FULL CODE on discussion with her.  Patient's surrogate decision maker is her daughter, Minor.            Josue Covarrubias MD  Department of Hospital Medicine  [facility] - Telemetry Stepdown

## 2024-07-12 NOTE — ASSESSMENT & PLAN NOTE
S/p liver transplant 10/8/15 for unresectable cholangiocarcinoma and biliary tract carcinoma. Continue home Prograf and Cellcept. Appreciate transplant recommendations.

## 2024-07-12 NOTE — NURSING
Pt arrived to floor via [facility] from [facility]. Pt ambulated from stretcher to bed with out any assistance. Pt assessed, and gown applied. Dr. Jovani Preston notified of pt arrival. Awaiting orders.

## 2024-07-12 NOTE — HPI
Nadeen Mcgovern is a 63 year old Black woman with scleroderma, Raynaud's disease, restrictive lung disease, pulmonary hypertension, history of unresectable cholangiocarcinoma status post liver transplant on 10/8/2015 (immunosuppressed with tacrolimus and mycophenolate). She lives in [facility]. Her primary care physician is Dr. Alvin Dennis. Her rheumatologist is Dr. Haresh Butler.    She presented to [facility] Emergency Department on 7/11/2024 with left sided abdominal pain, nausea, vomiting, and diarrhea that started 2 days ago. She reported eating some gumbo that she thought was bad prior to symptoms. She was found to have COVID-19. Urinalysis showed 2+ protein, 2+ ketones, 36 RBC/hpf, 10 WBC/hpf, rare bacteria, occasional calcium oxalate crystals, and 28 squamous epithelia/lpf. WBC was normal. CT showed a short segment of dilated proximal small bowel measuring up to 3.4 cm. She was given ceftriaxone and IV fluids. She was transferred to [facility] for evaluation by Hepatology and possibly General Surgery if needed. She was admitted to Hospital Medicine Team L.

## 2024-07-12 NOTE — CONSULTS
"[facility] - Telemetry Stepdown  General Surgery  Consult Note    Inpatient consult to General Surgery  Consult performed by: Nereida Mederos MD  Consult ordered by: Josue Covarrubias MD        Subjective:     Chief Complaint/Reason for Admission: ileus    History of Present Illness: Patient is a 63y F w/ hx of liver transplant for unresectable cholangiocarcinoma, scleroderma, pulm HTN and restrictive lung disease who was transferred to [facility] for ileus. Consult to general surgery for ileus vs SBO.    Patient began having nausea, vomiting and diarrhea on Sunday after eating some bad gumbo. She continued to have diarrhea, nausea and some vomiting over the following days. Today, she denies any continued abdominal pain and is passing "a ton of gas". Says she is feeling "great" and would like to take a walk in the hallway. No nausea or emesis.     Current Facility-Administered Medications on File Prior to Encounter   Medication    [COMPLETED] ondansetron injection 4 mg    [DISCONTINUED] 0.9%  NaCl infusion     Current Outpatient Medications on File Prior to Encounter   Medication Sig    aspirin 81 MG Chew Take 81 mg by mouth once daily.    multivitamin (THERAGRAN) tablet Take 1 tablet by mouth once daily.    mycophenolate (CELLCEPT) 500 mg Tab Take 3 tablets (1,500 mg total) by mouth 2 (two) times daily.    rosuvastatin (CRESTOR) 10 MG tablet Take 1 tablet (10 mg total) by mouth once daily.    sildenafil (REVATIO) 20 mg Tab TAKE 1 TABLET (20 MG) BY MOUTH TWO TIMES DAILY    tacrolimus (PROGRAF) 1 MG Cap Take 2 capsules (2 mg total) by mouth every morning AND 1 capsule (1 mg total) every evening.    [DISCONTINUED] SUTAB 1.479-0.188- 0.225 gram tablet     betamethasone dipropionate (DIPROLENE) 0.05 % ointment Apply topically 2 (two) times daily. To affected areas on hands    diphenhydrAMINE (BENADRYL) 25 mg capsule Take 25 mg by mouth every 6 (six) hours as needed for Itching.    estradioL (ESTRACE) 0.01 % (0.1 " mg/gram) vaginal cream Place 1 g vaginally once daily.    ondansetron (ZOFRAN) 4 MG tablet Take 1 tablet (4 mg total) by mouth every 8 (eight) hours as needed for Nausea.       Review of patient's allergies indicates:   Allergen Reactions    Tomato Rash       Past Medical History:   Diagnosis Date    Acute cholecystitis     Cholecystitis    Anemia     Arthritis 2015    septic arthritis of right shoulder    Bacteremia due to Streptococcus pneumoniae     Cancer     Cataract     Cholangiocarcinoma     Fever blister 20    Hypertension     Jaundice     obstructive    Liver transplant status     Organ transplant     Prediabetes     Raynaud disease     Scleroderma      Past Surgical History:   Procedure Laterality Date    arthoscopic Right     drained infection     SECTION      ESOPHAGOGASTRODUODENOSCOPY N/A 2020    Procedure: EGD (ESOPHAGOGASTRODUODENOSCOPY);  Surgeon: Drew Mackay MD;  Location: [facility] ([address] [place] [address]);  Service: Endoscopy;  Laterality: N/A;  [facility] [facility]    INCISION AND DRAINAGE OF WOUND      s/p I&D of R thumb    LIVER TRANSPLANT      SHOULDER ARTHROSCOPY       Family History       Problem Relation (Age of Onset)    Alcohol abuse Brother    Arthritis Father, Sister    Breast cancer Sister (73)    Cancer Father, Paternal Grandmother, Brother    Diabetes Mother    Heart attack Mother    Hypertension Mother    No Known Problems Daughter, Daughter, Son, Son    Psoriasis Sister    Stroke Mother          Tobacco Use    Smoking status: Never     Passive exposure: Current    Smokeless tobacco: Never   Substance and Sexual Activity    Alcohol use: Not Currently     Alcohol/week: 2.0 standard drinks of alcohol     Types: 2 Cans of beer per week     Comment: To be social with family and friends.    Drug use: Yes     Frequency: 3.0 times per week     Types: Marijuana     Comment: Help with pain    Sexual activity: Yes     Partners: Male     Birth control/protection:  Condom     Review of Systems   Constitutional:  Negative for fatigue and unexpected weight change.   HENT:  Negative for facial swelling.    Eyes:  Negative for redness.   Respiratory:  Negative for chest tightness and shortness of breath.    Cardiovascular:  Negative for chest pain and leg swelling.   Gastrointestinal:  Negative for abdominal pain, blood in stool, constipation, diarrhea and vomiting.   Musculoskeletal: Negative.    Neurological:  Negative for dizziness and headaches.   Psychiatric/Behavioral: Negative.       Objective:     Vital Signs (Most Recent):  Temp: 98.1 °F (36.7 °C) (07/12/24 0744)  Pulse: (!) 53 (07/12/24 0744)  Resp: 19 (07/12/24 0744)  BP: 124/71 (07/12/24 0744)  SpO2: 100 % (07/12/24 0744) Vital Signs (24h Range):  Temp:  [97.9 °F (36.6 °C)-98.3 °F (36.8 °C)] 98.1 °F (36.7 °C)  Pulse:  [47-65] 53  Resp:  [16-19] 19  SpO2:  [97 %-100 %] 100 %  BP: (116-193)/(50-81) 124/71     Weight: 61.2 kg (134 lb 14.7 oz)  Body mass index is 24.68 kg/m².    No intake or output data in the 24 hours ending 07/12/24 1048    Physical Exam  Constitutional:       General: She is not in acute distress.     Appearance: Normal appearance.   HENT:      Head: Normocephalic and atraumatic.      Mouth/Throat:      Mouth: Mucous membranes are moist.   Eyes:      Pupils: Pupils are equal, round, and reactive to light.   Cardiovascular:      Rate and Rhythm: Normal rate.   Pulmonary:      Effort: Pulmonary effort is normal. No respiratory distress.   Abdominal:      General: Abdomen is flat. There is no distension.      Palpations: Abdomen is soft. There is no mass.      Tenderness: There is no abdominal tenderness. There is no guarding.      Hernia: No hernia is present.   Musculoskeletal:         General: Normal range of motion.      Cervical back: Normal range of motion.   Skin:     General: Skin is warm and dry.   Neurological:      General: No focal deficit present.      Mental Status: She is alert and oriented  to person, place, and time.   Psychiatric:         Mood and Affect: Mood normal.         Behavior: Behavior normal.         Significant Labs:  All pertinent labs from the last 24 hours have been reviewed.    Significant Diagnostics:  I have reviewed all pertinent imaging results/findings within the past 24 hours.    Assessment/Plan:     Active Diagnoses:    Diagnosis Date Noted POA    PRINCIPAL PROBLEM:  Ileus [K56.7] 07/11/2024 Yes    COVID-19 [U07.1] 07/11/2024 Yes    Pulmonary hypertension [I27.20] 06/25/2021 Yes     Chronic    Restrictive lung disease [J98.4]  Yes     Chronic    Scleroderma [M34.9] 08/17/2016 Yes     Chronic    S/P liver transplant [Z94.4] 10/08/2015 Not Applicable     Chronic      Problems Resolved During this Admission:   Patient is a 63y F admitted with abdominal pain, nausea, vomiting and diarrhea. Consult to general surgery for ileus vs SBO.     Patient is not clinically obstructed (no SBO) or does she appear to be having loss of GI motility (ileus) with repeated episodes of diarrhea that she continues to have. In the setting of her nausea and vomiting we would say this sounds more like an enteritis picture. Would recommend supportive care .     Thank you for your consult. I will sign off. Please contact us if you have any additional questions.    Nereida Mederos MD  General Surgery  [facility] - Telemetry Stepdown

## 2024-07-12 NOTE — HOSPITAL COURSE
She was given lactated Ringer's solution infusion. Her tacrolimus and mycophenolate were continued. General Surgery suspected enteritis. Trial of clear liquids was started. She tolerated it. Diet was advanced. She tolerated regular diet. The liver transplant team was consulted on admission but did not see her before she was ready for discharge. She reported that her immunosuppressants had never been held, even when she had a UTI. Since her symptoms were resolving, it was not felt necessary to hold mycophenolate for her COVID-19 enteritis.

## 2024-07-12 NOTE — NURSING
Nurses Note -- 4 Eyes      7/11/2024   11:04 PM      Skin assessed during: Admit      [x] No Altered Skin Integrity Present    [x]Prevention Measures Documented      [] Yes- Altered Skin Integrity Present or Discovered   [] LDA Added if Not in Epic (Describe Wound)   [] New Altered Skin Integrity was Present on Admit and Documented in LDA   [] Wound Image Taken    Wound Care Consulted? No    Attending Nurse:  Toya Alfred RN/Staff Member:  Toya/Olinda

## 2024-07-12 NOTE — SUBJECTIVE & OBJECTIVE
Past Medical History:   Diagnosis Date    Acute cholecystitis     Cholecystitis    Anemia     Arthritis     septic arthritis of right shoulder    Bacteremia due to Streptococcus pneumoniae     Cancer     Cataract     Cholangiocarcinoma     Fever blister 20    Hypertension     Jaundice     obstructive    Liver transplant status     Organ transplant     Prediabetes     Raynaud disease     Scleroderma        Past Surgical History:   Procedure Laterality Date    arthoscopic Right     drained infection     SECTION      ESOPHAGOGASTRODUODENOSCOPY N/A 2020    Procedure: EGD (ESOPHAGOGASTRODUODENOSCOPY);  Surgeon: Drew Mackay MD;  Location: [facility] ([address] [place] [address]);  Service: Endoscopy;  Laterality: N/A;  covid-20-[facility]    INCISION AND DRAINAGE OF WOUND      s/p I&D of R thumb    LIVER TRANSPLANT      SHOULDER ARTHROSCOPY         Review of patient's allergies indicates:   Allergen Reactions    Tomato Rash       Current Facility-Administered Medications on File Prior to Encounter   Medication    [COMPLETED] cefTRIAXone (Rocephin) 1 g in D5W 100 mL IVPB (MB+)    [COMPLETED] dicyclomine injection 20 mg    [COMPLETED] ondansetron injection 4 mg    [COMPLETED] ondansetron injection 4 mg    [COMPLETED] ondansetron injection 4 mg    [COMPLETED] sodium chloride 0.9% bolus 1,000 mL 1,000 mL    [DISCONTINUED] 0.9%  NaCl infusion     Current Outpatient Medications on File Prior to Encounter   Medication Sig    aspirin 81 MG Chew Take 81 mg by mouth once daily.    multivitamin (THERAGRAN) tablet Take 1 tablet by mouth once daily.    mycophenolate (CELLCEPT) 500 mg Tab Take 3 tablets (1,500 mg total) by mouth 2 (two) times daily.    rosuvastatin (CRESTOR) 10 MG tablet Take 1 tablet (10 mg total) by mouth once daily.    sildenafil (REVATIO) 20 mg Tab TAKE 1 TABLET (20 MG) BY MOUTH TWO TIMES DAILY    SUTAB 1.479-0.188- 0.225 gram tablet     tacrolimus (PROGRAF) 1 MG Cap Take 2 capsules (2 mg  total) by mouth every morning AND 1 capsule (1 mg total) every evening.    betamethasone dipropionate (DIPROLENE) 0.05 % ointment Apply topically 2 (two) times daily. To affected areas on hands    diphenhydrAMINE (BENADRYL) 25 mg capsule Take 25 mg by mouth every 6 (six) hours as needed for Itching.    estradioL (ESTRACE) 0.01 % (0.1 mg/gram) vaginal cream Place 1 g vaginally once daily.    ondansetron (ZOFRAN) 4 MG tablet Take 1 tablet (4 mg total) by mouth every 8 (eight) hours as needed for Nausea.     Family History       Problem Relation (Age of Onset)    Alcohol abuse Brother    Arthritis Father, Sister    Breast cancer Sister (73)    Cancer Father, Paternal Grandmother, Brother    Diabetes Mother    Heart attack Mother    Hypertension Mother    No Known Problems Daughter, Daughter, Son, Son    Psoriasis Sister    Stroke Mother          Tobacco Use    Smoking status: Never     Passive exposure: Current    Smokeless tobacco: Never   Substance and Sexual Activity    Alcohol use: Not Currently     Alcohol/week: 2.0 standard drinks of alcohol     Types: 2 Cans of beer per week     Comment: To be social with family and friends.    Drug use: Yes     Frequency: 3.0 times per week     Types: Marijuana     Comment: Help with pain    Sexual activity: Yes     Partners: Male     Birth control/protection: Condom     Review of Systems   All other systems reviewed and are negative.    Objective:     Vital Signs (Most Recent):  Temp: 97.9 °F (36.6 °C) (07/11/24 2315)  Pulse: (!) 58 (07/11/24 2315)  Resp: 16 (07/11/24 2315)  BP: (!) 129/50 (07/11/24 2315)  SpO2: 99 % (07/11/24 2315) Vital Signs (24h Range):  Temp:  [97.4 °F (36.3 °C)-98.1 °F (36.7 °C)] 97.9 °F (36.6 °C)  Pulse:  [47-73] 58  Resp:  [16-19] 16  SpO2:  [97 %-100 %] 99 %  BP: (119-193)/(50-81) 129/50     Weight: 61.2 kg (134 lb 14.7 oz)  Body mass index is 24.68 kg/m².     Physical Exam  Vitals and nursing note reviewed.   Constitutional:       General: She is not  in acute distress.     Appearance: She is well-developed. She is not diaphoretic.   HENT:      Head: Normocephalic and atraumatic.      Mouth/Throat:      Mouth: Mucous membranes are dry.   Eyes:      General: No scleral icterus.     Conjunctiva/sclera: Conjunctivae normal.   Neck:      Vascular: No JVD.   Cardiovascular:      Rate and Rhythm: Regular rhythm. Bradycardia present.   Pulmonary:      Effort: Pulmonary effort is normal. No respiratory distress.   Abdominal:      General: There is no distension.      Tenderness: There is no abdominal tenderness.      Comments: Hypoactive bowel sounds    Musculoskeletal:      Right lower leg: No edema.      Left lower leg: No edema.   Skin:     Coloration: Skin is not jaundiced or pale.   Neurological:      Mental Status: She is alert and oriented to person, place, and time.      Motor: No abnormal muscle tone.   Psychiatric:         Mood and Affect: Mood normal.         Behavior: Behavior normal.                Significant Labs: All pertinent labs within the past 24 hours have been reviewed.  CBC:   Recent Labs   Lab 07/11/24  0728   WBC 9.12   HGB 15.2   HCT 45.4        CMP:   Recent Labs   Lab 07/11/24  0728      K 3.4*      CO2 21*   *   BUN 19   CREATININE 0.9   CALCIUM 10.0   PROT 8.0   ALBUMIN 4.5   BILITOT 1.5*   ALKPHOS 67   AST 18   ALT 17   ANIONGAP 15       Significant Imaging: I have reviewed all pertinent imaging results/findings within the past 24 hours.

## 2024-07-13 VITALS
RESPIRATION RATE: 17 BRPM | SYSTOLIC BLOOD PRESSURE: 117 MMHG | TEMPERATURE: 98 F | OXYGEN SATURATION: 100 % | HEIGHT: 62 IN | WEIGHT: 134.94 LBS | BODY MASS INDEX: 24.83 KG/M2 | HEART RATE: 50 BPM | DIASTOLIC BLOOD PRESSURE: 43 MMHG

## 2024-07-13 LAB — TACROLIMUS BLD-MCNC: 3.6 NG/ML (ref 5–15)

## 2024-07-13 PROCEDURE — 63600175 PHARM REV CODE 636 W HCPCS: Performed by: HOSPITALIST

## 2024-07-13 PROCEDURE — 80197 ASSAY OF TACROLIMUS: CPT | Performed by: HOSPITALIST

## 2024-07-13 PROCEDURE — 25000003 PHARM REV CODE 250: Performed by: STUDENT IN AN ORGANIZED HEALTH CARE EDUCATION/TRAINING PROGRAM

## 2024-07-13 PROCEDURE — 63600175 PHARM REV CODE 636 W HCPCS: Performed by: STUDENT IN AN ORGANIZED HEALTH CARE EDUCATION/TRAINING PROGRAM

## 2024-07-13 PROCEDURE — 36415 COLL VENOUS BLD VENIPUNCTURE: CPT | Performed by: HOSPITALIST

## 2024-07-13 PROCEDURE — 25000003 PHARM REV CODE 250: Performed by: HOSPITALIST

## 2024-07-13 RX ORDER — MUPIROCIN 20 MG/G
OINTMENT TOPICAL 2 TIMES DAILY
Status: DISCONTINUED | OUTPATIENT
Start: 2024-07-13 | End: 2024-07-13 | Stop reason: HOSPADM

## 2024-07-13 RX ADMIN — ONDANSETRON 8 MG: 4 TABLET, ORALLY DISINTEGRATING ORAL at 06:07

## 2024-07-13 RX ADMIN — SODIUM CHLORIDE, SODIUM LACTATE, POTASSIUM CHLORIDE, AND CALCIUM CHLORIDE: 600; 310; 30; 20 INJECTION, SOLUTION INTRAVENOUS at 05:07

## 2024-07-13 RX ADMIN — MYCOPHENOLATE MOFETIL 500 MG: 250 CAPSULE ORAL at 08:07

## 2024-07-13 RX ADMIN — SILDENAFIL 20 MG: 20 TABLET ORAL at 08:07

## 2024-07-13 RX ADMIN — TACROLIMUS 2 MG: 1 CAPSULE ORAL at 08:07

## 2024-07-13 RX ADMIN — MUPIROCIN: 20 OINTMENT TOPICAL at 10:07

## 2024-07-13 NOTE — DISCHARGE SUMMARY
[facility] Medicine  Discharge Summary      Patient Name: Nadeen Mcgovern  MRN: [id number]  SALMA: [id number]  Patient Class: IP- Inpatient  Admission Date: 7/11/2024  Hospital Length of Stay: 1 days  Discharge Date and Time: 7/13/2024  4:49 PM  Attending Physician: Nils Sequeira MD   Discharging Provider: Nils Sequeira MD  Primary Care Provider: Alvin Dennis MD  [facility] Medicine Team: [facility] Nils Sequeira MD  Primary Care Team: [facility] MED     HPI:   Nadeen Mcgovern is a 63 year old Black woman with scleroderma, Raynaud's disease, restrictive lung disease, pulmonary hypertension, history of unresectable cholangiocarcinoma status post liver transplant on 10/8/2015 (immunosuppressed with tacrolimus and mycophenolate). She lives in [facility]. Her primary care physician is Dr. Alvin Dennis. Her rheumatologist is Dr. Haresh Butler.    She presented to [facility] Emergency Department on 7/11/2024 with left sided abdominal pain, nausea, vomiting, and diarrhea that started 2 days ago. She reported eating some gumbo that she thought was bad prior to symptoms. She was found to have COVID-19. Urinalysis showed 2+ protein, 2+ ketones, 36 RBC/hpf, 10 WBC/hpf, rare bacteria, occasional calcium oxalate crystals, and 28 squamous epithelia/lpf. WBC was normal. CT showed a short segment of dilated proximal small bowel measuring up to 3.4 cm. She was given ceftriaxone and IV fluids. She was transferred to [facility] for evaluation by Hepatology and possibly General Surgery if needed. She was admitted to [facility] Medicine Team L.       Hospital Course:   She was given lactated Ringer's solution infusion. Her tacrolimus and mycophenolate were continued. General Surgery suspected enteritis. Trial of clear liquids was started. She tolerated it. Diet was advanced. She tolerated regular diet. The liver transplant team was consulted on admission but did  not see her before she was ready for discharge. She reported that her immunosuppressants had never been held, even when she had a UTI. Since her symptoms were resolving, it was not felt necessary to hold mycophenolate for her COVID-19 enteritis.      Goals of Care Treatment Preferences:  Code Status: Full Code      Consults:   Consults (From admission, onward)          Status Ordering Provider     Inpatient consult to Liver Transplant Surgery  Once        Provider:  (Not yet assigned)    Acknowledged KIMMY MCGOWAN     Inpatient consult to General Surgery  Once        Provider:  (Not yet assigned)    Completed KIMMY MCGOWAN            Final Active Diagnoses:    Diagnosis Date Noted POA    PRINCIPAL PROBLEM:  Viral enteritis [A08.4] 07/11/2024 Yes    COVID-19 [U07.1] 07/11/2024 Yes    Pulmonary hypertension [I27.20] 06/25/2021 Yes     Chronic    Restrictive lung disease [J98.4]  Yes     Chronic    Scleroderma [M34.9] 08/17/2016 Yes     Chronic    S/P liver transplant [Z94.4] 10/08/2015 Not Applicable     Chronic      Problems Resolved During this Admission:       Discharged Condition: good    Disposition: Home or Self Care    Follow Up:   Follow-up Information       Alvin Dennis MD Follow up.    Specialty: Internal Medicine  Contact information:  [address]  [place] [address] [phone]                           Patient Instructions:      Diet Adult Regular     Notify your health care provider if you experience any of the following:  difficulty breathing or increased cough     Activity as tolerated       Significant Diagnostic Studies:   X-Ray Abdomen Portable 7/13/24: FINDINGS:   There are surgical clips at the region of the gastroesophageal junction.  Cholecystectomy clips are also present.  Mildly dilated bowel loops are present with caliber is up to 3.7 cm.  Findings appear similar to recent CT.  This could reflect ileus or early obstruction.   Sensitivity for free air is limited  by lack of upright view.   Impression:  Mildly dilated small bowel loops similar to prior CT could reflect ileus or early obstruction.     Medications:  Reconciled Home Medications:      Medication List        START taking these medications      pulse oximeter device  Commonly known as: pulse oximeter  by Apply Externally route 2 (two) times a day. Use twice daily at 8 AM and 3 PM and record the value in MyChart as directed.            CONTINUE taking these medications      aspirin 81 MG Chew  Take 81 mg by mouth once daily.     betamethasone dipropionate 0.05 % ointment  Commonly known as: DIPROLENE  Apply topically 2 (two) times daily. To affected areas on hands     diphenhydrAMINE 25 mg capsule  Commonly known as: BENADRYL  Take 25 mg by mouth every 6 (six) hours as needed for Itching.     estradioL 0.01 % (0.1 mg/gram) vaginal cream  Commonly known as: ESTRACE  Place 1 g vaginally once daily.     multivitamin tablet  Commonly known as: THERAGRAN  Take 1 tablet by mouth once daily.     mycophenolate 500 mg Tab  Commonly known as: CELLCEPT  Take 3 tablets (1,500 mg total) by mouth 2 (two) times daily.     ondansetron 4 MG tablet  Commonly known as: ZOFRAN  Take 1 tablet (4 mg total) by mouth every 8 (eight) hours as needed for Nausea.     rosuvastatin 10 MG tablet  Commonly known as: CRESTOR  Take 1 tablet (10 mg total) by mouth once daily.     sildenafil 20 mg Tab  Commonly known as: REVATIO  TAKE 1 TABLET (20 MG) BY MOUTH TWO TIMES DAILY     tacrolimus 1 MG Cap  Commonly known as: PROGRAF  Take 2 capsules (2 mg total) by mouth every morning AND 1 capsule (1 mg total) every evening.              Indwelling Lines/Drains at time of discharge: None         Time spent on the discharge of patient: 35 minutes         Nils Sequeira MD  Department of Hospital Medicine  [facility] - Telemetry Stepdown

## 2024-07-13 NOTE — PLAN OF CARE
Problem: Adult Inpatient Plan of Care  Goal: Plan of Care Review  Outcome: Adequate for Care Transition  Goal: Patient-Specific Goal (Individualized)  Outcome: Adequate for Care Transition  Goal: Absence of Hospital-Acquired Illness or Injury  Outcome: Adequate for Care Transition  Goal: Optimal Comfort and Wellbeing  Outcome: Adequate for Care Transition  Goal: Readiness for Transition of Care  Outcome: Adequate for Care Transition     Problem: Infection  Goal: Absence of Infection Signs and Symptoms  Outcome: Adequate for Care Transition     Patient discharged.

## 2024-07-13 NOTE — NURSING
Pt discharged from unit home with family. IV removed. Discharge summary reviewed, verbalizes understanding. Pt opted to  prescription from pharmacy on her way out.

## 2024-07-13 NOTE — PLAN OF CARE
Patient will discharge home. Appointment scheduled and added to AVS. Patient family will transport patient home. All CM needs have been met.    07/13/24 1541   Final Note   Assessment Type Final Discharge Note   Anticipated Discharge Disposition Home   Hospital Resources/Appts/Education Provided Provided patient/caregiver with written discharge plan information;Appointments scheduled and added to AVS   Hospital Follow Up  Appt(s) scheduled? Yes   Schedule Hospital follow up within >7 days  (pt covid positive)   Discharge/Hospital Encounter Summary to (non-[facility]) PCP n/a   Referral to Outpatient Case Management complete? n/a   Referral to / orders for Home Health Complete? n/a   30 day supply of medicines given at discharge, if documented non-compliance / non-adherence? n/a   Any social issues identified prior to discharge? n/a   Did you assess the readiness or willingness of the family or caregiver to support self management of care? Yes   Post-Acute Status   Discharge Delays None known at this time     [facility] - Telemetry Stepdown  Discharge Final Note    Primary Care Provider: Alvin Dennis MD    Expected Discharge Date: 7/13/2024    Final Discharge Note (most recent)       Final Note - 07/13/24 1541          Final Note    Assessment Type Final Discharge Note     Anticipated Discharge Disposition Home or Self Care     Hospital Resources/Appts/Education Provided Provided patient/caregiver with written discharge plan information;Appointments scheduled and added to AVS     Hospital Follow Up  Appt(s) scheduled? Yes     Schedule Hospital follow up within >7 days   pt covid positive    Discharge/Hospital Encounter Summary to (non-[facility]) PCP n/a (P)      Referral to Outpatient Case Management complete? n/a (P)      Referral to / orders for Home Health Complete? n/a (P)      30 day supply of medicines given at discharge, if documented non-compliance / non-adherence? n/a (P)      Any social issues identified prior to  discharge? n/a (P)      Did you assess the readiness or willingness of the family or caregiver to support self management of care? Yes (P)         Post-Acute Status    Discharge Delays None known at this time                     Important Message from Medicare             Contact Info       Alvin Dennis MD   Specialty: Internal Medicine   Relationship: PCP - General    [address]  [facility] [address]   Phone: [phone]       Next Steps: Follow up

## 2024-07-16 ENCOUNTER — PATIENT OUTREACH (OUTPATIENT)
Dept: ADMINISTRATIVE | Facility: CLINIC | Age: 64
End: 2024-07-16
Payer: MEDICARE

## 2024-07-23 ENCOUNTER — OFFICE VISIT (OUTPATIENT)
Dept: INTERNAL MEDICINE | Facility: CLINIC | Age: 64
End: 2024-07-23
Payer: MEDICARE

## 2024-07-23 ENCOUNTER — LAB VISIT (OUTPATIENT)
Dept: LAB | Facility: HOSPITAL | Age: 64
End: 2024-07-23
Attending: INTERNAL MEDICINE
Payer: MEDICARE

## 2024-07-23 VITALS
SYSTOLIC BLOOD PRESSURE: 110 MMHG | BODY MASS INDEX: 24.68 KG/M2 | WEIGHT: 139.31 LBS | DIASTOLIC BLOOD PRESSURE: 60 MMHG | HEART RATE: 64 BPM | OXYGEN SATURATION: 97 % | HEIGHT: 63 IN

## 2024-07-23 DIAGNOSIS — I70.0 ATHEROSCLEROSIS OF AORTA: ICD-10-CM

## 2024-07-23 DIAGNOSIS — E87.6 HYPOKALEMIA: ICD-10-CM

## 2024-07-23 DIAGNOSIS — A08.4 VIRAL ENTERITIS: ICD-10-CM

## 2024-07-23 DIAGNOSIS — Z09 HOSPITAL DISCHARGE FOLLOW-UP: Primary | ICD-10-CM

## 2024-07-23 DIAGNOSIS — M34.9 SCLERODERMA: Chronic | ICD-10-CM

## 2024-07-23 DIAGNOSIS — F12.20 CANNABIS DEPENDENCE, UNCOMPLICATED: ICD-10-CM

## 2024-07-23 DIAGNOSIS — Z85.09 HISTORY OF BILE DUCT CANCER: ICD-10-CM

## 2024-07-23 DIAGNOSIS — J98.4 RESTRICTIVE LUNG DISEASE: Chronic | ICD-10-CM

## 2024-07-23 DIAGNOSIS — R82.90 ABNORMAL URINE: ICD-10-CM

## 2024-07-23 DIAGNOSIS — Z94.4 S/P LIVER TRANSPLANT: Chronic | ICD-10-CM

## 2024-07-23 DIAGNOSIS — U07.1 COVID-19: ICD-10-CM

## 2024-07-23 DIAGNOSIS — I27.20 PULMONARY HYPERTENSION: Chronic | ICD-10-CM

## 2024-07-23 LAB
ALBUMIN SERPL BCP-MCNC: 4 G/DL (ref 3.5–5.2)
ALP SERPL-CCNC: 60 U/L (ref 55–135)
ALT SERPL W/O P-5'-P-CCNC: 17 U/L (ref 10–44)
ANION GAP SERPL CALC-SCNC: 7 MMOL/L (ref 8–16)
AST SERPL-CCNC: 15 U/L (ref 10–40)
BILIRUB SERPL-MCNC: 0.7 MG/DL (ref 0.1–1)
BILIRUB UR QL STRIP: NEGATIVE
BUN SERPL-MCNC: 8 MG/DL (ref 8–23)
CALCIUM SERPL-MCNC: 9.3 MG/DL (ref 8.7–10.5)
CHLORIDE SERPL-SCNC: 108 MMOL/L (ref 95–110)
CLARITY UR REFRACT.AUTO: CLEAR
CO2 SERPL-SCNC: 28 MMOL/L (ref 23–29)
COLOR UR AUTO: YELLOW
CREAT SERPL-MCNC: 0.8 MG/DL (ref 0.5–1.4)
EST. GFR  (NO RACE VARIABLE): >60 ML/MIN/1.73 M^2
GLUCOSE SERPL-MCNC: 97 MG/DL (ref 70–110)
GLUCOSE UR QL STRIP: NEGATIVE
HGB UR QL STRIP: NEGATIVE
KETONES UR QL STRIP: NEGATIVE
LEUKOCYTE ESTERASE UR QL STRIP: NEGATIVE
NITRITE UR QL STRIP: NEGATIVE
PH UR STRIP: 7 [PH] (ref 5–8)
POTASSIUM SERPL-SCNC: 4.2 MMOL/L (ref 3.5–5.1)
PROT SERPL-MCNC: 7.1 G/DL (ref 6–8.4)
PROT UR QL STRIP: NEGATIVE
SODIUM SERPL-SCNC: 143 MMOL/L (ref 136–145)
SP GR UR STRIP: 1.02 (ref 1–1.03)
URN SPEC COLLECT METH UR: NORMAL

## 2024-07-23 PROCEDURE — 1160F RVW MEDS BY RX/DR IN RCRD: CPT | Mod: CPTII,S$GLB,, | Performed by: INTERNAL MEDICINE

## 2024-07-23 PROCEDURE — 3078F DIAST BP <80 MM HG: CPT | Mod: CPTII,S$GLB,, | Performed by: INTERNAL MEDICINE

## 2024-07-23 PROCEDURE — 3074F SYST BP LT 130 MM HG: CPT | Mod: CPTII,S$GLB,, | Performed by: INTERNAL MEDICINE

## 2024-07-23 PROCEDURE — 99999 PR PBB SHADOW E&M-EST. PATIENT-LVL III: CPT | Mod: PBBFAC,,, | Performed by: INTERNAL MEDICINE

## 2024-07-23 PROCEDURE — 81003 URINALYSIS AUTO W/O SCOPE: CPT | Performed by: INTERNAL MEDICINE

## 2024-07-23 PROCEDURE — 1111F DSCHRG MED/CURRENT MED MERGE: CPT | Mod: CPTII,S$GLB,, | Performed by: INTERNAL MEDICINE

## 2024-07-23 PROCEDURE — 36415 COLL VENOUS BLD VENIPUNCTURE: CPT | Performed by: INTERNAL MEDICINE

## 2024-07-23 PROCEDURE — 3008F BODY MASS INDEX DOCD: CPT | Mod: CPTII,S$GLB,, | Performed by: INTERNAL MEDICINE

## 2024-07-23 PROCEDURE — 99214 OFFICE O/P EST MOD 30 MIN: CPT | Mod: S$GLB,,, | Performed by: INTERNAL MEDICINE

## 2024-07-23 PROCEDURE — 80053 COMPREHEN METABOLIC PANEL: CPT | Performed by: INTERNAL MEDICINE

## 2024-07-23 PROCEDURE — 3044F HG A1C LEVEL LT 7.0%: CPT | Mod: CPTII,S$GLB,, | Performed by: INTERNAL MEDICINE

## 2024-07-23 PROCEDURE — 1159F MED LIST DOCD IN RCRD: CPT | Mod: CPTII,S$GLB,, | Performed by: INTERNAL MEDICINE

## 2024-07-23 NOTE — PROGRESS NOTES
"Subjective:       Patient ID: Nadeen Mcgovern is a 63 y.o. female.    Chief Complaint: Hospital Follow Up      HPI  Nadeen Mcgovern is a 63 y.o. year old female established patient with medical history below presents for hospital discharge follow up.    " She presented to [facility] Emergency Department on 7/11/2024 with left sided abdominal pain, nausea, vomiting, and diarrhea that started 2 days ago. She reported eating some gumbo that she thought was bad prior to symptoms. She was found to have COVID-19. Urinalysis showed 2+ protein, 2+ ketones, 36 RBC/hpf, 10 WBC/hpf, rare bacteria, occasional calcium oxalate crystals, and 28 squamous epithelia/lpf. WBC was normal. CT showed a short segment of dilated proximal small bowel measuring up to 3.4 cm. She was given ceftriaxone and IV fluids. She was transferred to [facility] for evaluation by Hepatology and possibly General Surgery if needed. She was admitted to Hospital Medicine Team L.         Hospital Course:   She was given lactated Ringer's solution infusion. Her tacrolimus and mycophenolate were continued. General Surgery suspected enteritis. Trial of clear liquids was started. She tolerated it. Diet was advanced. She tolerated regular diet. The liver transplant team was consulted on admission but did not see her before she was ready for discharge. She reported that her immunosuppressants had never been held, even when she had a UTI. Since her symptoms were resolving, it was not felt necessary to hold mycophenolate for her COVID-19 enteritis.  "    Since discharge - patient feels that her symptoms have improved; feels that she is back to baseline health.     Review of Systems   Constitutional:  Negative for chills, fatigue and fever.   Respiratory:  Negative for shortness of breath.    Cardiovascular:  Negative for chest pain.   Gastrointestinal:  Negative for abdominal pain, diarrhea, nausea and vomiting.        Had bowel " movement this morning, normal   Genitourinary:  Negative for dysuria.   Neurological:  Positive for light-headedness. Negative for weakness and headaches.         Past Medical History:   Diagnosis Date    Acute cholecystitis     Cholecystitis    Anemia 12/14    Arthritis 2015    septic arthritis of right shoulder    Bacteremia due to Streptococcus pneumoniae     Cancer     Cataract     Cholangiocarcinoma     Fever blister 2/7/20    Hypertension     Jaundice     obstructive    Liver transplant status     Organ transplant     Prediabetes     Raynaud disease     Scleroderma         Prior to Admission medications    Medication Sig Start Date End Date Taking? Authorizing Provider   aspirin 81 MG Chew Take 81 mg by mouth once daily.    Provider, Historical   betamethasone dipropionate (DIPROLENE) 0.05 % ointment Apply topically 2 (two) times daily. To affected areas on hands 8/8/23   Coby Schmidt MD   diphenhydrAMINE (BENADRYL) 25 mg capsule Take 25 mg by mouth every 6 (six) hours as needed for Itching.    Provider, Historical   estradioL (ESTRACE) 0.01 % (0.1 mg/gram) vaginal cream Place 1 g vaginally once daily. 8/20/21 6/11/24  Karlie Matute DO   multivitamin (THERAGRAN) tablet Take 1 tablet by mouth once daily. 10/12/15   Marysol Zavaleta MD   mycophenolate (CELLCEPT) 500 mg Tab Take 3 tablets (1,500 mg total) by mouth 2 (two) times daily. 5/9/24   Campbell Chavez MD   ondansetron (ZOFRAN) 4 MG tablet Take 1 tablet (4 mg total) by mouth every 8 (eight) hours as needed for Nausea. 3/15/23   Angelina Saez MD   pulse oximeter (PULSE OXIMETER) device by Apply Externally route 2 (two) times a day. Use twice daily at 8 AM and 3 PM and record the value in [employer] as directed. 7/13/24   Nils Sequeira MD   rosuvastatin (CRESTOR) 10 MG tablet Take 1 tablet (10 mg total) by mouth once daily. 12/8/23 12/7/24  Alvin Dennis MD   sildenafil (REVATIO) 20 mg Tab TAKE 1 TABLET (20 MG) BY MOUTH TWO TIMES  "DAILY 5/21/24   Haresh Butler MD   tacrolimus (PROGRAF) 1 MG Cap Take 2 capsules (2 mg total) by mouth every morning AND 1 capsule (1 mg total) every evening. 5/7/24   Vitaly Martinez MD        Past medical history, surgical history, and family medical history reviewed and updated as appropriate.    Medications and allergies reviewed.     Objective:          Vitals:    07/23/24 0907   BP: 110/60   BP Location: Left arm   Patient Position: Sitting   BP Method: Large (Manual)   Pulse: 64   SpO2: 97%   Weight: 63.2 kg (139 lb 5.3 oz)   Height: 5' 3" (1.6 m)     Body mass index is 24.68 kg/m².  Physical Exam  Constitutional:       Appearance: Normal appearance. She is normal weight. She is not ill-appearing.   HENT:      Head: Normocephalic and atraumatic.   Eyes:      Extraocular Movements: Extraocular movements intact.   Cardiovascular:      Rate and Rhythm: Normal rate.   Pulmonary:      Effort: Pulmonary effort is normal.   Abdominal:      General: Bowel sounds are normal.   Musculoskeletal:         General: Normal range of motion.      Cervical back: Normal range of motion.   Neurological:      General: No focal deficit present.      Mental Status: She is alert and oriented to person, place, and time.         Lab Results   Component Value Date    WBC 9.12 07/11/2024    HGB 15.2 07/11/2024    HCT 45.4 07/11/2024     07/11/2024    CHOL 204 (H) 06/05/2024    TRIG 93 06/05/2024    HDL 47 06/05/2024    ALT 17 07/11/2024    AST 18 07/11/2024     07/12/2024    K 3.8 07/12/2024     07/12/2024    CREATININE 0.8 07/12/2024    BUN 12 07/12/2024    CO2 26 07/12/2024    TSH 1.932 06/05/2024    INR 1.0 03/15/2023    HGBA1C 5.3 06/05/2024       Assessment:       1. Hospital discharge follow-up    2. COVID-19    3. Viral enteritis    4. Scleroderma    5. Pulmonary hypertension    6. Restrictive lung disease    7. History of bile duct cancer    8. S/P liver transplant    9. Abnormal urine    10. " Hypokalemia    11. Atherosclerosis of aorta    12. Cannabis dependence, uncomplicated          Plan:     Nadeen was seen today for hospital follow up.    Diagnoses and all orders for this visit:    Hospital discharge follow-up  Comments:  recovered.    COVID-19  Comments:  had no symptoms, may have been asymptomatic, or related to gastroenteritis symptoms    Viral enteritis  Comments:  vs gastroenteritis from foodborne illness; resolved.    Scleroderma    Pulmonary hypertension    Restrictive lung disease    History of bile duct cancer    S/P liver transplant    Abnormal urine  Comments:  repeat urine today  Orders:  -     Urinalysis, Reflex to Urine Culture Urine, Clean Catch    Hypokalemia  Comments:  recheck potassium  Orders:  -     COMPREHENSIVE METABOLIC PANEL; Future    Atherosclerosis of aorta  Comments:  stable, no changes to management    Cannabis dependence, uncomplicated  Comments:  daily use, no intent of quitting    Benign physical examination, no issues identified. Will obtain routine labwork and age appropriate health screenings.     Health maintenance reviewed with patient.     Follow up in about 6 months (around 1/23/2025).    Alvin Dennis MD  Internal Medicine / Primary Care  [facility]  7/23/2024

## 2024-07-23 NOTE — PATIENT INSTRUCTIONS
Labs today  Urine today  Return to clinic in 6 months - reschedule the appointment from 12/12/2024 to early 2025 for annual exam.

## 2024-07-24 ENCOUNTER — PATIENT MESSAGE (OUTPATIENT)
Dept: HEPATOLOGY | Facility: CLINIC | Age: 64
End: 2024-07-24
Payer: MEDICARE

## 2024-08-01 ENCOUNTER — OFFICE VISIT (OUTPATIENT)
Dept: HEMATOLOGY/ONCOLOGY | Facility: CLINIC | Age: 64
End: 2024-08-01
Payer: MEDICARE

## 2024-08-01 VITALS
BODY MASS INDEX: 24.2 KG/M2 | HEIGHT: 63 IN | WEIGHT: 136.56 LBS | OXYGEN SATURATION: 100 % | HEART RATE: 66 BPM | DIASTOLIC BLOOD PRESSURE: 65 MMHG | RESPIRATION RATE: 18 BRPM | TEMPERATURE: 98 F | SYSTOLIC BLOOD PRESSURE: 134 MMHG

## 2024-08-01 DIAGNOSIS — R76.8 ELEVATED SERUM IMMUNOGLOBULIN FREE LIGHT CHAINS: ICD-10-CM

## 2024-08-01 PROCEDURE — 1160F RVW MEDS BY RX/DR IN RCRD: CPT | Mod: CPTII,S$GLB,, | Performed by: PHYSICIAN ASSISTANT

## 2024-08-01 PROCEDURE — 99999 PR PBB SHADOW E&M-EST. PATIENT-LVL V: CPT | Mod: PBBFAC,,, | Performed by: PHYSICIAN ASSISTANT

## 2024-08-01 PROCEDURE — 3075F SYST BP GE 130 - 139MM HG: CPT | Mod: CPTII,S$GLB,, | Performed by: PHYSICIAN ASSISTANT

## 2024-08-01 PROCEDURE — 1111F DSCHRG MED/CURRENT MED MERGE: CPT | Mod: CPTII,S$GLB,, | Performed by: PHYSICIAN ASSISTANT

## 2024-08-01 PROCEDURE — 1159F MED LIST DOCD IN RCRD: CPT | Mod: CPTII,S$GLB,, | Performed by: PHYSICIAN ASSISTANT

## 2024-08-01 PROCEDURE — 3044F HG A1C LEVEL LT 7.0%: CPT | Mod: CPTII,S$GLB,, | Performed by: PHYSICIAN ASSISTANT

## 2024-08-01 PROCEDURE — 3008F BODY MASS INDEX DOCD: CPT | Mod: CPTII,S$GLB,, | Performed by: PHYSICIAN ASSISTANT

## 2024-08-01 PROCEDURE — 3078F DIAST BP <80 MM HG: CPT | Mod: CPTII,S$GLB,, | Performed by: PHYSICIAN ASSISTANT

## 2024-08-01 PROCEDURE — 99204 OFFICE O/P NEW MOD 45 MIN: CPT | Mod: S$GLB,,, | Performed by: PHYSICIAN ASSISTANT

## 2024-08-19 ENCOUNTER — LAB VISIT (OUTPATIENT)
Dept: LAB | Facility: HOSPITAL | Age: 64
End: 2024-08-19
Attending: INTERNAL MEDICINE
Payer: MEDICARE

## 2024-08-19 DIAGNOSIS — M34.9 SCLERODERMA: ICD-10-CM

## 2024-08-19 LAB
ALBUMIN SERPL BCP-MCNC: 4 G/DL (ref 3.5–5.2)
ALP SERPL-CCNC: 61 U/L (ref 55–135)
ALT SERPL W/O P-5'-P-CCNC: 15 U/L (ref 10–44)
ANION GAP SERPL CALC-SCNC: 7 MMOL/L (ref 8–16)
AST SERPL-CCNC: 18 U/L (ref 10–40)
BASOPHILS # BLD AUTO: 0.03 K/UL (ref 0–0.2)
BASOPHILS NFR BLD: 0.5 % (ref 0–1.9)
BILIRUB SERPL-MCNC: 0.9 MG/DL (ref 0.1–1)
BUN SERPL-MCNC: 10 MG/DL (ref 8–23)
CALCIUM SERPL-MCNC: 9.1 MG/DL (ref 8.7–10.5)
CHLORIDE SERPL-SCNC: 109 MMOL/L (ref 95–110)
CO2 SERPL-SCNC: 24 MMOL/L (ref 23–29)
CREAT SERPL-MCNC: 0.7 MG/DL (ref 0.5–1.4)
CRP SERPL-MCNC: 0.9 MG/L (ref 0–8.2)
DIFFERENTIAL METHOD BLD: ABNORMAL
EOSINOPHIL # BLD AUTO: 0.2 K/UL (ref 0–0.5)
EOSINOPHIL NFR BLD: 2.8 % (ref 0–8)
ERYTHROCYTE [DISTWIDTH] IN BLOOD BY AUTOMATED COUNT: 13.3 % (ref 11.5–14.5)
ERYTHROCYTE [SEDIMENTATION RATE] IN BLOOD BY PHOTOMETRIC METHOD: 12 MM/HR (ref 0–36)
EST. GFR  (NO RACE VARIABLE): >60 ML/MIN/1.73 M^2
GLUCOSE SERPL-MCNC: 99 MG/DL (ref 70–110)
HCT VFR BLD AUTO: 38.4 % (ref 37–48.5)
HGB BLD-MCNC: 12.4 G/DL (ref 12–16)
IMM GRANULOCYTES # BLD AUTO: 0.02 K/UL (ref 0–0.04)
IMM GRANULOCYTES NFR BLD AUTO: 0.3 % (ref 0–0.5)
LYMPHOCYTES # BLD AUTO: 1.4 K/UL (ref 1–4.8)
LYMPHOCYTES NFR BLD: 21.6 % (ref 18–48)
MCH RBC QN AUTO: 31.4 PG (ref 27–31)
MCHC RBC AUTO-ENTMCNC: 32.3 G/DL (ref 32–36)
MCV RBC AUTO: 97 FL (ref 82–98)
MONOCYTES # BLD AUTO: 0.6 K/UL (ref 0.3–1)
MONOCYTES NFR BLD: 9.9 % (ref 4–15)
NEUTROPHILS # BLD AUTO: 4.2 K/UL (ref 1.8–7.7)
NEUTROPHILS NFR BLD: 64.9 % (ref 38–73)
NRBC BLD-RTO: 0 /100 WBC
PLATELET # BLD AUTO: 207 K/UL (ref 150–450)
PMV BLD AUTO: 10.9 FL (ref 9.2–12.9)
POTASSIUM SERPL-SCNC: 4.1 MMOL/L (ref 3.5–5.1)
PROT SERPL-MCNC: 6.9 G/DL (ref 6–8.4)
RBC # BLD AUTO: 3.95 M/UL (ref 4–5.4)
SODIUM SERPL-SCNC: 140 MMOL/L (ref 136–145)
WBC # BLD AUTO: 6.45 K/UL (ref 3.9–12.7)

## 2024-08-19 PROCEDURE — 85652 RBC SED RATE AUTOMATED: CPT | Performed by: INTERNAL MEDICINE

## 2024-08-19 PROCEDURE — 86256 FLUORESCENT ANTIBODY TITER: CPT | Performed by: INTERNAL MEDICINE

## 2024-08-19 PROCEDURE — 80053 COMPREHEN METABOLIC PANEL: CPT | Performed by: INTERNAL MEDICINE

## 2024-08-19 PROCEDURE — 85025 COMPLETE CBC W/AUTO DIFF WBC: CPT | Performed by: INTERNAL MEDICINE

## 2024-08-19 PROCEDURE — 86140 C-REACTIVE PROTEIN: CPT | Performed by: INTERNAL MEDICINE

## 2024-08-22 LAB — ANTI-CENTROMERE ANTIBODY: 0.5 U/ML

## 2024-08-30 ENCOUNTER — OFFICE VISIT (OUTPATIENT)
Dept: OBSTETRICS AND GYNECOLOGY | Facility: CLINIC | Age: 64
End: 2024-08-30
Attending: OBSTETRICS & GYNECOLOGY
Payer: MEDICARE

## 2024-08-30 VITALS — DIASTOLIC BLOOD PRESSURE: 68 MMHG | SYSTOLIC BLOOD PRESSURE: 126 MMHG | BODY MASS INDEX: 24 KG/M2 | WEIGHT: 135.5 LBS

## 2024-08-30 DIAGNOSIS — Z01.419 WELL WOMAN EXAM: Primary | ICD-10-CM

## 2024-08-30 DIAGNOSIS — N89.8 VAGINAL DRYNESS: ICD-10-CM

## 2024-08-30 PROCEDURE — 88175 CYTOPATH C/V AUTO FLUID REDO: CPT | Performed by: OBSTETRICS & GYNECOLOGY

## 2024-08-30 PROCEDURE — 87624 HPV HI-RISK TYP POOLED RSLT: CPT | Performed by: OBSTETRICS & GYNECOLOGY

## 2024-08-30 PROCEDURE — 99999 PR PBB SHADOW E&M-EST. PATIENT-LVL III: CPT | Mod: PBBFAC,,, | Performed by: OBSTETRICS & GYNECOLOGY

## 2024-08-30 RX ORDER — ESTRADIOL 0.1 MG/G
1 CREAM VAGINAL DAILY
Qty: 42.5 G | Refills: 1 | Status: SHIPPED | OUTPATIENT
Start: 2024-08-30 | End: 2025-08-30

## 2024-08-30 NOTE — PROGRESS NOTES
CC: Well woman exam    Nadeen Mcgovern is a 63 y.o. female  presents for well woman exam.  LMP: No LMP recorded (lmp unknown). Patient is postmenopausal..  Was given estrogen cream, had concerns due to reading it might cause cancer, discussed and reassured, has been hospitalized for UTI recently.     UTD mmg  Due for pap, UTD, needs yearly pap due to immune modulation  DEXA: UTD normal.  Scopes UTD    Past Medical History:   Diagnosis Date    Acute cholecystitis     Cholecystitis    Anemia     Arthritis     septic arthritis of right shoulder    Bacteremia due to Streptococcus pneumoniae     Cancer     Cataract     Cholangiocarcinoma     Fever blister 20    Hypertension     Jaundice     obstructive    Liver transplant status     Organ transplant     Prediabetes     Raynaud disease     Scleroderma      Past Surgical History:   Procedure Laterality Date    arthoscopic Right     drained infection     SECTION      ESOPHAGOGASTRODUODENOSCOPY N/A 2020    Procedure: EGD (ESOPHAGOGASTRODUODENOSCOPY);  Surgeon: Drew Mackay MD;  Location: [address]);  Service: Endoscopy;  Laterality: N/A;  [facility]    INCISION AND DRAINAGE OF WOUND      s/p I&D of R thumb    LIVER TRANSPLANT      SHOULDER ARTHROSCOPY       Social History     Socioeconomic History    Marital status: Single   Occupational History    Occupation: Food Tech     Comment: [employer]   Tobacco Use    Smoking status: Never     Passive exposure: Current    Smokeless tobacco: Never   Substance and Sexual Activity    Alcohol use: Not Currently     Alcohol/week: 2.0 standard drinks of alcohol     Types: 2 Cans of beer per week     Comment: To be social with family and friends.    Drug use: Yes     Frequency: 3.0 times per week     Types: Marijuana     Comment: Help with pain    Sexual activity: Yes     Partners: Male     Birth control/protection: Condom   Other Topics Concern     Are you pregnant or think you may be? No    Breast-feeding No     Social Determinants of Health     Financial Resource Strain: Low Risk  (2024)    Overall Financial Resource Strain (CARDIA)     Difficulty of Paying Living Expenses: Not hard at all   Food Insecurity: No Food Insecurity (2024)    Hunger Vital Sign     Worried About Running Out of Food in the Last Year: Never true     Ran Out of Food in the Last Year: Never true   Transportation Needs: No Transportation Needs (2024)    TRANSPORTATION NEEDS     Transportation : No   Physical Activity: Insufficiently Active (2024)    Exercise Vital Sign     Days of Exercise per Week: 7 days     Minutes of Exercise per Session: 20 min   Stress: No Stress Concern Present (2024)    [employer] [place] [employer] - Occupational Stress Questionnaire     Feeling of Stress : Not at all   Housing Stability: Low Risk  (2024)    Housing Stability Vital Sign     Unable to Pay for Housing in the Last Year: No     Homeless in the Last Year: No     Family History   Problem Relation Name Age of Onset    Stroke Mother      Diabetes Mother      Hypertension Mother      Heart attack Mother      Cancer Father          Lung    Arthritis Father      Breast cancer Sister Eutiquia 73    Arthritis Sister Eutiquia     Psoriasis Sister Eutiquia     No Known Problems Daughter Perla     No Known Problems Daughter Kemonei     Cancer Paternal Grandmother          pancreatic cancer    Cancer Brother Bertin         Lung    Alcohol abuse Brother Bertin     No Known Problems Son Rolando     No Known Problems Son Neel     Colon cancer Neg Hx      Esophageal cancer Neg Hx       OB History          6    Para   4    Term   4            AB   2    Living   4         SAB        IAB   1    Ectopic        Multiple        Live Births   4                 /68   Wt 61.5 kg (135 lb 8 oz)   LMP  (LMP Unknown)   BMI 24.00 kg/m²       ROS:  GENERAL: Denies  weight gain or weight loss. Feeling well overall.   SKIN: Denies rash or lesions.   HEAD: Denies head injury or headache.   NODES: Denies enlarged lymph nodes.   CHEST: Denies chest pain or shortness of breath.   CARDIOVASCULAR: Denies palpitations or left sided chest pain.   ABDOMEN: No abdominal pain, constipation, diarrhea, nausea, vomiting or rectal bleeding.   URINARY: No frequency, dysuria, hematuria, or burning on urination.  REPRODUCTIVE: See HPI.   BREASTS: The patient performs breast self-examination and denies pain, lumps, or nipple discharge.   HEMATOLOGIC: No easy bruisability or excessive bleeding.   MUSCULOSKELETAL: Denies joint pain or swelling.   NEUROLOGIC: Denies syncope or weakness.   PSYCHIATRIC: Denies depression, anxiety or mood swings.    PHYSICAL EXAM:  APPEARANCE: Well nourished, well developed, in no acute distress.  AFFECT: WNL, alert and oriented x 3  SKIN: No acne or hirsutism  NECK: Neck symmetric without masses or thyromegaly  NODES: No inguinal, cervical, axillary, or femoral lymph node enlargement  CHEST: Good respiratory effect  ABDOMEN: Soft.  No tenderness or masses.  No hepatosplenomegaly.  No hernias.  BREASTS: Symmetrical, no skin changes or visible lesions.  No palpable masses, nipple discharge bilaterally.  PELVIC: Normal external genitalia without lesions.  Normal hair distribution.  Adequate perineal body, normal urethral meatus.  Vagina atrophic with loss of rugae.  Cervix pink, without lesions, discharge or tenderness.  No significant cystocele or rectocele.  Bimanual exam shows uterus to be normal size, regular, mobile and nontender.  Adnexa without masses or tenderness.    EXTREMITIES: No edema.    Well woman exam  -     Ambulatory referral/consult to Obstetrics / Gynecology  -     Liquid-Based Pap Smear, Screening  -     HPV High Risk Genotypes, PCR    Vaginal dryness  Comments:  patient referred to ob/gyn for discussion of restarting estrogen cream and well woman  exam  Orders:  -     Ambulatory referral/consult to Obstetrics / Gynecology    Other orders  -     estradioL (ESTRACE) 0.01 % (0.1 mg/gram) vaginal cream; Place 1 g vaginally once daily.  Dispense: 42.5 g; Refill: 1            Patient was counseled today on A.C.S. Pap guidelines and recommendations for yearly pelvic exams, mammograms and monthly self breast exams; to see her PCP for other health maintenance.     No follow-ups on file.

## 2024-08-31 LAB
CLINICAL INFO: NORMAL
DATE OF PREVIOUS PAP: NORMAL
DATE PREVIOUS BX: NO
LMP START DATE: NORMAL
SPECIMEN SOURCE CVX/VAG CYTO: NORMAL

## 2024-09-08 NOTE — TELEPHONE ENCOUNTER
Continue routine labs no changes needed.  Letter sent for next lab appointment 10/14/24    ----- Message from Vitaly Martinez MD sent at 4/17/2024 10:27 AM CDT -----  Liver tests are stable. No changes in her immunosuppression. Please continue to monitor labs per transplant protocol.   Statement Selected

## 2024-09-10 ENCOUNTER — IMMUNIZATION (OUTPATIENT)
Dept: INTERNAL MEDICINE | Facility: CLINIC | Age: 64
End: 2024-09-10
Payer: MEDICARE

## 2024-09-10 DIAGNOSIS — Z23 NEED FOR VACCINATION: Primary | ICD-10-CM

## 2024-09-10 PROCEDURE — 90656 IIV3 VACC NO PRSV 0.5 ML IM: CPT | Mod: S$GLB,,, | Performed by: INTERNAL MEDICINE

## 2024-09-10 PROCEDURE — G0008 ADMIN INFLUENZA VIRUS VAC: HCPCS | Mod: S$GLB,,, | Performed by: INTERNAL MEDICINE

## 2024-09-19 DIAGNOSIS — I70.0 ATHEROSCLEROSIS OF AORTA: ICD-10-CM

## 2024-09-19 RX ORDER — ROSUVASTATIN CALCIUM 10 MG/1
10 TABLET, COATED ORAL DAILY
Qty: 90 TABLET | Refills: 3 | Status: SHIPPED | OUTPATIENT
Start: 2024-09-19 | End: 2025-09-19

## 2024-09-19 NOTE — TELEPHONE ENCOUNTER
No care due was identified.  [facility] Embedded Care Due Messages. Reference number: [id number].   9/19/2024 9:44:34 AM CDT

## 2024-09-25 DIAGNOSIS — L23.9 ALLERGIC CONTACT DERMATITIS, UNSPECIFIED TRIGGER: ICD-10-CM

## 2024-09-27 RX ORDER — BETAMETHASONE DIPROPIONATE 0.5 MG/G
OINTMENT TOPICAL 2 TIMES DAILY
Qty: 45 G | Refills: 3 | Status: SHIPPED | OUTPATIENT
Start: 2024-09-27

## 2024-10-14 ENCOUNTER — LAB VISIT (OUTPATIENT)
Dept: LAB | Facility: HOSPITAL | Age: 64
End: 2024-10-14
Attending: STUDENT IN AN ORGANIZED HEALTH CARE EDUCATION/TRAINING PROGRAM
Payer: MEDICARE

## 2024-10-14 DIAGNOSIS — C22.0 COMBINED HEPATOCELLULAR AND CHOLANGIOCARCINOMA: ICD-10-CM

## 2024-10-14 DIAGNOSIS — Z94.4 LIVER TRANSPLANTED: ICD-10-CM

## 2024-10-14 DIAGNOSIS — Z85.09 HISTORY OF CHOLANGIOCARCINOMA: ICD-10-CM

## 2024-10-14 LAB
AFP SERPL-MCNC: <2 NG/ML (ref 0–8.4)
ALBUMIN SERPL BCP-MCNC: 4.1 G/DL (ref 3.5–5.2)
ALP SERPL-CCNC: 66 U/L (ref 55–135)
ALT SERPL W/O P-5'-P-CCNC: 15 U/L (ref 10–44)
ANION GAP SERPL CALC-SCNC: 10 MMOL/L (ref 8–16)
AST SERPL-CCNC: 17 U/L (ref 10–40)
BASOPHILS # BLD AUTO: 0.02 K/UL (ref 0–0.2)
BASOPHILS NFR BLD: 0.3 % (ref 0–1.9)
BILIRUB SERPL-MCNC: 0.8 MG/DL (ref 0.1–1)
BUN SERPL-MCNC: 13 MG/DL (ref 8–23)
CALCIUM SERPL-MCNC: 9.4 MG/DL (ref 8.7–10.5)
CANCER AG19-9 SERPL-ACNC: <2.1 U/ML (ref 0–40)
CHLORIDE SERPL-SCNC: 107 MMOL/L (ref 95–110)
CO2 SERPL-SCNC: 24 MMOL/L (ref 23–29)
CREAT SERPL-MCNC: 0.8 MG/DL (ref 0.5–1.4)
DIFFERENTIAL METHOD BLD: ABNORMAL
EOSINOPHIL # BLD AUTO: 0.1 K/UL (ref 0–0.5)
EOSINOPHIL NFR BLD: 2.1 % (ref 0–8)
ERYTHROCYTE [DISTWIDTH] IN BLOOD BY AUTOMATED COUNT: 12.7 % (ref 11.5–14.5)
EST. GFR  (NO RACE VARIABLE): >60 ML/MIN/1.73 M^2
GLUCOSE SERPL-MCNC: 100 MG/DL (ref 70–110)
HCT VFR BLD AUTO: 39.1 % (ref 37–48.5)
HGB BLD-MCNC: 12.7 G/DL (ref 12–16)
IMM GRANULOCYTES # BLD AUTO: 0.03 K/UL (ref 0–0.04)
IMM GRANULOCYTES NFR BLD AUTO: 0.4 % (ref 0–0.5)
LYMPHOCYTES # BLD AUTO: 1.7 K/UL (ref 1–4.8)
LYMPHOCYTES NFR BLD: 25.1 % (ref 18–48)
MCH RBC QN AUTO: 31.3 PG (ref 27–31)
MCHC RBC AUTO-ENTMCNC: 32.5 G/DL (ref 32–36)
MCV RBC AUTO: 96 FL (ref 82–98)
MONOCYTES # BLD AUTO: 0.5 K/UL (ref 0.3–1)
MONOCYTES NFR BLD: 7 % (ref 4–15)
NEUTROPHILS # BLD AUTO: 4.4 K/UL (ref 1.8–7.7)
NEUTROPHILS NFR BLD: 65.1 % (ref 38–73)
NRBC BLD-RTO: 0 /100 WBC
PLATELET # BLD AUTO: 235 K/UL (ref 150–450)
PMV BLD AUTO: 10.2 FL (ref 9.2–12.9)
POTASSIUM SERPL-SCNC: 4 MMOL/L (ref 3.5–5.1)
PROT SERPL-MCNC: 7.4 G/DL (ref 6–8.4)
RBC # BLD AUTO: 4.06 M/UL (ref 4–5.4)
SODIUM SERPL-SCNC: 141 MMOL/L (ref 136–145)
TACROLIMUS BLD-MCNC: 4.5 NG/ML (ref 5–15)
WBC # BLD AUTO: 6.74 K/UL (ref 3.9–12.7)

## 2024-10-14 PROCEDURE — 80053 COMPREHEN METABOLIC PANEL: CPT | Performed by: STUDENT IN AN ORGANIZED HEALTH CARE EDUCATION/TRAINING PROGRAM

## 2024-10-14 PROCEDURE — 82105 ALPHA-FETOPROTEIN SERUM: CPT | Performed by: STUDENT IN AN ORGANIZED HEALTH CARE EDUCATION/TRAINING PROGRAM

## 2024-10-14 PROCEDURE — 80197 ASSAY OF TACROLIMUS: CPT | Performed by: STUDENT IN AN ORGANIZED HEALTH CARE EDUCATION/TRAINING PROGRAM

## 2024-10-14 PROCEDURE — 86301 IMMUNOASSAY TUMOR CA 19-9: CPT | Performed by: STUDENT IN AN ORGANIZED HEALTH CARE EDUCATION/TRAINING PROGRAM

## 2024-10-14 PROCEDURE — 85025 COMPLETE CBC W/AUTO DIFF WBC: CPT | Performed by: STUDENT IN AN ORGANIZED HEALTH CARE EDUCATION/TRAINING PROGRAM

## 2024-10-14 PROCEDURE — 36415 COLL VENOUS BLD VENIPUNCTURE: CPT | Performed by: STUDENT IN AN ORGANIZED HEALTH CARE EDUCATION/TRAINING PROGRAM

## 2024-10-16 ENCOUNTER — TELEPHONE (OUTPATIENT)
Dept: TRANSPLANT | Facility: CLINIC | Age: 64
End: 2024-10-16
Payer: MEDICARE

## 2024-10-16 NOTE — LETTER
October 16, 2024    Nadeen Mcgovern  [address] [facility] [address]          Dear Nadeen Mcgovern:  MRN: [id number]    This is a follow up to your recent labs, your lab results were stable.  There are no medicine changes.  Please have your labs drawn again on 4/10/25.      If you cannot have your labs drawn on the scheduled date, it is your responsibility to call the transplant department to reschedule.  Please call [phone] and ask to speak to Rossy Pineda, Medical Assistant for all scheduling requests.     Sincerely,    Chetna Pickard, RN      Your Liver Transplant Coordinator    [facility]  [facility] [place] [address]  [phone]

## 2024-10-16 NOTE — TELEPHONE ENCOUNTER
Continue routine labs no changes needed.  Letter sent for next lab appointment 4/10/25      ----- Message from Vitaly Martinez MD sent at 10/16/2024 12:45 PM CDT -----  Liver tests are stable. No changes in her immunosuppression. Please continue to monitor labs per transplant protocol.

## 2024-10-20 ENCOUNTER — OFFICE VISIT (OUTPATIENT)
Dept: URGENT CARE | Facility: CLINIC | Age: 64
End: 2024-10-20
Payer: MEDICARE

## 2024-10-20 VITALS
SYSTOLIC BLOOD PRESSURE: 133 MMHG | HEART RATE: 61 BPM | HEIGHT: 63 IN | DIASTOLIC BLOOD PRESSURE: 55 MMHG | TEMPERATURE: 98 F | BODY MASS INDEX: 24.02 KG/M2 | RESPIRATION RATE: 16 BRPM | OXYGEN SATURATION: 100 % | WEIGHT: 135.56 LBS

## 2024-10-20 DIAGNOSIS — J22 ACUTE LOWER RESPIRATORY INFECTION: ICD-10-CM

## 2024-10-20 DIAGNOSIS — R05.1 ACUTE COUGH: Primary | ICD-10-CM

## 2024-10-20 PROCEDURE — 71046 X-RAY EXAM CHEST 2 VIEWS: CPT | Mod: S$GLB,,, | Performed by: RADIOLOGY

## 2024-10-20 PROCEDURE — 99214 OFFICE O/P EST MOD 30 MIN: CPT | Mod: S$GLB,,, | Performed by: FAMILY MEDICINE

## 2024-10-20 RX ORDER — DOXYCYCLINE HYCLATE 100 MG
100 TABLET ORAL 2 TIMES DAILY
Qty: 14 TABLET | Refills: 0 | Status: SHIPPED | OUTPATIENT
Start: 2024-10-20 | End: 2024-10-27

## 2024-10-20 RX ORDER — BENZONATATE 200 MG/1
200 CAPSULE ORAL 3 TIMES DAILY PRN
Qty: 30 CAPSULE | Refills: 0 | Status: SHIPPED | OUTPATIENT
Start: 2024-10-20 | End: 2024-10-30

## 2024-10-20 RX ORDER — PROMETHAZINE HYDROCHLORIDE AND DEXTROMETHORPHAN HYDROBROMIDE 6.25; 15 MG/5ML; MG/5ML
5 SYRUP ORAL NIGHTLY PRN
Qty: 118 ML | Refills: 0 | Status: SHIPPED | OUTPATIENT
Start: 2024-10-20 | End: 2024-10-30

## 2024-10-20 NOTE — PROGRESS NOTES
"Subjective:      Patient ID: Nadeen Mcgovern is a 64 y.o. female.    Vitals:  height is 5' 3" (1.6 m) and weight is 61.5 kg (135 lb 9.3 oz). Her temperature is 97.6 °F (36.4 °C). Her blood pressure is 133/55 (abnormal) and her pulse is 61. Her respiration is 16 and oxygen saturation is 100%.     Chief Complaint: Cough    64-year-old female with Past Medical History:  No date: Acute cholecystitis      Comment:  Cholecystitis  12/14: Anemia  2015: Arthritis      Comment:  septic arthritis of right shoulder  No date: Bacteremia due to Streptococcus pneumoniae  No date: Cancer  No date: Cataract  No date: Cholangiocarcinoma  2/7/20: Fever blister  No date: Hypertension  No date: Jaundice      Comment:  obstructive  No date: Liver transplant status  No date: Organ transplant  No date: Prediabetes  No date: Raynaud disease  No date: Scleroderma    Immunosuppressed due to liver transplant status presenting with 2 weeks or more of cough mild chest congestion.  No fevers.  No wheezing.  Has been taking over-the-counter cough and cold preparations without alleviation.  No chest pain or shortness  of breath        ROS   See HPI for pertinent positives and negatives      Objective:     Physical Exam  Constitutional: Pt oriented to person, place, and time.  Non-toxic appearance.   Patient does not appear ill. No distress. normal  HENT: No icterus or facial swelling appreciated  Head: Normocephalic and atraumatic.   Nose: + clear rhinorrhea, + congestion.   Pulmonary/Chest: Effort normal. No stridor. No respiratory distress.  Dry cough during exam  Abdominal: Normal appearance. Abdomen exhibits no distension.   Musculoskeletal:         General: No swelling.   Neurological: no focal deficit. Patient is alert and oriented to person, place, and time.   Skin: Skin is not diaphoretic and not pale. no jaundice  Psychiatric: Patients behavior is normal. Mood, judgment and thought content normal.     Details    Reading Physician Reading " Date Result Priority   Calixto Butler MD  [phone] 10/20/2024 STAT     Narrative & Impression  EXAMINATION:  XR CHEST PA AND LATERAL     CLINICAL HISTORY:  Acute cough     TECHNIQUE:  PA and lateral views of the chest were performed.     COMPARISON:  Chest radiograph performed 09/09/2022.     FINDINGS:  Cardiomediastinal contours appear to be within normal limits.     Lungs essentially clear.  No definite pneumothorax or large volume pleural effusion.     No acute findings in the visualized abdomen.  Operative sequela project in the upper abdomen.     No acute osseous or soft tissue findings are appreciated.  Scoliotic curvature of the spine.     Impression:     No convincing evidence of acute cardiopulmonary disease.        Electronically signed by:Calixto Butler  Date:                                            10/20/2024  Time:                                           10:25        Exam Ended: 10/20/24 10:04 CDT Last Resulted: 10/20/24 10:25 CDT     Assessment:     1. Acute cough    2. Acute lower respiratory infection        Plan:       Acute cough  -     X-Ray Chest PA And Lateral; Future; Expected date: 10/20/2024- no pneumonia or other acute abnormalities radiologically visible    Acute lower respiratory infection  Since cough present more than 2 weeks' we will cover for possible bacterial etiology atypical-     doxycycline (VIBRA-TABS) 100 MG tablet; Take 1 tablet (100 mg total) by mouth 2 (two) times daily. for 7 days  Dispense: 14 tablet; Refill: 0  -     benzonatate (TESSALON) 200 MG capsule; Take 1 capsule (200 mg total) by mouth 3 (three) times daily as needed for Cough.  Dispense: 30 capsule; Refill: 0  -     promethazine-dextromethorphan (PROMETHAZINE-DM) 6.25-15 mg/5 mL Syrp; Take 5 mLs by mouth nightly as needed (cough).  Dispense: 118 mL; Refill: 0    Rest and hydration encouraged

## 2024-11-13 ENCOUNTER — HOSPITAL ENCOUNTER (OUTPATIENT)
Dept: RADIOLOGY | Facility: HOSPITAL | Age: 64
Discharge: HOME | End: 2024-11-13
Attending: STUDENT IN AN ORGANIZED HEALTH CARE EDUCATION/TRAINING PROGRAM
Payer: MEDICARE

## 2024-11-13 DIAGNOSIS — Z85.09 HISTORY OF CHOLANGIOCARCINOMA: ICD-10-CM

## 2024-11-13 PROCEDURE — 25500020 PHARM REV CODE 255: Performed by: STUDENT IN AN ORGANIZED HEALTH CARE EDUCATION/TRAINING PROGRAM

## 2024-11-13 PROCEDURE — 71260 CT THORAX DX C+: CPT | Mod: TC

## 2024-11-13 RX ADMIN — IOHEXOL 100 ML: 350 INJECTION, SOLUTION INTRAVENOUS at 09:11

## 2024-11-25 ENCOUNTER — TELEPHONE (OUTPATIENT)
Dept: TRANSPLANT | Facility: CLINIC | Age: 64
End: 2024-11-25
Payer: MEDICARE

## 2024-11-25 NOTE — TELEPHONE ENCOUNTER
Called patient to let her know about recommendation of pelvic US and sent message to her GYN    ----- Message from Vitaly Martinez MD sent at 11/24/2024 12:15 PM CST -----  Reviewed. No evidence of recurrent liver cancer.    CT does show hyperenhancing endometrial tissue for which radiologist is recommending a pelvic ultrasound. I recommend she have this done with her gynecologist or PCP, but we can arrange if she cannot get it arranged by them soon.

## 2024-11-26 ENCOUNTER — TELEPHONE (OUTPATIENT)
Dept: OBSTETRICS AND GYNECOLOGY | Facility: CLINIC | Age: 64
End: 2024-11-26
Payer: MEDICARE

## 2024-11-26 NOTE — TELEPHONE ENCOUNTER
----- Message from Bree sent at 11/26/2024  7:43 AM CST -----  Regarding: appt request  Name of Who is Calling: Nadeen           What is the request in detail: Patient is requesting a call back to schedule to review CT scan 11/26-12/4.           Can the clinic reply by [facility]: No           What Number to Call Back if not in [facility]:  [phone]

## 2024-11-26 NOTE — TELEPHONE ENCOUNTER
LVM for pt informing her that I will ask provider to reach out about CT scan when she returns to the office.

## 2024-12-04 ENCOUNTER — LAB VISIT (OUTPATIENT)
Dept: LAB | Facility: HOSPITAL | Age: 64
End: 2024-12-04
Attending: INTERNAL MEDICINE
Payer: MEDICARE

## 2024-12-04 DIAGNOSIS — E78.2 MIXED HYPERLIPIDEMIA: ICD-10-CM

## 2024-12-04 LAB
ALBUMIN SERPL BCP-MCNC: 4.2 G/DL (ref 3.5–5.2)
ALP SERPL-CCNC: 68 U/L (ref 40–150)
ALT SERPL W/O P-5'-P-CCNC: 20 U/L (ref 10–44)
ANION GAP SERPL CALC-SCNC: 6 MMOL/L (ref 8–16)
AST SERPL-CCNC: 21 U/L (ref 10–40)
BILIRUB SERPL-MCNC: 0.8 MG/DL (ref 0.1–1)
BUN SERPL-MCNC: 12 MG/DL (ref 8–23)
CALCIUM SERPL-MCNC: 9.5 MG/DL (ref 8.7–10.5)
CHLORIDE SERPL-SCNC: 109 MMOL/L (ref 95–110)
CHOLEST SERPL-MCNC: 121 MG/DL (ref 120–199)
CHOLEST/HDLC SERPL: 2.5 {RATIO} (ref 2–5)
CO2 SERPL-SCNC: 28 MMOL/L (ref 23–29)
CREAT SERPL-MCNC: 0.8 MG/DL (ref 0.5–1.4)
EST. GFR  (NO RACE VARIABLE): >60 ML/MIN/1.73 M^2
GLUCOSE SERPL-MCNC: 90 MG/DL (ref 70–110)
HDLC SERPL-MCNC: 49 MG/DL (ref 40–75)
HDLC SERPL: 40.5 % (ref 20–50)
LDLC SERPL CALC-MCNC: 62.4 MG/DL (ref 63–159)
NONHDLC SERPL-MCNC: 72 MG/DL
POTASSIUM SERPL-SCNC: 4.3 MMOL/L (ref 3.5–5.1)
PROT SERPL-MCNC: 7.4 G/DL (ref 6–8.4)
SODIUM SERPL-SCNC: 143 MMOL/L (ref 136–145)
TRIGL SERPL-MCNC: 48 MG/DL (ref 30–150)

## 2024-12-04 PROCEDURE — 80061 LIPID PANEL: CPT | Performed by: INTERNAL MEDICINE

## 2024-12-04 PROCEDURE — 36415 COLL VENOUS BLD VENIPUNCTURE: CPT | Performed by: INTERNAL MEDICINE

## 2024-12-04 PROCEDURE — 80053 COMPREHEN METABOLIC PANEL: CPT | Performed by: INTERNAL MEDICINE

## 2024-12-05 ENCOUNTER — TELEPHONE (OUTPATIENT)
Dept: OBSTETRICS AND GYNECOLOGY | Facility: CLINIC | Age: 64
End: 2024-12-05
Payer: MEDICARE

## 2024-12-05 ENCOUNTER — PATIENT MESSAGE (OUTPATIENT)
Dept: OBSTETRICS AND GYNECOLOGY | Facility: CLINIC | Age: 64
End: 2024-12-05
Payer: MEDICARE

## 2024-12-05 DIAGNOSIS — R93.89 THICKENED ENDOMETRIUM: Primary | ICD-10-CM

## 2024-12-05 NOTE — TELEPHONE ENCOUNTER
----- Message from Chetna Neeraj sent at 11/25/2024  4:33 PM CST -----  She had a CT of her abdomen and pelvis to follow up for her Cholangiocarcinoma which she was transplanted for.  CT does show hyperenhancing endometrial tissue for which radiologist is recommending a pelvic ultrasound.  Dr Martinez thought this would be something you should order and follow.    I did talk to the patient about this.    Thanks

## 2024-12-24 RX ORDER — MYCOPHENOLATE MOFETIL 500 MG/1
1500 TABLET ORAL 2 TIMES DAILY
Qty: 180 TABLET | Refills: 0 | Status: SHIPPED | OUTPATIENT
Start: 2024-12-24

## 2025-01-07 ENCOUNTER — OFFICE VISIT (OUTPATIENT)
Dept: TRANSPLANT | Facility: CLINIC | Age: 65
End: 2025-01-07
Payer: MEDICARE

## 2025-01-07 VITALS
DIASTOLIC BLOOD PRESSURE: 63 MMHG | SYSTOLIC BLOOD PRESSURE: 130 MMHG | RESPIRATION RATE: 16 BRPM | WEIGHT: 142 LBS | HEIGHT: 62 IN | OXYGEN SATURATION: 100 % | BODY MASS INDEX: 26.13 KG/M2 | HEART RATE: 64 BPM | TEMPERATURE: 97 F

## 2025-01-07 DIAGNOSIS — Z79.60 LONG-TERM USE OF IMMUNOSUPPRESSANT MEDICATION: ICD-10-CM

## 2025-01-07 DIAGNOSIS — Z94.4 LIVER TRANSPLANTED: Primary | ICD-10-CM

## 2025-01-07 DIAGNOSIS — Z85.09 HISTORY OF CHOLANGIOCARCINOMA: ICD-10-CM

## 2025-01-07 PROCEDURE — 1159F MED LIST DOCD IN RCRD: CPT | Mod: CPTII,S$GLB,, | Performed by: STUDENT IN AN ORGANIZED HEALTH CARE EDUCATION/TRAINING PROGRAM

## 2025-01-07 PROCEDURE — 99999 PR PBB SHADOW E&M-EST. PATIENT-LVL IV: CPT | Mod: PBBFAC,,, | Performed by: STUDENT IN AN ORGANIZED HEALTH CARE EDUCATION/TRAINING PROGRAM

## 2025-01-07 PROCEDURE — 99214 OFFICE O/P EST MOD 30 MIN: CPT | Mod: S$GLB,,, | Performed by: STUDENT IN AN ORGANIZED HEALTH CARE EDUCATION/TRAINING PROGRAM

## 2025-01-07 PROCEDURE — 3078F DIAST BP <80 MM HG: CPT | Mod: CPTII,S$GLB,, | Performed by: STUDENT IN AN ORGANIZED HEALTH CARE EDUCATION/TRAINING PROGRAM

## 2025-01-07 PROCEDURE — 3008F BODY MASS INDEX DOCD: CPT | Mod: CPTII,S$GLB,, | Performed by: STUDENT IN AN ORGANIZED HEALTH CARE EDUCATION/TRAINING PROGRAM

## 2025-01-07 PROCEDURE — 1160F RVW MEDS BY RX/DR IN RCRD: CPT | Mod: CPTII,S$GLB,, | Performed by: STUDENT IN AN ORGANIZED HEALTH CARE EDUCATION/TRAINING PROGRAM

## 2025-01-07 PROCEDURE — 3075F SYST BP GE 130 - 139MM HG: CPT | Mod: CPTII,S$GLB,, | Performed by: STUDENT IN AN ORGANIZED HEALTH CARE EDUCATION/TRAINING PROGRAM

## 2025-01-07 NOTE — PROGRESS NOTES
Transplant Hepatology  Liver Transplant Recipient Follow Up    PCP: Alvin Dennis MD    Transplant History  Transplant Date: 10/8/2015  UNOS Native Liver Dx: Bile Duct Cancer (Cholangioma, Biliary Tract Carcinoma)     Nadeen is here for follow up of her liver transplant.      ORGAN: LIVER  Whole or Partial: whole liver  Donor Type:  - brain death  [facility] High Risk: yes  Donor CMV Status: Positive  Donor HCV Status: Negative  Donor HBcAb: Negative  Biliary Anastomosis: tobias-en-y  Arterial Anatomy: standard  IVC reconstruction: end to end ivc  Portal vein status: patent    Chief complaint: follow up, history of OLT    HPI:  Nadeen Mcgovern is a 64 y.o. female with history of OLT in 10/2015 for  cholangiocarcinoma  who presents for follow up.    She is without complaints today.  She was hospitalized in 2024 for nausea, vomiting, diarrhea attributed to enteritis. No other recent hospitalizations or ED visits.  Compliant with Prograf and CellCept. She denies recent fever, chills, nausea, vomiting, diarrhea, headache, tremors.    Past Medical History:   Diagnosis Date    Acute cholecystitis     Cholecystitis    Anemia     Arthritis     septic arthritis of right shoulder    Bacteremia due to Streptococcus pneumoniae     Cancer     Cataract     Cholangiocarcinoma     Fever blister 20    Hypertension     Jaundice     obstructive    Liver transplant status     Organ transplant     Prediabetes     Raynaud disease     Scleroderma        Past Surgical History:   Procedure Laterality Date    arthoscopic Right     drained infection     SECTION      ESOPHAGOGASTRODUODENOSCOPY N/A 2020    Procedure: EGD (ESOPHAGOGASTRODUODENOSCOPY);  Surgeon: Drew Mackay MD;  Location: [address]);  Service: Endoscopy;  Laterality: N/A;  [facility]    INCISION AND DRAINAGE OF WOUND      s/p I&D of R thumb    LIVER TRANSPLANT      SHOULDER ARTHROSCOPY         Family History   Problem  Relation Name Age of Onset    Stroke Mother      Diabetes Mother      Hypertension Mother      Heart attack Mother      Cancer Father          Lung    Arthritis Father      Breast cancer Sister Eutiquia 73    Arthritis Sister Eutiquia     Psoriasis Sister Eutiquia     No Known Problems Daughter Perla     No Known Problems Daughter Katieei     Cancer Paternal Grandmother          pancreatic cancer    Cancer Brother Bertin         Lung    Alcohol abuse Brother Bertin     No Known Problems Son Rolando     No Known Problems Son Neel     Colon cancer Neg Hx      Esophageal cancer Neg Hx         Social History     Socioeconomic History    Marital status: Single   Occupational History    Occupation: Food Tech     Comment: [employer]   Tobacco Use    Smoking status: Never     Passive exposure: Current    Smokeless tobacco: Never   Substance and Sexual Activity    Alcohol use: Not Currently     Alcohol/week: 2.0 standard drinks of alcohol     Types: 2 Cans of beer per week     Comment: To be social with family and friends.    Drug use: Yes     Frequency: 3.0 times per week     Types: Marijuana     Comment: Help with pain    Sexual activity: Yes     Partners: Male     Birth control/protection: Condom   Other Topics Concern    Are you pregnant or think you may be? No    Breast-feeding No     Social Drivers of Health     Financial Resource Strain: Medium Risk (1/7/2025)    Overall Financial Resource Strain (CARDIA)     Difficulty of Paying Living Expenses: Somewhat hard   Food Insecurity: Food Insecurity Present (1/7/2025)    Hunger Vital Sign     Worried About Running Out of Food in the Last Year: Never true     Ran Out of Food in the Last Year: Sometimes true   Transportation Needs: No Transportation Needs (7/12/2024)    TRANSPORTATION NEEDS     Transportation : No   Physical Activity: Insufficiently Active (1/7/2025)    Exercise Vital Sign     Days of Exercise per Week: 3 days     Minutes of  Exercise per Session: 30 min   Stress: Stress Concern Present (1/7/2025)    [employer] [place] [employer] - Occupational Stress Questionnaire     Feeling of Stress : To some extent   Housing Stability: High Risk (1/7/2025)    Housing Stability Vital Sign     Unable to Pay for Housing in the Last Year: Yes     Homeless in the Last Year: No       Current Outpatient Medications   Medication Sig Dispense Refill    aspirin 81 MG Chew Take 81 mg by mouth once daily.      betamethasone dipropionate (DIPROLENE) 0.05 % ointment Apply topically 2 (two) times daily. To affected areas on hands 45 g 3    diphenhydrAMINE (BENADRYL) 25 mg capsule Take 25 mg by mouth every 6 (six) hours as needed for Itching.      estradioL (ESTRACE) 0.01 % (0.1 mg/gram) vaginal cream Place 1 g vaginally once daily. 42.5 g 1    multivitamin (THERAGRAN) tablet Take 1 tablet by mouth once daily.      mycophenolate (CELLCEPT) 500 mg Tab Take 3 tablets (1,500 mg total) by mouth 2 (two) times daily. 180 tablet 0    ondansetron (ZOFRAN) 4 MG tablet Take 1 tablet (4 mg total) by mouth every 8 (eight) hours as needed for Nausea. 12 tablet 0    pulse oximeter (PULSE OXIMETER) device by Apply Externally route 2 (two) times a day. Use twice daily at 8 AM and 3 PM and [facility] as directed. 1 each 0    rosuvastatin (CRESTOR) 10 MG tablet Take 1 tablet (10 mg total) by mouth once daily. 90 tablet 3    sildenafil (REVATIO) 20 mg Tab TAKE 1 TABLET (20 MG) BY MOUTH TWO TIMES DAILY 180 tablet 3    tacrolimus (PROGRAF) 1 MG Cap Take 2 capsules (2 mg total) by mouth every morning AND 1 capsule (1 mg total) every evening. 90 capsule 11     No current facility-administered medications for this visit.       Review of patient's allergies indicates:   Allergen Reactions    Tomato Rash       Review of Systems   Constitutional:  Negative for fever and weight loss.   Gastrointestinal:  Negative for abdominal pain, blood in stool, constipation,  "diarrhea, heartburn, melena, nausea and vomiting.   Neurological:  Negative for tremors and headaches.       Vitals:    01/07/25 0905   BP: 130/63   Pulse: 64   Resp: 16   Temp: 97.3 °F (36.3 °C)   TempSrc: Temporal   SpO2: 100%   Weight: 64.4 kg (141 lb 15.6 oz)   Height: 5' 2" (1.575 m)       Physical Exam  Vitals reviewed.   Constitutional:       General: She is not in acute distress.  Eyes:      General: No scleral icterus.  Cardiovascular:      Rate and Rhythm: Normal rate and regular rhythm.   Pulmonary:      Effort: Pulmonary effort is normal. No respiratory distress.   Abdominal:      General: Bowel sounds are normal. There is no distension.      Palpations: Abdomen is soft.      Tenderness: There is no abdominal tenderness. There is no guarding or rebound.   Musculoskeletal:      Right lower leg: No edema.      Left lower leg: No edema.   Skin:     Coloration: Skin is not jaundiced.         LABS:  Lab Results   Component Value Date    WBC 6.74 10/14/2024    HGB 12.7 10/14/2024    HCT 39.1 10/14/2024    MCV 96 10/14/2024     10/14/2024       Lab Results   Component Value Date     12/04/2024    K 4.3 12/04/2024     12/04/2024    CO2 28 12/04/2024    BUN 12 12/04/2024    CREATININE 0.8 12/04/2024    CALCIUM 9.5 12/04/2024    ANIONGAP 6 (L) 12/04/2024    ESTGFRAFRICA >60.0 07/21/2022    ESTGFRAFRICA >60.0 07/21/2022    EGFRNONAA >60.0 07/21/2022    EGFRNONAA >60.0 07/21/2022       Lab Results   Component Value Date    ALT 20 12/04/2024    AST 21 12/04/2024     (H) 10/13/2015    ALKPHOS 68 12/04/2024    BILITOT 0.8 12/04/2024       Lab Results   Component Value Date    TACROLIMUS 4.5 (L) 10/14/2024       Assessment:  64 y.o. 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    1. Liver transplanted    2. Long-term use of immunosuppressant medication    3. History of cholangiocarcinoma        Recommendations:  1. Allograft Function  - " Excellent graft function with normal LFTs     2. Immunosuppression   - Continue Prograf 2mg in AM and 1mg in PM  - Continue CellCept per rheumatology for scleroderma    3. History of cholangiocarcinoma  - Cross-sectional imaging in 11/2024 without evidence of recurrence. Continue imaging surveillance per protocol.    4. Kidney function   - Creatinine 0.8  - Avoid NSAIDs as able and maintain adequate hydration     5.  Health Maintenance/Screening:  - Recommend age appropriate cancer screening.  - Skin cancer: Recommend use of sunscreen SPF 30 or higher, hat and sunglasses while outside, dermatologist visit annually or sooner if any concerning lesions.  - Osteoporosis: Recommend bone density testing every 3 years if previously normal or annually if previously abnormal. DEXA normal 04/2019.    Return to clinic in 12 months.    UNOS Patient Status  Functional Status: 100% - Normal, no complaints, no evidence of disease  Physical Capacity: No Limitations    I spent a total of 30 minutes on the day of the visit. This includes face to face time and non-face to face time preparing to see the patient (eg, review of tests), obtaining and/or reviewing separately obtained history, documenting clinical information in the electronic or other health record, independently interpreting results, and communicating results to the patient/family/caregiver, or care coordination.    Vitaly Martinez MD  Staff Physician  Hepatology and Liver Transplant  [facility]  [facility]

## 2025-01-07 NOTE — LETTER
January 7, 2025        Wendy Hernandez  [address] [facility] [address]  Phone: [phone]  Fax: [phone]             [facility] 1st Fl  [address]  [facility] [address]  Phone: [phone]   Patient: Nadeen Mcgovern   MR Number: [id number]   YOB: 1960   Date of Visit: 1/7/2025       Dear Dr. Wendy Hernandez    Thank you for referring Nadeen Mcgovern to me for evaluation. Attached you will find relevant portions of my assessment and plan of care.    If you have questions, please do not hesitate to call me. I look forward to following Nadeen Mcgovern along with you.    Sincerely,    Vitaly Martinez MD    Enclosure    If you would like to receive this communication electronically, please contact [email or web address] or [phone] to request [employer] Link access.    [employer] Link is a tool which provides read-only access to select patient information with whom you have a relationship. Its easy to use and provides real time access to review your patients record including encounter summaries, notes, results, and demographic information.    If you feel you have received this communication in error or would no longer like to receive these types of communications, please e-mail [email or web address]

## 2025-01-14 ENCOUNTER — OFFICE VISIT (OUTPATIENT)
Dept: INTERNAL MEDICINE | Facility: CLINIC | Age: 65
End: 2025-01-14
Payer: MEDICARE

## 2025-01-14 ENCOUNTER — IMMUNIZATION (OUTPATIENT)
Dept: INTERNAL MEDICINE | Facility: CLINIC | Age: 65
End: 2025-01-14
Payer: MEDICARE

## 2025-01-14 VITALS
HEIGHT: 62 IN | WEIGHT: 141.75 LBS | SYSTOLIC BLOOD PRESSURE: 118 MMHG | OXYGEN SATURATION: 99 % | DIASTOLIC BLOOD PRESSURE: 60 MMHG | BODY MASS INDEX: 26.09 KG/M2 | HEART RATE: 63 BPM

## 2025-01-14 DIAGNOSIS — M34.9 SCLERODERMA: Chronic | ICD-10-CM

## 2025-01-14 DIAGNOSIS — Z00.00 LABORATORY EXAMINATION ORDERED AS PART OF A ROUTINE GENERAL MEDICAL EXAMINATION: ICD-10-CM

## 2025-01-14 DIAGNOSIS — Z23 NEED FOR VACCINATION: Primary | ICD-10-CM

## 2025-01-14 DIAGNOSIS — I27.20 PULMONARY HYPERTENSION: ICD-10-CM

## 2025-01-14 DIAGNOSIS — Z94.4 S/P LIVER TRANSPLANT: Chronic | ICD-10-CM

## 2025-01-14 DIAGNOSIS — F12.20 CANNABIS DEPENDENCE, UNCOMPLICATED: ICD-10-CM

## 2025-01-14 DIAGNOSIS — Z85.09 HISTORY OF BILE DUCT CANCER: ICD-10-CM

## 2025-01-14 DIAGNOSIS — E78.2 MIXED HYPERLIPIDEMIA: ICD-10-CM

## 2025-01-14 DIAGNOSIS — Z00.00 ENCOUNTER FOR ANNUAL PHYSICAL EXAM: Primary | ICD-10-CM

## 2025-01-14 DIAGNOSIS — J98.4 RESTRICTIVE LUNG DISEASE: ICD-10-CM

## 2025-01-14 DIAGNOSIS — D84.9 IMMUNOSUPPRESSION: ICD-10-CM

## 2025-01-14 PROCEDURE — 99396 PREV VISIT EST AGE 40-64: CPT | Mod: S$GLB,,, | Performed by: INTERNAL MEDICINE

## 2025-01-14 PROCEDURE — 1159F MED LIST DOCD IN RCRD: CPT | Mod: CPTII,S$GLB,, | Performed by: INTERNAL MEDICINE

## 2025-01-14 PROCEDURE — 91320 SARSCV2 VAC 30MCG TRS-SUC IM: CPT | Mod: S$GLB,,, | Performed by: INTERNAL MEDICINE

## 2025-01-14 PROCEDURE — 90480 ADMN SARSCOV2 VAC 1/ONLY CMP: CPT | Mod: S$GLB,,, | Performed by: INTERNAL MEDICINE

## 2025-01-14 PROCEDURE — 3078F DIAST BP <80 MM HG: CPT | Mod: CPTII,S$GLB,, | Performed by: INTERNAL MEDICINE

## 2025-01-14 PROCEDURE — 99999 PR PBB SHADOW E&M-EST. PATIENT-LVL IV: CPT | Mod: PBBFAC,,, | Performed by: INTERNAL MEDICINE

## 2025-01-14 PROCEDURE — 1160F RVW MEDS BY RX/DR IN RCRD: CPT | Mod: CPTII,S$GLB,, | Performed by: INTERNAL MEDICINE

## 2025-01-14 PROCEDURE — 3074F SYST BP LT 130 MM HG: CPT | Mod: CPTII,S$GLB,, | Performed by: INTERNAL MEDICINE

## 2025-01-14 PROCEDURE — 3008F BODY MASS INDEX DOCD: CPT | Mod: CPTII,S$GLB,, | Performed by: INTERNAL MEDICINE

## 2025-01-14 NOTE — PROGRESS NOTES
Subjective:       Patient ID: Nadeen Mcgovern is a 64 y.o. female.    Chief Complaint: Follow-up      HPI  Nadeen Mcgovern is a 64 y.o. year old female with  scleroderma, hx of cholangiocarcinoma s/p liver transplant, HLD, on immunosuppression presents for annual exam. Doing well with no new complaints. Does report she may have worsening dyspnea on exertion. Last PFTs done 7/2023 with rheumatology. Taking medications as prescribed.     Review of Systems   Constitutional:  Negative for activity change, appetite change, fatigue, fever and unexpected weight change.   HENT:  Negative for congestion, hearing loss, postnasal drip, sneezing, sore throat, trouble swallowing and voice change.    Eyes:  Negative for pain and discharge.   Respiratory:  Positive for shortness of breath (with exertion, slightly worse than previous). Negative for cough, choking, chest tightness and wheezing.    Cardiovascular:  Negative for chest pain, palpitations and leg swelling.   Gastrointestinal:  Negative for abdominal distention, abdominal pain, blood in stool, constipation, diarrhea, nausea and vomiting.   Endocrine: Negative for polydipsia and polyuria.   Genitourinary:  Negative for difficulty urinating and flank pain.   Musculoskeletal:  Negative for arthralgias, back pain, joint swelling, myalgias and neck pain.   Skin:  Negative for rash.   Neurological:  Negative for dizziness, tremors, seizures, weakness, numbness and headaches.   Psychiatric/Behavioral:  Negative for agitation. The patient is not nervous/anxious.          Past Medical History:   Diagnosis Date    Acute cholecystitis     Cholecystitis    Anemia 12/14    Arthritis 2015    septic arthritis of right shoulder    Bacteremia due to Streptococcus pneumoniae     Cancer     Cataract     Cholangiocarcinoma     Fever blister 2/7/20    Hypertension     Jaundice     obstructive    Liver transplant status     Organ transplant     Prediabetes     Raynaud disease     Scleroderma      "    Prior to Admission medications    Medication Sig Start Date End Date Taking? Authorizing Provider   aspirin 81 MG Chew Take 81 mg by mouth once daily.    Provider, Historical   betamethasone dipropionate (DIPROLENE) 0.05 % ointment Apply topically 2 (two) times daily. To affected areas on hands 9/27/24   Coby Schmidt MD   diphenhydrAMINE (BENADRYL) 25 mg capsule Take 25 mg by mouth every 6 (six) hours as needed for Itching.    Provider, Historical   estradioL (ESTRACE) 0.01 % (0.1 mg/gram) vaginal cream Place 1 g vaginally once daily. 8/30/24 8/30/25  Karlie Matute DO   multivitamin (THERAGRAN) tablet Take 1 tablet by mouth once daily. 10/12/15   Marysol Zavaleta MD   mycophenolate (CELLCEPT) 500 mg Tab Take 3 tablets (1,500 mg total) by mouth 2 (two) times daily. 12/24/24   Fran Mueller MD   ondansetron (ZOFRAN) 4 MG tablet Take 1 tablet (4 mg total) by mouth every 8 (eight) hours as needed for Nausea. 3/15/23   Angelina Saez MD   pulse oximeter (PULSE OXIMETER) device by Apply Externally route 2 (two) times a day. Use twice daily at 8 AM and 3 PM and record the value in MyChart as directed. 7/13/24   Nils Sequeira MD   rosuvastatin (CRESTOR) 10 MG tablet Take 1 tablet (10 mg total) by mouth once daily. 9/19/24 9/19/25  Alvin Dennis MD   sildenafil (REVATIO) 20 mg Tab TAKE 1 TABLET (20 MG) BY MOUTH TWO TIMES DAILY 5/21/24   Haresh Butler MD   tacrolimus (PROGRAF) 1 MG Cap Take 2 capsules (2 mg total) by mouth every morning AND 1 capsule (1 mg total) every evening. 5/7/24   Vitaly Martinez MD        Past medical history, surgical history, and family medical history reviewed and updated as appropriate.    Medications and allergies reviewed.     Objective:          Vitals:    01/14/25 0837   BP: 118/60   BP Location: Right arm   Patient Position: Sitting   Pulse: 63   SpO2: 99%   Weight: 64.3 kg (141 lb 12.1 oz)   Height: 5' 2" (1.575 m)     Body mass index is 25.93 kg/m².  Physical " Exam  Constitutional:       Appearance: She is well-developed.   HENT:      Head: Normocephalic and atraumatic.   Eyes:      Extraocular Movements: Extraocular movements intact.   Cardiovascular:      Rate and Rhythm: Normal rate and regular rhythm.      Heart sounds: Normal heart sounds.   Pulmonary:      Effort: Pulmonary effort is normal. No respiratory distress.      Breath sounds: Normal breath sounds. No wheezing.   Abdominal:      General: There is no distension.      Palpations: Abdomen is soft.      Tenderness: There is no abdominal tenderness.   Musculoskeletal:         General: No tenderness. Normal range of motion.      Cervical back: Normal range of motion.   Skin:     General: Skin is warm and dry.   Neurological:      Mental Status: She is alert and oriented to person, place, and time.      Cranial Nerves: No cranial nerve deficit.      Deep Tendon Reflexes: Reflexes are normal and symmetric.         Lab Results   Component Value Date    WBC 6.74 10/14/2024    HGB 12.7 10/14/2024    HCT 39.1 10/14/2024     10/14/2024    CHOL 121 12/04/2024    TRIG 48 12/04/2024    HDL 49 12/04/2024    ALT 20 12/04/2024    AST 21 12/04/2024     12/04/2024    K 4.3 12/04/2024     12/04/2024    CREATININE 0.8 12/04/2024    BUN 12 12/04/2024    CO2 28 12/04/2024    TSH 1.932 06/05/2024    INR 1.0 03/15/2023    HGBA1C 5.3 06/05/2024       Assessment:       1. Encounter for annual physical exam    2. Scleroderma    3. S/P liver transplant    4. Immunosuppression    5. History of bile duct cancer    6. Laboratory examination ordered as part of a routine general medical examination    7. Pulmonary hypertension    8. Mixed hyperlipidemia    9. Restrictive lung disease    10. Cannabis dependence, uncomplicated          Plan:     Nadeen was seen today for follow-up.    Diagnoses and all orders for this visit:    Encounter for annual physical exam    Scleroderma  Comments:  follows rheum, due for appointment, on  immunosuppression    S/P liver transplant    Immunosuppression    History of bile duct cancer    Laboratory examination ordered as part of a routine general medical examination    Pulmonary hypertension    Mixed hyperlipidemia    Restrictive lung disease  -     Complete PFT w/ bronchodilator; Future  -     Stress test, pulmonary; Future    Cannabis dependence, uncomplicated  Comments:  daily use, 2-3 joints a day    Benign physical examination, no issues identified. Will obtain routine labwork and age appropriate health screenings.     Scleroderma - on tacrolimus and mycophenolate, followed by rheum, needs to schedule rheumatology appointment.    HLD - controlled, on crestor 10, no changes to current medication    pHTN - on revatio 20 mg BID.     Health maintenance reviewed with patient.     Follow up in about 6 months (around 7/14/2025).    Alvin Dennis MD  Internal Medicine / Primary Care  [facility]  1/14/2025

## 2025-01-14 NOTE — PATIENT INSTRUCTIONS
Covid-19 vaccination today  Schedule pulmonary function tests and pulmonary stress test  Schedule 2D echo    Schedule rheumatology appointment (they will no longer fill meds if you don't see them in clinic).     Return to clinic in 6 months or sooner if needed.

## 2025-01-16 ENCOUNTER — HOSPITAL ENCOUNTER (OUTPATIENT)
Dept: PULMONOLOGY | Facility: CLINIC | Age: 65
Discharge: HOME | End: 2025-01-16
Payer: MEDICARE

## 2025-01-16 ENCOUNTER — HOSPITAL ENCOUNTER (OUTPATIENT)
Dept: CARDIOLOGY | Facility: HOSPITAL | Age: 65
Discharge: HOME | End: 2025-01-16
Attending: INTERNAL MEDICINE
Payer: MEDICARE

## 2025-01-16 VITALS
HEART RATE: 70 BPM | WEIGHT: 141 LBS | DIASTOLIC BLOOD PRESSURE: 68 MMHG | HEIGHT: 62 IN | BODY MASS INDEX: 25.95 KG/M2 | HEIGHT: 62 IN | BODY MASS INDEX: 25.97 KG/M2 | SYSTOLIC BLOOD PRESSURE: 120 MMHG | WEIGHT: 141.13 LBS

## 2025-01-16 DIAGNOSIS — J98.4 RESTRICTIVE LUNG DISEASE: ICD-10-CM

## 2025-01-16 DIAGNOSIS — I27.20 PULMONARY HYPERTENSION: Primary | Chronic | ICD-10-CM

## 2025-01-16 DIAGNOSIS — I27.20 PULMONARY HYPERTENSION: ICD-10-CM

## 2025-01-16 LAB
ASCENDING AORTA: 2.7 CM
AV AREA BY CONTINUOUS VTI: 2.8 CM2
AV INDEX (PROSTH): 0.81
AV LVOT MEAN GRADIENT: 1 MMHG
AV LVOT PEAK GRADIENT: 3 MMHG
AV MEAN GRADIENT: 3 MMHG
AV PEAK GRADIENT: 5 MMHG
AV VALVE AREA BY VELOCITY RATIO: 2.8 CM²
AV VALVE AREA: 2.8 CM2
AV VELOCITY RATIO: 0.82
BSA FOR ECHO PROCEDURE: 1.67 M2
CV ECHO LV RWT: 0.29 CM
DLCO SINGLE BREATH LLN: 15.19
DLCO SINGLE BREATH PRE REF: 69.4 %
DLCO SINGLE BREATH REF: 20.92
DLCOC SBVA LLN: 2.96
DLCOC SBVA REF: 4.54
DLCOC SINGLE BREATH LLN: 15.19
DLCOC SINGLE BREATH REF: 20.92
DLCOCSBVAULN: 6.13
DLCOCSINGLEBREATHULN: 26.65
DLCOSINGLEBREATHULN: 26.65
DLCOSINGLEBREATHZSCORE: -1.84
DLCOVA LLN: 2.96
DLCOVA PRE REF: 67.8 %
DLCOVA PRE: 3.08 ML/(MIN*MMHG*L) (ref 2.96–6.13)
DLCOVA REF: 4.54
DLCOVAULN: 6.13
DOP CALC AO PEAK VEL: 1.1 M/S
DOP CALC AO VTI: 20.9 CM
DOP CALC LVOT AREA: 3.5 CM2
DOP CALC LVOT DIAMETER: 2.1 CM
DOP CALC LVOT PEAK VEL: 0.9 M/S
DOP CALC LVOT STROKE VOLUME: 58.9 CM3
DOP CALCLVOT PEAK VEL VTI: 17 CM
E WAVE DECELERATION TIME: 275 MS
E/A RATIO: 0.83
E/E' RATIO: 6 M/S
ECHO EF ESTIMATED: 71 %
ECHO LV POSTERIOR WALL: 0.7 CM (ref 0.6–1.1)
EJECTION FRACTION: 65 %
ERV LLN: -16449.28
ERV PRE REF: 120.7 %
ERV REF: 0.72
ERVULN: ABNORMAL
FEF 25 75 LLN: 1.31
FEF 25 75 PRE REF: 130 %
FEF 25 75 REF: 2.71
FET100 CHG: -1.6 %
FEV05 LLN: 0.83
FEV05 REF: 1.69
FEV1 CHG: 0.2 %
FEV1 FVC LLN: 67
FEV1 FVC PRE REF: 105.5 %
FEV1 FVC REF: 80
FEV1 LLN: 1.37
FEV1 PRE REF: 147.5 %
FEV1 REF: 1.91
FEV1 VOL CHG: 0.01
FEV1FVCZSCORE: 0.66
FEV1ZSCORE: 2.91
FRACTIONAL SHORTENING: 40.8 % (ref 28–44)
FRCPLETH LLN: 1.77
FRCPLETH PREREF: 88.8 %
FRCPLETH REF: 2.59
FRCPLETHULN: 3.41
FVC CHG: -2.3 %
FVC LLN: 1.75
FVC PRE REF: 139.2 %
FVC REF: 2.42
FVC VOL CHG: -0.08
FVCZSCORE: 2.22
INTERVENTRICULAR SEPTUM: 0.7 CM (ref 0.6–1.1)
IVC DIAMETER: 1.64 CM
IVC PRE: 3.48 L (ref 1.75–3.11)
IVC SINGLE BREATH LLN: 1.75
IVC SINGLE BREATH PRE REF: 144.2 %
IVC SINGLE BREATH REF: 2.42
IVCSINGLEBREATHULN: 3.11
LA MAJOR: 4.4 CM
LA MINOR: 4.5 CM
LA WIDTH: 4.1 CM
LEFT ATRIUM SIZE: 3 CM
LEFT ATRIUM VOLUME INDEX MOD: 30 ML/M2
LEFT ATRIUM VOLUME INDEX: 28 ML/M2
LEFT ATRIUM VOLUME MOD: 49 ML
LEFT ATRIUM VOLUME: 47 CM3
LEFT INTERNAL DIMENSION IN SYSTOLE: 2.9 CM (ref 2.1–4)
LEFT VENTRICLE DIASTOLIC VOLUME INDEX: 67.87 ML/M2
LEFT VENTRICLE DIASTOLIC VOLUME: 111.99 ML
LEFT VENTRICLE MASS INDEX: 67.1 G/M2
LEFT VENTRICLE SYSTOLIC VOLUME INDEX: 19.7 ML/M2
LEFT VENTRICLE SYSTOLIC VOLUME: 32.5 ML
LEFT VENTRICULAR INTERNAL DIMENSION IN DIASTOLE: 4.9 CM (ref 3.5–6)
LEFT VENTRICULAR MASS: 110.8 G
LLN IC: -16448.19
LV LATERAL E/E' RATIO: 6
LV SEPTAL E/E' RATIO: 5.5
MV PEAK A VEL: 0.72 M/S
MV PEAK E VEL: 0.6 M/S
OHS CV RV/LV RATIO: 0.67 CM
PEF LLN: 3.12
PEF PRE REF: 139.2 %
PEF REF: 5
PHYSICIAN COMMENT: ABNORMAL
PISA TR MAX VEL: 2 M/S
POST FEF 25 75: 3.8 L/S (ref 1.31–4.11)
POST FET 100: 6.44 SEC
POST FEV1 FVC: 86.11 % (ref 67.31–90.05)
POST FEV1: 2.83 L (ref 1.37–2.43)
POST FEV5: 2.37 L (ref 0.83–2.55)
POST FVC: 3.28 L (ref 1.75–3.11)
POST PEF: 6.92 L/S (ref 3.12–6.88)
PRE DLCO: 14.51 ML/(MIN*MMHG) (ref 15.19–26.65)
PRE ERV: 0.87 L (ref -16449.28–16450.72)
PRE FEF 25 75: 3.53 L/S (ref 1.31–4.11)
PRE FET 100: 6.54 SEC
PRE FEV05 REF: 139.5 %
PRE FEV1 FVC: 83.91 % (ref 67.31–90.05)
PRE FEV1: 2.82 L (ref 1.37–2.43)
PRE FEV5: 2.36 L (ref 0.83–2.55)
PRE FRC PL: 2.3 L (ref 1.77–3.41)
PRE FVC: 3.36 L (ref 1.75–3.11)
PRE IC: 2.62 L (ref -16448.19–16451.81)
PRE PEF: 6.96 L/S (ref 3.12–6.88)
PRE REF IC: 145 %
PRE RV: 1.44 L (ref 1.3–2.45)
PRE TLC: 4.92 L (ref 3.62–5.59)
RA MAJOR: 4.51 CM
RA PRESSURE ESTIMATED: 3 MMHG
RA WIDTH: 2.77 CM
RAW PRE REF: 71.2 %
RAW PRE: 2.18 CMH2O*S/L (ref 3.06–3.06)
RAW REF: 3.06
REF IC: 1.81
RIGHT ATRIAL AREA: 11.7 CM2
RIGHT VENTRICLE DIASTOLIC BASEL DIMENSION: 3.3 CM
RV LLN: 1.3
RV PRE REF: 76.6 %
RV REF: 1.87
RV TB RVSP: 5 MMHG
RV TISSUE DOPPLER FREE WALL SYSTOLIC VELOCITY 1 (APICAL 4 CHAMBER VIEW): 16.53 CM/S
RVTLC LLN: 31
RVTLC PRE REF: 71.7 %
RVTLC PRE: 29.18 % (ref 31.13–50.31)
RVTLC REF: 41
RVTLCULN: 50
RVULN: 2.45
SGAW PRE REF: 138.5 %
SGAW PRE: 0.14 1/(CMH2O*S) (ref 0.1–0.1)
SGAW REF: 0.1
SINUS: 3.04 CM
STJ: 2.8 CM
TDI LATERAL: 0.1 M/S
TDI SEPTAL: 0.11 M/S
TDI: 0.11 M/S
TLC LLN: 3.62
TLC PRE REF: 106.8 %
TLC REF: 4.6
TLC ULN: 5.59
TLCZSCORE: 0.52
TR MAX PG: 16 MMHG
TRICUSPID ANNULAR PLANE SYSTOLIC EXCURSION: 3.08 CM
TV PEAK GRADIENT: 17 MMHG
TV REST PULMONARY ARTERY PRESSURE: 19 MMHG
ULN IC: ABNORMAL
VA PRE: 4.71 L (ref 4.45–4.45)
VA SINGLE BREATH LLN: 4.45
VA SINGLE BREATH PRE REF: 105.7 %
VA SINGLE BREATH REF: 4.45
VASINGLEBREATHULN: 4.45
VC LLN: 1.75
VC PRE REF: 144.2 %
VC PRE: 3.48 L (ref 1.75–3.11)
VC REF: 2.42
VC ULN: 3.11
Z-SCORE OF LEFT VENTRICULAR DIMENSION IN END DIASTOLE: 0.56
Z-SCORE OF LEFT VENTRICULAR DIMENSION IN END SYSTOLE: 0.08

## 2025-01-16 PROCEDURE — 93306 TTE W/DOPPLER COMPLETE: CPT | Mod: 26,,, | Performed by: INTERNAL MEDICINE

## 2025-01-16 PROCEDURE — 93306 TTE W/DOPPLER COMPLETE: CPT

## 2025-01-16 PROCEDURE — 94618 PULMONARY STRESS TESTING: CPT | Mod: S$GLB,,, | Performed by: INTERNAL MEDICINE

## 2025-01-16 PROCEDURE — 94729 DIFFUSING CAPACITY: CPT | Mod: S$GLB,,, | Performed by: INTERNAL MEDICINE

## 2025-01-16 PROCEDURE — 94060 EVALUATION OF WHEEZING: CPT | Mod: 59,S$GLB,, | Performed by: INTERNAL MEDICINE

## 2025-01-16 PROCEDURE — 94726 PLETHYSMOGRAPHY LUNG VOLUMES: CPT | Mod: S$GLB,,, | Performed by: INTERNAL MEDICINE

## 2025-01-16 NOTE — PROCEDURES
Nadeen Mcgovern is a 64 y.o.   female patient, who presents for a 6 minute walk test ordered by MD Sonny.  The diagnosis is Scleroderma/CREST.  The patient's BMI is 25.8 kg/m2.  Predicted distance (lower limit of normal) is 343.89 meters.      Test Results:    The test was completed without stopping.  The total time walked was 360 seconds.  During walking, the patient reported:  Dyspnea.  The patient used no assistive devices during testing.     01/16/2025---------Distance: 402.34 meters (1320 feet)     O2 Sat % Supplemental Oxygen Heart Rate Blood Pressure Adamaris Scale   Pre-exercise  (Resting) 98 % Room Air 71 bpm 135/80 mmHg 1   During Exercise 98 % Room Air 96 bpm 145/76 mmHg 4   Post-exercise  (Recovery) 98 % Room Air  84 bpm       Recovery Time: 66 seconds    Performing nurse/tech: Campos DURAN      PREVIOUS STUDY:   09/15/2023---------Distance: 405.99 meters (1332 feet)       O2 Sat % Supplemental Oxygen Heart Rate Blood Pressure Adamaris Scale   Pre-exercise  (Resting) 98 % Room Air 56 bpm 125/58 mmHg 0   During Exercise 98 % Room Air 71 bpm 143/65 mmHg 4   Post-exercise  (Recovery) 98 % Room Air  62 bpm           CLINICAL INTERPRETATION:  Six minute walk distance is 402.34 meters (1320 feet) with somewhat heavy dyspnea.  During exercise, there was no desaturation while breathing room air.  Both blood pressure and heart rate remained stable with walking.  The patient did not report non-pulmonary symptoms during exercise.  Since the previous study in September 2023, exercise capacity is unchanged.  Based upon age and body mass index, exercise capacity is normal.

## 2025-01-28 ENCOUNTER — TELEPHONE (OUTPATIENT)
Dept: RHEUMATOLOGY | Facility: CLINIC | Age: 65
End: 2025-01-28
Payer: MEDICARE

## 2025-01-28 DIAGNOSIS — M34.9 SCLERODERMA: Primary | ICD-10-CM

## 2025-01-28 RX ORDER — MYCOPHENOLATE MOFETIL 500 MG/1
1500 TABLET ORAL 2 TIMES DAILY
Qty: 180 TABLET | Refills: 0 | Status: SHIPPED | OUTPATIENT
Start: 2025-01-28

## 2025-02-07 ENCOUNTER — LAB VISIT (OUTPATIENT)
Dept: LAB | Facility: HOSPITAL | Age: 65
End: 2025-02-07
Attending: INTERNAL MEDICINE
Payer: MEDICARE

## 2025-02-07 DIAGNOSIS — M34.9 SCLERODERMA: ICD-10-CM

## 2025-02-07 LAB
ALBUMIN SERPL BCP-MCNC: 4.2 G/DL (ref 3.5–5.2)
ALP SERPL-CCNC: 66 U/L (ref 40–150)
ALT SERPL W/O P-5'-P-CCNC: 13 U/L (ref 10–44)
ANION GAP SERPL CALC-SCNC: 7 MMOL/L (ref 8–16)
AST SERPL-CCNC: 17 U/L (ref 10–40)
BASOPHILS # BLD AUTO: 0.03 K/UL (ref 0–0.2)
BASOPHILS NFR BLD: 0.6 % (ref 0–1.9)
BILIRUB SERPL-MCNC: 0.9 MG/DL (ref 0.1–1)
BUN SERPL-MCNC: 10 MG/DL (ref 8–23)
CALCIUM SERPL-MCNC: 9.7 MG/DL (ref 8.7–10.5)
CHLORIDE SERPL-SCNC: 108 MMOL/L (ref 95–110)
CO2 SERPL-SCNC: 27 MMOL/L (ref 23–29)
CREAT SERPL-MCNC: 0.8 MG/DL (ref 0.5–1.4)
CRP SERPL-MCNC: 0.7 MG/L (ref 0–8.2)
DIFFERENTIAL METHOD BLD: ABNORMAL
EOSINOPHIL # BLD AUTO: 0.1 K/UL (ref 0–0.5)
EOSINOPHIL NFR BLD: 1.1 % (ref 0–8)
ERYTHROCYTE [DISTWIDTH] IN BLOOD BY AUTOMATED COUNT: 12.9 % (ref 11.5–14.5)
ERYTHROCYTE [SEDIMENTATION RATE] IN BLOOD BY PHOTOMETRIC METHOD: 23 MM/HR (ref 0–36)
EST. GFR  (NO RACE VARIABLE): >60 ML/MIN/1.73 M^2
GLUCOSE SERPL-MCNC: 87 MG/DL (ref 70–110)
HCT VFR BLD AUTO: 42.7 % (ref 37–48.5)
HGB BLD-MCNC: 13.5 G/DL (ref 12–16)
IMM GRANULOCYTES # BLD AUTO: 0.01 K/UL (ref 0–0.04)
IMM GRANULOCYTES NFR BLD AUTO: 0.2 % (ref 0–0.5)
LYMPHOCYTES # BLD AUTO: 1.9 K/UL (ref 1–4.8)
LYMPHOCYTES NFR BLD: 35 % (ref 18–48)
MCH RBC QN AUTO: 30.3 PG (ref 27–31)
MCHC RBC AUTO-ENTMCNC: 31.6 G/DL (ref 32–36)
MCV RBC AUTO: 96 FL (ref 82–98)
MONOCYTES # BLD AUTO: 0.6 K/UL (ref 0.3–1)
MONOCYTES NFR BLD: 10.2 % (ref 4–15)
NEUTROPHILS # BLD AUTO: 2.9 K/UL (ref 1.8–7.7)
NEUTROPHILS NFR BLD: 52.9 % (ref 38–73)
NRBC BLD-RTO: 0 /100 WBC
PLATELET # BLD AUTO: 233 K/UL (ref 150–450)
PMV BLD AUTO: 10 FL (ref 9.2–12.9)
POTASSIUM SERPL-SCNC: 4.4 MMOL/L (ref 3.5–5.1)
PROT SERPL-MCNC: 7.7 G/DL (ref 6–8.4)
RBC # BLD AUTO: 4.46 M/UL (ref 4–5.4)
SODIUM SERPL-SCNC: 142 MMOL/L (ref 136–145)
WBC # BLD AUTO: 5.4 K/UL (ref 3.9–12.7)

## 2025-02-07 PROCEDURE — 86140 C-REACTIVE PROTEIN: CPT | Performed by: INTERNAL MEDICINE

## 2025-02-07 PROCEDURE — 36415 COLL VENOUS BLD VENIPUNCTURE: CPT | Performed by: INTERNAL MEDICINE

## 2025-02-07 PROCEDURE — 80053 COMPREHEN METABOLIC PANEL: CPT | Performed by: INTERNAL MEDICINE

## 2025-02-07 PROCEDURE — 85652 RBC SED RATE AUTOMATED: CPT | Performed by: INTERNAL MEDICINE

## 2025-02-07 PROCEDURE — 85025 COMPLETE CBC W/AUTO DIFF WBC: CPT | Performed by: INTERNAL MEDICINE

## 2025-02-13 ENCOUNTER — PATIENT MESSAGE (OUTPATIENT)
Dept: RHEUMATOLOGY | Facility: CLINIC | Age: 65
End: 2025-02-13
Payer: MEDICARE

## 2025-02-28 ENCOUNTER — PATIENT MESSAGE (OUTPATIENT)
Dept: RHEUMATOLOGY | Facility: CLINIC | Age: 65
End: 2025-02-28
Payer: MEDICARE

## 2025-02-28 RX ORDER — MYCOPHENOLATE MOFETIL 500 MG/1
TABLET ORAL
Qty: 540 TABLET | Refills: 0 | Status: SHIPPED | OUTPATIENT
Start: 2025-02-28

## 2025-03-03 NOTE — TELEPHONE ENCOUNTER
Refill Routing Note   Medication(s) are not appropriate for processing by [facility] for the following reason(s):        Drug-disease interaction    [facility] action(s):  Defer        Medication Therapy Plan: Drug-Disease: estradioL and S/P liver transplant      Appointments  past 12m or future 3m with PCP    Date Provider   Last Visit   8/30/2024 Karlie Matute, DO   Next Visit   Visit date not found Karlie Matute, DO   ED visits in past 90 days: 0        Note composed:7:07 AM 03/03/2025

## 2025-03-11 RX ORDER — ESTRADIOL 0.1 MG/G
CREAM VAGINAL
Qty: 42.5 G | Refills: 1 | Status: SHIPPED | OUTPATIENT
Start: 2025-03-11

## 2025-04-04 DIAGNOSIS — M34.9 SCLERODERMA: ICD-10-CM

## 2025-04-04 RX ORDER — SILDENAFIL CITRATE 20 MG/1
TABLET ORAL
Qty: 180 TABLET | Refills: 0 | Status: SHIPPED | OUTPATIENT
Start: 2025-04-04

## 2025-04-10 ENCOUNTER — LAB VISIT (OUTPATIENT)
Dept: LAB | Facility: HOSPITAL | Age: 65
End: 2025-04-10
Attending: STUDENT IN AN ORGANIZED HEALTH CARE EDUCATION/TRAINING PROGRAM
Payer: MEDICARE

## 2025-04-10 DIAGNOSIS — R97.8 OTHER ABNORMAL TUMOR MARKERS: ICD-10-CM

## 2025-04-10 DIAGNOSIS — C22.0 COMBINED HEPATOCELLULAR AND CHOLANGIOCARCINOMA: ICD-10-CM

## 2025-04-10 DIAGNOSIS — Z94.4 LIVER TRANSPLANTED: ICD-10-CM

## 2025-04-10 DIAGNOSIS — Z85.09 HISTORY OF CHOLANGIOCARCINOMA: ICD-10-CM

## 2025-04-10 LAB
ABSOLUTE EOSINOPHIL (OHS): 0.19 K/UL
ABSOLUTE MONOCYTE (OHS): 0.46 K/UL (ref 0.3–1)
ABSOLUTE NEUTROPHIL COUNT (OHS): 3.46 K/UL (ref 1.8–7.7)
AFP SERPL-MCNC: <2 NG/ML
ALBUMIN SERPL BCP-MCNC: 4 G/DL (ref 3.5–5.2)
ALP SERPL-CCNC: 63 UNIT/L (ref 40–150)
ALT SERPL W/O P-5'-P-CCNC: 16 UNIT/L (ref 10–44)
ANION GAP (OHS): 8 MMOL/L (ref 8–16)
AST SERPL-CCNC: 16 UNIT/L (ref 11–45)
BASOPHILS # BLD AUTO: 0.04 K/UL
BASOPHILS NFR BLD AUTO: 0.7 %
BILIRUB SERPL-MCNC: 1 MG/DL (ref 0.1–1)
BUN SERPL-MCNC: 14 MG/DL (ref 8–23)
CALCIUM SERPL-MCNC: 9.1 MG/DL (ref 8.7–10.5)
CANCER AG19-9 SERPL-ACNC: 3.8 U/ML
CARCINOEMBRYONIC ANTIGEN (OHS): <1.7 NG/ML
CHLORIDE SERPL-SCNC: 109 MMOL/L (ref 95–110)
CO2 SERPL-SCNC: 24 MMOL/L (ref 23–29)
CREAT SERPL-MCNC: 0.7 MG/DL (ref 0.5–1.4)
ERYTHROCYTE [DISTWIDTH] IN BLOOD BY AUTOMATED COUNT: 12.7 % (ref 11.5–14.5)
GFR SERPLBLD CREATININE-BSD FMLA CKD-EPI: >60 ML/MIN/1.73/M2
GLUCOSE SERPL-MCNC: 96 MG/DL (ref 70–110)
HCT VFR BLD AUTO: 41.7 % (ref 37–48.5)
HGB BLD-MCNC: 13.4 GM/DL (ref 12–16)
IMM GRANULOCYTES # BLD AUTO: 0.02 K/UL (ref 0–0.04)
IMM GRANULOCYTES NFR BLD AUTO: 0.3 % (ref 0–0.5)
LYMPHOCYTES # BLD AUTO: 1.61 K/UL (ref 1–4.8)
MCH RBC QN AUTO: 30.3 PG (ref 27–31)
MCHC RBC AUTO-ENTMCNC: 32.1 G/DL (ref 32–36)
MCV RBC AUTO: 94 FL (ref 82–98)
NUCLEATED RBC (/100WBC) (OHS): 0 /100 WBC
PLATELET # BLD AUTO: 210 K/UL (ref 150–450)
PMV BLD AUTO: 10.2 FL (ref 9.2–12.9)
POTASSIUM SERPL-SCNC: 3.9 MMOL/L (ref 3.5–5.1)
PROT SERPL-MCNC: 7.3 GM/DL (ref 6–8.4)
RBC # BLD AUTO: 4.42 M/UL (ref 4–5.4)
RELATIVE EOSINOPHIL (OHS): 3.3 %
RELATIVE LYMPHOCYTE (OHS): 27.9 % (ref 18–48)
RELATIVE MONOCYTE (OHS): 8 % (ref 4–15)
RELATIVE NEUTROPHIL (OHS): 59.8 % (ref 38–73)
SODIUM SERPL-SCNC: 141 MMOL/L (ref 136–145)
TACROLIMUS BLD-MCNC: 3.8 NG/ML (ref 5–15)
WBC # BLD AUTO: 5.78 K/UL (ref 3.9–12.7)

## 2025-04-10 PROCEDURE — 86301 IMMUNOASSAY TUMOR CA 19-9: CPT

## 2025-04-10 PROCEDURE — 85025 COMPLETE CBC W/AUTO DIFF WBC: CPT

## 2025-04-10 PROCEDURE — 82378 CARCINOEMBRYONIC ANTIGEN: CPT

## 2025-04-10 PROCEDURE — 82105 ALPHA-FETOPROTEIN SERUM: CPT

## 2025-04-10 PROCEDURE — 36415 COLL VENOUS BLD VENIPUNCTURE: CPT

## 2025-04-10 PROCEDURE — 80053 COMPREHEN METABOLIC PANEL: CPT

## 2025-04-10 PROCEDURE — 80197 ASSAY OF TACROLIMUS: CPT

## 2025-04-12 ENCOUNTER — OFFICE VISIT (OUTPATIENT)
Dept: URGENT CARE | Facility: CLINIC | Age: 65
End: 2025-04-12
Payer: MEDICARE

## 2025-04-12 VITALS
HEART RATE: 61 BPM | HEIGHT: 62 IN | SYSTOLIC BLOOD PRESSURE: 113 MMHG | BODY MASS INDEX: 26.53 KG/M2 | OXYGEN SATURATION: 96 % | TEMPERATURE: 98 F | RESPIRATION RATE: 17 BRPM | DIASTOLIC BLOOD PRESSURE: 60 MMHG | WEIGHT: 144.19 LBS

## 2025-04-12 DIAGNOSIS — M25.522 LEFT ELBOW PAIN: ICD-10-CM

## 2025-04-12 DIAGNOSIS — M77.12 LATERAL EPICONDYLITIS OF LEFT ELBOW: Primary | ICD-10-CM

## 2025-04-12 PROBLEM — K63.5 POLYP OF COLON: Status: ACTIVE | Noted: 2024-05-21

## 2025-04-12 PROBLEM — Z86.0100 HISTORY OF COLONIC POLYPS: Status: ACTIVE | Noted: 2025-04-12

## 2025-04-12 PROBLEM — D12.3 BENIGN NEOPLASM OF TRANSVERSE COLON: Status: ACTIVE | Noted: 2019-05-17

## 2025-04-12 PROBLEM — K62.1 RECTAL POLYP: Status: ACTIVE | Noted: 2019-05-17

## 2025-04-12 PROBLEM — D01.5: Status: ACTIVE | Noted: 2025-04-12

## 2025-04-12 PROBLEM — D64.9 ANEMIA, UNSPECIFIED: Status: ACTIVE | Noted: 2025-04-12

## 2025-04-12 PROCEDURE — 99213 OFFICE O/P EST LOW 20 MIN: CPT | Mod: S$GLB,,, | Performed by: NURSE PRACTITIONER

## 2025-04-12 RX ORDER — AMLODIPINE BESYLATE 5 MG/1
TABLET ORAL
COMMUNITY

## 2025-04-12 RX ORDER — ZOLPIDEM TARTRATE 5 MG/1
TABLET ORAL
COMMUNITY

## 2025-04-12 RX ORDER — BETAMETHASONE DIPROPIONATE 0.5 MG/G
OINTMENT TOPICAL
COMMUNITY

## 2025-04-12 RX ORDER — PHENYLPROPANOLAMINE/CLEMASTINE 75-1.34MG
TABLET, EXTENDED RELEASE ORAL
COMMUNITY

## 2025-04-12 RX ORDER — TACROLIMUS 1 MG/1
CAPSULE ORAL
COMMUNITY

## 2025-04-12 RX ORDER — MYCOPHENOLATE MOFETIL 500 MG/1
TABLET ORAL
COMMUNITY

## 2025-04-12 RX ORDER — SILDENAFIL CITRATE 20 MG/1
TABLET ORAL
COMMUNITY
End: 2025-04-16 | Stop reason: SDUPTHER

## 2025-04-12 RX ORDER — LIDOCAINE 50 MG/G
1 PATCH TOPICAL DAILY PRN
Qty: 5 PATCH | Refills: 0 | Status: SHIPPED | OUTPATIENT
Start: 2025-04-12

## 2025-04-12 NOTE — PROGRESS NOTES
"Subjective:      Patient ID: Nadeen Mcgovern is a 64 y.o. female.    Vitals:  height is 5' 2" (1.575 m) and weight is 65.4 kg (144 lb 2.9 oz). Her oral temperature is 97.9 °F (36.6 °C). Her blood pressure is 113/60 and her pulse is 61. Her respiration is 17 and oxygen saturation is 96%.     Chief Complaint: Arm Pain    Pt presents today w/ lt elbow pain that radiates the lt forearm ; onset yesterday 04/11/25. Pt c/o slight joint edema , decrease in ROM , decrease in ability to bear full weight   and throbbing sensation of lt arm. Pt denies direct trauma , loss of sensation , fever and emesis. Pt has hx arthritis and HTN. Pt tried ice and heat compress;no relief.     64-year-old female presents to clinic with complaints of left elbow pain rated 8 on a scale 0-10 radiating down forearm x 1 day, denies injury. Reports recently assisted her sister with packing and moving along with recent gardening. History of tendinitis left hand, left rotator cuff tear, adhesive capsulitis left shoulder, bilateral shoulder pain, let wrist pain, chronic bilateral low back pain, osteoarthritis of right thumb, liver cancer and liver transplant. Labs 2/7/25 GFR > 60     Arm Pain   Her dominant hand is their right hand. The incident occurred 12 to 24 hours ago. The incident occurred at home. There was no injury mechanism. The pain is present in the left elbow. The quality of the pain is described as aching. The pain radiates to the left arm. The pain is at a severity of 8/10. The pain is severe. The pain has been Constant since the incident. Associated symptoms include muscle weakness. Pertinent negatives include no chest pain, numbness or tingling. The symptoms are aggravated by movement, lifting and palpation. She has tried ice and heat for the symptoms. The treatment provided no relief.       Cardiovascular:  Negative for chest pain.   Musculoskeletal:  Positive for pain, joint pain, joint swelling, abnormal ROM of joint and muscle ache. " Negative for trauma, arthritis, gout, back pain, muscle cramps and history of spine disorder.   Neurological:  Negative for numbness and tingling.      Objective:     Physical Exam   Constitutional: She is oriented to person, place, and time. She appears well-developed. She is cooperative. No distress.   HENT:   Head: Normocephalic and atraumatic.   Nose: Nose normal.   Mouth/Throat: Oropharynx is clear and moist and mucous membranes are normal.   Eyes: Lids are normal. Extraocular movement intact   Neck: Trachea normal and phonation normal. Neck supple.   Cardiovascular: Normal rate.   Pulmonary/Chest: Effort normal.   Abdominal: Normal appearance.   Musculoskeletal:         General: No deformity.      Left elbow: She exhibits decreased range of motion and swelling. Tenderness found.        Arms:       Comments: Tenderness with light touch lateral epicondyle area   Neurological: She is alert and oriented to person, place, and time. She has normal strength and normal reflexes. No sensory deficit.   Skin: Skin is warm, dry, intact and not diaphoretic.   Psychiatric: Her speech is normal and behavior is normal. Judgment and thought content normal.   Nursing note and vitals reviewed.      Assessment:     1. Lateral epicondylitis of left elbow    2. Left elbow pain        Plan:       Lateral epicondylitis of left elbow  -     SLING FOR HOME USE  -     BANDAGE ELASTIC 3IN ACE  -     LIDOcaine (LIDODERM) 5 %; Place 1 patch onto the skin daily as needed (pain). Remove & Discard patch within 12 hours or as directed by MD  Dispense: 5 patch; Refill: 0    Left elbow pain  -     SLING FOR HOME USE  -     LIDOcaine (LIDODERM) 5 %; Place 1 patch onto the skin daily as needed (pain). Remove & Discard patch within 12 hours or as directed by MD  Dispense: 5 patch; Refill: 0      Sling and ace wrap applied    Patient Instructions   Rest. Allow your injury to heal before you do slow movements.  Place an ice pack or a bag of frozen  peas wrapped in a towel over the painful part. Never put ice right on the skin. Do not leave the ice on more than 10 to 15 minutes at a time. Ice after activity may help decrease pain and swelling. Never ice before stretching.  Prop your elbow on pillows to help with swelling.  Use a brace or strap around the elbow to lessen the stress on the tendon.  Heat may be used later but not right away. Heat can make swelling worse. If your doctor tells you to use heat, put a heating pad on the painful part for no more than 20 minutes at a time. Never go to sleep with a heating pad on as this can cause     Please return here or go to the Emergency Department for any concerns or worsening of condition.  Please follow up with your primary care doctor or specialist as needed.

## 2025-04-12 NOTE — PATIENT INSTRUCTIONS
Rest. Allow your injury to heal before you do slow movements.  Place an ice pack or a bag of frozen peas wrapped in a towel over the painful part. Never put ice right on the skin. Do not leave the ice on more than 10 to 15 minutes at a time. Ice after activity may help decrease pain and swelling. Never ice before stretching.  Prop your elbow on pillows to help with swelling.  Use a brace or strap around the elbow to lessen the stress on the tendon.  Heat may be used later but not right away. Heat can make swelling worse. If your doctor tells you to use heat, put a heating pad on the painful part for no more than 20 minutes at a time. Never go to sleep with a heating pad on as this can cause     Please return here or go to the Emergency Department for any concerns or worsening of condition.  Please follow up with your primary care doctor or specialist as needed.

## 2025-04-14 ENCOUNTER — PATIENT MESSAGE (OUTPATIENT)
Dept: RHEUMATOLOGY | Facility: CLINIC | Age: 65
End: 2025-04-14
Payer: MEDICARE

## 2025-04-14 DIAGNOSIS — M34.9 SCLERODERMA: ICD-10-CM

## 2025-04-14 RX ORDER — SILDENAFIL CITRATE 20 MG/1
TABLET ORAL
Qty: 180 TABLET | Refills: 0 | Status: SHIPPED | OUTPATIENT
Start: 2025-04-14

## 2025-04-15 ENCOUNTER — RESULTS FOLLOW-UP (OUTPATIENT)
Dept: HEPATOLOGY | Facility: CLINIC | Age: 65
End: 2025-04-15
Payer: MEDICARE

## 2025-04-15 DIAGNOSIS — C22.1 CHOLANGIOCARCINOMA OF BILIARY TRACT: ICD-10-CM

## 2025-04-15 DIAGNOSIS — C22.0 HEPATOCELLULAR CARCINOMA: ICD-10-CM

## 2025-04-15 DIAGNOSIS — R97.8 OTHER ABNORMAL TUMOR MARKERS: ICD-10-CM

## 2025-04-15 DIAGNOSIS — Z94.4 S/P LIVER TRANSPLANT: Primary | Chronic | ICD-10-CM

## 2025-04-15 NOTE — TELEPHONE ENCOUNTER
Continue routine labs no changes needed.  Letter sent for next lab appointment 10/13/25      ----- Message from Vitaly Martinez MD sent at 4/15/2025 12:17 PM CDT -----  Liver tests are stable. No changes in her immunosuppression. Please continue to monitor labs per transplant protocol.  ----- Message -----  From: Lab, Background User  Sent: 4/10/2025   8:41 AM CDT  To: Vitaly Martinez MD

## 2025-04-16 ENCOUNTER — HOSPITAL ENCOUNTER (EMERGENCY)
Facility: HOSPITAL | Age: 65
Discharge: HOME | End: 2025-04-16
Attending: STUDENT IN AN ORGANIZED HEALTH CARE EDUCATION/TRAINING PROGRAM
Payer: MEDICARE

## 2025-04-16 ENCOUNTER — PATIENT MESSAGE (OUTPATIENT)
Dept: INTERNAL MEDICINE | Facility: CLINIC | Age: 65
End: 2025-04-16
Payer: MEDICARE

## 2025-04-16 ENCOUNTER — NURSE TRIAGE (OUTPATIENT)
Dept: ADMINISTRATIVE | Facility: CLINIC | Age: 65
End: 2025-04-16
Payer: MEDICARE

## 2025-04-16 VITALS
DIASTOLIC BLOOD PRESSURE: 69 MMHG | SYSTOLIC BLOOD PRESSURE: 120 MMHG | HEART RATE: 64 BPM | OXYGEN SATURATION: 98 % | RESPIRATION RATE: 17 BRPM | BODY MASS INDEX: 25.76 KG/M2 | HEIGHT: 62 IN | WEIGHT: 140 LBS | TEMPERATURE: 98 F

## 2025-04-16 DIAGNOSIS — M79.602 ARM PAIN, LEFT: ICD-10-CM

## 2025-04-16 DIAGNOSIS — M79.602 LEFT ARM PAIN: ICD-10-CM

## 2025-04-16 PROCEDURE — 25000003 PHARM REV CODE 250: Performed by: STUDENT IN AN ORGANIZED HEALTH CARE EDUCATION/TRAINING PROGRAM

## 2025-04-16 PROCEDURE — 99284 EMERGENCY DEPT VISIT MOD MDM: CPT | Mod: 25

## 2025-04-16 RX ORDER — DICLOFENAC SODIUM 10 MG/G
2 GEL TOPICAL 4 TIMES DAILY
Qty: 150 G | Refills: 0 | Status: SHIPPED | OUTPATIENT
Start: 2025-04-16

## 2025-04-16 RX ORDER — OXYCODONE AND ACETAMINOPHEN 5; 325 MG/1; MG/1
1 TABLET ORAL EVERY 6 HOURS PRN
Qty: 12 TABLET | Refills: 0 | Status: SHIPPED | OUTPATIENT
Start: 2025-04-16

## 2025-04-16 RX ORDER — OXYCODONE AND ACETAMINOPHEN 5; 325 MG/1; MG/1
1 TABLET ORAL
Refills: 0 | Status: COMPLETED | OUTPATIENT
Start: 2025-04-16 | End: 2025-04-16

## 2025-04-16 RX ADMIN — OXYCODONE HYDROCHLORIDE AND ACETAMINOPHEN 1 TABLET: 5; 325 TABLET ORAL at 05:04

## 2025-04-16 NOTE — ED NOTES
Patient identifiers verified and correct for  Ms Mcgovern  C/C:  Redness, swelling and pain to left wrist SEE NN  APPEARANCE: awake and alert in NAD. PAIN 10/10  SKIN: warm, dry, redness swelling , shiny skin, no open areas SEE PHOTOS  MUSCULOSKELETAL: Patient moving all extremities spontaneously, no obvious swelling or deformities noted. Ambulates independently.  RESPIRATORY: Denies shortness of breath.Respirations unlabored.   CARDIAC: Denies CP, 2+ distal pulses; no peripheral edema  ABDOMEN: S/ND/NT, Denies nausea  : voids spontaneously, denies difficulty  Neurologic: AAO x 4; follows commands equal strength in all extremities; denies numbness/tingling. Denies dizziness Denies new weknaess

## 2025-04-16 NOTE — TELEPHONE ENCOUNTER
Pt is a liver transplant recipient in 10/8/2015. Pt is stating that she has left arm, hand  and wrist pain and swelling. Pt was helping her sister pack and move a few days ago and she does remember hitting her hand. Pt states that she cannot use her hand/arm due to the tenderness. Dispo- Go to ED now. Pt states that she will go to the ED. Advised to call back with any further questions or concerns.        Reason for Disposition   [1] Can't use hand or can barely use hand AND [2] new-onset    Additional Information   Negative: SEVERE difficulty breathing (e.g., struggling for each breath, speaks in single words)   Negative: Sounds like a life-threatening emergency to the triager   Negative: Difficulty breathing at rest   Negative: Looks like a broken bone or dislocated joint (e.g., crooked or deformed)   Negative: Entire hand is cool or blue in comparison to other side    Protocols used: Hand Swelling-A-AH

## 2025-04-16 NOTE — ED PROVIDER NOTES
Encounter Date: 2025       History     Chief Complaint   Patient presents with    Arm Pain     L wrist and L elbow pain since Saturday. L wrist is swollen. Pt states that she believes she hit her wrist on something but is not sure. Has not taken anything for pain. Radial pulse 2+     HPI    63 yo F w/cholangiocarcinoma s/p liver transplant (), scleroderma/CREST, eczema, presenting with L elbow, L wrist pain since Saturday.    Patient reports she was hanging a lot of picture frames with her sister on Wednesday, went to a party on Friday night with a lot of physical activity, and began having severe L elbow pain, with swelling distal to the L elbow. No other joint swelling, no falls that she remembers.    No fever, n/v, no chest pain, no dyspnea. Has been having eczema outbreak on both arms. Went to urgent care and was given lidocaine patches, arm brace, no XR performed.    Compliant with tacro and mycophenolate    Review of patient's allergies indicates:   Allergen Reactions    Tomato Rash     Past Medical History:   Diagnosis Date    Acute cholecystitis     Cholecystitis    Anemia     Arthritis     septic arthritis of right shoulder    Bacteremia due to Streptococcus pneumoniae     Cancer     Cataract     Cholangiocarcinoma     Encounter for screening for malignant neoplasm of colon 2011    Fever blister 20    Hypertension     Jaundice     obstructive    Liver transplant status     Organ transplant     Prediabetes     Raynaud disease     Scleroderma      Past Surgical History:   Procedure Laterality Date    arthoscopic Right     drained infection     SECTION      ESOPHAGOGASTRODUODENOSCOPY N/A 2020    Procedure: EGD (ESOPHAGOGASTRODUODENOSCOPY);  Surgeon: Drew Mackay MD;  Location: [facility] ([address] [place] [address]);  Service: Endoscopy;  Laterality: N/A;  [facility] [facility]    INCISION AND DRAINAGE OF WOUND      s/p I&D of R thumb    LIVER TRANSPLANT      SHOULDER  ARTHROSCOPY       Family History   Problem Relation Name Age of Onset    Stroke Mother      Diabetes Mother      Hypertension Mother      Heart attack Mother      Cancer Father          Lung    Arthritis Father      Breast cancer Sister Eutiquia 73    Arthritis Sister Eutiquia     Psoriasis Sister Eutiquia     No Known Problems Daughter Perla     No Known Problems Daughter Kemonei     Cancer Paternal Grandmother          pancreatic cancer    Cancer Brother Bertin         Lung    Alcohol abuse Brother Bertin     No Known Problems Son Rolando     No Known Problems Son Neel     Colon cancer Neg Hx      Esophageal cancer Neg Hx       Social History[1]  Review of Systems    Physical Exam     Initial Vitals [04/16/25 1708]   BP Pulse Resp Temp SpO2   132/64 72 18 98.3 °F (36.8 °C) 98 %      MAP       --         Physical Exam    Nursing note and vitals reviewed.  Constitutional: No distress.   HENT:   Head: Atraumatic. Mouth/Throat: Oropharynx is clear and moist and mucous membranes are normal.   Eyes: Right conjunctiva is not injected. Left conjunctiva is not injected. No scleral icterus.   Cardiovascular:  Normal heart sounds.           Pulmonary/Chest: Effort normal and breath sounds normal. No respiratory distress.   Abdominal: Abdomen is soft. She exhibits no distension. There is no abdominal tenderness.   Musculoskeletal:      Comments: Swelling of L hand knuckles     Neurological: No cranial nerve deficit.   Skin: Skin is warm. No ecchymosis and no rash noted.   L forearm with swelling diffusely, shiny skin         ED Course   Procedures  Labs Reviewed - No data to display       Imaging Results              X-Ray Wrist Complete Left (Final result)  Result time 04/16/25 20:32:13      Final result by Jovani Mcqueen MD (04/16/25 20:32:13)                   Impression:      Distal forearm, dorsal wrist and hand nonspecific soft tissue swelling, without acute displaced fracture-dislocation  identified.      Electronically signed by: Jovani Mcqueen MD  Date:    04/16/2025  Time:    20:32               Narrative:    EXAMINATION:  XR WRIST COMPLETE 3 VIEWS LEFT    CLINICAL HISTORY:  Pain in left arm    TECHNIQUE:  PA, lateral, and oblique views of the left wrist were performed.    COMPARISON:  Left wrist series 08/01/2023    FINDINGS:  No acute fracture or dislocation.  Overall alignment similar to prior.  Stable ossific density at the ulnocarpal interval which may reflect sequela of remote trauma versus accessory ossicle.  Similar chondrocalcinosis at the ulnocarpal and radiocarpal intervals.  Mild DJD.  No subcutaneous emphysema or radiopaque foreign body.  There is soft tissue prominence along the distal forearm, dorsal aspect of the wrist and overlying the metacarpals suggesting nonspecific swelling, appears slightly increased from prior.                                       X-Ray Elbow Complete Left (Final result)  Result time 04/16/25 20:30:42      Final result by Jovani Mcqueen MD (04/16/25 20:30:42)                   Impression:      No acute displaced fracture-dislocation identified.      Electronically signed by: Jovani Mcqueen MD  Date:    04/16/2025  Time:    20:30               Narrative:    EXAMINATION:  XR ELBOW COMPLETE 3 VIEW LEFT    CLINICAL HISTORY:  Pain in left arm    TECHNIQUE:  AP, lateral, and oblique views of the left elbow were performed.    COMPARISON:  Left elbow series 07/11/2018    FINDINGS:  Bones are well mineralized for age.  Overall alignment is within normal limits. No displaced fracture, dislocation or destructive osseous process.  Minimal degenerative change.  No large elbow joint effusion.  No subcutaneous emphysema or radiopaque foreign body.                                       Medications   oxyCODONE-acetaminophen 5-325 mg per tablet 1 tablet (1 tablet Oral Given 4/16/25 0869)     Medical Decision Making  65 yo F w/cholangiocarcinoma s/p liver transplant (2015),  scleroderma/CREST, eczema, presenting with L elbow, L wrist pain since Saturday.      Differential diagnosis includes but is not limited to: tendonitis, cutaneous scleroderma, eczema, cellulitis    No concern for cellulitis, no focal skin breakdown, edema is diffuse across hand, arm, with no erythema, with pinpoint bumps, tightening of skin, possibly consistent with scleroderma flare. No fracture/dislocation on XR. Has splint for pain management given by urgent care. Given liver transplant hx, unable to take frequent NSAIDs, will give topical voltaren, short course of percocet for pain relief. Advised prompt follow up with rheumatology. No systemic signs of infection or other systemic symptoms to indicate need for emergent lab work today.    I discussed with the patient/family the diagnosis, treatment plan, indications for return to the emergency department, as well as for expected follow-up. The patient/family verbalized an understanding. The patient/family is asked if there were any questions or concerns, which were addressed to patient/family satisfaction. Patient/family understands and is agreeable to the plan.           Amount and/or Complexity of Data Reviewed  Radiology: ordered.    Risk  Prescription drug management.                                      Clinical Impression:  Final diagnoses:  [M79.602] Arm pain, left  [M79.602] Left arm pain          ED Disposition Condition    Discharge Stable          ED Prescriptions       Medication Sig Dispense Start Date End Date Auth. Provider    oxyCODONE-acetaminophen (PERCOCET) 5-325 mg per tablet Take 1 tablet by mouth every 6 (six) hours as needed for Pain. 12 tablet 4/16/2025 -- Tara Kim MD    diclofenac sodium (VOLTAREN ARTHRITIS PAIN) 1 % Gel Apply 2 g topically 4 (four) times daily. 150 g 4/16/2025 -- Tara Kim MD          Follow-up Information       Follow up With Specialties Details Why Contact Info    [facility] Rheumatology Schedule  an appointment as soon as possible for a visit  possible scleroderma flare [address]  [facility] [address]  [phone]                 [1]   Social History  Tobacco Use    Smoking status: Never     Passive exposure: Current    Smokeless tobacco: Never   Substance Use Topics    Alcohol use: Not Currently     Alcohol/week: 2.0 standard drinks of alcohol     Types: 2 Cans of beer per week     Comment: To be social with family and friends.    Drug use: Yes     Frequency: 3.0 times per week     Types: Marijuana     Comment: Help with pain        Tara Kim MD  04/16/25 3646

## 2025-04-16 NOTE — ED NOTES
Patient states swelling and redness to left wrist onset Saturday, no antibiotics Liver transplant 2015

## 2025-04-17 ENCOUNTER — LAB VISIT (OUTPATIENT)
Dept: LAB | Facility: HOSPITAL | Age: 65
End: 2025-04-17
Attending: STUDENT IN AN ORGANIZED HEALTH CARE EDUCATION/TRAINING PROGRAM
Payer: MEDICARE

## 2025-04-17 DIAGNOSIS — C22.0 HEPATOCELLULAR CARCINOMA: ICD-10-CM

## 2025-04-17 DIAGNOSIS — C22.1 CHOLANGIOCARCINOMA OF BILIARY TRACT: ICD-10-CM

## 2025-04-17 DIAGNOSIS — R97.8 OTHER ABNORMAL TUMOR MARKERS: ICD-10-CM

## 2025-04-17 DIAGNOSIS — Z94.4 S/P LIVER TRANSPLANT: ICD-10-CM

## 2025-04-17 LAB
ABSOLUTE EOSINOPHIL (OHS): 0.14 K/UL
ABSOLUTE MONOCYTE (OHS): 0.46 K/UL (ref 0.3–1)
ABSOLUTE NEUTROPHIL COUNT (OHS): 5.05 K/UL (ref 1.8–7.7)
AFP SERPL-MCNC: <2 NG/ML
ALBUMIN SERPL BCP-MCNC: 3.9 G/DL (ref 3.5–5.2)
ALP SERPL-CCNC: 70 UNIT/L (ref 40–150)
ALT SERPL W/O P-5'-P-CCNC: 15 UNIT/L (ref 10–44)
ANION GAP (OHS): 12 MMOL/L (ref 8–16)
AST SERPL-CCNC: 18 UNIT/L (ref 11–45)
BASOPHILS # BLD AUTO: 0.03 K/UL
BASOPHILS NFR BLD AUTO: 0.4 %
BILIRUB SERPL-MCNC: 0.8 MG/DL (ref 0.1–1)
BUN SERPL-MCNC: 11 MG/DL (ref 8–23)
CALCIUM SERPL-MCNC: 9.5 MG/DL (ref 8.7–10.5)
CANCER AG19-9 SERPL-ACNC: 4.4 U/ML
CARCINOEMBRYONIC ANTIGEN (OHS): 2.1 NG/ML
CHLORIDE SERPL-SCNC: 104 MMOL/L (ref 95–110)
CO2 SERPL-SCNC: 25 MMOL/L (ref 23–29)
CREAT SERPL-MCNC: 0.7 MG/DL (ref 0.5–1.4)
ERYTHROCYTE [DISTWIDTH] IN BLOOD BY AUTOMATED COUNT: 12.4 % (ref 11.5–14.5)
GFR SERPLBLD CREATININE-BSD FMLA CKD-EPI: >60 ML/MIN/1.73/M2
GLUCOSE SERPL-MCNC: 88 MG/DL (ref 70–110)
HCT VFR BLD AUTO: 43.1 % (ref 37–48.5)
HGB BLD-MCNC: 13.4 GM/DL (ref 12–16)
IMM GRANULOCYTES # BLD AUTO: 0.02 K/UL (ref 0–0.04)
IMM GRANULOCYTES NFR BLD AUTO: 0.3 % (ref 0–0.5)
LYMPHOCYTES # BLD AUTO: 1.76 K/UL (ref 1–4.8)
MCH RBC QN AUTO: 29.9 PG (ref 27–31)
MCHC RBC AUTO-ENTMCNC: 31.1 G/DL (ref 32–36)
MCV RBC AUTO: 96 FL (ref 82–98)
NUCLEATED RBC (/100WBC) (OHS): 0 /100 WBC
PLATELET # BLD AUTO: 240 K/UL (ref 150–450)
PMV BLD AUTO: 10.5 FL (ref 9.2–12.9)
POTASSIUM SERPL-SCNC: 3.7 MMOL/L (ref 3.5–5.1)
PROT SERPL-MCNC: 7.9 GM/DL (ref 6–8.4)
RBC # BLD AUTO: 4.48 M/UL (ref 4–5.4)
RELATIVE EOSINOPHIL (OHS): 1.9 %
RELATIVE LYMPHOCYTE (OHS): 23.6 % (ref 18–48)
RELATIVE MONOCYTE (OHS): 6.2 % (ref 4–15)
RELATIVE NEUTROPHIL (OHS): 67.6 % (ref 38–73)
SODIUM SERPL-SCNC: 141 MMOL/L (ref 136–145)
TACROLIMUS BLD-MCNC: 3 NG/ML (ref 5–15)
WBC # BLD AUTO: 7.46 K/UL (ref 3.9–12.7)

## 2025-04-17 NOTE — DISCHARGE INSTRUCTIONS

## 2025-04-19 ENCOUNTER — RESULTS FOLLOW-UP (OUTPATIENT)
Dept: TRANSPLANT | Facility: CLINIC | Age: 65
End: 2025-04-19

## 2025-04-19 NOTE — LETTER
April 21, 2025    Nadeen Mcgovern  [address] [facility] [address]          Dear Nadeen Mcgovern:  MRN: [id number]    This is a follow up to your recent labs, your lab results were stable.  There are no medicine changes.  Please have your labs drawn again on ***.      If you cannot have your labs drawn on the scheduled date, it is your responsibility to call the transplant department to reschedule.  Please call [phone] and ask to speak to {scheduling specialists:[phone]} for all scheduling requests.     Sincerely,        Your Liver Transplant Coordinator    [facility]  [address] [place] [id number]  [phone]

## 2025-04-21 DIAGNOSIS — I70.0 ATHEROSCLEROSIS OF AORTA: ICD-10-CM

## 2025-04-21 NOTE — TELEPHONE ENCOUNTER
----- Message from Vitaly Martinez MD sent at 4/19/2025 10:38 AM CDT -----  Liver tests are stable. No changes in her immunosuppression. Please continue to monitor labs per transplant protocol.  ----- Message -----  From: Lab, Background User  Sent: 4/17/2025   1:21 PM CDT  To: Vitaly Martinez MD

## 2025-04-21 NOTE — TELEPHONE ENCOUNTER
No care due was identified.  [facility] Embedded Care Due Messages. Reference number: [id number].   4/21/2025 10:40:43 AM CDT

## 2025-04-22 DIAGNOSIS — Z94.4 LIVER TRANSPLANTED: Primary | ICD-10-CM

## 2025-04-22 RX ORDER — MYCOPHENOLATE MOFETIL 500 MG/1
TABLET ORAL
Qty: 540 TABLET | Refills: 0 | Status: ACTIVE | OUTPATIENT
Start: 2025-04-22

## 2025-04-22 RX ORDER — ROSUVASTATIN CALCIUM 10 MG/1
10 TABLET, COATED ORAL
Qty: 90 TABLET | Refills: 2 | Status: SHIPPED | OUTPATIENT
Start: 2025-04-22

## 2025-04-22 NOTE — TELEPHONE ENCOUNTER
Refill Decision Note   Nadeen Mcgovern  is requesting a refill authorization.  Brief Assessment and Rationale for Refill:  Approve     Medication Therapy Plan:  Reviewed acute care/admission visit notes; No follow up with PCP recommended by acute care provider; Approved per protocol      Extended chart review required: Yes   Comments:     Note composed:7:11 AM 04/22/2025

## 2025-04-23 ENCOUNTER — OFFICE VISIT (OUTPATIENT)
Dept: INTERNAL MEDICINE | Facility: CLINIC | Age: 65
End: 2025-04-23
Payer: MEDICARE

## 2025-04-23 VITALS
SYSTOLIC BLOOD PRESSURE: 120 MMHG | HEIGHT: 62 IN | OXYGEN SATURATION: 99 % | BODY MASS INDEX: 26.61 KG/M2 | HEART RATE: 57 BPM | DIASTOLIC BLOOD PRESSURE: 64 MMHG | WEIGHT: 144.63 LBS

## 2025-04-23 DIAGNOSIS — M25.532 LEFT WRIST PAIN: ICD-10-CM

## 2025-04-23 DIAGNOSIS — M34.9 SCLERODERMA: ICD-10-CM

## 2025-04-23 DIAGNOSIS — Z09 HOSPITAL DISCHARGE FOLLOW-UP: Primary | ICD-10-CM

## 2025-04-23 DIAGNOSIS — M25.522 LEFT ELBOW PAIN: ICD-10-CM

## 2025-04-23 PROCEDURE — 99214 OFFICE O/P EST MOD 30 MIN: CPT | Mod: S$GLB,,, | Performed by: PHYSICIAN ASSISTANT

## 2025-04-23 PROCEDURE — 1159F MED LIST DOCD IN RCRD: CPT | Mod: CPTII,S$GLB,, | Performed by: PHYSICIAN ASSISTANT

## 2025-04-23 PROCEDURE — 3074F SYST BP LT 130 MM HG: CPT | Mod: CPTII,S$GLB,, | Performed by: PHYSICIAN ASSISTANT

## 2025-04-23 PROCEDURE — 3078F DIAST BP <80 MM HG: CPT | Mod: CPTII,S$GLB,, | Performed by: PHYSICIAN ASSISTANT

## 2025-04-23 PROCEDURE — 3008F BODY MASS INDEX DOCD: CPT | Mod: CPTII,S$GLB,, | Performed by: PHYSICIAN ASSISTANT

## 2025-04-23 PROCEDURE — 1160F RVW MEDS BY RX/DR IN RCRD: CPT | Mod: CPTII,S$GLB,, | Performed by: PHYSICIAN ASSISTANT

## 2025-04-23 PROCEDURE — 99999 PR PBB SHADOW E&M-EST. PATIENT-LVL V: CPT | Mod: PBBFAC,,, | Performed by: PHYSICIAN ASSISTANT

## 2025-04-23 RX ORDER — ROSUVASTATIN CALCIUM 10 MG/1
10 TABLET, COATED ORAL DAILY
Qty: 90 TABLET | Refills: 2 | Status: CANCELLED | OUTPATIENT
Start: 2025-04-23

## 2025-04-23 NOTE — PROGRESS NOTES
"Subjective     Patient ID: Nadeen Mcgovern is a 64 y.o. female.    Chief Complaint: Hospital Follow Up (Ed for tendintis)    HPI    Here to follow up recent ED visit one week ago for left elbow and left wrist pain/swelling. (After working in gardening, moving/hanging pictures, and increase physical activity/dancing at a party)  Xrays unremarkable. Dx with tenontitis. Treated with topical NSAIDs, and percocet.     Today feeling much better, swelling has significantly improved. Only residual mild soreness at wrist.     She is right handed.     No other acute complaints.       Past Medical History:   Diagnosis Date    Acute cholecystitis     Cholecystitis    Anemia 12/14    Arthritis 2015    septic arthritis of right shoulder    Bacteremia due to Streptococcus pneumoniae     Cancer     Cataract     Cholangiocarcinoma     Encounter for screening for malignant neoplasm of colon 5/25/2011    Fever blister 2/7/20    Hypertension     Jaundice     obstructive    Liver transplant status     Organ transplant     Prediabetes     Raynaud disease     Scleroderma      Social History[1]    Review of patient's allergies indicates:   Allergen Reactions    Tomato Rash           Review of Systems       Objective  /64 (BP Location: Left arm, Patient Position: Sitting)   Pulse (!) 57   Ht 5' 2" (1.575 m)   Wt 65.6 kg (144 lb 10 oz)   LMP  (LMP Unknown)   SpO2 99%   BMI 26.45 kg/m²       Physical Exam  Exam conducted with a chaperone present.   Constitutional:       General: She is not in acute distress.     Appearance: She is not ill-appearing.   Pulmonary:      Effort: Pulmonary effort is normal. No respiratory distress.   Musculoskeletal:      Left elbow: No swelling or deformity. Normal range of motion. No tenderness.      Right wrist: Normal pulse.      Left wrist: Swelling (mid ulnar wrist) and tenderness (mild ulnar wrist) present. No bony tenderness. Decreased range of motion. Normal pulse.      Right lower leg: No edema. "      Left lower leg: No edema.   Skin:     Findings: Rash (ezcema, hands) present.   Neurological:      Mental Status: She is alert.            Assessment and Plan     1. Hospital discharge follow-up  Left wrist pain  Left elbow pain  ED visit, imaging reviewed  Significant improvement  May continue topical analgesics and ice  Keep Rheum f/u    2. Scleroderma  Stable, followed by Rheum   Overview:  lcSSc(limited scleroderma): ACR/EULAR criteria: (no sclerodactyly today) digital tip pitting scars(3) telangiectases(2) Raynaud's(3) =8 does not meet  criteria ; oxaliplatin(cisplatin associated with Raynaud's) ; 5-FU not known to be associated with Raynaud's or scleroderma. MRSS=17 increased from 2 previous!. No evidence of scleroderma esophagus by esophageal manometry or upper endoscopy  EUGENE+ 1:320 nucleolar(scleroderma pattern) and speckled + SS-A (most typical of Sjogren's and SLE), Raynaud's, telangiectases, dysphagia. All scleroderma associated autoantibodies negative      Future Appointments   Date Time Provider Department Center   5/6/2025  7:15 AM [facility] LAB [facility]   5/13/2025  8:00 AM Haresh Butler MD [facility] RHEUM [facility]   7/3/2025  9:30 AM Jovani Hankins, [facility] [place]   7/15/2025  8:40 AM Alvin Dennis MD [facility] IM [facility]   10/13/2025  8:00 AM LAB, APPOINTMENT [facility] LAB [facility]           Betzaida Velasquez PA-C       [1]   Social History  Tobacco Use    Smoking status: Never     Passive exposure: Current    Smokeless tobacco: Never    Tobacco comments:     Smokes marijuana everyday.    Substance Use Topics    Alcohol use: Not Currently     Alcohol/week: 2.0 standard drinks of alcohol     Types: 2 Cans of beer per week     Comment: To be social with family and friends.    Drug use: Yes     Frequency: 3.0 times per week     Types: Marijuana     Comment: Help with pain

## 2025-05-05 ENCOUNTER — LAB VISIT (OUTPATIENT)
Dept: LAB | Facility: HOSPITAL | Age: 65
End: 2025-05-05
Attending: INTERNAL MEDICINE
Payer: MEDICARE

## 2025-05-05 ENCOUNTER — RESULTS FOLLOW-UP (OUTPATIENT)
Dept: RHEUMATOLOGY | Facility: CLINIC | Age: 65
End: 2025-05-05

## 2025-05-05 DIAGNOSIS — M34.9 SCLERODERMA: ICD-10-CM

## 2025-05-05 LAB
ABSOLUTE EOSINOPHIL (OHS): 0.22 K/UL
ABSOLUTE MONOCYTE (OHS): 0.48 K/UL (ref 0.3–1)
ABSOLUTE NEUTROPHIL COUNT (OHS): 4.3 K/UL (ref 1.8–7.7)
ALBUMIN SERPL BCP-MCNC: 3.8 G/DL (ref 3.5–5.2)
ALP SERPL-CCNC: 60 UNIT/L (ref 40–150)
ALT SERPL W/O P-5'-P-CCNC: 17 UNIT/L (ref 10–44)
ANION GAP (OHS): 8 MMOL/L (ref 8–16)
AST SERPL-CCNC: 16 UNIT/L (ref 11–45)
BASOPHILS # BLD AUTO: 0.04 K/UL
BASOPHILS NFR BLD AUTO: 0.6 %
BILIRUB SERPL-MCNC: 0.8 MG/DL (ref 0.1–1)
BUN SERPL-MCNC: 11 MG/DL (ref 8–23)
CALCIUM SERPL-MCNC: 9.1 MG/DL (ref 8.7–10.5)
CHLORIDE SERPL-SCNC: 107 MMOL/L (ref 95–110)
CO2 SERPL-SCNC: 25 MMOL/L (ref 23–29)
CREAT SERPL-MCNC: 0.7 MG/DL (ref 0.5–1.4)
CRP SERPL-MCNC: 1 MG/L
ERYTHROCYTE [DISTWIDTH] IN BLOOD BY AUTOMATED COUNT: 12.9 % (ref 11.5–14.5)
ERYTHROCYTE [SEDIMENTATION RATE] IN BLOOD BY PHOTOMETRIC METHOD: 18 MM/HR
GFR SERPLBLD CREATININE-BSD FMLA CKD-EPI: >60 ML/MIN/1.73/M2
GLUCOSE SERPL-MCNC: 95 MG/DL (ref 70–110)
HCT VFR BLD AUTO: 41 % (ref 37–48.5)
HGB BLD-MCNC: 13.1 GM/DL (ref 12–16)
IMM GRANULOCYTES # BLD AUTO: 0.01 K/UL (ref 0–0.04)
IMM GRANULOCYTES NFR BLD AUTO: 0.2 % (ref 0–0.5)
LYMPHOCYTES # BLD AUTO: 1.57 K/UL (ref 1–4.8)
MCH RBC QN AUTO: 30.3 PG (ref 27–31)
MCHC RBC AUTO-ENTMCNC: 32 G/DL (ref 32–36)
MCV RBC AUTO: 95 FL (ref 82–98)
NUCLEATED RBC (/100WBC) (OHS): 0 /100 WBC
PLATELET # BLD AUTO: 215 K/UL (ref 150–450)
PMV BLD AUTO: 10.7 FL (ref 9.2–12.9)
POTASSIUM SERPL-SCNC: 4 MMOL/L (ref 3.5–5.1)
PROT SERPL-MCNC: 7 GM/DL (ref 6–8.4)
RBC # BLD AUTO: 4.33 M/UL (ref 4–5.4)
RELATIVE EOSINOPHIL (OHS): 3.3 %
RELATIVE LYMPHOCYTE (OHS): 23.7 % (ref 18–48)
RELATIVE MONOCYTE (OHS): 7.3 % (ref 4–15)
RELATIVE NEUTROPHIL (OHS): 64.9 % (ref 38–73)
SODIUM SERPL-SCNC: 140 MMOL/L (ref 136–145)
WBC # BLD AUTO: 6.62 K/UL (ref 3.9–12.7)

## 2025-05-05 PROCEDURE — 85652 RBC SED RATE AUTOMATED: CPT

## 2025-05-05 PROCEDURE — 36415 COLL VENOUS BLD VENIPUNCTURE: CPT | Mod: PN

## 2025-05-05 PROCEDURE — 86140 C-REACTIVE PROTEIN: CPT

## 2025-05-05 PROCEDURE — 85025 COMPLETE CBC W/AUTO DIFF WBC: CPT

## 2025-05-05 PROCEDURE — 80053 COMPREHEN METABOLIC PANEL: CPT

## 2025-05-13 ENCOUNTER — LAB VISIT (OUTPATIENT)
Dept: LAB | Facility: HOSPITAL | Age: 65
End: 2025-05-13
Attending: INTERNAL MEDICINE
Payer: MEDICARE

## 2025-05-13 ENCOUNTER — TELEPHONE (OUTPATIENT)
Dept: RHEUMATOLOGY | Facility: CLINIC | Age: 65
End: 2025-05-13

## 2025-05-13 ENCOUNTER — OFFICE VISIT (OUTPATIENT)
Dept: RHEUMATOLOGY | Facility: CLINIC | Age: 65
End: 2025-05-13
Payer: MEDICARE

## 2025-05-13 ENCOUNTER — RESULTS FOLLOW-UP (OUTPATIENT)
Dept: RHEUMATOLOGY | Facility: CLINIC | Age: 65
End: 2025-05-13

## 2025-05-13 VITALS
DIASTOLIC BLOOD PRESSURE: 73 MMHG | HEART RATE: 62 BPM | HEIGHT: 62 IN | BODY MASS INDEX: 26.61 KG/M2 | SYSTOLIC BLOOD PRESSURE: 138 MMHG | WEIGHT: 144.63 LBS

## 2025-05-13 DIAGNOSIS — Z78.0 MENOPAUSE: ICD-10-CM

## 2025-05-13 DIAGNOSIS — M34.9 SCLERODERMA: ICD-10-CM

## 2025-05-13 DIAGNOSIS — M34.9 LIMITED SCLERODERMA: Primary | ICD-10-CM

## 2025-05-13 DIAGNOSIS — Z94.4 LIVER TRANSPLANT RECIPIENT: ICD-10-CM

## 2025-05-13 DIAGNOSIS — M34.9 LIMITED SCLERODERMA: ICD-10-CM

## 2025-05-13 PROBLEM — D64.9 ANEMIA, UNSPECIFIED: Status: RESOLVED | Noted: 2025-04-12 | Resolved: 2025-05-13

## 2025-05-13 PROBLEM — U07.1 COVID-19: Status: RESOLVED | Noted: 2024-07-11 | Resolved: 2025-05-13

## 2025-05-13 PROBLEM — M25.532 LEFT WRIST PAIN: Status: RESOLVED | Noted: 2023-07-28 | Resolved: 2025-05-13

## 2025-05-13 LAB
BACTERIA #/AREA URNS AUTO: NORMAL /HPF
BILIRUB UR QL STRIP.AUTO: NEGATIVE
CLARITY UR: CLEAR
COLOR UR AUTO: YELLOW
CREAT UR-MCNC: 66 MG/DL (ref 15–325)
GLUCOSE UR QL STRIP: NEGATIVE
HGB UR QL STRIP: ABNORMAL
KETONES UR QL STRIP: NEGATIVE
LEUKOCYTE ESTERASE UR QL STRIP: NEGATIVE
MICROSCOPIC COMMENT: NORMAL
NITRITE UR QL STRIP: NEGATIVE
PH UR STRIP: 6 [PH]
PROT UR QL STRIP: NEGATIVE
PROT UR-MCNC: <7 MG/DL
PROT/CREAT UR: NORMAL MG/G{CREAT}
RBC #/AREA URNS AUTO: 4 /HPF (ref 0–4)
SP GR UR STRIP: 1.01
SQUAMOUS #/AREA URNS AUTO: 2 /HPF
UROBILINOGEN UR STRIP-ACNC: NEGATIVE EU/DL
WBC #/AREA URNS AUTO: <1 /HPF (ref 0–5)

## 2025-05-13 PROCEDURE — 1159F MED LIST DOCD IN RCRD: CPT | Mod: CPTII,S$GLB,, | Performed by: INTERNAL MEDICINE

## 2025-05-13 PROCEDURE — 3008F BODY MASS INDEX DOCD: CPT | Mod: CPTII,S$GLB,, | Performed by: INTERNAL MEDICINE

## 2025-05-13 PROCEDURE — 3075F SYST BP GE 130 - 139MM HG: CPT | Mod: CPTII,S$GLB,, | Performed by: INTERNAL MEDICINE

## 2025-05-13 PROCEDURE — 81001 URINALYSIS AUTO W/SCOPE: CPT

## 2025-05-13 PROCEDURE — 99214 OFFICE O/P EST MOD 30 MIN: CPT | Mod: S$GLB,,, | Performed by: INTERNAL MEDICINE

## 2025-05-13 PROCEDURE — 99999 PR PBB SHADOW E&M-EST. PATIENT-LVL IV: CPT | Mod: PBBFAC,,, | Performed by: INTERNAL MEDICINE

## 2025-05-13 PROCEDURE — 3078F DIAST BP <80 MM HG: CPT | Mod: CPTII,S$GLB,, | Performed by: INTERNAL MEDICINE

## 2025-05-13 PROCEDURE — 82570 ASSAY OF URINE CREATININE: CPT

## 2025-05-13 RX ORDER — SILDENAFIL CITRATE 20 MG/1
TABLET ORAL
Qty: 180 TABLET | Refills: 3 | Status: SHIPPED | OUTPATIENT
Start: 2025-05-13 | End: 2025-05-13 | Stop reason: SDUPTHER

## 2025-05-13 RX ORDER — MYCOPHENOLATE MOFETIL 500 MG/1
TABLET ORAL
Qty: 540 TABLET | Refills: 0 | Status: ACTIVE | OUTPATIENT
Start: 2025-05-13

## 2025-05-13 RX ORDER — MYCOPHENOLATE MOFETIL 500 MG/1
TABLET ORAL
Qty: 540 TABLET | Refills: 0 | Status: SHIPPED | OUTPATIENT
Start: 2025-05-13 | End: 2025-05-13 | Stop reason: SDUPTHER

## 2025-05-13 RX ORDER — SILDENAFIL CITRATE 20 MG/1
TABLET ORAL
Qty: 180 TABLET | Refills: 3 | Status: ACTIVE | OUTPATIENT
Start: 2025-05-13

## 2025-05-13 ASSESSMENT — ROUTINE ASSESSMENT OF PATIENT INDEX DATA (RAPID3)
AM STIFFNESS SCORE: 0, NO
MDHAQ FUNCTION SCORE: 1
TOTAL RAPID3 SCORE: 1.61
PSYCHOLOGICAL DISTRESS SCORE: 3.3
PAIN SCORE: 0
FATIGUE SCORE: 0
PATIENT GLOBAL ASSESSMENT SCORE: 1.5

## 2025-05-13 NOTE — PATIENT INSTRUCTIONS
UA, UPCR  DXA > 7/5/25  Cont stationary bike  [employer] Pilates, Rajeev Chi, yoga  F/u Dr. Schmidt  in Derm   contact dermatitis,  atopic dermatitis, hand eczema improved.  Mycophenolate 1500mg twice daily refilled   Tacrolimus 2mg in am and 1mg in pm Transplant  Sildenafil 20mg twice daily refilled  rosuvastatin 10mg nightly  CBC, CMP, ESR, CRP  q 3 months.   Cont working on Mediterranean diet can tolerate canned salmon  RTC 6 months

## 2025-05-13 NOTE — PROGRESS NOTES
5/12/2025     6:55 PM   Rapid3 Question Responses and Scores   MDHAQ Score 1   Psychologic Score 3.3   Pain Score 0   When you awakened in the morning OVER THE LAST WEEK, did you feel stiff? No   Fatigue Score 0   Global Health Score 1.5   RAPID3 Score 1.61    Answers submitted by the patient for this visit:  Rheumatology Questionnaire (Submitted on 5/12/2025)  fever: No  eye redness: No  mouth sores: No  headaches: No  shortness of breath: No  chest pain: No  trouble swallowing: No  diarrhea: No  constipation: Yes  unexpected weight change: No  genital sore: No  dysuria: No  During the last 3 days, have you had a skin rash?: No  Bruises or bleeds easily: Yes  cough: No

## 2025-05-22 DIAGNOSIS — Z94.4 LIVER REPLACED BY TRANSPLANT: ICD-10-CM

## 2025-05-22 RX ORDER — TACROLIMUS 1 MG/1
CAPSULE ORAL
Qty: 90 CAPSULE | Refills: 11 | Status: SHIPPED | OUTPATIENT
Start: 2025-05-22

## 2025-05-22 NOTE — TELEPHONE ENCOUNTER
Refill Routing Note   Medication(s) are not appropriate for processing by [facility] for the following reason(s):        Drug-disease interaction    [facility] action(s):  Defer             Pharmacist review requested: Yes     Appointments  past 12m or future 3m with PCP    Date Provider   Last Visit   8/30/2024 Karlie Matute, DO   Next Visit   Visit date not found Karlie Matute, DO   ED visits in past 90 days: 1        Note composed:3:23 PM 05/22/2025

## 2025-05-22 NOTE — TELEPHONE ENCOUNTER
Refill Routing Note   Medication(s) are not appropriate for processing by [facility] for the following reason(s):        Clarification of medication (Rx) details    [facility] action(s):  Defer    Medication typically dosed once daily for 2 weeks then decreased to twice to three times weekly. Would provider like to continue once daily dose?       Pharmacist review requested: Yes     Appointments  past 12m or future 3m with PCP    Date Provider   Last Visit   8/30/2024 Karlie Matute, DO   Next Visit   Visit date not found Karlie Matute, DO   ED visits in past 90 days: 1        Note composed:3:31 PM 05/22/2025

## 2025-05-23 RX ORDER — ESTRADIOL 0.1 MG/G
CREAM VAGINAL
Qty: 85 G | Refills: 2 | Status: SHIPPED | OUTPATIENT
Start: 2025-05-23

## 2025-06-11 ENCOUNTER — LAB VISIT (OUTPATIENT)
Dept: LAB | Facility: HOSPITAL | Age: 65
End: 2025-06-11
Attending: INTERNAL MEDICINE
Payer: MEDICARE

## 2025-06-11 DIAGNOSIS — M34.9 LIMITED SCLERODERMA: ICD-10-CM

## 2025-06-11 LAB
BILIRUB UR QL STRIP.AUTO: NEGATIVE
CLARITY UR: CLEAR
COLOR UR AUTO: YELLOW
GLUCOSE UR QL STRIP: NEGATIVE
HGB UR QL STRIP: NEGATIVE
KETONES UR QL STRIP: NEGATIVE
LEUKOCYTE ESTERASE UR QL STRIP: NEGATIVE
NITRITE UR QL STRIP: NEGATIVE
PH UR STRIP: 7 [PH]
PROT UR QL STRIP: NEGATIVE
SP GR UR STRIP: 1.02
UROBILINOGEN UR STRIP-ACNC: NEGATIVE EU/DL

## 2025-06-11 PROCEDURE — 81003 URINALYSIS AUTO W/O SCOPE: CPT

## 2025-06-12 ENCOUNTER — RESULTS FOLLOW-UP (OUTPATIENT)
Dept: RHEUMATOLOGY | Facility: CLINIC | Age: 65
End: 2025-06-12

## 2025-07-02 ENCOUNTER — HOSPITAL ENCOUNTER (OUTPATIENT)
Dept: RADIOLOGY | Facility: OTHER | Age: 65
Discharge: HOME | End: 2025-07-02
Attending: INTERNAL MEDICINE
Payer: MEDICARE

## 2025-07-02 VITALS — WEIGHT: 144.63 LBS | BODY MASS INDEX: 26.61 KG/M2 | HEIGHT: 62 IN

## 2025-07-02 DIAGNOSIS — Z12.31 ENCOUNTER FOR SCREENING MAMMOGRAM FOR BREAST CANCER: ICD-10-CM

## 2025-07-02 PROCEDURE — 77067 SCR MAMMO BI INCL CAD: CPT | Mod: TC

## 2025-07-03 ENCOUNTER — OFFICE VISIT (OUTPATIENT)
Dept: OPTOMETRY | Facility: CLINIC | Age: 65
End: 2025-07-03
Payer: COMMERCIAL

## 2025-07-03 ENCOUNTER — TELEPHONE (OUTPATIENT)
Dept: HEMATOLOGY/ONCOLOGY | Facility: CLINIC | Age: 65
End: 2025-07-03
Payer: MEDICARE

## 2025-07-03 DIAGNOSIS — H52.4 PRESBYOPIA: ICD-10-CM

## 2025-07-03 DIAGNOSIS — H25.13 NUCLEAR SCLEROSIS, BILATERAL: ICD-10-CM

## 2025-07-03 DIAGNOSIS — Z13.5 GLAUCOMA SCREENING: ICD-10-CM

## 2025-07-03 DIAGNOSIS — Z01.00 ROUTINE EYE EXAM: Primary | ICD-10-CM

## 2025-07-03 PROCEDURE — 92015 DETERMINE REFRACTIVE STATE: CPT | Mod: S$GLB,,, | Performed by: OPTOMETRIST

## 2025-07-03 PROCEDURE — 92014 COMPRE OPH EXAM EST PT 1/>: CPT | Mod: S$GLB,,, | Performed by: OPTOMETRIST

## 2025-07-03 PROCEDURE — 99999 PR PBB SHADOW E&M-EST. PATIENT-LVL III: CPT | Mod: PBBFAC,,, | Performed by: OPTOMETRIST

## 2025-07-03 NOTE — PROGRESS NOTES
HPI    63 Y/o female is here for ocular health pt say's she occasionally see's   floaters in OU that last about a minute   Pt denies pain and discomfort   No f/f    Eye med: no gtt   Last edited by Manuel Price MA on 7/3/2025  9:17 AM.            Assessment /Plan     For exam results, see Encounter Report.    Routine eye exam    Presbyopia    Nuclear sclerosis, bilateral    Glaucoma screening      Small cats OU--pt wishes new Rx    PLAN:    Rtc 1 yr

## 2025-07-03 NOTE — TELEPHONE ENCOUNTER
Copied from CRM #[id number]. Topic: Appointments - Appointment Scheduling  >> Jul 3, 2025  8:28 AM Renee wrote:  Scheduling Request           Appt Type:  Labs and EP     Date/Time Preference:Yearly     Treating Provider:Jonathan     Caller Name:Nadeen Mcgovern      Contact Preference:[phone]     Comments/notes:Patient is calling to schedule her yearly. Requesting a call back

## 2025-07-04 ENCOUNTER — HOSPITAL ENCOUNTER (EMERGENCY)
Facility: HOSPITAL | Age: 65
Discharge: HOME | End: 2025-07-05
Attending: EMERGENCY MEDICINE
Payer: MEDICARE

## 2025-07-04 DIAGNOSIS — R07.9 CHEST PAIN: ICD-10-CM

## 2025-07-04 LAB
ABSOLUTE EOSINOPHIL (OHS): 0.08 K/UL
ABSOLUTE MONOCYTE (OHS): 1.13 K/UL (ref 0.3–1)
ABSOLUTE NEUTROPHIL COUNT (OHS): 6.92 K/UL (ref 1.8–7.7)
ALBUMIN SERPL BCP-MCNC: 4.1 G/DL (ref 3.5–5.2)
ALP SERPL-CCNC: 69 UNIT/L (ref 40–150)
ALT SERPL W/O P-5'-P-CCNC: 12 UNIT/L (ref 10–44)
ANION GAP (OHS): 11 MMOL/L (ref 8–16)
AST SERPL-CCNC: 20 UNIT/L (ref 11–45)
BASOPHILS # BLD AUTO: 0.05 K/UL
BASOPHILS NFR BLD AUTO: 0.5 %
BILIRUB SERPL-MCNC: 0.9 MG/DL (ref 0.1–1)
BNP SERPL-MCNC: <10 PG/ML (ref 0–99)
BUN SERPL-MCNC: 10 MG/DL (ref 8–23)
CALCIUM SERPL-MCNC: 9.5 MG/DL (ref 8.7–10.5)
CHLORIDE SERPL-SCNC: 106 MMOL/L (ref 95–110)
CO2 SERPL-SCNC: 21 MMOL/L (ref 23–29)
CREAT SERPL-MCNC: 0.7 MG/DL (ref 0.5–1.4)
ERYTHROCYTE [DISTWIDTH] IN BLOOD BY AUTOMATED COUNT: 12.9 % (ref 11.5–14.5)
GFR SERPLBLD CREATININE-BSD FMLA CKD-EPI: >60 ML/MIN/1.73/M2
GLUCOSE SERPL-MCNC: 127 MG/DL (ref 70–110)
HCT VFR BLD AUTO: 39 % (ref 37–48.5)
HCV AB SERPL QL IA: NORMAL
HGB BLD-MCNC: 13.1 GM/DL (ref 12–16)
HIV 1+2 AB+HIV1 P24 AG SERPL QL IA: NORMAL
IMM GRANULOCYTES # BLD AUTO: 0.03 K/UL (ref 0–0.04)
IMM GRANULOCYTES NFR BLD AUTO: 0.3 % (ref 0–0.5)
LYMPHOCYTES # BLD AUTO: 1.79 K/UL (ref 1–4.8)
MCH RBC QN AUTO: 30.9 PG (ref 27–31)
MCHC RBC AUTO-ENTMCNC: 33.6 G/DL (ref 32–36)
MCV RBC AUTO: 92 FL (ref 82–98)
NUCLEATED RBC (/100WBC) (OHS): 0 /100 WBC
PLATELET # BLD AUTO: 208 K/UL (ref 150–450)
PMV BLD AUTO: 10.8 FL (ref 9.2–12.9)
POTASSIUM SERPL-SCNC: 4.1 MMOL/L (ref 3.5–5.1)
PROT SERPL-MCNC: 8 GM/DL (ref 6–8.4)
RBC # BLD AUTO: 4.24 M/UL (ref 4–5.4)
RELATIVE EOSINOPHIL (OHS): 0.8 %
RELATIVE LYMPHOCYTE (OHS): 17.9 % (ref 18–48)
RELATIVE MONOCYTE (OHS): 11.3 % (ref 4–15)
RELATIVE NEUTROPHIL (OHS): 69.2 % (ref 38–73)
SODIUM SERPL-SCNC: 138 MMOL/L (ref 136–145)
TROPONIN I SERPL HS-MCNC: <3 NG/L
TROPONIN I SERPL HS-MCNC: <3 NG/L
WBC # BLD AUTO: 10 K/UL (ref 3.9–12.7)

## 2025-07-04 PROCEDURE — 99285 EMERGENCY DEPT VISIT HI MDM: CPT | Mod: 25

## 2025-07-04 PROCEDURE — 87389 HIV-1 AG W/HIV-1&-2 AB AG IA: CPT | Performed by: PHYSICIAN ASSISTANT

## 2025-07-04 PROCEDURE — 93010 ELECTROCARDIOGRAM REPORT: CPT | Mod: ,,, | Performed by: INTERNAL MEDICINE

## 2025-07-04 PROCEDURE — 80053 COMPREHEN METABOLIC PANEL: CPT

## 2025-07-04 PROCEDURE — 25500020 PHARM REV CODE 255: Performed by: EMERGENCY MEDICINE

## 2025-07-04 PROCEDURE — 84484 ASSAY OF TROPONIN QUANT: CPT

## 2025-07-04 PROCEDURE — 25000003 PHARM REV CODE 250

## 2025-07-04 PROCEDURE — 85025 COMPLETE CBC W/AUTO DIFF WBC: CPT

## 2025-07-04 PROCEDURE — 96374 THER/PROPH/DIAG INJ IV PUSH: CPT

## 2025-07-04 PROCEDURE — 93005 ELECTROCARDIOGRAM TRACING: CPT

## 2025-07-04 PROCEDURE — 63600175 PHARM REV CODE 636 W HCPCS: Mod: JZ,TB | Performed by: EMERGENCY MEDICINE

## 2025-07-04 PROCEDURE — 86803 HEPATITIS C AB TEST: CPT | Performed by: PHYSICIAN ASSISTANT

## 2025-07-04 PROCEDURE — 83880 ASSAY OF NATRIURETIC PEPTIDE: CPT

## 2025-07-04 RX ORDER — KETOROLAC TROMETHAMINE 30 MG/ML
10 INJECTION, SOLUTION INTRAMUSCULAR; INTRAVENOUS
Status: COMPLETED | OUTPATIENT
Start: 2025-07-04 | End: 2025-07-04

## 2025-07-04 RX ORDER — ACETAMINOPHEN 325 MG/1
650 TABLET ORAL
Status: COMPLETED | OUTPATIENT
Start: 2025-07-04 | End: 2025-07-04

## 2025-07-04 RX ORDER — METHOCARBAMOL 500 MG/1
500 TABLET, FILM COATED ORAL
Status: COMPLETED | OUTPATIENT
Start: 2025-07-04 | End: 2025-07-04

## 2025-07-04 RX ADMIN — METHOCARBAMOL 500 MG: 500 TABLET ORAL at 09:07

## 2025-07-04 RX ADMIN — KETOROLAC TROMETHAMINE 10 MG: 30 INJECTION, SOLUTION INTRAMUSCULAR; INTRAVENOUS at 10:07

## 2025-07-04 RX ADMIN — ACETAMINOPHEN 650 MG: 325 TABLET ORAL at 09:07

## 2025-07-04 RX ADMIN — IOHEXOL 75 ML: 350 INJECTION, SOLUTION INTRAVENOUS at 10:07

## 2025-07-05 VITALS
RESPIRATION RATE: 19 BRPM | OXYGEN SATURATION: 97 % | TEMPERATURE: 98 F | HEIGHT: 62 IN | SYSTOLIC BLOOD PRESSURE: 112 MMHG | HEART RATE: 63 BPM | DIASTOLIC BLOOD PRESSURE: 51 MMHG | BODY MASS INDEX: 26.45 KG/M2

## 2025-07-05 LAB
OHS QRS DURATION: 74 MS
OHS QTC CALCULATION: 410 MS

## 2025-07-05 NOTE — ED TRIAGE NOTES
Continue home meds   Nadeen Mcgovern, a 64 y.o. female presents to the ED w/ complaint of chest pain since Wednesday started on right , sharp pains. With SOB    Triage note:  Chief Complaint   Patient presents with    Chest Pain     Radiating Midsternal CP x3days.      Review of patient's allergies indicates:   Allergen Reactions    Tomato Rash     Past Medical History:   Diagnosis Date    Acute cholecystitis     Cholecystitis    Anemia 12/14    Arthritis 2015    septic arthritis of right shoulder    Bacteremia due to Streptococcus pneumoniae     Cancer     Cataract     Cholangiocarcinoma     Encounter for screening for malignant neoplasm of colon 5/25/2011    Fever blister 2/7/20    Hypertension     Jaundice     obstructive    Liver transplant status     Organ transplant     Prediabetes     Raynaud disease     Scleroderma

## 2025-07-05 NOTE — PROVIDER PROGRESS NOTES - EMERGENCY DEPT.
Encounter Date: 7/4/2025    ED Physician Progress Notes          Physician Note:   Signout Note  I received signout from the previous providers, Dr. Torres and Dr. Morrow.      Chief complaint: CP     Per sign out and chart review:  Nadeen Mcgovern is a 64 y.o. female, PMHcholangiocarcinoma s/p liver transplant (2015), scleroderma/CREST, eczema. Patient presents to the ED today for a 3 day history of radiating CP.  On Wednesday, patient had sudden onset of neck pain radiating into the left side of her chest which has slowly progressed over this time period and now involves the entirety of her chest and radiates both to her neck and back.  Pain is present at rest but worse w/ movement and respiration.  Patient has tried heat, ice, OTC analgesics for her symptoms w/ no significant relief.  Patient did state that taking a hot shower made it feel slightly better.  Patient has also had some intermittent episodes of associated SOB.  Patient has not had symptoms like this in the past.  Patient denies f/c, headaches, vision changes, syncope, palpitations, cough/congestion, hemoptysis, abdominal pain, N/V/D, hematuria/dysuria, lower extremity edema/erythema.        During ED stay patient received:  Medications   methocarbamoL tablet 500 mg (500 mg Oral Given 7/4/25 2129)   acetaminophen tablet 650 mg (650 mg Oral Given 7/4/25 2129)   iohexoL (OMNIPAQUE 350) injection 75 mL (75 mLs Intravenous Given 7/4/25 2211)   ketorolac injection 9.999 mg (9.999 mg Intravenous Given 7/4/25 2245)        Pt signed out to me pending: CT PE then DC with pain control. Hx cholangiocarc, transplant 2000s? Here CP, Neck pain, worse w movement. Pleuritic pain.      Update/ Disposition: CT without PE. Pt reports improvement in previous pain. I discussed findings of pneumobilia, repeat exam is benign and pt instructed to follow up with PCP.      Patient, caregiver and/or family understands the plan and verbalized agreement. All questions answered.       Diagnostic Impression:    Chest Pain     Nena Marti MD  [facility], PGY3

## 2025-07-05 NOTE — ED PROVIDER NOTES
Encounter Date: 07/05/2025       Chief Complaint   Chest Pain (Radiating Midsternal CP x3days. )    History Of Present Illness     Nadeen Mcgovern is a 64 y.o. female w/ PMHx of cholangiocarcinoma s/p liver transplant (2015), scleroderma/CREST, eczema. Patient presents to the ED today for a 3 day history of radiating CP.  On Wednesday, patient had sudden onset of neck pain radiating into the left side of her chest which has slowly progressed over this time period and now involves the entirety of her chest and radiates both to her neck and back.  Pain is present at rest but worse w/ movement and respiration.  Patient has tried heat, ice, OTC analgesics for her symptoms w/ no significant relief.  Patient did state that taking a hot shower made it feel slightly better.  Patient has also had some intermittent episodes of associated SOB.  Patient has not had symptoms like this in the past.  Patient denies f/c, headaches, vision changes, syncope, palpitations, cough/congestion, hemoptysis, abdominal pain, N/V/D, hematuria/dysuria, lower extremity edema/erythema.    Please see MDM for additional details.      Past History   As per HPI and below:  Past Medical History[1]  Past Surgical History[2]  Medications Ordered Prior to Encounter[3]    Social History[4]  family history includes Alcohol abuse in her brother; Arthritis in her father and sister; Breast cancer (age of onset: 73) in her sister; Cancer in her brother, father, and paternal grandmother; Diabetes in her mother; Heart attack in her mother; Hypertension in her mother; No Known Problems in her daughter, daughter, son, and son; Psoriasis in her sister; Stroke in her mother.   Review of patient's allergies indicates:   Allergen Reactions    Tomato Rash       Physical Exam     Vitals:    07/04/25 2300 07/04/25 2332 07/04/25 2333 07/05/25 0000   BP: (!) 114/57 (!) 105/51     BP Location:  Right arm     Patient Position:  Lying     Pulse: 66  68 71   Resp:   19    Temp:        TempSrc:       SpO2: 98%  95% (!) 94%   Height:         Physical Exam  Vitals and nursing note reviewed.   Constitutional:       General: She is in acute distress.      Appearance: She is well-developed.   HENT:      Head: Normocephalic and atraumatic.   Eyes:      Extraocular Movements: Extraocular movements intact.   Cardiovascular:      Rate and Rhythm: Normal rate.      Heart sounds: Normal heart sounds.   Pulmonary:      Effort: Pulmonary effort is normal. No respiratory distress.      Breath sounds: Normal breath sounds.   Chest:      Chest wall: Tenderness present. No crepitus.   Abdominal:      Palpations: Abdomen is soft.      Tenderness: There is no abdominal tenderness.   Musculoskeletal:      Cervical back: Normal range of motion.      Right lower leg: No tenderness. No edema.      Left lower leg: No tenderness. No edema.      Comments: reproducible right-sided CP w/ movement of her right arm and w/ palpation.  Questionable tenderness to palpation left-side of her chest, but is not consistently reproducible.  No significant tenderness to her neck.  Mild tenderness to palpation along paraspinal musculature   Skin:     General: Skin is warm and dry.   Neurological:      General: No focal deficit present.      Mental Status: She is alert and oriented to person, place, and time.      Motor: No weakness.   Psychiatric:         Mood and Affect: Mood normal.         Behavior: Behavior normal.            Medications Given     Medications   methocarbamoL tablet 500 mg (500 mg Oral Given 7/4/25 2129)   acetaminophen tablet 650 mg (650 mg Oral Given 7/4/25 2129)   iohexoL (OMNIPAQUE 350) injection 75 mL (75 mLs Intravenous Given 7/4/25 2211)   ketorolac injection 9.999 mg (9.999 mg Intravenous Given 7/4/25 2245)       Labs & Imaging     Labs Reviewed   COMPREHENSIVE METABOLIC PANEL - Abnormal       Result Value    Sodium 138      Potassium 4.1      Chloride 106      CO2 21 (*)     Glucose 127 (*)     BUN 10       Creatinine 0.7      Calcium 9.5      Protein Total 8.0      Albumin 4.1      Bilirubin Total 0.9      ALP 69      AST 20      ALT 12      Anion Gap 11      eGFR >60     CBC WITH DIFFERENTIAL - Abnormal    WBC 10.00      RBC 4.24      HGB 13.1      HCT 39.0      MCV 92      MCH 30.9      MCHC 33.6      RDW 12.9      Platelet Count 208      MPV 10.8      Nucleated RBC 0      Neut % 69.2      Lymph % 17.9 (*)     Mono % 11.3      Eos % 0.8      Basophil % 0.5      Imm Grans % 0.3      Neut # 6.92      Lymph # 1.79      Mono # 1.13 (*)     Eos # 0.08      Baso # 0.05      Imm Grans # 0.03     HEPATITIS C ANTIBODY - Normal    Hep C Ab Interp Non-Reactive     HIV 1 / 2 ANTIBODY - Normal    HIV 1/2 Ag/Ab Non-Reactive     TROPONIN I HIGH SENSITIVITY - Normal    Troponin High Sensitive <3     B-TYPE NATRIURETIC PEPTIDE - Normal    BNP <10     TROPONIN I HIGH SENSITIVITY - Normal    Troponin High Sensitive <3     CBC W/ AUTO DIFFERENTIAL    Narrative:     The following orders were created for panel order CBC auto differential.                  Procedure                               Abnormality         Status                                     ---------                               -----------         ------                                     CBC with Differential[[id number]]       Abnormal            Final result                                                 Please view results for these tests on the individual orders.   HEP C VIRUS HOLD SPECIMEN       Imaging Results              CTA Chest Non-Coronary (PE Studies) (Preliminary result)  Result time 07/04/25 23:58:49      Preliminary result by Alexi Rosenberg MD (07/04/25 23:58:49)                   Impression:      1. No evidence of pulmonary embolism.  2. Mild pneumobilia, progressed when compared to prior imaging, though likely secondary to prior sphincterotomy.  Correlation advised.  3. Additional findings as above.    Electronically signed by resident: Alexi  Dresden  Date:    07/04/2025  Time:    23:50                 Narrative:    EXAMINATION:  CTA CHEST NON CORONARY (PE STUDIES)    CLINICAL HISTORY:  Pulmonary embolism (PE) suspected, unknown D-dimer;    TECHNIQUE:  Low dose axial images, sagittal and coronal reformations were obtained from the thoracic inlet to the lung bases following the IV administration of 75 mL of Omnipaque 350.  Contrast timing was optimized to evaluate the pulmonary arteries.  MIP images were performed.    COMPARISON:  CT 11/13/2024    FINDINGS:  Base the neck:Unremarkable    Thoracic soft tissues: No significant abnormalities.    Aorta and vasculature: No aneurysm.  Mild calcific atherosclerosis.    Heart: Normal size.  No pericardial fluid.  Mild coronary calcific atherosclerosis.    Aminta/mediastinum/lymph nodes: No pathologically enlarged lymph nodes.    Pulmonary vasculature: There is suboptimal opacification of the pulmonary arteries.  There is no pulmonary thromboembolus seen to the level of the segmental branches.    Trachea/proximal airways: Patent.    Lungs: Dependent atelectasis bilaterally.  No focal consolidation.  No pleural effusions.  Calcified granuloma left lower lobe.    Esophagus: Distal segment distended with air.  Small hiatal hernia.    Upper abdomen: Mild pneumobilia, progressed when compared with CT 11/13/2024.  Postop changes of liver transplant..    Spine/osseous structures: Age-appropriate degenerative changes.                                       X-Ray Chest AP Portable (In process)                     PHAN Mcgovern is a 64 y.o. female who presents to the emergency department for a 3 day history of CP radiating to the neck and back as detailed in HPI.  EKG shows normal sinus rhythm w/ a rate of 79. On exam, patient appears uncomfortable, but VSS.  Patient has reproducible right-sided CP w/ movement of her right arm and w/ palpation.  Questionable tenderness to palpation left-side of her chest, but is not  consistently reproducible.  No significant tenderness to her neck.  Mild tenderness to palpation along paraspinal musculature.  Patient given Tylenol, Toradol, and Robaxin for analgesia w/ some relief in her symptoms.  Patient now able to move through a much more significant range of motion in her right arm w/o as much pain as she experienced previously.  She is  to palpation has pain w/ deep inspiration. On re-evaluation, patient has no significant change in physical exam, VS, or overall clinical status.     Labs (independently interpreted by myself): CBC WNL.  No leukocytosis, reducing concern for acute infectious process.  No signs of anemia. CMP WNL. Significantly reduced concern for acute metabolic abnormality.  No signs of TRACIE.  Initial troponin WNL, repeat troponin also WNL significantly reducing concern for ACS. BNP also WNL reducing concern for CHF for significant myocardial strain.    Imaging (independently interpreted by myself):  CXR pending at time of handoff; on my review I see no obvious consolidations, effusion, edema.  CT PE study pending at time of handoff.     Ddx including, but not limited to: ACS v. musculoskeletal pain v. pericarditis v. pneumonia v. aortic dissection v. PE      Most likely Dx:  At this time, I have highest suspicion for musculoskeletal pain given patient's history, physical exam, labs, imaging.  PE less likely but difficult to definitively rule out as CT PE study is pending at time of handoff.    Disposition:  I anticipate that if patient's CT PE study is unremarkable she will be stable for discharge home w/ pain control.    Please see HPI, physical exam, ED course for additional details.    Procedures   Final diagnoses:  [R07.9] Chest pain         Tl Morrow MD         [1]   Past Medical History:  Diagnosis Date    Acute cholecystitis     Cholecystitis    Anemia 12/14    Arthritis 2015    septic arthritis of right shoulder    Bacteremia due to Streptococcus  pneumoniae     Cancer     Cataract     Cholangiocarcinoma     Encounter for screening for malignant neoplasm of colon 2011    Fever blister 20    Hypertension     Jaundice     obstructive    Liver transplant status     Organ transplant     Prediabetes     Raynaud disease     Scleroderma    [2]   Past Surgical History:  Procedure Laterality Date    arthoscopic Right     drained infection     SECTION      ESOPHAGOGASTRODUODENOSCOPY N/A 2020    Procedure: EGD (ESOPHAGOGASTRODUODENOSCOPY);  Surgeon: Drew Mackay MD;  Location: [address]);  Service: Endoscopy;  Laterality: N/A;  [facility]    INCISION AND DRAINAGE OF WOUND      s/p I&D of R thumb    LIVER TRANSPLANT      SHOULDER ARTHROSCOPY     [3]   No current facility-administered medications on file prior to encounter.     Current Outpatient Medications on File Prior to Encounter   Medication Sig Dispense Refill    amLODIPine (NORVASC) 5 MG tablet 45      aspirin 81 MG Chew Take 81 mg by mouth once daily.      betamethasone dipropionate (BETANATE) 0.05 % ointment       betamethasone dipropionate (DIPROLENE) 0.05 % ointment Apply topically 2 (two) times daily. To affected areas on hands 45 g 3    diclofenac sodium (VOLTAREN ARTHRITIS PAIN) 1 % Gel Apply 2 g topically 4 (four) times daily. 150 g 0    diphenhydrAMINE (BENADRYL) 25 mg capsule Take 25 mg by mouth every 6 (six) hours as needed for Itching.      estradioL (ESTRACE) 0.01 % (0.1 mg/gram) vaginal cream PLACE 1 GRAM VAGINALLY DAILY 85 g 2    ibuprofen 200 mg Cap 0      LIDOcaine (LIDODERM) 5 % Place 1 patch onto the skin daily as needed (pain). Remove & Discard patch within 12 hours or as directed by MD 5 patch 0    multivitamin (THERAGRAN) tablet Take 1 tablet by mouth once daily.      mycophenolate (CELLCEPT) 500 mg Tab TAKE 3 TABLETS(1500 MG) BY MOUTH TWICE DAILY 540 tablet 0    ondansetron (ZOFRAN) 4 MG tablet Take 1 tablet (4 mg total) by mouth  every 8 (eight) hours as needed for Nausea. 12 tablet 0    pulse oximeter (PULSE OXIMETER) device by Apply Externally route 2 (two) times a day. Use twice daily at 8 AM and 3 PM and record the value in MyChart as directed. 1 each 0    rosuvastatin (CRESTOR) 10 MG tablet TAKE 1 TABLET(10 MG) BY MOUTH EVERY DAY 90 tablet 2    sildenafil (REVATIO) 20 mg Tab TAKE 1 TABLET (20 MG) BY MOUTH TWO TIMES DAILY 180 tablet 3    tacrolimus (PROGRAF) 1 MG Cap 60      tacrolimus (PROGRAF) 1 MG Cap TAKE 2 CAPSULES BY MOUTH EVERY MORNING AND 1 CAPSULE EVERY EVENING 90 capsule 11    zolpidem (AMBIEN) 5 MG Tab 30     [4]   Social History  Tobacco Use    Smoking status: Never     Passive exposure: Current    Smokeless tobacco: Never    Tobacco comments:     Smokes marijuana everyday.    Substance Use Topics    Alcohol use: Not Currently     Alcohol/week: 2.0 standard drinks of alcohol     Types: 2 Cans of beer per week     Comment: To be social with family and friends.    Drug use: Yes     Frequency: 3.0 times per week     Types: Marijuana     Comment: Help with pain        Tl Morrow MD  Resident  07/05/25 0018

## 2025-07-05 NOTE — DISCHARGE INSTRUCTIONS
Diagnosis: Chest Pain    Follow-Up Plan:  - Follow-up with primary care doctor within 3 - 5 days  - Additional testing and/or evaluation as directed by your primary doctor  -We did not find emergency causes for your pain today. If you symptoms worsen or do not improve you should return to the ED for evaluation.      Return to the Emergency Department for symptoms including but not limited to: worsening symptoms, shortness of breath or chest pain, vomiting with inability to hold down fluids, fevers greater than 100.4°F, passing out/fainting/unconsciousness, or other concerning symptoms.    We would like to thank you for coming today and our hope is that we served you and your family well during your stay

## 2025-07-07 ENCOUNTER — HOSPITAL ENCOUNTER (OUTPATIENT)
Dept: RADIOLOGY | Facility: CLINIC | Age: 65
Discharge: HOME | End: 2025-07-07
Attending: INTERNAL MEDICINE
Payer: MEDICARE

## 2025-07-07 DIAGNOSIS — Z78.0 MENOPAUSE: ICD-10-CM

## 2025-07-07 LAB — HOLD SPECIMEN: NORMAL

## 2025-07-07 PROCEDURE — 77080 DXA BONE DENSITY AXIAL: CPT | Mod: TC,FY

## 2025-07-07 PROCEDURE — 77080 DXA BONE DENSITY AXIAL: CPT | Mod: 26,,, | Performed by: INTERNAL MEDICINE

## 2025-07-15 ENCOUNTER — OFFICE VISIT (OUTPATIENT)
Dept: INTERNAL MEDICINE | Facility: CLINIC | Age: 65
End: 2025-07-15
Payer: MEDICARE

## 2025-07-15 VITALS
BODY MASS INDEX: 26.78 KG/M2 | HEART RATE: 72 BPM | DIASTOLIC BLOOD PRESSURE: 60 MMHG | OXYGEN SATURATION: 99 % | WEIGHT: 145.5 LBS | HEIGHT: 62 IN | SYSTOLIC BLOOD PRESSURE: 110 MMHG

## 2025-07-15 DIAGNOSIS — Z13.220 ENCOUNTER FOR LIPID SCREENING FOR CARDIOVASCULAR DISEASE: ICD-10-CM

## 2025-07-15 DIAGNOSIS — Z23 NEED FOR PNEUMOCOCCAL 20-VALENT CONJUGATE VACCINATION: ICD-10-CM

## 2025-07-15 DIAGNOSIS — Z94.4 S/P LIVER TRANSPLANT: Chronic | ICD-10-CM

## 2025-07-15 DIAGNOSIS — I70.0 ATHEROSCLEROSIS OF AORTA: ICD-10-CM

## 2025-07-15 DIAGNOSIS — M34.9 SCLERODERMA: ICD-10-CM

## 2025-07-15 DIAGNOSIS — Z13.1 SCREENING FOR DIABETES MELLITUS: ICD-10-CM

## 2025-07-15 DIAGNOSIS — Z09 FOLLOW-UP EXAM: Primary | ICD-10-CM

## 2025-07-15 DIAGNOSIS — Z13.6 ENCOUNTER FOR LIPID SCREENING FOR CARDIOVASCULAR DISEASE: ICD-10-CM

## 2025-07-15 DIAGNOSIS — G89.29 CHRONIC RIGHT SHOULDER PAIN: ICD-10-CM

## 2025-07-15 DIAGNOSIS — D84.9 IMMUNOSUPPRESSION: ICD-10-CM

## 2025-07-15 DIAGNOSIS — M25.511 CHRONIC RIGHT SHOULDER PAIN: ICD-10-CM

## 2025-07-15 PROCEDURE — G0009 ADMIN PNEUMOCOCCAL VACCINE: HCPCS | Mod: S$GLB,,, | Performed by: INTERNAL MEDICINE

## 2025-07-15 PROCEDURE — 1159F MED LIST DOCD IN RCRD: CPT | Mod: CPTII,S$GLB,, | Performed by: INTERNAL MEDICINE

## 2025-07-15 PROCEDURE — 3008F BODY MASS INDEX DOCD: CPT | Mod: CPTII,S$GLB,, | Performed by: INTERNAL MEDICINE

## 2025-07-15 PROCEDURE — 3078F DIAST BP <80 MM HG: CPT | Mod: CPTII,S$GLB,, | Performed by: INTERNAL MEDICINE

## 2025-07-15 PROCEDURE — 3074F SYST BP LT 130 MM HG: CPT | Mod: CPTII,S$GLB,, | Performed by: INTERNAL MEDICINE

## 2025-07-15 PROCEDURE — 99999 PR PBB SHADOW E&M-EST. PATIENT-LVL IV: CPT | Mod: PBBFAC,,, | Performed by: INTERNAL MEDICINE

## 2025-07-15 PROCEDURE — 90677 PCV20 VACCINE IM: CPT | Mod: S$GLB,,, | Performed by: INTERNAL MEDICINE

## 2025-07-15 PROCEDURE — 99214 OFFICE O/P EST MOD 30 MIN: CPT | Mod: S$GLB,,, | Performed by: INTERNAL MEDICINE

## 2025-07-15 NOTE — PROGRESS NOTES
"Subjective:      History of Present Illness    CHIEF COMPLAINT:  Nadeen presents with a history of neck, shoulder, and back pain that progressed to chest pain, prompting an emergency room visit.    HPI:  Nadeen reports an episode of pain that began with neck discomfort, initially attributed to incorrect sleeping position. The pain spread to her shoulder and back, progressing to chest pain. She had these symptoms for approximately 3 days before seeking medical attention. She attempted to manage the pain at home using hot and cold therapy, which was ineffective.    The pain intensified, leading to an emergency room visit. She had tight chest pain radiating to her right arm, with near-syncope and difficulty walking due to pain. At the emergency room, she received Toradol injection, muscle relaxers, and Tylenol, providing minimal relief.    She recalls attempting to turn a heavy mattress a few days before symptom onset, potentially precipitating the pain. Currently, the acute pain has subsided, but she occasionally has discomfort in her back, particularly in the area of pain origin. She reports increased caution with movements.    Tension in her latissimus dorsi muscle as the likely source of pain, demonstrated its effect on shoulder mobility and chest discomfort.    She denies current chest pain, shortness of breath, or difficulty moving her arm.      ROS:  Cardiovascular: +chest pain  Musculoskeletal: +body aches, +back pain, +limb pain  Neurological: +near-syncope, +difficulty falling asleep  Psychiatric: +anxiety  Neck: +neck pain           Past medical history, surgical history, and family medical history reviewed and updated as appropriate.    Medications and allergies reviewed.     Objective:          Vitals:    07/15/25 0841   BP: 110/60   BP Location: Right arm   Patient Position: Sitting   Pulse: 72   SpO2: 99%   Weight: 66 kg (145 lb 8.1 oz)   Height: 5' 2" (1.575 m)     Body mass index is 26.61 kg/m².  Physical " Exam  Constitutional:       Appearance: She is well-developed.   HENT:      Head: Normocephalic and atraumatic.   Eyes:      Pupils: Pupils are equal, round, and reactive to light.   Cardiovascular:      Rate and Rhythm: Normal rate and regular rhythm.      Heart sounds: Normal heart sounds.   Pulmonary:      Effort: Pulmonary effort is normal. No respiratory distress.      Breath sounds: Normal breath sounds. No wheezing.   Abdominal:      General: Bowel sounds are normal. There is no distension.      Palpations: Abdomen is soft.      Tenderness: There is no abdominal tenderness.   Musculoskeletal:         General: Tenderness present. Normal range of motion.      Cervical back: Normal range of motion.   Skin:     General: Skin is warm and dry.   Neurological:      Mental Status: She is alert and oriented to person, place, and time.      Cranial Nerves: No cranial nerve deficit.      Deep Tendon Reflexes: Reflexes are normal and symmetric.         Lab Results   Component Value Date    WBC 10.00 07/04/2025    HGB 13.1 07/04/2025    HCT 39.0 07/04/2025     07/04/2025    CHOL 121 12/04/2024    TRIG 48 12/04/2024    HDL 49 12/04/2024    ALT 12 07/04/2025    AST 20 07/04/2025     07/04/2025    K 4.1 07/04/2025     07/04/2025    CREATININE 0.7 07/04/2025    BUN 10 07/04/2025    CO2 21 (L) 07/04/2025    TSH 1.932 06/05/2024    INR 1.0 03/15/2023    HGBA1C 5.3 06/05/2024       Assessment:       1. Follow-up exam    2. Chronic right shoulder pain    3. Atherosclerosis of aorta    4. Immunosuppression    5. S/P liver transplant    6. Scleroderma    7. Encounter for lipid screening for cardiovascular disease    8. Screening for diabetes mellitus    9. Need for pneumococcal 20-valent conjugate vaccination          Plan:     Nadeen was seen today for follow-up.    Diagnoses and all orders for this visit:    Follow-up exam    Chronic right shoulder pain    Atherosclerosis of aorta  -     Lipid Panel;  Future    Immunosuppression    S/P liver transplant    Scleroderma  -     TSH; Future    Encounter for lipid screening for cardiovascular disease  -     Lipid Panel; Future    Screening for diabetes mellitus  -     Hemoglobin A1C; Future    Need for pneumococcal 20-valent conjugate vaccination  -     pneumoc 20-mary conj-dip cr(PF) (PREVNAR-20 (PF)) injection Syrg 0.5 mL    Benign physical examination, no issues identified. Will obtain routine labwork and age appropriate health screenings.   Stretching techniques discussed. Referral to PT offered, patient declines at this time.   On appropriate management for scleroderma, follows with rheumatology.   Blood pressure and HLD at goal, no changes to current management.   Patient is looking to cut back on THC use.     Health maintenance reviewed with patient.     Follow up in about 6 months (around 1/15/2026).    Alvin Dennis MD  Internal Medicine / Primary Care  [facility]  7/15/2025    This note was generated with the assistance of ambient listening technology. Verbal consent was obtained by the patient and accompanying visitor(s) for the recording of patient appointment to facilitate this note. I attest to having reviewed and edited the generated note for accuracy, though some syntax or spelling errors may persist. Please contact the author of this note for any clarification.

## 2025-07-15 NOTE — PATIENT INSTRUCTIONS
Pneumonia shot today.   Schedule fasting labwork  Return to clinic in 6 months or sooner if needed.

## 2025-07-16 ENCOUNTER — LAB VISIT (OUTPATIENT)
Dept: LAB | Facility: HOSPITAL | Age: 65
End: 2025-07-16
Attending: INTERNAL MEDICINE
Payer: MEDICARE

## 2025-07-16 DIAGNOSIS — M34.9 SCLERODERMA: ICD-10-CM

## 2025-07-16 DIAGNOSIS — I70.0 ATHEROSCLEROSIS OF AORTA: ICD-10-CM

## 2025-07-16 DIAGNOSIS — Z13.1 SCREENING FOR DIABETES MELLITUS: ICD-10-CM

## 2025-07-16 DIAGNOSIS — Z13.220 ENCOUNTER FOR LIPID SCREENING FOR CARDIOVASCULAR DISEASE: ICD-10-CM

## 2025-07-16 DIAGNOSIS — Z13.6 ENCOUNTER FOR LIPID SCREENING FOR CARDIOVASCULAR DISEASE: ICD-10-CM

## 2025-07-16 LAB
CHOLEST SERPL-MCNC: 115 MG/DL (ref 120–199)
CHOLEST/HDLC SERPL: 3 {RATIO} (ref 2–5)
EAG (OHS): 114 MG/DL (ref 68–131)
HBA1C MFR BLD: 5.6 % (ref 4–5.6)
HDLC SERPL-MCNC: 38 MG/DL (ref 40–75)
HDLC SERPL: 33 % (ref 20–50)
LDLC SERPL CALC-MCNC: 59 MG/DL (ref 63–159)
NONHDLC SERPL-MCNC: 77 MG/DL
TRIGL SERPL-MCNC: 90 MG/DL (ref 30–150)
TSH SERPL-ACNC: 1.29 UIU/ML (ref 0.4–4)

## 2025-07-16 PROCEDURE — 36415 COLL VENOUS BLD VENIPUNCTURE: CPT | Mod: PN

## 2025-07-16 PROCEDURE — 80061 LIPID PANEL: CPT

## 2025-07-16 PROCEDURE — 83036 HEMOGLOBIN GLYCOSYLATED A1C: CPT

## 2025-07-16 PROCEDURE — 84443 ASSAY THYROID STIM HORMONE: CPT

## 2025-08-13 ENCOUNTER — LAB VISIT (OUTPATIENT)
Dept: LAB | Facility: HOSPITAL | Age: 65
End: 2025-08-13
Attending: INTERNAL MEDICINE
Payer: MEDICARE

## 2025-08-13 DIAGNOSIS — M34.9 LIMITED SCLERODERMA: ICD-10-CM

## 2025-08-13 DIAGNOSIS — Z94.4 LIVER TRANSPLANT RECIPIENT: ICD-10-CM

## 2025-08-13 LAB
ABSOLUTE EOSINOPHIL (OHS): 0.18 K/UL
ABSOLUTE MONOCYTE (OHS): 0.54 K/UL (ref 0.3–1)
ABSOLUTE NEUTROPHIL COUNT (OHS): 3.71 K/UL (ref 1.8–7.7)
ALBUMIN SERPL BCP-MCNC: 4.5 G/DL (ref 3.5–5.2)
ALP SERPL-CCNC: 63 UNIT/L (ref 40–150)
ALT SERPL W/O P-5'-P-CCNC: 18 UNIT/L (ref 0–55)
ANION GAP (OHS): 8 MMOL/L (ref 8–16)
AST SERPL-CCNC: 20 UNIT/L (ref 0–50)
BASOPHILS # BLD AUTO: 0.05 K/UL
BASOPHILS NFR BLD AUTO: 0.8 %
BILIRUB SERPL-MCNC: 0.9 MG/DL (ref 0.1–1)
BUN SERPL-MCNC: 10 MG/DL (ref 8–23)
CALCIUM SERPL-MCNC: 9.4 MG/DL (ref 8.7–10.5)
CHLORIDE SERPL-SCNC: 106 MMOL/L (ref 95–110)
CO2 SERPL-SCNC: 26 MMOL/L (ref 23–29)
CREAT SERPL-MCNC: 0.7 MG/DL (ref 0.5–1.4)
CRP SERPL-MCNC: 0.8 MG/L
ERYTHROCYTE [DISTWIDTH] IN BLOOD BY AUTOMATED COUNT: 13.3 % (ref 11.5–14.5)
ERYTHROCYTE [SEDIMENTATION RATE] IN BLOOD BY PHOTOMETRIC METHOD: 23 MM/HR
GFR SERPLBLD CREATININE-BSD FMLA CKD-EPI: >60 ML/MIN/1.73/M2
GLUCOSE SERPL-MCNC: 92 MG/DL (ref 70–110)
HCT VFR BLD AUTO: 40.6 % (ref 37–48.5)
HGB BLD-MCNC: 13.6 GM/DL (ref 12–16)
IMM GRANULOCYTES # BLD AUTO: 0.01 K/UL (ref 0–0.04)
IMM GRANULOCYTES NFR BLD AUTO: 0.2 % (ref 0–0.5)
LYMPHOCYTES # BLD AUTO: 1.69 K/UL (ref 1–4.8)
MCH RBC QN AUTO: 31.8 PG (ref 27–31)
MCHC RBC AUTO-ENTMCNC: 33.5 G/DL (ref 32–36)
MCV RBC AUTO: 95 FL (ref 82–98)
NUCLEATED RBC (/100WBC) (OHS): 0 /100 WBC
PLATELET # BLD AUTO: 199 K/UL (ref 150–450)
PMV BLD AUTO: 11.4 FL (ref 9.2–12.9)
POTASSIUM SERPL-SCNC: 3.8 MMOL/L (ref 3.5–5.1)
PROT SERPL-MCNC: 7.7 GM/DL (ref 6–8.4)
RBC # BLD AUTO: 4.28 M/UL (ref 4–5.4)
RELATIVE EOSINOPHIL (OHS): 2.9 %
RELATIVE LYMPHOCYTE (OHS): 27.3 % (ref 18–48)
RELATIVE MONOCYTE (OHS): 8.7 % (ref 4–15)
RELATIVE NEUTROPHIL (OHS): 60.1 % (ref 38–73)
SODIUM SERPL-SCNC: 140 MMOL/L (ref 136–145)
WBC # BLD AUTO: 6.18 K/UL (ref 3.9–12.7)

## 2025-08-13 PROCEDURE — 85025 COMPLETE CBC W/AUTO DIFF WBC: CPT

## 2025-08-13 PROCEDURE — 85652 RBC SED RATE AUTOMATED: CPT

## 2025-08-13 PROCEDURE — 86140 C-REACTIVE PROTEIN: CPT

## 2025-08-13 PROCEDURE — 82040 ASSAY OF SERUM ALBUMIN: CPT

## 2025-08-13 PROCEDURE — 36415 COLL VENOUS BLD VENIPUNCTURE: CPT | Mod: PN

## 2025-08-14 RX ORDER — MYCOPHENOLATE MOFETIL 500 MG/1
TABLET ORAL
Qty: 540 TABLET | Refills: 0 | Status: ACTIVE | OUTPATIENT
Start: 2025-08-14

## 2025-08-19 ENCOUNTER — HOSPITAL ENCOUNTER (OUTPATIENT)
Dept: RADIOLOGY | Facility: HOSPITAL | Age: 65
Discharge: HOME | End: 2025-08-19
Attending: PHYSICIAN ASSISTANT
Payer: MEDICARE

## 2025-08-19 ENCOUNTER — OFFICE VISIT (OUTPATIENT)
Dept: INTERNAL MEDICINE | Facility: CLINIC | Age: 65
End: 2025-08-19
Payer: MEDICARE

## 2025-08-19 VITALS
DIASTOLIC BLOOD PRESSURE: 64 MMHG | WEIGHT: 145.06 LBS | BODY MASS INDEX: 26.69 KG/M2 | OXYGEN SATURATION: 99 % | SYSTOLIC BLOOD PRESSURE: 110 MMHG | HEART RATE: 66 BPM | HEIGHT: 62 IN

## 2025-08-19 DIAGNOSIS — M25.531 PAIN AND SWELLING OF RIGHT WRIST: ICD-10-CM

## 2025-08-19 DIAGNOSIS — M25.50 RECURRENT JOINT PAIN: ICD-10-CM

## 2025-08-19 DIAGNOSIS — M25.431 PAIN AND SWELLING OF RIGHT WRIST: ICD-10-CM

## 2025-08-19 DIAGNOSIS — M25.531 PAIN AND SWELLING OF RIGHT WRIST: Primary | ICD-10-CM

## 2025-08-19 DIAGNOSIS — M25.431 PAIN AND SWELLING OF RIGHT WRIST: Primary | ICD-10-CM

## 2025-08-19 PROCEDURE — 73100 X-RAY EXAM OF WRIST: CPT | Mod: 26,RT,, | Performed by: RADIOLOGY

## 2025-08-19 PROCEDURE — 3074F SYST BP LT 130 MM HG: CPT | Mod: CPTII,S$GLB,, | Performed by: PHYSICIAN ASSISTANT

## 2025-08-19 PROCEDURE — 3008F BODY MASS INDEX DOCD: CPT | Mod: CPTII,S$GLB,, | Performed by: PHYSICIAN ASSISTANT

## 2025-08-19 PROCEDURE — 99214 OFFICE O/P EST MOD 30 MIN: CPT | Mod: S$GLB,,, | Performed by: PHYSICIAN ASSISTANT

## 2025-08-19 PROCEDURE — 99999 PR PBB SHADOW E&M-EST. PATIENT-LVL V: CPT | Mod: PBBFAC,,, | Performed by: PHYSICIAN ASSISTANT

## 2025-08-19 PROCEDURE — 1159F MED LIST DOCD IN RCRD: CPT | Mod: CPTII,S$GLB,, | Performed by: PHYSICIAN ASSISTANT

## 2025-08-19 PROCEDURE — 1160F RVW MEDS BY RX/DR IN RCRD: CPT | Mod: CPTII,S$GLB,, | Performed by: PHYSICIAN ASSISTANT

## 2025-08-19 PROCEDURE — 3078F DIAST BP <80 MM HG: CPT | Mod: CPTII,S$GLB,, | Performed by: PHYSICIAN ASSISTANT

## 2025-08-19 PROCEDURE — 3044F HG A1C LEVEL LT 7.0%: CPT | Mod: CPTII,S$GLB,, | Performed by: PHYSICIAN ASSISTANT

## 2025-08-19 PROCEDURE — 73100 X-RAY EXAM OF WRIST: CPT | Mod: TC,RT

## 2025-08-19 RX ORDER — HYDROCODONE BITARTRATE AND ACETAMINOPHEN 5; 325 MG/1; MG/1
1 TABLET ORAL EVERY 8 HOURS PRN
Qty: 12 TABLET | Refills: 0 | Status: SHIPPED | OUTPATIENT
Start: 2025-08-19

## 2025-08-19 RX ORDER — METHYLPREDNISOLONE 4 MG/1
TABLET ORAL
Qty: 1 EACH | Refills: 0 | Status: SHIPPED | OUTPATIENT
Start: 2025-08-19

## 2025-09-02 ENCOUNTER — OFFICE VISIT (OUTPATIENT)
Dept: OBSTETRICS AND GYNECOLOGY | Facility: CLINIC | Age: 65
End: 2025-09-02
Payer: MEDICARE

## 2025-09-02 VITALS
BODY MASS INDEX: 26.43 KG/M2 | HEIGHT: 62 IN | WEIGHT: 143.63 LBS | DIASTOLIC BLOOD PRESSURE: 68 MMHG | HEART RATE: 65 BPM | SYSTOLIC BLOOD PRESSURE: 126 MMHG

## 2025-09-02 DIAGNOSIS — Z01.411 ENCNTR FOR GYN EXAM (GENERAL) (ROUTINE) W ABNORMAL FINDINGS: Primary | ICD-10-CM

## 2025-09-02 DIAGNOSIS — N95.2 VAGINAL ATROPHY: ICD-10-CM

## 2025-09-02 DIAGNOSIS — Z12.4 ENCOUNTER FOR SCREENING FOR MALIGNANT NEOPLASM OF CERVIX: ICD-10-CM

## 2025-09-02 DIAGNOSIS — Z12.39 ENCOUNTER FOR SCREENING BREAST EXAMINATION: ICD-10-CM

## 2025-09-02 DIAGNOSIS — D22.9 SKIN MOLE: ICD-10-CM

## 2025-09-02 PROCEDURE — 87624 HPV HI-RISK TYP POOLED RSLT: CPT | Performed by: NURSE PRACTITIONER

## 2025-09-02 PROCEDURE — 88175 CYTOPATH C/V AUTO FLUID REDO: CPT | Mod: TC | Performed by: NURSE PRACTITIONER

## 2025-09-02 PROCEDURE — 99999 PR PBB SHADOW E&M-EST. PATIENT-LVL IV: CPT | Mod: PBBFAC,,, | Performed by: NURSE PRACTITIONER

## 2025-09-02 RX ORDER — ESTRADIOL 0.1 MG/G
1 CREAM VAGINAL
Qty: 42.5 G | Refills: 2 | Status: SHIPPED | OUTPATIENT
Start: 2025-09-03 | End: 2026-09-03

## 2025-09-03 ENCOUNTER — OFFICE VISIT (OUTPATIENT)
Dept: HEMATOLOGY/ONCOLOGY | Facility: CLINIC | Age: 65
End: 2025-09-03
Payer: MEDICARE

## 2025-09-03 VITALS
TEMPERATURE: 98 F | DIASTOLIC BLOOD PRESSURE: 60 MMHG | HEART RATE: 74 BPM | HEIGHT: 62 IN | SYSTOLIC BLOOD PRESSURE: 122 MMHG | WEIGHT: 143.19 LBS | RESPIRATION RATE: 17 BRPM | OXYGEN SATURATION: 99 % | BODY MASS INDEX: 26.35 KG/M2

## 2025-09-03 DIAGNOSIS — R76.8 ELEVATED SERUM IMMUNOGLOBULIN FREE LIGHT CHAINS: Primary | ICD-10-CM

## 2025-09-03 PROCEDURE — 99999 PR PBB SHADOW E&M-EST. PATIENT-LVL IV: CPT | Mod: PBBFAC,,,

## 2025-09-05 ENCOUNTER — OFFICE VISIT (OUTPATIENT)
Dept: RHEUMATOLOGY | Facility: CLINIC | Age: 65
End: 2025-09-05
Payer: MEDICARE

## 2025-09-05 ENCOUNTER — HOSPITAL ENCOUNTER (OUTPATIENT)
Dept: PULMONOLOGY | Facility: CLINIC | Age: 65
Discharge: HOME | End: 2025-09-05
Payer: MEDICARE

## 2025-09-05 ENCOUNTER — HOSPITAL ENCOUNTER (OUTPATIENT)
Dept: RADIOLOGY | Facility: HOSPITAL | Age: 65
Discharge: HOME | End: 2025-09-05
Attending: INTERNAL MEDICINE
Payer: MEDICARE

## 2025-09-05 VITALS
BODY MASS INDEX: 26.25 KG/M2 | SYSTOLIC BLOOD PRESSURE: 103 MMHG | HEART RATE: 70 BPM | HEIGHT: 62 IN | DIASTOLIC BLOOD PRESSURE: 68 MMHG | WEIGHT: 142.63 LBS

## 2025-09-05 DIAGNOSIS — M34.9 LIMITED SCLERODERMA: ICD-10-CM

## 2025-09-05 DIAGNOSIS — M25.572 ACUTE LEFT ANKLE PAIN: ICD-10-CM

## 2025-09-05 DIAGNOSIS — M34.9 LIMITED SCLERODERMA: Primary | ICD-10-CM

## 2025-09-05 DIAGNOSIS — Z94.4 LIVER TRANSPLANT RECIPIENT: ICD-10-CM

## 2025-09-05 DIAGNOSIS — M34.9 SCLERODERMA: ICD-10-CM

## 2025-09-05 LAB
DLCO ADJ PRE: 13.7 ML/(MIN*MMHG) (ref 15.19–26.65)
DLCO SINGLE BREATH LLN: 15.19
DLCO SINGLE BREATH PRE REF: 65.1 %
DLCO SINGLE BREATH REF: 20.92
DLCOC SBVA LLN: 2.96
DLCOC SBVA PRE REF: 64.9 %
DLCOC SBVA REF: 4.54
DLCOC SINGLE BREATH LLN: 15.19
DLCOC SINGLE BREATH PRE REF: 65.5 %
DLCOC SINGLE BREATH REF: 20.92
DLCOCSBVAULN: 6.13
DLCOCSINGLEBREATHULN: 26.65
DLCOCSINGLEBREATHZSCORE: -2.07
DLCOSINGLEBREATHULN: 26.65
DLCOSINGLEBREATHZSCORE: -2.1
DLCOVA LLN: 2.96
DLCOVA PRE REF: 64.5 %
DLCOVA PRE: 2.93 ML/(MIN*MMHG*L) (ref 2.96–6.13)
DLCOVA REF: 4.54
DLCOVAULN: 6.13
DLVAADJ PRE: 2.95 ML/(MIN*MMHG*L) (ref 2.96–6.13)
ERV LLN: -16449.28
ERV PRE REF: 162.4 %
ERV REF: 0.72
ERVULN: ABNORMAL
FEF 25 75 LLN: 0.71
FEF 25 75 PRE REF: 197.5 %
FEF 25 75 REF: 1.74
FEV05 LLN: 0.83
FEV05 REF: 1.69
FEV1 FVC LLN: 67
FEV1 FVC PRE REF: 104.7 %
FEV1 FVC REF: 79
FEV1 LLN: 1.36
FEV1 PRE REF: 150.8 %
FEV1 REF: 1.9
FRCPLETH LLN: 1.77
FRCPLETH PREREF: 109.5 %
FRCPLETH REF: 2.59
FRCPLETHULN: 3.41
FVC LLN: 1.74
FVC PRE REF: 143.5 %
FVC REF: 2.4
INSULIN SERPL-ACNC: NORMAL U[IU]/ML
IVC PRE: 3.42 L (ref 1.74–3.1)
IVC SINGLE BREATH LLN: 1.74
IVC SINGLE BREATH PRE REF: 142.3 %
IVC SINGLE BREATH REF: 2.4
IVCSINGLEBREATHULN: 3.1
LAB AP BETHESDA CATEGORY: NORMAL
LAB AP CLINICAL FINDINGS: NORMAL
LAB AP CONTRACEPTIVES: NORMAL
LAB AP LMP DATE: NORMAL
LAB AP OCHS PAP SPECIMEN ADEQUACY: NORMAL
LAB AP OHS PAP INTERPRETATION: NORMAL
LAB AP PAP DISCLAIMER COMMENTS: NORMAL
LAB AP PAP ESTROGEN REPLACEMENT THERAPY: NORMAL
LAB AP PAP PMP: NORMAL
LAB AP PAP PREVIOUS BX: NORMAL
LAB AP PAP PRIOR TREATMENT: NORMAL
LAB AP PERFORMING LOCATION(S): NORMAL
LLN IC: -16448.19
PEF LLN: 3.12
PEF PRE REF: 142.1 %
PEF REF: 5
PRE DLCO: 13.61 ML/(MIN*MMHG) (ref 15.19–26.65)
PRE ERV: 1.17 L (ref -16449.28–16450.72)
PRE FEF 25 75: 3.44 L/S (ref 0.71–3.27)
PRE FET 100: 6.12 SEC
PRE FEV05 REF: 141.1 %
PRE FEV1 FVC: 83.18 % (ref 67.16–90.04)
PRE FEV1: 2.86 L (ref 1.36–2.42)
PRE FEV5: 2.38 L (ref 0.83–2.55)
PRE FRC PL: 2.84 L (ref 1.77–3.41)
PRE FVC: 3.44 L (ref 1.74–3.1)
PRE IC: 2.28 L (ref -16448.19–16451.81)
PRE PEF: 7.1 L/S (ref 3.12–6.88)
PRE REF IC: 126.2 %
PRE RV: 1.67 L (ref 1.3–2.45)
PRE TLC: 5.12 L (ref 3.62–5.59)
RAW PRE REF: 87 %
RAW PRE: 2.66 CMH2O*S/L (ref 3.06–3.06)
RAW REF: 3.06
REF IC: 1.81
RV LLN: 1.3
RV PRE REF: 89.2 %
RV REF: 1.87
RVTLC LLN: 31
RVTLC PRE REF: 80.3 %
RVTLC PRE: 32.69 % (ref 31.13–50.31)
RVTLC REF: 41
RVTLCULN: 50
RVULN: 2.45
SGAW PRE REF: 120.5 %
SGAW PRE: 0.12 1/(CMH2O*S) (ref 0.1–0.1)
SGAW REF: 0.1
TLC LLN: 3.62
TLC PRE REF: 111.1 %
TLC REF: 4.6
TLC ULN: 5.59
ULN IC: ABNORMAL
VA PRE: 4.64 L (ref 4.45–4.45)
VA SINGLE BREATH LLN: 4.45
VA SINGLE BREATH PRE REF: 104.3 %
VA SINGLE BREATH REF: 4.45
VASINGLEBREATHULN: 4.45
VC LLN: 1.74
VC PRE REF: 143.5 %
VC PRE: 3.44 L (ref 1.74–3.1)
VC REF: 2.4
VC ULN: 3.1

## 2025-09-05 PROCEDURE — 99999 PR PBB SHADOW E&M-EST. PATIENT-LVL IV: CPT | Mod: PBBFAC,,, | Performed by: INTERNAL MEDICINE

## 2025-09-05 PROCEDURE — 73610 X-RAY EXAM OF ANKLE: CPT | Mod: 26,LT,, | Performed by: RADIOLOGY

## 2025-09-05 PROCEDURE — 73610 X-RAY EXAM OF ANKLE: CPT | Mod: TC,LT

## 2025-09-05 RX ORDER — MYCOPHENOLATE MOFETIL 500 MG/1
TABLET ORAL
Qty: 540 TABLET | Refills: 0 | Status: ACTIVE | OUTPATIENT
Start: 2025-09-05

## 2025-09-05 RX ORDER — DICLOFENAC SODIUM 10 MG/G
2 GEL TOPICAL 4 TIMES DAILY
Qty: 150 G | Refills: 0 | Status: SHIPPED | OUTPATIENT
Start: 2025-09-05

## 2025-09-05 RX ORDER — SILDENAFIL CITRATE 20 MG/1
TABLET ORAL
Qty: 180 TABLET | Refills: 3 | Status: ACTIVE | OUTPATIENT
Start: 2025-09-05

## 2025-09-05 RX ORDER — AMLODIPINE BESYLATE 5 MG/1
5 TABLET ORAL DAILY
Qty: 90 TABLET | Refills: 3 | Status: SHIPPED | OUTPATIENT
Start: 2025-09-05

## 2025-09-05 ASSESSMENT — ROUTINE ASSESSMENT OF PATIENT INDEX DATA (RAPID3)
TOTAL RAPID3 SCORE: 2.61
MDHAQ FUNCTION SCORE: 0.7
PSYCHOLOGICAL DISTRESS SCORE: 2.2
FATIGUE SCORE: 2.5
PAIN SCORE: 0
AM STIFFNESS SCORE: 0, NO
PATIENT GLOBAL ASSESSMENT SCORE: 5.5

## 2025-09-06 ENCOUNTER — RESULTS FOLLOW-UP (OUTPATIENT)
Dept: RHEUMATOLOGY | Facility: CLINIC | Age: 65
End: 2025-09-06
Payer: MEDICARE